# Patient Record
Sex: MALE | Race: OTHER | HISPANIC OR LATINO
[De-identification: names, ages, dates, MRNs, and addresses within clinical notes are randomized per-mention and may not be internally consistent; named-entity substitution may affect disease eponyms.]

---

## 2021-08-27 DIAGNOSIS — Z98.890 OTHER SPECIFIED POSTPROCEDURAL STATES: ICD-10-CM

## 2021-08-27 DIAGNOSIS — Z78.9 OTHER SPECIFIED HEALTH STATUS: ICD-10-CM

## 2021-08-27 DIAGNOSIS — Z80.9 FAMILY HISTORY OF MALIGNANT NEOPLASM, UNSPECIFIED: ICD-10-CM

## 2021-08-27 DIAGNOSIS — Z87.891 PERSONAL HISTORY OF NICOTINE DEPENDENCE: ICD-10-CM

## 2021-08-27 RX ORDER — ENTECAVIR 0.5 MG/1
0.5 TABLET, FILM COATED ORAL DAILY
Refills: 0 | Status: ACTIVE | COMMUNITY

## 2021-08-27 RX ORDER — PENICILLIN V POTASSIUM 250 MG/1
250 TABLET, FILM COATED ORAL TWICE DAILY
Refills: 0 | Status: ACTIVE | COMMUNITY

## 2021-08-27 RX ORDER — ACETAMINOPHEN 500 MG/1
500 TABLET ORAL EVERY 6 HOURS
Refills: 0 | Status: ACTIVE | COMMUNITY

## 2021-08-30 ENCOUNTER — RESULT REVIEW (OUTPATIENT)
Age: 66
End: 2021-08-30

## 2021-08-30 ENCOUNTER — APPOINTMENT (OUTPATIENT)
Dept: CARDIOLOGY | Facility: CLINIC | Age: 66
End: 2021-08-30
Payer: MEDICARE

## 2021-08-30 ENCOUNTER — OUTPATIENT (OUTPATIENT)
Dept: OUTPATIENT SERVICES | Facility: HOSPITAL | Age: 66
LOS: 1 days | Discharge: HOME | End: 2021-08-30

## 2021-08-30 VITALS
OXYGEN SATURATION: 97 % | HEIGHT: 73 IN | RESPIRATION RATE: 16 BRPM | TEMPERATURE: 97.3 F | BODY MASS INDEX: 25.74 KG/M2 | SYSTOLIC BLOOD PRESSURE: 128 MMHG | WEIGHT: 194.22 LBS | HEART RATE: 65 BPM | DIASTOLIC BLOOD PRESSURE: 74 MMHG

## 2021-08-30 DIAGNOSIS — E78.5 HYPERLIPIDEMIA, UNSPECIFIED: ICD-10-CM

## 2021-08-30 DIAGNOSIS — D64.9 ANEMIA, UNSPECIFIED: ICD-10-CM

## 2021-08-30 DIAGNOSIS — I71.2 THORACIC AORTIC ANEURYSM, W/OUT RUPTURE: ICD-10-CM

## 2021-08-30 LAB
ACANTHOCYTES BLD QL SMEAR: SLIGHT — SIGNIFICANT CHANGE UP
ALBUMIN SERPL ELPH-MCNC: 4.7 G/DL
ALP BLD-CCNC: 74 U/L
ALT SERPL-CCNC: 18 U/L
ANION GAP SERPL CALC-SCNC: 13 MMOL/L
ANISOCYTOSIS BLD QL: SLIGHT — SIGNIFICANT CHANGE UP
AST SERPL-CCNC: 24 U/L
BASOPHILS # BLD AUTO: 0.11 K/UL — SIGNIFICANT CHANGE UP (ref 0–0.2)
BASOPHILS NFR BLD AUTO: 2 % — SIGNIFICANT CHANGE UP (ref 0–2)
BILIRUB SERPL-MCNC: 0.9 MG/DL
BUN SERPL-MCNC: 17 MG/DL
CALCIUM SERPL-MCNC: 9.5 MG/DL
CHLORIDE SERPL-SCNC: 105 MMOL/L
CHOLEST SERPL-MCNC: 104 MG/DL
CO2 SERPL-SCNC: 21 MMOL/L
CREAT SERPL-MCNC: 1.06 MG/DL
DACRYOCYTES BLD QL SMEAR: SLIGHT — SIGNIFICANT CHANGE UP
EOSINOPHIL # BLD AUTO: 0.39 K/UL — SIGNIFICANT CHANGE UP (ref 0–0.5)
EOSINOPHIL NFR BLD AUTO: 7 % — HIGH (ref 0–6)
ESTIMATED AVERAGE GLUCOSE: 123 MG/DL
GLUCOSE SERPL-MCNC: 104 MG/DL
HBA1C MFR BLD HPLC: 5.9 %
HCT VFR BLD CALC: 33.5 % — LOW (ref 39–50)
HDLC SERPL-MCNC: 42 MG/DL
HGB BLD-MCNC: 11.5 G/DL — LOW (ref 13–17)
LDLC SERPL CALC-MCNC: 38 MG/DL
LYMPHOCYTES # BLD AUTO: 2.45 K/UL — SIGNIFICANT CHANGE UP (ref 1–3.3)
LYMPHOCYTES # BLD AUTO: 44 % — SIGNIFICANT CHANGE UP (ref 13–44)
MCHC RBC-ENTMCNC: 34.3 G/DL — SIGNIFICANT CHANGE UP (ref 32–36)
MCHC RBC-ENTMCNC: 35.5 PG — HIGH (ref 27–34)
MCV RBC AUTO: 103.4 FL — HIGH (ref 80–100)
MONOCYTES # BLD AUTO: 0.78 K/UL — SIGNIFICANT CHANGE UP (ref 0–0.9)
MONOCYTES NFR BLD AUTO: 14 % — SIGNIFICANT CHANGE UP (ref 2–14)
MYELOCYTES NFR BLD: 1 % — HIGH (ref 0–0)
NEUTROPHILS # BLD AUTO: 1.78 K/UL — LOW (ref 1.8–7.4)
NEUTROPHILS NFR BLD AUTO: 32 % — LOW (ref 43–77)
NONHDLC SERPL-MCNC: 63 MG/DL
NRBC # BLD: 0 /100 — SIGNIFICANT CHANGE UP (ref 0–0)
NRBC # BLD: SIGNIFICANT CHANGE UP /100 WBCS (ref 0–0)
NT-PROBNP SERPL-MCNC: 211 PG/ML
PLAT MORPH BLD: NORMAL — SIGNIFICANT CHANGE UP
PLATELET # BLD AUTO: 165 K/UL — SIGNIFICANT CHANGE UP (ref 150–400)
POIKILOCYTOSIS BLD QL AUTO: SLIGHT — SIGNIFICANT CHANGE UP
POTASSIUM SERPL-SCNC: 4.2 MMOL/L
PROT SERPL-MCNC: 7.2 G/DL
RBC # BLD: 3.24 M/UL — LOW (ref 4.2–5.8)
RBC # FLD: 17 % — HIGH (ref 10.3–14.5)
RBC BLD AUTO: ABNORMAL
SODIUM SERPL-SCNC: 139 MMOL/L
TARGETS BLD QL SMEAR: SLIGHT — SIGNIFICANT CHANGE UP
TRIGL SERPL-MCNC: 123 MG/DL
WBC # BLD: 5.56 K/UL — SIGNIFICANT CHANGE UP (ref 3.8–10.5)
WBC # FLD AUTO: 5.56 K/UL — SIGNIFICANT CHANGE UP (ref 3.8–10.5)

## 2021-08-30 PROCEDURE — 93000 ELECTROCARDIOGRAM COMPLETE: CPT

## 2021-08-30 PROCEDURE — 99204 OFFICE O/P NEW MOD 45 MIN: CPT

## 2021-08-30 NOTE — REASON FOR VISIT
[Symptom and Test Evaluation] : symptom and test evaluation [Structural Heart and Valve Disease] : structural heart and valve disease [Coronary Artery Disease] : coronary artery disease [Spouse] : spouse [FreeTextEntry3] : [facility] [place]

## 2021-08-30 NOTE — PHYSICAL EXAM
[Well Developed] : well developed [Well Nourished] : well nourished [No Acute Distress] : no acute distress [Normal Conjunctiva] : normal conjunctiva [No Xanthelasma] : no xanthelasma [Normal Venous Pressure] : normal venous pressure [No Carotid Bruit] : no carotid bruit [Normal S1, S2] : normal S1, S2 [No Murmur] : no murmur [No Rub] : no rub [No Gallop] : no gallop [Clear Lung Fields] : clear lung fields [Good Air Entry] : good air entry [No Respiratory Distress] : no respiratory distress  [Soft] : abdomen soft [Non Tender] : non-tender [Normal Gait] : normal gait [Gait - Sufficient for Exercise Testing] : gait - sufficient for exercise testing [No Edema] : no edema [No Cyanosis] : no cyanosis [No Clubbing] : no clubbing [No Varicosities] : no varicosities [Normal Radial B/L] : normal radial B/L [No Rash] : no rash [Moves all extremities] : moves all extremities [No Focal Deficits] : no focal deficits [Normal Speech] : normal speech [Alert and Oriented] : alert and oriented [Normal memory] : normal memory

## 2021-08-30 NOTE — HISTORY OF PRESENT ILLNESS
[FreeTextEntry1] : PETER DE LA PAZ is a 65 year man with a history of multiple myeloma s/p CyBorD x2, followed by 4 cycles of daratumumab, carfilzomib, lenalidomide, with dexamethasone followed by autologous SCT (melphalan) in 10/2018, HTN, HLD, and an ascending aortic aneurysm which was repaired with bioprosthetic AV prosthesis 7/3/2019 at [facility]. He is referred for chest pain and dyspnea on exertion. Last saw Cardiologist Sept 2019, Dr. Dutton at [facility] [place].\par \par He reports retrosternal chest pain which comes and goes, especially after exerting himself (gardening or household repairs). He also notes dyspnea on exertion, when walking, which has been present for a few years but worsened in the past few months. No syncope. No palpitations. No recent stress test. \par \par Myeloma was discovered after he presented with chest pain. 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 The most recent PET scan done a few months ago showed no evidence of new disease or skeletal involvement. He remains on lenalidomide maintenance with good control.\par  \par \par Other Medical History:\par splenectomy, partial gastrectomy, partial pancreatectomy\par Aortic valve replacement (bovine, 27mm) Dr. Payne [facility], 2019.\par Titanium kevin in right femur, plasmacytoma\par \par \par

## 2021-08-30 NOTE — REVIEW OF SYSTEMS
[Dyspnea on exertion] : dyspnea during exertion [Chest Discomfort] : chest discomfort [Lower Ext Edema] : lower extremity edema [Leg Claudication] : no intermittent leg claudication [Palpitations] : no palpitations [Orthopnea] : no orthopnea [PND] : no PND [Syncope] : no syncope [Abdominal Pain] : abdominal pain [Numbness (Hypoesthesia)] : numbness [Negative] : Heme/Lymph

## 2021-08-30 NOTE — ASSESSMENT
[FreeTextEntry1] : 65 year old man with coronary artery calcifications on chest CT, thoracic aortic aneursym s/p repair and bioprosthetic AVR in 2019, multiple myeloma in remission after above treatments, SCT, currently on maintenance lenalidomide who is seen for exertional chest pain and dyspnea on exertion.\par \par Lipid panel, A1c\par Exercise stress test\par Echo\par Follow up by phone after the above\par \par Plan of care and recommendations discussed with the patient and all questions answered to the best of my ability and to their apparent satisfaction.

## 2021-09-15 ENCOUNTER — APPOINTMENT (OUTPATIENT)
Dept: CV DIAGNOSITCS | Facility: HOSPITAL | Age: 66
End: 2021-09-15
Payer: MEDICARE

## 2021-09-15 ENCOUNTER — OUTPATIENT (OUTPATIENT)
Dept: OUTPATIENT SERVICES | Facility: HOSPITAL | Age: 66
LOS: 1 days | End: 2021-09-15

## 2021-09-15 ENCOUNTER — APPOINTMENT (OUTPATIENT)
Dept: CV DIAGNOSTICS | Facility: HOSPITAL | Age: 66
End: 2021-09-15
Payer: MEDICARE

## 2021-09-15 DIAGNOSIS — R07.2 PRECORDIAL PAIN: ICD-10-CM

## 2021-09-15 PROCEDURE — 93306 TTE W/DOPPLER COMPLETE: CPT | Mod: 26

## 2021-09-15 PROCEDURE — 93018 CV STRESS TEST I&R ONLY: CPT | Mod: GC

## 2021-09-15 PROCEDURE — 93016 CV STRESS TEST SUPVJ ONLY: CPT | Mod: GC

## 2021-09-20 RX ORDER — METOPROLOL TARTRATE 25 MG/1
25 TABLET, FILM COATED ORAL TWICE DAILY
Refills: 0 | Status: DISCONTINUED | COMMUNITY
End: 2021-09-20

## 2021-10-19 ENCOUNTER — NON-APPOINTMENT (OUTPATIENT)
Age: 66
End: 2021-10-19

## 2021-11-27 PROBLEM — Z92.21 HISTORY OF CHEMOTHERAPY: Status: ACTIVE | Noted: 2021-08-27

## 2021-11-27 PROBLEM — R06.00 DYSPNEA ON EXERTION: Status: ACTIVE | Noted: 2021-08-30

## 2021-11-27 NOTE — CARDIOLOGY SUMMARY
[de-identified] : 11/29/2021:\par 8/30/2021: NSR at normal. HR 62 bpm. QTc 414  [de-identified] : 9/15/2021: Exercise treadmill stress test, 4 METS. ST depressions II, III, AvF, V4-V6 during stress, persisting into recovery.  [de-identified] : 9/15/2021: Normal LV function, LVEF 66 %, Bioprosthetic aortic valve replacement. Peak transaortic\par valve gradient equals 3 mm Hg, mean transaortic valve gradient equals 2 mm Hg, which is probably normal in the\par presence of a prosthetic valve. No obvious aortic valve regurgitation seen.

## 2021-11-27 NOTE — HISTORY OF PRESENT ILLNESS
[FreeTextEntry1] : PEETR DE LA PAZ is a 66 year man with a history of multiple myeloma s/p CyBorD x2, 4 cycles of daratumumab, carfilzomib, lenalidomide, with dexamethasone followed by autologous SCT (melphalan) in 10/2018, HTN, HLD, and an ascending aortic aneurysm which was repaired with bioprosthetic AV replacement 7/3/2019 at [facility] who comes for follow up today.\par \par Interval History:\par Increased metoprolol to 50 mg BID.\par \par History:\par He is referred for chest pain and dyspnea on exertion. Last saw Cardiologist Sept 2019, Dr. Dutton at [facility] [place].\par \par He reports retrosternal chest pain which comes and goes, especially after exerting himself (gardening or household repairs). He also notes dyspnea on exertion, when walking, which has been present for a few years but worsened in the past few months. No syncope. No palpitations. No recent stress test. \par \par Myeloma was discovered after he presented with chest pain. 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 The most recent PET scan done a few months ago showed no evidence of new disease or skeletal involvement. He remains on lenalidomide maintenance with good control.\par  \par \par Other Medical History:\par splenectomy, partial gastrectomy, partial pancreatectomy\par Aortic valve replacement (bovine, 27mm) Dr. 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

## 2021-11-27 NOTE — REASON FOR VISIT
[Structural Heart and Valve Disease] : structural heart and valve disease [Coronary Artery Disease] : coronary artery disease [[place]Entry3[place] [facility] [place]

## 2021-11-29 ENCOUNTER — APPOINTMENT (OUTPATIENT)
Dept: CARDIOLOGY | Facility: CLINIC | Age: 66
End: 2021-11-29

## 2021-11-29 DIAGNOSIS — Z92.21 PERSONAL HISTORY OF ANTINEOPLASTIC CHEMOTHERAPY: ICD-10-CM

## 2021-11-29 DIAGNOSIS — R06.00 DYSPNEA, UNSPECIFIED: ICD-10-CM

## 2022-02-19 ENCOUNTER — TRANSCRIPTION ENCOUNTER (OUTPATIENT)
Age: 67
End: 2022-02-19

## 2022-08-01 ENCOUNTER — APPOINTMENT (OUTPATIENT)
Dept: CARDIOLOGY | Facility: CLINIC | Age: 67
End: 2022-08-01

## 2022-08-01 ENCOUNTER — RESULT REVIEW (OUTPATIENT)
Age: 67
End: 2022-08-01

## 2022-08-01 ENCOUNTER — OUTPATIENT (OUTPATIENT)
Dept: OUTPATIENT SERVICES | Facility: HOSPITAL | Age: 67
LOS: 1 days | Discharge: HOME | End: 2022-08-01

## 2022-08-01 VITALS
BODY MASS INDEX: 26.18 KG/M2 | WEIGHT: 198.42 LBS | RESPIRATION RATE: 16 BRPM | SYSTOLIC BLOOD PRESSURE: 118 MMHG | TEMPERATURE: 97.3 F | OXYGEN SATURATION: 99 % | HEART RATE: 58 BPM | DIASTOLIC BLOOD PRESSURE: 67 MMHG

## 2022-08-01 DIAGNOSIS — C90.01 MULTIPLE MYELOMA IN REMISSION: ICD-10-CM

## 2022-08-01 DIAGNOSIS — R07.2 PRECORDIAL PAIN: ICD-10-CM

## 2022-08-01 DIAGNOSIS — D64.9 ANEMIA, UNSPECIFIED: ICD-10-CM

## 2022-08-01 LAB
BASOPHILS # BLD AUTO: 0.07 K/UL — SIGNIFICANT CHANGE UP (ref 0–0.2)
BASOPHILS NFR BLD AUTO: 1.2 % — SIGNIFICANT CHANGE UP (ref 0–2)
EOSINOPHIL # BLD AUTO: 0.36 K/UL — SIGNIFICANT CHANGE UP (ref 0–0.5)
EOSINOPHIL NFR BLD AUTO: 6.2 % — HIGH (ref 0–6)
HCT VFR BLD CALC: 30.5 % — LOW (ref 39–50)
HGB BLD-MCNC: 10.6 G/DL — LOW (ref 13–17)
IMM GRANULOCYTES NFR BLD AUTO: 0.2 % — SIGNIFICANT CHANGE UP (ref 0–1.5)
LYMPHOCYTES # BLD AUTO: 2.74 K/UL — SIGNIFICANT CHANGE UP (ref 1–3.3)
LYMPHOCYTES # BLD AUTO: 47.2 % — HIGH (ref 13–44)
MCHC RBC-ENTMCNC: 34.8 G/DL — SIGNIFICANT CHANGE UP (ref 32–36)
MCHC RBC-ENTMCNC: 36.8 PG — HIGH (ref 27–34)
MCV RBC AUTO: 105.9 FL — HIGH (ref 80–100)
MONOCYTES # BLD AUTO: 1.16 K/UL — HIGH (ref 0–0.9)
MONOCYTES NFR BLD AUTO: 20 % — HIGH (ref 2–14)
NEUTROPHILS # BLD AUTO: 1.47 K/UL — LOW (ref 1.8–7.4)
NEUTROPHILS NFR BLD AUTO: 25.2 % — LOW (ref 43–77)
NRBC # BLD: 0 /100 WBCS — SIGNIFICANT CHANGE UP (ref 0–0)
PLATELET # BLD AUTO: 146 K/UL — LOW (ref 150–400)
RBC # BLD: 2.88 M/UL — LOW (ref 4.2–5.8)
RBC # FLD: 17 % — HIGH (ref 10.3–14.5)
WBC # BLD: 5.81 K/UL — SIGNIFICANT CHANGE UP (ref 3.8–10.5)
WBC # FLD AUTO: 5.81 K/UL — SIGNIFICANT CHANGE UP (ref 3.8–10.5)

## 2022-08-01 PROCEDURE — 99215 OFFICE O/P EST HI 40 MIN: CPT | Mod: 25

## 2022-08-01 PROCEDURE — 93000 ELECTROCARDIOGRAM COMPLETE: CPT

## 2022-08-01 PROCEDURE — 93010 ELECTROCARDIOGRAM REPORT: CPT

## 2022-08-01 RX ORDER — ASPIRIN ENTERIC COATED TABLETS 81 MG 81 MG/1
81 TABLET, DELAYED RELEASE ORAL
Refills: 0 | Status: ACTIVE | COMMUNITY

## 2022-08-01 RX ORDER — AZITHROMYCIN 250 MG/1
250 TABLET, FILM COATED ORAL
Qty: 6 | Refills: 0 | Status: COMPLETED | COMMUNITY
Start: 2022-07-25

## 2022-08-02 LAB
ALBUMIN SERPL ELPH-MCNC: 4.5 G/DL
ALP BLD-CCNC: 69 U/L
ALT SERPL-CCNC: 14 U/L
ANION GAP SERPL CALC-SCNC: 9 MMOL/L
AST SERPL-CCNC: 19 U/L
BILIRUB SERPL-MCNC: 0.6 MG/DL
BUN SERPL-MCNC: 17 MG/DL
CALCIUM SERPL-MCNC: 9.6 MG/DL
CHLORIDE SERPL-SCNC: 106 MMOL/L
CHOLEST SERPL-MCNC: 105 MG/DL
CO2 SERPL-SCNC: 24 MMOL/L
CREAT SERPL-MCNC: 1.1 MG/DL
EGFR: 74 ML/MIN/1.73M2
ESTIMATED AVERAGE GLUCOSE: 120 MG/DL
GLUCOSE SERPL-MCNC: 90 MG/DL
HBA1C MFR BLD HPLC: 5.8 %
HDLC SERPL-MCNC: 46 MG/DL
LDLC SERPL CALC-MCNC: 34 MG/DL
NONHDLC SERPL-MCNC: 59 MG/DL
POTASSIUM SERPL-SCNC: 4.4 MMOL/L
PROT SERPL-MCNC: 7.2 G/DL
SODIUM SERPL-SCNC: 139 MMOL/L
TRIGL SERPL-MCNC: 123 MG/DL

## 2022-08-02 NOTE — HISTORY OF PRESENT ILLNESS
[FreeTextEntry1] : Interval History:\par Increased metoprolol to 50 mg BID for abnormal exercise stress test. He reports he felt better after this with no further chest pain. No exertional symptoms. No change in exercise capacity. Missed last follow up appointment due to pandemic, but denies any new complaints today and reports feeling well. Remains on maintenance lenalidomide.\par \par History:\par He is referred for chest pain and dyspnea on exertion. Last saw Cardiologist Sept 2019, Dr. Dutton at [facility] [place].\par \par He reports retrosternal chest pain which comes and goes, especially after exerting himself (gardening or household repairs). He also notes dyspnea on exertion, when walking, which has been present for a few years but worsened in the past few months. No syncope. No palpitations. No recent stress test. \par \par Myeloma was discovered after he presented with chest pain. 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 The most recent PET scan done a few months ago showed no evidence of new disease or skeletal involvement. He remains on lenalidomide maintenance with good control.\par  \par Other Medical History:\par splenectomy, partial gastrectomy, partial pancreatectomy\par Aortic valve replacement (bovine, 27mm) Dr. 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

## 2022-08-02 NOTE — PHYSICAL EXAM
[Normal] : normal venous pressure, no carotid bruit [Normal S1, S2] : normal S1, S2 [No Murmur] : no murmur [No Rub] : no rub [No Gallop] : no gallop [Clear Lung Fields] : clear lung fields [Good Air Entry] : good air entry [No Respiratory Distress] : no respiratory distress  [Soft] : abdomen soft [Normal Gait] : normal gait [Gait - Sufficient for Exercise Testing] : gait - sufficient for exercise testing [No Edema] : no edema [No Cyanosis] : no cyanosis [No Clubbing] : no clubbing [No Varicosities] : no varicosities [No Rash] : no rash [Moves all extremities] : moves all extremities [No Focal Deficits] : no focal deficits [Normal Speech] : normal speech [Alert and Oriented] : alert and oriented [de-identified] : surgical and radiation scars

## 2022-08-02 NOTE — DISCUSSION/SUMMARY
[FreeTextEntry1] : Doing well from the cardiovascular standpoint, symptoms controlled with Toprol XL 50 mg daily\par To continue aspirin, atorvastatin 40, and Toprol 50 XL for CAD\par Lipid panel and a1c today\par \par Because of abnormal exercise ECG, risk factors, will risk stratify with MPI or CT-A. Patient in agreement with this approach.\par \par Follow up in 6 months with me. Discussed with the patient and all questions answered to the best of my ability and to his apparent satisfaction.\par \par \par  [EKG obtained to assist in diagnosis and management of assessed problem(s)] : EKG obtained to assist in diagnosis and management of assessed problem(s)

## 2022-08-02 NOTE — REASON FOR VISIT
[FreeTextEntry3] : Dr. Stevie MUÑOZK [FreeTextEntry1] : PETER DE LA PAZ is a 66 year man with a history of multiple myeloma s/p CyBorD x2, 4 cycles of daratumumab, carfilzomib, lenalidomide, with dexamethasone followed by autologous SCT (melphalan) in 10/2018, HTN, HLD, and an ascending aortic aneurysm which was repaired with bioprosthetic AV replacement 7/3/2019 at [facility] who comes for follow up today.\par \par Prior Cancer Treatments:\par ------------------------------------------------------------------------\par Chemo/targeted therapy:\par CyBorD x2\par KRD + daratumumab x4\par autologous SCT with melphalan prep 10/2018\par lenalidomide maintenance currently\par ------------------------------------------------------------------------\par Surgery:\par 1/31/2018: 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

## 2022-08-05 ENCOUNTER — APPOINTMENT (OUTPATIENT)
Dept: CV DIAGNOSITCS | Facility: HOSPITAL | Age: 67
End: 2022-08-05

## 2022-08-05 ENCOUNTER — OUTPATIENT (OUTPATIENT)
Dept: OUTPATIENT SERVICES | Facility: HOSPITAL | Age: 67
LOS: 1 days | End: 2022-08-05

## 2022-08-05 ENCOUNTER — APPOINTMENT (OUTPATIENT)
Dept: CV DIAGNOSTICS | Facility: HOSPITAL | Age: 67
End: 2022-08-05

## 2022-08-05 DIAGNOSIS — C90.01 MULTIPLE MYELOMA IN REMISSION: ICD-10-CM

## 2022-08-05 DIAGNOSIS — I25.10 ATHEROSCLEROTIC HEART DISEASE OF NATIVE CORONARY ARTERY WITHOUT ANGINA PECTORIS: ICD-10-CM

## 2022-08-05 PROCEDURE — 93306 TTE W/DOPPLER COMPLETE: CPT | Mod: 26

## 2022-08-05 PROCEDURE — 93018 CV STRESS TEST I&R ONLY: CPT | Mod: GC

## 2022-08-05 PROCEDURE — 78452 HT MUSCLE IMAGE SPECT MULT: CPT | Mod: 26,MH

## 2022-08-05 PROCEDURE — 93016 CV STRESS TEST SUPVJ ONLY: CPT | Mod: GC

## 2022-10-14 RX ORDER — ATORVASTATIN CALCIUM 40 MG/1
40 TABLET, FILM COATED ORAL
Qty: 90 | Refills: 3 | Status: ACTIVE | COMMUNITY
Start: 1900-01-01 | End: 1900-01-01

## 2023-01-27 ENCOUNTER — TRANSCRIPTION ENCOUNTER (OUTPATIENT)
Age: 68
End: 2023-01-27

## 2023-01-27 RX ORDER — METOPROLOL TARTRATE 50 MG/1
50 TABLET, FILM COATED ORAL
Qty: 180 | Refills: 1 | Status: ACTIVE | COMMUNITY
Start: 2021-09-20 | End: 1900-01-01

## 2023-02-04 PROBLEM — R73.03 PRE-DIABETES: Status: ACTIVE | Noted: 2023-02-04

## 2023-02-06 ENCOUNTER — RESULT REVIEW (OUTPATIENT)
Age: 68
End: 2023-02-06

## 2023-02-06 ENCOUNTER — APPOINTMENT (OUTPATIENT)
Dept: CARDIOLOGY | Facility: CLINIC | Age: 68
End: 2023-02-06
Payer: MEDICARE

## 2023-02-06 ENCOUNTER — OUTPATIENT (OUTPATIENT)
Dept: OUTPATIENT SERVICES | Facility: HOSPITAL | Age: 68
LOS: 1 days | Discharge: HOME | End: 2023-02-06
Payer: MEDICARE

## 2023-02-06 ENCOUNTER — LABORATORY RESULT (OUTPATIENT)
Age: 68
End: 2023-02-06

## 2023-02-06 VITALS
RESPIRATION RATE: 20 BRPM | WEIGHT: 179.67 LBS | HEIGHT: 73 IN | SYSTOLIC BLOOD PRESSURE: 96 MMHG | DIASTOLIC BLOOD PRESSURE: 61 MMHG | BODY MASS INDEX: 23.81 KG/M2 | TEMPERATURE: 97.7 F | OXYGEN SATURATION: 96 % | HEART RATE: 64 BPM

## 2023-02-06 DIAGNOSIS — I10 ESSENTIAL (PRIMARY) HYPERTENSION: ICD-10-CM

## 2023-02-06 DIAGNOSIS — I25.10 ATHEROSCLEROTIC HEART DISEASE OF NATIVE CORONARY ARTERY W/OUT ANGINA PECTORIS: ICD-10-CM

## 2023-02-06 DIAGNOSIS — R73.03 PREDIABETES.: ICD-10-CM

## 2023-02-06 DIAGNOSIS — Z86.79 OTHER SPECIFIED POSTPROCEDURAL STATES: ICD-10-CM

## 2023-02-06 DIAGNOSIS — R61 GENERALIZED HYPERHIDROSIS: ICD-10-CM

## 2023-02-06 DIAGNOSIS — D64.9 ANEMIA, UNSPECIFIED: ICD-10-CM

## 2023-02-06 DIAGNOSIS — C90.00 MULTIPLE MYELOMA NOT HAVING ACHIEVED REMISSION: ICD-10-CM

## 2023-02-06 DIAGNOSIS — Z98.890 OTHER SPECIFIED POSTPROCEDURAL STATES: ICD-10-CM

## 2023-02-06 LAB
ACANTHOCYTES BLD QL SMEAR: SLIGHT — SIGNIFICANT CHANGE UP
ALBUMIN SERPL ELPH-MCNC: 4.1 G/DL
ALP BLD-CCNC: 117 U/L
ALT SERPL-CCNC: 61 U/L
ANION GAP SERPL CALC-SCNC: 19 MMOL/L
AST SERPL-CCNC: 64 U/L
BASOPHILS # BLD AUTO: 0 K/UL — SIGNIFICANT CHANGE UP (ref 0–0.2)
BASOPHILS NFR BLD AUTO: 0 % — SIGNIFICANT CHANGE UP (ref 0–2)
BILIRUB SERPL-MCNC: 0.5 MG/DL
BUN SERPL-MCNC: 45 MG/DL
CALCIUM SERPL-MCNC: 9.6 MG/DL
CHLORIDE SERPL-SCNC: 97 MMOL/L
CO2 SERPL-SCNC: 15 MMOL/L
CREAT SERPL-MCNC: 2.52 MG/DL
EGFR: 27 ML/MIN/1.73M2
ELLIPTOCYTES BLD QL SMEAR: SLIGHT — SIGNIFICANT CHANGE UP
EOSINOPHIL # BLD AUTO: 0.05 K/UL — SIGNIFICANT CHANGE UP (ref 0–0.5)
EOSINOPHIL NFR BLD AUTO: 2 % — SIGNIFICANT CHANGE UP (ref 0–6)
GLUCOSE SERPL-MCNC: 108 MG/DL
HCT VFR BLD CALC: 30 % — LOW (ref 39–50)
HGB BLD-MCNC: 10.6 G/DL — LOW (ref 13–17)
LG PLATELETS BLD QL AUTO: SLIGHT — SIGNIFICANT CHANGE UP
LYMPHOCYTES # BLD AUTO: 0.86 K/UL — LOW (ref 1–3.3)
LYMPHOCYTES # BLD AUTO: 37 % — SIGNIFICANT CHANGE UP (ref 13–44)
MCHC RBC-ENTMCNC: 33.9 PG — SIGNIFICANT CHANGE UP (ref 27–34)
MCHC RBC-ENTMCNC: 35.3 G/DL — SIGNIFICANT CHANGE UP (ref 32–36)
MCV RBC AUTO: 95.8 FL — SIGNIFICANT CHANGE UP (ref 80–100)
MONOCYTES # BLD AUTO: 0.3 K/UL — SIGNIFICANT CHANGE UP (ref 0–0.9)
MONOCYTES NFR BLD AUTO: 13 % — SIGNIFICANT CHANGE UP (ref 2–14)
NEUTROPHILS # BLD AUTO: 1.11 K/UL — LOW (ref 1.8–7.4)
NEUTROPHILS NFR BLD AUTO: 48 % — SIGNIFICANT CHANGE UP (ref 43–77)
NRBC # BLD: 1 /100 — HIGH (ref 0–0)
NRBC # BLD: SIGNIFICANT CHANGE UP /100 WBCS (ref 0–0)
NT-PROBNP SERPL-MCNC: 1273 PG/ML
PLAT MORPH BLD: ABNORMAL
PLATELET # BLD AUTO: SIGNIFICANT CHANGE UP K/UL (ref 150–400)
POIKILOCYTOSIS BLD QL AUTO: SLIGHT — SIGNIFICANT CHANGE UP
POTASSIUM SERPL-SCNC: 4 MMOL/L
PROT SERPL-MCNC: 7.6 G/DL
RBC # BLD: 3.13 M/UL — LOW (ref 4.2–5.8)
RBC # FLD: 18.6 % — HIGH (ref 10.3–14.5)
RBC BLD AUTO: ABNORMAL
SCHISTOCYTES BLD QL AUTO: SLIGHT — SIGNIFICANT CHANGE UP
SODIUM SERPL-SCNC: 131 MMOL/L
TSH SERPL-ACNC: 10.2 UIU/ML
WBC # BLD: 2.32 K/UL — LOW (ref 3.8–10.5)
WBC # FLD AUTO: 2.32 K/UL — LOW (ref 3.8–10.5)

## 2023-02-06 PROCEDURE — 93000 ELECTROCARDIOGRAM COMPLETE: CPT

## 2023-02-06 PROCEDURE — 99214 OFFICE O/P EST MOD 30 MIN: CPT | Mod: 25

## 2023-02-06 PROCEDURE — 93010 ELECTROCARDIOGRAM REPORT: CPT

## 2023-02-06 RX ORDER — LENALIDOMIDE 10 MG/1
10 CAPSULE ORAL DAILY
Refills: 0 | Status: ACTIVE | COMMUNITY

## 2023-02-06 NOTE — PHYSICAL EXAM
[Normal] : normal venous pressure, no carotid bruit [Normal S1, S2] : normal S1, S2 [No Murmur] : no murmur [No Gallop] : no gallop [Clear Lung Fields] : clear lung fields [Good Air Entry] : good air entry [No Respiratory Distress] : no respiratory distress  [Soft] : abdomen soft [Normal Gait] : normal gait [Gait - Sufficient for Exercise Testing] : gait - sufficient for exercise testing [No Edema] : no edema [No Cyanosis] : no cyanosis [No Clubbing] : no clubbing [No Varicosities] : no varicosities [Moves all extremities] : moves all extremities [No Rash] : no rash [No Focal Deficits] : no focal deficits [Normal Speech] : normal speech [Alert and Oriented] : alert and oriented [Ill-Appearing] : ill-appearing [Murmur] : murmur [de-identified] : 2/6 systolic and diastolic murmur [de-identified] : surgical and radiation scars

## 2023-02-06 NOTE — REASON FOR VISIT
[Spouse] : spouse [FreeTextEntry3] : Dr. Stevie MUÑOZK [FreeTextEntry1] : PETER DE LA PAZ is a 67 year man with a history of multiple myeloma s/p autologous SCT in 2018, HTN, HLD, and aortic aneurysm and bioprosthetic AV replacement 7/3/2019 at [facility] seen for follow up.\par \par Prior Cancer Treatments:\par ------------------------------------------------------------------------\par Chemo/targeted therapy:\par CyBorD x2\par KRD + daratumumab x4\par autologous SCT with melphalan prep 10/2018\par lenalidomide maintenance currently\par ------------------------------------------------------------------------\par Surgery:\par 1/31/2018: 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

## 2023-02-06 NOTE — REVIEW OF SYSTEMS
[Negative] : Heme/Lymph [Fever] : fever [Feeling Fatigued] : feeling fatigued [Weight Loss (___ Lbs)] : [unfilled] ~Ulb weight loss [Chest Discomfort] : chest discomfort [Joint Pain] : joint pain [Muscle Cramps] : muscle cramps

## 2023-02-06 NOTE — HISTORY OF PRESENT ILLNESS
[FreeTextEntry1] : Interval History:\par Feels terrible, pain all over. Lost about 20 lbs since August. Also with night sweats, diaphoresis. Occasional chest discomfort, but no exertional angina. Had PET/CT and laboratory testing at [facility] yesterday. To see Dr. Hubbard this week.\par \par \par \par History:\par He is referred for chest pain and dyspnea on exertion. Last saw Cardiologist Sept 2019, Dr. Dutton at [facility] [place].\par \par He reports retrosternal chest pain which comes and goes, especially after exerting himself (gardening or household repairs). He also notes dyspnea on exertion, when walking, which has been present for a few years but worsened in the past few months. No syncope. No palpitations. No recent stress test. \par \par Myeloma was discovered after he presented with chest pain. 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 The most recent PET scan done a few months ago showed no evidence of new disease or skeletal involvement. He remains on lenalidomide maintenance with good control.\par  \par Other Medical History:\par splenectomy, partial gastrectomy, partial pancreatectomy\par Aortic valve replacement (bovine, 27mm) Dr. 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 He reports he felt better after this with no further chest pain. No exertional symptoms. No change in exercise capacity. Missed last follow up appointment due to pandemic, but denies any new complaints today and reports feeling well. Remains on maintenance lenalidomide.\par \par \par Cardiovascular Testing:\par ---------------------------------------\par ECG:\par 2/6/2023: NSR 94 bpm, possible LAE\par 8/1/2022: sinus bradycardia 55 bpm\par 8/30/2021: NSR 60, normal ECG\par ---------------------------------------\par Echo:\par 9/15/2021: EF 66%, bioprosthetic AVR, Peak gradient 3 mm Hg, mean gradient 2 mm Hg (normal), no AI\par ---------------------------------------\par Stress:\par 8/2022: MPI, mild-moderate basal inferolateral scar, mild inferoapical ischemia. EF 65%\par 9/15/2021: Exercise Joseluis 4 mets (fatigue), ST depressions at stress persisted into recovery\par ---------------------------------------

## 2023-02-06 NOTE — ASSESSMENT
[FreeTextEntry1] : Systemic symptoms, weight loss, night sweats suggest possible myeloma progression or infection. Has appointment with Dr. Hubbard tomorrow. He is afebrile today, but with increased HR and decreased BP.\par \par 1. Multiple myeloma, possible relapse.\par - Will check pro-BNP and CMP in anticipation of likely chemotherapy\par - Stressed importance of PO hydration\par - Will call me after myeloma eval once treatment plan is determined for telehealth follow up\par \par 2. Stable ischemic heart disease: mild ischemia with preserved EF on MPI stress test in August. Chest pain resolved with metoprolol. Current symptoms more likely related to systemic process. \par - he should continue maximal medical therapy with aspirin, atorvastatin, and metoprolol\par \par 3. Hstory of bioprosthetic AVR and aortic aneurysm repair. ECG today without worrisome findings and auscultation of the chest revealed soft murmurs which may be physiologic. \par - he should continue aspirin, statin, and BB for BP control.\par - Will repeat echo pending lab results and myeloma plan. \par \par Discussed with Dr. Hubbard and with the patient.

## 2023-02-24 ENCOUNTER — NON-APPOINTMENT (OUTPATIENT)
Age: 68
End: 2023-02-24

## 2023-03-03 ENCOUNTER — INPATIENT (INPATIENT)
Facility: HOSPITAL | Age: 68
LOS: 5 days | Discharge: HOME | DRG: 682 | End: 2023-03-09
Attending: INTERNAL MEDICINE | Admitting: INTERNAL MEDICINE
Payer: MEDICARE

## 2023-03-03 VITALS
HEART RATE: 80 BPM | HEIGHT: 74 IN | DIASTOLIC BLOOD PRESSURE: 55 MMHG | WEIGHT: 169.98 LBS | TEMPERATURE: 98 F | OXYGEN SATURATION: 99 % | RESPIRATION RATE: 18 BRPM | SYSTOLIC BLOOD PRESSURE: 93 MMHG

## 2023-03-03 DIAGNOSIS — D61.818 OTHER PANCYTOPENIA: ICD-10-CM

## 2023-03-03 DIAGNOSIS — R19.7 DIARRHEA, UNSPECIFIED: ICD-10-CM

## 2023-03-03 DIAGNOSIS — I95.9 HYPOTENSION, UNSPECIFIED: ICD-10-CM

## 2023-03-03 DIAGNOSIS — N17.9 ACUTE KIDNEY FAILURE, UNSPECIFIED: ICD-10-CM

## 2023-03-03 DIAGNOSIS — R77.8 OTHER SPECIFIED ABNORMALITIES OF PLASMA PROTEINS: ICD-10-CM

## 2023-03-03 DIAGNOSIS — Z90.81 ACQUIRED ABSENCE OF SPLEEN: Chronic | ICD-10-CM

## 2023-03-03 DIAGNOSIS — Z90.3 ACQUIRED ABSENCE OF STOMACH [PART OF]: Chronic | ICD-10-CM

## 2023-03-03 DIAGNOSIS — Z98.890 OTHER SPECIFIED POSTPROCEDURAL STATES: Chronic | ICD-10-CM

## 2023-03-03 DIAGNOSIS — D72.819 DECREASED WHITE BLOOD CELL COUNT, UNSPECIFIED: ICD-10-CM

## 2023-03-03 DIAGNOSIS — E78.5 HYPERLIPIDEMIA, UNSPECIFIED: ICD-10-CM

## 2023-03-03 DIAGNOSIS — Z29.9 ENCOUNTER FOR PROPHYLACTIC MEASURES, UNSPECIFIED: ICD-10-CM

## 2023-03-03 DIAGNOSIS — C90.00 MULTIPLE MYELOMA NOT HAVING ACHIEVED REMISSION: ICD-10-CM

## 2023-03-03 LAB
ACANTHOCYTES BLD QL SMEAR: SIGNIFICANT CHANGE UP
ANISOCYTOSIS BLD QL: SIGNIFICANT CHANGE UP
BASOPHILS # BLD AUTO: 0 K/UL — SIGNIFICANT CHANGE UP (ref 0–0.2)
BASOPHILS NFR BLD AUTO: 0 % — SIGNIFICANT CHANGE UP (ref 0–2)
EOSINOPHIL # BLD AUTO: 0 K/UL — SIGNIFICANT CHANGE UP (ref 0–0.5)
EOSINOPHIL NFR BLD AUTO: 0 % — SIGNIFICANT CHANGE UP (ref 0–6)
FLUAV AG NPH QL: SIGNIFICANT CHANGE UP
FLUBV AG NPH QL: SIGNIFICANT CHANGE UP
GIANT PLATELETS BLD QL SMEAR: PRESENT — SIGNIFICANT CHANGE UP
HCT VFR BLD CALC: 23 % — LOW (ref 39–50)
HGB BLD-MCNC: 7.6 G/DL — LOW (ref 13–17)
HOWELL-JOLLY BOD BLD QL SMEAR: PRESENT — SIGNIFICANT CHANGE UP
LACTATE BLDV-MCNC: 1.7 MMOL/L — SIGNIFICANT CHANGE UP (ref 0.5–2)
LDH SERPL L TO P-CCNC: 212 U/L — SIGNIFICANT CHANGE UP (ref 50–242)
LYMPHOCYTES # BLD AUTO: 0.19 K/UL — LOW (ref 1–3.3)
LYMPHOCYTES # BLD AUTO: 18 % — SIGNIFICANT CHANGE UP (ref 13–44)
MACROCYTES BLD QL: SIGNIFICANT CHANGE UP
MAGNESIUM SERPL-MCNC: 1.7 MG/DL — SIGNIFICANT CHANGE UP (ref 1.6–2.6)
MANUAL SMEAR VERIFICATION: SIGNIFICANT CHANGE UP
MCHC RBC-ENTMCNC: 33 GM/DL — SIGNIFICANT CHANGE UP (ref 32–36)
MCHC RBC-ENTMCNC: 33.3 PG — SIGNIFICANT CHANGE UP (ref 27–34)
MCV RBC AUTO: 100.9 FL — HIGH (ref 80–100)
METAMYELOCYTES # FLD: 10 % — HIGH (ref 0–0)
MICROCYTES BLD QL: SLIGHT — SIGNIFICANT CHANGE UP
MONOCYTES # BLD AUTO: 0 K/UL — SIGNIFICANT CHANGE UP (ref 0–0.9)
MONOCYTES NFR BLD AUTO: 0 % — LOW (ref 2–14)
NEUTROPHILS # BLD AUTO: 0.76 K/UL — LOW (ref 1.8–7.4)
NEUTROPHILS NFR BLD AUTO: 62 % — SIGNIFICANT CHANGE UP (ref 43–77)
NEUTS BAND # BLD: 10 % — HIGH (ref 0–8)
NRBC # BLD: 2 /100 — HIGH (ref 0–0)
NT-PROBNP SERPL-SCNC: 1120 PG/ML — HIGH (ref 0–300)
OVALOCYTES BLD QL SMEAR: SLIGHT — SIGNIFICANT CHANGE UP
PHOSPHATE SERPL-MCNC: 4.3 MG/DL — SIGNIFICANT CHANGE UP (ref 2.5–4.5)
PLAT MORPH BLD: ABNORMAL
PLATELET # BLD AUTO: 59 K/UL — LOW (ref 150–400)
POIKILOCYTOSIS BLD QL AUTO: SIGNIFICANT CHANGE UP
RBC # BLD: 2.28 M/UL — LOW (ref 4.2–5.8)
RBC # FLD: 20.9 % — HIGH (ref 10.3–14.5)
RBC BLD AUTO: ABNORMAL
RSV RNA NPH QL NAA+NON-PROBE: SIGNIFICANT CHANGE UP
SARS-COV-2 RNA SPEC QL NAA+PROBE: SIGNIFICANT CHANGE UP
SCHISTOCYTES BLD QL AUTO: SLIGHT — SIGNIFICANT CHANGE UP
TARGETS BLD QL SMEAR: SLIGHT — SIGNIFICANT CHANGE UP
TROPONIN T, HIGH SENSITIVITY RESULT: 68 NG/L — HIGH (ref 0–51)
TROPONIN T, HIGH SENSITIVITY RESULT: 74 NG/L — HIGH (ref 0–51)
WBC # BLD: 1.05 K/UL — LOW (ref 3.8–10.5)
WBC # FLD AUTO: 1.05 K/UL — LOW (ref 3.8–10.5)

## 2023-03-03 PROCEDURE — 74176 CT ABD & PELVIS W/O CONTRAST: CPT | Mod: 26,MA

## 2023-03-03 PROCEDURE — 99285 EMERGENCY DEPT VISIT HI MDM: CPT

## 2023-03-03 PROCEDURE — 99223 1ST HOSP IP/OBS HIGH 75: CPT

## 2023-03-03 PROCEDURE — 71046 X-RAY EXAM CHEST 2 VIEWS: CPT | Mod: 26

## 2023-03-03 RX ORDER — ATORVASTATIN CALCIUM 80 MG/1
40 TABLET, FILM COATED ORAL AT BEDTIME
Refills: 0 | Status: DISCONTINUED | OUTPATIENT
Start: 2023-03-03 | End: 2023-03-09

## 2023-03-03 RX ORDER — MONTELUKAST 4 MG/1
10 TABLET, CHEWABLE ORAL DAILY
Refills: 0 | Status: DISCONTINUED | OUTPATIENT
Start: 2023-03-03 | End: 2023-03-09

## 2023-03-03 RX ORDER — PENICILLIN V POTASSIUM 250 MG
250 TABLET ORAL EVERY 12 HOURS
Refills: 0 | Status: DISCONTINUED | OUTPATIENT
Start: 2023-03-03 | End: 2023-03-03

## 2023-03-03 RX ORDER — METRONIDAZOLE 500 MG
500 TABLET ORAL ONCE
Refills: 0 | Status: DISCONTINUED | OUTPATIENT
Start: 2023-03-03 | End: 2023-03-06

## 2023-03-03 RX ORDER — ASPIRIN/CALCIUM CARB/MAGNESIUM 324 MG
81 TABLET ORAL DAILY
Refills: 0 | Status: DISCONTINUED | OUTPATIENT
Start: 2023-03-03 | End: 2023-03-09

## 2023-03-03 RX ORDER — ONDANSETRON 8 MG/1
8 TABLET, FILM COATED ORAL THREE TIMES A DAY
Refills: 0 | Status: DISCONTINUED | OUTPATIENT
Start: 2023-03-03 | End: 2023-03-09

## 2023-03-03 RX ORDER — METRONIDAZOLE 500 MG
500 TABLET ORAL EVERY 8 HOURS
Refills: 0 | Status: DISCONTINUED | OUTPATIENT
Start: 2023-03-03 | End: 2023-03-06

## 2023-03-03 RX ORDER — METRONIDAZOLE 500 MG
TABLET ORAL
Refills: 0 | Status: DISCONTINUED | OUTPATIENT
Start: 2023-03-03 | End: 2023-03-06

## 2023-03-03 RX ORDER — CEFTRIAXONE 500 MG/1
1000 INJECTION, POWDER, FOR SOLUTION INTRAMUSCULAR; INTRAVENOUS EVERY 24 HOURS
Refills: 0 | Status: DISCONTINUED | OUTPATIENT
Start: 2023-03-03 | End: 2023-03-06

## 2023-03-03 RX ORDER — SODIUM CHLORIDE 9 MG/ML
1000 INJECTION INTRAMUSCULAR; INTRAVENOUS; SUBCUTANEOUS
Refills: 0 | Status: DISCONTINUED | OUTPATIENT
Start: 2023-03-03 | End: 2023-03-05

## 2023-03-03 RX ORDER — SODIUM CHLORIDE 9 MG/ML
1000 INJECTION INTRAMUSCULAR; INTRAVENOUS; SUBCUTANEOUS ONCE
Refills: 0 | Status: COMPLETED | OUTPATIENT
Start: 2023-03-03 | End: 2023-03-03

## 2023-03-03 RX ORDER — DEXAMETHASONE 0.5 MG/5ML
4 ELIXIR ORAL DAILY
Refills: 0 | Status: DISCONTINUED | OUTPATIENT
Start: 2023-03-03 | End: 2023-03-09

## 2023-03-03 RX ADMIN — Medication 100 MILLIGRAM(S): at 21:45

## 2023-03-03 RX ADMIN — SODIUM CHLORIDE 75 MILLILITER(S): 9 INJECTION INTRAMUSCULAR; INTRAVENOUS; SUBCUTANEOUS at 19:23

## 2023-03-03 RX ADMIN — ATORVASTATIN CALCIUM 40 MILLIGRAM(S): 80 TABLET, FILM COATED ORAL at 21:42

## 2023-03-03 RX ADMIN — SODIUM CHLORIDE 1000 MILLILITER(S): 9 INJECTION INTRAMUSCULAR; INTRAVENOUS; SUBCUTANEOUS at 14:07

## 2023-03-03 RX ADMIN — CEFTRIAXONE 100 MILLIGRAM(S): 500 INJECTION, POWDER, FOR SOLUTION INTRAMUSCULAR; INTRAVENOUS at 19:23

## 2023-03-03 NOTE — ED PROVIDER NOTE - CHILD ABUSE FACILITY
[facility] reactions to medicines

## 2023-03-03 NOTE — H&P ADULT - HISTORY OF PRESENT ILLNESS
67 year old male PMH aortic aneurysm and bioprosthetic AV valve replacement 2019, HL, splenectomy, partial gastrectomy, partial pancreatectomy, oligosecretory multiple myeloma s/p auto SCT in 2018 on revlimid started david-cybor D 2/10 under care at [facility] by Dr. Jean Claude Murryd was due for chemo today but was found to be more hypotensive than baseline (usually sbp ~90) and was sent to the ED.  He reports having diarrhea for the past 7 days, with abdominal cramping and liquid diarrhea after every meal.  Denies nausea/vomiting.  Denies fevers.  Pain is relieved with diarrhea episodes.  Has no appetite and has had minimal po intake except fluids.  Notes less urine production.  Denies dysuria.    Prior to arrival, he received 2L IVF.  He also received Neupogen today.  In the received another 1L IVF. 67 year old male PMH aortic aneurysm and bioprosthetic AV valve replacement 2019, HL, splenectomy, partial gastrectomy, partial pancreatectomy, oligosecretory multiple myeloma s/p auto SCT in 2018 on revlimid started david-cybor D 2/10 under care at [facility] [place] [place] Dr. Jean Claude Murryd was due for chemo today but was found to be more hypotensive than baseline (usually sbp ~90) and was sent to the ED.  He reports having diarrhea for the past 7 days, with abdominal cramping and liquid diarrhea after every meal.  Denies nausea/vomiting.  Denies fevers.  Pain is relieved with diarrhea episodes.  Has no appetite and has had minimal po intake except fluids.  Notes less urine production.  Denies dysuria.    Prior to arrival, he received 2L IVF.  He also received Neupogen today.  In the received another 1L IVF. 67 year old male PMH aortic aneurysm and bioprosthetic AV valve replacement 2019, HL, splenectomy, partial gastrectomy, partial pancreatectomy, oligosecretory multiple myeloma s/p auto SCT in 2018 on revlimid started david-cybor D 2/10 [place] [facility] [place] Jean Claude Murryd was due for chemo today but was found to be more hypotensive than baseline (usually sbp ~90) and was sent to the ED.  He reports having diarrhea for the past 7 days, with abdominal cramping and liquid diarrhea after every meal.  Denies nausea/vomiting.  Denies fevers.  Pain is relieved with diarrhea episodes.  Has no appetite and has had minimal po intake except fluids.  Notes less urine production.  Denies dysuria.    Prior to arrival, he received 2L IVF.  He also received Neupogen today.  In the received another 1L IVF.

## 2023-03-03 NOTE — H&P ADULT - RESPIRATORY
normal/clear to auscultation bilaterally/no wheezes/no rales/no rhonchi/no respiratory distress/airway patent/breath sounds equal/good air movement/respirations non-labored/no intercostal retractions

## 2023-03-03 NOTE — H&P ADULT - COMMENTS
Gen: no fevers.  +weight loss  HEENT: reports some dysphagia but no odynophagia  Neuro: +HA, no syncope  CV: no cp/palpitations  Resp: +SOB.  No cough  Abd: per HPI  : no dysuria  MSK: +muscle aches  Heme: no epistaxis/gum bleeding  Endo: +cold intolerance  Psych: denies anxiety/depression

## 2023-03-03 NOTE — H&P ADULT - PROBLEM SELECTOR PLAN 2
suspect chemo related  however has h/o splenectomy and is on daily penicillin (assuming for prophylaxis?)  CT abd/pelvis with liquid stool  check cdiff  will give cipro/flagyl given active chemo, bandemia suspect chemo related  however has h/o splenectomy and is on daily penicillin (assuming for prophylaxis?)  CT abd/pelvis with liquid stool  check cdiff  will give ceftriaxone/flagyl given active chemo, bandemia  hold penicillin while on ceftriaxone

## 2023-03-03 NOTE — ED PROVIDER NOTE - ATTENDING CONTRIBUTION TO CARE
67-year-old male with a history of multiple  myeloma on chemo,  valve repair, who presents to the ED from [facility] for hypotension In the 70s patient states his blood pressure normally runs low like 90s to 100 has been having nonbloody diarrhea intermittently for the past week since his last chemo patient was post have chemo today but did not occur due to his blood pressure.  Patient says he feels a little lightheaded no fevers.  Patient received 2 L of fluid outs at prior to arrival.  No access on arrival was peripheral line was not working properly ultrasound IV was placed fluid bolus was ordered do not believe is infectious etiology or sepsis check labs IV fluids and new onset renal failure with cret of 4 normal potassium no EKG changes awaiting labs CT was ordered of the abdomen pelvis of the abdomen is benign on exam diarrhea is likely secondary to chemo

## 2023-03-03 NOTE — ED PROVIDER NOTE - IV ALTEPLASE EXCL REL HIDDEN
Apixaban/Eliquis increases your risk for bleeding. Notify your doctor if you experience any of the following side effects: bleeding, coughing or vomiting blood, red or black stool, unexpected pain or swelling, itching or hives, chest pain, chest tightness, trouble breathing, changes in how much or how often you urinate, red or pink urine, numbness or tingling in your feet, or unusual muscle weakness. When Apixaban/Eliquis is taken with other medicines, they can affect how it works. Taking other medications such as aspirin, blood thinners, nonsteroidal anti-inflammatories, and medications that treat depression can increase your risk of bleeding. It is very important to tell your health care provider about all of the other medicines, including over-the-counter medications, herbs, and vitamins you are taking. DO NOT start, stop, or change the dosage of any medicine, including over-the-counter medicines, vitamins, and herbal products without your doctor’s approval. Any products containing aspirin or are nonsteroidal anti-inflammatories lessen the blood’s ability to form clots and add to the effect of Apixaban/Eliquis. Never take aspirin or medicines that contain aspirin without speaking to your doctor.
show

## 2023-03-03 NOTE — ED PROCEDURE NOTE - PROCEDURE ADDITIONAL DETAILS
Peripheral IV access in the Emergency Department obtained under dynamic ultrasound guidance with dark nonpulsatile blood return.  Catheter was flushed afterwards without any resistance or resulting extravasation.  IV catheter confirmed in compressible vein after insertion.  right upper extremity 18 gauge

## 2023-03-03 NOTE — ED ADULT TRIAGE NOTE - NS ED NURSE AMBULANCES
[facility]

## 2023-03-03 NOTE — H&P ADULT - ASSESSMENT
67 year old male PMH aortic aneurysm and bioprosthetic AV valve replacement 2019, HL, splenectomy, partial gastrectomy, partial pancreatectomy, oligosecretory multiple myeloma s/p auto SCT in 2018 on revlimid started david-cybor D 2/10 under care at [facility] by Dr. Jean Claude Hubbard was due for chemo today but was found to be more hypotensive than baseline (usually sbp ~90) and was sent to the ED, admitted with RK.  67 year old male PMH aortic aneurysm and bioprosthetic AV valve replacement 2019, HL, splenectomy, partial gastrectomy, partial pancreatectomy, oligosecretory multiple myeloma s/p auto SCT in 2018 on revlimid started david-cybor D 2/10 under care at [facility] by Dr. Jean Claude Hubbard was due for chemo today but was found to be more hypotensive than baseline (usually sbp ~90) and was sent to the ED, admitted with RK.  67 year old male PMH aortic aneurysm and bioprosthetic AV valve replacement 2019, HL, splenectomy, partial gastrectomy, partial pancreatectomy, oligosecretory multiple myeloma s/p auto SCT in 2018 on revlimid started david-cybor D 2/10 under care at [facility] by Dr. Jean Claude Hubbard was due for chemo today but was found to be more hypotensive than baseline (usually sbp ~90) and was sent to the ED, admitted with RK.

## 2023-03-03 NOTE — ED PROVIDER NOTE - PHYSICAL EXAMINATION
Const:   Thin elder male  Eyes: no conjunctival injection, and symmetrical lids.  HEENT: Head NCAT, no lesions. Atraumatic external nose and ears. Moist MM.  Neck: Symmetric, trachea midline.   CVS: +S1/S2,    RESP: Unlabored respiratory effort. Clear to auscultation bilaterally.  GI: Nontender/Nondistended   MSK: Normocephalic/Atraumatic, Lower Extremities w/o calf tenderness or edema b/l.   Skin: Warm, dry and intact.   Neuro: CNs II-XII grossly intact. Motor & Sensation grossly intact.  Psych: Awake, Alert, & Oriented (AAO) x3. Appropriate mood and affect.

## 2023-03-03 NOTE — H&P ADULT - NSICDXFAMILYHX_GEN_ALL_CORE_FT
FAMILY HISTORY:  Sibling  Still living? Unknown  FHx: lung cancer, Age at diagnosis: Age Unknown  FHx: ovarian cancer, Age at diagnosis: Age Unknown

## 2023-03-03 NOTE — H&P ADULT - NSHPLABSRESULTS_GEN_ALL_CORE
personally reviewed by me:   ct abd/pelvis: liquid stool, lipometosis structure, cholelithiasis  CXR: clear lungs  EKG: nsr@75, normal intervals, no q/st changes

## 2023-03-03 NOTE — ED CLERICAL - DIVISION
[facility]... [facility]... [facility]...

## 2023-03-03 NOTE — ED ADULT TRIAGE NOTE - NS ED NURSE BANDS TYPE
Left detailed  message on answering machine for patient to inform patient that writer called him yesterday morning to review meds for the refills that needed to be completed and that he did review the meds with writer and refills have already been sent to [facility]. Left our office phone # if he had further questions or concerns. Name band;

## 2023-03-03 NOTE — PATIENT PROFILE ADULT - FALL HARM RISK - HARM RISK INTERVENTIONS
Assistance with ambulation/Assistance OOB with selected safe patient handling equipment/Communicate Risk of Fall with Harm to all staff/Discuss with provider need for PT consult/Monitor gait and stability/Provide patient with walking aids - walker, cane, crutches/Reinforce activity limits and safety measures with patient and family/Tailored Fall Risk Interventions/Visual Cue: Yellow wristband and red socks/Bed in lowest position, wheels locked, appropriate side rails in place/Call bell, personal items and telephone in reach/Instruct patient to call for assistance before getting out of bed or chair/Non-slip footwear when patient is out of bed/[place] to call system/Physically safe environment - no spills, clutter or unnecessary equipment/Purposeful Proactive Rounding/Room/bathroom lighting operational, light cord in reach Assistance with ambulation/Assistance OOB with selected safe patient handling equipment/Communicate Risk of Fall with Harm to all staff/Discuss with provider need for PT consult/Monitor gait and stability/Provide patient with walking aids - walker, cane, crutches/Reinforce activity limits and safety measures with patient and family/Tailored Fall Risk Interventions/Visual Cue: Yellow wristband and red socks/Bed in lowest position, wheels locked, appropriate side rails in place/Call bell, personal items and telephone in reach/Instruct patient to call for assistance before getting out of bed or chair/Non-slip footwear when patient is out of bed/[place] to call system/Physically safe environment - no spills, clutter or unnecessary equipment/Purposeful Proactive Rounding/Room/bathroom lighting operational, light cord in reach Assistance with ambulation/Assistance OOB with selected safe patient handling equipment/Communicate Risk of Fall with Harm to all staff/Discuss with provider need for PT consult/Monitor gait and stability/Provide patient with walking aids - walker, cane, crutches/Reinforce activity limits and safety measures with patient and family/Tailored Fall Risk Interventions/Visual Cue: Yellow wristband and red socks/Bed in lowest position, wheels locked, appropriate side rails in place/Call bell, personal items and telephone in reach/Instruct patient to call for assistance before getting out of bed or chair/Non-slip footwear when patient is out of bed/[place] to call system/Physically safe environment - no spills, clutter or unnecessary equipment/Purposeful Proactive Rounding/Room/bathroom lighting operational, light cord in reach

## 2023-03-03 NOTE — ED ADULT NURSE NOTE - OBJECTIVE STATEMENT
Pt presented to the ED in [facility], sent from cancer center for dehydration and hypotension Pt presented to the ED in [facility], sent from cancer center for dehydration and hypotension Pt presented to the ED in [facility], sent from cancer center for dehydration and hypotension

## 2023-03-03 NOTE — ED ADULT NURSE NOTE - NSIMPLEMENTINTERV_GEN_ALL_ED
Implemented All Fall Risk Interventions:  [place] to call system. Call bell, personal items and telephone within reach. Instruct patient to call for assistance. Room bathroom lighting operational. Non-slip footwear when patient is off stretcher. Physically safe environment: no spills, clutter or unnecessary equipment. Stretcher in lowest position, wheels locked, appropriate side rails in place. Provide visual cue, wrist band, yellow gown, etc. Monitor gait and stability. Monitor for mental status changes and reorient to person, place, and time. Review medications for side effects contributing to fall risk. Reinforce activity limits and safety measures with patient and family. Implemented All Fall Risk Interventions:  [place] to call system. Call bell, personal items and telephone within reach. Instruct patient to call for assistance. Room bathroom lighting operational. Non-slip footwear when patient is off stretcher. Physically safe environment: no spills, clutter or unnecessary equipment. Stretcher in lowest position, wheels locked, appropriate side rails in place. Provide visual cue, wrist band, yellow gown, etc. Monitor gait and stability. Monitor for mental status changes and reorient to person, place, and time. Review medications for side effects contributing to fall risk. Reinforce activity limits and safety measures with patient and family. Implemented All Fall Risk Interventions:  [place] to call system. Call bell, personal items and telephone within reach. Instruct patient to call for assistance. Room bathroom lighting operational. Non-slip footwear when patient is off stretcher. Physically safe environment: no spills, clutter or unnecessary equipment. Stretcher in lowest position, wheels locked, appropriate side rails in place. Provide visual cue, wrist band, yellow gown, etc. Monitor gait and stability. Monitor for mental status changes and reorient to person, place, and time. Review medications for side effects contributing to fall risk. Reinforce activity limits and safety measures with patient and family.

## 2023-03-03 NOTE — H&P ADULT - PROBLEM SELECTOR PLAN 4
not meeting sirs criteria as HR<90, afebrile.  HOwever wbc is low, and has bandemia.  Lactate 1.7.  Will dose cipro/flagyl  f/u blood cultures sent from ED  RVP negative and cxr clear.  No clear infectious source on CT abd.  continue ivf, hold beta blocker not meeting sirs criteria as HR<90, afebrile.  HOwever wbc is low, and has bandemia.  Lactate 1.7.  Will dose ceftriaxone/flagyl  f/u blood cultures sent from ED  RVP negative and cxr clear.  No clear infectious source on CT abd.  continue ivf, hold beta blocker

## 2023-03-03 NOTE — CHART NOTE - NSCHARTNOTEFT_GEN_A_CORE
67 year old male with a history of oligosecretory multiple myeloma s/p auto SCT, under care at [facility] by Dr. Jean Claude Hubbard, presenting with hypotension. Patient has been receiving treatment with Shilpa-CyBorD since 2/10 with his last dose of daratumumab today, 3/3. Last dose of bortezomib and cyclophosphamide on 2/24. Patient is on Revlimid 10mg PO capsules. He also received Neupogen today, 3/3. Labs from earlier today showed creatinine 4.3 and ANC 0.8. His baseline creatinine is between 1.2-1.6. Full consult to follow if admitted.     If admitted to medicine, please admit to Dr. Addison, who is aware of the patient.     Thank you,   Quirino Holloway PA-C  Hematology/Oncology  [facility]  [phone] (office) 67 year old male with a history of oligosecretory multiple myeloma s/p auto SCT, under care at [facility] by Dr. Jean Claude Hubbard, presenting with hypotension. Patient has been receiving treatment with Shilpa-CyBorD since 2/10 with his last dose of daratumumab today, 3/3. Last dose of bortezomib and cyclophosphamide on 2/24. Patient is on Revlimid 10mg PO capsules. He also received Neupogen today, 3/3. Labs from earlier today showed creatinine 4.3 and ANC 0.8. His baseline creatinine is between 1.2-1.6. Full consult to follow if admitted.     If admitted to medicine, please admit to Dr. Addison, who is aware of the patient.     Thank you,   Quirino Holloway PA-C  Hematology/Oncology  [facility]  [phone] (office) 67 year old male with a history of oligosecretory multiple myeloma s/p auto SCT, under care at [facility] by [place]. Jean Claude Hubbard, presenting with hypotension. Patient has been receiving treatment with Shilpa-CyBorD since 2/10 with his last dose of daratumumab today, 3/3. Last dose of bortezomib and cyclophosphamide on 2/24. Patient is on Revlimid 10mg PO capsules. He also received Neupogen today, 3/3. Labs from earlier today showed creatinine 4.3 and ANC 0.8. His baseline creatinine is between 1.2-1.6. Full consult to follow if admitted.     If admitted to medicine, please admit to Dr. Addison, who is aware of the patient.     Thank you,   Quirino Holloway PA-C  Hematology/Oncology  [facility]  [phone] (office)

## 2023-03-03 NOTE — ED PROVIDER NOTE - CLINICAL SUMMARY MEDICAL DECISION MAKING FREE TEXT BOX
patient is a 67-year-old male with a history of multiple  myeloma on chemo,  valve repair, who presents to the ED from [facility] for hypotension.   patient outpatient labs show neutropenia.  We will get septic lab work-up.  Likely admission

## 2023-03-03 NOTE — ED PROVIDER NOTE - OBJECTIVE STATEMENT
patient is a 67-year-old male with a history of multiple  myeloma on chemo,  valve repair, who presents to the ED from [facility] for hypotension.  Patient was supposed to get chemotherapy was sent here due to low blood pressure.  Patient noting that he has been having 1 week of diarrhea, nonbloody.  Denying any chest pain, shortness of breath, nausea, vomiting, fevers, chills.    Outpatient labs show low WBC and creatinine of 4.

## 2023-03-03 NOTE — H&P ADULT - PROBLEM SELECTOR PLAN 5
left message for hematology team to call back  seen by Quirino Holloway PA-C in ED: Hematology/Oncology, [facility]  [phone] (office)  hold entecavir as it will need to be renally dosed  f/u with heme on revlimid dosing left message for hematology team to call back  seen by Quirino Holloway PA-C in ED: Hematology/Oncology, [facility]  [phone] (office)  hold entecavir as it will need to be renally dosed  f/u with heme on revlimid dosing left message for hematology team to call back  seen by Quirino Holloway PA-C in ED: Hematology/Oncology, [facility]  [phone] (office)  hold entecavir as it will need to be renally dosed  f/u with heme on revlimid dosing

## 2023-03-03 NOTE — H&P ADULT - PROBLEM SELECTOR PLAN 3
lower from baseline noted 2/6 with hb 10.6 to now 7.6  suspect chemo related  received neupogen outpatient today  no active bleeding reported  trend cbc daily  check iron studies/folate/b12

## 2023-03-03 NOTE — PATIENT PROFILE ADULT - HOSPITALS/PSYCHIATRIC FACILITIES
[facility]

## 2023-03-03 NOTE — PATIENT PROFILE ADULT - FUNCTIONAL ASSESSMENT - BASIC MOBILITY 6.
3-calculated by average/Not able to assess (calculate score using [facility] averaging method)  3-calculated by average/Not able to assess (calculate score using [facility] averaging method)  3-calculated by average/Not able to assess (calculate score using [facility] averaging method)

## 2023-03-03 NOTE — H&P ADULT - NSICDXPASTSURGICALHX_GEN_ALL_CORE_FT
PAST SURGICAL HISTORY:  H/O splenectomy     History of pancreatic surgery     S/P partial gastrectomy

## 2023-03-03 NOTE — PATIENT PROFILE ADULT - CAREGIVER ADDRESS
[address] [place], [place] [id number] [address] [place], [place] [id number] [address] [place], [place] [id number]

## 2023-03-04 LAB
A1C WITH ESTIMATED AVERAGE GLUCOSE RESULT: 6.5 % — HIGH (ref 4–5.6)
ALBUMIN SERPL ELPH-MCNC: 3.2 G/DL — LOW (ref 3.3–5)
ALP SERPL-CCNC: 83 U/L — SIGNIFICANT CHANGE UP (ref 40–120)
ALT FLD-CCNC: 61 U/L — HIGH (ref 10–45)
ANION GAP SERPL CALC-SCNC: 19 MMOL/L — HIGH (ref 5–17)
APPEARANCE UR: ABNORMAL
AST SERPL-CCNC: 48 U/L — HIGH (ref 10–40)
BACTERIA # UR AUTO: NEGATIVE — SIGNIFICANT CHANGE UP
BILIRUB DIRECT SERPL-MCNC: 0.2 MG/DL — SIGNIFICANT CHANGE UP (ref 0–0.3)
BILIRUB INDIRECT FLD-MCNC: 0.3 MG/DL — SIGNIFICANT CHANGE UP (ref 0.2–1)
BILIRUB SERPL-MCNC: 0.5 MG/DL — SIGNIFICANT CHANGE UP (ref 0.2–1.2)
BILIRUB UR-MCNC: NEGATIVE — SIGNIFICANT CHANGE UP
BUN SERPL-MCNC: 62 MG/DL — HIGH (ref 7–23)
C DIFF GDH STL QL: NEGATIVE — SIGNIFICANT CHANGE UP
C DIFF GDH STL QL: SIGNIFICANT CHANGE UP
CALCIUM SERPL-MCNC: 8.4 MG/DL — SIGNIFICANT CHANGE UP (ref 8.4–10.5)
CHLORIDE SERPL-SCNC: 102 MMOL/L — SIGNIFICANT CHANGE UP (ref 96–108)
CO2 SERPL-SCNC: 13 MMOL/L — LOW (ref 22–31)
COD CRY URNS QL: ABNORMAL
COLOR SPEC: YELLOW — SIGNIFICANT CHANGE UP
CREAT SERPL-MCNC: 4.4 MG/DL — HIGH (ref 0.5–1.3)
DIFF PNL FLD: NEGATIVE — SIGNIFICANT CHANGE UP
EGFR: 14 ML/MIN/1.73M2 — LOW
EPI CELLS # UR: 4 /HPF — SIGNIFICANT CHANGE UP
ESTIMATED AVERAGE GLUCOSE: 140 MG/DL — HIGH (ref 68–114)
FERRITIN SERPL-MCNC: 3651 NG/ML — HIGH (ref 30–400)
FOLATE SERPL-MCNC: >20 NG/ML — SIGNIFICANT CHANGE UP
GLUCOSE SERPL-MCNC: 95 MG/DL — SIGNIFICANT CHANGE UP (ref 70–99)
GLUCOSE UR QL: NEGATIVE — SIGNIFICANT CHANGE UP
GRAN CASTS # UR COMP ASSIST: 2 /LPF — SIGNIFICANT CHANGE UP
HCT VFR BLD CALC: 21.2 % — LOW (ref 39–50)
HCV AB S/CO SERPL IA: 0.03 S/CO — SIGNIFICANT CHANGE UP (ref 0–0.99)
HCV AB SERPL-IMP: SIGNIFICANT CHANGE UP
HGB BLD-MCNC: 7.2 G/DL — LOW (ref 13–17)
HYALINE CASTS # UR AUTO: 1 /LPF — SIGNIFICANT CHANGE UP (ref 0–7)
IRON SATN MFR SERPL: 14 % — LOW (ref 16–55)
IRON SATN MFR SERPL: 25 UG/DL — LOW (ref 45–165)
KETONES UR-MCNC: NEGATIVE — SIGNIFICANT CHANGE UP
LEUKOCYTE ESTERASE UR-ACNC: NEGATIVE — SIGNIFICANT CHANGE UP
MAGNESIUM SERPL-MCNC: 1.7 MG/DL — SIGNIFICANT CHANGE UP (ref 1.6–2.6)
MCHC RBC-ENTMCNC: 33.8 PG — SIGNIFICANT CHANGE UP (ref 27–34)
MCHC RBC-ENTMCNC: 34 GM/DL — SIGNIFICANT CHANGE UP (ref 32–36)
MCV RBC AUTO: 99.5 FL — SIGNIFICANT CHANGE UP (ref 80–100)
NITRITE UR-MCNC: NEGATIVE — SIGNIFICANT CHANGE UP
NRBC # BLD: 1 /100 WBCS — HIGH (ref 0–0)
PH UR: 5.5 — SIGNIFICANT CHANGE UP (ref 5–8)
PHOSPHATE SERPL-MCNC: 4.6 MG/DL — HIGH (ref 2.5–4.5)
PLATELET # BLD AUTO: 53 K/UL — LOW (ref 150–400)
POTASSIUM SERPL-MCNC: 3.4 MMOL/L — LOW (ref 3.5–5.3)
POTASSIUM SERPL-SCNC: 3.4 MMOL/L — LOW (ref 3.5–5.3)
PROT SERPL-MCNC: 5.7 G/DL — LOW (ref 6–8.3)
PROT UR-MCNC: ABNORMAL
RBC # BLD: 2.13 M/UL — LOW (ref 4.2–5.8)
RBC # FLD: 20.8 % — HIGH (ref 10.3–14.5)
RBC CASTS # UR COMP ASSIST: 3 /HPF — SIGNIFICANT CHANGE UP (ref 0–4)
RETICS #: 33 K/UL — SIGNIFICANT CHANGE UP (ref 25–125)
RETICS/RBC NFR: 1.6 % — SIGNIFICANT CHANGE UP (ref 0.5–2.5)
SODIUM SERPL-SCNC: 134 MMOL/L — LOW (ref 135–145)
SP GR SPEC: 1.01 — SIGNIFICANT CHANGE UP (ref 1.01–1.02)
TIBC SERPL-MCNC: 175 UG/DL — LOW (ref 220–430)
TSH SERPL-MCNC: 5.32 UIU/ML — HIGH (ref 0.27–4.2)
UIBC SERPL-MCNC: 150 UG/DL — SIGNIFICANT CHANGE UP (ref 110–370)
UROBILINOGEN FLD QL: NEGATIVE — SIGNIFICANT CHANGE UP
VIT B12 SERPL-MCNC: 536 PG/ML — SIGNIFICANT CHANGE UP (ref 232–1245)
WBC # BLD: 1.52 K/UL — LOW (ref 3.8–10.5)
WBC # FLD AUTO: 1.52 K/UL — LOW (ref 3.8–10.5)
WBC UR QL: 6 /HPF — HIGH (ref 0–5)

## 2023-03-04 RX ORDER — CHLORHEXIDINE GLUCONATE 213 G/1000ML
1 SOLUTION TOPICAL DAILY
Refills: 0 | Status: DISCONTINUED | OUTPATIENT
Start: 2023-03-04 | End: 2023-03-09

## 2023-03-04 RX ORDER — LOPERAMIDE HCL 2 MG
2 TABLET ORAL
Refills: 0 | Status: DISCONTINUED | OUTPATIENT
Start: 2023-03-04 | End: 2023-03-07

## 2023-03-04 RX ADMIN — Medication 100 MILLIGRAM(S): at 21:27

## 2023-03-04 RX ADMIN — CHLORHEXIDINE GLUCONATE 1 APPLICATION(S): 213 SOLUTION TOPICAL at 12:56

## 2023-03-04 RX ADMIN — ATORVASTATIN CALCIUM 40 MILLIGRAM(S): 80 TABLET, FILM COATED ORAL at 21:26

## 2023-03-04 RX ADMIN — Medication 100 MILLIGRAM(S): at 13:50

## 2023-03-04 RX ADMIN — Medication 2 MILLIGRAM(S): at 12:57

## 2023-03-04 RX ADMIN — Medication 4 MILLIGRAM(S): at 05:57

## 2023-03-04 RX ADMIN — Medication 2 MILLIGRAM(S): at 18:00

## 2023-03-04 RX ADMIN — MONTELUKAST 10 MILLIGRAM(S): 4 TABLET, CHEWABLE ORAL at 12:56

## 2023-03-04 RX ADMIN — Medication 100 MILLIGRAM(S): at 05:57

## 2023-03-04 RX ADMIN — SODIUM CHLORIDE 75 MILLILITER(S): 9 INJECTION INTRAMUSCULAR; INTRAVENOUS; SUBCUTANEOUS at 05:57

## 2023-03-04 RX ADMIN — Medication 81 MILLIGRAM(S): at 12:55

## 2023-03-04 RX ADMIN — CEFTRIAXONE 100 MILLIGRAM(S): 500 INJECTION, POWDER, FOR SOLUTION INTRAMUSCULAR; INTRAVENOUS at 18:00

## 2023-03-04 NOTE — DIETITIAN INITIAL EVALUATION ADULT - ADD RECOMMEND
3) Encourage PO intake, obtain food preferences, provide feeding assistance as needed.   4) Add multivitamin to optimize micronutrient intake if no contraindications.   5) Monitor nutritional intake/tolerance, weights, labs, hydration status, skin integrity, BM, GI symptoms.   6) New malnutrition notification sent.

## 2023-03-04 NOTE — PROGRESS NOTE ADULT - SUBJECTIVE AND OBJECTIVE BOX
Name of Patient : PETER DE LA PAZ  MRN: [id number]        Subjective: Patient seen and examined. No new events except as noted.     REVIEW OF SYSTEMS:    CONSTITUTIONAL: No weakness, fevers or chills  EYES/ENT: No visual changes;  No vertigo or throat pain   NECK: No pain or stiffness  RESPIRATORY: No cough, wheezing, hemoptysis; No shortness of breath  CARDIOVASCULAR: No chest pain or palpitations  GASTROINTESTINAL: No abdominal or epigastric pain. No nausea, vomiting, or hematemesis; No diarrhea or constipation. No melena or hematochezia.  GENITOURINARY: No dysuria, frequency or hematuria  NEUROLOGICAL: No numbness or weakness  SKIN: No itching, burning, rashes, or lesions   All other review of systems is negative unless indicated above.    MEDICATIONS:  MEDICATIONS  (STANDING):  aspirin enteric coated 81 milliGRAM(s) Oral daily  atorvastatin 40 milliGRAM(s) Oral at bedtime  cefTRIAXone   IVPB 1000 milliGRAM(s) IV Intermittent every 24 hours  chlorhexidine 2% Cloths 1 Application(s) Topical daily  dexAMETHasone     Tablet 4 milliGRAM(s) Oral daily  loperamide 2 milliGRAM(s) Oral two times a day  metroNIDAZOLE  IVPB 500 milliGRAM(s) IV Intermittent once  metroNIDAZOLE  IVPB 500 milliGRAM(s) IV Intermittent every 8 hours  metroNIDAZOLE  IVPB      montelukast 10 milliGRAM(s) Oral daily  multivitamin 1 Tablet(s) Oral daily  sodium chloride 0.9%. 1000 milliLiter(s) (75 mL/Hr) IV Continuous <Continuous>      PHYSICAL EXAM:  T(C): 36.8 (23 @ 00:12), Max: 37.6 (23 @ 08:47)  HR: 78 (23 @ 00:12) (78 - 90)  BP: 94/53 (23 @ 00:12) (94/53 - 104/62)  RR: 20 (23 @ 00:12) (20 - 20)  SpO2: 97% (23 @ 00:12) (95% - 97%)  Wt(kg): --  I&O's Summary    03 Mar 2023 07:01  -  04 Mar 2023 07:00  --------------------------------------------------------  IN: 880 mL / OUT: 2 mL / NET: 878 mL    04 Mar 2023 07:01  -  05 Mar 2023 01:05  --------------------------------------------------------  IN: 1050 mL / OUT: 0 mL / NET: 1050 mL          Appearance: Normal	  HEENT:  PERRLA   Lymphatic: No lymphadenopathy   Cardiovascular: Normal S1 S2, no JVD  Respiratory: normal effort , clear  Gastrointestinal:  Soft, Non-tender  Skin: No rashes,  warm to touch  Psychiatry:  Mood & affect appropriate  Musculuskeletal: No edema    recent labs, Imaging and EKGs personally reviewed     23 @ 07:01  -  23 @ 07:00  --------------------------------------------------------  IN: 880 mL / OUT: 2 mL / NET: 878 mL    23 @ 07:01  -  23 @ 01:05  --------------------------------------------------------  IN: 1050 mL / OUT: 0 mL / NET: 1050 mL                          7.2    1.52  )-----------( 53       ( 04 Mar 2023 07:01 )             21.2               03-04    134<L>  |  102  |  62<H>  ----------------------------<  95  3.4<L>   |  13<L>  |  4.40<H>    Ca    8.4      04 Mar 2023 07:01  Phos  4.6     03-04  Mg     1.7     03-04    TPro  5.7<L>  /  Alb  3.2<L>  /  TBili  0.5  /  DBili  0.2  /  AST  48<H>  /  ALT  61<H>  /  AlkPhos  83  -                       Urinalysis Basic - ( 04 Mar 2023 07:30 )    Color: Yellow / Appearance: Slightly Turbid / S.014 / pH: x  Gluc: x / Ketone: Negative  / Bili: Negative / Urobili: Negative   Blood: x / Protein: 100 mg/dl / Nitrite: Negative   Leuk Esterase: Negative / RBC: 3 /hpf / WBC 6 /HPF   Sq Epi: x / Non Sq Epi: 4 /hpf / Bacteria: Negative               Name of Patient : PETER DE LA PAZ  MRN: [id number]        Subjective: Patient seen and examined. No new events except as noted.     REVIEW OF SYSTEMS:    CONSTITUTIONAL: No weakness, fevers or chills  EYES/ENT: No visual changes;  No vertigo or throat pain   NECK: No pain or stiffness  RESPIRATORY: No cough, wheezing, hemoptysis; No shortness of breath  CARDIOVASCULAR: No chest pain or palpitations  GASTROINTESTINAL: No abdominal or epigastric pain. No nausea, vomiting, or hematemesis; No diarrhea or constipation. No melena or hematochezia.  GENITOURINARY: No dysuria, frequency or hematuria  NEUROLOGICAL: No numbness or weakness  SKIN: No itching, burning, rashes, or lesions   All other review of systems is negative unless indicated above.    MEDICATIONS:  MEDICATIONS  (STANDING):  aspirin enteric coated 81 milliGRAM(s) Oral daily  atorvastatin 40 milliGRAM(s) Oral at bedtime  cefTRIAXone   IVPB 1000 milliGRAM(s) IV Intermittent every 24 hours  chlorhexidine 2% Cloths 1 Application(s) Topical daily  dexAMETHasone     Tablet 4 milliGRAM(s) Oral daily  loperamide 2 milliGRAM(s) Oral two times a day  metroNIDAZOLE  IVPB 500 milliGRAM(s) IV Intermittent once  metroNIDAZOLE  IVPB 500 milliGRAM(s) IV Intermittent every 8 hours  metroNIDAZOLE  IVPB      montelukast 10 milliGRAM(s) Oral daily  multivitamin 1 Tablet(s) Oral daily  sodium chloride 0.9%. 1000 milliLiter(s) (75 mL/Hr) IV Continuous <Continuous>      PHYSICAL EXAM:  T(C): 36.8 (23 @ 00:12), Max: 37.6 (23 @ 08:47)  HR: 78 (23 @ 00:12) (78 - 90)  BP: 94/53 (23 @ 00:12) (94/53 - 104/62)  RR: 20 (23 @ 00:12) (20 - 20)  SpO2: 97% (23 @ 00:12) (95% - 97%)  Wt(kg): --  I&O's Summary    03 Mar 2023 07:01  -  04 Mar 2023 07:00  --------------------------------------------------------  IN: 880 mL / OUT: 2 mL / NET: 878 mL    04 Mar 2023 07:01  -  05 Mar 2023 01:05  --------------------------------------------------------  IN: 1050 mL / OUT: 0 mL / NET: 1050 mL          Appearance: Normal	  HEENT:  PERRLA   Lymphatic: No lymphadenopathy   Cardiovascular: Normal S1 S2, no JVD  Respiratory: normal effort , clear  Gastrointestinal:  Soft, Non-tender  Skin: No rashes,  warm to touch  Psychiatry:  Mood & affect appropriate  Musculuskeletal: No edema    recent labs, Imaging and EKGs personally reviewed     23 @ 07:01  -  23 @ 07:00  --------------------------------------------------------  IN: 880 mL / OUT: 2 mL / NET: 878 mL    23 @ 07:01  -  23 @ 01:05  --------------------------------------------------------  IN: 1050 mL / OUT: 0 mL / NET: 1050 mL                          7.2    1.52  )-----------( 53       ( 04 Mar 2023 07:01 )             21.2               03-04    134<L>  |  102  |  62<H>  ----------------------------<  95  3.4<L>   |  13<L>  |  4.40<H>    Ca    8.4      04 Mar 2023 07:01  Phos  4.6     03-04  Mg     1.7     03-04    TPro  5.7<L>  /  Alb  3.2<L>  /  TBili  0.5  /  DBili  0.2  /  AST  48<H>  /  ALT  61<H>  /  AlkPhos  83  -                       Urinalysis Basic - ( 04 Mar 2023 07:30 )    Color: Yellow / Appearance: Slightly Turbid / S.014 / pH: x  Gluc: x / Ketone: Negative  / Bili: Negative / Urobili: Negative   Blood: x / Protein: 100 mg/dl / Nitrite: Negative   Leuk Esterase: Negative / RBC: 3 /hpf / WBC 6 /HPF   Sq Epi: x / Non Sq Epi: 4 /hpf / Bacteria: Negative               Name of Patient : PETER DE LA PAZ  MRN: [id number]        Subjective: Patient seen and examined. No new events except as noted.     REVIEW OF SYSTEMS:    CONSTITUTIONAL: No weakness, fevers or chills  EYES/ENT: No visual changes;  No vertigo or throat pain   NECK: No pain or stiffness  RESPIRATORY: No cough, wheezing, hemoptysis; No shortness of breath  CARDIOVASCULAR: No chest pain or palpitations  GASTROINTESTINAL: No abdominal or epigastric pain. No nausea, vomiting, or hematemesis; No diarrhea or constipation. No melena or hematochezia.  GENITOURINARY: No dysuria, frequency or hematuria  NEUROLOGICAL: No numbness or weakness  SKIN: No itching, burning, rashes, or lesions   All other review of systems is negative unless indicated above.    MEDICATIONS:  MEDICATIONS  (STANDING):  aspirin enteric coated 81 milliGRAM(s) Oral daily  atorvastatin 40 milliGRAM(s) Oral at bedtime  cefTRIAXone   IVPB 1000 milliGRAM(s) IV Intermittent every 24 hours  chlorhexidine 2% Cloths 1 Application(s) Topical daily  dexAMETHasone     Tablet 4 milliGRAM(s) Oral daily  loperamide 2 milliGRAM(s) Oral two times a day  metroNIDAZOLE  IVPB 500 milliGRAM(s) IV Intermittent once  metroNIDAZOLE  IVPB 500 milliGRAM(s) IV Intermittent every 8 hours  metroNIDAZOLE  IVPB      montelukast 10 milliGRAM(s) Oral daily  multivitamin 1 Tablet(s) Oral daily  sodium chloride 0.9%. 1000 milliLiter(s) (75 mL/Hr) IV Continuous <Continuous>      PHYSICAL EXAM:  T(C): 36.8 (23 @ 00:12), Max: 37.6 (23 @ 08:47)  HR: 78 (23 @ 00:12) (78 - 90)  BP: 94/53 (23 @ 00:12) (94/53 - 104/62)  RR: 20 (23 @ 00:12) (20 - 20)  SpO2: 97% (23 @ 00:12) (95% - 97%)  Wt(kg): --  I&O's Summary    03 Mar 2023 07:01  -  04 Mar 2023 07:00  --------------------------------------------------------  IN: 880 mL / OUT: 2 mL / NET: 878 mL    04 Mar 2023 07:01  -  05 Mar 2023 01:05  --------------------------------------------------------  IN: 1050 mL / OUT: 0 mL / NET: 1050 mL          Appearance: Normal	  HEENT:  PERRLA   Lymphatic: No lymphadenopathy   Cardiovascular: Normal S1 S2, no JVD  Respiratory: normal effort , clear  Gastrointestinal:  Soft, Non-tender  Skin: No rashes,  warm to touch  Psychiatry:  Mood & affect appropriate  Musculuskeletal: No edema    recent labs, Imaging and EKGs personally reviewed     23 @ 07:01  -  23 @ 07:00  --------------------------------------------------------  IN: 880 mL / OUT: 2 mL / NET: 878 mL    23 @ 07:01  -  23 @ 01:05  --------------------------------------------------------  IN: 1050 mL / OUT: 0 mL / NET: 1050 mL                          7.2    1.52  )-----------( 53       ( 04 Mar 2023 07:01 )             21.2               03-04    134<L>  |  102  |  62<H>  ----------------------------<  95  3.4<L>   |  13<L>  |  4.40<H>    Ca    8.4      04 Mar 2023 07:01  Phos  4.6     03-04  Mg     1.7     03-04    TPro  5.7<L>  /  Alb  3.2<L>  /  TBili  0.5  /  DBili  0.2  /  AST  48<H>  /  ALT  61<H>  /  AlkPhos  83  -                       Urinalysis Basic - ( 04 Mar 2023 07:30 )    Color: Yellow / Appearance: Slightly Turbid / S.014 / pH: x  Gluc: x / Ketone: Negative  / Bili: Negative / Urobili: Negative   Blood: x / Protein: 100 mg/dl / Nitrite: Negative   Leuk Esterase: Negative / RBC: 3 /hpf / WBC 6 /HPF   Sq Epi: x / Non Sq Epi: 4 /hpf / Bacteria: Negative

## 2023-03-04 NOTE — PROGRESS NOTE ADULT - PROBLEM SELECTOR PLAN 1
suspect due to diarrhea chemo related  s/p 3 Liters  trend bmp  continue ivf maintenance  avoid nephrotoxic medications  renally dose meds  check bladder scan for PVR  check UA/urine lytes for Fena

## 2023-03-04 NOTE — DIETITIAN INITIAL EVALUATION ADULT - PERTINENT MEDS FT
MEDICATIONS  (STANDING):  aspirin enteric coated 81 milliGRAM(s) Oral daily  atorvastatin 40 milliGRAM(s) Oral at bedtime  cefTRIAXone   IVPB 1000 milliGRAM(s) IV Intermittent every 24 hours  chlorhexidine 2% Cloths 1 Application(s) Topical daily  dexAMETHasone     Tablet 4 milliGRAM(s) Oral daily  loperamide 2 milliGRAM(s) Oral two times a day  metroNIDAZOLE  IVPB 500 milliGRAM(s) IV Intermittent once  metroNIDAZOLE  IVPB 500 milliGRAM(s) IV Intermittent every 8 hours  metroNIDAZOLE  IVPB      montelukast 10 milliGRAM(s) Oral daily  sodium chloride 0.9%. 1000 milliLiter(s) (75 mL/Hr) IV Continuous <Continuous>    MEDICATIONS  (PRN):  ondansetron   Disintegrating Tablet 8 milliGRAM(s) Oral three times a day PRN Nausea and/or Vomiting

## 2023-03-04 NOTE — CONSULT NOTE ADULT - SUBJECTIVE AND OBJECTIVE BOX
Chief Complaint:  Patient is a 67y old  Male who presents with a chief complaint of 'I took a turn for the worst' (03 Mar 2023 16:06)      Date of service: 23 @ 09:33    HPI:    The patient is a 67 year old male history of aortic aneurysm and bioprosthetic AV valve replacement 2019, HL, splenectomy, partial gastrectomy, partial pancreatectomy, oligosecretory multiple myeloma s/p auto SCT in  on revlimid started david-cybor D 2/10 under care at [facility] [place] by Dr. Jean Claude Hubbard was due for chemo today but was found to be more hypotensive than baseline (usually sbp ~90) and was sent to the ED.  He reports having diarrhea for the past 7 days, with abdominal cramping and liquid diarrhea after every meal.  Denies nausea/vomiting.  Denies fevers.  Pain is relieved with diarrhea episodes.  Has no appetite and has had minimal po intake except fluids.  Notes less urine production.  Denies dysuria.        Allergies:  No Known Allergies      Home Medications:    Hospital Medications:  aspirin enteric coated 81 milliGRAM(s) Oral daily  atorvastatin 40 milliGRAM(s) Oral at bedtime  cefTRIAXone   IVPB 1000 milliGRAM(s) IV Intermittent every 24 hours  chlorhexidine 2% Cloths 1 Application(s) Topical daily  dexAMETHasone     Tablet 4 milliGRAM(s) Oral daily  metroNIDAZOLE  IVPB      metroNIDAZOLE  IVPB 500 milliGRAM(s) IV Intermittent once  metroNIDAZOLE  IVPB 500 milliGRAM(s) IV Intermittent every 8 hours  montelukast 10 milliGRAM(s) Oral daily  ondansetron   Disintegrating Tablet 8 milliGRAM(s) Oral three times a day PRN  sodium chloride 0.9%. 1000 milliLiter(s) IV Continuous <Continuous>      PMHX/PSHX:  Multiple myeloma    Hyperlipidemia    H/O aortic aneurysm    H/O splenectomy    S/P partial gastrectomy    History of pancreatic surgery        Family history:  FHx: lung cancer (Sibling)    FHx: ovarian cancer (Sibling)        Social History:   Denies ethanol use.  Denies illicit drug use.    ROS:     General:  No wt loss, fevers, chills, night sweats, fatigue,   Eyes:  Good vision, no reported pain  ENT:  No sore throat, pain, runny nose, dysphagia  CV:  No pain, palpitations, hypo/hypertension  Resp:  No dyspnea, cough, tachypnea, wheezing  GI:  See HPI  :  No pain, bleeding, incontinence, nocturia  Muscle:  No pain, weakness  Neuro:  No weakness, tingling, memory problems  Psych:  No fatigue, insomnia, mood problems, depression  Endocrine:  No polyuria, polydipsia, cold/heat intolerance  Heme:  No petechiae, ecchymosis, easy bruisability  Integumentary:  No rash, edema      PHYSICAL EXAM:     GENERAL:  Appears stated age, well-groomed, well-nourished, no distress  HEENT:  NC/AT,  conjunctivae anicteric, clear and pink,   NECK: supple, trachea midline  CHEST:  Full & symmetric excursion, no increased effort, breath sounds clear  HEART:  Regular rhythm, no JVD  ABDOMEN:  Soft, non-tender, non-distended, normoactive bowel sounds,  no masses , no hepatosplenomegaly  EXTREMITIES:  no cyanosis,clubbing or edema  SKIN:  No rash, erythema, or, ecchymoses, no jaundice  NEURO:  Alert, non-focal, no asterixis  PSYCH: Appropriate affect, oriented to place and time  RECTAL: Deferred      Vital Signs:  Vital Signs Last 24 Hrs  T(C): 37.6 (04 Mar 2023 08:47), Max: 37.9 (03 Mar 2023 23:54)  T(F): 99.7 (04 Mar 2023 08:47), Max: 100.3 (03 Mar 2023 23:54)  HR: 87 (04 Mar 2023 08:47) (80 - 96)  BP: 104/62 (04 Mar 2023 08:47) (92/51 - 104/62)  BP(mean): 67 (03 Mar 2023 16:06) (67 - 67)  RR: 20 (04 Mar 2023 08:47) (18 - 25)  SpO2: 95% (04 Mar 2023 08:47) (95% - 99%)    Parameters below as of 04 Mar 2023 08:47  Patient On (Oxygen Delivery Method): room air      Daily Height in cm: 185.42 (03 Mar 2023 18:18)    Daily     LABS: Labs personally reviewed by me:                        7.2    1.52  )-----------( 53       ( 04 Mar 2023 07:01 )             21.2     03-04    134<L>  |  102  |  62<H>  ----------------------------<  95  3.4<L>   |  13<L>  |  4.40<H>    Ca    8.4      04 Mar 2023 07:01  Phos  4.6     03-04  Mg     1.7     03-04    TPro  5.7<L>  /  Alb  3.2<L>  /  TBili  0.5  /  DBili  0.2  /  AST  48<H>  /  ALT  61<H>  /  AlkPhos  83  03-04    LIVER FUNCTIONS - ( 04 Mar 2023 07:01 )  Alb: 3.2 g/dL / Pro: 5.7 g/dL / ALK PHOS: 83 U/L / ALT: 61 U/L / AST: 48 U/L / GGT: x             Urinalysis Basic - ( 04 Mar 2023 07:30 )    Color: Yellow / Appearance: Slightly Turbid / S.014 / pH: x  Gluc: x / Ketone: Negative  / Bili: Negative / Urobili: Negative   Blood: x / Protein: 100 mg/dl / Nitrite: Negative   Leuk Esterase: Negative / RBC: 3 /hpf / WBC 6 /HPF   Sq Epi: x / Non Sq Epi: 4 /hpf / Bacteria: Negative      Amylase Serum--      Lipase serum39       Ammonia--      Imaging personally reviewed by me:           Chief Complaint:  Patient is a 67y old  Male who presents with a chief complaint of 'I took a turn for the worst' (03 Mar 2023 16:06)      Date of service: 23 @ 09:33    HPI:    The patient is a 67 year old male history of aortic aneurysm and bioprosthetic AV valve replacement 2019, HL, splenectomy, partial gastrectomy, partial pancreatectomy, oligosecretory multiple myeloma s/p auto SCT in  on revlimid started david-cybor D 2/10 under care at [facility] by Dr. Jean Claude Hubbard was due for chemo today but was found to be more hypotensive than baseline (usually sbp ~90) and was sent to the ED.  He reports having diarrhea for the past 7 days, with abdominal cramping and liquid diarrhea after every meal.  Denies nausea/vomiting.  Denies fevers.  Pain is relieved with diarrhea episodes.  Has no appetite and has had minimal po intake except fluids.  Notes less urine production.  Denies dysuria.        Allergies:  No Known Allergies      Home Medications:    Hospital Medications:  aspirin enteric coated 81 milliGRAM(s) Oral daily  atorvastatin 40 milliGRAM(s) Oral at bedtime  cefTRIAXone   IVPB 1000 milliGRAM(s) IV Intermittent every 24 hours  chlorhexidine 2% Cloths 1 Application(s) Topical daily  dexAMETHasone     Tablet 4 milliGRAM(s) Oral daily  metroNIDAZOLE  IVPB      metroNIDAZOLE  IVPB 500 milliGRAM(s) IV Intermittent once  metroNIDAZOLE  IVPB 500 milliGRAM(s) IV Intermittent every 8 hours  montelukast 10 milliGRAM(s) Oral daily  ondansetron   Disintegrating Tablet 8 milliGRAM(s) Oral three times a day PRN  sodium chloride 0.9%. 1000 milliLiter(s) IV Continuous <Continuous>      PMHX/PSHX:  Multiple myeloma    Hyperlipidemia    H/O aortic aneurysm    H/O splenectomy    S/P partial gastrectomy    History of pancreatic surgery        Family history:  FHx: lung cancer (Sibling)    FHx: ovarian cancer (Sibling)        Social History:   Denies ethanol use.  Denies illicit drug use.    ROS:     General:  No wt loss, fevers, chills, night sweats, fatigue,   Eyes:  Good vision, no reported pain  ENT:  No sore throat, pain, runny nose, dysphagia  CV:  No pain, palpitations, hypo/hypertension  Resp:  No dyspnea, cough, tachypnea, wheezing  GI:  See HPI  :  No pain, bleeding, incontinence, nocturia  Muscle:  No pain, weakness  Neuro:  No weakness, tingling, memory problems  Psych:  No fatigue, insomnia, mood problems, depression  Endocrine:  No polyuria, polydipsia, cold/heat intolerance  Heme:  No petechiae, ecchymosis, easy bruisability  Integumentary:  No rash, edema      PHYSICAL EXAM:     GENERAL:  Appears stated age, well-groomed, well-nourished, no distress  HEENT:  NC/AT,  conjunctivae anicteric, clear and pink,   NECK: supple, trachea midline  CHEST:  Full & symmetric excursion, no increased effort, breath sounds clear  HEART:  Regular rhythm, no JVD  ABDOMEN:  Soft, non-tender, non-distended, normoactive bowel sounds,  no masses , no hepatosplenomegaly  EXTREMITIES:  no cyanosis,clubbing or edema  SKIN:  No rash, erythema, or, ecchymoses, no jaundice  NEURO:  Alert, non-focal, no asterixis  PSYCH: Appropriate affect, oriented to place and time  RECTAL: Deferred      Vital Signs:  Vital Signs Last 24 Hrs  T(C): 37.6 (04 Mar 2023 08:47), Max: 37.9 (03 Mar 2023 23:54)  T(F): 99.7 (04 Mar 2023 08:47), Max: 100.3 (03 Mar 2023 23:54)  HR: 87 (04 Mar 2023 08:47) (80 - 96)  BP: 104/62 (04 Mar 2023 08:47) (92/51 - 104/62)  BP(mean): 67 (03 Mar 2023 16:06) (67 - 67)  RR: 20 (04 Mar 2023 08:47) (18 - 25)  SpO2: 95% (04 Mar 2023 08:47) (95% - 99%)    Parameters below as of 04 Mar 2023 08:47  Patient On (Oxygen Delivery Method): room air      Daily Height in cm: 185.42 (03 Mar 2023 18:18)    Daily     LABS: Labs personally reviewed by me:                        7.2    1.52  )-----------( 53       ( 04 Mar 2023 07:01 )             21.2     03-04    134<L>  |  102  |  62<H>  ----------------------------<  95  3.4<L>   |  13<L>  |  4.40<H>    Ca    8.4      04 Mar 2023 07:01  Phos  4.6     03-04  Mg     1.7     03-04    TPro  5.7<L>  /  Alb  3.2<L>  /  TBili  0.5  /  DBili  0.2  /  AST  48<H>  /  ALT  61<H>  /  AlkPhos  83  03-04    LIVER FUNCTIONS - ( 04 Mar 2023 07:01 )  Alb: 3.2 g/dL / Pro: 5.7 g/dL / ALK PHOS: 83 U/L / ALT: 61 U/L / AST: 48 U/L / GGT: x             Urinalysis Basic - ( 04 Mar 2023 07:30 )    Color: Yellow / Appearance: Slightly Turbid / S.014 / pH: x  Gluc: x / Ketone: Negative  / Bili: Negative / Urobili: Negative   Blood: x / Protein: 100 mg/dl / Nitrite: Negative   Leuk Esterase: Negative / RBC: 3 /hpf / WBC 6 /HPF   Sq Epi: x / Non Sq Epi: 4 /hpf / Bacteria: Negative      Amylase Serum--      Lipase serum39       Ammonia--      Imaging personally reviewed by me:           Chief Complaint:  Patient is a 67y old  Male who presents with a chief complaint of 'I took a turn for the worst' (03 Mar 2023 16:06)      Date of service: 23 @ 09:33    HPI:    The patient is a 67 year old male history of aortic aneurysm and bioprosthetic AV valve replacement 2019, HL, splenectomy, partial gastrectomy, partial pancreatectomy, oligosecretory multiple myeloma s/p auto SCT in  on revlimid started david-cybor D 2/10 under care at [facility] [place] by Dr. Jean Claude Hubbard was due for chemo today but was found to be more hypotensive than baseline (usually sbp ~90) and was sent to the ED.  He reports having diarrhea for the past 7 days, with abdominal cramping and liquid diarrhea after every meal.  Denies nausea/vomiting.  Denies fevers.  Pain is relieved with diarrhea episodes.  Has no appetite and has had minimal po intake except fluids.  Notes less urine production.  Denies dysuria.        Allergies:  No Known Allergies      Home Medications:    Hospital Medications:  aspirin enteric coated 81 milliGRAM(s) Oral daily  atorvastatin 40 milliGRAM(s) Oral at bedtime  cefTRIAXone   IVPB 1000 milliGRAM(s) IV Intermittent every 24 hours  chlorhexidine 2% Cloths 1 Application(s) Topical daily  dexAMETHasone     Tablet 4 milliGRAM(s) Oral daily  metroNIDAZOLE  IVPB      metroNIDAZOLE  IVPB 500 milliGRAM(s) IV Intermittent once  metroNIDAZOLE  IVPB 500 milliGRAM(s) IV Intermittent every 8 hours  montelukast 10 milliGRAM(s) Oral daily  ondansetron   Disintegrating Tablet 8 milliGRAM(s) Oral three times a day PRN  sodium chloride 0.9%. 1000 milliLiter(s) IV Continuous <Continuous>      PMHX/PSHX:  Multiple myeloma    Hyperlipidemia    H/O aortic aneurysm    H/O splenectomy    S/P partial gastrectomy    History of pancreatic surgery        Family history:  FHx: lung cancer (Sibling)    FHx: ovarian cancer (Sibling)        Social History:   Denies ethanol use.  Denies illicit drug use.    ROS:     General:  No wt loss, fevers, chills, night sweats, fatigue,   Eyes:  Good vision, no reported pain  ENT:  No sore throat, pain, runny nose, dysphagia  CV:  No pain, palpitations, hypo/hypertension  Resp:  No dyspnea, cough, tachypnea, wheezing  GI:  See HPI  :  No pain, bleeding, incontinence, nocturia  Muscle:  No pain, weakness  Neuro:  No weakness, tingling, memory problems  Psych:  No fatigue, insomnia, mood problems, depression  Endocrine:  No polyuria, polydipsia, cold/heat intolerance  Heme:  No petechiae, ecchymosis, easy bruisability  Integumentary:  No rash, edema      PHYSICAL EXAM:     GENERAL:  Appears stated age, well-groomed, well-nourished, no distress  HEENT:  NC/AT,  conjunctivae anicteric, clear and pink,   NECK: supple, trachea midline  CHEST:  Full & symmetric excursion, no increased effort, breath sounds clear  HEART:  Regular rhythm, no JVD  ABDOMEN:  Soft, non-tender, non-distended, normoactive bowel sounds,  no masses , no hepatosplenomegaly  EXTREMITIES:  no cyanosis,clubbing or edema  SKIN:  No rash, erythema, or, ecchymoses, no jaundice  NEURO:  Alert, non-focal, no asterixis  PSYCH: Appropriate affect, oriented to place and time  RECTAL: Deferred      Vital Signs:  Vital Signs Last 24 Hrs  T(C): 37.6 (04 Mar 2023 08:47), Max: 37.9 (03 Mar 2023 23:54)  T(F): 99.7 (04 Mar 2023 08:47), Max: 100.3 (03 Mar 2023 23:54)  HR: 87 (04 Mar 2023 08:47) (80 - 96)  BP: 104/62 (04 Mar 2023 08:47) (92/51 - 104/62)  BP(mean): 67 (03 Mar 2023 16:06) (67 - 67)  RR: 20 (04 Mar 2023 08:47) (18 - 25)  SpO2: 95% (04 Mar 2023 08:47) (95% - 99%)    Parameters below as of 04 Mar 2023 08:47  Patient On (Oxygen Delivery Method): room air      Daily Height in cm: 185.42 (03 Mar 2023 18:18)    Daily     LABS: Labs personally reviewed by me:                        7.2    1.52  )-----------( 53       ( 04 Mar 2023 07:01 )             21.2     03-04    134<L>  |  102  |  62<H>  ----------------------------<  95  3.4<L>   |  13<L>  |  4.40<H>    Ca    8.4      04 Mar 2023 07:01  Phos  4.6     03-04  Mg     1.7     03-04    TPro  5.7<L>  /  Alb  3.2<L>  /  TBili  0.5  /  DBili  0.2  /  AST  48<H>  /  ALT  61<H>  /  AlkPhos  83  03-04    LIVER FUNCTIONS - ( 04 Mar 2023 07:01 )  Alb: 3.2 g/dL / Pro: 5.7 g/dL / ALK PHOS: 83 U/L / ALT: 61 U/L / AST: 48 U/L / GGT: x             Urinalysis Basic - ( 04 Mar 2023 07:30 )    Color: Yellow / Appearance: Slightly Turbid / S.014 / pH: x  Gluc: x / Ketone: Negative  / Bili: Negative / Urobili: Negative   Blood: x / Protein: 100 mg/dl / Nitrite: Negative   Leuk Esterase: Negative / RBC: 3 /hpf / WBC 6 /HPF   Sq Epi: x / Non Sq Epi: 4 /hpf / Bacteria: Negative      Amylase Serum--      Lipase serum39       Ammonia--      Imaging personally reviewed by me:           Chief Complaint:  Patient is a 67y old  Male who presents with a chief complaint of 'I took a turn for the worst' (03 Mar 2023 16:06)      Date of service: 23 @ 09:33    HPI:    The patient is a 67 year old male history of aortic aneurysm and bioprosthetic AV valve replacement , HL, splenectomy, partial gastrectomy, partial pancreatectomy, oligosecretory multiple myeloma s/p auto SCT in  on revlimid started david-cybor D 2/10 under care at [facility] by Dr. Jean Claude Hubbard who presented for hypotension. Was scheduled for chemo however was hypotensive and sent to ED for further evaluation. Patient endorses having alternating diarrhea and constipation on chemo agents. However, over the past week has had 10+ episodes of liquid diarrhea daily. Stools brown, not black or bloody. Reports abdominal cramping when about to have diarrhea; cramping resolves with BM. Has diarrhea after eating; now with little appetite. States he checks his weight at home and has lost 10 pounds over the past week. Denies nausea, vomiting, dysphagia, fevers, chills. Has never had a colonoscopy or endoscopy.       Allergies:  No Known Allergies      Home Medications:    Hospital Medications:  aspirin enteric coated 81 milliGRAM(s) Oral daily  atorvastatin 40 milliGRAM(s) Oral at bedtime  cefTRIAXone   IVPB 1000 milliGRAM(s) IV Intermittent every 24 hours  chlorhexidine 2% Cloths 1 Application(s) Topical daily  dexAMETHasone     Tablet 4 milliGRAM(s) Oral daily  metroNIDAZOLE  IVPB      metroNIDAZOLE  IVPB 500 milliGRAM(s) IV Intermittent once  metroNIDAZOLE  IVPB 500 milliGRAM(s) IV Intermittent every 8 hours  montelukast 10 milliGRAM(s) Oral daily  ondansetron   Disintegrating Tablet 8 milliGRAM(s) Oral three times a day PRN  sodium chloride 0.9%. 1000 milliLiter(s) IV Continuous <Continuous>      PMHX/PSHX:  Multiple myeloma    Hyperlipidemia    H/O aortic aneurysm    H/O splenectomy    S/P partial gastrectomy    History of pancreatic surgery        Family history:  FHx: lung cancer (Sibling)    FHx: ovarian cancer (Sibling)        Social History:   Denies ethanol use.  Denies illicit drug use.    ROS:     General:  No wt loss, fevers, chills, night sweats, fatigue,   Eyes:  Good vision, no reported pain  ENT:  No sore throat, pain, runny nose, dysphagia  CV:  No pain, palpitations, hypo/hypertension  Resp:  No dyspnea, cough, tachypnea, wheezing  GI:  See HPI  :  No pain, bleeding, incontinence, nocturia  Muscle:  No pain, weakness  Neuro:  No weakness, tingling, memory problems  Psych:  No fatigue, insomnia, mood problems, depression  Endocrine:  No polyuria, polydipsia, cold/heat intolerance  Heme:  No petechiae, ecchymosis, easy bruisability  Integumentary:  No rash, edema      PHYSICAL EXAM:     GENERAL:  Appears stated age, well-groomed, well-nourished, no distress  HEENT:  NC/AT,  conjunctivae anicteric, clear and pink,   NECK: supple, trachea midline  CHEST:  Full & symmetric excursion, no increased effort, breath sounds clear  HEART:  Regular rhythm, no JVD  ABDOMEN:  Soft, non-tender, non-distended, normoactive bowel sounds,  no masses , no hepatosplenomegaly  EXTREMITIES:  no cyanosis,clubbing or edema  SKIN:  No rash, erythema, or, ecchymoses, no jaundice  NEURO:  Alert, non-focal, no asterixis  PSYCH: Appropriate affect, oriented to place and time  RECTAL: Deferred      Vital Signs:  Vital Signs Last 24 Hrs  T(C): 37.6 (04 Mar 2023 08:47), Max: 37.9 (03 Mar 2023 23:54)  T(F): 99.7 (04 Mar 2023 08:47), Max: 100.3 (03 Mar 2023 23:54)  HR: 87 (04 Mar 2023 08:47) (80 - 96)  BP: 104/62 (04 Mar 2023 08:47) (92/51 - 104/62)  BP(mean): 67 (03 Mar 2023 16:06) (67 - 67)  RR: 20 (04 Mar 2023 08:47) (18 - 25)  SpO2: 95% (04 Mar 2023 08:47) (95% - 99%)    Parameters below as of 04 Mar 2023 08:47  Patient On (Oxygen Delivery Method): room air      Daily Height in cm: 185.42 (03 Mar 2023 18:18)    Daily     LABS: Labs personally reviewed by me:                        7.2    1.52  )-----------( 53       ( 04 Mar 2023 07:01 )             21.2     03-04    134<L>  |  102  |  62<H>  ----------------------------<  95  3.4<L>   |  13<L>  |  4.40<H>    Ca    8.4      04 Mar 2023 07:01  Phos  4.6     03-04  Mg     1.7     03-04    TPro  5.7<L>  /  Alb  3.2<L>  /  TBili  0.5  /  DBili  0.2  /  AST  48<H>  /  ALT  61<H>  /  AlkPhos  83  03-04    LIVER FUNCTIONS - ( 04 Mar 2023 07:01 )  Alb: 3.2 g/dL / Pro: 5.7 g/dL / ALK PHOS: 83 U/L / ALT: 61 U/L / AST: 48 U/L / GGT: x             Urinalysis Basic - ( 04 Mar 2023 07:30 )    Color: Yellow / Appearance: Slightly Turbid / S.014 / pH: x  Gluc: x / Ketone: Negative  / Bili: Negative / Urobili: Negative   Blood: x / Protein: 100 mg/dl / Nitrite: Negative   Leuk Esterase: Negative / RBC: 3 /hpf / WBC 6 /HPF   Sq Epi: x / Non Sq Epi: 4 /hpf / Bacteria: Negative      Amylase Serum--      Lipase serum39       Ammonia--      Imaging personally reviewed by me:           Chief Complaint:  Patient is a 67y old  Male who presents with a chief complaint of 'I took a turn for the worst' (03 Mar 2023 16:06)      Date of service: 23 @ 09:33    HPI:    The patient is a 67 year old male history of aortic aneurysm and bioprosthetic AV valve replacement , HL, splenectomy, partial gastrectomy, partial pancreatectomy, oligosecretory multiple myeloma s/p auto SCT in  on revlimid started david-cybor D 2/10 under care at [facility] [place] by Dr. Jean Claude Hubbard who presented for hypotension. Was scheduled for chemo however was hypotensive and sent to ED for further evaluation. Patient endorses having alternating diarrhea and constipation on chemo agents. However, over the past week has had 10+ episodes of liquid diarrhea daily. Stools brown, not black or bloody. Reports abdominal cramping when about to have diarrhea; cramping resolves with BM. Has diarrhea after eating; now with little appetite. States he checks his weight at home and has lost 10 pounds over the past week. Denies nausea, vomiting, dysphagia, fevers, chills. Has never had a colonoscopy or endoscopy.       Allergies:  No Known Allergies      Home Medications:    Hospital Medications:  aspirin enteric coated 81 milliGRAM(s) Oral daily  atorvastatin 40 milliGRAM(s) Oral at bedtime  cefTRIAXone   IVPB 1000 milliGRAM(s) IV Intermittent every 24 hours  chlorhexidine 2% Cloths 1 Application(s) Topical daily  dexAMETHasone     Tablet 4 milliGRAM(s) Oral daily  metroNIDAZOLE  IVPB      metroNIDAZOLE  IVPB 500 milliGRAM(s) IV Intermittent once  metroNIDAZOLE  IVPB 500 milliGRAM(s) IV Intermittent every 8 hours  montelukast 10 milliGRAM(s) Oral daily  ondansetron   Disintegrating Tablet 8 milliGRAM(s) Oral three times a day PRN  sodium chloride 0.9%. 1000 milliLiter(s) IV Continuous <Continuous>      PMHX/PSHX:  Multiple myeloma    Hyperlipidemia    H/O aortic aneurysm    H/O splenectomy    S/P partial gastrectomy    History of pancreatic surgery        Family history:  FHx: lung cancer (Sibling)    FHx: ovarian cancer (Sibling)        Social History:   Denies ethanol use.  Denies illicit drug use.    ROS:     General:  No wt loss, fevers, chills, night sweats, fatigue,   Eyes:  Good vision, no reported pain  ENT:  No sore throat, pain, runny nose, dysphagia  CV:  No pain, palpitations, hypo/hypertension  Resp:  No dyspnea, cough, tachypnea, wheezing  GI:  See HPI  :  No pain, bleeding, incontinence, nocturia  Muscle:  No pain, weakness  Neuro:  No weakness, tingling, memory problems  Psych:  No fatigue, insomnia, mood problems, depression  Endocrine:  No polyuria, polydipsia, cold/heat intolerance  Heme:  No petechiae, ecchymosis, easy bruisability  Integumentary:  No rash, edema      PHYSICAL EXAM:     GENERAL:  Appears stated age, well-groomed, well-nourished, no distress  HEENT:  NC/AT,  conjunctivae anicteric, clear and pink,   NECK: supple, trachea midline  CHEST:  Full & symmetric excursion, no increased effort, breath sounds clear  HEART:  Regular rhythm, no JVD  ABDOMEN:  Soft, non-tender, non-distended, normoactive bowel sounds,  no masses , no hepatosplenomegaly  EXTREMITIES:  no cyanosis,clubbing or edema  SKIN:  No rash, erythema, or, ecchymoses, no jaundice  NEURO:  Alert, non-focal, no asterixis  PSYCH: Appropriate affect, oriented to place and time  RECTAL: Deferred      Vital Signs:  Vital Signs Last 24 Hrs  T(C): 37.6 (04 Mar 2023 08:47), Max: 37.9 (03 Mar 2023 23:54)  T(F): 99.7 (04 Mar 2023 08:47), Max: 100.3 (03 Mar 2023 23:54)  HR: 87 (04 Mar 2023 08:47) (80 - 96)  BP: 104/62 (04 Mar 2023 08:47) (92/51 - 104/62)  BP(mean): 67 (03 Mar 2023 16:06) (67 - 67)  RR: 20 (04 Mar 2023 08:47) (18 - 25)  SpO2: 95% (04 Mar 2023 08:47) (95% - 99%)    Parameters below as of 04 Mar 2023 08:47  Patient On (Oxygen Delivery Method): room air      Daily Height in cm: 185.42 (03 Mar 2023 18:18)    Daily     LABS: Labs personally reviewed by me:                        7.2    1.52  )-----------( 53       ( 04 Mar 2023 07:01 )             21.2     03-04    134<L>  |  102  |  62<H>  ----------------------------<  95  3.4<L>   |  13<L>  |  4.40<H>    Ca    8.4      04 Mar 2023 07:01  Phos  4.6     03-04  Mg     1.7     03-04    TPro  5.7<L>  /  Alb  3.2<L>  /  TBili  0.5  /  DBili  0.2  /  AST  48<H>  /  ALT  61<H>  /  AlkPhos  83  03-04    LIVER FUNCTIONS - ( 04 Mar 2023 07:01 )  Alb: 3.2 g/dL / Pro: 5.7 g/dL / ALK PHOS: 83 U/L / ALT: 61 U/L / AST: 48 U/L / GGT: x             Urinalysis Basic - ( 04 Mar 2023 07:30 )    Color: Yellow / Appearance: Slightly Turbid / S.014 / pH: x  Gluc: x / Ketone: Negative  / Bili: Negative / Urobili: Negative   Blood: x / Protein: 100 mg/dl / Nitrite: Negative   Leuk Esterase: Negative / RBC: 3 /hpf / WBC 6 /HPF   Sq Epi: x / Non Sq Epi: 4 /hpf / Bacteria: Negative      Amylase Serum--      Lipase serum39       Ammonia--      Imaging personally reviewed by me:           Chief Complaint:  Patient is a 67y old  Male who presents with a chief complaint of 'I took a turn for the worst' (03 Mar 2023 16:06)      Date of service: 23 @ 09:33    HPI:    The patient is a 67 year old male history of aortic aneurysm and bioprosthetic AV valve replacement , HL, splenectomy, partial gastrectomy, partial pancreatectomy, oligosecretory multiple myeloma s/p auto SCT in  on revlimid started david-cybor D 2/10 under care at [facility] [place]. Jean Claude Hubbard who presented for hypotension. Was scheduled for chemo however was hypotensive and sent to ED for further evaluation. Patient endorses having alternating diarrhea and constipation on chemo agents. However, over the past week has had 10+ episodes of liquid diarrhea daily. Stools brown, not black or bloody. Reports abdominal cramping when about to have diarrhea; cramping resolves with BM. Has diarrhea after eating; now with little appetite. States he checks his weight at home and has lost 10 pounds over the past week. Denies nausea, vomiting, dysphagia, fevers, chills. Has never had a colonoscopy or endoscopy.       Allergies:  No Known Allergies      Home Medications:    Hospital Medications:  aspirin enteric coated 81 milliGRAM(s) Oral daily  atorvastatin 40 milliGRAM(s) Oral at bedtime  cefTRIAXone   IVPB 1000 milliGRAM(s) IV Intermittent every 24 hours  chlorhexidine 2% Cloths 1 Application(s) Topical daily  dexAMETHasone     Tablet 4 milliGRAM(s) Oral daily  metroNIDAZOLE  IVPB      metroNIDAZOLE  IVPB 500 milliGRAM(s) IV Intermittent once  metroNIDAZOLE  IVPB 500 milliGRAM(s) IV Intermittent every 8 hours  montelukast 10 milliGRAM(s) Oral daily  ondansetron   Disintegrating Tablet 8 milliGRAM(s) Oral three times a day PRN  sodium chloride 0.9%. 1000 milliLiter(s) IV Continuous <Continuous>      PMHX/PSHX:  Multiple myeloma    Hyperlipidemia    H/O aortic aneurysm    H/O splenectomy    S/P partial gastrectomy    History of pancreatic surgery        Family history:  FHx: lung cancer (Sibling)    FHx: ovarian cancer (Sibling)        Social History:   Denies ethanol use.  Denies illicit drug use.    ROS:     General:  No wt loss, fevers, chills, night sweats, fatigue,   Eyes:  Good vision, no reported pain  ENT:  No sore throat, pain, runny nose, dysphagia  CV:  No pain, palpitations, hypo/hypertension  Resp:  No dyspnea, cough, tachypnea, wheezing  GI:  See HPI  :  No pain, bleeding, incontinence, nocturia  Muscle:  No pain, weakness  Neuro:  No weakness, tingling, memory problems  Psych:  No fatigue, insomnia, mood problems, depression  Endocrine:  No polyuria, polydipsia, cold/heat intolerance  Heme:  No petechiae, ecchymosis, easy bruisability  Integumentary:  No rash, edema      PHYSICAL EXAM:     GENERAL:  Appears stated age, well-groomed, well-nourished, no distress  HEENT:  NC/AT,  conjunctivae anicteric, clear and pink,   NECK: supple, trachea midline  CHEST:  Full & symmetric excursion, no increased effort, breath sounds clear  HEART:  Regular rhythm, no JVD  ABDOMEN:  Soft, non-tender, non-distended, normoactive bowel sounds,  no masses , no hepatosplenomegaly  EXTREMITIES:  no cyanosis,clubbing or edema  SKIN:  No rash, erythema, or, ecchymoses, no jaundice  NEURO:  Alert, non-focal, no asterixis  PSYCH: Appropriate affect, oriented to place and time  RECTAL: Deferred      Vital Signs:  Vital Signs Last 24 Hrs  T(C): 37.6 (04 Mar 2023 08:47), Max: 37.9 (03 Mar 2023 23:54)  T(F): 99.7 (04 Mar 2023 08:47), Max: 100.3 (03 Mar 2023 23:54)  HR: 87 (04 Mar 2023 08:47) (80 - 96)  BP: 104/62 (04 Mar 2023 08:47) (92/51 - 104/62)  BP(mean): 67 (03 Mar 2023 16:06) (67 - 67)  RR: 20 (04 Mar 2023 08:47) (18 - 25)  SpO2: 95% (04 Mar 2023 08:47) (95% - 99%)    Parameters below as of 04 Mar 2023 08:47  Patient On (Oxygen Delivery Method): room air      Daily Height in cm: 185.42 (03 Mar 2023 18:18)    Daily     LABS: Labs personally reviewed by me:                        7.2    1.52  )-----------( 53       ( 04 Mar 2023 07:01 )             21.2     03-04    134<L>  |  102  |  62<H>  ----------------------------<  95  3.4<L>   |  13<L>  |  4.40<H>    Ca    8.4      04 Mar 2023 07:01  Phos  4.6     03-04  Mg     1.7     03-04    TPro  5.7<L>  /  Alb  3.2<L>  /  TBili  0.5  /  DBili  0.2  /  AST  48<H>  /  ALT  61<H>  /  AlkPhos  83  03-04    LIVER FUNCTIONS - ( 04 Mar 2023 07:01 )  Alb: 3.2 g/dL / Pro: 5.7 g/dL / ALK PHOS: 83 U/L / ALT: 61 U/L / AST: 48 U/L / GGT: x             Urinalysis Basic - ( 04 Mar 2023 07:30 )    Color: Yellow / Appearance: Slightly Turbid / S.014 / pH: x  Gluc: x / Ketone: Negative  / Bili: Negative / Urobili: Negative   Blood: x / Protein: 100 mg/dl / Nitrite: Negative   Leuk Esterase: Negative / RBC: 3 /hpf / WBC 6 /HPF   Sq Epi: x / Non Sq Epi: 4 /hpf / Bacteria: Negative      Amylase Serum--      Lipase serum39       Ammonia--      Imaging personally reviewed by me:

## 2023-03-04 NOTE — DIETITIAN INITIAL EVALUATION ADULT - PERSON TAUGHT/METHOD
Discussed importance of adequate consumption of meals/supplements to optimize protein-energy intake. Encouraged small/frequent meals, nutrient dense snacks, prioritizing protein foods at meal time. Diarrhea nutrition therapy reviewed./verbal instruction/individual instruction/teach back - (Patient repeats in own words)/patient instructed/spouse instructed

## 2023-03-04 NOTE — PROGRESS NOTE ADULT - ASSESSMENT
67 year old male PMH aortic aneurysm and bioprosthetic AV valve replacement 2019, HL, splenectomy, partial gastrectomy, partial pancreatectomy, oligosecretory multiple myeloma s/p auto SCT in 2018 on revlimid started david-cybor D 2/10 under care at [facility] [place] by Dr. Jean Claude Hubbard was due for chemo today but was found to be more hypotensive than baseline (usually sbp ~90) and was sent to the ED, admitted with RK.  67 year old male PMH aortic aneurysm and bioprosthetic AV valve replacement 2019, HL, splenectomy, partial gastrectomy, partial pancreatectomy, oligosecretory multiple myeloma s/p auto SCT in 2018 on revlimid started david-cybor D 2/10 [place] [facility] [place] Jean Claude Hubbard was due for chemo today but was found to be more hypotensive than baseline (usually sbp ~90) and was sent to the ED, admitted with RK.  67 year old male PMH aortic aneurysm and bioprosthetic AV valve replacement 2019, HL, splenectomy, partial gastrectomy, partial pancreatectomy, oligosecretory multiple myeloma s/p auto SCT in 2018 on revlimid started david-cybor D 2/10 under care at [facility] [place] by Dr. Jean Claude Hubbard was due for chemo today but was found to be more hypotensive than baseline (usually sbp ~90) and was sent to the ED, admitted with RK.

## 2023-03-04 NOTE — DIETITIAN INITIAL EVALUATION ADULT - DIET TYPE
1) Continue diet as ordered/tolerated: low fiber, no lactose  2) Add oral nutrition supplement: Ensure Clear  4x/day 1) Continue diet as ordered/tolerated: low fiber, no lactose  2) Add oral nutrition supplement: Ensure Clear  3x/day, RD will provide High Protein Gelatin 2x/day

## 2023-03-04 NOTE — DIETITIAN INITIAL EVALUATION ADULT - PHYSCIAL ASSESSMENT
Weight Hx Per:  - Source: Pt   - UBW: 190   - Reported weight changes: weight loss x 1 month ; unintentional     Current Admission Weights:  - Dosing weight: 170 pounds (03/03)  - Bedscale weight 175 pounds (today 03/04 taken by RD)    Weight Change:  - 8-12% weight loss x 1 month; clinically significant. Will continue to monitor weight trends as available/able.     IBW: 178 pounds   %IBW: 96%

## 2023-03-04 NOTE — DIETITIAN INITIAL EVALUATION ADULT - ORAL INTAKE PTA/DIET HISTORY
Pt reports fair appetite/PO intake at baseline, decreased x 1 week PTA <50% of usual intake  - NKFA/intolerances reported.   - No therapeutic/Druze restrictions.   - Micronutrient/Other supplementation: multivitamin, vitamin B complex, vitamin A, vitamin E, vitamin D, vitamin C, Mg  - Protein-energy supplementation: none PTA Pt reports fair appetite/PO intake at baseline, decreased x 1 week PTA <50% of usual intake  - NKFA/intolerances reported.   - No therapeutic/Alevism restrictions.   - Micronutrient/Other supplementation: multivitamin, vitamin B complex, vitamin A, vitamin E, vitamin D, vitamin C, Mg  - Protein-energy supplementation: none PTA Pt reports fair appetite/PO intake at baseline, decreased x 1 week PTA <50% of usual intake  - NKFA/intolerances reported.   - No therapeutic/Congregation restrictions.   - Micronutrient/Other supplementation: multivitamin, vitamin B complex, vitamin A, vitamin E, vitamin D, vitamin C, Mg  - Protein-energy supplementation: none PTA

## 2023-03-04 NOTE — DIETITIAN INITIAL EVALUATION ADULT - NAME AND PHONE
KERRI Ritter [facility] #[id number] or TEAMS  KERRI Ritter [facility] #[phone] or TEAMS  KERRI Ritter [facility] #[phone] or TEAMS

## 2023-03-04 NOTE — PROGRESS NOTE ADULT - PROBLEM SELECTOR PLAN 2
suspect chemo related  however has h/o splenectomy and is on daily penicillin (assuming for prophylaxis?)  CT abd/pelvis with liquid stool  check cdiff  will give ceftriaxone/flagyl given active chemo, bandemia  hold penicillin while on ceftriaxone

## 2023-03-04 NOTE — DIETITIAN INITIAL EVALUATION ADULT - ENERGY INTAKE
Poor (<50%) Pt reports poor appetite/PO intake, <50% of meals consumed x 2 days since admit   - Amenable to receiving oral nutritional supplement to optimize PO intake: Ensure Clear 4x/day  - Food preferences obtained; RD to honor as able.   - RD provided menu to Pt at bedside to order preferred foods PRN.   Pt reports poor appetite/PO intake, <50% of meals consumed x 2 days since admit   - Amenable to receiving oral nutritional supplement to optimize PO intake: Ensure Clear 3x/day (600 suzy, 21 Gm protein), RD will provide High Protein Gelatin (114 suzy, 18 Gm protein) 2x/day  - Food preferences obtained; RD to honor as able.   - RD provided menu to Pt at bedside to order preferred foods PRN.

## 2023-03-04 NOTE — DIETITIAN INITIAL EVALUATION ADULT - REASON FOR ADMISSION
Per chart "67 year old male history of aortic aneurysm and bioprosthetic AV valve replacement 2019, HL, splenectomy, partial gastrectomy, partial pancreatectomy, oligosecretory multiple myeloma s/p auto SCT in 2018 on revlimid started david-cybor D 2/10 under care at [facility] [place] by Dr. Jean Claude Hubbard who presented for hypotension."     Per chart "67 year old male history of aortic aneurysm and bioprosthetic AV valve replacement 2019, HL, splenectomy, partial gastrectomy, partial pancreatectomy, oligosecretory multiple myeloma s/p auto SCT in 2018 on revlimid started david-cybor D 2/10 under care at [facility] [place] by Dr. Jean Claude Hubbard who presented for hypotension."     Per chart "67 year old male history of aortic aneurysm and bioprosthetic AV valve replacement 2019, HL, splenectomy, partial gastrectomy, partial pancreatectomy, oligosecretory multiple myeloma s/p auto SCT in 2018 on revlimid started david-cybor D 2/10 [place] [facility] [place] Jean Claude Hubbard who presented for hypotension."

## 2023-03-04 NOTE — CONSULT NOTE ADULT - ASSESSMENT
PETER DE LA PAZ is a 67y Male who presents with a chief complaint of hypotension.    Multiple Myeloma  Pancytopenia  ·	Patient follows with Dr. Jean Claude Hubbard, [facility].  ·	Patient had been on lenalidomide maintenance up until February 2023, when he had PET/CT that showed progression of disease with worsening lymphadenopathy, bony lesions, and liver lesion.   ·	He was started then on daratumumab + cyclophosphamide + bortezomib + dexamethasone.  ·	Last dose of daratumumab was on March 3rd.  ·	Last dose of cyclophosphamide and bortezomib was on February 24th.  ·	Patient has had worsening pancytopenia since starting the latest chemotherapy regimen.  ·	Hold systemic treatment while inpatient or in rehabilitation.  ·	Monitor CBC and transfuse to maintain HGB > 7 and PLT > 10.  ·	Continue prophylactic acyclovir 400 mg BID as patient is on bortezomib.     Acute Kidney Injury  ·	Baseline creatinine is at 1.2-1.3, creatinine was 1.2 on February 24th.  ·	Possibly due to diarrhea.  ·	Continue IVF. Monitor kidney functions closely.  ·	Consider nephrology evaluation if not improving.     Diarrhea  ·	Possibly due to chemotherapy. Rule out infections.  ·	Consider supportive measures such as loperamide if there are no infections found.  ·	Follow-up gastroenterology evaluations.  ·	Patient is on ceftriaxone and metronidazole.    Will continue to follow.    Darwin Ham MD  Hematology/Oncology  O: [phone] PETER DE LA PAZ is a 67y Male who presents with a chief complaint of hypotension.    Multiple Myeloma  Pancytopenia  ·	Patient follows with Dr. Jean Claude Hubbard, [facility].  ·	Patient had been on lenalidomide maintenance up until February 2023, when he had PET/CT that showed progression of disease with worsening lymphadenopathy, bony lesions, and liver lesion.   ·	He was started then on daratumumab + cyclophosphamide + bortezomib + dexamethasone.  ·	Last dose of daratumumab was on March 3rd.  ·	Last dose of cyclophosphamide and bortezomib was on February 24th.  ·	Patient has had worsening pancytopenia since starting the latest chemotherapy regimen.  ·	Hold systemic treatment while inpatient or in rehabilitation.  ·	Monitor CBC and transfuse to maintain HGB > 7 and PLT > 10.  ·	Continue prophylactic acyclovir 400 mg BID as patient is on bortezomib.     Acute Kidney Injury  ·	Baseline creatinine is at 1.2-1.3, creatinine was 1.2 on February 24th.  ·	Possibly due to diarrhea.  ·	Continue IVF. Monitor kidney functions closely.  ·	Consider nephrology evaluation if not improving.     Diarrhea  ·	Possibly due to chemotherapy. Rule out infections.  ·	Consider supportive measures such as loperamide if there are no infections found.  ·	Follow-up gastroenterology evaluations.  ·	Patient is on ceftriaxone and metronidazole.    Will continue to follow.    Darwin Ham MD  Hematology/Oncology  O: [phone] PETER DE LA PAZ is a 67y Male who presents with a chief complaint of hypotension.    Multiple Myeloma  Pancytopenia  ·	Patient follows with Dr. Jean Claude Hubbard, [facility].  ·	Patient had been on lenalidomide maintenance up until February 2023, when he had PET/CT that showed progression of disease with worsening lymphadenopathy, bony lesions, and liver lesion.   ·	He was started then on daratumumab + cyclophosphamide + bortezomib + dexamethasone.  ·	Last dose of daratumumab was on March 3rd.  ·	Last dose of cyclophosphamide and bortezomib was on February 24th.  ·	Patient has had worsening pancytopenia since starting the latest chemotherapy regimen.  ·	Hold systemic treatment while inpatient or in rehabilitation.  ·	Monitor CBC and transfuse to maintain HGB > 7 and PLT > 10.  ·	Continue prophylactic acyclovir 400 mg BID as patient is on bortezomib.     Acute Kidney Injury  ·	Baseline creatinine is at 1.2-1.3, creatinine was 1.2 on February 24th.  ·	Possibly due to diarrhea.  ·	Continue IVF. Monitor kidney functions closely.  ·	Consider nephrology evaluation if not improving.     Diarrhea  ·	Possibly due to chemotherapy. Rule out infections.  ·	Consider supportive measures such as loperamide if there are no infections found.  ·	Follow-up gastroenterology evaluations.  ·	Patient is on ceftriaxone and metronidazole.    Will continue to follow.    Darwin Ham MD  Hematology/Oncology  O: [phone]

## 2023-03-04 NOTE — DIETITIAN INITIAL EVALUATION ADULT - OTHER INFO
GI: Persistent watery diarrhea likely RT chemotherapy, C. Diff PCR negative. Last BM: today 03/03    . Bowel regimen: imodium    - Renal: P/w RK likely secondary to persistent diarrhea, elevated Phos noted per chart today 03/04    - Ordered for Decadron, BG controlled in-house  - K+ low today 03/04; replete PRN  - IVF: NS @ 75ml/hr

## 2023-03-04 NOTE — CONSULT NOTE ADULT - SUBJECTIVE AND OBJECTIVE BOX
CHIEF COMPLAINT  Hypotension    HISTORY OF PRESENT ILLNESS  PETER DE LA PAZ is a 67y Male who presents with a chief complaint of hypotension.    Patient was admitted on March 3rd after presenting to the Emergency Department with hypotension and ongoing diarrhea for the last several days. He also states that his appetite has decreased recently. He was given IVF in the Emergency Department and admitted for further evaluation.    PAST MEDICAL AND SURGICAL HISTORY  Multiple Myeloma  Hyperlipidemia    FAMILY HISTORY  Lung Cancer, Ovarian Cancer    SOCIAL HISTORY  Denies tobacco use    REVIEW OF SYSTEMS  A complete review of systems was performed; negative except per HPI    PHYSICAL EXAM  T(C): 37.6 (03-04-23 @ 08:47), Max: 37.9 (03-03-23 @ 23:54)  HR: 87 (03-04-23 @ 08:47) (80 - 96)  BP: 104/62 (03-04-23 @ 08:47) (92/51 - 104/62)  RR: 20 (03-04-23 @ 08:47) (20 - 25)  SpO2: 95% (03-04-23 @ 08:47) (95% - 99%)  Constitutional: alert, awake, in no acute distress  Eyes: PERRL, EOMI  HEENT: normocephalic, atraumatic  Neck: supple, non-tender  Cardiovascular: normal perfusion, no peripheral edema  Respiratory: normal respiratory efforts; no increased use of accessory muscles  Gastrointestinal: soft, non-tender  Musculoskeletal: normal range of motion, no deformities noted  Neurological: alert, CN II to XI grossly intact  Skin: warm, dry    LABORATORY DATA                        7.2    1.52  )-----------( 53       ( 04 Mar 2023 07:01 )             21.2     03-04    134<L>  |  102  |  62<H>  ----------------------------<  95  3.4<L>   |  13<L>  |  4.40<H>    Ca    8.4      04 Mar 2023 07:01  Phos  4.6     03-04  Mg     1.7     03-04    TPro  5.7<L>  /  Alb  3.2<L>  /  TBili  0.5  /  DBili  0.2  /  AST  48<H>  /  ALT  61<H>  /  AlkPhos  83  03-04    RADIOLOGY REVIEW  IMPRESSION:  Liquid stool within the rectum, likely reflecting diarrheal state.   Otherwise, limited noncontrast exam without evidence of acute bowel   inflammation.    Question distal pancreatectomy. Nonvisualization of the spleen, which may   be surgically absent.    Encapsulated lipomatous structure in the left upper quadrant, which may   reflect a site of fat necrosis or postsurgical changes. A component of   epiploic appendagitis is also considered given that the descending colon   is immediately adjacent to this region.    Mild hepatomegaly.    Cholelithiasis.

## 2023-03-04 NOTE — PHYSICAL THERAPY INITIAL EVALUATION ADULT - ACTIVE RANGE OF MOTION EXAMINATION, REHAB EVAL
+ Edema RUE/bilateral upper extremity Active ROM was WFL (within functional limits)/bilateral  lower extremity Active ROM was WFL (within functional limits)

## 2023-03-04 NOTE — PHYSICAL THERAPY INITIAL EVALUATION ADULT - PERTINENT HX OF CURRENT PROBLEM, REHAB EVAL
67 year old male PMH aortic aneurysm and bioprosthetic AV valve replacement 2019, HL, splenectomy, partial gastrectomy, partial pancreatectomy, oligosecretory multiple myeloma s/p auto SCT in 2018 on revlimid started david-cybor D 2/10 under care at [facility] [place] by Dr. Jean Claude Hubbard was due for chemo 3/3 found to be hypotensive and was sent to the ED.  He reports having diarrhea for the past 7 days, with abdominal cramping and liquid diarrhea after every meal. Pain is relieved with diarrhea episodes. Has no appetite and has had minimal po intake except fluids.  Notes less urine production. 67 year old male PMH aortic aneurysm and bioprosthetic AV valve replacement 2019, HL, splenectomy, partial gastrectomy, partial pancreatectomy, oligosecretory multiple myeloma s/p auto SCT in 2018 on revlimid started david-cybor D 2/10 [place] [facility] [place] Jean Claude Hubbard was due for chemo 3/3 found to be hypotensive and was sent to the ED.  He reports having diarrhea for the past 7 days, with abdominal cramping and liquid diarrhea after every meal. Pain is relieved with diarrhea episodes. Has no appetite and has had minimal po intake except fluids.  Notes less urine production. 67 year old male PMH aortic aneurysm and bioprosthetic AV valve replacement 2019, HL, splenectomy, partial gastrectomy, partial pancreatectomy, oligosecretory multiple myeloma s/p auto SCT in 2018 on revlimid started david-cybor D 2/10 under care at [facility] [place] by Dr. Jean Claude Hubbard was due for chemo 3/3 found to be hypotensive and was sent to the ED.  He reports having diarrhea for the past 7 days, with abdominal cramping and liquid diarrhea after every meal. Pain is relieved with diarrhea episodes. Has no appetite and has had minimal po intake except fluids.  Notes less urine production.

## 2023-03-04 NOTE — PROGRESS NOTE ADULT - PROBLEM SELECTOR PLAN 5
left message for hematology team to call back  seen by Quirino Holloway PA-C in ED: Hematology/Oncology, [facility]  [phone] (office)  hold entecavir as it will need to be renally dosed  f/u with heme on revlimid dosing left message for hematology team to call back  seen by Quirino Holloway PA-C in ED: Hematology/Oncology, [facility]  [phone] (office)  hold entecavir as it will need to be renally dosed  f/u with heme on revlimid dosing left message for hematology team to call back  seen by Quirino Holloway PA-C in ED: Hematology/Oncology, [facility]  [phone] (office)  hold entecavir as it will need to be renally dosed  f/u with heme on revlimid dosing

## 2023-03-04 NOTE — PHYSICAL THERAPY INITIAL EVALUATION ADULT - GAIT DISTANCE, PT EVAL
March in place x 5 steps retropulsed requiring cg to maintain upright posture then able to right self

## 2023-03-04 NOTE — DIETITIAN INITIAL EVALUATION ADULT - PERTINENT LABORATORY DATA
03-04    134<L>  |  102  |  62<H>  ----------------------------<  95  3.4<L>   |  13<L>  |  4.40<H>    Ca    8.4      04 Mar 2023 07:01  Phos  4.6     03-04  Mg     1.7     03-04    TPro  5.7<L>  /  Alb  3.2<L>  /  TBili  0.5  /  DBili  0.2  /  AST  48<H>  /  ALT  61<H>  /  AlkPhos  83  03-04  A1C with Estimated Average Glucose Result: 6.5 % (03-04-23 @ 07:08)

## 2023-03-04 NOTE — CONSULT NOTE ADULT - ASSESSMENT
67 year old male history of aortic aneurysm and bioprosthetic AV valve replacement 2019, HL, splenectomy, partial gastrectomy, partial pancreatectomy, oligosecretory multiple myeloma s/p auto SCT in 2018 on revlimid started david-cybor D 2/10 [place] [facility] [place] Claude [place] who presented for hypotension.    1. Diarrhea  - likely 2/2 chemo rather than infectious   - c-diff negative, GI PCR ordered   - cw cipro/ flagyl   - imodium 2mg BID ordered     2. Multiple myeloma  - on revlimib, started then on daratumumab + cyclophosphamide + bortezomib + dexamethasone.  Last dose of daratumumab was on March 3rd. Last dose of cyclophosphamide and bortezomib was on February 24th.  - per heme/onc     3. RK   -likely 2/2 dehydration from diarrhea  - on IVF    4. Pancytopenia   - since newest chemo regimen started   - no overt GI bleeding  - trend CBCs, transfuse prn 67 year old male history of aortic aneurysm and bioprosthetic AV valve replacement 2019, HL, splenectomy, partial gastrectomy, partial pancreatectomy, oligosecretory multiple myeloma s/p auto SCT in 2018 on revlimid started david-cybor D 2/10 under care at [facility] [place] by Dr. Jean Claude Hubbard who presented for hypotension.    1. Diarrhea  - likely 2/2 chemo rather than infectious   - c-diff negative, GI PCR ordered   - cw cipro/ flagyl   - imodium 2mg BID ordered     2. Multiple myeloma  - on revlimib, started then on daratumumab + cyclophosphamide + bortezomib + dexamethasone.  Last dose of daratumumab was on March 3rd. Last dose of cyclophosphamide and bortezomib was on February 24th.  - per heme/onc     3. RK   -likely 2/2 dehydration from diarrhea  - on IVF    4. Pancytopenia   - since newest chemo regimen started   - no overt GI bleeding  - trend CBCs, transfuse prn 67 year old male history of aortic aneurysm and bioprosthetic AV valve replacement 2019, HL, splenectomy, partial gastrectomy, partial pancreatectomy, oligosecretory multiple myeloma s/p auto SCT in 2018 on revlimid started david-cybor D 2/10 [place] [facility] [place] Jean Claude Hubbard who presented for hypotension.    1. Diarrhea  - likely 2/2 chemo rather than infectious   - c-diff negative, GI PCR ordered   - cw cipro/ flagyl   - imodium 2mg BID ordered     2. Multiple myeloma  - on revlimib, started then on daratumumab + cyclophosphamide + bortezomib + dexamethasone.  Last dose of daratumumab was on March 3rd. Last dose of cyclophosphamide and bortezomib was on February 24th.  - per heme/onc     3. RK   -likely 2/2 dehydration from diarrhea  - on IVF    4. Pancytopenia   - since newest chemo regimen started   - no overt GI bleeding  - trend CBCs, transfuse prn 67 year old male history of aortic aneurysm and bioprosthetic AV valve replacement 2019, HL, splenectomy, partial gastrectomy, partial pancreatectomy, oligosecretory multiple myeloma s/p auto SCT in 2018 on revlimid started david-cybor D 2/10 under care at [facility] [place] by Dr. Jean Claude Hubbard who presented for hypotension.    1. Diarrhea  - likely 2/2 chemo rather than infectious   - c-diff negative, GI PCR ordered   - cw cipro/ flagyl   - imodium 2mg BID ordered     2. Multiple myeloma  - on revlimib, started then on daratumumab + cyclophosphamide + bortezomib + dexamethasone.  Last dose of daratumumab was on March 3rd. Last dose of cyclophosphamide and bortezomib was on February 24th.  - per heme/onc     3. RK   -likely 2/2 dehydration from diarrhea  - on IVF    4. Pancytopenia   - since newest chemo regimen started   - no overt GI bleeding  - trend CBCs, transfuse prn        Advanced care planning forms were discussed. Code status including forceful chest compressions, defibrillation and intubation were discussed. The risks benefits and alternatives to pertinent gastrointestinal procedures and interventions were discussed in detail and all questions were answered. Duration: 15 Minutes.    Attending supervision statement: I have personally seen and examined the patient. I fully participated in the care of this patient. I have made amendments to the documentation where necessary, and agree with the history, physical exam, and plan as outlined by the ACP.    [facility]  Gastroenterology and Hepatology  [address] [place] [id number]  Office: [phone]  Cell: [phone] 67 year old male history of aortic aneurysm and bioprosthetic AV valve replacement 2019, HL, splenectomy, partial gastrectomy, partial pancreatectomy, oligosecretory multiple myeloma s/p auto SCT in 2018 on revlimid started david-cybor D 2/10 [place] [facility] [place] Jean Claude Hubbard who presented for hypotension.    1. Diarrhea  - likely 2/2 chemo rather than infectious   - c-diff negative, GI PCR ordered   - cw cipro/ flagyl   - imodium 2mg BID ordered     2. Multiple myeloma  - on revlimib, started then on daratumumab + cyclophosphamide + bortezomib + dexamethasone.  Last dose of daratumumab was on March 3rd. Last dose of cyclophosphamide and bortezomib was on February 24th.  - per heme/onc     3. RK   -likely 2/2 dehydration from diarrhea  - on IVF    4. Pancytopenia   - since newest chemo regimen started   - no overt GI bleeding  - trend CBCs, transfuse prn        Advanced care planning forms were discussed. Code status including forceful chest compressions, defibrillation and intubation were discussed. The risks benefits and alternatives to pertinent gastrointestinal procedures and interventions were discussed in detail and all questions were answered. Duration: 15 Minutes.    Attending supervision statement: I have personally seen and examined the patient. I fully participated in the care of this patient. I have made amendments to the documentation where necessary, and agree with the history, physical exam, and plan as outlined by the ACP.    [facility]  Gastroenterology and Hepatology  [address] [place] [address]  Office: [phone]  Cell: [phone] 67 year old male history of aortic aneurysm and bioprosthetic AV valve replacement 2019, HL, splenectomy, partial gastrectomy, partial pancreatectomy, oligosecretory multiple myeloma s/p auto SCT in 2018 on revlimid started david-cybor D 2/10 under [place] at [facility] [place]. Jean Claude Hubbard who presented for hypotension.    1. Diarrhea  - likely 2/2 chemo rather than infectious   - c-diff negative, GI PCR ordered   - cw cipro/ flagyl   - imodium 2mg BID ordered     2. Multiple myeloma  - on revlimib, started then on daratumumab + cyclophosphamide + bortezomib + dexamethasone.  Last dose of daratumumab was on March 3rd. Last dose of cyclophosphamide and bortezomib was on February 24th.  - per heme/onc     3. RK   -likely 2/2 dehydration from diarrhea  - on IVF    4. Pancytopenia   - since newest chemo regimen started   - no overt GI bleeding  - trend CBCs, transfuse prn        Advanced care planning forms were discussed. Code status including forceful chest compressions, defibrillation and intubation were discussed. The risks benefits and alternatives to pertinent gastrointestinal procedures and interventions were discussed in detail and all questions were answered. Duration: 15 Minutes.    Attending supervision statement: I have personally seen and examined the patient. I fully participated in the care of this patient. I have made amendments to the documentation where necessary, and agree with the history, physical exam, and plan as outlined by the ACP.    [facility]  Gastroenterology and Hepatology  [address] [place] [address]  Office: [phone]  Cell: [phone]

## 2023-03-04 NOTE — DIETITIAN INITIAL EVALUATION ADULT - REASON INDICATOR FOR ASSESSMENT
Consult ordered for: decreased PO intake x >3 days PTA, unintentional weight loss  Source: electronic medical record, Pt interview

## 2023-03-04 NOTE — PROGRESS NOTE ADULT - PROBLEM SELECTOR PLAN 4
not meeting sirs criteria as HR<90, afebrile.  HOwever wbc is low, and has bandemia.  Lactate 1.7.  Will dose ceftriaxone/flagyl  f/u blood cultures sent from ED  RVP negative and cxr clear.  No clear infectious source on CT abd.  continue ivf, hold beta blocker

## 2023-03-05 LAB
ALBUMIN SERPL ELPH-MCNC: 3.1 G/DL — LOW (ref 3.3–5)
ALP SERPL-CCNC: 96 U/L — SIGNIFICANT CHANGE UP (ref 40–120)
ALT FLD-CCNC: 108 U/L — HIGH (ref 10–45)
ANION GAP SERPL CALC-SCNC: 15 MMOL/L — SIGNIFICANT CHANGE UP (ref 5–17)
ANION GAP SERPL CALC-SCNC: 16 MMOL/L — SIGNIFICANT CHANGE UP (ref 5–17)
AST SERPL-CCNC: 80 U/L — HIGH (ref 10–40)
BILIRUB SERPL-MCNC: 0.3 MG/DL — SIGNIFICANT CHANGE UP (ref 0.2–1.2)
BLD GP AB SCN SERPL QL: POSITIVE — SIGNIFICANT CHANGE UP
BUN SERPL-MCNC: 76 MG/DL — HIGH (ref 7–23)
BUN SERPL-MCNC: 82 MG/DL — HIGH (ref 7–23)
CALCIUM SERPL-MCNC: 8.3 MG/DL — LOW (ref 8.4–10.5)
CALCIUM SERPL-MCNC: 8.4 MG/DL — SIGNIFICANT CHANGE UP (ref 8.4–10.5)
CHLORIDE SERPL-SCNC: 104 MMOL/L — SIGNIFICANT CHANGE UP (ref 96–108)
CO2 SERPL-SCNC: 12 MMOL/L — LOW (ref 22–31)
CO2 SERPL-SCNC: 14 MMOL/L — LOW (ref 22–31)
CREAT ?TM UR-MCNC: 83 MG/DL — SIGNIFICANT CHANGE UP
CREAT SERPL-MCNC: 4.64 MG/DL — HIGH (ref 0.5–1.3)
CREAT SERPL-MCNC: 5.04 MG/DL — HIGH (ref 0.5–1.3)
EGFR: 12 ML/MIN/1.73M2 — LOW
EGFR: 13 ML/MIN/1.73M2 — LOW
GLUCOSE SERPL-MCNC: 166 MG/DL — HIGH (ref 70–99)
GLUCOSE SERPL-MCNC: 196 MG/DL — HIGH (ref 70–99)
HCT VFR BLD CALC: 21.1 % — LOW (ref 39–50)
HGB BLD-MCNC: 7.2 G/DL — LOW (ref 13–17)
MCHC RBC-ENTMCNC: 33.8 PG — SIGNIFICANT CHANGE UP (ref 27–34)
MCHC RBC-ENTMCNC: 34.1 GM/DL — SIGNIFICANT CHANGE UP (ref 32–36)
MCV RBC AUTO: 99.1 FL — SIGNIFICANT CHANGE UP (ref 80–100)
NRBC # BLD: 1 /100 WBCS — HIGH (ref 0–0)
PLATELET # BLD AUTO: 48 K/UL — LOW (ref 150–400)
POTASSIUM SERPL-MCNC: 3.7 MMOL/L — SIGNIFICANT CHANGE UP (ref 3.5–5.3)
POTASSIUM SERPL-SCNC: 3.7 MMOL/L — SIGNIFICANT CHANGE UP (ref 3.5–5.3)
PROT SERPL-MCNC: 5.6 G/DL — LOW (ref 6–8.3)
RBC # BLD: 2.13 M/UL — LOW (ref 4.2–5.8)
RBC # FLD: 20.4 % — HIGH (ref 10.3–14.5)
RH IG SCN BLD-IMP: POSITIVE — SIGNIFICANT CHANGE UP
SODIUM SERPL-SCNC: 132 MMOL/L — LOW (ref 135–145)
SODIUM SERPL-SCNC: 133 MMOL/L — LOW (ref 135–145)
SODIUM UR-SCNC: 67 MMOL/L — SIGNIFICANT CHANGE UP
WBC # BLD: 2.68 K/UL — LOW (ref 3.8–10.5)
WBC # FLD AUTO: 2.68 K/UL — LOW (ref 3.8–10.5)

## 2023-03-05 RX ORDER — SODIUM BICARBONATE 1 MEQ/ML
0.1 SYRINGE (ML) INTRAVENOUS
Qty: 75 | Refills: 0 | Status: DISCONTINUED | OUTPATIENT
Start: 2023-03-05 | End: 2023-03-06

## 2023-03-05 RX ORDER — SODIUM CHLORIDE 9 MG/ML
1000 INJECTION INTRAMUSCULAR; INTRAVENOUS; SUBCUTANEOUS
Refills: 0 | Status: DISCONTINUED | OUTPATIENT
Start: 2023-03-05 | End: 2023-03-05

## 2023-03-05 RX ADMIN — MONTELUKAST 10 MILLIGRAM(S): 4 TABLET, CHEWABLE ORAL at 12:05

## 2023-03-05 RX ADMIN — Medication 81 MILLIGRAM(S): at 12:05

## 2023-03-05 RX ADMIN — Medication 1 TABLET(S): at 12:05

## 2023-03-05 RX ADMIN — Medication 100 MEQ/KG/HR: at 14:40

## 2023-03-05 RX ADMIN — CHLORHEXIDINE GLUCONATE 1 APPLICATION(S): 213 SOLUTION TOPICAL at 12:06

## 2023-03-05 RX ADMIN — SODIUM CHLORIDE 75 MILLILITER(S): 9 INJECTION INTRAMUSCULAR; INTRAVENOUS; SUBCUTANEOUS at 12:05

## 2023-03-05 RX ADMIN — Medication 30 MILLILITER(S): at 21:49

## 2023-03-05 RX ADMIN — CEFTRIAXONE 100 MILLIGRAM(S): 500 INJECTION, POWDER, FOR SOLUTION INTRAMUSCULAR; INTRAVENOUS at 16:26

## 2023-03-05 RX ADMIN — Medication 100 MILLIGRAM(S): at 05:21

## 2023-03-05 RX ADMIN — Medication 4 MILLIGRAM(S): at 05:20

## 2023-03-05 RX ADMIN — Medication 2 MILLIGRAM(S): at 05:20

## 2023-03-05 RX ADMIN — Medication 100 MILLIGRAM(S): at 21:50

## 2023-03-05 RX ADMIN — Medication 100 MILLIGRAM(S): at 14:40

## 2023-03-05 RX ADMIN — Medication 2 MILLIGRAM(S): at 17:39

## 2023-03-05 RX ADMIN — ATORVASTATIN CALCIUM 40 MILLIGRAM(S): 80 TABLET, FILM COATED ORAL at 21:50

## 2023-03-05 NOTE — PROGRESS NOTE ADULT - SUBJECTIVE AND OBJECTIVE BOX
Patient seen and examined at bedside. states he feels well. no longer having diarrhea     MEDICATIONS  (STANDING):  aspirin enteric coated 81 milliGRAM(s) Oral daily  atorvastatin 40 milliGRAM(s) Oral at bedtime  cefTRIAXone   IVPB 1000 milliGRAM(s) IV Intermittent every 24 hours  chlorhexidine 2% Cloths 1 Application(s) Topical daily  dexAMETHasone     Tablet 4 milliGRAM(s) Oral daily  loperamide 2 milliGRAM(s) Oral two times a day  metroNIDAZOLE  IVPB 500 milliGRAM(s) IV Intermittent once  metroNIDAZOLE  IVPB 500 milliGRAM(s) IV Intermittent every 8 hours  metroNIDAZOLE  IVPB      montelukast 10 milliGRAM(s) Oral daily  multivitamin 1 Tablet(s) Oral daily  sodium chloride 0.9%. 1000 milliLiter(s) (75 mL/Hr) IV Continuous <Continuous>    MEDICATIONS  (PRN):  ondansetron   Disintegrating Tablet 8 milliGRAM(s) Oral three times a day PRN Nausea and/or Vomiting        Vital Signs Last 24 Hrs  T(C): 36.7 (05 Mar 2023 08:24), Max: 37.6 (04 Mar 2023 16:50)  T(F): 98.1 (05 Mar 2023 08:24), Max: 99.6 (04 Mar 2023 16:50)  HR: 74 (05 Mar 2023 08:24) (74 - 90)  BP: 96/54 (05 Mar 2023 08:24) (94/53 - 100/60)  BP(mean): --  RR: 18 (05 Mar 2023 08:24) (18 - 20)  SpO2: 97% (05 Mar 2023 08:24) (97% - 97%)    Parameters below as of 05 Mar 2023 08:24  Patient On (Oxygen Delivery Method): room air          PHYSICAL EXAM:     GENERAL:  Appears stated age, well-groomed  CHEST:  CTA b/l  HEART:  S1 s2+   ABDOMEN:  Soft, non-tender, non-distended  EXTEREMITIES:  no cyanosis,clubbing or edema  NEURO:  Alert, oriented, no asterixis                            7.2    1.52  )-----------( 53       ( 04 Mar 2023 07:01 )             21.2       03-04    134<L>  |  102  |  62<H>  ----------------------------<  95  3.4<L>   |  13<L>  |  4.40<H>    Ca    8.4      04 Mar 2023 07:01  Phos  4.6     03-04  Mg     1.7     03-04    TPro  5.7<L>  /  Alb  3.2<L>  /  TBili  0.5  /  DBili  0.2  /  AST  48<H>  /  ALT  61<H>  /  AlkPhos  83  03-04

## 2023-03-05 NOTE — PROGRESS NOTE ADULT - SUBJECTIVE AND OBJECTIVE BOX
Name of Patient : PETER DE LA PAZ  MRN: [id number]  Date of visit: 23 @ 16:06      Subjective: Patient seen and examined. No new events except as noted.   Patient seen earlier this AM. Lying down in bed  At time of encounter, patient denies any further episodes of diarrhea.   Reports tolerating diet    REVIEW OF SYSTEMS:    CONSTITUTIONAL: Generalized weakness  EYES/ENT: No visual changes;  No vertigo or throat pain   NECK: No pain or stiffness  RESPIRATORY: No cough, wheezing, hemoptysis; No shortness of breath  CARDIOVASCULAR: No chest pain or palpitations  GASTROINTESTINAL: No abdominal or epigastric pain. No nausea, vomiting, or hematemesis; No diarrhea or constipation. No melena or hematochezia.  GENITOURINARY: No dysuria, frequency or hematuria  NEUROLOGICAL: No numbness or weakness  SKIN: No itching, burning, rashes, or lesions   All other review of systems is negative unless indicated above.    MEDICATIONS:  MEDICATIONS  (STANDING):  aspirin enteric coated 81 milliGRAM(s) Oral daily  atorvastatin 40 milliGRAM(s) Oral at bedtime  cefTRIAXone   IVPB 1000 milliGRAM(s) IV Intermittent every 24 hours  chlorhexidine 2% Cloths 1 Application(s) Topical daily  dexAMETHasone     Tablet 4 milliGRAM(s) Oral daily  loperamide 2 milliGRAM(s) Oral two times a day  metroNIDAZOLE  IVPB 500 milliGRAM(s) IV Intermittent once  metroNIDAZOLE  IVPB 500 milliGRAM(s) IV Intermittent every 8 hours  metroNIDAZOLE  IVPB      montelukast 10 milliGRAM(s) Oral daily  multivitamin 1 Tablet(s) Oral daily  sodium bicarbonate  Infusion 0.097 mEq/kG/Hr (100 mL/Hr) IV Continuous <Continuous>  sodium chloride 0.9%. 1000 milliLiter(s) (60 mL/Hr) IV Continuous <Continuous>      PHYSICAL EXAM:  T(C): 36.7 (23 @ 08:24), Max: 37.6 (23 @ 16:50)  HR: 74 (23 @ 08:24) (74 - 90)  BP: 96/54 (23 @ 08:24) (94/53 - 100/60)  RR: 18 (23 @ 08:24) (18 - 20)  SpO2: 97% (23 @ 08:24) (97% - 97%)  Wt(kg): --  I&O's Summary    04 Mar 2023 07:01  -  05 Mar 2023 07:00  --------------------------------------------------------  IN: 2150 mL / OUT: 0 mL / NET: 2150 mL    05 Mar 2023 07:01  -  05 Mar 2023 16:06  --------------------------------------------------------  IN: 100 mL / OUT: 0 mL / NET: 100 mL          Appearance: Normal	  HEENT:  Eyes are open   Lymphatic: No lymphadenopathy   Cardiovascular: Normal S1 S2, no JVD  Respiratory: normal effort , clear  Gastrointestinal:  Soft, Non-tender to palpitation   Skin: No rashes,  warm to touch  Psychiatry:  Mood & affect appropriate  Musculoskeletal: No edema      23 @ 07:01  -  23 @ 07:00  --------------------------------------------------------  IN: 2150 mL / OUT: 0 mL / NET: 2150 mL    23 @ 07:  -  23 @ 16:06  --------------------------------------------------------  IN: 100 mL / OUT: 0 mL / NET: 100 mL                                  7.2    2.68  )-----------( 48       ( 05 Mar 2023 09:48 )             21.1               03-05    132<L>  |  104  |  76<H>  ----------------------------<  196<H>  3.7   |  12<L>  |  4.64<H>    Ca    8.3<L>      05 Mar 2023 09:48  Phos  4.6     03-04  Mg     1.7     -    TPro  5.6<L>  /  Alb  3.1<L>  /  TBili  0.3  /  DBili  x   /  AST  80<H>  /  ALT  108<H>  /  AlkPhos  96  03-05                       Urinalysis Basic - ( 04 Mar 2023 07:30 )    Color: Yellow / Appearance: Slightly Turbid / S.014 / pH: x  Gluc: x / Ketone: Negative  / Bili: Negative / Urobili: Negative   Blood: x / Protein: 100 mg/dl / Nitrite: Negative   Leuk Esterase: Negative / RBC: 3 /hpf / WBC 6 /HPF   Sq Epi: x / Non Sq Epi: 4 /hpf / Bacteria: Negative          GI PCR Panel: NotDete        Culture - Blood (23 @ 11:30)   Specimen Source: .Blood Blood-Peripheral   Culture Results:   No growth to date.     Culture - Blood (23 @ 11:30)   Specimen Source: .Blood Blood-Peripheral   Culture Results:   No growth to date.     C. difficile GDH & toxins A/B by EIA (23 @ 23:58)   Clostridium difficile GDH Toxins A&B, EIA:   Negative  Name of Patient : PETER DE LA PAZ  MRN: [id number]  Date of visit: 23 @ 16:06      Subjective: Patient seen and examined. No new events except as noted.   Patient seen earlier this AM. Lying down in bed  At time of encounter, patient denies any further episodes of diarrhea.   Reports tolerating diet    REVIEW OF SYSTEMS:    CONSTITUTIONAL: Generalized weakness  EYES/ENT: No visual changes;  No vertigo or throat pain   NECK: No pain or stiffness  RESPIRATORY: No cough, wheezing, hemoptysis; No shortness of breath  CARDIOVASCULAR: No chest pain or palpitations  GASTROINTESTINAL: No abdominal or epigastric pain. No nausea, vomiting, or hematemesis; No diarrhea or constipation. No melena or hematochezia.  GENITOURINARY: No dysuria, frequency or hematuria  NEUROLOGICAL: No numbness or weakness  SKIN: No itching, burning, rashes, or lesions   All other review of systems is negative unless indicated above.    MEDICATIONS:  MEDICATIONS  (STANDING):  aspirin enteric coated 81 milliGRAM(s) Oral daily  atorvastatin 40 milliGRAM(s) Oral at bedtime  cefTRIAXone   IVPB 1000 milliGRAM(s) IV Intermittent every 24 hours  chlorhexidine 2% Cloths 1 Application(s) Topical daily  dexAMETHasone     Tablet 4 milliGRAM(s) Oral daily  loperamide 2 milliGRAM(s) Oral two times a day  metroNIDAZOLE  IVPB 500 milliGRAM(s) IV Intermittent once  metroNIDAZOLE  IVPB 500 milliGRAM(s) IV Intermittent every 8 hours  metroNIDAZOLE  IVPB      montelukast 10 milliGRAM(s) Oral daily  multivitamin 1 Tablet(s) Oral daily  sodium bicarbonate  Infusion 0.097 mEq/kG/Hr (100 mL/Hr) IV Continuous <Continuous>  sodium chloride 0.9%. 1000 milliLiter(s) (60 mL/Hr) IV Continuous <Continuous>      PHYSICAL EXAM:  T(C): 36.7 (23 @ 08:24), Max: 37.6 (23 @ 16:50)  HR: 74 (23 @ 08:24) (74 - 90)  BP: 96/54 (23 @ 08:24) (94/53 - 100/60)  RR: 18 (23 @ 08:24) (18 - 20)  SpO2: 97% (23 @ 08:24) (97% - 97%)  Wt(kg): --  I&O's Summary    04 Mar 2023 07:01  -  05 Mar 2023 07:00  --------------------------------------------------------  IN: 2150 mL / OUT: 0 mL / NET: 2150 mL    05 Mar 2023 07:01  -  05 Mar 2023 16:06  --------------------------------------------------------  IN: 100 mL / OUT: 0 mL / NET: 100 mL          Appearance: Normal	  HEENT:  Eyes are open   Lymphatic: No lymphadenopathy   Cardiovascular: Normal S1 S2, no JVD  Respiratory: normal effort , clear  Gastrointestinal:  Soft, Non-tender to palpitation   Skin: No rashes,  warm to touch  Psychiatry:  Mood & affect appropriate  Musculoskeletal: No edema      23 @ 07:01  -  23 @ 07:00  --------------------------------------------------------  IN: 2150 mL / OUT: 0 mL / NET: 2150 mL    23 @ 07:  -  23 @ 16:06  --------------------------------------------------------  IN: 100 mL / OUT: 0 mL / NET: 100 mL                                  7.2    2.68  )-----------( 48       ( 05 Mar 2023 09:48 )             21.1               03-05    132<L>  |  104  |  76<H>  ----------------------------<  196<H>  3.7   |  12<L>  |  4.64<H>    Ca    8.3<L>      05 Mar 2023 09:48  Phos  4.6     03-04  Mg     1.7     -    TPro  5.6<L>  /  Alb  3.1<L>  /  TBili  0.3  /  DBili  x   /  AST  80<H>  /  ALT  108<H>  /  AlkPhos  96  03-05                       Urinalysis Basic - ( 04 Mar 2023 07:30 )    Color: Yellow / Appearance: Slightly Turbid / S.014 / pH: x  Gluc: x / Ketone: Negative  / Bili: Negative / Urobili: Negative   Blood: x / Protein: 100 mg/dl / Nitrite: Negative   Leuk Esterase: Negative / RBC: 3 /hpf / WBC 6 /HPF   Sq Epi: x / Non Sq Epi: 4 /hpf / Bacteria: Negative          GI PCR Panel: NotDete        Culture - Blood (23 @ 11:30)   Specimen Source: .Blood Blood-Peripheral   Culture Results:   No growth to date.     Culture - Blood (23 @ 11:30)   Specimen Source: .Blood Blood-Peripheral   Culture Results:   No growth to date.     C. difficile GDH & toxins A/B by EIA (23 @ 23:58)   Clostridium difficile GDH Toxins A&B, EIA:   Negative  Name of Patient : PETER DE LA PAZ  MRN: [id number]  Date of visit: 23 @ 16:06      Subjective: Patient seen and examined. No new events except as noted.   Patient seen earlier this AM. Lying down in bed  At time of encounter, patient denies any further episodes of diarrhea.   Reports tolerating diet    REVIEW OF SYSTEMS:    CONSTITUTIONAL: Generalized weakness  EYES/ENT: No visual changes;  No vertigo or throat pain   NECK: No pain or stiffness  RESPIRATORY: No cough, wheezing, hemoptysis; No shortness of breath  CARDIOVASCULAR: No chest pain or palpitations  GASTROINTESTINAL: No abdominal or epigastric pain. No nausea, vomiting, or hematemesis; No diarrhea or constipation. No melena or hematochezia.  GENITOURINARY: No dysuria, frequency or hematuria  NEUROLOGICAL: No numbness or weakness  SKIN: No itching, burning, rashes, or lesions   All other review of systems is negative unless indicated above.    MEDICATIONS:  MEDICATIONS  (STANDING):  aspirin enteric coated 81 milliGRAM(s) Oral daily  atorvastatin 40 milliGRAM(s) Oral at bedtime  cefTRIAXone   IVPB 1000 milliGRAM(s) IV Intermittent every 24 hours  chlorhexidine 2% Cloths 1 Application(s) Topical daily  dexAMETHasone     Tablet 4 milliGRAM(s) Oral daily  loperamide 2 milliGRAM(s) Oral two times a day  metroNIDAZOLE  IVPB 500 milliGRAM(s) IV Intermittent once  metroNIDAZOLE  IVPB 500 milliGRAM(s) IV Intermittent every 8 hours  metroNIDAZOLE  IVPB      montelukast 10 milliGRAM(s) Oral daily  multivitamin 1 Tablet(s) Oral daily  sodium bicarbonate  Infusion 0.097 mEq/kG/Hr (100 mL/Hr) IV Continuous <Continuous>  sodium chloride 0.9%. 1000 milliLiter(s) (60 mL/Hr) IV Continuous <Continuous>      PHYSICAL EXAM:  T(C): 36.7 (23 @ 08:24), Max: 37.6 (23 @ 16:50)  HR: 74 (23 @ 08:24) (74 - 90)  BP: 96/54 (23 @ 08:24) (94/53 - 100/60)  RR: 18 (23 @ 08:24) (18 - 20)  SpO2: 97% (23 @ 08:24) (97% - 97%)  Wt(kg): --  I&O's Summary    04 Mar 2023 07:01  -  05 Mar 2023 07:00  --------------------------------------------------------  IN: 2150 mL / OUT: 0 mL / NET: 2150 mL    05 Mar 2023 07:01  -  05 Mar 2023 16:06  --------------------------------------------------------  IN: 100 mL / OUT: 0 mL / NET: 100 mL          Appearance: Normal	  HEENT:  Eyes are open   Lymphatic: No lymphadenopathy   Cardiovascular: Normal S1 S2, no JVD  Respiratory: normal effort , clear  Gastrointestinal:  Soft, Non-tender to palpitation   Skin: No rashes,  warm to touch  Psychiatry:  Mood & affect appropriate  Musculoskeletal: No edema      23 @ 07:01  -  23 @ 07:00  --------------------------------------------------------  IN: 2150 mL / OUT: 0 mL / NET: 2150 mL    23 @ 07:  -  23 @ 16:06  --------------------------------------------------------  IN: 100 mL / OUT: 0 mL / NET: 100 mL                                  7.2    2.68  )-----------( 48       ( 05 Mar 2023 09:48 )             21.1               03-05    132<L>  |  104  |  76<H>  ----------------------------<  196<H>  3.7   |  12<L>  |  4.64<H>    Ca    8.3<L>      05 Mar 2023 09:48  Phos  4.6     03-04  Mg     1.7     -    TPro  5.6<L>  /  Alb  3.1<L>  /  TBili  0.3  /  DBili  x   /  AST  80<H>  /  ALT  108<H>  /  AlkPhos  96  03-05                       Urinalysis Basic - ( 04 Mar 2023 07:30 )    Color: Yellow / Appearance: Slightly Turbid / S.014 / pH: x  Gluc: x / Ketone: Negative  / Bili: Negative / Urobili: Negative   Blood: x / Protein: 100 mg/dl / Nitrite: Negative   Leuk Esterase: Negative / RBC: 3 /hpf / WBC 6 /HPF   Sq Epi: x / Non Sq Epi: 4 /hpf / Bacteria: Negative          GI PCR Panel: NotDete        Culture - Blood (23 @ 11:30)   Specimen Source: .Blood Blood-Peripheral   Culture Results:   No growth to date.     Culture - Blood (23 @ 11:30)   Specimen Source: .Blood Blood-Peripheral   Culture Results:   No growth to date.     C. difficile GDH & toxins A/B by EIA (23 @ 23:58)   Clostridium difficile GDH Toxins A&B, EIA:   Negative

## 2023-03-05 NOTE — PROGRESS NOTE ADULT - NS ATTEND AMEND GEN_ALL_CORE FT
Pt care and plan discussed and reviewed with PA. Plan as outlined above edited by me to reflect our discussion. Advanced care planning/advanced directives discussed with patient/family. DNR status including forceful chest compressions to attempt to restart the heart, ventilator support/artificial breathing, electric shock, artificial nutrition, health care proxy, Molst form all discussed with pt.

## 2023-03-05 NOTE — PROGRESS NOTE ADULT - ASSESSMENT
PETER DE LA PAZ is a 67y Male who presents with a chief complaint of hypotension.    Multiple Myeloma  Pancytopenia  ·	Patient follows with Dr. Jean Claude Hubbard, [facility].  ·	Patient had been on lenalidomide maintenance up until February 2023, when he had PET/CT that showed progression of disease with worsening lymphadenopathy, bony lesions, and liver lesion.   ·	He was started then on daratumumab + cyclophosphamide + bortezomib + dexamethasone.  ·	Last dose of daratumumab was on March 3rd.  ·	Last dose of cyclophosphamide and bortezomib was on February 24th.  ·	Patient has had worsening pancytopenia since starting the latest chemotherapy regimen.  ·	Hold systemic treatment while inpatient or in rehabilitation.  ·	Monitor CBC and transfuse to maintain HGB > 7 and PLT > 10.  ·	Continue prophylactic acyclovir 400 mg BID as patient is on bortezomib.     Acute Kidney Injury  ·	Baseline creatinine is at 1.2-1.3, creatinine was 1.2 on February 24th.  ·	Possibly due to diarrhea.  ·	Continue IVF. Monitor kidney functions closely.  ·	Consider nephrology evaluation    Diarrhea  ·	Possibly due to chemotherapy. Rule out infections.  ·	Consider supportive measures such as loperamide if there are no infections found.  ·	appears to have resolved     Nile Pedro MD  HematologyOncology   O:   PETER DE LA PAZ is a 67y Male who presents with a chief complaint of hypotension.    Multiple Myeloma  Pancytopenia  ·	Patient follows with Dr. Jean Claude Hbubard, [facility].  ·	Patient had been on lenalidomide maintenance up until February 2023, when he had PET/CT that showed progression of disease with worsening lymphadenopathy, bony lesions, and liver lesion.   ·	He was started then on daratumumab + cyclophosphamide + bortezomib + dexamethasone.  ·	Last dose of daratumumab was on March 3rd.  ·	Last dose of cyclophosphamide and bortezomib was on February 24th.  ·	Patient has had worsening pancytopenia since starting the latest chemotherapy regimen.  ·	Hold systemic treatment while inpatient or in rehabilitation.  ·	Monitor CBC and transfuse to maintain HGB > 7 and PLT > 10.  ·	Continue prophylactic acyclovir 400 mg BID as patient is on bortezomib.     Acute Kidney Injury  ·	Baseline creatinine is at 1.2-1.3, creatinine was 1.2 on February 24th.  ·	Possibly due to diarrhea.  ·	Continue IVF. Monitor kidney functions closely.  ·	Consider nephrology evaluation    Diarrhea  ·	Possibly due to chemotherapy. Rule out infections.  ·	Consider supportive measures such as loperamide if there are no infections found.  ·	appears to have resolved     Nile Pedro MD  HematologyOncology   O:   PETER DE LA PAZ is a 67y Male who presents with a chief complaint of hypotension.    Multiple Myeloma  Pancytopenia  ·	Patient follows with Dr. Jean Claude Hubbard, [facility].  ·	Patient had been on lenalidomide maintenance up until February 2023, when he had PET/CT that showed progression of disease with worsening lymphadenopathy, bony lesions, and liver lesion.   ·	He was started then on daratumumab + cyclophosphamide + bortezomib + dexamethasone.  ·	Last dose of daratumumab was on March 3rd.  ·	Last dose of cyclophosphamide and bortezomib was on February 24th.  ·	Patient has had worsening pancytopenia since starting the latest chemotherapy regimen.  ·	Hold systemic treatment while inpatient or in rehabilitation.  ·	Monitor CBC and transfuse to maintain HGB > 7 and PLT > 10.  ·	Continue prophylactic acyclovir 400 mg BID as patient is on bortezomib.     Acute Kidney Injury  ·	Baseline creatinine is at 1.2-1.3, creatinine was 1.2 on February 24th.  ·	Possibly due to diarrhea.  ·	Continue IVF. Monitor kidney functions closely.  ·	Consider nephrology evaluation    Diarrhea  ·	Possibly due to chemotherapy. Rule out infections.  ·	Consider supportive measures such as loperamide if there are no infections found.  ·	appears to have resolved     Nile Pedro MD  HematologyOncology   O:

## 2023-03-05 NOTE — PROGRESS NOTE ADULT - ASSESSMENT
67 year old male history of aortic aneurysm and bioprosthetic AV valve replacement 2019, HL, splenectomy, partial gastrectomy, partial pancreatectomy, oligosecretory multiple myeloma s/p auto SCT in 2018 on revlimid started david-cybor D 2/10 [place] [facility] [place] Jean Claude Hubbard who presented for hypotension.    1. Diarrhea  - likely 2/2 chemo rather than infectious (stools negative)  - low dose loperamide  - diet as tolerated    2. Multiple myeloma  - on revlimid, started then on daratumumab + cyclophosphamide + bortezomib + dexamethasone.  Last dose of daratumumab was on March 3rd. Last dose of cyclophosphamide and bortezomib was on February 24th.  - per heme/onc     3. KR   -likely 2/2 dehydration from diarrhea  - on IVF    4. Pancytopenia   - since newest chemo regimen started   - no overt GI bleeding  - trend CBCs, transfuse prn     67 year old male history of aortic aneurysm and bioprosthetic AV valve replacement 2019, HL, splenectomy, partial gastrectomy, partial pancreatectomy, oligosecretory multiple myeloma s/p auto SCT in 2018 on revlimid started david-cybor D 2/10 under care at [facility] [place] by Dr. Jean Claude Hubbard who presented for hypotension.    1. Diarrhea  - likely 2/2 chemo rather than infectious (stools negative)  - low dose loperamide  - diet as tolerated    2. Multiple myeloma  - on revlimid, started then on daratumumab + cyclophosphamide + bortezomib + dexamethasone.  Last dose of daratumumab was on March 3rd. Last dose of cyclophosphamide and bortezomib was on February 24th.  - per heme/onc     3. RK   -likely 2/2 dehydration from diarrhea  - on IVF    4. Pancytopenia   - since newest chemo regimen started   - no overt GI bleeding  - trend CBCs, transfuse prn     67 year old male history of aortic aneurysm and bioprosthetic AV valve replacement 2019, HL, splenectomy, partial gastrectomy, partial pancreatectomy, oligosecretory multiple myeloma s/p auto SCT in 2018 on revlimid started david-cybor D 2/10 under care at [facility] [place] by Dr. Jean Claude Hubbard who presented for hypotension.    1. Diarrhea  - likely 2/2 chemo rather than infectious (stools negative)  - low dose loperamide  - diet as tolerated    2. Multiple myeloma  - on revlimid, started then on daratumumab + cyclophosphamide + bortezomib + dexamethasone.  Last dose of daratumumab was on March 3rd. Last dose of cyclophosphamide and bortezomib was on February 24th.  - per heme/onc     3. RK   -likely 2/2 dehydration from diarrhea  - on IVF    4. Pancytopenia   - since newest chemo regimen started   - no overt GI bleeding  - trend CBCs, transfuse prn

## 2023-03-05 NOTE — CONSULT NOTE ADULT - SUBJECTIVE AND OBJECTIVE BOX
[facility] NEPHROLOGY- CONSULTATION NOTE    Patient is a 67y Male with MM on  daratumumab + cyclophosphamide + bortezomib + dexamethasone (last dose 3/3) with Baseline creatinine is at 1.2-1.3, creatinine was 1.2 on  who presented to the hospital with severe diarrhea, though to be due to chemotherapy.  Of note, chemotherapy regimen was changed at the end of February due to CT/PET that showed progression of disease. Pt reports having up to 10 episodes of diarrhea/day for ~ 1 week.  He has been eating and drinking, but feels he was unable to keep up with his output.  No ACEi/ARBs/SGLT-2 inhibitors/NSAIDs/Diuretics/.  Pt had a PET/CT in late February.    Pt reports that his diarrhea has significantly improved.    PAST MEDICAL & SURGICAL HISTORY:  Multiple myeloma      Hyperlipidemia      H/O aortic aneurysm      H/O splenectomy      S/P partial gastrectomy      History of pancreatic surgery        No Known Allergies    Home Medications Reviewed  Hospital Medications:   MEDICATIONS  (STANDING):  aspirin enteric coated 81 milliGRAM(s) Oral daily  atorvastatin 40 milliGRAM(s) Oral at bedtime  cefTRIAXone   IVPB 1000 milliGRAM(s) IV Intermittent every 24 hours  chlorhexidine 2% Cloths 1 Application(s) Topical daily  dexAMETHasone     Tablet 4 milliGRAM(s) Oral daily  loperamide 2 milliGRAM(s) Oral two times a day  metroNIDAZOLE  IVPB 500 milliGRAM(s) IV Intermittent once  metroNIDAZOLE  IVPB 500 milliGRAM(s) IV Intermittent every 8 hours  metroNIDAZOLE  IVPB      montelukast 10 milliGRAM(s) Oral daily  multivitamin 1 Tablet(s) Oral daily  sodium bicarbonate  Infusion 0.097 mEq/kG/Hr (100 mL/Hr) IV Continuous <Continuous>    SOCIAL HISTORY:  Denies ETOh,Smoking,   FAMILY HISTORY:  FHx: lung cancer (Sibling)    FHx: ovarian cancer (Sibling)      REVIEW OF SYSTEMS:  CONSTITUTIONAL: No weakness, fevers or chills  EYES/ENT: No visual changes;  No vertigo or throat pain   NECK: No pain or stiffness  RESPIRATORY: No cough, wheezing, hemoptysis; No shortness of breath  CARDIOVASCULAR: No chest pain or palpitations.  GASTROINTESTINAL: No abdominal or epigastric pain. No nausea, vomiting, or hematemesis; + diarrhea improving. No melena or hematochezia.  GENITOURINARY: No dysuria, frequency, foamy urine, urinary urgency, incontinence or hematuria  NEUROLOGICAL: No numbness or weakness  SKIN: No itching, burning, rashes, or lesions   VASCULAR: No bilateral lower extremity edema.   All other review of systems is negative unless indicated above.    VITALS:  T(F): 97.7 (23 @ 16:31), Max: 98.2 (23 @ 00:12)  HR: 78 (23 @ 16:31)  BP: 98/60 (23 @ 16:31)  RR: 18 (23 @ 16:31)  SpO2: 97% (23 @ 16:31)  Wt(kg): --     @ 07:01  -   @ 07:00  --------------------------------------------------------  IN: 2150 mL / OUT: 0 mL / NET: 2150 mL     @ 07:01  -   @ 17:35  --------------------------------------------------------  IN: 150 mL / OUT: 0 mL / NET: 150 mL          PHYSICAL EXAM:  Constitutional: NAD  HEENT: anicteric sclera, oropharynx clear, MMM  Neck: No JVD  Respiratory: CTAB, no wheezes, rales or rhonchi  Cardiovascular: S1, S2, RRR  Gastrointestinal: BS+, soft, NT/ND  Extremities: No cyanosis or clubbing. No peripheral edema  Neurological: A/O x 3, no focal deficits  Psychiatric: Normal mood, normal affect  : No CVA tenderness. No lloyd.   Skin: No rashes    LABS:    132 <--, 134 <--, 132 <--  132<L>  |  104  |  76<H>  ----------------------------<  196<H>  3.7   |  12<L>  |  4.64<H>    Ca    8.3<L>      05 Mar 2023 09:48  Phos  4.6     03-04  Mg     1.7     -04    TPro  5.6<L>  /  Alb  3.1<L>  /  TBili  0.3  /  DBili      /  AST  80<H>  /  ALT  108<H>  /  AlkPhos  96      Creatinine Trend: 4.64 <--, 4.40 <--, 4.01 <--                        7.2    2.68  )-----------( 48       ( 05 Mar 2023 09:48 )             21.1     Urine Studies:  Urinalysis Basic - ( 04 Mar 2023 07:30 )    Color: Yellow / Appearance: Slightly Turbid / S.014 / pH:   Gluc:  / Ketone: Negative  / Bili: Negative / Urobili: Negative   Blood:  / Protein: 100 mg/dl / Nitrite: Negative   Leuk Esterase: Negative / RBC: 3 /hpf / WBC 6 /HPF   Sq Epi:  / Non Sq Epi: 4 /hpf / Bacteria: Negative      Creatinine, Random Urine: 83 mg/dL ( @ 08:48)  Sodium, Random Urine: 67 mmol/L ( @ 08:48)    RADIOLOGY & ADDITIONAL STUDIES:      < from: CT Abdomen and Pelvis No Cont (23 @ 12:38) >    ACC: [id number] EXAM:  CT ABDOMEN AND PELVIS   ORDERED BY: BONITA SOSA     PROCEDURE DATE:  2023          INTERPRETATION:  CLINICAL INFORMATION: Abdominal pain and diarrhea.   Multiple myeloma, on chemotherapy. Sent to the emergency department for   hypotension.    COMPARISON: None.    CONTRAST/COMPLICATIONS:  IV Contrast: NONE  Oral Contrast: NONE  Complications: None reported at time of study completion    PROCEDURE:  CT of the Abdomen and Pelvis was performed.  Sagittal and coronal reformats were performed.    FINDINGS:  Limited evaluation of the vasculature and viscera in the absence of   intravenous contrast.    LOWER CHEST: Aortic valve repair. Coronary artery calcifications.   Hypoattenuation of the blood pool relative to myocardium, compatible with   anemia.    LIVER: Mild hepatomegaly, measures 19.0 cm craniocaudally.  BILE DUCTS: Normal caliber.  GALLBLADDER: Cholelithiasis.  SPLEEN: Not visualized and may be surgically absent.  PANCREAS: The pancreas appears truncated with possible distal   pancreatectomy.  ADRENALS: Within normal limits.  KIDNEYS/URETERS: Within normal limits.    BLADDER: Underdistended. Small diverticulum projecting from the right   posterior bladder wall.  REPRODUCTIVE ORGANS: The prostate is mildly enlarged.    BOWEL: Suture material along the distal esophagus, gastroesophageal   junction, and gastric fundus. No bowel obstruction. Appendix is normal.   Liquid stool within the rectum. No appreciable bowel inflammation.  PERITONEUM: No ascites. Bilobed fat density in the left upper quadrant   demonstrating a thin rim of encapsulation, with one lobe measuring 3.1 cm   and the other lobe measuring 2.5 cm (series 3 images 39 and 41), which   may reflect a site of fat necrosis or post surgicalchanges. The proximal   descending colon courses immediately adjacent to this site, and a   component of epiploic appendagitis is also considered.  VESSELS: Atherosclerotic changes.  RETROPERITONEUM/LYMPH NODES: No lymphadenopathy.  ABDOMINAL WALL: Small bilateral fat-containing inguinal hernias.  BONES: Median sternotomy wires. Partially imaged right femoral   intramedullary kevin and nail fixation. Degenerative changes. Osseous   demineralization without a discrete focal aggressive osseous lesion  identified.    IMPRESSION:  Liquid stool within the rectum, likely reflecting diarrheal state.   Otherwise, limited noncontrast exam without evidence of acute bowel   inflammation.    Question distal pancreatectomy. Nonvisualization of the spleen, which may   be surgically absent.    Encapsulated lipomatous structure in the left upper quadrant, which may   reflect a site of fat necrosis or postsurgical changes. A component of   epiploic appendagitis is also considered given that the descending colon   is immediately adjacent to this region.    Mild hepatomegaly.    Cholelithiasis.          --- End of Report ---            SINAN FABIAN MD; Attending Radiologist  This document has been electronically signed. Mar  3 2023  2:28PM    < end of copied text >             [facility] NEPHROLOGY- CONSULTATION NOTE    Patient is a 67y Male with MM on  daratumumab + cyclophosphamide + bortezomib + dexamethasone (last dose 3/3) with Baseline creatinine is at 1.2-1.3, creatinine was 1.2 on  who presented to the hospital with severe diarrhea, though to be due to chemotherapy.  Of note, chemotherapy regimen was changed at the end of February due to CT/PET that showed progression of disease. Pt reports having up to 10 episodes of diarrhea/day for ~ 1 week.  He has been eating and drinking, but feels he was unable to keep up with his output.  No ACEi/ARBs/SGLT-2 inhibitors/NSAIDs/Diuretics/.  Pt had a PET/CT in late February.    Pt reports that his diarrhea has significantly improved.    PAST MEDICAL & SURGICAL HISTORY:  Multiple myeloma      Hyperlipidemia      H/O aortic aneurysm      H/O splenectomy      S/P partial gastrectomy      History of pancreatic surgery        No Known Allergies    Home Medications Reviewed  Hospital Medications:   MEDICATIONS  (STANDING):  aspirin enteric coated 81 milliGRAM(s) Oral daily  atorvastatin 40 milliGRAM(s) Oral at bedtime  cefTRIAXone   IVPB 1000 milliGRAM(s) IV Intermittent every 24 hours  chlorhexidine 2% Cloths 1 Application(s) Topical daily  dexAMETHasone     Tablet 4 milliGRAM(s) Oral daily  loperamide 2 milliGRAM(s) Oral two times a day  metroNIDAZOLE  IVPB 500 milliGRAM(s) IV Intermittent once  metroNIDAZOLE  IVPB 500 milliGRAM(s) IV Intermittent every 8 hours  metroNIDAZOLE  IVPB      montelukast 10 milliGRAM(s) Oral daily  multivitamin 1 Tablet(s) Oral daily  sodium bicarbonate  Infusion 0.097 mEq/kG/Hr (100 mL/Hr) IV Continuous <Continuous>    SOCIAL HISTORY:  Denies ETOh,Smoking,   FAMILY HISTORY:  FHx: lung cancer (Sibling)    FHx: ovarian cancer (Sibling)      REVIEW OF SYSTEMS:  CONSTITUTIONAL: No weakness, fevers or chills  EYES/ENT: No visual changes;  No vertigo or throat pain   NECK: No pain or stiffness  RESPIRATORY: No cough, wheezing, hemoptysis; No shortness of breath  CARDIOVASCULAR: No chest pain or palpitations.  GASTROINTESTINAL: No abdominal or epigastric pain. No nausea, vomiting, or hematemesis; + diarrhea improving. No melena or hematochezia.  GENITOURINARY: No dysuria, frequency, foamy urine, urinary urgency, incontinence or hematuria  NEUROLOGICAL: No numbness or weakness  SKIN: No itching, burning, rashes, or lesions   VASCULAR: No bilateral lower extremity edema.   All other review of systems is negative unless indicated above.    VITALS:  T(F): 97.7 (23 @ 16:31), Max: 98.2 (23 @ 00:12)  HR: 78 (23 @ 16:31)  BP: 98/60 (23 @ 16:31)  RR: 18 (23 @ 16:31)  SpO2: 97% (23 @ 16:31)  Wt(kg): --     @ 07:01  -   @ 07:00  --------------------------------------------------------  IN: 2150 mL / OUT: 0 mL / NET: 2150 mL     @ 07:01  -   @ 17:35  --------------------------------------------------------  IN: 150 mL / OUT: 0 mL / NET: 150 mL          PHYSICAL EXAM:  Constitutional: NAD  HEENT: anicteric sclera, oropharynx clear, MMM  Neck: No JVD  Respiratory: CTAB, no wheezes, rales or rhonchi  Cardiovascular: S1, S2, RRR  Gastrointestinal: BS+, soft, NT/ND  Extremities: No cyanosis or clubbing. No peripheral edema  Neurological: A/O x 3, no focal deficits  Psychiatric: Normal mood, normal affect  : No CVA tenderness. No lloyd.   Skin: No rashes    LABS:    132 <--, 134 <--, 132 <--  132<L>  |  104  |  76<H>  ----------------------------<  196<H>  3.7   |  12<L>  |  4.64<H>    Ca    8.3<L>      05 Mar 2023 09:48  Phos  4.6     03-04  Mg     1.7     -04    TPro  5.6<L>  /  Alb  3.1<L>  /  TBili  0.3  /  DBili      /  AST  80<H>  /  ALT  108<H>  /  AlkPhos  96      Creatinine Trend: 4.64 <--, 4.40 <--, 4.01 <--                        7.2    2.68  )-----------( 48       ( 05 Mar 2023 09:48 )             21.1     Urine Studies:  Urinalysis Basic - ( 04 Mar 2023 07:30 )    Color: Yellow / Appearance: Slightly Turbid / S.014 / pH:   Gluc:  / Ketone: Negative  / Bili: Negative / Urobili: Negative   Blood:  / Protein: 100 mg/dl / Nitrite: Negative   Leuk Esterase: Negative / RBC: 3 /hpf / WBC 6 /HPF   Sq Epi:  / Non Sq Epi: 4 /hpf / Bacteria: Negative      Creatinine, Random Urine: 83 mg/dL ( @ 08:48)  Sodium, Random Urine: 67 mmol/L ( @ 08:48)    RADIOLOGY & ADDITIONAL STUDIES:      < from: CT Abdomen and Pelvis No Cont (23 @ 12:38) >    ACC: [id number] EXAM:  CT ABDOMEN AND PELVIS   ORDERED BY: BONITA SOSA     PROCEDURE DATE:  2023          INTERPRETATION:  CLINICAL INFORMATION: Abdominal pain and diarrhea.   Multiple myeloma, on chemotherapy. Sent to the emergency department for   hypotension.    COMPARISON: None.    CONTRAST/COMPLICATIONS:  IV Contrast: NONE  Oral Contrast: NONE  Complications: None reported at time of study completion    PROCEDURE:  CT of the Abdomen and Pelvis was performed.  Sagittal and coronal reformats were performed.    FINDINGS:  Limited evaluation of the vasculature and viscera in the absence of   intravenous contrast.    LOWER CHEST: Aortic valve repair. Coronary artery calcifications.   Hypoattenuation of the blood pool relative to myocardium, compatible with   anemia.    LIVER: Mild hepatomegaly, measures 19.0 cm craniocaudally.  BILE DUCTS: Normal caliber.  GALLBLADDER: Cholelithiasis.  SPLEEN: Not visualized and may be surgically absent.  PANCREAS: The pancreas appears truncated with possible distal   pancreatectomy.  ADRENALS: Within normal limits.  KIDNEYS/URETERS: Within normal limits.    BLADDER: Underdistended. Small diverticulum projecting from the right   posterior bladder wall.  REPRODUCTIVE ORGANS: The prostate is mildly enlarged.    BOWEL: Suture material along the distal esophagus, gastroesophageal   junction, and gastric fundus. No bowel obstruction. Appendix is normal.   Liquid stool within the rectum. No appreciable bowel inflammation.  PERITONEUM: No ascites. Bilobed fat density in the left upper quadrant   demonstrating a thin rim of encapsulation, with one lobe measuring 3.1 cm   and the other lobe measuring 2.5 cm (series 3 images 39 and 41), which   may reflect a site of fat necrosis or post surgicalchanges. The proximal   descending colon courses immediately adjacent to this site, and a   component of epiploic appendagitis is also considered.  VESSELS: Atherosclerotic changes.  RETROPERITONEUM/LYMPH NODES: No lymphadenopathy.  ABDOMINAL WALL: Small bilateral fat-containing inguinal hernias.  BONES: Median sternotomy wires. Partially imaged right femoral   intramedullary kevin and nail fixation. Degenerative changes. Osseous   demineralization without a discrete focal aggressive osseous lesion  identified.    IMPRESSION:  Liquid stool within the rectum, likely reflecting diarrheal state.   Otherwise, limited noncontrast exam without evidence of acute bowel   inflammation.    Question distal pancreatectomy. Nonvisualization of the spleen, which may   be surgically absent.    Encapsulated lipomatous structure in the left upper quadrant, which may   reflect a site of fat necrosis or postsurgical changes. A component of   epiploic appendagitis is also considered given that the descending colon   is immediately adjacent to this region.    Mild hepatomegaly.    Cholelithiasis.          --- End of Report ---            SINAN FABIAN MD; Attending Radiologist  This document has been electronically signed. Mar  3 2023  2:28PM    < end of copied text >             [facility] NEPHROLOGY- CONSULTATION NOTE    Patient is a 67y Male with MM on  daratumumab + cyclophosphamide + bortezomib + dexamethasone (last dose 3/3) with Baseline creatinine is at 1.2-1.3, creatinine was 1.2 on  who presented to the hospital with severe diarrhea, though to be due to chemotherapy.  Of note, chemotherapy regimen was changed at the end of February due to CT/PET that showed progression of disease. Pt reports having up to 10 episodes of diarrhea/day for ~ 1 week.  He has been eating and drinking, but feels he was unable to keep up with his output.  No ACEi/ARBs/SGLT-2 inhibitors/NSAIDs/Diuretics/.  Pt had a PET/CT in late February.    Pt reports that his diarrhea has significantly improved.    PAST MEDICAL & SURGICAL HISTORY:  Multiple myeloma      Hyperlipidemia      H/O aortic aneurysm      H/O splenectomy      S/P partial gastrectomy      History of pancreatic surgery        No Known Allergies    Home Medications Reviewed  Hospital Medications:   MEDICATIONS  (STANDING):  aspirin enteric coated 81 milliGRAM(s) Oral daily  atorvastatin 40 milliGRAM(s) Oral at bedtime  cefTRIAXone   IVPB 1000 milliGRAM(s) IV Intermittent every 24 hours  chlorhexidine 2% Cloths 1 Application(s) Topical daily  dexAMETHasone     Tablet 4 milliGRAM(s) Oral daily  loperamide 2 milliGRAM(s) Oral two times a day  metroNIDAZOLE  IVPB 500 milliGRAM(s) IV Intermittent once  metroNIDAZOLE  IVPB 500 milliGRAM(s) IV Intermittent every 8 hours  metroNIDAZOLE  IVPB      montelukast 10 milliGRAM(s) Oral daily  multivitamin 1 Tablet(s) Oral daily  sodium bicarbonate  Infusion 0.097 mEq/kG/Hr (100 mL/Hr) IV Continuous <Continuous>    SOCIAL HISTORY:  Denies ETOh,Smoking,   FAMILY HISTORY:  FHx: lung cancer (Sibling)    FHx: ovarian cancer (Sibling)      REVIEW OF SYSTEMS:  CONSTITUTIONAL: No weakness, fevers or chills  EYES/ENT: No visual changes;  No vertigo or throat pain   NECK: No pain or stiffness  RESPIRATORY: No cough, wheezing, hemoptysis; No shortness of breath  CARDIOVASCULAR: No chest pain or palpitations.  GASTROINTESTINAL: No abdominal or epigastric pain. No nausea, vomiting, or hematemesis; + diarrhea improving. No melena or hematochezia.  GENITOURINARY: No dysuria, frequency, foamy urine, urinary urgency, incontinence or hematuria  NEUROLOGICAL: No numbness or weakness  SKIN: No itching, burning, rashes, or lesions   VASCULAR: No bilateral lower extremity edema.   All other review of systems is negative unless indicated above.    VITALS:  T(F): 97.7 (23 @ 16:31), Max: 98.2 (23 @ 00:12)  HR: 78 (23 @ 16:31)  BP: 98/60 (23 @ 16:31)  RR: 18 (23 @ 16:31)  SpO2: 97% (23 @ 16:31)  Wt(kg): --     @ 07:01  -   @ 07:00  --------------------------------------------------------  IN: 2150 mL / OUT: 0 mL / NET: 2150 mL     @ 07:01  -   @ 17:35  --------------------------------------------------------  IN: 150 mL / OUT: 0 mL / NET: 150 mL          PHYSICAL EXAM:  Constitutional: NAD  HEENT: anicteric sclera, oropharynx clear, MMM  Neck: No JVD  Respiratory: CTAB, no wheezes, rales or rhonchi  Cardiovascular: S1, S2, RRR  Gastrointestinal: BS+, soft, NT/ND  Extremities: No cyanosis or clubbing. No peripheral edema  Neurological: A/O x 3, no focal deficits  Psychiatric: Normal mood, normal affect  : No CVA tenderness. No lloyd.   Skin: No rashes    LABS:    132 <--, 134 <--, 132 <--  132<L>  |  104  |  76<H>  ----------------------------<  196<H>  3.7   |  12<L>  |  4.64<H>    Ca    8.3<L>      05 Mar 2023 09:48  Phos  4.6     03-04  Mg     1.7     -04    TPro  5.6<L>  /  Alb  3.1<L>  /  TBili  0.3  /  DBili      /  AST  80<H>  /  ALT  108<H>  /  AlkPhos  96      Creatinine Trend: 4.64 <--, 4.40 <--, 4.01 <--                        7.2    2.68  )-----------( 48       ( 05 Mar 2023 09:48 )             21.1     Urine Studies:  Urinalysis Basic - ( 04 Mar 2023 07:30 )    Color: Yellow / Appearance: Slightly Turbid / S.014 / pH:   Gluc:  / Ketone: Negative  / Bili: Negative / Urobili: Negative   Blood:  / Protein: 100 mg/dl / Nitrite: Negative   Leuk Esterase: Negative / RBC: 3 /hpf / WBC 6 /HPF   Sq Epi:  / Non Sq Epi: 4 /hpf / Bacteria: Negative      Creatinine, Random Urine: 83 mg/dL ( @ 08:48)  Sodium, Random Urine: 67 mmol/L ( @ 08:48)    RADIOLOGY & ADDITIONAL STUDIES:      < from: CT Abdomen and Pelvis No Cont (23 @ 12:38) >    ACC: [id number] EXAM:  CT ABDOMEN AND PELVIS   ORDERED BY: BONITA SOSA     PROCEDURE DATE:  2023          INTERPRETATION:  CLINICAL INFORMATION: Abdominal pain and diarrhea.   Multiple myeloma, on chemotherapy. Sent to the emergency department for   hypotension.    COMPARISON: None.    CONTRAST/COMPLICATIONS:  IV Contrast: NONE  Oral Contrast: NONE  Complications: None reported at time of study completion    PROCEDURE:  CT of the Abdomen and Pelvis was performed.  Sagittal and coronal reformats were performed.    FINDINGS:  Limited evaluation of the vasculature and viscera in the absence of   intravenous contrast.    LOWER CHEST: Aortic valve repair. Coronary artery calcifications.   Hypoattenuation of the blood pool relative to myocardium, compatible with   anemia.    LIVER: Mild hepatomegaly, measures 19.0 cm craniocaudally.  BILE DUCTS: Normal caliber.  GALLBLADDER: Cholelithiasis.  SPLEEN: Not visualized and may be surgically absent.  PANCREAS: The pancreas appears truncated with possible distal   pancreatectomy.  ADRENALS: Within normal limits.  KIDNEYS/URETERS: Within normal limits.    BLADDER: Underdistended. Small diverticulum projecting from the right   posterior bladder wall.  REPRODUCTIVE ORGANS: The prostate is mildly enlarged.    BOWEL: Suture material along the distal esophagus, gastroesophageal   junction, and gastric fundus. No bowel obstruction. Appendix is normal.   Liquid stool within the rectum. No appreciable bowel inflammation.  PERITONEUM: No ascites. Bilobed fat density in the left upper quadrant   demonstrating a thin rim of encapsulation, with one lobe measuring 3.1 cm   and the other lobe measuring 2.5 cm (series 3 images 39 and 41), which   may reflect a site of fat necrosis or post surgicalchanges. The proximal   descending colon courses immediately adjacent to this site, and a   component of epiploic appendagitis is also considered.  VESSELS: Atherosclerotic changes.  RETROPERITONEUM/LYMPH NODES: No lymphadenopathy.  ABDOMINAL WALL: Small bilateral fat-containing inguinal hernias.  BONES: Median sternotomy wires. Partially imaged right femoral   intramedullary kevin and nail fixation. Degenerative changes. Osseous   demineralization without a discrete focal aggressive osseous lesion  identified.    IMPRESSION:  Liquid stool within the rectum, likely reflecting diarrheal state.   Otherwise, limited noncontrast exam without evidence of acute bowel   inflammation.    Question distal pancreatectomy. Nonvisualization of the spleen, which may   be surgically absent.    Encapsulated lipomatous structure in the left upper quadrant, which may   reflect a site of fat necrosis or postsurgical changes. A component of   epiploic appendagitis is also considered given that the descending colon   is immediately adjacent to this region.    Mild hepatomegaly.    Cholelithiasis.          --- End of Report ---            SINAN FABIAN MD; Attending Radiologist  This document has been electronically signed. Mar  3 2023  2:28PM    < end of copied text >             [facility] NEPHROLOGY- CONSULTATION NOTE    Patient is a 67y Male with MM on  daratumumab + cyclophosphamide + bortezomib + dexamethasone (last dose 3/3) with Baseline creatinine is at 1.2-1.3, creatinine was 1.2 on  who presented to the hospital with severe diarrhea, though to be due to chemotherapy.  Of note, chemotherapy regimen was changed at the end of February due to CT/PET that showed progression of disease. Pt reports having up to 10 episodes of diarrhea/day for ~ 1 week.  He has been eating and drinking, but feels he was unable to keep up with his output.  No ACEi/ARBs/SGLT-2 inhibitors/NSAIDs/Diuretics/.  Pt had a PET/CT in late February.    Pt reports that his diarrhea has significantly improved.    PAST MEDICAL & SURGICAL HISTORY:  Multiple myeloma      Hyperlipidemia      H/O aortic aneurysm      H/O splenectomy      S/P partial gastrectomy      History of pancreatic surgery        No Known Allergies    Home Medications Reviewed  Hospital Medications:   MEDICATIONS  (STANDING):  aspirin enteric coated 81 milliGRAM(s) Oral daily  atorvastatin 40 milliGRAM(s) Oral at bedtime  cefTRIAXone   IVPB 1000 milliGRAM(s) IV Intermittent every 24 hours  chlorhexidine 2% Cloths 1 Application(s) Topical daily  dexAMETHasone     Tablet 4 milliGRAM(s) Oral daily  loperamide 2 milliGRAM(s) Oral two times a day  metroNIDAZOLE  IVPB 500 milliGRAM(s) IV Intermittent once  metroNIDAZOLE  IVPB 500 milliGRAM(s) IV Intermittent every 8 hours  metroNIDAZOLE  IVPB      montelukast 10 milliGRAM(s) Oral daily  multivitamin 1 Tablet(s) Oral daily  sodium bicarbonate  Infusion 0.097 mEq/kG/Hr (100 mL/Hr) IV Continuous <Continuous>    SOCIAL HISTORY:  Denies ETOh,Smoking,   FAMILY HISTORY:  FHx: lung cancer (Sibling)    FHx: ovarian cancer (Sibling)      REVIEW OF SYSTEMS:  CONSTITUTIONAL: No weakness, fevers or chills  EYES/ENT: No visual changes;  No vertigo or throat pain   NECK: No pain or stiffness  RESPIRATORY: No cough, wheezing, hemoptysis; No shortness of breath  CARDIOVASCULAR: No chest pain or palpitations.  GASTROINTESTINAL: No abdominal or epigastric pain. No nausea, vomiting, or hematemesis; + diarrhea improving. No melena or hematochezia.  GENITOURINARY: No dysuria, frequency, foamy urine, urinary urgency, incontinence or hematuria  NEUROLOGICAL: No numbness or weakness  SKIN: No itching, burning, rashes, or lesions   VASCULAR: No bilateral lower extremity edema.   All other review of systems is negative unless indicated above.    VITALS:  T(F): 97.7 (23 @ 16:31), Max: 98.2 (23 @ 00:12)  HR: 78 (23 @ 16:31)  BP: 98/60 (23 @ 16:31)  RR: 18 (23 @ 16:31)  SpO2: 97% (23 @ 16:31)  Wt(kg): --     @ 07:01  -   @ 07:00  --------------------------------------------------------  IN: 2150 mL / OUT: 0 mL / NET: 2150 mL     @ 07:01  -   @ 17:35  --------------------------------------------------------  IN: 150 mL / OUT: 0 mL / NET: 150 mL          PHYSICAL EXAM:  Constitutional: NAD  HEENT: anicteric sclera, oropharynx clear, MMM  Neck: No JVD  Respiratory: CTAB, no wheezes, rales or rhonchi  Cardiovascular: S1, S2, RRR  Gastrointestinal: BS+, soft, NT/ND  Extremities: No cyanosis or clubbing. No peripheral edema  Neurological: A/O x 3, no focal deficits  Psychiatric: Normal mood, normal affect  : No CVA tenderness. No llody.   Skin: No rashes    LABS:    132 <--, 134 <--, 132 <--  132<L>  |  104  |  76<H>  ----------------------------<  196<H>  3.7   |  12<L>  |  4.64<H>    Ca    8.3<L>      05 Mar 2023 09:48  Phos  4.6     03-04  Mg     1.7     -04    TPro  5.6<L>  /  Alb  3.1<L>  /  TBili  0.3  /  DBili      /  AST  80<H>  /  ALT  108<H>  /  AlkPhos  96      Creatinine Trend: 4.64 <--, 4.40 <--, 4.01 <--                        7.2    2.68  )-----------( 48       ( 05 Mar 2023 09:48 )             21.1     Urine Studies:  Urinalysis Basic - ( 04 Mar 2023 07:30 )    Color: Yellow / Appearance: Slightly Turbid / S.014 / pH:   Gluc:  / Ketone: Negative  / Bili: Negative / Urobili: Negative   Blood:  / Protein: 100 mg/dl / Nitrite: Negative   Leuk Esterase: Negative / RBC: 3 /hpf / WBC 6 /HPF   Sq Epi:  / Non Sq Epi: 4 /hpf / Bacteria: Negative      Creatinine, Random Urine: 83 mg/dL ( @ 08:48)  Sodium, Random Urine: 67 mmol/L ( @ 08:48)    RADIOLOGY & ADDITIONAL STUDIES:      < from: CT Abdomen and Pelvis No Cont (23 @ 12:38) >    ACC: [id number] EXAM:  CT ABDOMEN AND PELVIS   ORDERED BY: BONITA SOSA     PROCEDURE DATE:  2023          INTERPRETATION:  CLINICAL INFORMATION: Abdominal pain and diarrhea.   Multiple myeloma, on chemotherapy. Sent to the emergency department for   hypotension.    COMPARISON: None.    CONTRAST/COMPLICATIONS:  IV Contrast: NONE  Oral Contrast: NONE  Complications: None reported at time of study completion    PROCEDURE:  CT of the Abdomen and Pelvis was performed.  Sagittal and coronal reformats were performed.    FINDINGS:  Limited evaluation of the vasculature and viscera in the absence of   intravenous contrast.    LOWER CHEST: Aortic valve repair. Coronary artery calcifications.   Hypoattenuation of the blood pool relative to myocardium, compatible with   anemia.    LIVER: Mild hepatomegaly, measures 19.0 cm craniocaudally.  BILE DUCTS: Normal caliber.  GALLBLADDER: Cholelithiasis.  SPLEEN: Not visualized and may be surgically absent.  PANCREAS: The pancreas appears truncated with possible distal   pancreatectomy.  ADRENALS: Within normal limits.  KIDNEYS/URETERS: Within normal limits.    BLADDER: Underdistended. Small diverticulum projecting from the right   posterior bladder wall.  REPRODUCTIVE ORGANS: The prostate is mildly enlarged.    BOWEL: Suture material along the distal esophagus, gastroesophageal   junction, and gastric fundus. No bowel obstruction. Appendix is normal.   Liquid stool within the rectum. No appreciable bowel inflammation.  PERITONEUM: No ascites. Bilobed fat density in the left upper quadrant   demonstrating a thin rim of encapsulation, with one lobe measuring 3.1 cm   and the other lobe measuring 2.5 cm (series 3 images 39 and 41), which   may reflect a site of fat necrosis or post surgicalchanges. The proximal   descending colon courses immediately adjacent to this site, and a   component of epiploic appendagitis is also considered.  VESSELS: Atherosclerotic changes.  RETROPERITONEUM/LYMPH NODES: No lymphadenopathy.  ABDOMINAL WALL: Small bilateral fat-containing inguinal hernias.  BONES: Median sternotomy wires. Partially imaged right femoral   intramedullary kevin and nail fixation. Degenerative changes. Osseous   demineralization without a discrete focal aggressive osseous lesion  identified.    IMPRESSION:  Liquid stool within the rectum, likely reflecting diarrheal state.   Otherwise, limited noncontrast exam without evidence of acute bowel   inflammation.    Question distal pancreatectomy. Nonvisualization of the spleen, which may   be surgically absent.    Encapsulated lipomatous structure in the left upper quadrant, which may   reflect a site of fat necrosis or postsurgical changes. A component of   epiploic appendagitis is also considered given that the descending colon   is immediately adjacent to this region.    Mild hepatomegaly.    Cholelithiasis.          --- End of Report ---            SINAN FABIAN MD; Attending Radiologist  This document has been electronically signed. Mar  3 2023  2:28PM    < end of copied text >             [facility] NEPHROLOGY- CONSULTATION NOTE    Patient is a 67y Male with MM on  daratumumab + cyclophosphamide + bortezomib + dexamethasone (last dose 3/3) with Baseline creatinine is at 1.2-1.3, creatinine was 1.2 on  who presented to the hospital with severe diarrhea, though to be due to chemotherapy.  Of note, chemotherapy regimen was changed at the end of February due to CT/PET that showed progression of disease. Pt reports having up to 10 episodes of diarrhea/day for ~ 1 week.  He has been eating and drinking, but feels he was unable to keep up with his output.  No ACEi/ARBs/SGLT-2 inhibitors/NSAIDs/Diuretics/.  Pt had a PET/CT in late February.    Pt reports that his diarrhea has significantly improved.    PAST MEDICAL & SURGICAL HISTORY:  Multiple myeloma      Hyperlipidemia      H/O aortic aneurysm      H/O splenectomy      S/P partial gastrectomy      History of pancreatic surgery        No Known Allergies    Home Medications Reviewed  Hospital Medications:   MEDICATIONS  (STANDING):  aspirin enteric coated 81 milliGRAM(s) Oral daily  atorvastatin 40 milliGRAM(s) Oral at bedtime  cefTRIAXone   IVPB 1000 milliGRAM(s) IV Intermittent every 24 hours  chlorhexidine 2% Cloths 1 Application(s) Topical daily  dexAMETHasone     Tablet 4 milliGRAM(s) Oral daily  loperamide 2 milliGRAM(s) Oral two times a day  metroNIDAZOLE  IVPB 500 milliGRAM(s) IV Intermittent once  metroNIDAZOLE  IVPB 500 milliGRAM(s) IV Intermittent every 8 hours  metroNIDAZOLE  IVPB      montelukast 10 milliGRAM(s) Oral daily  multivitamin 1 Tablet(s) Oral daily  sodium bicarbonate  Infusion 0.097 mEq/kG/Hr (100 mL/Hr) IV Continuous <Continuous>    SOCIAL HISTORY:  Denies ETOh,Smoking,   FAMILY HISTORY:  FHx: lung cancer (Sibling)    FHx: ovarian cancer (Sibling)      REVIEW OF SYSTEMS:  CONSTITUTIONAL: No weakness, fevers or chills  EYES/ENT: No visual changes;  No vertigo or throat pain   NECK: No pain or stiffness  RESPIRATORY: No cough, wheezing, hemoptysis; No shortness of breath  CARDIOVASCULAR: No chest pain or palpitations.  GASTROINTESTINAL: No abdominal or epigastric pain. No nausea, vomiting, or hematemesis; + diarrhea improving. No melena or hematochezia.  GENITOURINARY: No dysuria, frequency, foamy urine, urinary urgency, incontinence or hematuria  NEUROLOGICAL: No numbness or weakness  SKIN: No itching, burning, rashes, or lesions   VASCULAR: No bilateral lower extremity edema.   All other review of systems is negative unless indicated above.    VITALS:  T(F): 97.7 (23 @ 16:31), Max: 98.2 (23 @ 00:12)  HR: 78 (23 @ 16:31)  BP: 98/60 (23 @ 16:31)  RR: 18 (23 @ 16:31)  SpO2: 97% (23 @ 16:31)  Wt(kg): --     @ 07:01  -   @ 07:00  --------------------------------------------------------  IN: 2150 mL / OUT: 0 mL / NET: 2150 mL     @ 07:01  -   @ 17:35  --------------------------------------------------------  IN: 150 mL / OUT: 0 mL / NET: 150 mL          PHYSICAL EXAM:  Constitutional: NAD  HEENT: anicteric sclera, oropharynx clear, MMM  Neck: No JVD  Respiratory: CTAB, no wheezes, rales or rhonchi  Cardiovascular: S1, S2, RRR  Gastrointestinal: BS+, soft, NT/ND  Extremities: No cyanosis or clubbing. No peripheral edema  Neurological: A/O x 3, no focal deficits  Psychiatric: Normal mood, normal affect  : No CVA tenderness. No lloyd.   Skin: No rashes    LABS:    132 <--, 134 <--, 132 <--  132<L>  |  104  |  76<H>  ----------------------------<  196<H>  3.7   |  12<L>  |  4.64<H>    Ca    8.3<L>      05 Mar 2023 09:48  Phos  4.6     03-04  Mg     1.7     -04    TPro  5.6<L>  /  Alb  3.1<L>  /  TBili  0.3  /  DBili      /  AST  80<H>  /  ALT  108<H>  /  AlkPhos  96      Creatinine Trend: 4.64 <--, 4.40 <--, 4.01 <--                        7.2    2.68  )-----------( 48       ( 05 Mar 2023 09:48 )             21.1     Urine Studies:  Urinalysis Basic - ( 04 Mar 2023 07:30 )    Color: Yellow / Appearance: Slightly Turbid / S.014 / pH:   Gluc:  / Ketone: Negative  / Bili: Negative / Urobili: Negative   Blood:  / Protein: 100 mg/dl / Nitrite: Negative   Leuk Esterase: Negative / RBC: 3 /hpf / WBC 6 /HPF   Sq Epi:  / Non Sq Epi: 4 /hpf / Bacteria: Negative      Creatinine, Random Urine: 83 mg/dL ( @ 08:48)  Sodium, Random Urine: 67 mmol/L ( @ 08:48)    RADIOLOGY & ADDITIONAL STUDIES:      < from: CT Abdomen and Pelvis No Cont (23 @ 12:38) >    ACC: [id number] EXAM:  CT ABDOMEN AND PELVIS   ORDERED BY: BONITA SOSA     PROCEDURE DATE:  2023          INTERPRETATION:  CLINICAL INFORMATION: Abdominal pain and diarrhea.   Multiple myeloma, on chemotherapy. Sent to the emergency department for   hypotension.    COMPARISON: None.    CONTRAST/COMPLICATIONS:  IV Contrast: NONE  Oral Contrast: NONE  Complications: None reported at time of study completion    PROCEDURE:  CT of the Abdomen and Pelvis was performed.  Sagittal and coronal reformats were performed.    FINDINGS:  Limited evaluation of the vasculature and viscera in the absence of   intravenous contrast.    LOWER CHEST: Aortic valve repair. Coronary artery calcifications.   Hypoattenuation of the blood pool relative to myocardium, compatible with   anemia.    LIVER: Mild hepatomegaly, measures 19.0 cm craniocaudally.  BILE DUCTS: Normal caliber.  GALLBLADDER: Cholelithiasis.  SPLEEN: Not visualized and may be surgically absent.  PANCREAS: The pancreas appears truncated with possible distal   pancreatectomy.  ADRENALS: Within normal limits.  KIDNEYS/URETERS: Within normal limits.    BLADDER: Underdistended. Small diverticulum projecting from the right   posterior bladder wall.  REPRODUCTIVE ORGANS: The prostate is mildly enlarged.    BOWEL: Suture material along the distal esophagus, gastroesophageal   junction, and gastric fundus. No bowel obstruction. Appendix is normal.   Liquid stool within the rectum. No appreciable bowel inflammation.  PERITONEUM: No ascites. Bilobed fat density in the left upper quadrant   demonstrating a thin rim of encapsulation, with one lobe measuring 3.1 cm   and the other lobe measuring 2.5 cm (series 3 images 39 and 41), which   may reflect a site of fat necrosis or post surgicalchanges. The proximal   descending colon courses immediately adjacent to this site, and a   component of epiploic appendagitis is also considered.  VESSELS: Atherosclerotic changes.  RETROPERITONEUM/LYMPH NODES: No lymphadenopathy.  ABDOMINAL WALL: Small bilateral fat-containing inguinal hernias.  BONES: Median sternotomy wires. Partially imaged right femoral   intramedullary kevin and nail fixation. Degenerative changes. Osseous   demineralization without a discrete focal aggressive osseous lesion  identified.    IMPRESSION:  Liquid stool within the rectum, likely reflecting diarrheal state.   Otherwise, limited noncontrast exam without evidence of acute bowel   inflammation.    Question distal pancreatectomy. Nonvisualization of the spleen, which may   be surgically absent.    Encapsulated lipomatous structure in the left upper quadrant, which may   reflect a site of fat necrosis or postsurgical changes. A component of   epiploic appendagitis is also considered given that the descending colon   is immediately adjacent to this region.    Mild hepatomegaly.    Cholelithiasis.          --- End of Report ---            SINAN FABIAN MD; Attending Radiologist  This document has been electronically signed. Mar  3 2023  2:28PM    < end of copied text >             [facility] NEPHROLOGY- CONSULTATION NOTE    Patient is a 67y Male with MM on  daratumumab + cyclophosphamide + bortezomib + dexamethasone (last dose 3/3) with Baseline creatinine is at 1.2-1.3, creatinine was 1.2 on  who presented to the hospital with severe diarrhea, though to be due to chemotherapy.  Of note, chemotherapy regimen was changed at the end of February due to CT/PET that showed progression of disease. Pt reports having up to 10 episodes of diarrhea/day for ~ 1 week.  He has been eating and drinking, but feels he was unable to keep up with his output.  No ACEi/ARBs/SGLT-2 inhibitors/NSAIDs/Diuretics/.  Pt had a PET/CT in late February.    Pt reports that his diarrhea has significantly improved.    PAST MEDICAL & SURGICAL HISTORY:  Multiple myeloma      Hyperlipidemia      H/O aortic aneurysm      H/O splenectomy      S/P partial gastrectomy      History of pancreatic surgery        No Known Allergies    Home Medications Reviewed  Hospital Medications:   MEDICATIONS  (STANDING):  aspirin enteric coated 81 milliGRAM(s) Oral daily  atorvastatin 40 milliGRAM(s) Oral at bedtime  cefTRIAXone   IVPB 1000 milliGRAM(s) IV Intermittent every 24 hours  chlorhexidine 2% Cloths 1 Application(s) Topical daily  dexAMETHasone     Tablet 4 milliGRAM(s) Oral daily  loperamide 2 milliGRAM(s) Oral two times a day  metroNIDAZOLE  IVPB 500 milliGRAM(s) IV Intermittent once  metroNIDAZOLE  IVPB 500 milliGRAM(s) IV Intermittent every 8 hours  metroNIDAZOLE  IVPB      montelukast 10 milliGRAM(s) Oral daily  multivitamin 1 Tablet(s) Oral daily  sodium bicarbonate  Infusion 0.097 mEq/kG/Hr (100 mL/Hr) IV Continuous <Continuous>    SOCIAL HISTORY:  Denies ETOh,Smoking,   FAMILY HISTORY:  FHx: lung cancer (Sibling)    FHx: ovarian cancer (Sibling)      REVIEW OF SYSTEMS:  CONSTITUTIONAL: No weakness, fevers or chills  EYES/ENT: No visual changes;  No vertigo or throat pain   NECK: No pain or stiffness  RESPIRATORY: No cough, wheezing, hemoptysis; No shortness of breath  CARDIOVASCULAR: No chest pain or palpitations.  GASTROINTESTINAL: No abdominal or epigastric pain. No nausea, vomiting, or hematemesis; + diarrhea improving. No melena or hematochezia.  GENITOURINARY: No dysuria, frequency, foamy urine, urinary urgency, incontinence or hematuria  NEUROLOGICAL: No numbness or weakness  SKIN: No itching, burning, rashes, or lesions   VASCULAR: No bilateral lower extremity edema.   All other review of systems is negative unless indicated above.    VITALS:  T(F): 97.7 (23 @ 16:31), Max: 98.2 (23 @ 00:12)  HR: 78 (23 @ 16:31)  BP: 98/60 (23 @ 16:31)  RR: 18 (23 @ 16:31)  SpO2: 97% (23 @ 16:31)  Wt(kg): --     @ 07:01  -   @ 07:00  --------------------------------------------------------  IN: 2150 mL / OUT: 0 mL / NET: 2150 mL     @ 07:01  -   @ 17:35  --------------------------------------------------------  IN: 150 mL / OUT: 0 mL / NET: 150 mL          PHYSICAL EXAM:  Constitutional: NAD  HEENT: anicteric sclera, oropharynx clear, MMM  Neck: No JVD  Respiratory: CTAB, no wheezes, rales or rhonchi  Cardiovascular: S1, S2, RRR  Gastrointestinal: BS+, soft, NT/ND  Extremities: No cyanosis or clubbing. No peripheral edema  Neurological: A/O x 3, no focal deficits  Psychiatric: Normal mood, normal affect  : No CVA tenderness. No lloyd.   Skin: No rashes    LABS:    132 <--, 134 <--, 132 <--  132<L>  |  104  |  76<H>  ----------------------------<  196<H>  3.7   |  12<L>  |  4.64<H>    Ca    8.3<L>      05 Mar 2023 09:48  Phos  4.6     03-04  Mg     1.7     -04    TPro  5.6<L>  /  Alb  3.1<L>  /  TBili  0.3  /  DBili      /  AST  80<H>  /  ALT  108<H>  /  AlkPhos  96      Creatinine Trend: 4.64 <--, 4.40 <--, 4.01 <--                        7.2    2.68  )-----------( 48       ( 05 Mar 2023 09:48 )             21.1     Urine Studies:  Urinalysis Basic - ( 04 Mar 2023 07:30 )    Color: Yellow / Appearance: Slightly Turbid / S.014 / pH:   Gluc:  / Ketone: Negative  / Bili: Negative / Urobili: Negative   Blood:  / Protein: 100 mg/dl / Nitrite: Negative   Leuk Esterase: Negative / RBC: 3 /hpf / WBC 6 /HPF   Sq Epi:  / Non Sq Epi: 4 /hpf / Bacteria: Negative      Creatinine, Random Urine: 83 mg/dL ( @ 08:48)  Sodium, Random Urine: 67 mmol/L ( @ 08:48)    RADIOLOGY & ADDITIONAL STUDIES:      < from: CT Abdomen and Pelvis No Cont (23 @ 12:38) >    ACC: [id number] EXAM:  CT ABDOMEN AND PELVIS   ORDERED BY: BONITA SOSA     PROCEDURE DATE:  2023          INTERPRETATION:  CLINICAL INFORMATION: Abdominal pain and diarrhea.   Multiple myeloma, on chemotherapy. Sent to the emergency department for   hypotension.    COMPARISON: None.    CONTRAST/COMPLICATIONS:  IV Contrast: NONE  Oral Contrast: NONE  Complications: None reported at time of study completion    PROCEDURE:  CT of the Abdomen and Pelvis was performed.  Sagittal and coronal reformats were performed.    FINDINGS:  Limited evaluation of the vasculature and viscera in the absence of   intravenous contrast.    LOWER CHEST: Aortic valve repair. Coronary artery calcifications.   Hypoattenuation of the blood pool relative to myocardium, compatible with   anemia.    LIVER: Mild hepatomegaly, measures 19.0 cm craniocaudally.  BILE DUCTS: Normal caliber.  GALLBLADDER: Cholelithiasis.  SPLEEN: Not visualized and may be surgically absent.  PANCREAS: The pancreas appears truncated with possible distal   pancreatectomy.  ADRENALS: Within normal limits.  KIDNEYS/URETERS: Within normal limits.    BLADDER: Underdistended. Small diverticulum projecting from the right   posterior bladder wall.  REPRODUCTIVE ORGANS: The prostate is mildly enlarged.    BOWEL: Suture material along the distal esophagus, gastroesophageal   junction, and gastric fundus. No bowel obstruction. Appendix is normal.   Liquid stool within the rectum. No appreciable bowel inflammation.  PERITONEUM: No ascites. Bilobed fat density in the left upper quadrant   demonstrating a thin rim of encapsulation, with one lobe measuring 3.1 cm   and the other lobe measuring 2.5 cm (series 3 images 39 and 41), which   may reflect a site of fat necrosis or post surgicalchanges. The proximal   descending colon courses immediately adjacent to this site, and a   component of epiploic appendagitis is also considered.  VESSELS: Atherosclerotic changes.  RETROPERITONEUM/LYMPH NODES: No lymphadenopathy.  ABDOMINAL WALL: Small bilateral fat-containing inguinal hernias.  BONES: Median sternotomy wires. Partially imaged right femoral   intramedullary kevin and nail fixation. Degenerative changes. Osseous   demineralization without a discrete focal aggressive osseous lesion  identified.    IMPRESSION:  Liquid stool within the rectum, likely reflecting diarrheal state.   Otherwise, limited noncontrast exam without evidence of acute bowel   inflammation.    Question distal pancreatectomy. Nonvisualization of the spleen, which may   be surgically absent.    Encapsulated lipomatous structure in the left upper quadrant, which may   reflect a site of fat necrosis or postsurgical changes. A component of   epiploic appendagitis is also considered given that the descending colon   is immediately adjacent to this region.    Mild hepatomegaly.    Cholelithiasis.          --- End of Report ---            SINAN FABIAN MD; Attending Radiologist  This document has been electronically signed. Mar  3 2023  2:28PM    < end of copied text >

## 2023-03-05 NOTE — CONSULT NOTE ADULT - ASSESSMENT
67 year-old man with RK hemodynamically-mediated in the setting of severe dehydration.    1. RK-  hemodynamically-mediated in the setting of severe dehydration.  It is possible that RK has progressed to ATN given the duration of diarrhea and lack of initial response to IVF.  Doubt cast nephropathy given timeframe of RK and doubt chemotherapy related RK, though it is possible given the recent change in chemotherapy agents.  Will give further IVF as below.  Repeat BMP this evening and again tomorrow morning.    2. Acidosis- mostly non-gap acidosis, though there is a small component of an AG acidosis.  Giving 1/2NS + 75mEq NaHCO3 X 1 L, then will change to NS or LR, but maintain isotonic fluids. Repeat BMP this evening and again tomorrow morning.  3. Hyponatremia- in the setting of volume depletion as above as well as small component of hyperglycemia.  4. MM- treatment per oncology. Doubt renal involement.    [facility] NEPHROLOGY  Leoncio Leonard M.D.  Tay Brooke D.O.  Precious Chen M.D.  Nidia Stallings, NURIS, ANP-C    Telephone: [phone]  Facsimile: [phone] [place] [address], [address]  [place] [address]   67 year-old man with RK hemodynamically-mediated in the setting of severe dehydration.    1. RK-  hemodynamically-mediated in the setting of severe dehydration.  It is possible that RK has progressed to ATN given the duration of diarrhea and lack of initial response to IVF.  Doubt cast nephropathy given timeframe of RK and doubt chemotherapy related RK, though it is possible given the recent change in chemotherapy agents.  Will give further IVF as below.  Repeat BMP this evening and again tomorrow morning.    2. Acidosis- mostly non-gap acidosis, though there is a small component of an AG acidosis.  Giving 1/2NS + 75mEq NaHCO3 X 1 L, then will change to NS or LR, but maintain isotonic fluids. Repeat BMP this evening and again tomorrow morning.  3. Hyponatremia- in the setting of volume depletion as above as well as small component of hyperglycemia.  4. MM- treatment per oncology. Doubt renal involement.    [facility] NEPHROLOGY  Leoncio Leonard M.D.  Tay Brooke D.O.  Precious Chen M.D.  Nidia Stallings, NURIS, ANP-C    Telephone: [phone]  Facsimile: [phone] [place] [address], [address]  [place] [address]   67 year-old man with RK hemodynamically-mediated in the setting of severe dehydration.    1. RK-  hemodynamically-mediated in the setting of severe dehydration.  It is possible that RK has progressed to ATN given the duration of diarrhea and lack of initial response to IVF.  Doubt cast nephropathy given timeframe of RK and doubt chemotherapy related RK, though it is possible given the recent change in chemotherapy agents.  Will give further IVF as below.  Repeat BMP this evening and again tomorrow morning.    2. Acidosis- mostly non-gap acidosis, though there is a small component of an AG acidosis.  Giving 1/2NS + 75mEq NaHCO3 X 1 L, then will change to NS or LR, but maintain isotonic fluids. Repeat BMP this evening and again tomorrow morning.  3. Hyponatremia- in the setting of volume depletion as above as well as small component of hyperglycemia.  4. MM- treatment per oncology. Doubt renal involement.    [facility] NEPHROLOGY  Leoncio Leonard M.D.  Tay Brooke D.O.  Precious Chen M.D.  Nidia Stallings, NURIS, ANP-C    Telephone: [phone]  Facsimile: [phone] [place] [address], [address]  [place] [address]   67 year-old man with RK hemodynamically-mediated in the setting of severe dehydration with hypotension.    1. RK-  hemodynamically-mediated in the setting of severe dehydration with hypotension.  It is possible that RK has progressed to ATN given the duration of diarrhea and lack of initial response to IVF.  Doubt cast nephropathy given timeframe of RK and doubt chemotherapy related RK, though it is possible given the recent change in chemotherapy agents.  Will give further IVF as below.  Repeat BMP this evening and again tomorrow morning.    2. Acidosis- mostly non-gap acidosis, though there is a small component of an AG acidosis.  Giving 1/2NS + 75mEq NaHCO3 X 1 L, then will change to NS or LR, but maintain isotonic fluids. Repeat BMP this evening and again tomorrow morning.  3. Hyponatremia- in the setting of volume depletion as above as well as small component of hyperglycemia.  4. MM- treatment per oncology. Doubt renal involement.    [facility] NEPHROLOGY  Leoncio Leonard M.D.  Tay Brooke D.O.  Precious Chen M.D.  Nidia Stallings, NURIS, ANP-C    Telephone: [phone]  Facsimile: [phone]    [address] [place] [address], [address]  [place] [address]   67 year-old man with RK hemodynamically-mediated in the setting of severe dehydration with hypotension.    1. RK-  hemodynamically-mediated in the setting of severe dehydration with hypotension.  It is possible that RK has progressed to ATN given the duration of diarrhea and lack of initial response to IVF.  Doubt cast nephropathy given timeframe of RK and doubt chemotherapy related RK, though it is possible given the recent change in chemotherapy agents.  Will give further IVF as below.  Repeat BMP this evening and again tomorrow morning.    2. Acidosis- mostly non-gap acidosis, though there is a small component of an AG acidosis.  Giving 1/2NS + 75mEq NaHCO3 X 1 L, then will change to NS or LR, but maintain isotonic fluids. Repeat BMP this evening and again tomorrow morning.  3. Hyponatremia- in the setting of volume depletion as above as well as small component of hyperglycemia.  4. MM- treatment per oncology. Doubt renal involement.    [facility] NEPHROLOGY  Leoncio Leonard M.D.  Tay Brooke D.O.  Precious Chen M.D.  Nidia Stallings, NURIS, ANP-C    Telephone: [phone]  Facsimile: [phone]    [address] [place] [address], [address]  [place] [address]   67 year-old man with RK hemodynamically-mediated in the setting of severe dehydration with hypotension.    1. RK-  hemodynamically-mediated in the setting of severe dehydration with hypotension.  It is possible that RK has progressed to ATN given the duration of diarrhea and lack of initial response to IVF.  Doubt cast nephropathy given timeframe of RK and doubt chemotherapy related RK, though it is possible given the recent change in chemotherapy agents.  Will give further IVF as below.  Repeat BMP this evening and again tomorrow morning.    2. Acidosis- mostly non-gap acidosis, though there is a small component of an AG acidosis.  Giving 1/2NS + 75mEq NaHCO3 X 1 L, then will change to NS or LR, but maintain isotonic fluids. Repeat BMP this evening and again tomorrow morning.  3. Hyponatremia- in the setting of volume depletion as above as well as small component of hyperglycemia.  4. MM- treatment per oncology. Doubt renal involement.    [facility] NEPHROLOGY  Leoncio Leonard M.D.  Tay Brooke D.O.  Precious Chen M.D.  Nidia Stallings, NURIS, ANP-C    Telephone: [phone]  Facsimile: [phone]    [address] [place] [address], [address]  [place] [address]

## 2023-03-05 NOTE — PROGRESS NOTE ADULT - SUBJECTIVE AND OBJECTIVE BOX
Chief Complaint:  Patient is a 67y old  Male who presents with a chief complaint of 'I took a turn for the worst' (05 Mar 2023 09:35)      Date of service 23 @ 10:15      Interval Events:   no diarrhea overnight    Hospital Medications:  aspirin enteric coated 81 milliGRAM(s) Oral daily  atorvastatin 40 milliGRAM(s) Oral at bedtime  cefTRIAXone   IVPB 1000 milliGRAM(s) IV Intermittent every 24 hours  chlorhexidine 2% Cloths 1 Application(s) Topical daily  dexAMETHasone     Tablet 4 milliGRAM(s) Oral daily  loperamide 2 milliGRAM(s) Oral two times a day  metroNIDAZOLE  IVPB 500 milliGRAM(s) IV Intermittent once  metroNIDAZOLE  IVPB 500 milliGRAM(s) IV Intermittent every 8 hours  metroNIDAZOLE  IVPB      montelukast 10 milliGRAM(s) Oral daily  multivitamin 1 Tablet(s) Oral daily  ondansetron   Disintegrating Tablet 8 milliGRAM(s) Oral three times a day PRN  sodium chloride 0.9%. 1000 milliLiter(s) IV Continuous <Continuous>        Review of Systems:  General:  No wt loss, fevers, chills, night sweats, fatigue,   Eyes:  Good vision, no reported pain  ENT:  No sore throat, pain, runny nose, dysphagia  CV:  No pain, palpitations, hypo/hypertension  Resp:  No dyspnea, cough, tachypnea, wheezing  GI:  See HPI  :  No pain, bleeding, incontinence, nocturia  Muscle:  No pain, weakness  Neuro:  No weakness, tingling, memory problems  Psych:  No fatigue, insomnia, mood problems, depression  Endocrine:  No polyuria, polydipsia, cold/heat intolerance  Heme:  No petechiae, ecchymosis, easy bruisability  Integumentary:  No rash, edema    PHYSICAL EXAM:   Vital Signs:  Vital Signs Last 24 Hrs  T(C): 36.7 (05 Mar 2023 08:24), Max: 37.6 (04 Mar 2023 16:50)  T(F): 98.1 (05 Mar 2023 08:24), Max: 99.6 (04 Mar 2023 16:50)  HR: 74 (05 Mar 2023 08:24) (74 - 90)  BP: 96/54 (05 Mar 2023 08:24) (94/53 - 100/60)  BP(mean): --  RR: 18 (05 Mar 2023 08:24) (18 - 20)  SpO2: 97% (05 Mar 2023 08:24) (97% - 97%)    Parameters below as of 05 Mar 2023 08:24  Patient On (Oxygen Delivery Method): room air      Daily     Daily       PHYSICAL EXAM:     GENERAL:  Appears stated age, well-groomed, well-nourished, no distress  HEENT:  NC/AT,  conjunctivae anicteric, clear and pink,   NECK: supple, trachea midline  CHEST:  Full & symmetric excursion, no increased effort, breath sounds clear  HEART:  Regular rhythm, no JVD  ABDOMEN:  Soft, non-tender, non-distended, normoactive bowel sounds,  no masses , no hepatosplenomegaly  EXTREMITIES:  no cyanosis,clubbing or edema  SKIN:  No rash, erythema, or, ecchymoses, no jaundice  NEURO:  Alert, non-focal, no asterixis  PSYCH: Appropriate affect, oriented to place and time  RECTAL: Deferred      LABS Personally reviewed by me:                        7.2    2.68  )-----------( x        ( 05 Mar 2023 09:48 )             21.1     Mean Cell Volume: 99.1 fl (23 @ 09:48)    03-05    132<L>  |  104  |  76<H>  ----------------------------<  196<H>  3.7   |  12<L>  |  4.64<H>    Ca    8.3<L>      05 Mar 2023 09:48  Phos  4.6     03-04  Mg     1.7     03-04    TPro  5.6<L>  /  Alb  3.1<L>  /  TBili  0.3  /  DBili  x   /  AST  80<H>  /  ALT  108<H>  /  AlkPhos  96  03-05    LIVER FUNCTIONS - ( 05 Mar 2023 09:48 )  Alb: 3.1 g/dL / Pro: 5.6 g/dL / ALK PHOS: 96 U/L / ALT: 108 U/L / AST: 80 U/L / GGT: x             Urinalysis Basic - ( 04 Mar 2023 07:30 )    Color: Yellow / Appearance: Slightly Turbid / S.014 / pH: x  Gluc: x / Ketone: Negative  / Bili: Negative / Urobili: Negative   Blood: x / Protein: 100 mg/dl / Nitrite: Negative   Leuk Esterase: Negative / RBC: 3 /hpf / WBC 6 /HPF   Sq Epi: x / Non Sq Epi: 4 /hpf / Bacteria: Negative                              7.2    2.68  )-----------( x        ( 05 Mar 2023 09:48 )             21.1                         7.2    1.52  )-----------( 53       ( 04 Mar 2023 07:01 )             21.2                         7.6    1.05  )-----------( 59       ( 03 Mar 2023 12:15 )             23.0       Imaging personally reviewed by me:

## 2023-03-05 NOTE — PROGRESS NOTE ADULT - ASSESSMENT
67 year old male PMH aortic aneurysm and bioprosthetic AV valve replacement 2019, HL, splenectomy, partial gastrectomy, partial pancreatectomy, oligosecretory multiple myeloma s/p auto SCT in 2018 on revlimid started david-cybor D 2/10 under care at [facility] by Dr. Jean Claude Hubbard was due for chemo today but was found to be more hypotensive than baseline (usually sbp ~90) and was sent to the ED, admitted with RK.     RK (acute kidney injury).  - Suspect due to diarrhea / chemo related  - S/P IVF, will continue gentle IVF 60 CC x 24 hours    - Trend BMP  - Avoid nephrotoxic medications  - Renally dose meds  - Check bladder scan for PVR  - Check UA/urine lytes for Fena  - Started on Sodium bicarb infusion   - Renal eval consulted; F/u recs     Diarrhea  - Suspect chemo related  - However has h/o splenectomy and is on daily penicillin (assuming for prophylaxis?)  - CT abd/pelvis with liquid stool  - C diff - Neg   - C/w  ceftriaxone/flagyl given active chemo, bandemia  - Hold penicillin while on ceftriaxone.  - GI PCR neg   - GI eval consulted; F/u recs     Pancytopenia.   - Lower from baseline noted 2/6 with hb 10.6 to now 7.6  - Suspect chemo related  - Received neupogen outpatient prior to arrival   - No active bleeding reported  - Trend cbc daily  - Check iron studies/folate/b12. -- Noted  - Maintain Active T+S    Elevated LFTs  - Cont to monitor and trend  - Avoid hepatotoxic agents  - GI eval appreciated; F/u recs     Hypotension.  - Not meeting sirs criteria as HR<90, afebrile.  HOwever wbc is low, and has bandemia.  Lactate 1.7.  - C/w ceftriaxone/flagyl  - BCx2 w/ NGTD; F/u final   - RVP negative and cxr clear.  No clear infectious source on CT abd.  - Continue ivf, hold beta blocker.    Multiple myeloma.   - heme/Onc eval appreciated; F/u recs   - Hold entecavir as it will need to be renally dosed  - F/u with heme on revlimid dosing.    Hyperlipidemia.  -continue statin.     Elevated troponin  - suspect due to hypotension and RK, not true cardiac event  - EKG no acute findings.     Preventive measure.    no pharm ppx due to thrombocytopenia  ICDs  incentive spirometry  fall risk  PT consult.   67 year old male PMH aortic aneurysm and bioprosthetic AV valve replacement 2019, HL, splenectomy, partial gastrectomy, partial pancreatectomy, oligosecretory multiple myeloma s/p auto SCT in 2018 on revlimid started david-cybor D 2/10 [place] [facility] [place] Jean Claude Hubbard was due for chemo today but was found to be more hypotensive than baseline (usually sbp ~90) and was sent to the ED, admitted with RK.     RK (acute kidney injury).  - Suspect due to diarrhea / chemo related  - S/P IVF, will continue gentle IVF 60 CC x 24 hours    - Trend BMP  - Avoid nephrotoxic medications  - Renally dose meds  - Check bladder scan for PVR  - Check UA/urine lytes for Fena  - Started on Sodium bicarb infusion   - Renal eval consulted; F/u recs     Diarrhea  - Suspect chemo related  - However has h/o splenectomy and is on daily penicillin (assuming for prophylaxis?)  - CT abd/pelvis with liquid stool  - C diff - Neg   - C/w  ceftriaxone/flagyl given active chemo, bandemia  - Hold penicillin while on ceftriaxone.  - GI PCR neg   - GI eval consulted; F/u recs     Pancytopenia.   - Lower from baseline noted 2/6 with hb 10.6 to now 7.6  - Suspect chemo related  - Received neupogen outpatient prior to arrival   - No active bleeding reported  - Trend cbc daily  - Check iron studies/folate/b12. -- Noted  - Maintain Active T+S    Elevated LFTs  - Cont to monitor and trend  - Avoid hepatotoxic agents  - GI eval appreciated; F/u recs     Hypotension.  - Not meeting sirs criteria as HR<90, afebrile.  HOwever wbc is low, and has bandemia.  Lactate 1.7.  - C/w ceftriaxone/flagyl  - BCx2 w/ NGTD; F/u final   - RVP negative and cxr clear.  No clear infectious source on CT abd.  - Continue ivf, hold beta blocker.    Multiple myeloma.   - heme/Onc eval appreciated; F/u recs   - Hold entecavir as it will need to be renally dosed  - F/u with heme on revlimid dosing.    Hyperlipidemia.  -continue statin.     Elevated troponin  - suspect due to hypotension and RK, not true cardiac event  - EKG no acute findings.     Preventive measure.    no pharm ppx due to thrombocytopenia  ICDs  incentive spirometry  fall risk  PT consult.   67 year old male PMH aortic aneurysm and bioprosthetic AV valve replacement 2019, HL, splenectomy, partial gastrectomy, partial pancreatectomy, oligosecretory multiple myeloma s/p auto SCT in 2018 on revlimid started david-cybor D 2/10 [place] [facility] [place] Jean Claude Hubbard was due for chemo today but was found to be more hypotensive than baseline (usually sbp ~90) and was sent to the ED, admitted with RK.     RK (acute kidney injury).  - Suspect due to diarrhea / chemo related  - S/P IVF, will continue gentle IVF 60 CC x 24 hours    - Trend BMP  - Avoid nephrotoxic medications  - Renally dose meds  - Check bladder scan for PVR  - Check UA/urine lytes for Fena  - Started on Sodium bicarb infusion   - Renal eval consulted; F/u recs     Diarrhea  - Suspect chemo related  - However has h/o splenectomy and is on daily penicillin (assuming for prophylaxis?)  - CT abd/pelvis with liquid stool  - C diff - Neg   - C/w  ceftriaxone/flagyl given active chemo, bandemia  - Hold penicillin while on ceftriaxone.  - GI PCR neg   - GI eval consulted; F/u recs     Pancytopenia.   - Lower from baseline noted 2/6 with hb 10.6 to now 7.6  - Suspect chemo related  - Received neupogen outpatient prior to arrival   - No active bleeding reported  - Trend cbc daily  - Check iron studies/folate/b12. -- Noted  - Maintain Active T+S    Elevated LFTs  - Cont to monitor and trend  - Avoid hepatotoxic agents  - GI eval appreciated; F/u recs     Hypotension.  - Not meeting sirs criteria as HR<90, afebrile.  HOwever wbc is low, and has bandemia.  Lactate 1.7.  - C/w ceftriaxone/flagyl  - BCx2 w/ NGTD; F/u final   - RVP negative and cxr clear.  No clear infectious source on CT abd.  - Continue ivf, hold beta blocker.    Multiple myeloma.   - heme/Onc eval appreciated; F/u recs   - Hold entecavir as it will need to be renally dosed  - F/u with heme on revlimid dosing.    Hyperlipidemia.  -continue statin.     Elevated troponin  - suspect due to hypotension and RK, not true cardiac event  - EKG no acute findings.     Preventive measure.    no pharm ppx due to thrombocytopenia  ICDs  incentive spirometry  fall risk  PT consult.

## 2023-03-06 LAB
ALBUMIN SERPL ELPH-MCNC: 2.8 G/DL — LOW (ref 3.3–5)
ALP SERPL-CCNC: 88 U/L — SIGNIFICANT CHANGE UP (ref 40–120)
ALT FLD-CCNC: 85 U/L — HIGH (ref 10–45)
ANION GAP SERPL CALC-SCNC: 16 MMOL/L — SIGNIFICANT CHANGE UP (ref 5–17)
AST SERPL-CCNC: 38 U/L — SIGNIFICANT CHANGE UP (ref 10–40)
BILIRUB SERPL-MCNC: 0.3 MG/DL — SIGNIFICANT CHANGE UP (ref 0.2–1.2)
BUN SERPL-MCNC: 82 MG/DL — HIGH (ref 7–23)
CALCIUM SERPL-MCNC: 8.6 MG/DL — SIGNIFICANT CHANGE UP (ref 8.4–10.5)
CHLORIDE SERPL-SCNC: 105 MMOL/L — SIGNIFICANT CHANGE UP (ref 96–108)
CO2 SERPL-SCNC: 15 MMOL/L — LOW (ref 22–31)
CREAT SERPL-MCNC: 4.86 MG/DL — HIGH (ref 0.5–1.3)
EGFR: 12 ML/MIN/1.73M2 — LOW
GLUCOSE SERPL-MCNC: 140 MG/DL — HIGH (ref 70–99)
HCT VFR BLD CALC: 21.3 % — LOW (ref 39–50)
HGB BLD-MCNC: 7.3 G/DL — LOW (ref 13–17)
MCHC RBC-ENTMCNC: 33.8 PG — SIGNIFICANT CHANGE UP (ref 27–34)
MCHC RBC-ENTMCNC: 34.3 GM/DL — SIGNIFICANT CHANGE UP (ref 32–36)
MCV RBC AUTO: 98.6 FL — SIGNIFICANT CHANGE UP (ref 80–100)
NRBC # BLD: 1 /100 WBCS — HIGH (ref 0–0)
PLATELET # BLD AUTO: 52 K/UL — LOW (ref 150–400)
POTASSIUM SERPL-MCNC: 3.9 MMOL/L — SIGNIFICANT CHANGE UP (ref 3.5–5.3)
POTASSIUM SERPL-SCNC: 3.9 MMOL/L — SIGNIFICANT CHANGE UP (ref 3.5–5.3)
PROT SERPL-MCNC: 5.1 G/DL — LOW (ref 6–8.3)
RBC # BLD: 2.16 M/UL — LOW (ref 4.2–5.8)
RBC # FLD: 20.6 % — HIGH (ref 10.3–14.5)
SODIUM SERPL-SCNC: 136 MMOL/L — SIGNIFICANT CHANGE UP (ref 135–145)
WBC # BLD: 2.33 K/UL — LOW (ref 3.8–10.5)
WBC # FLD AUTO: 2.33 K/UL — LOW (ref 3.8–10.5)

## 2023-03-06 PROCEDURE — 86077 PHYS BLOOD BANK SERV XMATCH: CPT

## 2023-03-06 RX ORDER — ACYCLOVIR SODIUM 500 MG
400 VIAL (EA) INTRAVENOUS DAILY
Refills: 0 | Status: DISCONTINUED | OUTPATIENT
Start: 2023-03-06 | End: 2023-03-09

## 2023-03-06 RX ORDER — SODIUM BICARBONATE 1 MEQ/ML
0.1 SYRINGE (ML) INTRAVENOUS
Qty: 75 | Refills: 0 | Status: DISCONTINUED | OUTPATIENT
Start: 2023-03-06 | End: 2023-03-08

## 2023-03-06 RX ORDER — PENICILLIN V POTASSIUM 250 MG
250 TABLET ORAL EVERY 12 HOURS
Refills: 0 | Status: DISCONTINUED | OUTPATIENT
Start: 2023-03-06 | End: 2023-03-09

## 2023-03-06 RX ADMIN — Medication 250 MILLIGRAM(S): at 17:02

## 2023-03-06 RX ADMIN — Medication 4 MILLIGRAM(S): at 05:04

## 2023-03-06 RX ADMIN — MONTELUKAST 10 MILLIGRAM(S): 4 TABLET, CHEWABLE ORAL at 11:39

## 2023-03-06 RX ADMIN — CHLORHEXIDINE GLUCONATE 1 APPLICATION(S): 213 SOLUTION TOPICAL at 11:38

## 2023-03-06 RX ADMIN — Medication 100 MEQ/KG/HR: at 17:02

## 2023-03-06 RX ADMIN — Medication 2 MILLIGRAM(S): at 05:05

## 2023-03-06 RX ADMIN — Medication 81 MILLIGRAM(S): at 11:39

## 2023-03-06 RX ADMIN — ATORVASTATIN CALCIUM 40 MILLIGRAM(S): 80 TABLET, FILM COATED ORAL at 22:09

## 2023-03-06 RX ADMIN — Medication 100 MILLIGRAM(S): at 05:02

## 2023-03-06 RX ADMIN — Medication 2 MILLIGRAM(S): at 17:02

## 2023-03-06 RX ADMIN — Medication 1 TABLET(S): at 11:39

## 2023-03-06 RX ADMIN — Medication 100 MILLIGRAM(S): at 14:58

## 2023-03-06 NOTE — PROGRESS NOTE ADULT - NS ATTEND AMEND GEN_ALL_CORE FT
66 y/o male, recently switched to daratumumab + CyBorD, admitted with RK, diarrhea. Diarrhea has since resolved. Continue to monitor cytopenia. Continue to monitor kidney functions. Follow-up nephrology recommendations.    Continue to hold all myeloma therapy at this time. 68 y/o male, recently switched to daratumumab + CyBorD, admitted with RK, diarrhea. Diarrhea has since resolved. Continue to monitor cytopenia. Continue to monitor kidney functions. Follow-up nephrology recommendations.    Continue to hold all myeloma therapy at this time.

## 2023-03-06 NOTE — PROGRESS NOTE ADULT - SUBJECTIVE AND OBJECTIVE BOX
[facility] NEPHROLOGY- PROGRESS NOTE    67y Male with history of MM on chemotherapy presents with hypotension. Nephrology consulted for elevated Scr.    REVIEW OF SYSTEMS:  Gen: no fevers  Cards: no chest pain  Resp: no dyspnea  GI: no nausea or vomiting or diarrhea  Vascular: no LE edema    No Known Allergies      Hospital Medications: Medications reviewed    VITALS:  T(F): 97.5 (23 @ 08:57), Max: 97.8 (23 @ 00:10)  HR: 76 (23 @ 08:57)  BP: 110/57 (23 @ 08:57)  RR: 18 (23 @ 08:57)  SpO2: 98% (23 @ 08:57)  Wt(kg): --  Height (cm): 185.4 ( @ 18:18)  Weight (kg): 77.111 ( @ 18:18)  BMI (kg/m2): 22.4 ( 18:18)  BSA (m2): 2.01 ( @ 18:18)    0305 @ 07:01  -   @ 07:00  --------------------------------------------------------  IN: 1300 mL / OUT: 0 mL / NET: 1300 mL     @ 07:01  -   @ 15:45  --------------------------------------------------------  IN: 100 mL / OUT: 0 mL / NET: 100 mL        PHYSICAL EXAM:    Gen: NAD, calm  Cards: RRR, +S1/S2, no M/G/R  Resp: CTA B/L  GI: soft, NT/ND, NABS  Vascular: no LE edema B/L    LABS:      136  |  105  |  82<H>  ----------------------------<  140<H>  3.9   |  15<L>  |  4.86<H>    Ca    8.6      06 Mar 2023 06:55    TPro  5.1<L>  /  Alb  2.8<L>  /  TBili  0.3  /  DBili      /  AST  38  /  ALT  85<H>  /  AlkPhos  88  -    Creatinine Trend: 4.86 <--, 5.04 <--, 4.64 <--, 4.40 <--, 4.01 <--                        7.3    2.33  )-----------( 52       ( 06 Mar 2023 06:55 )             21.3     Urine Studies:  Urinalysis Basic - ( 04 Mar 2023 07:30 )    Color: Yellow / Appearance: Slightly Turbid / S.014 / pH:   Gluc:  / Ketone: Negative  / Bili: Negative / Urobili: Negative   Blood:  / Protein: 100 mg/dl / Nitrite: Negative   Leuk Esterase: Negative / RBC: 3 /hpf / WBC 6 /HPF   Sq Epi:  / Non Sq Epi: 4 /hpf / Bacteria: Negative      Creatinine, Random Urine: 83 mg/dL ( @ 08:48)  Sodium, Random Urine: 67 mmol/L ( @ 08:48)      RADIOLOGY & ADDITIONAL STUDIES: [facility] NEPHROLOGY- PROGRESS NOTE    67y Male with history of MM on chemotherapy presents with hypotension. Nephrology consulted for elevated Scr.    REVIEW OF SYSTEMS:  Gen: no fevers  Cards: no chest pain  Resp: no dyspnea  GI: no nausea or vomiting or diarrhea  Vascular: no LE edema    No Known Allergies      Hospital Medications: Medications reviewed    VITALS:  T(F): 97.5 (23 @ 08:57), Max: 97.8 (23 @ 00:10)  HR: 76 (23 @ 08:57)  BP: 110/57 (23 @ 08:57)  RR: 18 (23 @ 08:57)  SpO2: 98% (23 @ 08:57)  Wt(kg): --  Height (cm): 185.4 ( @ 18:18)  Weight (kg): 77.111 ( @ 18:18)  BMI (kg/m2): 22.4 ( 18:18)  BSA (m2): 2.01 ( @ 18:18)    0305 @ 07:01  -   @ 07:00  --------------------------------------------------------  IN: 1300 mL / OUT: 0 mL / NET: 1300 mL     @ 07:01  -   @ 15:45  --------------------------------------------------------  IN: 100 mL / OUT: 0 mL / NET: 100 mL        PHYSICAL EXAM:    Gen: NAD, calm  Cards: RRR, +S1/S2, no M/G/R  Resp: CTA B/L  GI: soft, NT/ND, NABS  Vascular: no LE edema B/L    LABS:      136  |  105  |  82<H>  ----------------------------<  140<H>  3.9   |  15<L>  |  4.86<H>    Ca    8.6      06 Mar 2023 06:55    TPro  5.1<L>  /  Alb  2.8<L>  /  TBili  0.3  /  DBili      /  AST  38  /  ALT  85<H>  /  AlkPhos  88  -    Creatinine Trend: 4.86 <--, 5.04 <--, 4.64 <--, 4.40 <--, 4.01 <--                        7.3    2.33  )-----------( 52       ( 06 Mar 2023 06:55 )             21.3     Urine Studies:  Urinalysis Basic - ( 04 Mar 2023 07:30 )    Color: Yellow / Appearance: Slightly Turbid / S.014 / pH:   Gluc:  / Ketone: Negative  / Bili: Negative / Urobili: Negative   Blood:  / Protein: 100 mg/dl / Nitrite: Negative   Leuk Esterase: Negative / RBC: 3 /hpf / WBC 6 /HPF   Sq Epi:  / Non Sq Epi: 4 /hpf / Bacteria: Negative      Creatinine, Random Urine: 83 mg/dL ( @ 08:48)  Sodium, Random Urine: 67 mmol/L ( @ 08:48)      RADIOLOGY & ADDITIONAL STUDIES: [facility] NEPHROLOGY- PROGRESS NOTE    67y Male with history of MM on chemotherapy presents with hypotension. Nephrology consulted for elevated Scr.    REVIEW OF SYSTEMS:  Gen: no fevers  Cards: no chest pain  Resp: no dyspnea  GI: no nausea or vomiting or diarrhea  Vascular: no LE edema    No Known Allergies      Hospital Medications: Medications reviewed    VITALS:  T(F): 97.5 (23 @ 08:57), Max: 97.8 (23 @ 00:10)  HR: 76 (23 @ 08:57)  BP: 110/57 (23 @ 08:57)  RR: 18 (23 @ 08:57)  SpO2: 98% (23 @ 08:57)  Wt(kg): --  Height (cm): 185.4 ( @ 18:18)  Weight (kg): 77.111 ( @ 18:18)  BMI (kg/m2): 22.4 ( 18:18)  BSA (m2): 2.01 ( @ 18:18)    0305 @ 07:01  -   @ 07:00  --------------------------------------------------------  IN: 1300 mL / OUT: 0 mL / NET: 1300 mL     @ 07:01  -   @ 15:45  --------------------------------------------------------  IN: 100 mL / OUT: 0 mL / NET: 100 mL        PHYSICAL EXAM:    Gen: NAD, calm  Cards: RRR, +S1/S2, no M/G/R  Resp: CTA B/L  GI: soft, NT/ND, NABS  Vascular: no LE edema B/L    LABS:      136  |  105  |  82<H>  ----------------------------<  140<H>  3.9   |  15<L>  |  4.86<H>    Ca    8.6      06 Mar 2023 06:55    TPro  5.1<L>  /  Alb  2.8<L>  /  TBili  0.3  /  DBili      /  AST  38  /  ALT  85<H>  /  AlkPhos  88  -    Creatinine Trend: 4.86 <--, 5.04 <--, 4.64 <--, 4.40 <--, 4.01 <--                        7.3    2.33  )-----------( 52       ( 06 Mar 2023 06:55 )             21.3     Urine Studies:  Urinalysis Basic - ( 04 Mar 2023 07:30 )    Color: Yellow / Appearance: Slightly Turbid / S.014 / pH:   Gluc:  / Ketone: Negative  / Bili: Negative / Urobili: Negative   Blood:  / Protein: 100 mg/dl / Nitrite: Negative   Leuk Esterase: Negative / RBC: 3 /hpf / WBC 6 /HPF   Sq Epi:  / Non Sq Epi: 4 /hpf / Bacteria: Negative      Creatinine, Random Urine: 83 mg/dL ( @ 08:48)  Sodium, Random Urine: 67 mmol/L ( @ 08:48)      RADIOLOGY & ADDITIONAL STUDIES:

## 2023-03-06 NOTE — PROGRESS NOTE ADULT - SUBJECTIVE AND OBJECTIVE BOX
Patient is a 67y old  Male who presents with a chief complaint of 'I took a turn for the worst' (06 Mar 2023 11:51)    Patient seen and examined at bedside. Feeling well.     MEDICATIONS  (STANDING):  aspirin enteric coated 81 milliGRAM(s) Oral daily  atorvastatin 40 milliGRAM(s) Oral at bedtime  cefTRIAXone   IVPB 1000 milliGRAM(s) IV Intermittent every 24 hours  chlorhexidine 2% Cloths 1 Application(s) Topical daily  dexAMETHasone     Tablet 4 milliGRAM(s) Oral daily  loperamide 2 milliGRAM(s) Oral two times a day  metroNIDAZOLE  IVPB 500 milliGRAM(s) IV Intermittent once  metroNIDAZOLE  IVPB 500 milliGRAM(s) IV Intermittent every 8 hours  metroNIDAZOLE  IVPB      montelukast 10 milliGRAM(s) Oral daily  multivitamin 1 Tablet(s) Oral daily  sodium bicarbonate  Infusion 0.097 mEq/kG/Hr (100 mL/Hr) IV Continuous <Continuous>    MEDICATIONS  (PRN):  ondansetron   Disintegrating Tablet 8 milliGRAM(s) Oral three times a day PRN Nausea and/or Vomiting      Vital Signs Last 24 Hrs  T(C): 36.4 (06 Mar 2023 08:57), Max: 36.6 (06 Mar 2023 00:10)  T(F): 97.5 (06 Mar 2023 08:57), Max: 97.8 (06 Mar 2023 00:10)  HR: 76 (06 Mar 2023 08:57) (74 - 78)  BP: 110/57 (06 Mar 2023 08:57) (96/51 - 110/57)  BP(mean): --  RR: 18 (06 Mar 2023 08:57) (18 - 18)  SpO2: 98% (06 Mar 2023 08:57) (96% - 98%)    Parameters below as of 06 Mar 2023 08:57  Patient On (Oxygen Delivery Method): room air    PE  NAD  Awake, alert  Anicteric, MMM  No c/c/e  No rash grossly  FROM                          7.3    2.33  )-----------( 52       ( 06 Mar 2023 06:55 )             21.3       03-06    136  |  105  |  82<H>  ----------------------------<  140<H>  3.9   |  15<L>  |  4.86<H>    Ca    8.6      06 Mar 2023 06:55    TPro  5.1<L>  /  Alb  2.8<L>  /  TBili  0.3  /  DBili  x   /  AST  38  /  ALT  85<H>  /  AlkPhos  88  03-06

## 2023-03-06 NOTE — PROGRESS NOTE ADULT - ASSESSMENT
67 year old male history of aortic aneurysm and bioprosthetic AV valve replacement 2019, HL, splenectomy, partial gastrectomy, partial pancreatectomy, oligosecretory multiple myeloma s/p auto SCT in 2018 on revlimid started david-cybor D 2/10 under [place] at [facility] [place] by Dr. Jean Claude Hubbard who presented for hypotension.    1. Diarrhea  - likely 2/2 chemo rather than infectious (stools negative)  - low dose loperamide--diarrhea resolving  - tolerating low fiber diet  - no indication for abx     2. Multiple myeloma  - on revlimid, started then on daratumumab + cyclophosphamide + bortezomib + dexamethasone.  Last dose of daratumumab was on March 3rd. Last dose of cyclophosphamide and bortezomib was on February 24th.  - per heme/onc     3. RK   -likely 2/2 dehydration from diarrhea  - on IVF    4. Pancytopenia   - since newest chemo regimen started   - no overt GI bleeding  - trend CBCs, transfuse prn      Attending supervision statement: I have personally seen and examined the patient. I fully participated in the care of this patient. I have made amendments to the documentation where necessary, and agree with the history, physical exam, and plan as outlined by the ACP.     67 year old male history of aortic aneurysm and bioprosthetic AV valve replacement 2019, HL, splenectomy, partial gastrectomy, partial pancreatectomy, oligosecretory multiple myeloma s/p auto SCT in 2018 on revlimid started david-cybor D 2/10 under care at [facility] [place] by Dr. Jean Claude Hubbard who presented for hypotension.    1. Diarrhea  - likely 2/2 chemo rather than infectious (stools negative)  - low dose loperamide--diarrhea resolving  - tolerating low fiber diet  - no indication for abx     2. Multiple myeloma  - on revlimid, started then on daratumumab + cyclophosphamide + bortezomib + dexamethasone.  Last dose of daratumumab was on March 3rd. Last dose of cyclophosphamide and bortezomib was on February 24th.  - per heme/onc     3. RK   -likely 2/2 dehydration from diarrhea  - on IVF    4. Pancytopenia   - since newest chemo regimen started   - no overt GI bleeding  - trend CBCs, transfuse prn      Attending supervision statement: I have personally seen and examined the patient. I fully participated in the care of this patient. I have made amendments to the documentation where necessary, and agree with the history, physical exam, and plan as outlined by the ACP.     67 year old male history of aortic aneurysm and bioprosthetic AV valve replacement 2019, HL, splenectomy, partial gastrectomy, partial pancreatectomy, oligosecretory multiple myeloma s/p auto SCT in 2018 on revlimid started david-cybor D 2/10 under care at [facility] [place] by Dr. Jean Claude Hubbard who presented for hypotension.    1. Diarrhea  - likely 2/2 chemo rather than infectious (stools negative)  - low dose loperamide--diarrhea resolving  - tolerating low fiber diet  - no indication for abx     2. Multiple myeloma  - on revlimid, started then on daratumumab + cyclophosphamide + bortezomib + dexamethasone.  Last dose of daratumumab was on March 3rd. Last dose of cyclophosphamide and bortezomib was on February 24th.  - per heme/onc     3. RK   -likely 2/2 dehydration from diarrhea  - on IVF    4. Pancytopenia   - since newest chemo regimen started   - no overt GI bleeding  - trend CBCs, transfuse prn      Attending supervision statement: I have personally seen and examined the patient. I fully participated in the care of this patient. I have made amendments to the documentation where necessary, and agree with the history, physical exam, and plan as outlined by the ACP.

## 2023-03-06 NOTE — PROGRESS NOTE ADULT - SUBJECTIVE AND OBJECTIVE BOX
Name of Patient : PETER DE LA PAZ  MRN: [id number]  Date of visit: 03-06-23 @ 15:48      Subjective: Patient seen and examined. No new events except as noted.   Patient seen earlier this AM. Patient reports diarrhea has resolved.   Discussed with GI - Will KAYE OCONNELL.   Home PCN resumed.    REVIEW OF SYSTEMS:    CONSTITUTIONAL: No weakness, fevers or chills  EYES/ENT: No visual changes;  No vertigo or throat pain   NECK: No pain or stiffness  RESPIRATORY: No cough, wheezing, hemoptysis; No shortness of breath  CARDIOVASCULAR: No chest pain or palpitations  GASTROINTESTINAL: + Diarrhea resolved, Soft BM; No abdominal or epigastric pain. No nausea, vomiting, or hematemesis; No diarrhea or constipation. No melena or hematochezia.  GENITOURINARY: No dysuria, frequency or hematuria  NEUROLOGICAL: No numbness or weakness  SKIN: No itching, burning, rashes, or lesions   All other review of systems is negative unless indicated above.    MEDICATIONS:  MEDICATIONS  (STANDING):  acyclovir   Oral Tab/Cap 400 milliGRAM(s) Oral daily  aspirin enteric coated 81 milliGRAM(s) Oral daily  atorvastatin 40 milliGRAM(s) Oral at bedtime  chlorhexidine 2% Cloths 1 Application(s) Topical daily  dexAMETHasone     Tablet 4 milliGRAM(s) Oral daily  loperamide 2 milliGRAM(s) Oral two times a day  montelukast 10 milliGRAM(s) Oral daily  multivitamin 1 Tablet(s) Oral daily  penicillin   milliGRAM(s) Oral every 12 hours      PHYSICAL EXAM:  T(C): 36.4 (03-06-23 @ 08:57), Max: 36.6 (03-06-23 @ 00:10)  HR: 76 (03-06-23 @ 08:57) (74 - 78)  BP: 110/57 (03-06-23 @ 08:57) (96/51 - 110/57)  RR: 18 (03-06-23 @ 08:57) (18 - 18)  SpO2: 98% (03-06-23 @ 08:57) (96% - 98%)  Wt(kg): --  I&O's Summary    05 Mar 2023 07:01  -  06 Mar 2023 07:00  --------------------------------------------------------  IN: 1300 mL / OUT: 0 mL / NET: 1300 mL    06 Mar 2023 07:01  -  06 Mar 2023 15:48  --------------------------------------------------------  IN: 100 mL / OUT: 0 mL / NET: 100 mL          Appearance: Normal	  HEENT:  Eyes are open; Glasses   Lymphatic: No lymphadenopathy   Cardiovascular: Normal    Respiratory: normal effort , clear  Gastrointestinal:  Soft, Non-tender  Skin: No rashes,  warm to touch  Psychiatry:  Mood & affect appropriate  Musculoskeletal: No edema          03-05-23 @ 07:01  -  03-06-23 @ 07:00  --------------------------------------------------------  IN: 1300 mL / OUT: 0 mL / NET: 1300 mL    03-06-23 @ 07:01  -  03-06-23 @ 15:48  --------------------------------------------------------  IN: 100 mL / OUT: 0 mL / NET: 100 mL                                  7.3    2.33  )-----------( 52       ( 06 Mar 2023 06:55 )             21.3               03-06    136  |  105  |  82<H>  ----------------------------<  140<H>  3.9   |  15<L>  |  4.86<H>    Ca    8.6      06 Mar 2023 06:55    TPro  5.1<L>  /  Alb  2.8<L>  /  TBili  0.3  /  DBili  x   /  AST  38  /  ALT  85<H>  /  AlkPhos  88  03-06                         Culture - Blood (03.03.23 @ 11:30)   Specimen Source: .Blood Blood-Peripheral   Culture Results:   No growth to date.     Culture - Blood (03.03.23 @ 11:30)   Specimen Source: .Blood Blood-Peripheral   Culture Results:   No growth to date.  Name of Patient : PETER DE LA PAZ  MRN: [id number]  Date of visit: 03-06-23 @ 15:48      Subjective: Patient seen and examined. No new events except as noted.   Patient seen earlier this AM. Patient reports diarrhea has resolved.   Discussed with GI - Will KAYE OCONNELL.   Home PCN resumed.    REVIEW OF SYSTEMS:    CONSTITUTIONAL: No weakness, fevers or chills  EYES/ENT: No visual changes;  No vertigo or throat pain   NECK: No pain or stiffness  RESPIRATORY: No cough, wheezing, hemoptysis; No shortness of breath  CARDIOVASCULAR: No chest pain or palpitations  GASTROINTESTINAL: + Diarrhea resolved, Soft BM; No abdominal or epigastric pain. No nausea, vomiting, or hematemesis; No diarrhea or constipation. No melena or hematochezia.  GENITOURINARY: No dysuria, frequency or hematuria  NEUROLOGICAL: No numbness or weakness  SKIN: No itching, burning, rashes, or lesions   All other review of systems is negative unless indicated above.    MEDICATIONS:  MEDICATIONS  (STANDING):  acyclovir   Oral Tab/Cap 400 milliGRAM(s) Oral daily  aspirin enteric coated 81 milliGRAM(s) Oral daily  atorvastatin 40 milliGRAM(s) Oral at bedtime  chlorhexidine 2% Cloths 1 Application(s) Topical daily  dexAMETHasone     Tablet 4 milliGRAM(s) Oral daily  loperamide 2 milliGRAM(s) Oral two times a day  montelukast 10 milliGRAM(s) Oral daily  multivitamin 1 Tablet(s) Oral daily  penicillin   milliGRAM(s) Oral every 12 hours      PHYSICAL EXAM:  T(C): 36.4 (03-06-23 @ 08:57), Max: 36.6 (03-06-23 @ 00:10)  HR: 76 (03-06-23 @ 08:57) (74 - 78)  BP: 110/57 (03-06-23 @ 08:57) (96/51 - 110/57)  RR: 18 (03-06-23 @ 08:57) (18 - 18)  SpO2: 98% (03-06-23 @ 08:57) (96% - 98%)  Wt(kg): --  I&O's Summary    05 Mar 2023 07:01  -  06 Mar 2023 07:00  --------------------------------------------------------  IN: 1300 mL / OUT: 0 mL / NET: 1300 mL    06 Mar 2023 07:01  -  06 Mar 2023 15:48  --------------------------------------------------------  IN: 100 mL / OUT: 0 mL / NET: 100 mL          Appearance: Normal	  HEENT:  Eyes are open; Glasses   Lymphatic: No lymphadenopathy   Cardiovascular: Normal    Respiratory: normal effort , clear  Gastrointestinal:  Soft, Non-tender  Skin: No rashes,  warm to touch  Psychiatry:  Mood & affect appropriate  Musculoskeletal: No edema          03-05-23 @ 07:01  -  03-06-23 @ 07:00  --------------------------------------------------------  IN: 1300 mL / OUT: 0 mL / NET: 1300 mL    03-06-23 @ 07:01  -  03-06-23 @ 15:48  --------------------------------------------------------  IN: 100 mL / OUT: 0 mL / NET: 100 mL                                  7.3    2.33  )-----------( 52       ( 06 Mar 2023 06:55 )             21.3               03-06    136  |  105  |  82<H>  ----------------------------<  140<H>  3.9   |  15<L>  |  4.86<H>    Ca    8.6      06 Mar 2023 06:55    TPro  5.1<L>  /  Alb  2.8<L>  /  TBili  0.3  /  DBili  x   /  AST  38  /  ALT  85<H>  /  AlkPhos  88  03-06                         Culture - Blood (03.03.23 @ 11:30)   Specimen Source: .Blood Blood-Peripheral   Culture Results:   No growth to date.     Culture - Blood (03.03.23 @ 11:30)   Specimen Source: .Blood Blood-Peripheral   Culture Results:   No growth to date.  Name of Patient : PETER DE LA PAZ  MRN: [id number]  Date of visit: 03-06-23 @ 15:48      Subjective: Patient seen and examined. No new events except as noted.   Patient seen earlier this AM. Patient reports diarrhea has resolved.   Discussed with GI - Will KAYE OCONNELL.   Home PCN resumed.    REVIEW OF SYSTEMS:    CONSTITUTIONAL: No weakness, fevers or chills  EYES/ENT: No visual changes;  No vertigo or throat pain   NECK: No pain or stiffness  RESPIRATORY: No cough, wheezing, hemoptysis; No shortness of breath  CARDIOVASCULAR: No chest pain or palpitations  GASTROINTESTINAL: + Diarrhea resolved, Soft BM; No abdominal or epigastric pain. No nausea, vomiting, or hematemesis; No diarrhea or constipation. No melena or hematochezia.  GENITOURINARY: No dysuria, frequency or hematuria  NEUROLOGICAL: No numbness or weakness  SKIN: No itching, burning, rashes, or lesions   All other review of systems is negative unless indicated above.    MEDICATIONS:  MEDICATIONS  (STANDING):  acyclovir   Oral Tab/Cap 400 milliGRAM(s) Oral daily  aspirin enteric coated 81 milliGRAM(s) Oral daily  atorvastatin 40 milliGRAM(s) Oral at bedtime  chlorhexidine 2% Cloths 1 Application(s) Topical daily  dexAMETHasone     Tablet 4 milliGRAM(s) Oral daily  loperamide 2 milliGRAM(s) Oral two times a day  montelukast 10 milliGRAM(s) Oral daily  multivitamin 1 Tablet(s) Oral daily  penicillin   milliGRAM(s) Oral every 12 hours      PHYSICAL EXAM:  T(C): 36.4 (03-06-23 @ 08:57), Max: 36.6 (03-06-23 @ 00:10)  HR: 76 (03-06-23 @ 08:57) (74 - 78)  BP: 110/57 (03-06-23 @ 08:57) (96/51 - 110/57)  RR: 18 (03-06-23 @ 08:57) (18 - 18)  SpO2: 98% (03-06-23 @ 08:57) (96% - 98%)  Wt(kg): --  I&O's Summary    05 Mar 2023 07:01  -  06 Mar 2023 07:00  --------------------------------------------------------  IN: 1300 mL / OUT: 0 mL / NET: 1300 mL    06 Mar 2023 07:01  -  06 Mar 2023 15:48  --------------------------------------------------------  IN: 100 mL / OUT: 0 mL / NET: 100 mL          Appearance: Normal	  HEENT:  Eyes are open; Glasses   Lymphatic: No lymphadenopathy   Cardiovascular: Normal    Respiratory: normal effort , clear  Gastrointestinal:  Soft, Non-tender  Skin: No rashes,  warm to touch  Psychiatry:  Mood & affect appropriate  Musculoskeletal: No edema          03-05-23 @ 07:01  -  03-06-23 @ 07:00  --------------------------------------------------------  IN: 1300 mL / OUT: 0 mL / NET: 1300 mL    03-06-23 @ 07:01  -  03-06-23 @ 15:48  --------------------------------------------------------  IN: 100 mL / OUT: 0 mL / NET: 100 mL                                  7.3    2.33  )-----------( 52       ( 06 Mar 2023 06:55 )             21.3               03-06    136  |  105  |  82<H>  ----------------------------<  140<H>  3.9   |  15<L>  |  4.86<H>    Ca    8.6      06 Mar 2023 06:55    TPro  5.1<L>  /  Alb  2.8<L>  /  TBili  0.3  /  DBili  x   /  AST  38  /  ALT  85<H>  /  AlkPhos  88  03-06                         Culture - Blood (03.03.23 @ 11:30)   Specimen Source: .Blood Blood-Peripheral   Culture Results:   No growth to date.     Culture - Blood (03.03.23 @ 11:30)   Specimen Source: .Blood Blood-Peripheral   Culture Results:   No growth to date.

## 2023-03-06 NOTE — PROGRESS NOTE ADULT - ASSESSMENT
PETER DE LA PAZ is a 67y Male who presents with a chief complaint of hypotension.    Multiple Myeloma  Pancytopenia  ·	Patient follows with Dr. Jean Claude Hubbard, [facility].  ·	Patient had been on lenalidomide maintenance up until February 2023, when he had PET/CT that showed progression of disease with worsening lymphadenopathy, bony lesions, and liver lesion.   ·	He was started then on daratumumab + cyclophosphamide + bortezomib + dexamethasone.  ·	Last dose of daratumumab was on March 3rd.  ·	Last dose of cyclophosphamide and bortezomib was on February 24th.  ·	Patient has had worsening pancytopenia since starting the latest chemotherapy regimen.  ·	Hold systemic treatment while inpatient or in rehabilitation.  ·	Monitor CBC and transfuse to maintain HGB > 7 and PLT > 10.  ·	Continue prophylactic acyclovir 400 mg BID as patient is on bortezomib.     Acute Kidney Injury  ·	Baseline creatinine is at 1.2-1.3, creatinine was 1.2 on February 24th.  ·	Possibly due to diarrhea, which is improved  ·	Nephrology consulted, continue IVF.   ·	Monitor kidney functions closely. Cr 4.86 today.    Diarrhea  ·	Possibly due to chemotherapy. GI PCR and C diff negative  ·	GI following, on low dose loperamide  ·	Resolved     Will continue to follow.     Quirino Holloway PA-C  Hematology/Oncology  [facility]  [phone] (office)  PETER DE LA PAZ is a 67y Male who presents with a chief complaint of hypotension.    Multiple Myeloma  Pancytopenia  ·	Patient follows with Dr. Jean Claude Hubbard, [facility].  ·	Patient had been on lenalidomide maintenance up until February 2023, when he had PET/CT that showed progression of disease with worsening lymphadenopathy, bony lesions, and liver lesion.   ·	He was started then on daratumumab + cyclophosphamide + bortezomib + dexamethasone.  ·	Last dose of daratumumab was on March 3rd.  ·	Last dose of cyclophosphamide and bortezomib was on February 24th.  ·	Patient has had worsening pancytopenia since starting the latest chemotherapy regimen.  ·	Hold systemic treatment while inpatient or in rehabilitation.  ·	Monitor CBC and transfuse to maintain HGB > 7 and PLT > 10.  ·	Continue prophylactic acyclovir 400 mg BID as patient is on bortezomib.     Acute Kidney Injury  ·	Baseline creatinine is at 1.2-1.3, creatinine was 1.2 on February 24th.  ·	Possibly due to diarrhea, which is improved  ·	Nephrology consulted, continue IVF.   ·	Monitor kidney functions closely. Cr 4.86 today.    Diarrhea  ·	Possibly due to chemotherapy. GI PCR and C diff negative  ·	GI following, on low dose loperamide  ·	Resolved     Will continue to follow.     Quirino Holloway PA-C  Hematology/Oncology  [facility]  [phone] (office)  PETER DE LA PAZ is a 67y Male who presents with a chief complaint of hypotension.    Multiple Myeloma  Pancytopenia  ·	Patient follows with Dr. Jean Claude Hubbard, [facility].  ·	Patient had been on lenalidomide maintenance up until February 2023, when he had PET/CT that showed progression of disease with worsening lymphadenopathy, bony lesions, and liver lesion.   ·	He was started then on daratumumab + cyclophosphamide + bortezomib + dexamethasone.  ·	Last dose of daratumumab was on March 3rd.  ·	Last dose of cyclophosphamide and bortezomib was on February 24th.  ·	Patient has had worsening pancytopenia since starting the latest chemotherapy regimen.  ·	Hold systemic treatment while inpatient or in rehabilitation.  ·	Monitor CBC and transfuse to maintain HGB > 7 and PLT > 10.  ·	Continue prophylactic acyclovir 400 mg BID as patient is on bortezomib.     Acute Kidney Injury  ·	Baseline creatinine is at 1.2-1.3, creatinine was 1.2 on February 24th.  ·	Possibly due to diarrhea, which is improved  ·	Nephrology consulted, continue IVF.   ·	Monitor kidney functions closely. Cr 4.86 today.    Diarrhea  ·	Possibly due to chemotherapy. GI PCR and C diff negative  ·	GI following, on low dose loperamide  ·	Resolved     Will continue to follow.     Quirino Holloway PA-C  Hematology/Oncology  [facility]  [phone] (office)

## 2023-03-06 NOTE — PROGRESS NOTE ADULT - ASSESSMENT
67 year old male PMH aortic aneurysm and bioprosthetic AV valve replacement 2019, HL, splenectomy, partial gastrectomy, partial pancreatectomy, oligosecretory multiple myeloma s/p auto SCT in 2018 on revlimid started david-cybor D 2/10 [place] [facility] [place]. Jean Claude Hubbard was due for chemo today but was found to be more hypotensive than baseline (usually sbp ~90) and was sent to the ED, admitted with RK.     RK (acute kidney injury).  - Suspect due to diarrhea / chemo related  - S/P IVF, will continue gentle IVF 60 CC x 24 hours    - Trend BMP  - Avoid nephrotoxic medications  - Renally dose meds  - Check bladder scan for PVR  - Check UA/urine lytes for Fena  - On Sodium bicarb infusion as per renal   - Renal eval appreciated; F/u recs     Diarrhea  - Suspect chemo related  - However has h/o splenectomy and is on daily penicillin (assuming for prophylaxis?)  - CT abd/pelvis with liquid stool  - C diff - Neg   - Ceftriaxone/flagyl -- DC'd. Discussed with GI. Infectious work up negative   - PCN resumed   - GI PCR neg   - GI eval appreciated; F/u recs     Pancytopenia.   - Suspect chemo related  - Received neupogen outpatient prior to arrival   - No active bleeding reported  - Trend cbc daily  - Check iron studies/folate/b12. -- Noted  - Maintain Active T+S  - Heme/Onc eval appreciated; F/u recs    Elevated LFTs  - Cont to monitor and trend  - Avoid hepatotoxic agents  - GI eval appreciated; F/u recs     Hypotension.  - Not meeting sirs criteria as HR<90, afebrile.  However wbc is low, and has bandemia.  Lactate 1.7.  - ABX DC'd   - BCx2 w/ NGTD; F/u final   - RVP negative and cxr clear.  No clear infectious source on CT abd.  - Continue ivf, hold beta blocker.    Multiple myeloma.   - heme/Onc eval appreciated; F/u recs   - Hold entecavir as it will need to be renally dosed  - F/u with heme on revlimid dosing.    Hyperlipidemia.  -continue statin.     Elevated troponin  - suspect due to hypotension and RK, not true cardiac event  - EKG no acute findings.     Preventive measure.    no pharm ppx due to thrombocytopenia  ICDs  incentive spirometry  fall risk  PT consult.   67 year old male PMH aortic aneurysm and bioprosthetic AV valve replacement 2019, HL, splenectomy, partial gastrectomy, partial pancreatectomy, oligosecretory multiple myeloma s/p auto SCT in 2018 on revlimid started david-cybor D 2/10 [place] [facility] [place] Jean Claude Hubbard was due for chemo today but was found to be more hypotensive than baseline (usually sbp ~90) and was sent to the ED, admitted with RK.     RK (acute kidney injury).  - Suspect due to diarrhea / chemo related  - S/P IVF, will continue gentle IVF 60 CC x 24 hours    - Trend BMP  - Avoid nephrotoxic medications  - Renally dose meds  - Check bladder scan for PVR  - Check UA/urine lytes for Fena  - On Sodium bicarb infusion as per renal   - Renal eval appreciated; F/u recs     Diarrhea  - Suspect chemo related  - However has h/o splenectomy and is on daily penicillin (assuming for prophylaxis?)  - CT abd/pelvis with liquid stool  - C diff - Neg   - Ceftriaxone/flagyl -- DC'd. Discussed with GI. Infectious work up negative   - PCN resumed   - GI PCR neg   - GI eval appreciated; F/u recs     Pancytopenia.   - Suspect chemo related  - Received neupogen outpatient prior to arrival   - No active bleeding reported  - Trend cbc daily  - Check iron studies/folate/b12. -- Noted  - Maintain Active T+S  - Heme/Onc eval appreciated; F/u recs    Elevated LFTs  - Cont to monitor and trend  - Avoid hepatotoxic agents  - GI eval appreciated; F/u recs     Hypotension.  - Not meeting sirs criteria as HR<90, afebrile.  However wbc is low, and has bandemia.  Lactate 1.7.  - ABX DC'd   - BCx2 w/ NGTD; F/u final   - RVP negative and cxr clear.  No clear infectious source on CT abd.  - Continue ivf, hold beta blocker.    Multiple myeloma.   - heme/Onc eval appreciated; F/u recs   - Hold entecavir as it will need to be renally dosed  - F/u with heme on revlimid dosing.    Hyperlipidemia.  -continue statin.     Elevated troponin  - suspect due to hypotension and RK, not true cardiac event  - EKG no acute findings.     Preventive measure.    no pharm ppx due to thrombocytopenia  ICDs  incentive spirometry  fall risk  PT consult.   67 year old male PMH aortic aneurysm and bioprosthetic AV valve replacement 2019, HL, splenectomy, partial gastrectomy, partial pancreatectomy, oligosecretory multiple myeloma s/p auto SCT in 2018 on revlimid started david-cybor D 2/10 [place] [facility] [place] Jean Claude Hubbard was due for chemo today but was found to be more hypotensive than baseline (usually sbp ~90) and was sent to the ED, admitted with RK.     RK (acute kidney injury).  - Suspect due to diarrhea / chemo related  - S/P IVF, will continue gentle IVF 60 CC x 24 hours    - Trend BMP  - Avoid nephrotoxic medications  - Renally dose meds  - Check bladder scan for PVR  - Check UA/urine lytes for Fena  - On Sodium bicarb infusion as per renal   - Renal eval appreciated; F/u recs     Diarrhea  - Suspect chemo related  - However has h/o splenectomy and is on daily penicillin (assuming for prophylaxis?)  - CT abd/pelvis with liquid stool  - C diff - Neg   - Ceftriaxone/flagyl -- DC'd. Discussed with GI. Infectious work up negative   - PCN resumed   - GI PCR neg   - GI eval appreciated; F/u recs     Pancytopenia.   - Suspect chemo related  - Received neupogen outpatient prior to arrival   - No active bleeding reported  - Trend cbc daily  - Check iron studies/folate/b12. -- Noted  - Maintain Active T+S  - Heme/Onc eval appreciated; F/u recs    Elevated LFTs  - Cont to monitor and trend  - Avoid hepatotoxic agents  - GI eval appreciated; F/u recs     Hypotension.  - Not meeting sirs criteria as HR<90, afebrile.  However wbc is low, and has bandemia.  Lactate 1.7.  - ABX DC'd   - BCx2 w/ NGTD; F/u final   - RVP negative and cxr clear.  No clear infectious source on CT abd.  - Continue ivf, hold beta blocker.    Multiple myeloma.   - heme/Onc eval appreciated; F/u recs   - Hold entecavir as it will need to be renally dosed  - F/u with heme on revlimid dosing.    Hyperlipidemia.  -continue statin.     Elevated troponin  - suspect due to hypotension and RK, not true cardiac event  - EKG no acute findings.     Preventive measure.    no pharm ppx due to thrombocytopenia  ICDs  incentive spirometry  fall risk  PT consult.

## 2023-03-06 NOTE — PROGRESS NOTE ADULT - ASSESSMENT
67y Male with history of MM on chemotherapy presents with hypotension. Nephrology consulted for elevated Scr.    1. RK: likely due to ATN in setting of hypotension and diarrhea. Scr has plateaued with mild improvement this morning. UA bland with hyaline casts. FeNa elevated. CT without obstruction. Continue with IVF. Check LDH/haptoglobin r/o TMA. Check strict I/O's. Avoid nephrotoxins. Monitor electrolytes.    2. Hypotension: BP improving. IVF as above. Can start midodrine if SBP < 100. Monitor BP.    3. Metabolic acidosis: With gap and non-gap acidosis likely due to RK/diarrhea. Will continue with 1/2 NS with 75 meQ of sodium bicarbonate but please check AM VBG to ensure patient with acidemia. Monitor pH.    4. Hyponatremia: likely due to volume depletion. Hyponatremia resolved with IVF. Monitor serum Na.    5. MM: As per Heme/Onc.    [facility] NEPHROLOGY  Leoncio Leonard M.D.  Tay Brooke D.O.  Precious Chen M.D.  Nidia Stallings, MSN, ANP-C    Telephone: [phone]  Facsimile: [phone] [place] [address], [address]  [place] [address]   67y Male with history of MM on chemotherapy presents with hypotension. Nephrology consulted for elevated Scr.    1. RK: likely due to ATN in setting of hypotension and diarrhea. Scr has plateaued with mild improvement this morning. UA bland with hyaline casts. FeNa elevated. CT without obstruction. Continue with IVF. Check LDH/haptoglobin r/o TMA. Check strict I/O's. Avoid nephrotoxins. Monitor electrolytes.    2. Hypotension: BP improving. IVF as above. Can start midodrine if SBP < 100. Monitor BP.    3. Metabolic acidosis: With gap and non-gap acidosis likely due to RK/diarrhea. Will continue with 1/2 NS with 75 meQ of sodium bicarbonate but please check AM VBG to ensure patient with acidemia. Monitor pH.    4. Hyponatremia: likely due to volume depletion. Hyponatremia resolved with IVF. Monitor serum Na.    5. MM: As per Heme/Onc.    [facility] NEPHROLOGY  Leoncio Leonard M.D.  Tay Brooke D.O.  Precious Chen M.D.  Nidia Stallings, MSN, ANP-C    Telephone: [phone]  Facsimile: [phone] [place] [address], [address]  [place] [address]   67y Male with history of MM on chemotherapy presents with hypotension. Nephrology consulted for elevated Scr.    1. RK: likely due to ATN in setting of hypotension and diarrhea. Scr has plateaued with mild improvement this morning. UA bland with hyaline casts. FeNa elevated. CT without obstruction. Continue with IVF. Check LDH/haptoglobin r/o TMA. Check strict I/O's. Avoid nephrotoxins. Monitor electrolytes.    2. Hypotension: BP improving. IVF as above. Can start midodrine if SBP < 100. Monitor BP.    3. Metabolic acidosis: With gap and non-gap acidosis likely due to RK/diarrhea. Will continue with 1/2 NS with 75 meQ of sodium bicarbonate but please check AM VBG to ensure patient with acidemia. Monitor pH.    4. Hyponatremia: likely due to volume depletion. Hyponatremia resolved with IVF. Monitor serum Na.    5. MM: As per Heme/Onc.    [facility] NEPHROLOGY  Leoncio Leonard M.D.  Tay Brooke D.O.  Precious Chen M.D.  Nidia Stallings, MSN, ANP-C    Telephone: [phone]  Facsimile: [phone] [place] [address], [address]  [place] [address]

## 2023-03-06 NOTE — PROGRESS NOTE ADULT - SUBJECTIVE AND OBJECTIVE BOX
Chief Complaint:  Patient is a 67y old  Male who presents with a chief complaint of 'I took a turn for the worst' (05 Mar 2023 17:34)      Date of service 03-06-23 @ 11:52      Interval Events:   Patient seen and examined.   Diarrhea resolved. No abdominal cramping.   States appetite is better and ate all of breakfast today.    Hospital Medications:  aspirin enteric coated 81 milliGRAM(s) Oral daily  atorvastatin 40 milliGRAM(s) Oral at bedtime  cefTRIAXone   IVPB 1000 milliGRAM(s) IV Intermittent every 24 hours  chlorhexidine 2% Cloths 1 Application(s) Topical daily  dexAMETHasone     Tablet 4 milliGRAM(s) Oral daily  loperamide 2 milliGRAM(s) Oral two times a day  metroNIDAZOLE  IVPB 500 milliGRAM(s) IV Intermittent once  metroNIDAZOLE  IVPB 500 milliGRAM(s) IV Intermittent every 8 hours  metroNIDAZOLE  IVPB      montelukast 10 milliGRAM(s) Oral daily  multivitamin 1 Tablet(s) Oral daily  ondansetron   Disintegrating Tablet 8 milliGRAM(s) Oral three times a day PRN  sodium bicarbonate  Infusion 0.097 mEq/kG/Hr IV Continuous <Continuous>        Review of Systems:  General:  No wt loss, fevers, chills, night sweats, fatigue,   Eyes:  Good vision, no reported pain  ENT:  No sore throat, pain, runny nose, dysphagia  CV:  No pain, palpitations, hypo/hypertension  Resp:  No dyspnea, cough, tachypnea, wheezing  GI:  See HPI  :  No pain, bleeding, incontinence, nocturia  Muscle:  No pain, weakness  Neuro:  No weakness, tingling, memory problems  Psych:  No fatigue, insomnia, mood problems, depression  Endocrine:  No polyuria, polydipsia, cold/heat intolerance  Heme:  No petechiae, ecchymosis, easy bruisability  Integumentary:  No rash, edema    PHYSICAL EXAM:   Vital Signs:  Vital Signs Last 24 Hrs  T(C): 36.4 (06 Mar 2023 08:57), Max: 36.6 (06 Mar 2023 00:10)  T(F): 97.5 (06 Mar 2023 08:57), Max: 97.8 (06 Mar 2023 00:10)  HR: 76 (06 Mar 2023 08:57) (74 - 78)  BP: 110/57 (06 Mar 2023 08:57) (96/51 - 110/57)  BP(mean): --  RR: 18 (06 Mar 2023 08:57) (18 - 18)  SpO2: 98% (06 Mar 2023 08:57) (96% - 98%)    Parameters below as of 06 Mar 2023 08:57  Patient On (Oxygen Delivery Method): room air      Daily     Daily       PHYSICAL EXAM:     GENERAL:  Appears stated age, well-groomed, well-nourished, no distress  HEENT:  NC/AT,  conjunctivae anicteric, clear and pink,   NECK: supple, trachea midline  CHEST:  Full & symmetric excursion, no increased effort, breath sounds clear  HEART:  Regular rhythm, no JVD  ABDOMEN:  Soft, non-tender, non-distended, normoactive bowel sounds,  no masses , no hepatosplenomegaly  EXTREMITIES:  no cyanosis,clubbing or edema  SKIN:  No rash, erythema, or, ecchymoses, no jaundice  NEURO:  Alert, non-focal, no asterixis  PSYCH: Appropriate affect, oriented to place and time  RECTAL: Deferred      LABS Personally reviewed by me:                        7.3    2.33  )-----------( 52       ( 06 Mar 2023 06:55 )             21.3     Mean Cell Volume: 98.6 fl (03-06-23 @ 06:55)    03-06    136  |  105  |  82<H>  ----------------------------<  140<H>  3.9   |  15<L>  |  4.86<H>    Ca    8.6      06 Mar 2023 06:55    TPro  5.1<L>  /  Alb  2.8<L>  /  TBili  0.3  /  DBili  x   /  AST  38  /  ALT  85<H>  /  AlkPhos  88  03-06    LIVER FUNCTIONS - ( 06 Mar 2023 06:55 )  Alb: 2.8 g/dL / Pro: 5.1 g/dL / ALK PHOS: 88 U/L / ALT: 85 U/L / AST: 38 U/L / GGT: x                                       7.3    2.33  )-----------( 52       ( 06 Mar 2023 06:55 )             21.3                         7.2    2.68  )-----------( 48       ( 05 Mar 2023 09:48 )             21.1                         7.2    1.52  )-----------( 53       ( 04 Mar 2023 07:01 )             21.2                         7.6    1.05  )-----------( 59       ( 03 Mar 2023 12:15 )             23.0       Imaging personally reviewed by me:

## 2023-03-07 LAB
ALBUMIN SERPL ELPH-MCNC: 2.9 G/DL — LOW (ref 3.3–5)
ALP SERPL-CCNC: 88 U/L — SIGNIFICANT CHANGE UP (ref 40–120)
ALT FLD-CCNC: 62 U/L — HIGH (ref 10–45)
ANION GAP SERPL CALC-SCNC: 17 MMOL/L — SIGNIFICANT CHANGE UP (ref 5–17)
AST SERPL-CCNC: 16 U/L — SIGNIFICANT CHANGE UP (ref 10–40)
BASE EXCESS BLDV CALC-SCNC: -7.9 MMOL/L — LOW (ref -2–3)
BILIRUB SERPL-MCNC: 0.4 MG/DL — SIGNIFICANT CHANGE UP (ref 0.2–1.2)
BLD GP AB SCN SERPL QL: POSITIVE — SIGNIFICANT CHANGE UP
BUN SERPL-MCNC: 82 MG/DL — HIGH (ref 7–23)
CALCIUM SERPL-MCNC: 8.1 MG/DL — LOW (ref 8.4–10.5)
CHLORIDE SERPL-SCNC: 107 MMOL/L — SIGNIFICANT CHANGE UP (ref 96–108)
CO2 BLDV-SCNC: 20 MMOL/L — LOW (ref 22–26)
CO2 SERPL-SCNC: 17 MMOL/L — LOW (ref 22–31)
CREAT SERPL-MCNC: 4.33 MG/DL — HIGH (ref 0.5–1.3)
EGFR: 14 ML/MIN/1.73M2 — LOW
GAS PNL BLDV: SIGNIFICANT CHANGE UP
GLUCOSE SERPL-MCNC: 123 MG/DL — HIGH (ref 70–99)
HAPTOGLOB SERPL-MCNC: 249 MG/DL — HIGH (ref 34–200)
HCO3 BLDV-SCNC: 18 MMOL/L — LOW (ref 22–29)
HCT VFR BLD CALC: 22 % — LOW (ref 39–50)
HGB BLD-MCNC: 7.6 G/DL — LOW (ref 13–17)
LDH SERPL L TO P-CCNC: 226 U/L — SIGNIFICANT CHANGE UP (ref 50–242)
MCHC RBC-ENTMCNC: 33.3 PG — SIGNIFICANT CHANGE UP (ref 27–34)
MCHC RBC-ENTMCNC: 34.5 GM/DL — SIGNIFICANT CHANGE UP (ref 32–36)
MCV RBC AUTO: 96.5 FL — SIGNIFICANT CHANGE UP (ref 80–100)
NRBC # BLD: 0 /100 WBCS — SIGNIFICANT CHANGE UP (ref 0–0)
PCO2 BLDV: 39 MMHG — LOW (ref 42–55)
PH BLDV: 7.28 — LOW (ref 7.32–7.43)
PLATELET # BLD AUTO: 51 K/UL — LOW (ref 150–400)
PO2 BLDV: 32 MMHG — SIGNIFICANT CHANGE UP (ref 25–45)
POTASSIUM SERPL-MCNC: 3.6 MMOL/L — SIGNIFICANT CHANGE UP (ref 3.5–5.3)
POTASSIUM SERPL-SCNC: 3.6 MMOL/L — SIGNIFICANT CHANGE UP (ref 3.5–5.3)
PROT SERPL-MCNC: 5 G/DL — LOW (ref 6–8.3)
RBC # BLD: 2.28 M/UL — LOW (ref 4.2–5.8)
RBC # FLD: 21.1 % — HIGH (ref 10.3–14.5)
RH IG SCN BLD-IMP: POSITIVE — SIGNIFICANT CHANGE UP
SAO2 % BLDV: 51.9 % — LOW (ref 67–88)
SODIUM SERPL-SCNC: 141 MMOL/L — SIGNIFICANT CHANGE UP (ref 135–145)
WBC # BLD: 2.21 K/UL — LOW (ref 3.8–10.5)
WBC # FLD AUTO: 2.21 K/UL — LOW (ref 3.8–10.5)

## 2023-03-07 RX ORDER — POTASSIUM CHLORIDE 20 MEQ
20 PACKET (EA) ORAL
Refills: 0 | Status: COMPLETED | OUTPATIENT
Start: 2023-03-07 | End: 2023-03-07

## 2023-03-07 RX ORDER — DIPHENOXYLATE HCL/ATROPINE 2.5-.025MG
1 TABLET ORAL
Refills: 0 | Status: DISCONTINUED | OUTPATIENT
Start: 2023-03-07 | End: 2023-03-09

## 2023-03-07 RX ADMIN — Medication 1 TABLET(S): at 18:06

## 2023-03-07 RX ADMIN — Medication 100 MEQ/KG/HR: at 15:56

## 2023-03-07 RX ADMIN — ATORVASTATIN CALCIUM 40 MILLIGRAM(S): 80 TABLET, FILM COATED ORAL at 21:04

## 2023-03-07 RX ADMIN — Medication 250 MILLIGRAM(S): at 18:06

## 2023-03-07 RX ADMIN — MONTELUKAST 10 MILLIGRAM(S): 4 TABLET, CHEWABLE ORAL at 12:01

## 2023-03-07 RX ADMIN — Medication 100 MEQ/KG/HR: at 05:30

## 2023-03-07 RX ADMIN — Medication 400 MILLIGRAM(S): at 12:03

## 2023-03-07 RX ADMIN — Medication 250 MILLIGRAM(S): at 05:30

## 2023-03-07 RX ADMIN — Medication 2 MILLIGRAM(S): at 05:29

## 2023-03-07 RX ADMIN — CHLORHEXIDINE GLUCONATE 1 APPLICATION(S): 213 SOLUTION TOPICAL at 12:02

## 2023-03-07 RX ADMIN — Medication 81 MILLIGRAM(S): at 12:01

## 2023-03-07 RX ADMIN — Medication 4 MILLIGRAM(S): at 05:29

## 2023-03-07 RX ADMIN — Medication 20 MILLIEQUIVALENT(S): at 18:06

## 2023-03-07 RX ADMIN — Medication 1 TABLET(S): at 12:01

## 2023-03-07 RX ADMIN — Medication 20 MILLIEQUIVALENT(S): at 16:35

## 2023-03-07 NOTE — PROGRESS NOTE ADULT - SUBJECTIVE AND OBJECTIVE BOX
covering for dr. cherry    1) pt with diarrhea, not felt to be infectious more likely chemo induced  pt states it was better but now again worse today    2) pt on imodium would change to lomotil bid    3) pt with aocd, mm no sign of gib    4) no complaint of abdominal pain, titrate up lomotil

## 2023-03-07 NOTE — PROGRESS NOTE ADULT - ASSESSMENT
67y Male with history of MM on chemotherapy presents with hypotension. Nephrology consulted for elevated Scr.    1. RK: likely due to ATN in setting of hypotension and diarrhea. Scr now improving. UA bland with hyaline casts. FeNa elevated. CT without obstruction. TMA work up negative. Continue with IVF. Avoid nephrotoxins. Monitor electrolytes.    2. Hypotension: BP improving. IVF as above. Can start midodrine if SBP < 100. Monitor BP.    3. Metabolic acidosis: With gap and non-gap acidosis likely due to RK/diarrhea and concurrent respiratory acidosis as PCO2 higher than expected for compensation. Will continue with 1/2 NS with 75 meQ of sodium bicarbonate. Monitor pH.    4. Hyponatremia: Resolved with IVF. Monitor serum Na.    5. MM: As per Heme/Onc.    [facility] NEPHROLOGY  Leoncio Leonard M.D.  Tay Brooke D.O.  Precious Chen M.D.  Nidia Stallings, NURIS, ANP-C    Telephone: [phone]  Facsimile: [phone] [place] [address], [address]  [place] [address]   67y Male with history of MM on chemotherapy presents with hypotension. Nephrology consulted for elevated Scr.    1. RK: likely due to ATN in setting of hypotension and diarrhea. Scr now improving. UA bland with hyaline casts. FeNa elevated. CT without obstruction. TMA work up negative. Continue with IVF. Avoid nephrotoxins. Monitor electrolytes.    2. Hypotension: BP improving. IVF as above. Can start midodrine if SBP < 100. Monitor BP.    3. Metabolic acidosis: With gap and non-gap acidosis likely due to RK/diarrhea and concurrent respiratory acidosis as PCO2 higher than expected for compensation. Will continue with 1/2 NS with 75 meQ of sodium bicarbonate. Monitor pH.    4. Hyponatremia: Resolved with IVF. Monitor serum Na.    5. MM: As per Heme/Onc.    [facility] NEPHROLOGY  Leoncio Leonard M.D.  Tay Brooke D.O.  Precious Chen M.D.  Nidia Stallings, NURIS, ANP-C    Telephone: [phone]  Facsimile: [phone] [place] [address], [address]  [place] [address]   67y Male with history of MM on chemotherapy presents with hypotension. Nephrology consulted for elevated Scr.    1. RK: likely due to ATN in setting of hypotension and diarrhea. Scr now improving. UA bland with hyaline casts. FeNa elevated. CT without obstruction. TMA work up negative. Continue with IVF. Avoid nephrotoxins. Monitor electrolytes.    2. Hypotension: BP improving. IVF as above. Can start midodrine if SBP < 100. Monitor BP.    3. Metabolic acidosis: With gap and non-gap acidosis likely due to RK/diarrhea and concurrent respiratory acidosis as PCO2 higher than expected for compensation. Will continue with 1/2 NS with 75 meQ of sodium bicarbonate. Monitor pH.    4. Hyponatremia: Resolved with IVF. Monitor serum Na.    5. MM: As per Heme/Onc.    [facility] NEPHROLOGY  Leoncio Leonard M.D.  Tay Brooke D.O.  Precious Chen M.D.  Nidia Stallings, NURIS, ANP-C    Telephone: [phone]  Facsimile: [phone] [place] [address], [address]  [place] [address]

## 2023-03-07 NOTE — PROGRESS NOTE ADULT - ASSESSMENT
PETER DE LA PAZ is a 67y Male who presents with a chief complaint of hypotension.    Multiple Myeloma  Pancytopenia  ·	Patient follows with Dr. Jean Claude Hubbard, [facility].  ·	Patient had been on lenalidomide maintenance up until February 2023, when he had PET/CT that showed progression of disease with worsening lymphadenopathy, bony lesions, and liver lesion.   ·	He was started then on daratumumab + cyclophosphamide + bortezomib + dexamethasone.  ·	Last dose of daratumumab was on March 3rd.  ·	Last dose of cyclophosphamide and bortezomib was on February 24th.  ·	Patient has had worsening pancytopenia since starting the latest chemotherapy regimen.  ·	Hold systemic treatment while inpatient or in rehabilitation.  ·	Monitor CBC and transfuse to maintain HGB > 7 and PLT > 10.  ·	Continue prophylactic acyclovir 400 mg BID as patient is on bortezomib, and prophylactic penicillin 250mg PO bid.     Acute Kidney Injury  ·	Baseline creatinine is at 1.2-1.3, creatinine was 1.2 on February 24th.  ·	Possibly due to diarrhea and hypotension  ·	Nephrology consulted, continue IVF.   ·	Monitor kidney functions closely. Cr 4.33 today.    Diarrhea  ·	Possibly due to chemotherapy. GI PCR and C diff negative  ·	Now reporting diarrhea again. GI adjusting antidiarrheals.     Will continue to follow.     Quirino Holloway PA-C  Hematology/Oncology  [facility]  [phone] (office)  PETER DE LA PAZ is a 67y Male who presents with a chief complaint of hypotension.    Multiple Myeloma  Pancytopenia  ·	Patient follows with Dr. Jean Claude Hubbard, [facility].  ·	Patient had been on lenalidomide maintenance up until February 2023, when he had PET/CT that showed progression of disease with worsening lymphadenopathy, bony lesions, and liver lesion.   ·	He was started then on daratumumab + cyclophosphamide + bortezomib + dexamethasone.  ·	Last dose of daratumumab was on March 3rd.  ·	Last dose of cyclophosphamide and bortezomib was on February 24th.  ·	Patient has had worsening pancytopenia since starting the latest chemotherapy regimen.  ·	Hold systemic treatment while inpatient or in rehabilitation.  ·	Monitor CBC and transfuse to maintain HGB > 7 and PLT > 10.  ·	Continue prophylactic acyclovir 400 mg BID as patient is on bortezomib, and prophylactic penicillin 250mg PO bid.     Acute Kidney Injury  ·	Baseline creatinine is at 1.2-1.3, creatinine was 1.2 on February 24th.  ·	Possibly due to diarrhea and hypotension  ·	Nephrology consulted, continue IVF.   ·	Monitor kidney functions closely. Cr 4.33 today.    Diarrhea  ·	Possibly due to chemotherapy. GI PCR and C diff negative  ·	Now reporting diarrhea again. GI adjusting antidiarrheals.     Will continue to follow.     Quirino Holloway PA-C  Hematology/Oncology  [facility]  [phone] (office)  PETER DE LA PAZ is a 67y Male who presents with a chief complaint of hypotension.    Multiple Myeloma  Pancytopenia  ·	Patient follows with Dr. Jean Claude Hubbard, [facility].  ·	Patient had been on lenalidomide maintenance up until February 2023, when he had PET/CT that showed progression of disease with worsening lymphadenopathy, bony lesions, and liver lesion.   ·	He was started then on daratumumab + cyclophosphamide + bortezomib + dexamethasone.  ·	Last dose of daratumumab was on March 3rd.  ·	Last dose of cyclophosphamide and bortezomib was on February 24th.  ·	Patient has had worsening pancytopenia since starting the latest chemotherapy regimen.  ·	Hold systemic treatment while inpatient or in rehabilitation.  ·	Monitor CBC and transfuse to maintain HGB > 7 and PLT > 10.  ·	Continue prophylactic acyclovir 400 mg BID as patient is on bortezomib, and prophylactic penicillin 250mg PO bid.     Acute Kidney Injury  ·	Baseline creatinine is at 1.2-1.3, creatinine was 1.2 on February 24th.  ·	Possibly due to diarrhea and hypotension  ·	Nephrology consulted, continue IVF.   ·	Monitor kidney functions closely. Cr 4.33 today.    Diarrhea  ·	Possibly due to chemotherapy. GI PCR and C diff negative  ·	Now reporting diarrhea again. GI adjusting antidiarrheals.     Will continue to follow.     Quirino Holloway PA-C  Hematology/Oncology  [facility]  [phone] (office)  PETER DE LA PAZ is a 67y Male who presents with a chief complaint of hypotension.    Multiple Myeloma  Pancytopenia  ·	[facility] Jean Claude Hubbard[facility]  ·	Patient had been on lenalidomide maintenance up until February 2023, when he had PET/CT that showed progression of disease with worsening lymphadenopathy, bony lesions, and liver lesion.   ·	He was started then on daratumumab + cyclophosphamide + bortezomib + dexamethasone.  ·	Last dose of daratumumab was on March 3rd.  ·	Last dose of cyclophosphamide and bortezomib was on February 24th.  ·	Patient has had worsening pancytopenia since starting the latest chemotherapy regimen.  ·	Hold systemic treatment while inpatient or in rehabilitation.  ·	Monitor CBC and transfuse to maintain HGB > 7 and PLT > 10.  ·	Continue prophylactic acyclovir 400 mg BID as patient is on bortezomib    Acute Kidney Injury  ·	Baseline creatinine is at 1.2-1.3, creatinine was 1.2 on February 24th.  ·	Possibly due to diarrhea and hypotension  ·	Nephrology consulted, continue IVF.   ·	Monitor kidney functions closely. Cr 4.33 today.    Diarrhea  ·	Possibly due to chemotherapy. GI PCR and C diff negative  ·	Now reporting diarrhea again. GI adjusting antidiarrheals.     Will continue to follow.     Quirino Holloway PA-C  Hematology/Oncology  [facility]  [phone] (office)  PETER DE LA PAZ is a 67y Male who presents with a chief complaint of hypotension.    Multiple Myeloma  Pancytopenia  ·	Patient follows with Dr. Jean Claude Hubbard, [facility].  ·	Patient had been on lenalidomide maintenance up until February 2023, when he had PET/CT that showed progression of disease with worsening lymphadenopathy, bony lesions, and liver lesion.   ·	He was started then on daratumumab + cyclophosphamide + bortezomib + dexamethasone.  ·	Last dose of daratumumab was on March 3rd.  ·	Last dose of cyclophosphamide and bortezomib was on February 24th.  ·	Patient has had worsening pancytopenia since starting the latest chemotherapy regimen.  ·	Hold systemic treatment while inpatient or in rehabilitation.  ·	Monitor CBC and transfuse to maintain HGB > 7 and PLT > 10.  ·	Continue prophylactic acyclovir 400 mg BID as patient is on bortezomib    Acute Kidney Injury  ·	Baseline creatinine is at 1.2-1.3, creatinine was 1.2 on February 24th.  ·	Possibly due to diarrhea and hypotension  ·	Nephrology consulted, continue IVF.   ·	Monitor kidney functions closely. Cr 4.33 today.    Diarrhea  ·	Possibly due to chemotherapy. GI PCR and C diff negative  ·	Now reporting diarrhea again. GI adjusting antidiarrheals.     Will continue to follow.     Quirino Holloway PA-C  Hematology/Oncology  [facility]  [phone] (office)  PETER DE LA PAZ is a 67y Male who presents with a chief complaint of hypotension.    Multiple Myeloma  Pancytopenia  ·	Patient follows with Dr. Jean Claude Hubbard, [facility].  ·	Patient had been on lenalidomide maintenance up until February 2023, when he had PET/CT that showed progression of disease with worsening lymphadenopathy, bony lesions, and liver lesion.   ·	He was started then on daratumumab + cyclophosphamide + bortezomib + dexamethasone.  ·	Last dose of daratumumab was on March 3rd.  ·	Last dose of cyclophosphamide and bortezomib was on February 24th.  ·	Patient has had worsening pancytopenia since starting the latest chemotherapy regimen.  ·	Hold systemic treatment while inpatient or in rehabilitation.  ·	Monitor CBC and transfuse to maintain HGB > 7 and PLT > 10.  ·	Continue prophylactic acyclovir 400 mg BID as patient is on bortezomib    Acute Kidney Injury  ·	Baseline creatinine is at 1.2-1.3, creatinine was 1.2 on February 24th.  ·	Possibly due to diarrhea and hypotension  ·	Nephrology consulted, continue IVF.   ·	Monitor kidney functions closely. Cr 4.33 today.    Diarrhea  ·	Possibly due to chemotherapy. GI PCR and C diff negative  ·	Now reporting diarrhea again. GI adjusting antidiarrheals.     Will continue to follow.     Quirino Holloway PA-C  Hematology/Oncology  [facility]  [phone] (office)

## 2023-03-07 NOTE — PROGRESS NOTE ADULT - NS ATTEND AMEND GEN_ALL_CORE FT
66 y/o male with multiple myeloma, admitted with RK, diarrhea. Diarrhea restarted today. Now on lomotil per gastroenterology. Monitoring kidney functions closely; nephrology following. 68 y/o male with multiple myeloma, admitted with RK, diarrhea. Diarrhea restarted today. Now on lomotil per gastroenterology. Monitoring kidney functions closely; nephrology following.

## 2023-03-07 NOTE — PROGRESS NOTE ADULT - SUBJECTIVE AND OBJECTIVE BOX
Patient is a 67y old  Male who presents with a chief complaint of 'I took a turn for the worst' (07 Mar 2023 06:55)    Patient seen and examined at bedside. Reports having diarrhea this morning, no obvious bleeding.    MEDICATIONS  (STANDING):  acyclovir   Oral Tab/Cap 400 milliGRAM(s) Oral daily  aspirin enteric coated 81 milliGRAM(s) Oral daily  atorvastatin 40 milliGRAM(s) Oral at bedtime  chlorhexidine 2% Cloths 1 Application(s) Topical daily  dexAMETHasone     Tablet 4 milliGRAM(s) Oral daily  diphenoxylate/atropine 1 Tablet(s) Oral two times a day  montelukast 10 milliGRAM(s) Oral daily  multivitamin 1 Tablet(s) Oral daily  penicillin   milliGRAM(s) Oral every 12 hours  sodium bicarbonate  Infusion 0.097 mEq/kG/Hr (100 mL/Hr) IV Continuous <Continuous>    MEDICATIONS  (PRN):  ondansetron   Disintegrating Tablet 8 milliGRAM(s) Oral three times a day PRN Nausea and/or Vomiting      Vital Signs Last 24 Hrs  T(C): 36.8 (07 Mar 2023 00:29), Max: 36.8 (07 Mar 2023 00:29)  T(F): 98.2 (07 Mar 2023 00:29), Max: 98.2 (07 Mar 2023 00:29)  HR: 77 (07 Mar 2023 00:29) (76 - 77)  BP: 97/58 (07 Mar 2023 00:29) (97/58 - 99/60)  BP(mean): --  RR: 18 (07 Mar 2023 00:29) (18 - 18)  SpO2: 98% (07 Mar 2023 00:29) (97% - 98%)    Parameters below as of 07 Mar 2023 00:29  Patient On (Oxygen Delivery Method): room air        PE  NAD  Awake, alert  Anicteric, MMM  No c/c/e  No rash grossly  FROM                          7.6    2.21  )-----------( 51       ( 07 Mar 2023 06:46 )             22.0       03-07    141  |  107  |  82<H>  ----------------------------<  123<H>  3.6   |  17<L>  |  4.33<H>    Ca    8.1<L>      07 Mar 2023 06:48    TPro  5.0<L>  /  Alb  2.9<L>  /  TBili  0.4  /  DBili  x   /  AST  16  /  ALT  62<H>  /  AlkPhos  88  03-07

## 2023-03-07 NOTE — PROGRESS NOTE ADULT - SUBJECTIVE AND OBJECTIVE BOX
Name of Patient : PETER DE LA PAZ  MRN: [id number]  Date of visit: 03-07-23 @ 12:27      Subjective: Patient seen and examined. No new events except as noted.   Patient seen earlier this AM. Sitting up in bed. Denies gross bleeding.   BP soft. Denies lightheadedness or dizziness.  Reports 2 episodes of diarrhea this AM. Imodium switched to lomotil per GI     REVIEW OF SYSTEMS:    CONSTITUTIONAL: No weakness, fevers or chills  EYES/ENT: No visual changes;  No vertigo or throat pain   NECK: No pain or stiffness  RESPIRATORY: No cough, wheezing, hemoptysis; No shortness of breath  CARDIOVASCULAR: No chest pain or palpitations  GASTROINTESTINAL: + 2 episodes of diarrhea; Denies bleeding; No abdominal or epigastric pain. No nausea, vomiting, or hematemesis   GENITOURINARY: No dysuria, frequency or hematuria  NEUROLOGICAL: No numbness or weakness  SKIN: No itching, burning, rashes, or lesions   All other review of systems is negative unless indicated above.    MEDICATIONS:  MEDICATIONS  (STANDING):  acyclovir   Oral Tab/Cap 400 milliGRAM(s) Oral daily  aspirin enteric coated 81 milliGRAM(s) Oral daily  atorvastatin 40 milliGRAM(s) Oral at bedtime  chlorhexidine 2% Cloths 1 Application(s) Topical daily  dexAMETHasone     Tablet 4 milliGRAM(s) Oral daily  diphenoxylate/atropine 1 Tablet(s) Oral two times a day  montelukast 10 milliGRAM(s) Oral daily  multivitamin 1 Tablet(s) Oral daily  penicillin   milliGRAM(s) Oral every 12 hours  sodium bicarbonate  Infusion 0.097 mEq/kG/Hr (100 mL/Hr) IV Continuous <Continuous>      PHYSICAL EXAM:  T(C): 36.8 (03-07-23 @ 00:29), Max: 36.8 (03-07-23 @ 00:29)  HR: 77 (03-07-23 @ 00:29) (76 - 77)  BP: 97/58 (03-07-23 @ 00:29) (97/58 - 99/60)  RR: 18 (03-07-23 @ 00:29) (18 - 18)  SpO2: 98% (03-07-23 @ 00:29) (97% - 98%)  Wt(kg): --  I&O's Summary    06 Mar 2023 07:01  -  07 Mar 2023 07:00  --------------------------------------------------------  IN: 1561 mL / OUT: 900 mL / NET: 661 mL          Appearance: Normal	  HEENT:  Eyes are open   Lymphatic: No lymphadenopathy   Cardiovascular: Normal S1 S2, no JVD  Respiratory: normal effort , clear  Gastrointestinal:  Soft, Non-tender  Skin: No rashes,  warm to touch  Psychiatry:  Mood & affect appropriate  Musculoskeletal: No edema          03-06-23 @ 07:01  -  03-07-23 @ 07:00  --------------------------------------------------------  IN: 1561 mL / OUT: 900 mL / NET: 661 mL                            7.6    2.21  )-----------( 51       ( 07 Mar 2023 06:46 )             22.0               03-07    141  |  107  |  82<H>  ----------------------------<  123<H>  3.6   |  17<L>  |  4.33<H>    Ca    8.1<L>      07 Mar 2023 06:48    TPro  5.0<L>  /  Alb  2.9<L>  /  TBili  0.4  /  DBili  x   /  AST  16  /  ALT  62<H>  /  AlkPhos  88  03-07                        Culture - Blood (03.03.23 @ 11:30)   Specimen Source: .Blood Blood-Peripheral   Culture Results: No growth to date.     Culture - Blood (03.03.23 @ 11:30)   Specimen Source: .Blood Blood-Peripheral   Culture Results: No growth to date.        Name of Patient : PETER DE LA PAZ  MRN: [id number]  Date of visit: 03-07-23 @ 12:27      Subjective: Patient seen and examined. No new events except as noted.   Patient seen earlier this AM. Sitting up in bed. Denies gross bleeding.   BP soft. Denies lightheadedness or dizziness.  Reports 2 episodes of diarrhea this AM. Imodium switched to lomotil per GI     REVIEW OF SYSTEMS:    CONSTITUTIONAL: No weakness, fevers or chills  EYES/ENT: No visual changes;  No vertigo or throat pain   NECK: No pain or stiffness  RESPIRATORY: No cough, wheezing, hemoptysis; No shortness of breath  CARDIOVASCULAR: No chest pain or palpitations  GASTROINTESTINAL: + 2 episodes of diarrhea; Denies bleeding; No abdominal or epigastric pain. No nausea, vomiting, or hematemesis   GENITOURINARY: No dysuria, frequency or hematuria  NEUROLOGICAL: No numbness or weakness  SKIN: No itching, burning, rashes, or lesions   All other review of systems is negative unless indicated above.    MEDICATIONS:  MEDICATIONS  (STANDING):  acyclovir   Oral Tab/Cap 400 milliGRAM(s) Oral daily  aspirin enteric coated 81 milliGRAM(s) Oral daily  atorvastatin 40 milliGRAM(s) Oral at bedtime  chlorhexidine 2% Cloths 1 Application(s) Topical daily  dexAMETHasone     Tablet 4 milliGRAM(s) Oral daily  diphenoxylate/atropine 1 Tablet(s) Oral two times a day  montelukast 10 milliGRAM(s) Oral daily  multivitamin 1 Tablet(s) Oral daily  penicillin   milliGRAM(s) Oral every 12 hours  sodium bicarbonate  Infusion 0.097 mEq/kG/Hr (100 mL/Hr) IV Continuous <Continuous>      PHYSICAL EXAM:  T(C): 36.8 (03-07-23 @ 00:29), Max: 36.8 (03-07-23 @ 00:29)  HR: 77 (03-07-23 @ 00:29) (76 - 77)  BP: 97/58 (03-07-23 @ 00:29) (97/58 - 99/60)  RR: 18 (03-07-23 @ 00:29) (18 - 18)  SpO2: 98% (03-07-23 @ 00:29) (97% - 98%)  Wt(kg): --  I&O's Summary    06 Mar 2023 07:01  -  07 Mar 2023 07:00  --------------------------------------------------------  IN: 1561 mL / OUT: 900 mL / NET: 661 mL          Appearance: Normal	  HEENT:  Eyes are open   Lymphatic: No lymphadenopathy   Cardiovascular: Normal S1 S2, no JVD  Respiratory: normal effort , clear  Gastrointestinal:  Soft, Non-tender  Skin: No rashes,  warm to touch  Psychiatry:  Mood & affect appropriate  Musculoskeletal: No edema          03-06-23 @ 07:01  -  03-07-23 @ 07:00  --------------------------------------------------------  IN: 1561 mL / OUT: 900 mL / NET: 661 mL                            7.6    2.21  )-----------( 51       ( 07 Mar 2023 06:46 )             22.0               03-07    141  |  107  |  82<H>  ----------------------------<  123<H>  3.6   |  17<L>  |  4.33<H>    Ca    8.1<L>      07 Mar 2023 06:48    TPro  5.0<L>  /  Alb  2.9<L>  /  TBili  0.4  /  DBili  x   /  AST  16  /  ALT  62<H>  /  AlkPhos  88  03-07                        Culture - Blood (03.03.23 @ 11:30)   Specimen Source: .Blood Blood-Peripheral   Culture Results: No growth to date.     Culture - Blood (03.03.23 @ 11:30)   Specimen Source: .Blood Blood-Peripheral   Culture Results: No growth to date.        Name of Patient : PETER DE LA PAZ  MRN: [id number]  Date of visit: 03-07-23 @ 12:27      Subjective: Patient seen and examined. No new events except as noted.   Patient seen earlier this AM. Sitting up in bed. Denies gross bleeding.   BP soft. Denies lightheadedness or dizziness.  Reports 2 episodes of diarrhea this AM. Imodium switched to lomotil per GI     REVIEW OF SYSTEMS:    CONSTITUTIONAL: No weakness, fevers or chills  EYES/ENT: No visual changes;  No vertigo or throat pain   NECK: No pain or stiffness  RESPIRATORY: No cough, wheezing, hemoptysis; No shortness of breath  CARDIOVASCULAR: No chest pain or palpitations  GASTROINTESTINAL: + 2 episodes of diarrhea; Denies bleeding; No abdominal or epigastric pain. No nausea, vomiting, or hematemesis   GENITOURINARY: No dysuria, frequency or hematuria  NEUROLOGICAL: No numbness or weakness  SKIN: No itching, burning, rashes, or lesions   All other review of systems is negative unless indicated above.    MEDICATIONS:  MEDICATIONS  (STANDING):  acyclovir   Oral Tab/Cap 400 milliGRAM(s) Oral daily  aspirin enteric coated 81 milliGRAM(s) Oral daily  atorvastatin 40 milliGRAM(s) Oral at bedtime  chlorhexidine 2% Cloths 1 Application(s) Topical daily  dexAMETHasone     Tablet 4 milliGRAM(s) Oral daily  diphenoxylate/atropine 1 Tablet(s) Oral two times a day  montelukast 10 milliGRAM(s) Oral daily  multivitamin 1 Tablet(s) Oral daily  penicillin   milliGRAM(s) Oral every 12 hours  sodium bicarbonate  Infusion 0.097 mEq/kG/Hr (100 mL/Hr) IV Continuous <Continuous>      PHYSICAL EXAM:  T(C): 36.8 (03-07-23 @ 00:29), Max: 36.8 (03-07-23 @ 00:29)  HR: 77 (03-07-23 @ 00:29) (76 - 77)  BP: 97/58 (03-07-23 @ 00:29) (97/58 - 99/60)  RR: 18 (03-07-23 @ 00:29) (18 - 18)  SpO2: 98% (03-07-23 @ 00:29) (97% - 98%)  Wt(kg): --  I&O's Summary    06 Mar 2023 07:01  -  07 Mar 2023 07:00  --------------------------------------------------------  IN: 1561 mL / OUT: 900 mL / NET: 661 mL          Appearance: Normal	  HEENT:  Eyes are open   Lymphatic: No lymphadenopathy   Cardiovascular: Normal S1 S2, no JVD  Respiratory: normal effort , clear  Gastrointestinal:  Soft, Non-tender  Skin: No rashes,  warm to touch  Psychiatry:  Mood & affect appropriate  Musculoskeletal: No edema          03-06-23 @ 07:01  -  03-07-23 @ 07:00  --------------------------------------------------------  IN: 1561 mL / OUT: 900 mL / NET: 661 mL                            7.6    2.21  )-----------( 51       ( 07 Mar 2023 06:46 )             22.0               03-07    141  |  107  |  82<H>  ----------------------------<  123<H>  3.6   |  17<L>  |  4.33<H>    Ca    8.1<L>      07 Mar 2023 06:48    TPro  5.0<L>  /  Alb  2.9<L>  /  TBili  0.4  /  DBili  x   /  AST  16  /  ALT  62<H>  /  AlkPhos  88  03-07                        Culture - Blood (03.03.23 @ 11:30)   Specimen Source: .Blood Blood-Peripheral   Culture Results: No growth to date.     Culture - Blood (03.03.23 @ 11:30)   Specimen Source: .Blood Blood-Peripheral   Culture Results: No growth to date.

## 2023-03-07 NOTE — PROGRESS NOTE ADULT - SUBJECTIVE AND OBJECTIVE BOX
[facility] NEPHROLOGY- PROGRESS NOTE    67y Male with history of MM on chemotherapy presents with hypotension. Nephrology consulted for elevated Scr.    REVIEW OF SYSTEMS:  Gen: no fevers  Cards: no chest pain  Resp: no dyspnea  GI: no nausea or vomiting, + diarrhea with ab pain  Vascular: no LE edema    No Known Allergies      Hospital Medications: Medications reviewed      VITALS:  T(F): 97.3 (23 @ 13:38), Max: 98.2 (23 @ 00:29)  HR: 76 (23 @ 13:38)  BP: 108/67 (23 @ 13:38)  RR: 16 (23 @ 13:38)  SpO2: 95% (23 @ 13:38)  Wt(kg): --     @ 07:01  -   @ 07:00  --------------------------------------------------------  IN: 1561 mL / OUT: 900 mL / NET: 661 mL        PHYSICAL EXAM:    Gen: NAD, calm  Cards: RRR, +S1/S2, no M/G/R  Resp: CTA B/L  GI: soft, NT/ND, NABS  Vascular: no LE edema B/L      LABS:      141  |  107  |  82<H>  ----------------------------<  123<H>  3.6   |  17<L>  |  4.33<H>    Ca    8.1<L>      07 Mar 2023 06:48    TPro  5.0<L>  /  Alb  2.9<L>  /  TBili  0.4  /  DBili      /  AST  16  /  ALT  62<H>  /  AlkPhos  88      Creatinine Trend: 4.33 <--, 4.86 <--, 5.04 <--, 4.64 <--, 4.40 <--, 4.01 <--                        7.6    2.21  )-----------( 51       ( 07 Mar 2023 06:46 )             22.0     Urine Studies:  Urinalysis Basic - ( 04 Mar 2023 07:30 )    Color: Yellow / Appearance: Slightly Turbid / S.014 / pH:   Gluc:  / Ketone: Negative  / Bili: Negative / Urobili: Negative   Blood:  / Protein: 100 mg/dl / Nitrite: Negative   Leuk Esterase: Negative / RBC: 3 /hpf / WBC 6 /HPF   Sq Epi:  / Non Sq Epi: 4 /hpf / Bacteria: Negative      Creatinine, Random Urine: 83 mg/dL ( @ 08:48)  Sodium, Random Urine: 67 mmol/L ( @ 08:48)     [facility] NEPHROLOGY- PROGRESS NOTE    67y Male with history of MM on chemotherapy presents with hypotension. Nephrology consulted for elevated Scr.    REVIEW OF SYSTEMS:  Gen: no fevers  Cards: no chest pain  Resp: no dyspnea  GI: no nausea or vomiting, + diarrhea with ab pain  Vascular: no LE edema    No Known Allergies      Hospital Medications: Medications reviewed      VITALS:  T(F): 97.3 (23 @ 13:38), Max: 98.2 (23 @ 00:29)  HR: 76 (23 @ 13:38)  BP: 108/67 (23 @ 13:38)  RR: 16 (23 @ 13:38)  SpO2: 95% (23 @ 13:38)  Wt(kg): --     @ 07:01  -   @ 07:00  --------------------------------------------------------  IN: 1561 mL / OUT: 900 mL / NET: 661 mL        PHYSICAL EXAM:    Gen: NAD, calm  Cards: RRR, +S1/S2, no M/G/R  Resp: CTA B/L  GI: soft, NT/ND, NABS  Vascular: no LE edema B/L      LABS:      141  |  107  |  82<H>  ----------------------------<  123<H>  3.6   |  17<L>  |  4.33<H>    Ca    8.1<L>      07 Mar 2023 06:48    TPro  5.0<L>  /  Alb  2.9<L>  /  TBili  0.4  /  DBili      /  AST  16  /  ALT  62<H>  /  AlkPhos  88      Creatinine Trend: 4.33 <--, 4.86 <--, 5.04 <--, 4.64 <--, 4.40 <--, 4.01 <--                        7.6    2.21  )-----------( 51       ( 07 Mar 2023 06:46 )             22.0     Urine Studies:  Urinalysis Basic - ( 04 Mar 2023 07:30 )    Color: Yellow / Appearance: Slightly Turbid / S.014 / pH:   Gluc:  / Ketone: Negative  / Bili: Negative / Urobili: Negative   Blood:  / Protein: 100 mg/dl / Nitrite: Negative   Leuk Esterase: Negative / RBC: 3 /hpf / WBC 6 /HPF   Sq Epi:  / Non Sq Epi: 4 /hpf / Bacteria: Negative      Creatinine, Random Urine: 83 mg/dL ( @ 08:48)  Sodium, Random Urine: 67 mmol/L ( @ 08:48)     [facility] NEPHROLOGY- PROGRESS NOTE    67y Male with history of MM on chemotherapy presents with hypotension. Nephrology consulted for elevated Scr.    REVIEW OF SYSTEMS:  Gen: no fevers  Cards: no chest pain  Resp: no dyspnea  GI: no nausea or vomiting, + diarrhea with ab pain  Vascular: no LE edema    No Known Allergies      Hospital Medications: Medications reviewed      VITALS:  T(F): 97.3 (23 @ 13:38), Max: 98.2 (23 @ 00:29)  HR: 76 (23 @ 13:38)  BP: 108/67 (23 @ 13:38)  RR: 16 (23 @ 13:38)  SpO2: 95% (23 @ 13:38)  Wt(kg): --     @ 07:01  -   @ 07:00  --------------------------------------------------------  IN: 1561 mL / OUT: 900 mL / NET: 661 mL        PHYSICAL EXAM:    Gen: NAD, calm  Cards: RRR, +S1/S2, no M/G/R  Resp: CTA B/L  GI: soft, NT/ND, NABS  Vascular: no LE edema B/L      LABS:      141  |  107  |  82<H>  ----------------------------<  123<H>  3.6   |  17<L>  |  4.33<H>    Ca    8.1<L>      07 Mar 2023 06:48    TPro  5.0<L>  /  Alb  2.9<L>  /  TBili  0.4  /  DBili      /  AST  16  /  ALT  62<H>  /  AlkPhos  88      Creatinine Trend: 4.33 <--, 4.86 <--, 5.04 <--, 4.64 <--, 4.40 <--, 4.01 <--                        7.6    2.21  )-----------( 51       ( 07 Mar 2023 06:46 )             22.0     Urine Studies:  Urinalysis Basic - ( 04 Mar 2023 07:30 )    Color: Yellow / Appearance: Slightly Turbid / S.014 / pH:   Gluc:  / Ketone: Negative  / Bili: Negative / Urobili: Negative   Blood:  / Protein: 100 mg/dl / Nitrite: Negative   Leuk Esterase: Negative / RBC: 3 /hpf / WBC 6 /HPF   Sq Epi:  / Non Sq Epi: 4 /hpf / Bacteria: Negative      Creatinine, Random Urine: 83 mg/dL ( @ 08:48)  Sodium, Random Urine: 67 mmol/L ( @ 08:48)

## 2023-03-07 NOTE — PROGRESS NOTE ADULT - ASSESSMENT
67 year old male PMH aortic aneurysm and bioprosthetic AV valve replacement 2019, HL, splenectomy, partial gastrectomy, partial pancreatectomy, oligosecretory multiple myeloma s/p auto SCT in 2018 on revlimid started david-cybor D 2/10 under care at [facility] [place] by Dr. Jean Claude Hubbard was due for chemo today but was found to be more hypotensive than baseline (usually sbp ~90) and was sent to the ED, admitted with RK.     RK (acute kidney injury).  - Suspect due to diarrhea / chemo related  - S/P IVF, on bicarb drip as per renal   - Trend BMP  - Avoid nephrotoxic medications  - Renally dose meds  - Check bladder scan for PVR  - Check UA/urine lytes for Fena  - On Sodium bicarb infusion as per renal   - Renal eval appreciated; F/u recs     Diarrhea  - Suspect chemo related  - However has h/o splenectomy and is on daily penicillin (assuming for prophylaxis?)  - CT abd/pelvis with liquid stool  - C diff - Neg   - Ceftriaxone/flagyl -- DC'd. Discussed with GI. Infectious work up negative   - PCN resumed   - GI PCR neg   - GI eval appreciated; F/u recs   - Imodium switched to Lomotil as per GI in view of recurrent diarrhea. Monitor closely    Pancytopenia.   - Suspect chemo related  - Received neupogen outpatient prior to arrival   - No active bleeding reported  - Trend cbc daily  - Check iron studies/folate/b12. -- Noted  - Maintain Active T+S  - Heme/Onc eval appreciated; F/u recs    Elevated LFTs  - Cont to monitor and trend  - Avoid hepatotoxic agents  - GI eval appreciated; F/u recs     Hypotension.  - Not meeting sirs criteria as HR<90, afebrile.  However wbc is low, and has bandemia.  Lactate 1.7.  - ABX DC'd   - BCx2 w/ NGTD; F/u final   - RVP negative and cxr clear.  No clear infectious source on CT abd.  - Continue ivf, hold beta blocker.    Multiple myeloma.   - heme/Onc eval appreciated; F/u recs   - Hold entecavir as it will need to be renally dosed  - F/u with heme on revlimid dosing.    Hyperlipidemia.  -continue statin.     Elevated troponin  - suspect due to hypotension and RK, not true cardiac event  - EKG no acute findings.     Preventive measure.    no pharm ppx due to thrombocytopenia  ICDs  incentive spirometry  fall risk  PT consult.   67 year old male PMH aortic aneurysm and bioprosthetic AV valve replacement 2019, HL, splenectomy, partial gastrectomy, partial pancreatectomy, oligosecretory multiple myeloma s/p auto SCT in 2018 on revlimid started david-cybor D 2/10 under care at [facility] [place] by Dr. Jean Claude Hubbard was due for chemo today but was found to be more hypotensive than baseline (usually sbp ~90) and was sent to the ED, admitted with RK.     RK (acute kidney injury).  - Suspect due to diarrhea / chemo related  - S/P IVF, on bicarb drip as per renal   - Trend BMP  - Avoid nephrotoxic medications  - Renally dose meds  - Check bladder scan for PVR  - Check UA/urine lytes for Fena  - On Sodium bicarb infusion as per renal   - Renal eval appreciated; F/u recs     Diarrhea  - Suspect chemo related  - However has h/o splenectomy and is on daily penicillin (assuming for prophylaxis?)  - CT abd/pelvis with liquid stool  - C diff - Neg   - Ceftriaxone/flagyl -- DC'd. Discussed with GI. Infectious work up negative   - PCN resumed   - GI PCR neg   - GI eval appreciated; F/u recs   - Imodium switched to Lomotil as per GI in view of recurrent diarrhea. Monitor closely    Pancytopenia.   - Suspect chemo related  - Received neupogen outpatient prior to arrival   - No active bleeding reported  - Trend cbc daily  - Check iron studies/folate/b12. -- Noted  - Maintain Active T+S  - Heme/Onc eval appreciated; F/u recs    Elevated LFTs  - Cont to monitor and trend  - Avoid hepatotoxic agents  - GI eval appreciated; F/u recs     Hypotension.  - Not meeting sirs criteria as HR<90, afebrile.  However wbc is low, and has bandemia.  Lactate 1.7.  - ABX DC'd   - BCx2 w/ NGTD; F/u final   - RVP negative and cxr clear.  No clear infectious source on CT abd.  - Continue ivf, hold beta blocker.    Multiple myeloma.   - heme/Onc eval appreciated; F/u recs   - Hold entecavir as it will need to be renally dosed  - F/u with heme on revlimid dosing.    Hyperlipidemia.  -continue statin.     Elevated troponin  - suspect due to hypotension and RK, not true cardiac event  - EKG no acute findings.     Preventive measure.    no pharm ppx due to thrombocytopenia  ICDs  incentive spirometry  fall risk  PT consult.   67 year old male PMH aortic aneurysm and bioprosthetic AV valve replacement 2019, HL, splenectomy, partial gastrectomy, partial pancreatectomy, oligosecretory multiple myeloma s/p auto SCT in 2018 on revlimid started david-cybor D 2/10 [place] [facility] [place]. Jean Claude Hubbard was due for chemo today but was found to be more hypotensive than baseline (usually sbp ~90) and was sent to the ED, admitted with RK.     RK (acute kidney injury).  - Suspect due to diarrhea / chemo related  - S/P IVF, on bicarb drip as per renal   - Trend BMP  - Avoid nephrotoxic medications  - Renally dose meds  - Check bladder scan for PVR  - Check UA/urine lytes for Fena  - On Sodium bicarb infusion as per renal   - Renal eval appreciated; F/u recs     Diarrhea  - Suspect chemo related  - However has h/o splenectomy and is on daily penicillin (assuming for prophylaxis?)  - CT abd/pelvis with liquid stool  - C diff - Neg   - Ceftriaxone/flagyl -- DC'd. Discussed with GI. Infectious work up negative   - PCN resumed   - GI PCR neg   - GI eval appreciated; F/u recs   - Imodium switched to Lomotil as per GI in view of recurrent diarrhea. Monitor closely    Pancytopenia.   - Suspect chemo related  - Received neupogen outpatient prior to arrival   - No active bleeding reported  - Trend cbc daily  - Check iron studies/folate/b12. -- Noted  - Maintain Active T+S  - Heme/Onc eval appreciated; F/u recs    Elevated LFTs  - Cont to monitor and trend  - Avoid hepatotoxic agents  - GI eval appreciated; F/u recs     Hypotension.  - Not meeting sirs criteria as HR<90, afebrile.  However wbc is low, and has bandemia.  Lactate 1.7.  - ABX DC'd   - BCx2 w/ NGTD; F/u final   - RVP negative and cxr clear.  No clear infectious source on CT abd.  - Continue ivf, hold beta blocker.    Multiple myeloma.   - heme/Onc eval appreciated; F/u recs   - Hold entecavir as it will need to be renally dosed  - F/u with heme on revlimid dosing.    Hyperlipidemia.  -continue statin.     Elevated troponin  - suspect due to hypotension and RK, not true cardiac event  - EKG no acute findings.     Preventive measure.    no pharm ppx due to thrombocytopenia  ICDs  incentive spirometry  fall risk  PT consult.

## 2023-03-08 LAB
ANION GAP SERPL CALC-SCNC: 15 MMOL/L — SIGNIFICANT CHANGE UP (ref 5–17)
BUN SERPL-MCNC: 76 MG/DL — HIGH (ref 7–23)
CALCIUM SERPL-MCNC: 7.5 MG/DL — LOW (ref 8.4–10.5)
CHLORIDE SERPL-SCNC: 105 MMOL/L — SIGNIFICANT CHANGE UP (ref 96–108)
CO2 SERPL-SCNC: 21 MMOL/L — LOW (ref 22–31)
CREAT SERPL-MCNC: 3.82 MG/DL — HIGH (ref 0.5–1.3)
CULTURE RESULTS: SIGNIFICANT CHANGE UP
EGFR: 16 ML/MIN/1.73M2 — LOW
GLUCOSE SERPL-MCNC: 122 MG/DL — HIGH (ref 70–99)
HCT VFR BLD CALC: 21.2 % — LOW (ref 39–50)
HGB BLD-MCNC: 7.3 G/DL — LOW (ref 13–17)
MCHC RBC-ENTMCNC: 33.8 PG — SIGNIFICANT CHANGE UP (ref 27–34)
MCHC RBC-ENTMCNC: 34.4 GM/DL — SIGNIFICANT CHANGE UP (ref 32–36)
MCV RBC AUTO: 98.1 FL — SIGNIFICANT CHANGE UP (ref 80–100)
NRBC # BLD: 1 /100 WBCS — HIGH (ref 0–0)
PLATELET # BLD AUTO: 50 K/UL — LOW (ref 150–400)
POTASSIUM SERPL-MCNC: 3.8 MMOL/L — SIGNIFICANT CHANGE UP (ref 3.5–5.3)
POTASSIUM SERPL-SCNC: 3.8 MMOL/L — SIGNIFICANT CHANGE UP (ref 3.5–5.3)
RBC # BLD: 2.16 M/UL — LOW (ref 4.2–5.8)
RBC # FLD: 20.6 % — HIGH (ref 10.3–14.5)
SODIUM SERPL-SCNC: 141 MMOL/L — SIGNIFICANT CHANGE UP (ref 135–145)
SPECIMEN SOURCE: SIGNIFICANT CHANGE UP
WBC # BLD: 2.03 K/UL — LOW (ref 3.8–10.5)
WBC # FLD AUTO: 2.03 K/UL — LOW (ref 3.8–10.5)

## 2023-03-08 RX ORDER — SODIUM CHLORIDE 9 MG/ML
1000 INJECTION, SOLUTION INTRAVENOUS
Refills: 0 | Status: DISCONTINUED | OUTPATIENT
Start: 2023-03-08 | End: 2023-03-09

## 2023-03-08 RX ADMIN — Medication 400 MILLIGRAM(S): at 12:50

## 2023-03-08 RX ADMIN — Medication 81 MILLIGRAM(S): at 12:49

## 2023-03-08 RX ADMIN — Medication 4 MILLIGRAM(S): at 05:11

## 2023-03-08 RX ADMIN — Medication 1 TABLET(S): at 18:21

## 2023-03-08 RX ADMIN — Medication 250 MILLIGRAM(S): at 05:11

## 2023-03-08 RX ADMIN — ATORVASTATIN CALCIUM 40 MILLIGRAM(S): 80 TABLET, FILM COATED ORAL at 22:59

## 2023-03-08 RX ADMIN — Medication 100 MEQ/KG/HR: at 05:11

## 2023-03-08 RX ADMIN — Medication 250 MILLIGRAM(S): at 18:18

## 2023-03-08 RX ADMIN — CHLORHEXIDINE GLUCONATE 1 APPLICATION(S): 213 SOLUTION TOPICAL at 12:50

## 2023-03-08 RX ADMIN — SODIUM CHLORIDE 100 MILLILITER(S): 9 INJECTION, SOLUTION INTRAVENOUS at 11:30

## 2023-03-08 RX ADMIN — Medication 1 TABLET(S): at 05:11

## 2023-03-08 RX ADMIN — MONTELUKAST 10 MILLIGRAM(S): 4 TABLET, CHEWABLE ORAL at 12:49

## 2023-03-08 RX ADMIN — Medication 1 TABLET(S): at 12:50

## 2023-03-08 RX ADMIN — SODIUM CHLORIDE 100 MILLILITER(S): 9 INJECTION, SOLUTION INTRAVENOUS at 22:57

## 2023-03-08 NOTE — PROGRESS NOTE ADULT - ASSESSMENT
67 year old male history of aortic aneurysm and bioprosthetic AV valve replacement 2019, HL, splenectomy, partial gastrectomy, partial pancreatectomy, oligosecretory multiple myeloma s/p auto SCT in 2018 on revlimid started david-cybor D 2/10 [place] [facility] [place] Jean Claude Hubbard who presented for hypotension.    1. Diarrhea  - likely 2/2 chemo rather than infectious (stools negative)  - switched to lomotil 1 tab bid-- diarrhea resolved   - tolerating low fiber diet  - no indication for abx     2. Multiple myeloma  - on revlimid, started then on daratumumab + cyclophosphamide + bortezomib + dexamethasone.  Last dose of daratumumab was on March 3rd. Last dose of cyclophosphamide and bortezomib was on February 24th.  - per heme/onc     3. RK   -likely 2/2 dehydration from diarrhea  - creatinine downtrending   - on IVF    4. Pancytopenia   - since newest chemo regimen started   - no overt GI bleeding  - trend CBCs, transfuse prn      Attending supervision statement: I have personally seen and examined the patient. I fully participated in the care of this patient. I have made amendments to the documentation where necessary, and agree with the history, physical exam, and plan as outlined by the ACP.     67 year old male history of aortic aneurysm and bioprosthetic AV valve replacement 2019, HL, splenectomy, partial gastrectomy, partial pancreatectomy, oligosecretory multiple myeloma s/p auto SCT in 2018 on revlimid started david-cybor D 2/10 under care at [facility] [place] by Dr. Jean Claude Hubbard who presented for hypotension.    1. Diarrhea  - likely 2/2 chemo rather than infectious (stools negative)  - switched to lomotil 1 tab bid-- diarrhea resolved   - tolerating low fiber diet  - no indication for abx     2. Multiple myeloma  - on revlimid, started then on daratumumab + cyclophosphamide + bortezomib + dexamethasone.  Last dose of daratumumab was on March 3rd. Last dose of cyclophosphamide and bortezomib was on February 24th.  - per heme/onc     3. RK   -likely 2/2 dehydration from diarrhea  - creatinine downtrending   - on IVF    4. Pancytopenia   - since newest chemo regimen started   - no overt GI bleeding  - trend CBCs, transfuse prn      Attending supervision statement: I have personally seen and examined the patient. I fully participated in the care of this patient. I have made amendments to the documentation where necessary, and agree with the history, physical exam, and plan as outlined by the ACP.     67 year old male history of aortic aneurysm and bioprosthetic AV valve replacement 2019, HL, splenectomy, partial gastrectomy, partial pancreatectomy, oligosecretory multiple myeloma s/p auto SCT in 2018 on revlimid started david-cybor D 2/10 [place] [facility] [place] Jean Claude Hubbard who presented for hypotension.    1. Diarrhea  - likely 2/2 chemo rather than infectious (stools negative)  - switched to lomotil 1 tab bid-- diarrhea resolved   - tolerating low fiber diet  - no indication for abx     2. Multiple myeloma  - on revlimid, started then on daratumumab + cyclophosphamide + bortezomib + dexamethasone.  Last dose of daratumumab was on March 3rd. Last dose of cyclophosphamide and bortezomib was on February 24th.  - per heme/onc     3. RK   -likely 2/2 dehydration from diarrhea  - creatinine downtrending   - on IVF    4. Pancytopenia   - since newest chemo regimen started   - no overt GI bleeding  - trend CBCs, transfuse prn      Attending supervision statement: I have personally seen and examined the patient. I fully participated in the care of this patient. I have made amendments to the documentation where necessary, and agree with the history, physical exam, and plan as outlined by the ACP.

## 2023-03-08 NOTE — PROGRESS NOTE ADULT - SUBJECTIVE AND OBJECTIVE BOX
Name of Patient : PETER DE LA PAZ  MRN: [id number]  Date of visit: 03-08-23 @ 13:39      Subjective: Patient seen and examined. No new events except as noted.   Patient seen earlier this AM. Sitting up in bed. Reports diarrhea has improved with Lomotil.  Patient reports his BP is currently at his baseline. Patient denies lightheadedness or dizziness. Denies gait unsteadiness.  Denies abdominal pain or discomfort. Denies nausea or vomiting.     REVIEW OF SYSTEMS:    CONSTITUTIONAL: No weakness, fevers or chills  EYES/ENT: No visual changes;  No vertigo or throat pain   NECK: No pain or stiffness  RESPIRATORY: No cough, wheezing, hemoptysis; No shortness of breath  CARDIOVASCULAR: No chest pain or palpitations  GASTROINTESTINAL: + Diarrhea improved; No abdominal or epigastric pain. No nausea, vomiting, or hematemesis; No diarrhea or constipation. No melena or hematochezia.  GENITOURINARY: No dysuria, frequency or hematuria  NEUROLOGICAL: No numbness or weakness  SKIN: No itching, burning, rashes, or lesions   All other review of systems is negative unless indicated above.    MEDICATIONS:  MEDICATIONS  (STANDING):  acyclovir   Oral Tab/Cap 400 milliGRAM(s) Oral daily  aspirin enteric coated 81 milliGRAM(s) Oral daily  atorvastatin 40 milliGRAM(s) Oral at bedtime  chlorhexidine 2% Cloths 1 Application(s) Topical daily  dexAMETHasone     Tablet 4 milliGRAM(s) Oral daily  diphenoxylate/atropine 1 Tablet(s) Oral two times a day  lactated ringers. 1000 milliLiter(s) (100 mL/Hr) IV Continuous <Continuous>  montelukast 10 milliGRAM(s) Oral daily  multivitamin 1 Tablet(s) Oral daily  penicillin   milliGRAM(s) Oral every 12 hours      PHYSICAL EXAM:  T(C): 36.8 (03-08-23 @ 08:30), Max: 36.8 (03-07-23 @ 15:09)  HR: 73 (03-08-23 @ 08:30) (72 - 77)  BP: 90/60 (03-08-23 @ 08:30) (90/60 - 110/61)  RR: 18 (03-08-23 @ 08:30) (16 - 18)  SpO2: 96% (03-08-23 @ 08:30) (95% - 97%)  Wt(kg): --  I&O's Summary    07 Mar 2023 07:01  -  08 Mar 2023 07:00  --------------------------------------------------------  IN: 1001 mL / OUT: 1650 mL / NET: -649 mL          Appearance: Normal	  HEENT:  Eyes are open; Glasses   Lymphatic: No lymphadenopathy   Cardiovascular: Normal S1 S2, no JVD  Respiratory: normal effort , clear  Gastrointestinal:  Soft, Non-tender to palpitation   Skin: No rashes,  warm to touch  Psychiatry:  Mood & affect appropriate  Musculoskeletal: No edema      03-07-23 @ 07:01  -  03-08-23 @ 07:00  --------------------------------------------------------  IN: 1001 mL / OUT: 1650 mL / NET: -649 mL                                7.3    2.03  )-----------( 50       ( 08 Mar 2023 06:34 )             21.2               03-08    141  |  105  |  76<H>  ----------------------------<  122<H>  3.8   |  21<L>  |  3.82<H>    Ca    7.5<L>      08 Mar 2023 06:34    TPro  5.0<L>  /  Alb  2.9<L>  /  TBili  0.4  /  DBili  x   /  AST  16  /  ALT  62<H>  /  AlkPhos  88  03-07                         Culture - Blood (03.03.23 @ 11:30)   Specimen Source: .Blood Blood-Peripheral   Culture Results: No growth to date.     Culture - Blood (03.03.23 @ 11:30)   Specimen Source: .Blood Blood-Peripheral   Culture Results: No growth to date.        Name of Patient : PETER DE LA PAZ  MRN: [id number]  Date of visit: 03-08-23 @ 13:39      Subjective: Patient seen and examined. No new events except as noted.   Patient seen earlier this AM. Sitting up in bed. Reports diarrhea has improved with Lomotil.  Patient reports his BP is currently at his baseline. Patient denies lightheadedness or dizziness. Denies gait unsteadiness.  Denies abdominal pain or discomfort. Denies nausea or vomiting.     REVIEW OF SYSTEMS:    CONSTITUTIONAL: No weakness, fevers or chills  EYES/ENT: No visual changes;  No vertigo or throat pain   NECK: No pain or stiffness  RESPIRATORY: No cough, wheezing, hemoptysis; No shortness of breath  CARDIOVASCULAR: No chest pain or palpitations  GASTROINTESTINAL: + Diarrhea improved; No abdominal or epigastric pain. No nausea, vomiting, or hematemesis; No diarrhea or constipation. No melena or hematochezia.  GENITOURINARY: No dysuria, frequency or hematuria  NEUROLOGICAL: No numbness or weakness  SKIN: No itching, burning, rashes, or lesions   All other review of systems is negative unless indicated above.    MEDICATIONS:  MEDICATIONS  (STANDING):  acyclovir   Oral Tab/Cap 400 milliGRAM(s) Oral daily  aspirin enteric coated 81 milliGRAM(s) Oral daily  atorvastatin 40 milliGRAM(s) Oral at bedtime  chlorhexidine 2% Cloths 1 Application(s) Topical daily  dexAMETHasone     Tablet 4 milliGRAM(s) Oral daily  diphenoxylate/atropine 1 Tablet(s) Oral two times a day  lactated ringers. 1000 milliLiter(s) (100 mL/Hr) IV Continuous <Continuous>  montelukast 10 milliGRAM(s) Oral daily  multivitamin 1 Tablet(s) Oral daily  penicillin   milliGRAM(s) Oral every 12 hours      PHYSICAL EXAM:  T(C): 36.8 (03-08-23 @ 08:30), Max: 36.8 (03-07-23 @ 15:09)  HR: 73 (03-08-23 @ 08:30) (72 - 77)  BP: 90/60 (03-08-23 @ 08:30) (90/60 - 110/61)  RR: 18 (03-08-23 @ 08:30) (16 - 18)  SpO2: 96% (03-08-23 @ 08:30) (95% - 97%)  Wt(kg): --  I&O's Summary    07 Mar 2023 07:01  -  08 Mar 2023 07:00  --------------------------------------------------------  IN: 1001 mL / OUT: 1650 mL / NET: -649 mL          Appearance: Normal	  HEENT:  Eyes are open; Glasses   Lymphatic: No lymphadenopathy   Cardiovascular: Normal S1 S2, no JVD  Respiratory: normal effort , clear  Gastrointestinal:  Soft, Non-tender to palpitation   Skin: No rashes,  warm to touch  Psychiatry:  Mood & affect appropriate  Musculoskeletal: No edema      03-07-23 @ 07:01  -  03-08-23 @ 07:00  --------------------------------------------------------  IN: 1001 mL / OUT: 1650 mL / NET: -649 mL                                7.3    2.03  )-----------( 50       ( 08 Mar 2023 06:34 )             21.2               03-08    141  |  105  |  76<H>  ----------------------------<  122<H>  3.8   |  21<L>  |  3.82<H>    Ca    7.5<L>      08 Mar 2023 06:34    TPro  5.0<L>  /  Alb  2.9<L>  /  TBili  0.4  /  DBili  x   /  AST  16  /  ALT  62<H>  /  AlkPhos  88  03-07                         Culture - Blood (03.03.23 @ 11:30)   Specimen Source: .Blood Blood-Peripheral   Culture Results: No growth to date.     Culture - Blood (03.03.23 @ 11:30)   Specimen Source: .Blood Blood-Peripheral   Culture Results: No growth to date.        Name of Patient : PETER DE LA PAZ  MRN: [id number]  Date of visit: 03-08-23 @ 13:39      Subjective: Patient seen and examined. No new events except as noted.   Patient seen earlier this AM. Sitting up in bed. Reports diarrhea has improved with Lomotil.  Patient reports his BP is currently at his baseline. Patient denies lightheadedness or dizziness. Denies gait unsteadiness.  Denies abdominal pain or discomfort. Denies nausea or vomiting.     REVIEW OF SYSTEMS:    CONSTITUTIONAL: No weakness, fevers or chills  EYES/ENT: No visual changes;  No vertigo or throat pain   NECK: No pain or stiffness  RESPIRATORY: No cough, wheezing, hemoptysis; No shortness of breath  CARDIOVASCULAR: No chest pain or palpitations  GASTROINTESTINAL: + Diarrhea improved; No abdominal or epigastric pain. No nausea, vomiting, or hematemesis; No diarrhea or constipation. No melena or hematochezia.  GENITOURINARY: No dysuria, frequency or hematuria  NEUROLOGICAL: No numbness or weakness  SKIN: No itching, burning, rashes, or lesions   All other review of systems is negative unless indicated above.    MEDICATIONS:  MEDICATIONS  (STANDING):  acyclovir   Oral Tab/Cap 400 milliGRAM(s) Oral daily  aspirin enteric coated 81 milliGRAM(s) Oral daily  atorvastatin 40 milliGRAM(s) Oral at bedtime  chlorhexidine 2% Cloths 1 Application(s) Topical daily  dexAMETHasone     Tablet 4 milliGRAM(s) Oral daily  diphenoxylate/atropine 1 Tablet(s) Oral two times a day  lactated ringers. 1000 milliLiter(s) (100 mL/Hr) IV Continuous <Continuous>  montelukast 10 milliGRAM(s) Oral daily  multivitamin 1 Tablet(s) Oral daily  penicillin   milliGRAM(s) Oral every 12 hours      PHYSICAL EXAM:  T(C): 36.8 (03-08-23 @ 08:30), Max: 36.8 (03-07-23 @ 15:09)  HR: 73 (03-08-23 @ 08:30) (72 - 77)  BP: 90/60 (03-08-23 @ 08:30) (90/60 - 110/61)  RR: 18 (03-08-23 @ 08:30) (16 - 18)  SpO2: 96% (03-08-23 @ 08:30) (95% - 97%)  Wt(kg): --  I&O's Summary    07 Mar 2023 07:01  -  08 Mar 2023 07:00  --------------------------------------------------------  IN: 1001 mL / OUT: 1650 mL / NET: -649 mL          Appearance: Normal	  HEENT:  Eyes are open; Glasses   Lymphatic: No lymphadenopathy   Cardiovascular: Normal S1 S2, no JVD  Respiratory: normal effort , clear  Gastrointestinal:  Soft, Non-tender to palpitation   Skin: No rashes,  warm to touch  Psychiatry:  Mood & affect appropriate  Musculoskeletal: No edema      03-07-23 @ 07:01  -  03-08-23 @ 07:00  --------------------------------------------------------  IN: 1001 mL / OUT: 1650 mL / NET: -649 mL                                7.3    2.03  )-----------( 50       ( 08 Mar 2023 06:34 )             21.2               03-08    141  |  105  |  76<H>  ----------------------------<  122<H>  3.8   |  21<L>  |  3.82<H>    Ca    7.5<L>      08 Mar 2023 06:34    TPro  5.0<L>  /  Alb  2.9<L>  /  TBili  0.4  /  DBili  x   /  AST  16  /  ALT  62<H>  /  AlkPhos  88  03-07                         Culture - Blood (03.03.23 @ 11:30)   Specimen Source: .Blood Blood-Peripheral   Culture Results: No growth to date.     Culture - Blood (03.03.23 @ 11:30)   Specimen Source: .Blood Blood-Peripheral   Culture Results: No growth to date.

## 2023-03-08 NOTE — PROGRESS NOTE ADULT - SUBJECTIVE AND OBJECTIVE BOX
[facility] NEPHROLOGY- PROGRESS NOTE    67y Male with history of MM on chemotherapy presents with hypotension. Nephrology consulted for elevated Scr.    REVIEW OF SYSTEMS:  Gen: no fevers  Cards: no chest pain  Resp: no dyspnea  GI: no nausea or vomiting, + diarrhea resolved  Vascular: no LE edema    No Known Allergies      Hospital Medications: Medications reviewed      VITALS:  T(F): 98.3 (23 @ 08:30), Max: 98.3 (23 @ 08:30)  HR: 73 (23 @ 08:30)  BP: 90/60 (23 @ 08:30)  RR: 18 (23 @ 08:30)  SpO2: 96% (23 @ 08:30)  Wt(kg): --     @ 07:01  -   @ 07:00  --------------------------------------------------------  IN: 1001 mL / OUT: 1650 mL / NET: -649 mL        PHYSICAL EXAM:    Gen: NAD, calm  Cards: RRR, +S1/S2, no M/G/R  Resp: CTA B/L  GI: soft, NT/ND, NABS  Vascular: no LE edema B/L      LABS:      141  |  105  |  76<H>  ----------------------------<  122<H>  3.8   |  21<L>  |  3.82<H>    Ca    7.5<L>      08 Mar 2023 06:34    TPro  5.0<L>  /  Alb  2.9<L>  /  TBili  0.4  /  DBili      /  AST  16  /  ALT  62<H>  /  AlkPhos  88      Creatinine Trend: 3.82 <--, 4.33 <--, 4.86 <--, 5.04 <--, 4.64 <--, 4.40 <--, 4.01 <--                        7.3    2.03  )-----------( 50       ( 08 Mar 2023 06:34 )             21.2     Urine Studies:  Urinalysis Basic - ( 04 Mar 2023 07:30 )    Color: Yellow / Appearance: Slightly Turbid / S.014 / pH:   Gluc:  / Ketone: Negative  / Bili: Negative / Urobili: Negative   Blood:  / Protein: 100 mg/dl / Nitrite: Negative   Leuk Esterase: Negative / RBC: 3 /hpf / WBC 6 /HPF   Sq Epi:  / Non Sq Epi: 4 /hpf / Bacteria: Negative      Creatinine, Random Urine: 83 mg/dL ( @ 08:48)  Sodium, Random Urine: 67 mmol/L ( @ 08:48)     [facility] NEPHROLOGY- PROGRESS NOTE    67y Male with history of MM on chemotherapy presents with hypotension. Nephrology consulted for elevated Scr.    REVIEW OF SYSTEMS:  Gen: no fevers  Cards: no chest pain  Resp: no dyspnea  GI: no nausea or vomiting, + diarrhea resolved  Vascular: no LE edema    No Known Allergies      Hospital Medications: Medications reviewed      VITALS:  T(F): 98.3 (23 @ 08:30), Max: 98.3 (23 @ 08:30)  HR: 73 (23 @ 08:30)  BP: 90/60 (23 @ 08:30)  RR: 18 (23 @ 08:30)  SpO2: 96% (23 @ 08:30)  Wt(kg): --     @ 07:01  -   @ 07:00  --------------------------------------------------------  IN: 1001 mL / OUT: 1650 mL / NET: -649 mL        PHYSICAL EXAM:    Gen: NAD, calm  Cards: RRR, +S1/S2, no M/G/R  Resp: CTA B/L  GI: soft, NT/ND, NABS  Vascular: no LE edema B/L      LABS:      141  |  105  |  76<H>  ----------------------------<  122<H>  3.8   |  21<L>  |  3.82<H>    Ca    7.5<L>      08 Mar 2023 06:34    TPro  5.0<L>  /  Alb  2.9<L>  /  TBili  0.4  /  DBili      /  AST  16  /  ALT  62<H>  /  AlkPhos  88      Creatinine Trend: 3.82 <--, 4.33 <--, 4.86 <--, 5.04 <--, 4.64 <--, 4.40 <--, 4.01 <--                        7.3    2.03  )-----------( 50       ( 08 Mar 2023 06:34 )             21.2     Urine Studies:  Urinalysis Basic - ( 04 Mar 2023 07:30 )    Color: Yellow / Appearance: Slightly Turbid / S.014 / pH:   Gluc:  / Ketone: Negative  / Bili: Negative / Urobili: Negative   Blood:  / Protein: 100 mg/dl / Nitrite: Negative   Leuk Esterase: Negative / RBC: 3 /hpf / WBC 6 /HPF   Sq Epi:  / Non Sq Epi: 4 /hpf / Bacteria: Negative      Creatinine, Random Urine: 83 mg/dL ( @ 08:48)  Sodium, Random Urine: 67 mmol/L ( @ 08:48)     [facility] NEPHROLOGY- PROGRESS NOTE    67y Male with history of MM on chemotherapy presents with hypotension. Nephrology consulted for elevated Scr.    REVIEW OF SYSTEMS:  Gen: no fevers  Cards: no chest pain  Resp: no dyspnea  GI: no nausea or vomiting, + diarrhea resolved  Vascular: no LE edema    No Known Allergies      Hospital Medications: Medications reviewed      VITALS:  T(F): 98.3 (23 @ 08:30), Max: 98.3 (23 @ 08:30)  HR: 73 (23 @ 08:30)  BP: 90/60 (23 @ 08:30)  RR: 18 (23 @ 08:30)  SpO2: 96% (23 @ 08:30)  Wt(kg): --     @ 07:01  -   @ 07:00  --------------------------------------------------------  IN: 1001 mL / OUT: 1650 mL / NET: -649 mL        PHYSICAL EXAM:    Gen: NAD, calm  Cards: RRR, +S1/S2, no M/G/R  Resp: CTA B/L  GI: soft, NT/ND, NABS  Vascular: no LE edema B/L      LABS:      141  |  105  |  76<H>  ----------------------------<  122<H>  3.8   |  21<L>  |  3.82<H>    Ca    7.5<L>      08 Mar 2023 06:34    TPro  5.0<L>  /  Alb  2.9<L>  /  TBili  0.4  /  DBili      /  AST  16  /  ALT  62<H>  /  AlkPhos  88      Creatinine Trend: 3.82 <--, 4.33 <--, 4.86 <--, 5.04 <--, 4.64 <--, 4.40 <--, 4.01 <--                        7.3    2.03  )-----------( 50       ( 08 Mar 2023 06:34 )             21.2     Urine Studies:  Urinalysis Basic - ( 04 Mar 2023 07:30 )    Color: Yellow / Appearance: Slightly Turbid / S.014 / pH:   Gluc:  / Ketone: Negative  / Bili: Negative / Urobili: Negative   Blood:  / Protein: 100 mg/dl / Nitrite: Negative   Leuk Esterase: Negative / RBC: 3 /hpf / WBC 6 /HPF   Sq Epi:  / Non Sq Epi: 4 /hpf / Bacteria: Negative      Creatinine, Random Urine: 83 mg/dL ( @ 08:48)  Sodium, Random Urine: 67 mmol/L ( @ 08:48)

## 2023-03-08 NOTE — PROGRESS NOTE ADULT - ASSESSMENT
67 year old male PMH aortic aneurysm and bioprosthetic AV valve replacement 2019, HL, splenectomy, partial gastrectomy, partial pancreatectomy, oligosecretory multiple myeloma s/p auto SCT in 2018 on revlimid started david-cybor D 2/10 under care at [facility] [place] by Dr. Jean Claude Hubbard was due for chemo today but was found to be more hypotensive than baseline (usually sbp ~90) and was sent to the ED, admitted with RK.     RK (acute kidney injury).  - Suspect due to diarrhea / chemo related  - S/P IVF, on bicarb drip as per renal --> Switched to LR    - Trend BMP  - Avoid nephrotoxic medications  - Renally dose meds  - Check bladder scan for PVR  - Check UA/urine lytes for Fena  - On Sodium bicarb infusion as per renal   - Renal eval appreciated; F/u recs   - Cr has plateaued, now downtrending. On IVF as per renal     Diarrhea  - Suspect chemo related  - However has h/o splenectomy and is on daily penicillin (assuming for prophylaxis?)  - CT abd/pelvis with liquid stool  - C diff - Neg   - Ceftriaxone/flagyl -- DC'd. Discussed with GI. Infectious work up negative   - PCN resumed   - GI PCR neg   - GI eval appreciated; F/u recs   - Imodium switched to Lomotil as per GI in view of recurrent diarrhea. Monitor closely --> Pt reports improvement in diarrhea. Cont to monitor     Pancytopenia.   - Suspect chemo related  - Received neupogen outpatient prior to arrival   - No active bleeding reported  - Trend cbc daily  - Check iron studies/folate/b12. -- Noted  - Maintain Active T+S  - Heme/Onc eval appreciated; F/u recs    Elevated LFTs  - Cont to monitor and trend  - Avoid hepatotoxic agents  - GI eval appreciated; F/u recs     Hypotension.  - Not meeting sirs criteria as HR<90, afebrile.  However wbc is low, and has bandemia.  Lactate 1.7.  - ABX DC'd   - BCx2 w/ NGTD; F/u final   - RVP negative and cxr clear.  No clear infectious source on CT abd.  - Continue ivf, hold beta blocker.  - Pt reports BP is currently at baseline. Asymptomatic at time of encounter     Multiple myeloma.   - heme/Onc eval appreciated; F/u recs   - Hold entecavir as it will need to be renally dosed  - F/u with heme on revlimid dosing.    Hyperlipidemia.  -continue statin.    Elevated troponin  - suspect due to hypotension and RK, not true cardiac event  - EKG no acute findings.    Preventive measure.   - No pharm ppx due to thrombocytopenia; C/w ICDs  - Incentive spirometry  - Fall risk  - PT consult.   67 year old male PMH aortic aneurysm and bioprosthetic AV valve replacement 2019, HL, splenectomy, partial gastrectomy, partial pancreatectomy, oligosecretory multiple myeloma s/p auto SCT in 2018 on revlimid started david-cybor D 2/10 under [place] at [facility] [place]. Jean Claude Hubbard was due for chemo today but was found to be more hypotensive than baseline (usually sbp ~90) and was sent to the ED, admitted with RK.     RK (acute kidney injury).  - Suspect due to diarrhea / chemo related  - S/P IVF, on bicarb drip as per renal --> Switched to LR    - Trend BMP  - Avoid nephrotoxic medications  - Renally dose meds  - Check bladder scan for PVR  - Check UA/urine lytes for Fena  - On Sodium bicarb infusion as per renal   - Renal eval appreciated; F/u recs   - Cr has plateaued, now downtrending. On IVF as per renal     Diarrhea  - Suspect chemo related  - However has h/o splenectomy and is on daily penicillin (assuming for prophylaxis?)  - CT abd/pelvis with liquid stool  - C diff - Neg   - Ceftriaxone/flagyl -- DC'd. Discussed with GI. Infectious work up negative   - PCN resumed   - GI PCR neg   - GI eval appreciated; F/u recs   - Imodium switched to Lomotil as per GI in view of recurrent diarrhea. Monitor closely --> Pt reports improvement in diarrhea. Cont to monitor     Pancytopenia.   - Suspect chemo related  - Received neupogen outpatient prior to arrival   - No active bleeding reported  - Trend cbc daily  - Check iron studies/folate/b12. -- Noted  - Maintain Active T+S  - Heme/Onc eval appreciated; F/u recs    Elevated LFTs  - Cont to monitor and trend  - Avoid hepatotoxic agents  - GI eval appreciated; F/u recs     Hypotension.  - Not meeting sirs criteria as HR<90, afebrile.  However wbc is low, and has bandemia.  Lactate 1.7.  - ABX DC'd   - BCx2 w/ NGTD; F/u final   - RVP negative and cxr clear.  No clear infectious source on CT abd.  - Continue ivf, hold beta blocker.  - Pt reports BP is currently at baseline. Asymptomatic at time of encounter     Multiple myeloma.   - heme/Onc eval appreciated; F/u recs   - Hold entecavir as it will need to be renally dosed  - F/u with heme on revlimid dosing.    Hyperlipidemia.  -continue statin.    Elevated troponin  - suspect due to hypotension and RK, not true cardiac event  - EKG no acute findings.    Preventive measure.   - No pharm ppx due to thrombocytopenia; C/w ICDs  - Incentive spirometry  - Fall risk  - PT consult.   67 year old male PMH aortic aneurysm and bioprosthetic AV valve replacement 2019, HL, splenectomy, partial gastrectomy, partial pancreatectomy, oligosecretory multiple myeloma s/p auto SCT in 2018 on revlimid started david-cybor D 2/10 [place] [facility] [place] Jean Claude Hubbard was due for chemo today but was found to be more hypotensive than baseline (usually sbp ~90) and was sent to the ED, admitted with RK.     RK (acute kidney injury).  - Suspect due to diarrhea / chemo related  - S/P IVF, on bicarb drip as per renal --> Switched to LR    - Trend BMP  - Avoid nephrotoxic medications  - Renally dose meds  - Check bladder scan for PVR  - Check UA/urine lytes for Fena  - On Sodium bicarb infusion as per renal   - Renal eval appreciated; F/u recs   - Cr has plateaued, now downtrending. On IVF as per renal     Diarrhea  - Suspect chemo related  - However has h/o splenectomy and is on daily penicillin (assuming for prophylaxis?)  - CT abd/pelvis with liquid stool  - C diff - Neg   - Ceftriaxone/flagyl -- DC'd. Discussed with GI. Infectious work up negative   - PCN resumed   - GI PCR neg   - GI eval appreciated; F/u recs   - Imodium switched to Lomotil as per GI in view of recurrent diarrhea. Monitor closely --> Pt reports improvement in diarrhea. Cont to monitor     Pancytopenia.   - Suspect chemo related  - Received neupogen outpatient prior to arrival   - No active bleeding reported  - Trend cbc daily  - Check iron studies/folate/b12. -- Noted  - Maintain Active T+S  - Heme/Onc eval appreciated; F/u recs    Elevated LFTs  - Cont to monitor and trend  - Avoid hepatotoxic agents  - GI eval appreciated; F/u recs     Hypotension.  - Not meeting sirs criteria as HR<90, afebrile.  However wbc is low, and has bandemia.  Lactate 1.7.  - ABX DC'd   - BCx2 w/ NGTD; F/u final   - RVP negative and cxr clear.  No clear infectious source on CT abd.  - Continue ivf, hold beta blocker.  - Pt reports BP is currently at baseline. Asymptomatic at time of encounter     Multiple myeloma.   - heme/Onc eval appreciated; F/u recs   - Hold entecavir as it will need to be renally dosed  - F/u with heme on revlimid dosing.    Hyperlipidemia.  -continue statin.    Elevated troponin  - suspect due to hypotension and RK, not true cardiac event  - EKG no acute findings.    Preventive measure.   - No pharm ppx due to thrombocytopenia; C/w ICDs  - Incentive spirometry  - Fall risk  - PT consult.

## 2023-03-08 NOTE — PROGRESS NOTE ADULT - ASSESSMENT
PETER DE LA PAZ is a 67y Male who presents with a chief complaint of hypotension.    Multiple Myeloma  Pancytopenia  ·	Patient follows with Dr. Jean Claude Hubbard, [facility].  ·	Patient had been on lenalidomide maintenance up until February 2023, when he had PET/CT that showed progression of disease with worsening lymphadenopathy, bony lesions, and liver lesion.   ·	He was started then on daratumumab + cyclophosphamide + bortezomib + dexamethasone.  ·	Last dose of daratumumab was on March 3rd.  ·	Last dose of cyclophosphamide and bortezomib was on February 24th.  ·	Patient has had worsening pancytopenia since starting the latest chemotherapy regimen.  ·	Hold systemic treatment while inpatient or in rehabilitation.  ·	Monitor CBC and transfuse to maintain HGB > 7 and PLT > 10.  ·	Continue prophylactic acyclovir 400 mg BID as patient is on bortezomib    Acute Kidney Injury  ·	Baseline creatinine is at 1.2-1.3, creatinine was 1.2 on February 24th.  ·	Possibly due to diarrhea and hypotension  ·	Nephrology consulted, continue IVF.   ·	Monitor kidney functions closely. Cr 3.82 today.    Diarrhea  ·	Possibly due to chemotherapy. GI PCR and C diff negative  ·	Diarrhea currently resolved    Will continue to follow.     Quirino Holloway PA-C  Hematology/Oncology  [facility]  [phone] (office)  PETER DE LA PAZ is a 67y Male who presents with a chief complaint of hypotension.    Multiple Myeloma  Pancytopenia  ·	Patient follows with Dr. Jean Claude Hubbard, [facility].  ·	Patient had been on lenalidomide maintenance up until February 2023, when he had PET/CT that showed progression of disease with worsening lymphadenopathy, bony lesions, and liver lesion.   ·	He was started then on daratumumab + cyclophosphamide + bortezomib + dexamethasone.  ·	Last dose of daratumumab was on March 3rd.  ·	Last dose of cyclophosphamide and bortezomib was on February 24th.  ·	Patient has had worsening pancytopenia since starting the latest chemotherapy regimen.  ·	Hold systemic treatment while inpatient or in rehabilitation.  ·	Monitor CBC and transfuse to maintain HGB > 7 and PLT > 10.  ·	Continue prophylactic acyclovir 400 mg BID as patient is on bortezomib    Acute Kidney Injury  ·	Baseline creatinine is at 1.2-1.3, creatinine was 1.2 on February 24th.  ·	Possibly due to diarrhea and hypotension  ·	Nephrology consulted, continue IVF.   ·	Monitor kidney functions closely. Cr 3.82 today.    Diarrhea  ·	Possibly due to chemotherapy. GI PCR and C diff negative  ·	Diarrhea currently resolved    Will continue to follow.     Quirino Holloway PA-C  Hematology/Oncology  [facility]  [phone] (office)  PETER DE LA PAZ is a 67y Male who presents with a chief complaint of hypotension.    Multiple Myeloma  Pancytopenia  ·	Patient follows with Dr. Jean Claude Hubbard, [facility].  ·	Patient had been on lenalidomide maintenance up until February 2023, when he had PET/CT that showed progression of disease with worsening lymphadenopathy, bony lesions, and liver lesion.   ·	He was started then on daratumumab + cyclophosphamide + bortezomib + dexamethasone.  ·	Last dose of daratumumab was on March 3rd.  ·	Last dose of cyclophosphamide and bortezomib was on February 24th.  ·	Patient has had worsening pancytopenia since starting the latest chemotherapy regimen.  ·	Hold systemic treatment while inpatient or in rehabilitation.  ·	Monitor CBC and transfuse to maintain HGB > 7 and PLT > 10.  ·	Continue prophylactic acyclovir 400 mg BID as patient is on bortezomib    Acute Kidney Injury  ·	Baseline creatinine is at 1.2-1.3, creatinine was 1.2 on February 24th.  ·	Possibly due to diarrhea and hypotension  ·	Nephrology consulted, continue IVF.   ·	Monitor kidney functions closely. Cr 3.82 today.    Diarrhea  ·	Possibly due to chemotherapy. GI PCR and C diff negative  ·	Diarrhea currently resolved    Will continue to follow.     Quirino Holloway PA-C  Hematology/Oncology  [facility]  [phone] (office)

## 2023-03-08 NOTE — PROGRESS NOTE ADULT - ASSESSMENT
67y Male with history of MM on chemotherapy presents with hypotension. Nephrology consulted for elevated Scr.    1. RK: likely due to ATN in setting of hypotension and diarrhea. Scr now improving. UA bland with hyaline casts. FeNa elevated. CT without obstruction. TMA work up negative. Continue with IVF. Avoid nephrotoxins. Monitor electrolytes.    2. Hypotension: BP improving. Can start midodrine if SBP remains < 100. Monitor BP.    3. Metabolic acidosis: Resolving. Change bicarb gtt to LR. Monitor pH.    4. Hyponatremia: Resolved with IVF. Monitor serum Na.    5. MM: As per Heme/Onc.    [facility] NEPHROLOGY  Leoncio Leonard M.D.  Tay Brooke D.O.  Precious Chen M.D.  Nidia Stallings, MSN, ANP-C    Telephone: [phone]  Facsimile: [phone]    [facility] [place] [address], [address]  [place] [id number]   67y Male with history of MM on chemotherapy presents with hypotension. Nephrology consulted for elevated Scr.    1. RK: likely due to ATN in setting of hypotension and diarrhea. Scr now improving. UA bland with hyaline casts. FeNa elevated. CT without obstruction. TMA work up negative. Continue with IVF. Avoid nephrotoxins. Monitor electrolytes.    2. Hypotension: BP improving. Can start midodrine if SBP remains < 100. Monitor BP.    3. Metabolic acidosis: Resolving. Change bicarb gtt to LR. Monitor pH.    4. Hyponatremia: Resolved with IVF. Monitor serum Na.    5. MM: As per Heme/Onc.    [facility] NEPHROLOGY  Leoncio Leonard M.D.  Tay Brooke D.O.  Precious Chen M.D.  Nidia Stallings, MSN, ANP-C    Telephone: [phone]  Facsimile: [phone]    [facility] [place] [address], [address]  [place] [phone]   67y Male with history of MM on chemotherapy presents with hypotension. Nephrology consulted for elevated Scr.    1. RK: likely due to ATN in setting of hypotension and diarrhea. Scr now improving. UA bland with hyaline casts. FeNa elevated. CT without obstruction. TMA work up negative. Continue with IVF. Avoid nephrotoxins. Monitor electrolytes.    2. Hypotension: BP improving. Can start midodrine if SBP remains < 100. Monitor BP.    3. Metabolic acidosis: Resolving. Change bicarb gtt to LR. Monitor pH.    4. Hyponatremia: Resolved with IVF. Monitor serum Na.    5. MM: As per Heme/Onc.    [facility] NEPHROLOGY  Leoncio Leonard M.D.  Tay Brooke D.O.  Precious Chen M.D.  Nidia Stallings, MSN, ANP-C    Telephone: [phone]  Facsimile: [phone]    [facility] [place] [address], [address]  [place] [phone]

## 2023-03-08 NOTE — PROGRESS NOTE ADULT - SUBJECTIVE AND OBJECTIVE BOX
Chief Complaint:  Patient is a 67y old  Male who presents with a chief complaint of 'I took a turn for the worst' (08 Mar 2023 12:17)      Date of service 23 @ 12:34      Interval Events:   Patient seen and examined.   Diarrhea resolved. Tolerating diet.    Hospital Medications:  acyclovir   Oral Tab/Cap 400 milliGRAM(s) Oral daily  aspirin enteric coated 81 milliGRAM(s) Oral daily  atorvastatin 40 milliGRAM(s) Oral at bedtime  chlorhexidine 2% Cloths 1 Application(s) Topical daily  dexAMETHasone     Tablet 4 milliGRAM(s) Oral daily  diphenoxylate/atropine 1 Tablet(s) Oral two times a day  lactated ringers. 1000 milliLiter(s) IV Continuous <Continuous>  montelukast 10 milliGRAM(s) Oral daily  multivitamin 1 Tablet(s) Oral daily  ondansetron   Disintegrating Tablet 8 milliGRAM(s) Oral three times a day PRN  penicillin   milliGRAM(s) Oral every 12 hours        Review of Systems:  General:  No wt loss, fevers, chills, night sweats, fatigue,   Eyes:  Good vision, no reported pain  ENT:  No sore throat, pain, runny nose, dysphagia  CV:  No pain, palpitations, hypo/hypertension  Resp:  No dyspnea, cough, tachypnea, wheezing  GI:  See HPI  :  No pain, bleeding, incontinence, nocturia  Muscle:  No pain, weakness  Neuro:  No weakness, tingling, memory problems  Psych:  No fatigue, insomnia, mood problems, depression  Endocrine:  No polyuria, polydipsia, cold/heat intolerance  Heme:  No petechiae, ecchymosis, easy bruisability  Integumentary:  No rash, edema    PHYSICAL EXAM:   Vital Signs:  Vital Signs Last 24 Hrs  T(C): 36.8 (08 Mar 2023 08:30), Max: 36.8 (07 Mar 2023 15:09)  T(F): 98.3 (08 Mar 2023 08:30), Max: 98.3 (08 Mar 2023 08:30)  HR: 73 (08 Mar 2023 08:30) (72 - 77)  BP: 90/60 (08 Mar 2023 08:30) (90/60 - 110/61)  BP(mean): --  RR: 18 (08 Mar 2023 08:30) (16 - 18)  SpO2: 96% (08 Mar 2023 08:30) (95% - 97%)    Parameters below as of 08 Mar 2023 08:30  Patient On (Oxygen Delivery Method): room air      Daily     Daily Weight in k.8 (08 Mar 2023 08:30)      PHYSICAL EXAM:     GENERAL:  Appears stated age, well-groomed, well-nourished, no distress  HEENT:  NC/AT,  conjunctivae anicteric, clear and pink,   NECK: supple, trachea midline  CHEST:  Full & symmetric excursion, no increased effort, breath sounds clear  HEART:  Regular rhythm, no JVD  ABDOMEN:  Soft, non-tender, non-distended, normoactive bowel sounds,  no masses , no hepatosplenomegaly  EXTREMITIES:  no cyanosis,clubbing or edema  SKIN:  No rash, erythema, or, ecchymoses, no jaundice  NEURO:  Alert, non-focal, no asterixis  PSYCH: Appropriate affect, oriented to place and time  RECTAL: Deferred      LABS Personally reviewed by me:                        7.3    2.03  )-----------( 50       ( 08 Mar 2023 06:34 )             21.2     Mean Cell Volume: 98.1 fl (23 @ 06:34)    03-08    141  |  105  |  76<H>  ----------------------------<  122<H>  3.8   |  21<L>  |  3.82<H>    Ca    7.5<L>      08 Mar 2023 06:34    TPro  5.0<L>  /  Alb  2.9<L>  /  TBili  0.4  /  DBili  x   /  AST  16  /  ALT  62<H>  /  AlkPhos  88  03-07    LIVER FUNCTIONS - ( 07 Mar 2023 06:48 )  Alb: 2.9 g/dL / Pro: 5.0 g/dL / ALK PHOS: 88 U/L / ALT: 62 U/L / AST: 16 U/L / GGT: x                                       7.3    2.03  )-----------( 50       ( 08 Mar 2023 06:34 )             21.2                         7.6    2.21  )-----------( 51       ( 07 Mar 2023 06:46 )             22.0                         7.3    2.33  )-----------( 52       ( 06 Mar 2023 06:55 )             21.3       Imaging personally reviewed by me:

## 2023-03-08 NOTE — PROGRESS NOTE ADULT - NS ATTEND OPT1 GEN_ALL_CORE
I attest my time as attending is greater than 50% of the total combined time spent on qualifying patient care activities by the PA/NP and attending.
I independently performed the documented:
I independently performed the documented:

## 2023-03-08 NOTE — PROGRESS NOTE ADULT - SUBJECTIVE AND OBJECTIVE BOX
Patient is a 67y old  Male who presents with a chief complaint of 'I took a turn for the worst' (08 Mar 2023 11:01)    Patient seen and examined at bedside. Feeling well, reports diarrhea has resolved.    MEDICATIONS  (STANDING):  acyclovir   Oral Tab/Cap 400 milliGRAM(s) Oral daily  aspirin enteric coated 81 milliGRAM(s) Oral daily  atorvastatin 40 milliGRAM(s) Oral at bedtime  chlorhexidine 2% Cloths 1 Application(s) Topical daily  dexAMETHasone     Tablet 4 milliGRAM(s) Oral daily  diphenoxylate/atropine 1 Tablet(s) Oral two times a day  lactated ringers. 1000 milliLiter(s) (100 mL/Hr) IV Continuous <Continuous>  montelukast 10 milliGRAM(s) Oral daily  multivitamin 1 Tablet(s) Oral daily  penicillin   milliGRAM(s) Oral every 12 hours    MEDICATIONS  (PRN):  ondansetron   Disintegrating Tablet 8 milliGRAM(s) Oral three times a day PRN Nausea and/or Vomiting    Vital Signs Last 24 Hrs  T(C): 36.8 (08 Mar 2023 08:30), Max: 36.8 (07 Mar 2023 15:09)  T(F): 98.3 (08 Mar 2023 08:30), Max: 98.3 (08 Mar 2023 08:30)  HR: 73 (08 Mar 2023 08:30) (72 - 77)  BP: 90/60 (08 Mar 2023 08:30) (90/60 - 110/61)  BP(mean): --  RR: 18 (08 Mar 2023 08:30) (16 - 18)  SpO2: 96% (08 Mar 2023 08:30) (95% - 97%)    Parameters below as of 08 Mar 2023 08:30  Patient On (Oxygen Delivery Method): room air        PE  NAD  Awake, alert  Anicteric, MMM  RRR  CTAB  Abd soft, NT, ND  No c/c/e  No rash grossly  FROM                          7.3    2.03  )-----------( 50       ( 08 Mar 2023 06:34 )             21.2       03-08    141  |  105  |  76<H>  ----------------------------<  122<H>  3.8   |  21<L>  |  3.82<H>    Ca    7.5<L>      08 Mar 2023 06:34    TPro  5.0<L>  /  Alb  2.9<L>  /  TBili  0.4  /  DBili  x   /  AST  16  /  ALT  62<H>  /  AlkPhos  88  03-07

## 2023-03-09 ENCOUNTER — TRANSCRIPTION ENCOUNTER (OUTPATIENT)
Age: 68
End: 2023-03-09

## 2023-03-09 VITALS
OXYGEN SATURATION: 96 % | RESPIRATION RATE: 18 BRPM | DIASTOLIC BLOOD PRESSURE: 65 MMHG | TEMPERATURE: 98 F | SYSTOLIC BLOOD PRESSURE: 105 MMHG | HEART RATE: 89 BPM

## 2023-03-09 LAB
ACANTHOCYTES BLD QL SMEAR: SIGNIFICANT CHANGE UP
ANION GAP SERPL CALC-SCNC: 15 MMOL/L — SIGNIFICANT CHANGE UP (ref 5–17)
ANISOCYTOSIS BLD QL: SIGNIFICANT CHANGE UP
BASOPHILS # BLD AUTO: 0 K/UL — SIGNIFICANT CHANGE UP (ref 0–0.2)
BASOPHILS NFR BLD AUTO: 0 % — SIGNIFICANT CHANGE UP (ref 0–2)
BUN SERPL-MCNC: 64 MG/DL — HIGH (ref 7–23)
CALCIUM SERPL-MCNC: 7.5 MG/DL — LOW (ref 8.4–10.5)
CHLORIDE SERPL-SCNC: 105 MMOL/L — SIGNIFICANT CHANGE UP (ref 96–108)
CO2 SERPL-SCNC: 23 MMOL/L — SIGNIFICANT CHANGE UP (ref 22–31)
CREAT SERPL-MCNC: 2.92 MG/DL — HIGH (ref 0.5–1.3)
EGFR: 23 ML/MIN/1.73M2 — LOW
ELLIPTOCYTES BLD QL SMEAR: SLIGHT — SIGNIFICANT CHANGE UP
EOSINOPHIL # BLD AUTO: 0.07 K/UL — SIGNIFICANT CHANGE UP (ref 0–0.5)
EOSINOPHIL NFR BLD AUTO: 3.5 % — SIGNIFICANT CHANGE UP (ref 0–6)
GIANT PLATELETS BLD QL SMEAR: PRESENT — SIGNIFICANT CHANGE UP
GLUCOSE SERPL-MCNC: 128 MG/DL — HIGH (ref 70–99)
HCT VFR BLD CALC: 22.5 % — LOW (ref 39–50)
HGB BLD-MCNC: 7.8 G/DL — LOW (ref 13–17)
LYMPHOCYTES # BLD AUTO: 0.43 K/UL — LOW (ref 1–3.3)
LYMPHOCYTES # BLD AUTO: 20.9 % — SIGNIFICANT CHANGE UP (ref 13–44)
MACROCYTES BLD QL: SIGNIFICANT CHANGE UP
MANUAL SMEAR VERIFICATION: SIGNIFICANT CHANGE UP
MCHC RBC-ENTMCNC: 33.8 PG — SIGNIFICANT CHANGE UP (ref 27–34)
MCHC RBC-ENTMCNC: 34.7 GM/DL — SIGNIFICANT CHANGE UP (ref 32–36)
MCV RBC AUTO: 97.4 FL — SIGNIFICANT CHANGE UP (ref 80–100)
MONOCYTES # BLD AUTO: 0.4 K/UL — SIGNIFICANT CHANGE UP (ref 0–0.9)
MONOCYTES NFR BLD AUTO: 19.1 % — HIGH (ref 2–14)
NEUTROPHILS # BLD AUTO: 1.17 K/UL — LOW (ref 1.8–7.4)
NEUTROPHILS NFR BLD AUTO: 56.5 % — SIGNIFICANT CHANGE UP (ref 43–77)
NRBC # BLD: 3 /100 — HIGH (ref 0–0)
PLAT MORPH BLD: ABNORMAL
PLATELET # BLD AUTO: 50 K/UL — LOW (ref 150–400)
POIKILOCYTOSIS BLD QL AUTO: SIGNIFICANT CHANGE UP
POTASSIUM SERPL-MCNC: 3.5 MMOL/L — SIGNIFICANT CHANGE UP (ref 3.5–5.3)
POTASSIUM SERPL-SCNC: 3.5 MMOL/L — SIGNIFICANT CHANGE UP (ref 3.5–5.3)
RBC # BLD: 2.31 M/UL — LOW (ref 4.2–5.8)
RBC # FLD: 21 % — HIGH (ref 10.3–14.5)
RBC BLD AUTO: ABNORMAL
SCHISTOCYTES BLD QL AUTO: SIGNIFICANT CHANGE UP
SODIUM SERPL-SCNC: 143 MMOL/L — SIGNIFICANT CHANGE UP (ref 135–145)
TARGETS BLD QL SMEAR: SLIGHT — SIGNIFICANT CHANGE UP
WBC # BLD: 2.07 K/UL — LOW (ref 3.8–10.5)
WBC # FLD AUTO: 2.07 K/UL — LOW (ref 3.8–10.5)

## 2023-03-09 PROCEDURE — 83010 ASSAY OF HAPTOGLOBIN QUANT: CPT

## 2023-03-09 PROCEDURE — 82728 ASSAY OF FERRITIN: CPT

## 2023-03-09 PROCEDURE — 87040 BLOOD CULTURE FOR BACTERIA: CPT

## 2023-03-09 PROCEDURE — 83540 ASSAY OF IRON: CPT

## 2023-03-09 PROCEDURE — 85027 COMPLETE CBC AUTOMATED: CPT

## 2023-03-09 PROCEDURE — 84300 ASSAY OF URINE SODIUM: CPT

## 2023-03-09 PROCEDURE — 84100 ASSAY OF PHOSPHORUS: CPT

## 2023-03-09 PROCEDURE — 82607 VITAMIN B-12: CPT

## 2023-03-09 PROCEDURE — 87507 IADNA-DNA/RNA PROBE TQ 12-25: CPT

## 2023-03-09 PROCEDURE — 86901 BLOOD TYPING SEROLOGIC RH(D): CPT

## 2023-03-09 PROCEDURE — 83690 ASSAY OF LIPASE: CPT

## 2023-03-09 PROCEDURE — 36415 COLL VENOUS BLD VENIPUNCTURE: CPT

## 2023-03-09 PROCEDURE — 97161 PT EVAL LOW COMPLEX 20 MIN: CPT

## 2023-03-09 PROCEDURE — 87324 CLOSTRIDIUM AG IA: CPT

## 2023-03-09 PROCEDURE — 83615 LACTATE (LD) (LDH) ENZYME: CPT

## 2023-03-09 PROCEDURE — 85025 COMPLETE CBC W/AUTO DIFF WBC: CPT

## 2023-03-09 PROCEDURE — 86880 COOMBS TEST DIRECT: CPT

## 2023-03-09 PROCEDURE — 97116 GAIT TRAINING THERAPY: CPT

## 2023-03-09 PROCEDURE — 81001 URINALYSIS AUTO W/SCOPE: CPT

## 2023-03-09 PROCEDURE — 80053 COMPREHEN METABOLIC PANEL: CPT

## 2023-03-09 PROCEDURE — 83735 ASSAY OF MAGNESIUM: CPT

## 2023-03-09 PROCEDURE — 83036 HEMOGLOBIN GLYCOSYLATED A1C: CPT

## 2023-03-09 PROCEDURE — 71046 X-RAY EXAM CHEST 2 VIEWS: CPT

## 2023-03-09 PROCEDURE — 87637 SARSCOV2&INF A&B&RSV AMP PRB: CPT

## 2023-03-09 PROCEDURE — 86803 HEPATITIS C AB TEST: CPT

## 2023-03-09 PROCEDURE — 86922 COMPATIBILITY TEST ANTIGLOB: CPT

## 2023-03-09 PROCEDURE — 82570 ASSAY OF URINE CREATININE: CPT

## 2023-03-09 PROCEDURE — 82803 BLOOD GASES ANY COMBINATION: CPT

## 2023-03-09 PROCEDURE — 83605 ASSAY OF LACTIC ACID: CPT

## 2023-03-09 PROCEDURE — 99285 EMERGENCY DEPT VISIT HI MDM: CPT

## 2023-03-09 PROCEDURE — 83550 IRON BINDING TEST: CPT

## 2023-03-09 PROCEDURE — 84443 ASSAY THYROID STIM HORMONE: CPT

## 2023-03-09 PROCEDURE — 86900 BLOOD TYPING SEROLOGIC ABO: CPT

## 2023-03-09 PROCEDURE — 74176 CT ABD & PELVIS W/O CONTRAST: CPT | Mod: MA

## 2023-03-09 PROCEDURE — 86870 RBC ANTIBODY IDENTIFICATION: CPT

## 2023-03-09 PROCEDURE — 80076 HEPATIC FUNCTION PANEL: CPT

## 2023-03-09 PROCEDURE — 85045 AUTOMATED RETICULOCYTE COUNT: CPT

## 2023-03-09 PROCEDURE — 86850 RBC ANTIBODY SCREEN: CPT

## 2023-03-09 PROCEDURE — 87449 NOS EACH ORGANISM AG IA: CPT

## 2023-03-09 PROCEDURE — 86905 BLOOD TYPING RBC ANTIGENS: CPT

## 2023-03-09 PROCEDURE — 80048 BASIC METABOLIC PNL TOTAL CA: CPT

## 2023-03-09 PROCEDURE — 84484 ASSAY OF TROPONIN QUANT: CPT

## 2023-03-09 PROCEDURE — 82746 ASSAY OF FOLIC ACID SERUM: CPT

## 2023-03-09 PROCEDURE — 97110 THERAPEUTIC EXERCISES: CPT

## 2023-03-09 PROCEDURE — 83880 ASSAY OF NATRIURETIC PEPTIDE: CPT

## 2023-03-09 RX ORDER — ACYCLOVIR SODIUM 500 MG
1 VIAL (EA) INTRAVENOUS
Qty: 30 | Refills: 0
Start: 2023-03-09 | End: 2023-04-07

## 2023-03-09 RX ADMIN — Medication 81 MILLIGRAM(S): at 12:03

## 2023-03-09 RX ADMIN — CHLORHEXIDINE GLUCONATE 1 APPLICATION(S): 213 SOLUTION TOPICAL at 12:04

## 2023-03-09 RX ADMIN — Medication 400 MILLIGRAM(S): at 12:03

## 2023-03-09 RX ADMIN — Medication 4 MILLIGRAM(S): at 05:45

## 2023-03-09 RX ADMIN — Medication 1 TABLET(S): at 05:45

## 2023-03-09 RX ADMIN — Medication 1 TABLET(S): at 12:08

## 2023-03-09 RX ADMIN — MONTELUKAST 10 MILLIGRAM(S): 4 TABLET, CHEWABLE ORAL at 12:03

## 2023-03-09 RX ADMIN — Medication 250 MILLIGRAM(S): at 05:45

## 2023-03-09 NOTE — PROGRESS NOTE ADULT - ASSESSMENT
67y Male with history of MM on chemotherapy presents with hypotension. Nephrology consulted for elevated Scr.    1. RK: likely due to ATN in setting of hypotension and diarrhea. Scr improving. UA bland with hyaline casts. FeNa elevated. CT without obstruction. TMA work up negative. Continue with IVF. Avoid nephrotoxins. Monitor electrolytes.    2. Hypotension: BP improving. Can start midodrine if SBP remains < 100. Monitor BP.    3. Metabolic acidosis: Resolved s/p bicarb gtt. Monitor pH.    4. Hyponatremia: Resolved with IVF. Monitor serum Na.    5. MM: As per Heme/Onc.    No renal objection to discharge.    [facility] NEPHROLOGY  Leoncio Leonard M.D.  Tay Brooke D.O.  Precious Chen M.D.  Nidia Stallings, MSN, ANP-C    Telephone: [phone]  Facsimile: [phone]    [address] [place] [address], [address]  [place] [address]   67y Male with history of MM on chemotherapy presents with hypotension. Nephrology consulted for elevated Scr.    1. RK: likely due to ATN in setting of hypotension and diarrhea. Scr improving. UA bland with hyaline casts. FeNa elevated. CT without obstruction. TMA work up negative. Continue with IVF. Avoid nephrotoxins. Monitor electrolytes.    2. Hypotension: BP improving. Can start midodrine if SBP remains < 100. Monitor BP.    3. Metabolic acidosis: Resolved s/p bicarb gtt. Monitor pH.    4. Hyponatremia: Resolved with IVF. Monitor serum Na.    5. MM: As per Heme/Onc.    No renal objection to discharge.    [facility] NEPHROLOGY  Leoncio Leonard M.D.  Tay Brooke D.O.  Precious Chen M.D.  Nidia Stallings, MSN, ANP-C    Telephone: [phone]  Facsimile: [phone]    [address] [place] [address], [address]  [place] [address]   67y Male with history of MM on chemotherapy presents with hypotension. Nephrology consulted for elevated Scr.    1. RK: likely due to ATN in setting of hypotension and diarrhea. Scr improving. UA bland with hyaline casts. FeNa elevated. CT without obstruction. TMA work up negative. Continue with IVF. Avoid nephrotoxins. Monitor electrolytes.    2. Hypotension: BP improving. Can start midodrine if SBP remains < 100. Monitor BP.    3. Metabolic acidosis: Resolved s/p bicarb gtt. Monitor pH.    4. Hyponatremia: Resolved with IVF. Monitor serum Na.    5. MM: As per Heme/Onc.    No renal objection to discharge.    [facility] NEPHROLOGY  Leoncio Leonard M.D.  Tay Brooke D.O.  Precious Chen M.D.  Nidia Stallings, MSN, ANP-C    Telephone: [phone]  Facsimile: [phone]    [address] [place] [address], [address]  [place] [address]

## 2023-03-09 NOTE — DISCHARGE NOTE PROVIDER - HOSPITAL COURSE
67 year old male PMH aortic aneurysm and bioprosthetic AV valve replacement 2019, HL, splenectomy, partial gastrectomy, partial pancreatectomy, oligosecretory multiple myeloma s/p auto SCT in 2018 on revlimid started david-cybor D 2/10 under care at [facility] by Dr. Jean Claude Hubbard was due for chemo today but was found to be more hypotensive than baseline (usually sbp ~90) and was sent to the ED, admitted with RK.     RK (acute kidney injury).  - Suspect due to diarrhea / chemo related  - S/P IVF, on bicarb drip as per renal --> Switched to LR    - On Sodium bicarb infusion as per renal   - Renal eval appreciated; likely due to ATN in setting of hypotension and diarrhea. Scr now improving. UA bland with hyaline casts. FeNa elevated. CT without obstruction. TMA work   	up negative.  - Cr has plateaued, now downtrending. S/p IVF     Diarrhea  - Suspect chemo related  - However has h/o splenectomy and is on daily penicillin (likely for prophylaxis)  - CT abd/pelvis with liquid stool  - C diff - Neg   - Ceftriaxone/flagyl -- DC'd. Discussed with GI.   Infectious work up has been negative   - PCN resumed   - GI PCR neg   - GI eval appreciated; F/u recs   - Imodium switched to Lomotil as per GI in view of recurrent diarrhea. Monitor closely --> Pt reports improvement in diarrhea. Cont to monitor     Pancytopenia.   - Suspect chemo related  - Received neupogen outpatient prior to arrival   - No active bleeding reported  - Trend cbc daily  - Check iron studies/folate/b12. -- Noted  - Heme/Onc eval appreciated; F/u recs    Elevated LFTs  - Cont to monitor and trend  - Avoid hepatotoxic agents  - GI eval appreciated; F/u recs     Hypotension.  - Not meeting sirs criteria as HR<90, afebrile.  However wbc is low, and has bandemia.  Lactate 1.7.  - ABX DC'd   - BCx2 w/ NGTD; F/u final   - RVP negative and cxr clear.  No clear infectious source on CT abd.  - s/p IVF  hold beta blocker.  - Pt reports BP is currently at baseline. Asymptomatic     Multiple myeloma.   - heme/Onc eval appreciated; Hold systemic treatment while inpatient or in rehabilitation.  Monitor CBC and transfuse to maintain HGB > 7 and PLT > 10.  Continue prophylactic acyclovir 400 mg BID   - Hold entecavir as it will need to be renally dosed  - F/u with heme on revlimid dosing.  Pt to follow up with Dr. Jean Claude Hubbard at  [facility].    Elevated Troponin  - suspect due to hypotension and RK, not true cardiac event  - EKG no acute findings.      Pt is medically stable for discharge home and to follow up with his Heme/ONC and Renal    67 year old male PMH aortic aneurysm and bioprosthetic AV valve replacement 2019, HL, splenectomy, partial gastrectomy, partial pancreatectomy, oligosecretory multiple myeloma s/p auto SCT in 2018 on revlimid started david-cybor D 2/10 under care at [facility] by Dr. Jean Claude Hubbard was due for chemo today but was found to be more hypotensive than baseline (usually sbp ~90) and was sent to the ED, admitted with RK.     RK (acute kidney injury).  - Suspect due to diarrhea / chemo related  - S/P IVF, on bicarb drip as per renal --> Switched to LR    - On Sodium bicarb infusion as per renal   - Renal eval appreciated; likely due to ATN in setting of hypotension and diarrhea. Scr now improving. UA bland with hyaline casts. FeNa elevated. CT without obstruction. TMA work   	up negative.  - Cr has plateaued, now downtrending. S/p IVF     Diarrhea  - Suspect chemo related  - However has h/o splenectomy and is on daily penicillin (likely for prophylaxis)  - CT abd/pelvis with liquid stool  - C diff - Neg   - Ceftriaxone/flagyl -- DC'd. Discussed with GI.   Infectious work up has been negative   - PCN resumed   - GI PCR neg   - GI eval appreciated; F/u recs   - Imodium switched to Lomotil as per GI in view of recurrent diarrhea. Monitor closely --> Pt reports improvement in diarrhea. Cont to monitor     Pancytopenia.   - Suspect chemo related  - Received neupogen outpatient prior to arrival   - No active bleeding reported  - Trend cbc daily  - Check iron studies/folate/b12. -- Noted  - Heme/Onc eval appreciated; F/u recs    Elevated LFTs  - Cont to monitor and trend  - Avoid hepatotoxic agents  - GI eval appreciated; F/u recs     Hypotension.  - Not meeting sirs criteria as HR<90, afebrile.  However wbc is low, and has bandemia.  Lactate 1.7.  - ABX DC'd   - BCx2 w/ NGTD; F/u final   - RVP negative and cxr clear.  No clear infectious source on CT abd.  - s/p IVF  hold beta blocker.  - Pt reports BP is currently at baseline. Asymptomatic     Multiple myeloma.   - heme/Onc eval appreciated; Hold systemic treatment while inpatient or in rehabilitation.  Monitor CBC and transfuse to maintain HGB > 7 and PLT > 10.  Continue prophylactic acyclovir 400 mg BID   - Hold entecavir as it will need to be renally dosed  - F/u with heme on revlimid dosing.  Pt to follow up with Dr. Jean Claude Hubbard at  [facility].    Elevated Troponin  - suspect due to hypotension and RK, not true cardiac event  - EKG no acute findings.      Pt is medically stable for discharge home and to follow up with his Heme/ONC and Renal    67 year old male PMH aortic aneurysm and bioprosthetic AV valve replacement 2019, HL, splenectomy, partial gastrectomy, partial pancreatectomy, oligosecretory multiple myeloma s/p auto SCT in 2018 on revlimid started david-cybor D 2/10 under care at [facility] by Dr. Jean Claude Hubbard was due for chemo today but was found to be more hypotensive than baseline (usually sbp ~90) and was sent to the ED, admitted with RK.     RK (acute kidney injury).  - Suspect due to diarrhea / chemo related  - S/P IVF, on bicarb drip as per renal --> Switched to LR    - On Sodium bicarb infusion as per renal   - Renal eval appreciated; likely due to ATN in setting of hypotension and diarrhea. Scr now improving. UA bland with hyaline casts. FeNa elevated. CT without obstruction. TMA work   	up negative.  - Cr has plateaued, now downtrending. S/p IVF     Diarrhea  - Suspect chemo related  - However has h/o splenectomy and is on daily penicillin (likely for prophylaxis)  - CT abd/pelvis with liquid stool  - C diff - Neg   - Ceftriaxone/flagyl -- DC'd. Discussed with GI.   Infectious work up has been negative   - PCN resumed   - GI PCR neg   - GI eval appreciated; F/u recs   - Imodium switched to Lomotil as per GI in view of recurrent diarrhea. Monitor closely --> Pt reports improvement in diarrhea. Cont to monitor     Pancytopenia.   - Suspect chemo related  - Received neupogen outpatient prior to arrival   - No active bleeding reported  - Trend cbc daily  - Check iron studies/folate/b12. -- Noted  - Heme/Onc eval appreciated; F/u recs    Elevated LFTs  - Cont to monitor and trend  - Avoid hepatotoxic agents  - GI eval appreciated; F/u recs     Hypotension.  - Not meeting sirs criteria as HR<90, afebrile.  However wbc is low, and has bandemia.  Lactate 1.7.  - ABX DC'd   - BCx2 w/ NGTD; F/u final   - RVP negative and cxr clear.  No clear infectious source on CT abd.  - s/p IVF  hold beta blocker.  - Pt reports BP is currently at baseline. Asymptomatic     Multiple myeloma.   - heme/Onc eval appreciated; Hold systemic treatment while inpatient or in rehabilitation.  Monitor CBC and transfuse to maintain HGB > 7 and PLT > 10.  Continue prophylactic acyclovir 400 mg BID   - Hold entecavir as it will need to be renally dosed  - F/u with heme on revlimid dosing.  Pt to follow up with Dr. Jean Claude Hubbard at  [facility].    Elevated Troponin  - suspect due to hypotension and RK, not true cardiac event  - EKG no acute findings.      Pt is medically stable for discharge home and to follow up with his Heme/ONC and Renal    67 year old male PMH aortic aneurysm and bioprosthetic AV valve replacement 2019, HL, splenectomy, partial gastrectomy, partial pancreatectomy, oligosecretory multiple myeloma s/p auto SCT in 2018 on revlimid started david-cybor D 2/10 under care at [facility] by Dr. Jean Claude Hubbard was due for chemo today but was found to be more hypotensive than baseline (usually sbp ~90) and was sent to the ED, admitted with RK.     RK (acute kidney injury).  - Suspect due to diarrhea / chemo related  - S/P IVF, on bicarb drip as per renal --> Switched to LR    - On Sodium bicarb infusion as per renal   - Renal eval appreciated; likely due to ATN in setting of hypotension and diarrhea. Scr now improving. UA bland with hyaline casts. FeNa elevated. CT without obstruction. TMA work   	up negative.  - Cr has plateaued, now downtrending. S/p IVF     Diarrhea  - Suspect chemo related  - However has h/o splenectomy and is on daily penicillin (likely for prophylaxis)  - CT abd/pelvis with liquid stool  - C diff - Neg   - Ceftriaxone/flagyl -- DC'd. Discussed with GI.   Infectious work up has been negative   - PCN resumed   - GI PCR neg   - GI eval appreciated; F/u recs   - Imodium switched to Lomotil as per GI in view of recurrent diarrhea. Monitor closely --> Pt reports improvement in diarrhea. Cont to monitor     Pancytopenia.   - Suspect chemo related  - Received neupogen outpatient prior to arrival   - No active bleeding reported  - Trend cbc daily  - Check iron studies/folate/b12. -- Noted  - Heme/Onc eval appreciated; F/u recs    Elevated LFTs  - Cont to monitor and trend  - Avoid hepatotoxic agents  - GI eval appreciated; F/u recs     Hypotension.  - Not meeting sirs criteria as HR<90, afebrile.  However wbc is low, and has bandemia.  Lactate 1.7.  - ABX DC'd   - BCx2 w/ NGTD; F/u final   - RVP negative and cxr clear.  No clear infectious source on CT abd.  - s/p IVF  hold beta blocker.  - Pt reports BP is currently at baseline. Asymptomatic     Multiple myeloma.   - heme/Onc eval appreciated; Hold systemic treatment while inpatient or in rehabilitation.  Monitor CBC and transfuse to maintain HGB > 7 and PLT > 10.  Continue prophylactic acyclovir 400 mg BID   - Hold entecavir as it will need to be renally dosed  - F/u with heme on revlimid dosing.  Pt to follow up with Dr. Jean Claude Hubbard at  [facility].    Elevated Troponin  suspect due to hypotension and RK, not true cardiac event  EKG no acute findings.  Pt is medically stable for discharge home and to follow up with his Heme/ONC and Renal    67 year old male PMH aortic aneurysm and bioprosthetic AV valve replacement 2019, HL, splenectomy, partial gastrectomy, partial pancreatectomy, oligosecretory multiple myeloma s/p auto SCT in 2018 on revlimid started david-cybor D 2/10 under care at [facility] by Dr. Jean Claude Hubbard was due for chemo today but was found to be more hypotensive than baseline (usually sbp ~90) and was sent to the ED, admitted with RK.     RK (acute kidney injury).  - Suspect due to diarrhea / chemo related  - S/P IVF, on bicarb drip as per renal --> Switched to LR    - On Sodium bicarb infusion as per renal   - Renal eval appreciated; likely due to ATN in setting of hypotension and diarrhea. Scr now improving. UA bland with hyaline casts. FeNa elevated. CT without obstruction. TMA work   	up negative.  - Cr has plateaued, now downtrending. S/p IVF     Diarrhea  - Suspect chemo related  - However has h/o splenectomy and is on daily penicillin (likely for prophylaxis)  - CT abd/pelvis with liquid stool  - C diff - Neg   - Ceftriaxone/flagyl -- DC'd. Discussed with GI.   Infectious work up has been negative   - PCN resumed   - GI PCR neg   - GI eval appreciated; F/u recs   - Imodium switched to Lomotil as per GI in view of recurrent diarrhea. Monitor closely --> Pt reports improvement in diarrhea. Cont to monitor     Pancytopenia.   - Suspect chemo related  - Received neupogen outpatient prior to arrival   - No active bleeding reported  - Trend cbc daily  - Check iron studies/folate/b12. -- Noted  - Heme/Onc eval appreciated; F/u recs    Elevated LFTs  - Cont to monitor and trend  - Avoid hepatotoxic agents  - GI eval appreciated; F/u recs     Hypotension.  - Not meeting sirs criteria as HR<90, afebrile.  However wbc is low, and has bandemia.  Lactate 1.7.  - ABX DC'd   - BCx2 w/ NGTD; F/u final   - RVP negative and cxr clear.  No clear infectious source on CT abd.  - s/p IVF  hold beta blocker.  - Pt reports BP is currently at baseline. Asymptomatic     Multiple myeloma.   - heme/Onc eval appreciated; Hold systemic treatment while inpatient or in rehabilitation.  Monitor CBC and transfuse to maintain HGB > 7 and PLT > 10.  Continue prophylactic acyclovir 400 mg BID   - Hold entecavir as it will need to be renally dosed  - F/u with heme on revlimid dosing.  Pt to follow up with Dr. Jean Claude Hubbard at  [facility].    Elevated Troponin  suspect due to hypotension and RK, not true cardiac event  EKG no acute findings.  Pt is medically stable for discharge home and to follow up with his Heme/ONC and Renal    67 year old male PMH aortic aneurysm and bioprosthetic AV valve replacement 2019, HL, splenectomy, partial gastrectomy, partial pancreatectomy, oligosecretory multiple myeloma s/p auto SCT in 2018 on revlimid started david-cybor D 2/10 under care at [facility] by Dr. Jean Claude Hubbard was due for chemo today but was found to be more hypotensive than baseline (usually sbp ~90) and was sent to the ED, admitted with RK.     RK (acute kidney injury).  - Suspect due to diarrhea / chemo related  - S/P IVF, on bicarb drip as per renal --> Switched to LR    - On Sodium bicarb infusion as per renal   - Renal eval appreciated; likely due to ATN in setting of hypotension and diarrhea. Scr now improving. UA bland with hyaline casts. FeNa elevated. CT without obstruction. TMA work   	up negative.  - Cr has plateaued, now downtrending. S/p IVF     Diarrhea  - Suspect chemo related  - However has h/o splenectomy and is on daily penicillin (likely for prophylaxis)  - CT abd/pelvis with liquid stool  - C diff - Neg   - Ceftriaxone/flagyl -- DC'd. Discussed with GI.   Infectious work up has been negative   - PCN resumed   - GI PCR neg   - GI eval appreciated; F/u recs   - Imodium switched to Lomotil as per GI in view of recurrent diarrhea. Monitor closely --> Pt reports improvement in diarrhea. Cont to monitor     Pancytopenia.   - Suspect chemo related  - Received neupogen outpatient prior to arrival   - No active bleeding reported  - Trend cbc daily  - Check iron studies/folate/b12. -- Noted  - Heme/Onc eval appreciated; F/u recs    Elevated LFTs  - Cont to monitor and trend  - Avoid hepatotoxic agents  - GI eval appreciated; F/u recs     Hypotension.  - Not meeting sirs criteria as HR<90, afebrile.  However wbc is low, and has bandemia.  Lactate 1.7.  - ABX DC'd   - BCx2 w/ NGTD; F/u final   - RVP negative and cxr clear.  No clear infectious source on CT abd.  - s/p IVF  hold beta blocker.  - Pt reports BP is currently at baseline. Asymptomatic     Multiple myeloma.   - heme/Onc eval appreciated; Hold systemic treatment while inpatient or in rehabilitation.  Monitor CBC and transfuse to maintain HGB > 7 and PLT > 10.  Continue prophylactic acyclovir 400 mg BID   - Hold entecavir as it will need to be renally dosed  - F/u with heme on revlimid dosing.  Pt to follow up with Dr. Jean Claude Hubbard at  [facility].    Elevated Troponin  suspect due to hypotension and RK, not true cardiac event  EKG no acute findings.  Pt is medically stable for discharge home and to follow up with his Heme/ONC and Renal

## 2023-03-09 NOTE — DISCHARGE NOTE NURSING/CASE MANAGEMENT/SOCIAL WORK - NSDCFUADDAPPT_GEN_ALL_CORE_FT
Pt to follow up with Dr. Jean Claude Hubbard at  [facility]. Pt to follow up with Dr. Jean Claude Hubbard at  [facility]. Pt to follow up with Dr. Jean Claude Hubbard at  [facility].

## 2023-03-09 NOTE — DISCHARGE NOTE NURSING/CASE MANAGEMENT/SOCIAL WORK - PATIENT PORTAL LINK FT
You can access the FollowMyHealth Patient Portal offered by [facility] by registering at the following website: [email or web address]. By joining [employer]’s FollowMyHealth portal, you will also be able to view your health information using other applications (apps) compatible with our system. You can access the FollowMyHealth Patient Portal offered by [facility] by registering at the following website: [email or web address]. By joining [employer]’s FollowMyHealth portal, you will also be able to view your health information using other applications (apps) compatible with our system. You can access the FollowMyHealth Patient Portal offered by [facility] by registering at the following website: [email or web address]. By joining [employer]’s FollowMyHealth portal, you will also be able to view your health information using other applications (apps) compatible with our system.

## 2023-03-09 NOTE — PROGRESS NOTE ADULT - SUBJECTIVE AND OBJECTIVE BOX
Patient is a 67y old  Male who presents with a chief complaint of 'I took a turn for the worst' (09 Mar 2023 13:15)    He feels well, no diarrhea. Planned for discharge.    MEDICATIONS  (STANDING):  acyclovir   Oral Tab/Cap 400 milliGRAM(s) Oral daily  aspirin enteric coated 81 milliGRAM(s) Oral daily  atorvastatin 40 milliGRAM(s) Oral at bedtime  chlorhexidine 2% Cloths 1 Application(s) Topical daily  dexAMETHasone     Tablet 4 milliGRAM(s) Oral daily  diphenoxylate/atropine 1 Tablet(s) Oral two times a day  lactated ringers. 1000 milliLiter(s) (100 mL/Hr) IV Continuous <Continuous>  montelukast 10 milliGRAM(s) Oral daily  multivitamin 1 Tablet(s) Oral daily  penicillin   milliGRAM(s) Oral every 12 hours    MEDICATIONS  (PRN):  ondansetron   Disintegrating Tablet 8 milliGRAM(s) Oral three times a day PRN Nausea and/or Vomiting    Vital Signs Last 24 Hrs  T(C): 36.7 (09 Mar 2023 08:40), Max: 36.7 (09 Mar 2023 01:26)  T(F): 98.1 (09 Mar 2023 08:40), Max: 98.1 (09 Mar 2023 08:40)  HR: 89 (09 Mar 2023 08:40) (71 - 89)  BP: 105/65 (09 Mar 2023 08:40) (104/62 - 105/65)  BP(mean): --  RR: 18 (09 Mar 2023 08:40) (18 - 185)  SpO2: 96% (09 Mar 2023 08:40) (95% - 96%)    Parameters below as of 09 Mar 2023 08:40  Patient On (Oxygen Delivery Method): room air        PE  NAD  Awake, alert  Anicteric, MMM  RRR  CTAB  Abd soft, NT, ND  No edema                          7.8    2.07  )-----------( 50       ( 09 Mar 2023 06:54 )             22.5       03-09    143  |  105  |  64<H>  ----------------------------<  128<H>  3.5   |  23  |  2.92<H>    Ca    7.5<L>      09 Mar 2023 06:54

## 2023-03-09 NOTE — DISCHARGE NOTE PROVIDER - PROVIDER TOKENS
PROVIDER:[TOKEN:[[id number]]] PROVIDER:[TOKEN:[[id number]]] PROVIDER:[TOKEN:[[id number]]] PROVIDER:[TOKEN:[[id number]]],PROVIDER:[TOKEN:[[id number]]] PROVIDER:[TOKEN:[[id number]]],PROVIDER:[TOKEN:[[id number]]] PROVIDER:[TOKEN:[[id number]]],PROVIDER:[TOKEN:[[id number]]]

## 2023-03-09 NOTE — PROGRESS NOTE ADULT - SUBJECTIVE AND OBJECTIVE BOX
[facility] NEPHROLOGY- PROGRESS NOTE    67y Male with history of MM on chemotherapy presents with hypotension. Nephrology consulted for elevated Scr.    REVIEW OF SYSTEMS:  Gen: no fevers  Cards: no chest pain  Resp: no dyspnea  GI: no nausea or vomiting, + diarrhea resolved  Vascular: no LE edema    No Known Allergies      Hospital Medications: Medications reviewed      VITALS:  T(F): 98.1 (23 @ 08:40), Max: 98.1 (23 @ 08:40)  HR: 89 (23 @ 08:40)  BP: 105/65 (23 @ 08:40)  RR: 18 (23 @ 08:40)  SpO2: 96% (23 @ 08:40)  Wt(kg): --     @ 07:01  -   @ 07:00  --------------------------------------------------------  IN: 1200 mL / OUT: 0 mL / NET: 1200 mL        PHYSICAL EXAM:    Gen: NAD, calm  Cards: RRR, +S1/S2, no M/G/R  Resp: CTA B/L  GI: soft, NT/ND, NABS  Vascular: no LE edema B/L      LABS:      143  |  105  |  64<H>  ----------------------------<  128<H>  3.5   |  23  |  2.92<H>    Ca    7.5<L>      09 Mar 2023 06:54      Creatinine Trend: 2.92 <--, 3.82 <--, 4.33 <--, 4.86 <--, 5.04 <--, 4.64 <--, 4.40 <--, 4.01 <--                        7.8    2.07  )-----------( 50       ( 09 Mar 2023 06:54 )             22.5     Urine Studies:  Urinalysis Basic - ( 04 Mar 2023 07:30 )    Color: Yellow / Appearance: Slightly Turbid / S.014 / pH:   Gluc:  / Ketone: Negative  / Bili: Negative / Urobili: Negative   Blood:  / Protein: 100 mg/dl / Nitrite: Negative   Leuk Esterase: Negative / RBC: 3 /hpf / WBC 6 /HPF   Sq Epi:  / Non Sq Epi: 4 /hpf / Bacteria: Negative      Creatinine, Random Urine: 83 mg/dL ( @ 08:48)  Sodium, Random Urine: 67 mmol/L ( @ 08:48)         [facility] NEPHROLOGY- PROGRESS NOTE    67y Male with history of MM on chemotherapy presents with hypotension. Nephrology consulted for elevated Scr.    REVIEW OF SYSTEMS:  Gen: no fevers  Cards: no chest pain  Resp: no dyspnea  GI: no nausea or vomiting, + diarrhea resolved  Vascular: no LE edema    No Known Allergies      Hospital Medications: Medications reviewed      VITALS:  T(F): 98.1 (23 @ 08:40), Max: 98.1 (23 @ 08:40)  HR: 89 (23 @ 08:40)  BP: 105/65 (23 @ 08:40)  RR: 18 (23 @ 08:40)  SpO2: 96% (23 @ 08:40)  Wt(kg): --     @ 07:01  -   @ 07:00  --------------------------------------------------------  IN: 1200 mL / OUT: 0 mL / NET: 1200 mL        PHYSICAL EXAM:    Gen: NAD, calm  Cards: RRR, +S1/S2, no M/G/R  Resp: CTA B/L  GI: soft, NT/ND, NABS  Vascular: no LE edema B/L      LABS:      143  |  105  |  64<H>  ----------------------------<  128<H>  3.5   |  23  |  2.92<H>    Ca    7.5<L>      09 Mar 2023 06:54      Creatinine Trend: 2.92 <--, 3.82 <--, 4.33 <--, 4.86 <--, 5.04 <--, 4.64 <--, 4.40 <--, 4.01 <--                        7.8    2.07  )-----------( 50       ( 09 Mar 2023 06:54 )             22.5     Urine Studies:  Urinalysis Basic - ( 04 Mar 2023 07:30 )    Color: Yellow / Appearance: Slightly Turbid / S.014 / pH:   Gluc:  / Ketone: Negative  / Bili: Negative / Urobili: Negative   Blood:  / Protein: 100 mg/dl / Nitrite: Negative   Leuk Esterase: Negative / RBC: 3 /hpf / WBC 6 /HPF   Sq Epi:  / Non Sq Epi: 4 /hpf / Bacteria: Negative      Creatinine, Random Urine: 83 mg/dL ( @ 08:48)  Sodium, Random Urine: 67 mmol/L ( @ 08:48)         [facility] NEPHROLOGY- PROGRESS NOTE    67y Male with history of MM on chemotherapy presents with hypotension. Nephrology consulted for elevated Scr.    REVIEW OF SYSTEMS:  Gen: no fevers  Cards: no chest pain  Resp: no dyspnea  GI: no nausea or vomiting, + diarrhea resolved  Vascular: no LE edema    No Known Allergies      Hospital Medications: Medications reviewed      VITALS:  T(F): 98.1 (23 @ 08:40), Max: 98.1 (23 @ 08:40)  HR: 89 (23 @ 08:40)  BP: 105/65 (23 @ 08:40)  RR: 18 (23 @ 08:40)  SpO2: 96% (23 @ 08:40)  Wt(kg): --     @ 07:01  -   @ 07:00  --------------------------------------------------------  IN: 1200 mL / OUT: 0 mL / NET: 1200 mL        PHYSICAL EXAM:    Gen: NAD, calm  Cards: RRR, +S1/S2, no M/G/R  Resp: CTA B/L  GI: soft, NT/ND, NABS  Vascular: no LE edema B/L      LABS:      143  |  105  |  64<H>  ----------------------------<  128<H>  3.5   |  23  |  2.92<H>    Ca    7.5<L>      09 Mar 2023 06:54      Creatinine Trend: 2.92 <--, 3.82 <--, 4.33 <--, 4.86 <--, 5.04 <--, 4.64 <--, 4.40 <--, 4.01 <--                        7.8    2.07  )-----------( 50       ( 09 Mar 2023 06:54 )             22.5     Urine Studies:  Urinalysis Basic - ( 04 Mar 2023 07:30 )    Color: Yellow / Appearance: Slightly Turbid / S.014 / pH:   Gluc:  / Ketone: Negative  / Bili: Negative / Urobili: Negative   Blood:  / Protein: 100 mg/dl / Nitrite: Negative   Leuk Esterase: Negative / RBC: 3 /hpf / WBC 6 /HPF   Sq Epi:  / Non Sq Epi: 4 /hpf / Bacteria: Negative      Creatinine, Random Urine: 83 mg/dL ( @ 08:48)  Sodium, Random Urine: 67 mmol/L ( @ 08:48)

## 2023-03-09 NOTE — DISCHARGE NOTE NURSING/CASE MANAGEMENT/SOCIAL WORK - CAREGIVER ADDRESS
[address] [place], [place] [id number] 1160 [address] [place], [place] [id number] 5568 [address] [place], [place] [id number]

## 2023-03-09 NOTE — PROGRESS NOTE ADULT - ASSESSMENT
67 year old male history of aortic aneurysm and bioprosthetic AV valve replacement 2019, HL, splenectomy, partial gastrectomy, partial pancreatectomy, oligosecretory multiple myeloma s/p auto SCT in 2018 on revlimid started david-cybor D 2/10 [place] [facility] [place] Jean Claude Hubbard who presented for hypotension.    1. Diarrhea  - likely 2/2 chemo rather than infectious (stools negative)  - switched to lomotil 1 tab bid-- diarrhea resolved   - tolerating low fiber diet  - no indication for abx     2. Multiple myeloma  - on revlimid, started then on daratumumab + cyclophosphamide + bortezomib + dexamethasone.  Last dose of daratumumab was on March 3rd. Last dose of cyclophosphamide and bortezomib was on February 24th.  - per heme/onc     3. RK   -likely 2/2 dehydration from diarrhea  - creatinine downtrending   - on IVF    4. Pancytopenia   - since newest chemo regimen started   - no overt GI bleeding  - trend CBCs, transfuse prn    DC planning underay      Attending supervision statement: I have personally seen and examined the patient. I fully participated in the care of this patient. I have made amendments to the documentation where necessary, and agree with the history, physical exam, and plan as outlined by the ACP.     67 year old male history of aortic aneurysm and bioprosthetic AV valve replacement 2019, HL, splenectomy, partial gastrectomy, partial pancreatectomy, oligosecretory multiple myeloma s/p auto SCT in 2018 on revlimid started david-cybor D 2/10 [place] [facility] [place]. Jean Claude Hubbard who presented for hypotension.    1. Diarrhea  - likely 2/2 chemo rather than infectious (stools negative)  - switched to lomotil 1 tab bid-- diarrhea resolved   - tolerating low fiber diet  - no indication for abx     2. Multiple myeloma  - on revlimid, started then on daratumumab + cyclophosphamide + bortezomib + dexamethasone.  Last dose of daratumumab was on March 3rd. Last dose of cyclophosphamide and bortezomib was on February 24th.  - per heme/onc     3. RK   -likely 2/2 dehydration from diarrhea  - creatinine downtrending   - on IVF    4. Pancytopenia   - since newest chemo regimen started   - no overt GI bleeding  - trend CBCs, transfuse prn    DC planning underay      Attending supervision statement: I have personally seen and examined the patient. I fully participated in the care of this patient. I have made amendments to the documentation where necessary, and agree with the history, physical exam, and plan as outlined by the ACP.     67 year old male history of aortic aneurysm and bioprosthetic AV valve replacement 2019, HL, splenectomy, partial gastrectomy, partial pancreatectomy, oligosecretory multiple myeloma s/p auto SCT in 2018 on revlimid started david-cybor D 2/10 [place] [facility]. Jean Claude Hubbard who presented for hypotension.    1. Diarrhea  - likely 2/2 chemo rather than infectious (stools negative)  - switched to lomotil 1 tab bid-- diarrhea resolved   - tolerating low fiber diet  - no indication for abx     2. Multiple myeloma  - on revlimid, started then on daratumumab + cyclophosphamide + bortezomib + dexamethasone.  Last dose of daratumumab was on March 3rd. Last dose of cyclophosphamide and bortezomib was on February 24th.  - per heme/onc     3. RK   -likely 2/2 dehydration from diarrhea  - creatinine downtrending   - on IVF    4. Pancytopenia   - since newest chemo regimen started   - no overt GI bleeding  - trend CBCs, transfuse prn    DC planning underay      Attending supervision statement: I have personally seen and examined the patient. I fully participated in the care of this patient. I have made amendments to the documentation where necessary, and agree with the history, physical exam, and plan as outlined by the ACP.

## 2023-03-09 NOTE — DISCHARGE NOTE PROVIDER - NPI NUMBER (FOR SYSADMIN USE ONLY) :
[[id number]] [[id number]] [[id number]] [[id number]],[[id number]] [[id number]],[[id number]] [[id number]],[[id number]]

## 2023-03-09 NOTE — DISCHARGE NOTE PROVIDER - NSDCMRMEDTOKEN_GEN_ALL_CORE_FT
aspirin 81 mg oral tablet: 1 tab(s) orally once a day  atorvastatin 40 mg oral tablet: 1 tab(s) orally once a day  dexamethasone 4 mg oral tablet: 1 tab(s) orally once a day  entecavir 0.5 mg oral tablet: 1 tab(s) orally once a day  Metoprolol Tartrate 50 mg oral tablet: 1 tab(s) orally 2 times a day  ondansetron 8 mg oral tablet, disintegratin tab(s) orally 3 times a day  penicillin V potassium 250 mg oral tablet: 1 tab(s) orally 2 times a day  Revlimid 10 mg oral capsule: 1 cap(s) orally once a day  Singulair 10 mg oral tablet: 1 tab(s) orally once a day   acyclovir 400 mg oral tablet: 1 tab(s) orally once a day  aspirin 81 mg oral tablet: 1 tab(s) orally once a day  atorvastatin 40 mg oral tablet: 1 tab(s) orally once a day  dexamethasone 4 mg oral tablet: 1 tab(s) orally once a day  ondansetron 8 mg oral tablet, disintegratin tab(s) orally 3 times a day  penicillin V potassium 250 mg oral tablet: 1 tab(s) orally 2 times a day  Revlimid 10 mg oral capsule: 1 cap(s) orally once a day  Singulair 10 mg oral tablet: 1 tab(s) orally once a day

## 2023-03-09 NOTE — DISCHARGE NOTE PROVIDER - NSDCFUADDAPPT_GEN_ALL_CORE_FT
Pt to follow up with Dr. Jean Claude Hubbard at  [facility]. Pt to follow up with Dr. Jean Claude Hubbard at  [facility]. Pt to follow up with Dr. Jean Claude Hubbard at  [facility].

## 2023-03-09 NOTE — DISCHARGE NOTE NURSING/CASE MANAGEMENT/SOCIAL WORK - NSDCPEFALRISK_GEN_ALL_CORE
For information on Fall & Injury Prevention, visit: https://www.[facility]/news/fall-prevention-protects-and-maintains-health-and-mobility OR  https://www.[facility]/news/fall-prevention-tips-to-avoid-injury OR  https://www.cdc.gov/steadi/patient.html For information on Fall & Injury Prevention, visit: https://www.[facility]/news/fall-prevention-protects-and-maintains-health-and-mobility OR  https://www.[facility]/news/fall-prevention-tips-to-avoid-injury OR  https://www.cdc.gov/steadi/patient.html For information on Fall & Injury Prevention, visit: https://www.[facility]/news/fall-prevention-protects-and-maintains-health-and-mobility OR  https://www.[facility]/news/fall-prevention-tips-to-avoid-injury OR  https://www.cdc.gov/steadi/patient.html

## 2023-03-09 NOTE — PROGRESS NOTE ADULT - REASON FOR ADMISSION
'I took a turn for the worst'

## 2023-03-09 NOTE — DISCHARGE NOTE PROVIDER - CARE PROVIDERS DIRECT ADDRESSES
,DirectAddress_Unknown ,DirectAddress_Unknown,[email or web address] ,DirectAddress_Unknown,[email or web address] ,DirectAddress_Unknown,[email or web address]

## 2023-03-09 NOTE — PROGRESS NOTE ADULT - ASSESSMENT
PETER DE LA PAZ is a 67y Male who presents with a chief complaint of hypotension.    Multiple Myeloma  Pancytopenia  ·	Patient follows with Dr. Jean Claude Hubbard, [facility].  ·	Patient had been on lenalidomide maintenance up until February 2023, when he had PET/CT that showed progression of disease with worsening lymphadenopathy, bony lesions, and liver lesion.   ·	He was started then on daratumumab + cyclophosphamide + bortezomib + dexamethasone.  ·	Last dose of daratumumab was on March 3rd.  ·	Last dose of cyclophosphamide and bortezomib was on February 24th.  ·	Patient has had worsening pancytopenia since starting the latest chemotherapy regimen.  ·	Hold systemic treatment while inpatient.  ·	Monitor CBC and transfuse to maintain HGB > 7 and PLT > 10.  ·	Continue prophylactic acyclovir 400 mg BID as patient is on bortezomib    Acute Kidney Injury  ·	Baseline creatinine is at 1.2-1.3, creatinine was 1.2 on February 24th.    ·	Possibly due to diarrhea and hypotension  ·	Nephrology consulted, continue IVF.   ·	Monitor kidney functions closely. Now down to 2.92 from 5.04.    Diarrhea  ·	Possibly due to chemotherapy. GI PCR and C diff negative  ·	Diarrhea currently resolved    Will continue to follow.   D/c home today.    Teena Riley D.O.   Hematology/Oncology  [facility]  [phone] (office)  PETER DE LA PAZ is a 67y Male who presents with a chief complaint of hypotension.    Multiple Myeloma  Pancytopenia  ·	Patient follows with Dr. Jean Claude Hubbard, [facility].  ·	Patient had been on lenalidomide maintenance up until February 2023, when he had PET/CT that showed progression of disease with worsening lymphadenopathy, bony lesions, and liver lesion.   ·	He was started then on daratumumab + cyclophosphamide + bortezomib + dexamethasone.  ·	Last dose of daratumumab was on March 3rd.  ·	Last dose of cyclophosphamide and bortezomib was on February 24th.  ·	Patient has had worsening pancytopenia since starting the latest chemotherapy regimen.  ·	Hold systemic treatment while inpatient.  ·	Monitor CBC and transfuse to maintain HGB > 7 and PLT > 10.  ·	Continue prophylactic acyclovir 400 mg BID as patient is on bortezomib    Acute Kidney Injury  ·	Baseline creatinine is at 1.2-1.3, creatinine was 1.2 on February 24th.    ·	Possibly due to diarrhea and hypotension  ·	Nephrology consulted, continue IVF.   ·	Monitor kidney functions closely. Now down to 2.92 from 5.04.    Diarrhea  ·	Possibly due to chemotherapy. GI PCR and C diff negative  ·	Diarrhea currently resolved    Will continue to follow.   D/c home today.    Teena Riley D.O.   Hematology/Oncology  [facility]  [phone] (office)  PETER DE LA PAZ is a 67y Male who presents with a chief complaint of hypotension.    Multiple Myeloma  Pancytopenia  ·	Patient follows with Dr. Jean Claude Hubbard, [facility].  ·	Patient had been on lenalidomide maintenance up until February 2023, when he had PET/CT that showed progression of disease with worsening lymphadenopathy, bony lesions, and liver lesion.   ·	He was started then on daratumumab + cyclophosphamide + bortezomib + dexamethasone.  ·	Last dose of daratumumab was on March 3rd.  ·	Last dose of cyclophosphamide and bortezomib was on February 24th.  ·	Patient has had worsening pancytopenia since starting the latest chemotherapy regimen.  ·	Hold systemic treatment while inpatient.  ·	Monitor CBC and transfuse to maintain HGB > 7 and PLT > 10.  ·	Continue prophylactic acyclovir 400 mg BID as patient is on bortezomib    Acute Kidney Injury  ·	Baseline creatinine is at 1.2-1.3, creatinine was 1.2 on February 24th.    ·	Possibly due to diarrhea and hypotension  ·	Nephrology consulted, continue IVF.   ·	Monitor kidney functions closely. Now down to 2.92 from 5.04.    Diarrhea  ·	Possibly due to chemotherapy. GI PCR and C diff negative  ·	Diarrhea currently resolved    Will continue to follow.   D/c home today.    Teena Riley D.O.   Hematology/Oncology  [facility]  [phone] (office)

## 2023-03-09 NOTE — PROGRESS NOTE ADULT - SUBJECTIVE AND OBJECTIVE BOX
Name of Patient : PETER DE LA PAZ  MRN: [id number]  Date of visit: 03-09-23        Subjective: Patient seen and examined. No new events except as noted.   Patient seen earlier this AM. Sitting up in bed. Patient reports feeling well. States his diarrhea has resolved.   Patient reports feeling well. Cr down-trending     REVIEW OF SYSTEMS:    CONSTITUTIONAL: No weakness, fevers or chills  EYES/ENT: No visual changes;  No vertigo or throat pain   NECK: No pain or stiffness  RESPIRATORY: No cough, wheezing, hemoptysis; No shortness of breath  CARDIOVASCULAR: No chest pain or palpitations  GASTROINTESTINAL: No abdominal or epigastric pain. No nausea, vomiting, or hematemesis; No diarrhea or constipation. No melena or hematochezia.  GENITOURINARY: No dysuria, frequency or hematuria  NEUROLOGICAL: No numbness or weakness  SKIN: No itching, burning, rashes, or lesions   All other review of systems is negative unless indicated above.    MEDICATIONS:  MEDICATIONS  (STANDING):  acyclovir   Oral Tab/Cap 400 milliGRAM(s) Oral daily  aspirin enteric coated 81 milliGRAM(s) Oral daily  atorvastatin 40 milliGRAM(s) Oral at bedtime  chlorhexidine 2% Cloths 1 Application(s) Topical daily  dexAMETHasone     Tablet 4 milliGRAM(s) Oral daily  diphenoxylate/atropine 1 Tablet(s) Oral two times a day  lactated ringers. 1000 milliLiter(s) (100 mL/Hr) IV Continuous <Continuous>  montelukast 10 milliGRAM(s) Oral daily  multivitamin 1 Tablet(s) Oral daily  penicillin   milliGRAM(s) Oral every 12 hours      PHYSICAL EXAM:  T(C): 36.7 (03-09-23 @ 08:40), Max: 36.7 (03-09-23 @ 01:26)  HR: 89 (03-09-23 @ 08:40) (71 - 89)  BP: 105/65 (03-09-23 @ 08:40) (104/62 - 105/65)  RR: 18 (03-09-23 @ 08:40) (18 - 185)  SpO2: 96% (03-09-23 @ 08:40) (95% - 96%)  Wt(kg): --  I&O's Summary    08 Mar 2023 07:01  -  09 Mar 2023 07:00  --------------------------------------------------------  IN: 1200 mL / OUT: 0 mL / NET: 1200 mL          Appearance: Normal	  HEENT:  Eyes are open; Glasses   Lymphatic: No lymphadenopathy   Cardiovascular: Normal S1 S2, no JVD  Respiratory: normal effort , clear  Gastrointestinal:  Soft, Non-tender  Skin: No rashes,  warm to touch  Psychiatry:  Mood & affect appropriate  Musculoskeletal: No edema      03-08-23 @ 07:01  -  03-09-23 @ 07:00  --------------------------------------------------------  IN: 1200 mL / OUT: 0 mL / NET: 1200 mL                              7.8    2.07  )-----------( 50       ( 09 Mar 2023 06:54 )             22.5               03-09    143  |  105  |  64<H>  ----------------------------<  128<H>  3.5   |  23  |  2.92<H>    Ca    7.5<L>      09 Mar 2023 06:54             Culture - Blood (03.03.23 @ 11:30)   Specimen Source: .Blood Blood-Peripheral   Culture Results: No Growth Final     Culture - Blood (03.03.23 @ 11:30)   Specimen Source: .Blood Blood-Peripheral   Culture Results: No Growth Final          Name of Patient : PETER DE LA PAZ  MRN: [id number]  Date of visit: 03-09-23        Subjective: Patient seen and examined. No new events except as noted.   Patient seen earlier this AM. Sitting up in bed. Patient reports feeling well. States his diarrhea has resolved.   Patient reports feeling well. Cr down-trending     REVIEW OF SYSTEMS:    CONSTITUTIONAL: No weakness, fevers or chills  EYES/ENT: No visual changes;  No vertigo or throat pain   NECK: No pain or stiffness  RESPIRATORY: No cough, wheezing, hemoptysis; No shortness of breath  CARDIOVASCULAR: No chest pain or palpitations  GASTROINTESTINAL: No abdominal or epigastric pain. No nausea, vomiting, or hematemesis; No diarrhea or constipation. No melena or hematochezia.  GENITOURINARY: No dysuria, frequency or hematuria  NEUROLOGICAL: No numbness or weakness  SKIN: No itching, burning, rashes, or lesions   All other review of systems is negative unless indicated above.    MEDICATIONS:  MEDICATIONS  (STANDING):  acyclovir   Oral Tab/Cap 400 milliGRAM(s) Oral daily  aspirin enteric coated 81 milliGRAM(s) Oral daily  atorvastatin 40 milliGRAM(s) Oral at bedtime  chlorhexidine 2% Cloths 1 Application(s) Topical daily  dexAMETHasone     Tablet 4 milliGRAM(s) Oral daily  diphenoxylate/atropine 1 Tablet(s) Oral two times a day  lactated ringers. 1000 milliLiter(s) (100 mL/Hr) IV Continuous <Continuous>  montelukast 10 milliGRAM(s) Oral daily  multivitamin 1 Tablet(s) Oral daily  penicillin   milliGRAM(s) Oral every 12 hours      PHYSICAL EXAM:  T(C): 36.7 (03-09-23 @ 08:40), Max: 36.7 (03-09-23 @ 01:26)  HR: 89 (03-09-23 @ 08:40) (71 - 89)  BP: 105/65 (03-09-23 @ 08:40) (104/62 - 105/65)  RR: 18 (03-09-23 @ 08:40) (18 - 185)  SpO2: 96% (03-09-23 @ 08:40) (95% - 96%)  Wt(kg): --  I&O's Summary    08 Mar 2023 07:01  -  09 Mar 2023 07:00  --------------------------------------------------------  IN: 1200 mL / OUT: 0 mL / NET: 1200 mL          Appearance: Normal	  HEENT:  Eyes are open; Glasses   Lymphatic: No lymphadenopathy   Cardiovascular: Normal S1 S2, no JVD  Respiratory: normal effort , clear  Gastrointestinal:  Soft, Non-tender  Skin: No rashes,  warm to touch  Psychiatry:  Mood & affect appropriate  Musculoskeletal: No edema      03-08-23 @ 07:01  -  03-09-23 @ 07:00  --------------------------------------------------------  IN: 1200 mL / OUT: 0 mL / NET: 1200 mL                              7.8    2.07  )-----------( 50       ( 09 Mar 2023 06:54 )             22.5               03-09    143  |  105  |  64<H>  ----------------------------<  128<H>  3.5   |  23  |  2.92<H>    Ca    7.5<L>      09 Mar 2023 06:54             Culture - Blood (03.03.23 @ 11:30)   Specimen Source: .Blood Blood-Peripheral   Culture Results: No Growth Final     Culture - Blood (03.03.23 @ 11:30)   Specimen Source: .Blood Blood-Peripheral   Culture Results: No Growth Final          Name of Patient : PETER DE LA PAZ  MRN: [id number]  Date of visit: 03-09-23        Subjective: Patient seen and examined. No new events except as noted.   Patient seen earlier this AM. Sitting up in bed. Patient reports feeling well. States his diarrhea has resolved.   Patient reports feeling well. Cr down-trending     REVIEW OF SYSTEMS:    CONSTITUTIONAL: No weakness, fevers or chills  EYES/ENT: No visual changes;  No vertigo or throat pain   NECK: No pain or stiffness  RESPIRATORY: No cough, wheezing, hemoptysis; No shortness of breath  CARDIOVASCULAR: No chest pain or palpitations  GASTROINTESTINAL: No abdominal or epigastric pain. No nausea, vomiting, or hematemesis; No diarrhea or constipation. No melena or hematochezia.  GENITOURINARY: No dysuria, frequency or hematuria  NEUROLOGICAL: No numbness or weakness  SKIN: No itching, burning, rashes, or lesions   All other review of systems is negative unless indicated above.    MEDICATIONS:  MEDICATIONS  (STANDING):  acyclovir   Oral Tab/Cap 400 milliGRAM(s) Oral daily  aspirin enteric coated 81 milliGRAM(s) Oral daily  atorvastatin 40 milliGRAM(s) Oral at bedtime  chlorhexidine 2% Cloths 1 Application(s) Topical daily  dexAMETHasone     Tablet 4 milliGRAM(s) Oral daily  diphenoxylate/atropine 1 Tablet(s) Oral two times a day  lactated ringers. 1000 milliLiter(s) (100 mL/Hr) IV Continuous <Continuous>  montelukast 10 milliGRAM(s) Oral daily  multivitamin 1 Tablet(s) Oral daily  penicillin   milliGRAM(s) Oral every 12 hours      PHYSICAL EXAM:  T(C): 36.7 (03-09-23 @ 08:40), Max: 36.7 (03-09-23 @ 01:26)  HR: 89 (03-09-23 @ 08:40) (71 - 89)  BP: 105/65 (03-09-23 @ 08:40) (104/62 - 105/65)  RR: 18 (03-09-23 @ 08:40) (18 - 185)  SpO2: 96% (03-09-23 @ 08:40) (95% - 96%)  Wt(kg): --  I&O's Summary    08 Mar 2023 07:01  -  09 Mar 2023 07:00  --------------------------------------------------------  IN: 1200 mL / OUT: 0 mL / NET: 1200 mL          Appearance: Normal	  HEENT:  Eyes are open; Glasses   Lymphatic: No lymphadenopathy   Cardiovascular: Normal S1 S2, no JVD  Respiratory: normal effort , clear  Gastrointestinal:  Soft, Non-tender  Skin: No rashes,  warm to touch  Psychiatry:  Mood & affect appropriate  Musculoskeletal: No edema      03-08-23 @ 07:01  -  03-09-23 @ 07:00  --------------------------------------------------------  IN: 1200 mL / OUT: 0 mL / NET: 1200 mL                              7.8    2.07  )-----------( 50       ( 09 Mar 2023 06:54 )             22.5               03-09    143  |  105  |  64<H>  ----------------------------<  128<H>  3.5   |  23  |  2.92<H>    Ca    7.5<L>      09 Mar 2023 06:54             Culture - Blood (03.03.23 @ 11:30)   Specimen Source: .Blood Blood-Peripheral   Culture Results: No Growth Final     Culture - Blood (03.03.23 @ 11:30)   Specimen Source: .Blood Blood-Peripheral   Culture Results: No Growth Final

## 2023-03-09 NOTE — PROGRESS NOTE ADULT - SUBJECTIVE AND OBJECTIVE BOX
Chief Complaint:  Patient is a 67y old  Male who presents with a chief complaint of 'I took a turn for the worst' (09 Mar 2023 10:43)      Date of service 03-09-23 @ 13:16      Interval Events:   Patient seen and examined.   Feels well. Diarrhea resolved.   Planned for discharge today.     Hospital Medications:  acyclovir   Oral Tab/Cap 400 milliGRAM(s) Oral daily  aspirin enteric coated 81 milliGRAM(s) Oral daily  atorvastatin 40 milliGRAM(s) Oral at bedtime  chlorhexidine 2% Cloths 1 Application(s) Topical daily  dexAMETHasone     Tablet 4 milliGRAM(s) Oral daily  diphenoxylate/atropine 1 Tablet(s) Oral two times a day  lactated ringers. 1000 milliLiter(s) IV Continuous <Continuous>  montelukast 10 milliGRAM(s) Oral daily  multivitamin 1 Tablet(s) Oral daily  ondansetron   Disintegrating Tablet 8 milliGRAM(s) Oral three times a day PRN  penicillin   milliGRAM(s) Oral every 12 hours        Review of Systems:  General:  No wt loss, fevers, chills, night sweats, fatigue,   Eyes:  Good vision, no reported pain  ENT:  No sore throat, pain, runny nose, dysphagia  CV:  No pain, palpitations, hypo/hypertension  Resp:  No dyspnea, cough, tachypnea, wheezing  GI:  See HPI  :  No pain, bleeding, incontinence, nocturia  Muscle:  No pain, weakness  Neuro:  No weakness, tingling, memory problems  Psych:  No fatigue, insomnia, mood problems, depression  Endocrine:  No polyuria, polydipsia, cold/heat intolerance  Heme:  No petechiae, ecchymosis, easy bruisability  Integumentary:  No rash, edema    PHYSICAL EXAM:   Vital Signs:  Vital Signs Last 24 Hrs  T(C): 36.7 (09 Mar 2023 08:40), Max: 36.7 (09 Mar 2023 01:26)  T(F): 98.1 (09 Mar 2023 08:40), Max: 98.1 (09 Mar 2023 08:40)  HR: 89 (09 Mar 2023 08:40) (71 - 89)  BP: 105/65 (09 Mar 2023 08:40) (97/60 - 105/65)  BP(mean): --  RR: 18 (09 Mar 2023 08:40) (18 - 185)  SpO2: 96% (09 Mar 2023 08:40) (94% - 96%)    Parameters below as of 09 Mar 2023 08:40  Patient On (Oxygen Delivery Method): room air      Daily     Daily       PHYSICAL EXAM:     GENERAL:  Appears stated age, well-groomed, well-nourished, no distress  HEENT:  NC/AT,  conjunctivae anicteric, clear and pink,   NECK: supple, trachea midline  CHEST:  Full & symmetric excursion, no increased effort, breath sounds clear  HEART:  Regular rhythm, no JVD  ABDOMEN:  Soft, non-tender, non-distended, normoactive bowel sounds,  no masses , no hepatosplenomegaly  EXTREMITIES:  no cyanosis,clubbing or edema  SKIN:  No rash, erythema, or, ecchymoses, no jaundice  NEURO:  Alert, non-focal, no asterixis  PSYCH: Appropriate affect, oriented to place and time  RECTAL: Deferred      LABS Personally reviewed by me:                        7.8    2.07  )-----------( 50       ( 09 Mar 2023 06:54 )             22.5     Mean Cell Volume: 97.4 fl (03-09-23 @ 06:54)    03-09    143  |  105  |  64<H>  ----------------------------<  128<H>  3.5   |  23  |  2.92<H>    Ca    7.5<L>      09 Mar 2023 06:54                                    7.8    2.07  )-----------( 50       ( 09 Mar 2023 06:54 )             22.5                         7.3    2.03  )-----------( 50       ( 08 Mar 2023 06:34 )             21.2                         7.6    2.21  )-----------( 51       ( 07 Mar 2023 06:46 )             22.0       Imaging personally reviewed by me:

## 2023-03-09 NOTE — DISCHARGE NOTE PROVIDER - NSDCCPCAREPLAN_GEN_ALL_CORE_FT
PRINCIPAL DISCHARGE DIAGNOSIS  Diagnosis: RK (acute kidney injury)  Assessment and Plan of Treatment: Suspect due to diarrhea / chemo related  - You received  IVF and required a bicarb drip as per renal --> eventually swich to regular IVF     -  Scr now improving.  CT without obstruction. TMA work   up negative.  - Cr has plateaued, now downtrending.   Please follow up with your PCP / nephrologist         SECONDARY DISCHARGE DIAGNOSES  Diagnosis: Diarrhea  Assessment and Plan of Treatment: Suspect chemo related  - CT abd/pelvis with liquid stool  - C diff - Neg   - Ceftriaxone/flagyl -- DC'd. Discussed with GI.   Infectious work up has been negative    Diagnosis: Multiple myeloma  Assessment and Plan of Treatment: Please follow up at [facility] with Dr. Dr. Jean Claude Hubbard    Diagnosis: Pancytopenia  Assessment and Plan of Treatment: Suspect chemo related  - Received neupogen outpatient prior to arrival   - No active bleeding reported  Follow up with your Oncologist    Diagnosis: Hyperlipidemia  Assessment and Plan of Treatment: Continue with your cholesterol medications. Eat a heart healthy diet that is low in saturated fats and salt, and includes whole grains, fruits, vegetables and lean protein; exercise regularly (consult with your physician or cardiologist first); maintain a heart healthy weight; if you smoke - quit (A resource to help you stop smoking is the [facility] Tobacco [facility] – phone number [phone].). Continue to follow with your primary physician or cardiologist.  Seek medical help for dizziness, Lightheadedness, Blurry vision, Headache, Chest pain, Shortness of breath      Diagnosis: Hypotension  Assessment and Plan of Treatment: resolved.  Likely in the setting of diarrhea snd dehydration   You received IV fluids    Diagnosis: Elevated troponin  Assessment and Plan of Treatment: suspect due to hypotension and RK, not true cardiac event  - EKG no acute findings.       PRINCIPAL DISCHARGE DIAGNOSIS  Diagnosis: RK (acute kidney injury)  Assessment and Plan of Treatment: Suspect due to diarrhea / chemo related  - You received  IVF and required a bicarb drip as per renal --> eventually swich to regular IVF     -  Scr now improving.  CT without obstruction. TMA work   up negative.  - Cr has plateaued, now downtrending.   Please follow up with your PCP / nephrologist         SECONDARY DISCHARGE DIAGNOSES  Diagnosis: Diarrhea  Assessment and Plan of Treatment: Suspect chemo related  - CT abd/pelvis with liquid stool  - C diff - Neg   - Ceftriaxone/flagyl -- DC'd. Discussed with GI.   Infectious work up has been negative    Diagnosis: Multiple myeloma  Assessment and Plan of Treatment: Please follow up at [facility] with Dr. Dr. Jean Claude Hubbard    Diagnosis: Pancytopenia  Assessment and Plan of Treatment: Suspect chemo related  - Received neupogen outpatient prior to arrival   - No active bleeding reported  Follow up with your Oncologist    Diagnosis: Hyperlipidemia  Assessment and Plan of Treatment: Continue with your cholesterol medications. Eat a heart healthy diet that is low in saturated fats and salt, and includes whole grains, fruits, vegetables and lean protein; exercise regularly (consult with your physician or cardiologist first); maintain a heart healthy weight; if you smoke - quit (A resource to help you stop smoking is the [facility] – phone number 968-728-4062.). Continue to follow with your primary physician or cardiologist.  Seek medical help for dizziness, Lightheadedness, Blurry vision, Headache, Chest pain, Shortness of breath      Diagnosis: Hypotension  Assessment and Plan of Treatment: resolved.  Likely in the setting of diarrhea snd dehydration   You received IV fluids    Diagnosis: Elevated troponin  Assessment and Plan of Treatment: suspect due to hypotension and RK, not true cardiac event  - EKG no acute findings.       PRINCIPAL DISCHARGE DIAGNOSIS  Diagnosis: RK (acute kidney injury)  Assessment and Plan of Treatment: Suspect due to diarrhea / chemo related  - You received  IVF and required a bicarb drip as per renal --> eventually swich to regular IVF     -  Scr now improving.  CT without obstruction. TMA work   up negative.  - Cr has plateaued, now downtrending.   Please follow up with your PCP / nephrologist         SECONDARY DISCHARGE DIAGNOSES  Diagnosis: Diarrhea  Assessment and Plan of Treatment: Suspect chemo related  - CT abd/pelvis with liquid stool  - C diff - Neg   - Ceftriaxone/flagyl -- DC'd. Discussed with GI.   Infectious work up has been negative    Diagnosis: Multiple myeloma  Assessment and Plan of Treatment: Please follow up at [facility] with Dr. Dr. Jean Claude Hubbard    Diagnosis: Pancytopenia  Assessment and Plan of Treatment: Suspect chemo related  - Received neupogen outpatient prior to arrival   - No active bleeding reported  Follow up with your Oncologist    Diagnosis: Hyperlipidemia  Assessment and Plan of Treatment: Continue with your cholesterol medications. Eat a heart healthy diet that is low in saturated fats and salt, and includes whole grains, fruits, vegetables and lean protein; exercise regularly (consult with your physician or cardiologist first); maintain a heart healthy weight; if you smoke - quit (A resource to help you stop smoking is the [facility] – phone number [phone].). Continue to follow with your primary physician or cardiologist.  Seek medical help for dizziness, Lightheadedness, Blurry vision, Headache, Chest pain, Shortness of breath      Diagnosis: Hypotension  Assessment and Plan of Treatment: resolved.  Likely in the setting of diarrhea snd dehydration   You received IV fluids    Diagnosis: Elevated troponin  Assessment and Plan of Treatment: suspect due to hypotension and RK, not true cardiac event  - EKG no acute findings.

## 2023-03-09 NOTE — PROGRESS NOTE ADULT - ASSESSMENT
67 year old male PMH aortic aneurysm and bioprosthetic AV valve replacement 2019, HL, splenectomy, partial gastrectomy, partial pancreatectomy, oligosecretory multiple myeloma s/p auto SCT in 2018 on revlimid started david-cybor D 2/10 under care at [facility] by Dr. Jean Claude Hubbard was due for chemo today but was found to be more hypotensive than baseline (usually sbp ~90) and was sent to the ED, admitted with RK.     RK (acute kidney injury).  - Suspect due to diarrhea / chemo related  - S/P IVF, on bicarb drip as per renal --> Switched to LR    - Trend BMP  - Avoid nephrotoxic medications  - Renally dose meds  - Check bladder scan for PVR  - Check UA/urine lytes for Fena  - On Sodium bicarb infusion as per renal   - Renal eval appreciated; F/u recs   - Cr has plateaued, now downtrending. On IVF as per renal   - Outpatient renal follow up     Diarrhea  - Suspect chemo related  - However has h/o splenectomy and is on daily penicillin (assuming for prophylaxis?)  - CT abd/pelvis with liquid stool  - C diff - Neg   - Ceftriaxone/flagyl -- DC'd. Discussed with GI. Infectious work up negative   - PCN resumed   - GI PCR neg   - GI eval appreciated; F/u recs   - Imodium switched to Lomotil as per GI in view of recurrent diarrhea. Monitor closely --> Pt reports resolution in diarrhea     Pancytopenia.   - Suspect chemo related  - Received neupogen outpatient prior to arrival   - No active bleeding reported  - Trend cbc daily  - Check iron studies/folate/b12. -- Noted  - Maintain Active T+S  - Heme/Onc eval appreciated; F/u recs    Elevated LFTs  - Cont to monitor and trend  - Avoid hepatotoxic agents  - GI eval appreciated; F/u recs     Hypotension.  - Not meeting sirs criteria as HR<90, afebrile.  However wbc is low, and has bandemia.  Lactate 1.7.  - ABX DC'd   - BCx2 w/ Neg final   - RVP negative and cxr clear.  No clear infectious source on CT abd.  - Continue ivf, hold beta blocker --> F/u with Cardio/ PCP within 1 week of DC   - Pt reports BP is currently at baseline. Asymptomatic at time of encounter     Multiple myeloma.   - heme/Onc eval appreciated; F/u recs   - Hold entecavir as it will need to be renally dosed  - F/u with heme on revlimid dosing.  - Outpatient [facility] follow up     Hyperlipidemia.  -continue statin.    Elevated troponin  - suspect due to hypotension and RK, not true cardiac event  - EKG no acute findings.    Preventive measure.   - No pharm ppx due to thrombocytopenia; C/w ICDs  - Incentive spirometry  - Fall risk  - PT consult.      DC planning  67 year old male PMH aortic aneurysm and bioprosthetic AV valve replacement 2019, HL, splenectomy, partial gastrectomy, partial pancreatectomy, oligosecretory multiple myeloma s/p auto SCT in 2018 on revlimid started david-cybor D 2/10 under care at [facility] by Dr. Jean Claude Hubbard was due for chemo today but was found to be more hypotensive than baseline (usually sbp ~90) and was sent to the ED, admitted with RK.     RK (acute kidney injury).  - Suspect due to diarrhea / chemo related  - S/P IVF, on bicarb drip as per renal --> Switched to LR    - Trend BMP  - Avoid nephrotoxic medications  - Renally dose meds  - Check bladder scan for PVR  - Check UA/urine lytes for Fena  - On Sodium bicarb infusion as per renal   - Renal eval appreciated; F/u recs   - Cr has plateaued, now downtrending. On IVF as per renal   - Outpatient renal follow up     Diarrhea  - Suspect chemo related  - However has h/o splenectomy and is on daily penicillin (assuming for prophylaxis?)  - CT abd/pelvis with liquid stool  - C diff - Neg   - Ceftriaxone/flagyl -- DC'd. Discussed with GI. Infectious work up negative   - PCN resumed   - GI PCR neg   - GI eval appreciated; F/u recs   - Imodium switched to Lomotil as per GI in view of recurrent diarrhea. Monitor closely --> Pt reports resolution in diarrhea     Pancytopenia.   - Suspect chemo related  - Received neupogen outpatient prior to arrival   - No active bleeding reported  - Trend cbc daily  - Check iron studies/folate/b12. -- Noted  - Maintain Active T+S  - Heme/Onc eval appreciated; F/u recs    Elevated LFTs  - Cont to monitor and trend  - Avoid hepatotoxic agents  - GI eval appreciated; F/u recs     Hypotension.  - Not meeting sirs criteria as HR<90, afebrile.  However wbc is low, and has bandemia.  Lactate 1.7.  - ABX DC'd   - BCx2 w/ Neg final   - RVP negative and cxr clear.  No clear infectious source on CT abd.  - Continue ivf, hold beta blocker --> F/u with Cardio/ PCP within 1 week of DC   - Pt reports BP is currently at baseline. Asymptomatic at time of encounter     Multiple myeloma.   - heme/Onc eval appreciated; F/u recs   - Hold entecavir as it will need to be renally dosed  - F/u with heme on revlimid dosing.  - Outpatient [facility] follow up     Hyperlipidemia.  -continue statin.    Elevated troponin  - suspect due to hypotension and RK, not true cardiac event  - EKG no acute findings.    Preventive measure.   - No pharm ppx due to thrombocytopenia; C/w ICDs  - Incentive spirometry  - Fall risk  - PT consult.      DC planning  67 year old male PMH aortic aneurysm and bioprosthetic AV valve replacement 2019, HL, splenectomy, partial gastrectomy, partial pancreatectomy, oligosecretory multiple myeloma s/p auto SCT in 2018 on revlimid started david-cybor D 2/10 under [place] at [facility] [place]. Jean Claude Hubbard was due for chemo today but was found to be more hypotensive than baseline (usually sbp ~90) and was sent to the ED, admitted with RK.     RK (acute kidney injury).  - Suspect due to diarrhea / chemo related  - S/P IVF, on bicarb drip as per renal --> Switched to LR    - Trend BMP  - Avoid nephrotoxic medications  - Renally dose meds  - Check bladder scan for PVR  - Check UA/urine lytes for Fena  - On Sodium bicarb infusion as per renal   - Renal eval appreciated; F/u recs   - Cr has plateaued, now downtrending. On IVF as per renal   - Outpatient renal follow up     Diarrhea  - Suspect chemo related  - However has h/o splenectomy and is on daily penicillin (assuming for prophylaxis?)  - CT abd/pelvis with liquid stool  - C diff - Neg   - Ceftriaxone/flagyl -- DC'd. Discussed with GI. Infectious work up negative   - PCN resumed   - GI PCR neg   - GI eval appreciated; F/u recs   - Imodium switched to Lomotil as per GI in view of recurrent diarrhea. Monitor closely --> Pt reports resolution in diarrhea     Pancytopenia.   - Suspect chemo related  - Received neupogen outpatient prior to arrival   - No active bleeding reported  - Trend cbc daily  - Check iron studies/folate/b12. -- Noted  - Maintain Active T+S  - Heme/Onc eval appreciated; F/u recs    Elevated LFTs  - Cont to monitor and trend  - Avoid hepatotoxic agents  - GI eval appreciated; F/u recs     Hypotension.  - Not meeting sirs criteria as HR<90, afebrile.  However wbc is low, and has bandemia.  Lactate 1.7.  - ABX DC'd   - BCx2 w/ Neg final   - RVP negative and cxr clear.  No clear infectious source on CT abd.  - Continue ivf, hold beta blocker --> F/u with Cardio/ PCP within 1 week of DC   - Pt reports BP is currently at baseline. Asymptomatic at time of encounter     Multiple myeloma.   - heme/Onc eval appreciated; F/u recs   - Hold entecavir as it will need to be renally dosed  - F/u with heme on revlimid dosing.  - Outpatient [facility] follow up     Hyperlipidemia.  -continue statin.    Elevated troponin  - suspect due to hypotension and RK, not true cardiac event  - EKG no acute findings.    Preventive measure.   - No pharm ppx due to thrombocytopenia; C/w ICDs  - Incentive spirometry  - Fall risk  - PT consult.      DC planning

## 2023-03-09 NOTE — DISCHARGE NOTE PROVIDER - CARE PROVIDER_API CALL
Tay Brooke (DO)  Internal Medicine  [address], [place] [address]  Phone: [phone]  Fax: [phone]  Follow Up Time:    Tay Brooke (DO)  Internal Medicine  [address], [place] [address]  Phone: [phone]  Fax: [phone]  Follow Up Time:    Tay Brooke (DO)  Internal Medicine  [address], [place] [address]  Phone: [phone]  Fax: [phone]  Follow Up Time:    Tay Brooke (DO)  Internal Medicine  [address], [place] [address]  Phone: [phone]  Fax: [phone]  Follow Up Time:     Jean Claude Hubbard  Internal Medicine  [facility] [place] [address]  Phone: ([phone]  Fax: ()-  Follow Up Time:    Tay Brooke (DO)  Internal Medicine  [address], [place] [address]  Phone: [phone]  Fax: [phone]  Follow Up Time:     Jean Claude Hubbard  Internal Medicine  [facility] [place] [address]  Phone: ([phone]  Fax: ()-  Follow Up Time:    Tay Brooke (DO)  Internal Medicine  [address], [place] [address]  Phone: [phone]  Fax: [phone]  Follow Up Time:     Jean Claude Hubbard  Internal Medicine  [facility] [place] [address]  Phone: ([phone]  Fax: ()-  Follow Up Time:

## 2023-03-09 NOTE — PROGRESS NOTE ADULT - PROVIDER SPECIALTY LIST ADULT
Gastroenterology
Heme/Onc
Internal Medicine
Nephrology
Gastroenterology
Heme/Onc
Internal Medicine
Nephrology
Nephrology
Gastroenterology
Heme/Onc
Internal Medicine
Internal Medicine
Heme/Onc
Heme/Onc
Internal Medicine
Nephrology
Internal Medicine

## 2023-03-23 ENCOUNTER — INPATIENT (INPATIENT)
Facility: HOSPITAL | Age: 68
LOS: 25 days | Discharge: ANOTHER HOSPITAL | DRG: 865 | End: 2023-04-18
Attending: INTERNAL MEDICINE | Admitting: INTERNAL MEDICINE
Payer: MEDICARE

## 2023-03-23 VITALS
OXYGEN SATURATION: 96 % | SYSTOLIC BLOOD PRESSURE: 109 MMHG | TEMPERATURE: 101 F | WEIGHT: 169.98 LBS | HEART RATE: 93 BPM | DIASTOLIC BLOOD PRESSURE: 53 MMHG | HEIGHT: 73 IN | RESPIRATION RATE: 18 BRPM

## 2023-03-23 DIAGNOSIS — R50.9 FEVER, UNSPECIFIED: ICD-10-CM

## 2023-03-23 DIAGNOSIS — Z98.890 OTHER SPECIFIED POSTPROCEDURAL STATES: Chronic | ICD-10-CM

## 2023-03-23 DIAGNOSIS — Z90.81 ACQUIRED ABSENCE OF SPLEEN: Chronic | ICD-10-CM

## 2023-03-23 DIAGNOSIS — Z90.3 ACQUIRED ABSENCE OF STOMACH [PART OF]: Chronic | ICD-10-CM

## 2023-03-23 LAB
ACANTHOCYTES BLD QL SMEAR: SLIGHT — SIGNIFICANT CHANGE UP
ALBUMIN SERPL ELPH-MCNC: 3 G/DL — LOW (ref 3.3–5)
ALP SERPL-CCNC: 69 U/L — SIGNIFICANT CHANGE UP (ref 40–120)
ALT FLD-CCNC: 18 U/L — SIGNIFICANT CHANGE UP (ref 10–45)
ANION GAP SERPL CALC-SCNC: 11 MMOL/L — SIGNIFICANT CHANGE UP (ref 5–17)
ANISOCYTOSIS BLD QL: SIGNIFICANT CHANGE UP
APPEARANCE UR: CLEAR — SIGNIFICANT CHANGE UP
APTT BLD: 29.6 SEC — SIGNIFICANT CHANGE UP (ref 27.5–35.5)
AST SERPL-CCNC: 21 U/L — SIGNIFICANT CHANGE UP (ref 10–40)
BACTERIA # UR AUTO: NEGATIVE — SIGNIFICANT CHANGE UP
BASE EXCESS BLDV CALC-SCNC: 1.7 MMOL/L — SIGNIFICANT CHANGE UP (ref -2–3)
BASOPHILS # BLD AUTO: 0 K/UL — SIGNIFICANT CHANGE UP (ref 0–0.2)
BASOPHILS NFR BLD AUTO: 0 % — SIGNIFICANT CHANGE UP (ref 0–2)
BILIRUB SERPL-MCNC: 0.7 MG/DL — SIGNIFICANT CHANGE UP (ref 0.2–1.2)
BILIRUB UR-MCNC: NEGATIVE — SIGNIFICANT CHANGE UP
BLD GP AB SCN SERPL QL: POSITIVE — SIGNIFICANT CHANGE UP
BUN SERPL-MCNC: 20 MG/DL — SIGNIFICANT CHANGE UP (ref 7–23)
BURR CELLS BLD QL SMEAR: PRESENT — SIGNIFICANT CHANGE UP
CA-I SERPL-SCNC: 1.18 MMOL/L — SIGNIFICANT CHANGE UP (ref 1.15–1.33)
CALCIUM SERPL-MCNC: 8.3 MG/DL — LOW (ref 8.4–10.5)
CHLORIDE BLDV-SCNC: 101 MMOL/L — SIGNIFICANT CHANGE UP (ref 96–108)
CHLORIDE SERPL-SCNC: 99 MMOL/L — SIGNIFICANT CHANGE UP (ref 96–108)
CO2 BLDV-SCNC: 27 MMOL/L — HIGH (ref 22–26)
CO2 SERPL-SCNC: 23 MMOL/L — SIGNIFICANT CHANGE UP (ref 22–31)
COLOR SPEC: SIGNIFICANT CHANGE UP
CREAT SERPL-MCNC: 1.37 MG/DL — HIGH (ref 0.5–1.3)
DIFF PNL FLD: ABNORMAL
EGFR: 56 ML/MIN/1.73M2 — LOW
ELLIPTOCYTES BLD QL SMEAR: SLIGHT — SIGNIFICANT CHANGE UP
EOSINOPHIL # BLD AUTO: 0 K/UL — SIGNIFICANT CHANGE UP (ref 0–0.5)
EOSINOPHIL NFR BLD AUTO: 0 % — SIGNIFICANT CHANGE UP (ref 0–6)
EPI CELLS # UR: 0 /HPF — SIGNIFICANT CHANGE UP
GAS PNL BLDV: 132 MMOL/L — LOW (ref 136–145)
GAS PNL BLDV: SIGNIFICANT CHANGE UP
GIANT PLATELETS BLD QL SMEAR: PRESENT — SIGNIFICANT CHANGE UP
GLUCOSE BLDV-MCNC: 94 MG/DL — SIGNIFICANT CHANGE UP (ref 70–99)
GLUCOSE SERPL-MCNC: 102 MG/DL — HIGH (ref 70–99)
GLUCOSE UR QL: NEGATIVE — SIGNIFICANT CHANGE UP
HCO3 BLDV-SCNC: 26 MMOL/L — SIGNIFICANT CHANGE UP (ref 22–29)
HCT VFR BLD CALC: 19.5 % — CRITICAL LOW (ref 39–50)
HCT VFR BLDA CALC: 20 % — CRITICAL LOW (ref 39–51)
HGB BLD CALC-MCNC: 6.8 G/DL — CRITICAL LOW (ref 12.6–17.4)
HGB BLD-MCNC: 6.7 G/DL — CRITICAL LOW (ref 13–17)
HOWELL-JOLLY BOD BLD QL SMEAR: PRESENT — SIGNIFICANT CHANGE UP
HYALINE CASTS # UR AUTO: 1 /LPF — SIGNIFICANT CHANGE UP (ref 0–2)
INR BLD: 1.42 RATIO — HIGH (ref 0.88–1.16)
KETONES UR-MCNC: NEGATIVE — SIGNIFICANT CHANGE UP
LACTATE BLDV-MCNC: 1.2 MMOL/L — SIGNIFICANT CHANGE UP (ref 0.5–2)
LEUKOCYTE ESTERASE UR-ACNC: NEGATIVE — SIGNIFICANT CHANGE UP
LG PLATELETS BLD QL AUTO: SLIGHT — SIGNIFICANT CHANGE UP
LYMPHOCYTES # BLD AUTO: 0.06 K/UL — LOW (ref 1–3.3)
LYMPHOCYTES # BLD AUTO: 2 % — LOW (ref 13–44)
MACROCYTES BLD QL: SLIGHT — SIGNIFICANT CHANGE UP
MANUAL SMEAR VERIFICATION: SIGNIFICANT CHANGE UP
MCHC RBC-ENTMCNC: 34.4 GM/DL — SIGNIFICANT CHANGE UP (ref 32–36)
MCHC RBC-ENTMCNC: 34.4 PG — HIGH (ref 27–34)
MCV RBC AUTO: 100 FL — SIGNIFICANT CHANGE UP (ref 80–100)
MICROCYTES BLD QL: SLIGHT — SIGNIFICANT CHANGE UP
MONOCYTES # BLD AUTO: 0.06 K/UL — SIGNIFICANT CHANGE UP (ref 0–0.9)
MONOCYTES NFR BLD AUTO: 2 % — SIGNIFICANT CHANGE UP (ref 2–14)
NEUTROPHILS # BLD AUTO: 3.11 K/UL — SIGNIFICANT CHANGE UP (ref 1.8–7.4)
NEUTROPHILS NFR BLD AUTO: 94 % — HIGH (ref 43–77)
NEUTS BAND # BLD: 2 % — SIGNIFICANT CHANGE UP (ref 0–8)
NITRITE UR-MCNC: NEGATIVE — SIGNIFICANT CHANGE UP
NRBC # BLD: 0 /100 — SIGNIFICANT CHANGE UP (ref 0–0)
OVALOCYTES BLD QL SMEAR: SLIGHT — SIGNIFICANT CHANGE UP
PCO2 BLDV: 35 MMHG — LOW (ref 42–55)
PH BLDV: 7.47 — HIGH (ref 7.32–7.43)
PH UR: 6 — SIGNIFICANT CHANGE UP (ref 5–8)
PLAT MORPH BLD: ABNORMAL
PLATELET # BLD AUTO: 63 K/UL — LOW (ref 150–400)
PO2 BLDV: 64 MMHG — HIGH (ref 25–45)
POLYCHROMASIA BLD QL SMEAR: SLIGHT — SIGNIFICANT CHANGE UP
POTASSIUM BLDV-SCNC: 4 MMOL/L — SIGNIFICANT CHANGE UP (ref 3.5–5.1)
POTASSIUM SERPL-MCNC: 4.4 MMOL/L — SIGNIFICANT CHANGE UP (ref 3.5–5.3)
POTASSIUM SERPL-SCNC: 4.4 MMOL/L — SIGNIFICANT CHANGE UP (ref 3.5–5.3)
PROT SERPL-MCNC: 5.1 G/DL — LOW (ref 6–8.3)
PROT UR-MCNC: ABNORMAL
PROTHROM AB SERPL-ACNC: 16.5 SEC — HIGH (ref 10.5–13.4)
RAPID RVP RESULT: SIGNIFICANT CHANGE UP
RBC # BLD: 1.95 M/UL — LOW (ref 4.2–5.8)
RBC # FLD: 23.3 % — HIGH (ref 10.3–14.5)
RBC BLD AUTO: ABNORMAL
RBC CASTS # UR COMP ASSIST: 7 /HPF — HIGH (ref 0–4)
RH IG SCN BLD-IMP: POSITIVE — SIGNIFICANT CHANGE UP
SAO2 % BLDV: 93.9 % — HIGH (ref 67–88)
SARS-COV-2 RNA SPEC QL NAA+PROBE: SIGNIFICANT CHANGE UP
SCHISTOCYTES BLD QL AUTO: SLIGHT — SIGNIFICANT CHANGE UP
SMUDGE CELLS # BLD: PRESENT — SIGNIFICANT CHANGE UP
SODIUM SERPL-SCNC: 133 MMOL/L — LOW (ref 135–145)
SP GR SPEC: 1.01 — SIGNIFICANT CHANGE UP (ref 1.01–1.02)
TARGETS BLD QL SMEAR: SLIGHT — SIGNIFICANT CHANGE UP
UROBILINOGEN FLD QL: NEGATIVE — SIGNIFICANT CHANGE UP
WBC # BLD: 3.24 K/UL — LOW (ref 3.8–10.5)
WBC # FLD AUTO: 3.24 K/UL — LOW (ref 3.8–10.5)
WBC UR QL: 1 /HPF — SIGNIFICANT CHANGE UP (ref 0–5)

## 2023-03-23 PROCEDURE — 71045 X-RAY EXAM CHEST 1 VIEW: CPT | Mod: 26

## 2023-03-23 PROCEDURE — 74177 CT ABD & PELVIS W/CONTRAST: CPT | Mod: 26,MA

## 2023-03-23 PROCEDURE — 99285 EMERGENCY DEPT VISIT HI MDM: CPT

## 2023-03-23 RX ORDER — ACETAMINOPHEN 500 MG
1000 TABLET ORAL ONCE
Refills: 0 | Status: COMPLETED | OUTPATIENT
Start: 2023-03-23 | End: 2023-03-23

## 2023-03-23 RX ORDER — SODIUM CHLORIDE 9 MG/ML
1000 INJECTION, SOLUTION INTRAVENOUS ONCE
Refills: 0 | Status: COMPLETED | OUTPATIENT
Start: 2023-03-23 | End: 2023-03-23

## 2023-03-23 RX ORDER — CEFEPIME 1 G/1
2000 INJECTION, POWDER, FOR SOLUTION INTRAMUSCULAR; INTRAVENOUS ONCE
Refills: 0 | Status: COMPLETED | OUTPATIENT
Start: 2023-03-23 | End: 2023-03-23

## 2023-03-23 RX ADMIN — CEFEPIME 100 MILLIGRAM(S): 1 INJECTION, POWDER, FOR SOLUTION INTRAMUSCULAR; INTRAVENOUS at 22:47

## 2023-03-23 RX ADMIN — Medication 400 MILLIGRAM(S): at 22:02

## 2023-03-23 RX ADMIN — SODIUM CHLORIDE 1000 MILLILITER(S): 9 INJECTION, SOLUTION INTRAVENOUS at 23:38

## 2023-03-23 NOTE — ED ADULT NURSE NOTE - CAS TRG GENERAL NORM CIRC DET
Enoxaparin/Lovenox - Compliance/Enoxaparin/Lovenox - Dietary Advice/Enoxaparin/Lovenox - Follow up monitoring/Enoxaparin/Lovenox - Potential for adverse drug reactions and interactions
Strong peripheral pulses

## 2023-03-23 NOTE — ED ADULT NURSE NOTE - OBJECTIVE STATEMENT
pt is a 66yo male pt is a 68yo male pt is a 66yo male PMH aortic aneurism, multiple myeloma, HLD BIBEMS for fever. pt states he was at [facility] for cancer and allied diseases to receive a blood transfusion for low hgb, pt found to be febrile to 102.5F, EMS called to bring pt to ED. pt receives chemotherapy infusions on Tuesdays, outpatient MD was concerned for fever secondary to cancer, wanted pt to be evaluated in ED. pt currently endorsing some stomach discomfort but otherwise denies complaints. pt A&Ox3 gross neuro intact, no difficulty speaking in complete sentences, s1s2 heart sounds heard, pulses x 4, daniels x4, abdomen soft nontender nondistended, skin intact. pt denies chest pain, sob, ha, n/v/d, urinary symptoms, hematuria. pt is a 68yo male PMH aortic aneurism, multiple myeloma, HLD BIBEMS for fever. pt states he was at [facility] for cancer and allied diseases to receive a blood transfusion for low hgb, pt found to be febrile to 102.5F, EMS called to bring pt to ED. pt receives chemotherapy infusions on Tuesdays, outpatient MD was concerned for fever secondary to cancer, wanted pt to be evaluated in ED. pt currently endorsing some stomach discomfort but otherwise denies complaints. pt A&Ox3 gross neuro intact, no difficulty speaking in complete sentences, s1s2 heart sounds heard, pulses x 4, daniels x4, abdomen soft nontender nondistended, skin intact. pt denies chest pain, sob, ha, n/v/d, urinary symptoms, hematuria. pt is a 68yo male PMH aortic aneurism, multiple myeloma, HLD BIBEMS for fever. pt states he was at [facility] for cancer and allied diseases to receive a blood transfusion for low hgb, pt found to be febrile to 102.5F, EMS called to bring pt to ED. pt receives chemotherapy infusions on Tuesdays, outpatient MD was concerned for fever secondary to cancer, wanted pt to be evaluated in ED. pt currently endorsing some stomach discomfort but otherwise denies complaints. pt A&Ox3 gross neuro intact, no difficulty speaking in complete sentences, s1s2 heart sounds heard, pulses x 4, daniels x4, abdomen soft nontender nondistended, skin intact. pt denies chest pain, sob, ha, n/v/d, urinary symptoms, hematuria. pt is a 66yo male PMH aortic aneurism, multiple myeloma, HLD BIBEMS for fever. pt states he was at [facility] for cancer and allied diseases to receive a blood transfusion for low hgb, pt found to be febrile to 102.5F, EMS called to bring pt to ED. pt receives chemotherapy infusions on Tuesdays, outpatient MD was concerned for fever secondary to cancer, wanted pt to be evaluated in ED. pt currently endorsing some stomach discomfort but otherwise denies complaints. pt was given 1000mg PO tylenol @ sending facility, pt A&Ox3 gross neuro intact, no difficulty speaking in complete sentences, s1s2 heart sounds heard, pulses x 4, daniels x4, abdomen soft nontender nondistended, skin intact. pt denies chest pain, sob, ha, n/v/d, urinary symptoms, hematuria. pt is a 66yo male PMH aortic aneurism, multiple myeloma, HLD BIBEMS for fever. pt states he was at [facility] for cancer and allied diseases to receive a blood transfusion for low hgb, pt found to be febrile to 102.5F, EMS called to bring pt to ED. pt receives chemotherapy infusions on Tuesdays, outpatient MD was concerned for fever secondary to cancer, wanted pt to be evaluated in ED. pt currently endorsing some stomach discomfort but otherwise denies complaints. pt was given 1000mg PO tylenol @ sending facility, pt A&Ox3 gross neuro intact, no difficulty speaking in complete sentences, s1s2 heart sounds heard, pulses x 4, daniels x4, abdomen soft nontender nondistended, skin intact. pt denies chest pain, sob, ha, n/v/d, urinary symptoms, hematuria. pt is a 68yo male PMH aortic aneurism, multiple myeloma, HLD BIBEMS for fever. pt states he was at [facility] for cancer and allied diseases to receive a blood transfusion for low hgb, pt found to be febrile to 102.5F, EMS called to bring pt to ED. pt receives chemotherapy infusions on Tuesdays, outpatient MD was concerned for fever secondary to cancer, wanted pt to be evaluated in ED. pt currently endorsing some stomach discomfort but otherwise denies complaints. pt was given 1000mg PO tylenol @ sending facility, pt A&Ox3 gross neuro intact, no difficulty speaking in complete sentences, s1s2 heart sounds heard, pulses x 4, daniels x4, abdomen soft nontender nondistended, skin intact. pt denies chest pain, sob, ha, n/v/d, urinary symptoms, hematuria.

## 2023-03-23 NOTE — ED ADULT TRIAGE NOTE - ACCOMPANIED BY
Low Dose CT (LDCT) Lung Screening criteria met   Age 50-69   Pack year smoking >30   Still smoking or less than 15 year since quit   No sign or symptoms of lung cancer   > 11 months since last LDCT     Risks and benefits of lung cancer screening with LDCT scans discussed:    Significance of positive screen - False-positive LDCT results often occur. 95% of all positive results do not lead to a diagnosis of cancer. Usually further imaging can resolve most false-positive results; however, some patients may require invasive procedures. Over diagnosis risk - 10% to 12% of screen-detected lung cancer cases are over diagnosed--that is, the cancer would not have been detected in the patient's lifetime without the screening. Need for follow up screens annually to continue lung cancer screening effectiveness     Risks associated with radiation from annual LDCT- Radiation exposure is about the same as for a mammogram, which is about 1/3 of the annual background radiation exposure from everyday life. Starting screening at age 54 is not likely to increase cancer risk from radiation exposure. Patients with comorbidities resulting in life expectancy of < 10 years, or that would preclude treatment of an abnormality identified on CT, should not be screened due to lack of benefit.     To obtain maximal benefit from this screening, smoking cessation and long-term abstinence from smoking is critical EMT/paramedic

## 2023-03-23 NOTE — ED ADULT NURSE NOTE - NSIMPLEMENTINTERV_GEN_ALL_ED
Implemented All Universal Safety Interventions:  [place] to call system. Call bell, personal items and telephone within reach. Instruct patient to call for assistance. Room bathroom lighting operational. Non-slip footwear when patient is off stretcher. Physically safe environment: no spills, clutter or unnecessary equipment. Stretcher in lowest position, wheels locked, appropriate side rails in place. Implemented All Universal Safety Interventions:  [place] to call system. Call bell, personal items and telephone within reach. Instruct patient to call for assistance. Room bathroom lighting operational. Non-slip footwear when patient is off stretcher. Physically safe environment: no spills, clutter or unnecessary equipment. Stretcher in lowest position, wheels locked, appropriate side rails in place. Implemented All Universal Safety Interventions:  [place] to call system. Call bell, personal items and telephone within reach. Instruct patient to call for assistance. Room bathroom lighting operational. Non-slip footwear when patient is off stretcher. Physically safe environment: no spills, clutter or unnecessary equipment. Stretcher in lowest position, wheels locked, appropriate side rails in place.

## 2023-03-23 NOTE — ED PROVIDER NOTE - OBJECTIVE STATEMENT
66 yo male with PMHx of aortic aneurysm and bioprosthetic AV valve replacement 2019, HL, splenectomy, partial gastrectomy, partial pancreatectomy, 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 p/w fever.  Patient reports that he went to bed last night feeling well.  This morning upon waking patient had chills and body aches.  Took his temperature and it was noted to be 102.  Patient endorsing some mild periumbilical abdominal discomfort, but otherwise feels well.  Denies chest pain, shortness of breath, cough, nasal congestion, vomiting, diarrhea, dysuria.  No sick contacts or recent travel.  Patient states that he went to his hematologist to get a blood transfusion today and was instructed to come to the ER.

## 2023-03-23 NOTE — ED PROVIDER NOTE - CLINICAL SUMMARY MEDICAL DECISION MAKING FREE TEXT BOX
Deloris: 67 year old male with MM, sent from [facility] center for fever. Patient had fever this am and then went there for blood andplt transfusion. unclear ifreceived full unit of blood. c/o epigastric pain. n/v x 1.  PE: Alert, nad, nonlabored respirations, + s1s2, abdomen ttp epigastrium, no /c/ce, no focal deficits, alert and oriented x 3. plan: patient with fever, on chemo. will get labs, blood cultures, ct a/p, reassess Deloris: 67 year old male with MM, sent from [facility] center for fever. Patient had fever this am and then went there for blood andplt transfusion. unclear ifreceived full unit of blood. c/o epigastric pain. n/v x 1.  PE: Alert, nad, nonlabored respirations, + s1s2, abdomen ttp epigastrium, no /c/ce, no focal deficits, alert and oriented x 3. plan: patient with fever, on chemo. will get labs, blood cultures, ct a/p, reassess Deloris: 67 year old male with MM, [place] [facility] [place]. Patient had fever this am and then went there for blood andplt transfusion. unclear ifreceived full unit of blood. c/o epigastric pain. n/v x 1.  PE: Alert, nad, nonlabored respirations, + s1s2, abdomen ttp epigastrium, no /c/ce, no focal deficits, alert and oriented x 3. plan: patient with fever, on chemo. will get labs, blood cultures, ct a/p, reassess

## 2023-03-24 DIAGNOSIS — E78.5 HYPERLIPIDEMIA, UNSPECIFIED: ICD-10-CM

## 2023-03-24 DIAGNOSIS — C90.00 MULTIPLE MYELOMA NOT HAVING ACHIEVED REMISSION: ICD-10-CM

## 2023-03-24 DIAGNOSIS — Z29.9 ENCOUNTER FOR PROPHYLACTIC MEASURES, UNSPECIFIED: ICD-10-CM

## 2023-03-24 DIAGNOSIS — I10 ESSENTIAL (PRIMARY) HYPERTENSION: ICD-10-CM

## 2023-03-24 DIAGNOSIS — R50.9 FEVER, UNSPECIFIED: ICD-10-CM

## 2023-03-24 LAB
ALBUMIN SERPL ELPH-MCNC: 3.1 G/DL — LOW (ref 3.3–5)
ALP SERPL-CCNC: 74 U/L — SIGNIFICANT CHANGE UP (ref 40–120)
ALT FLD-CCNC: 17 U/L — SIGNIFICANT CHANGE UP (ref 10–45)
ANION GAP SERPL CALC-SCNC: 6 MMOL/L — SIGNIFICANT CHANGE UP (ref 5–17)
APTT BLD: 22.9 SEC — LOW (ref 27.5–35.5)
AST SERPL-CCNC: 9 U/L — LOW (ref 10–40)
BILIRUB SERPL-MCNC: 0.8 MG/DL — SIGNIFICANT CHANGE UP (ref 0.2–1.2)
BLD GP AB SCN SERPL QL: POSITIVE — SIGNIFICANT CHANGE UP
BUN SERPL-MCNC: 19 MG/DL — SIGNIFICANT CHANGE UP (ref 7–23)
CALCIUM SERPL-MCNC: 8.4 MG/DL — SIGNIFICANT CHANGE UP (ref 8.4–10.5)
CHLORIDE SERPL-SCNC: 99 MMOL/L — SIGNIFICANT CHANGE UP (ref 96–108)
CO2 SERPL-SCNC: 28 MMOL/L — SIGNIFICANT CHANGE UP (ref 22–31)
CREAT SERPL-MCNC: 1.46 MG/DL — HIGH (ref 0.5–1.3)
CULTURE RESULTS: SIGNIFICANT CHANGE UP
DAT C3-SP REAG RBC QL: NEGATIVE — SIGNIFICANT CHANGE UP
EGFR: 52 ML/MIN/1.73M2 — LOW
GLUCOSE SERPL-MCNC: 104 MG/DL — HIGH (ref 70–99)
HAPTOGLOB SERPL-MCNC: 212 MG/DL — HIGH (ref 34–200)
HCT VFR BLD CALC: 23.2 % — LOW (ref 39–50)
HGB BLD-MCNC: 7.8 G/DL — LOW (ref 13–17)
INR BLD: 1.54 RATIO — HIGH (ref 0.88–1.16)
LDH SERPL L TO P-CCNC: 160 U/L — SIGNIFICANT CHANGE UP (ref 50–242)
MCHC RBC-ENTMCNC: 32.4 PG — SIGNIFICANT CHANGE UP (ref 27–34)
MCHC RBC-ENTMCNC: 33.6 GM/DL — SIGNIFICANT CHANGE UP (ref 32–36)
MCV RBC AUTO: 96.3 FL — SIGNIFICANT CHANGE UP (ref 80–100)
NRBC # BLD: 5 /100 WBCS — HIGH (ref 0–0)
NT-PROBNP SERPL-SCNC: 3068 PG/ML — HIGH (ref 0–300)
PLATELET # BLD AUTO: 51 K/UL — LOW (ref 150–400)
POTASSIUM SERPL-MCNC: 4 MMOL/L — SIGNIFICANT CHANGE UP (ref 3.5–5.3)
POTASSIUM SERPL-SCNC: 4 MMOL/L — SIGNIFICANT CHANGE UP (ref 3.5–5.3)
PROT SERPL-MCNC: 5.3 G/DL — LOW (ref 6–8.3)
PROTHROM AB SERPL-ACNC: 17.8 SEC — HIGH (ref 10.5–13.4)
RBC # BLD: 2.41 M/UL — LOW (ref 4.2–5.8)
RBC # FLD: 25 % — HIGH (ref 10.3–14.5)
RH IG SCN BLD-IMP: POSITIVE — SIGNIFICANT CHANGE UP
SODIUM SERPL-SCNC: 133 MMOL/L — LOW (ref 135–145)
SPECIMEN SOURCE: SIGNIFICANT CHANGE UP
TRANSFUSION REACTION INTERP 1: SIGNIFICANT CHANGE UP
WBC # BLD: 3.48 K/UL — LOW (ref 3.8–10.5)
WBC # FLD AUTO: 3.48 K/UL — LOW (ref 3.8–10.5)

## 2023-03-24 PROCEDURE — 71045 X-RAY EXAM CHEST 1 VIEW: CPT | Mod: 26

## 2023-03-24 PROCEDURE — 93970 EXTREMITY STUDY: CPT | Mod: 26

## 2023-03-24 PROCEDURE — 99222 1ST HOSP IP/OBS MODERATE 55: CPT

## 2023-03-24 PROCEDURE — 99232 SBSQ HOSP IP/OBS MODERATE 35: CPT

## 2023-03-24 PROCEDURE — 99223 1ST HOSP IP/OBS HIGH 75: CPT

## 2023-03-24 PROCEDURE — 86078 PHYS BLOOD BANK SERV REACTJ: CPT

## 2023-03-24 RX ORDER — METOPROLOL TARTRATE 50 MG
50 TABLET ORAL
Refills: 0 | Status: DISCONTINUED | OUTPATIENT
Start: 2023-03-24 | End: 2023-03-31

## 2023-03-24 RX ORDER — DEXAMETHASONE 0.5 MG/5ML
4 ELIXIR ORAL DAILY
Refills: 0 | Status: DISCONTINUED | OUTPATIENT
Start: 2023-03-24 | End: 2023-03-29

## 2023-03-24 RX ORDER — PIPERACILLIN AND TAZOBACTAM 4; .5 G/20ML; G/20ML
3.38 INJECTION, POWDER, LYOPHILIZED, FOR SOLUTION INTRAVENOUS ONCE
Refills: 0 | Status: COMPLETED | OUTPATIENT
Start: 2023-03-24 | End: 2023-03-24

## 2023-03-24 RX ORDER — HEPARIN SODIUM 5000 [USP'U]/ML
INJECTION INTRAVENOUS; SUBCUTANEOUS
Qty: 25000 | Refills: 0 | Status: DISCONTINUED | OUTPATIENT
Start: 2023-03-24 | End: 2023-03-25

## 2023-03-24 RX ORDER — MAGNESIUM SULFATE 500 MG/ML
1 VIAL (ML) INJECTION ONCE
Refills: 0 | Status: COMPLETED | OUTPATIENT
Start: 2023-03-24 | End: 2023-03-24

## 2023-03-24 RX ORDER — VANCOMYCIN HCL 1 G
1250 VIAL (EA) INTRAVENOUS EVERY 12 HOURS
Refills: 0 | Status: DISCONTINUED | OUTPATIENT
Start: 2023-03-24 | End: 2023-03-24

## 2023-03-24 RX ORDER — ATORVASTATIN CALCIUM 80 MG/1
40 TABLET, FILM COATED ORAL AT BEDTIME
Refills: 0 | Status: DISCONTINUED | OUTPATIENT
Start: 2023-03-24 | End: 2023-04-12

## 2023-03-24 RX ORDER — ENTECAVIR 0.5 MG/1
0.5 TABLET ORAL DAILY
Refills: 0 | Status: DISCONTINUED | OUTPATIENT
Start: 2023-03-24 | End: 2023-03-29

## 2023-03-24 RX ORDER — VANCOMYCIN HCL 1 G
750 VIAL (EA) INTRAVENOUS EVERY 12 HOURS
Refills: 0 | Status: DISCONTINUED | OUTPATIENT
Start: 2023-03-24 | End: 2023-03-24

## 2023-03-24 RX ORDER — PIPERACILLIN AND TAZOBACTAM 4; .5 G/20ML; G/20ML
3.38 INJECTION, POWDER, LYOPHILIZED, FOR SOLUTION INTRAVENOUS EVERY 8 HOURS
Refills: 0 | Status: DISCONTINUED | OUTPATIENT
Start: 2023-03-25 | End: 2023-03-28

## 2023-03-24 RX ORDER — ACETAMINOPHEN 500 MG
1000 TABLET ORAL ONCE
Refills: 0 | Status: COMPLETED | OUTPATIENT
Start: 2023-03-24 | End: 2023-03-24

## 2023-03-24 RX ORDER — ACETAMINOPHEN 500 MG
650 TABLET ORAL EVERY 6 HOURS
Refills: 0 | Status: DISCONTINUED | OUTPATIENT
Start: 2023-03-24 | End: 2023-04-18

## 2023-03-24 RX ORDER — ACYCLOVIR SODIUM 500 MG
400 VIAL (EA) INTRAVENOUS DAILY
Refills: 0 | Status: DISCONTINUED | OUTPATIENT
Start: 2023-03-24 | End: 2023-03-29

## 2023-03-24 RX ORDER — ACETAMINOPHEN 500 MG
325 TABLET ORAL ONCE
Refills: 0 | Status: COMPLETED | OUTPATIENT
Start: 2023-03-24 | End: 2023-03-24

## 2023-03-24 RX ORDER — HEPARIN SODIUM 5000 [USP'U]/ML
3000 INJECTION INTRAVENOUS; SUBCUTANEOUS EVERY 6 HOURS
Refills: 0 | Status: DISCONTINUED | OUTPATIENT
Start: 2023-03-24 | End: 2023-03-28

## 2023-03-24 RX ORDER — HEPARIN SODIUM 5000 [USP'U]/ML
6500 INJECTION INTRAVENOUS; SUBCUTANEOUS EVERY 6 HOURS
Refills: 0 | Status: DISCONTINUED | OUTPATIENT
Start: 2023-03-24 | End: 2023-03-28

## 2023-03-24 RX ORDER — CEFEPIME 1 G/1
2000 INJECTION, POWDER, FOR SOLUTION INTRAMUSCULAR; INTRAVENOUS EVERY 12 HOURS
Refills: 0 | Status: DISCONTINUED | OUTPATIENT
Start: 2023-03-24 | End: 2023-03-24

## 2023-03-24 RX ADMIN — ATORVASTATIN CALCIUM 40 MILLIGRAM(S): 80 TABLET, FILM COATED ORAL at 21:19

## 2023-03-24 RX ADMIN — Medication 400 MILLIGRAM(S): at 11:49

## 2023-03-24 RX ADMIN — PIPERACILLIN AND TAZOBACTAM 200 GRAM(S): 4; .5 INJECTION, POWDER, LYOPHILIZED, FOR SOLUTION INTRAVENOUS at 17:53

## 2023-03-24 RX ADMIN — Medication 650 MILLIGRAM(S): at 10:59

## 2023-03-24 RX ADMIN — CEFEPIME 100 MILLIGRAM(S): 1 INJECTION, POWDER, FOR SOLUTION INTRAMUSCULAR; INTRAVENOUS at 17:04

## 2023-03-24 RX ADMIN — Medication 650 MILLIGRAM(S): at 20:09

## 2023-03-24 RX ADMIN — Medication 4 MILLIGRAM(S): at 11:49

## 2023-03-24 RX ADMIN — Medication 650 MILLIGRAM(S): at 11:20

## 2023-03-24 RX ADMIN — HEPARIN SODIUM 6500 UNIT(S): 5000 INJECTION INTRAVENOUS; SUBCUTANEOUS at 17:18

## 2023-03-24 RX ADMIN — HEPARIN SODIUM 1400 UNIT(S)/HR: 5000 INJECTION INTRAVENOUS; SUBCUTANEOUS at 15:46

## 2023-03-24 RX ADMIN — Medication 325 MILLIGRAM(S): at 20:48

## 2023-03-24 RX ADMIN — Medication 400 MILLIGRAM(S): at 03:56

## 2023-03-24 RX ADMIN — HEPARIN SODIUM 1700 UNIT(S)/HR: 5000 INJECTION INTRAVENOUS; SUBCUTANEOUS at 17:14

## 2023-03-24 RX ADMIN — Medication 1000 MILLIGRAM(S): at 04:35

## 2023-03-24 RX ADMIN — Medication 250 MILLIGRAM(S): at 08:35

## 2023-03-24 RX ADMIN — ENTECAVIR 0.5 MILLIGRAM(S): 0.5 TABLET ORAL at 11:49

## 2023-03-24 RX ADMIN — Medication 100 GRAM(S): at 17:48

## 2023-03-24 RX ADMIN — PIPERACILLIN AND TAZOBACTAM 25 GRAM(S): 4; .5 INJECTION, POWDER, LYOPHILIZED, FOR SOLUTION INTRAVENOUS at 21:19

## 2023-03-24 RX ADMIN — Medication 50 MILLIGRAM(S): at 17:01

## 2023-03-24 NOTE — CONSULT NOTE ADULT - ASSESSMENT
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 Found to be febrile on admission, ID consulted for further management.    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 Found to be febrile on admission, ID consulted for further management.    Tmax 102.4 (3/23)  WBC 3.5K today  Urinalysis unremarkable  RVP neg  CXR unremarkable    Impression:  #Fever  #Immunocompromised Status, H/O Splenectomy    Fever of unclear etiology, only localizing finding is watery BM x 1 day duration and abdominal discomfort. Non-infectious etiologies on DDx as well, DVT noted on LE Duplex.     Recs:  - d/c vancomycin and cefepime  - start Zosyn 3.375g IV q8H  - check CT A/P, with IV contrast if renal function permits  - would consider pursuing CTA Chest to r/o PE, as could explain fevers  - follow up blood cultures  - check GI PCR  - monitor fever curve    Plan discussed with primary team    Morro Jimenez MD  Infectious Disease Fellow  Available on [employer] Teams  Before 9AM or after 5PM: [phone]  68 y/o M PMHx MM (s/p autoSCT, s/p relapse now on chemo last dose 3/3), bioprosthetic AVR (2019), splenectomy, and partial gastrectomy/pancreatectomy, presents with fever/chills. Found to be febrile on admission, ID consulted for further management.    Tmax 102.4 (3/23)  WBC 3.5K today  Urinalysis unremarkable  RVP neg  CXR unremarkable    Impression:  #Fever  #Immunocompromised Status, H/O Splenectomy    Fever of unclear etiology, only localizing finding is watery BM x 1 day duration and abdominal discomfort. Non-infectious etiologies on DDx as well, DVT noted on LE Duplex.     Recs:  - d/c vancomycin and cefepime  - start Zosyn 3.375g IV q8H  - check CT A/P, with IV contrast if renal function permits  - would consider pursuing CTA Chest to r/o PE, as could explain fevers  - follow up blood cultures  - check GI PCR  - monitor fever curve    Plan discussed with primary team    Morro Jimenez MD  Infectious Disease Fellow  Available on [employer] Teams  Before 9AM or after 5PM: [phone]  68 y/o M PMHx MM (s/p autoSCT, s/p relapse now on chemo last dose 3/3), bioprosthetic AVR (2019), splenectomy, and partial gastrectomy/pancreatectomy, presents with fever/chills. Found to be febrile on admission, ID consulted for further management.    Tmax 102.4 (3/23)  WBC 3.5K today  Urinalysis unremarkable  RVP neg  CXR unremarkable    Impression:  #Fever  #Immunocompromised Status, H/O Splenectomy    Fever of unclear etiology, only localizing finding is watery BM x 1 day duration and abdominal discomfort. Non-infectious etiologies on DDx as well, DVT noted on LE Duplex.     Recs:  - d/c vancomycin and cefepime  - start Zosyn 3.375g IV q8H  - check CT A/P, with IV contrast if renal function permits  - would consider pursuing CTA Chest to r/o PE, as could explain fevers  - follow up blood cultures  - check GI PCR  - monitor fever curve    Plan discussed with primary team    Morro Jimenez MD  Infectious Disease Fellow  Available on [employer] Teams  Before 9AM or after 5PM: [phone]

## 2023-03-24 NOTE — PROGRESS NOTE ADULT - SUBJECTIVE AND OBJECTIVE BOX
Name of Patient : PETER DE LA PAZ  MRN: [id number]  Date of visit: 23 @ 17:38      Subjective: Patient seen and examined. No new events except as noted.   Patient seen earlier this AM. Sitting up in bed.   In Emergency department.   C/o fatigue; S/p 1 unit of PRBCs yesterday   Febrile this AM  ID eval consulted    REVIEW OF SYSTEMS:    CONSTITUTIONAL: Generalized weakness   EYES/ENT: No visual changes;  No vertigo or throat pain   NECK: No pain or stiffness  RESPIRATORY: No cough, wheezing, hemoptysis; No shortness of breath  CARDIOVASCULAR: No chest pain or palpitations  GASTROINTESTINAL: No abdominal or epigastric pain. No nausea, vomiting, or hematemesis; No diarrhea or constipation. No melena or hematochezia.  GENITOURINARY: No dysuria, frequency or hematuria  NEUROLOGICAL: No numbness or weakness  SKIN: No itching, burning, rashes, or lesions   All other review of systems is negative unless indicated above.    MEDICATIONS:  MEDICATIONS  (STANDING):  acyclovir   Oral Tab/Cap 400 milliGRAM(s) Oral daily  atorvastatin 40 milliGRAM(s) Oral at bedtime  cefepime   IVPB 2000 milliGRAM(s) IV Intermittent every 12 hours  dexAMETHasone     Tablet 4 milliGRAM(s) Oral daily  entecavir 0.5 milliGRAM(s) Oral daily  heparin  Infusion.  Unit(s)/Hr (14 mL/Hr) IV Continuous <Continuous>  magnesium sulfate  IVPB 1 Gram(s) IV Intermittent once  metoprolol tartrate 50 milliGRAM(s) Oral two times a day  vancomycin  IVPB 750 milliGRAM(s) IV Intermittent every 12 hours      PHYSICAL EXAM:  T(C): 37.7 (23 @ 16:33), Max: 39.1 (23 @ 03:30)  HR: 91 (23 @ 16:33) (85 - 111)  BP: 124/70 (23 @ 16:33) (100/55 - 137/67)  RR: 18 (23 @ 16:33) (18 - 20)  SpO2: 95% (23 @ 16:33) (95% - 98%)  Wt(kg): --  I&O's Summary    23 Mar 2023 07:01  -  24 Mar 2023 07:00  --------------------------------------------------------  IN: 100 mL / OUT: 0 mL / NET: 100 mL    24 Mar 2023 07:01  -  24 Mar 2023 17:38  --------------------------------------------------------  IN: 0 mL / OUT: 300 mL / NET: -300 mL          Appearance: Normal; Glasses 	  HEENT:  Eyes are open   Lymphatic: No lymphadenopathy   Cardiovascular: Normal S1 S2   Respiratory: normal effort , clear  Gastrointestinal:  Soft, Non-tender  Skin: No rashes,  warm to touch  Psychiatry:  Mood & affect appropriate  Musculoskeletal: RLE Edema           23 @ 07:  -  23 @ 07:00  --------------------------------------------------------  IN: 100 mL / OUT: 0 mL / NET: 100 mL    23 @ 07:01  -  23 @ 17:38  --------------------------------------------------------  IN: 0 mL / OUT: 300 mL / NET: -300 mL                                7.8    3.48  )-----------( 51       ( 24 Mar 2023 04:23 )             23.2                   133<L>  |  99  |  19  ----------------------------<  104<H>  4.0   |  28  |  1.46<H>    Ca    8.4      24 Mar 2023 04:23  Mg     1.6         TPro  5.3<L>  /  Alb  3.1<L>  /  TBili  0.8  /  DBili  x   /  AST  9<L>  /  ALT  17  /  AlkPhos  74  -    PT/INR - ( 24 Mar 2023 15:47 )   PT: 17.8 sec;   INR: 1.54 ratio         PTT - ( 24 Mar 2023 15:47 )  PTT:22.9 sec                   Urinalysis Basic - ( 23 Mar 2023 18:13 )    Color: Light Yellow / Appearance: Clear / S.015 / pH: x  Gluc: x / Ketone: Negative  / Bili: Negative / Urobili: Negative   Blood: x / Protein: Trace / Nitrite: Negative   Leuk Esterase: Negative / RBC: 7 /hpf / WBC 1 /HPF   Sq Epi: x / Non Sq Epi: 0 /hpf / Bacteria: Negative        < from: [facility] Duplex Lower Ext Vein Scan, Bilat (23 @ 14:58) >  IMPRESSION:  Acute below the knee DVT confined to the soleal vein of the right calf.    Examination findings were conveyed toDr. Paredes by vascular technologist   Axel at 1430 hours on 3/24/2023 with read back.      < end of copied text >        < from: CT Abdomen and Pelvis w/ IV Cont (23 @ 20:25) >    IMPRESSION:  No acute pathology.    Prominent peripancreatic and portacaval nodes, etiology unclear.   Correlation with prior imaging would prove useful to demonstrate   stability.    < end of copied text >     Name of Patient : PETER DE LA PAZ  MRN: [id number]  Date of visit: 23 @ 17:38      Subjective: Patient seen and examined. No new events except as noted.   Patient seen earlier this AM. Sitting up in bed.   In Emergency department.   C/o fatigue; S/p 1 unit of PRBCs yesterday   Febrile this AM  ID eval consulted    REVIEW OF SYSTEMS:    CONSTITUTIONAL: Generalized weakness   EYES/ENT: No visual changes;  No vertigo or throat pain   NECK: No pain or stiffness  RESPIRATORY: No cough, wheezing, hemoptysis; No shortness of breath  CARDIOVASCULAR: No chest pain or palpitations  GASTROINTESTINAL: No abdominal or epigastric pain. No nausea, vomiting, or hematemesis; No diarrhea or constipation. No melena or hematochezia.  GENITOURINARY: No dysuria, frequency or hematuria  NEUROLOGICAL: No numbness or weakness  SKIN: No itching, burning, rashes, or lesions   All other review of systems is negative unless indicated above.    MEDICATIONS:  MEDICATIONS  (STANDING):  acyclovir   Oral Tab/Cap 400 milliGRAM(s) Oral daily  atorvastatin 40 milliGRAM(s) Oral at bedtime  cefepime   IVPB 2000 milliGRAM(s) IV Intermittent every 12 hours  dexAMETHasone     Tablet 4 milliGRAM(s) Oral daily  entecavir 0.5 milliGRAM(s) Oral daily  heparin  Infusion.  Unit(s)/Hr (14 mL/Hr) IV Continuous <Continuous>  magnesium sulfate  IVPB 1 Gram(s) IV Intermittent once  metoprolol tartrate 50 milliGRAM(s) Oral two times a day  vancomycin  IVPB 750 milliGRAM(s) IV Intermittent every 12 hours      PHYSICAL EXAM:  T(C): 37.7 (23 @ 16:33), Max: 39.1 (23 @ 03:30)  HR: 91 (23 @ 16:33) (85 - 111)  BP: 124/70 (23 @ 16:33) (100/55 - 137/67)  RR: 18 (23 @ 16:33) (18 - 20)  SpO2: 95% (23 @ 16:33) (95% - 98%)  Wt(kg): --  I&O's Summary    23 Mar 2023 07:01  -  24 Mar 2023 07:00  --------------------------------------------------------  IN: 100 mL / OUT: 0 mL / NET: 100 mL    24 Mar 2023 07:01  -  24 Mar 2023 17:38  --------------------------------------------------------  IN: 0 mL / OUT: 300 mL / NET: -300 mL          Appearance: Normal; Glasses 	  HEENT:  Eyes are open   Lymphatic: No lymphadenopathy   Cardiovascular: Normal S1 S2   Respiratory: normal effort , clear  Gastrointestinal:  Soft, Non-tender  Skin: No rashes,  warm to touch  Psychiatry:  Mood & affect appropriate  Musculoskeletal: RLE Edema           23 @ 07:  -  23 @ 07:00  --------------------------------------------------------  IN: 100 mL / OUT: 0 mL / NET: 100 mL    23 @ 07:01  -  23 @ 17:38  --------------------------------------------------------  IN: 0 mL / OUT: 300 mL / NET: -300 mL                                7.8    3.48  )-----------( 51       ( 24 Mar 2023 04:23 )             23.2                   133<L>  |  99  |  19  ----------------------------<  104<H>  4.0   |  28  |  1.46<H>    Ca    8.4      24 Mar 2023 04:23  Mg     1.6         TPro  5.3<L>  /  Alb  3.1<L>  /  TBili  0.8  /  DBili  x   /  AST  9<L>  /  ALT  17  /  AlkPhos  74  -    PT/INR - ( 24 Mar 2023 15:47 )   PT: 17.8 sec;   INR: 1.54 ratio         PTT - ( 24 Mar 2023 15:47 )  PTT:22.9 sec                   Urinalysis Basic - ( 23 Mar 2023 18:13 )    Color: Light Yellow / Appearance: Clear / S.015 / pH: x  Gluc: x / Ketone: Negative  / Bili: Negative / Urobili: Negative   Blood: x / Protein: Trace / Nitrite: Negative   Leuk Esterase: Negative / RBC: 7 /hpf / WBC 1 /HPF   Sq Epi: x / Non Sq Epi: 0 /hpf / Bacteria: Negative        < from: [facility] Duplex Lower Ext Vein Scan, Bilat (23 @ 14:58) >  IMPRESSION:  Acute below the knee DVT confined to the soleal vein of the right calf.    Examination findings were conveyed toDr. Paredes by vascular technologist   Axel at 1430 hours on 3/24/2023 with read back.      < end of copied text >        < from: CT Abdomen and Pelvis w/ IV Cont (23 @ 20:25) >    IMPRESSION:  No acute pathology.    Prominent peripancreatic and portacaval nodes, etiology unclear.   Correlation with prior imaging would prove useful to demonstrate   stability.    < end of copied text >     Name of Patient : PETER DE LA PAZ  MRN: [id number]  Date of visit: 23 @ 17:38      Subjective: Patient seen and examined. No new events except as noted.   Patient seen earlier this AM. Sitting up in bed.   In Emergency department.   C/o fatigue; S/p 1 unit of PRBCs yesterday   Febrile this AM  ID eval consulted    REVIEW OF SYSTEMS:    CONSTITUTIONAL: Generalized weakness   EYES/ENT: No visual changes;  No vertigo or throat pain   NECK: No pain or stiffness  RESPIRATORY: No cough, wheezing, hemoptysis; No shortness of breath  CARDIOVASCULAR: No chest pain or palpitations  GASTROINTESTINAL: No abdominal or epigastric pain. No nausea, vomiting, or hematemesis; No diarrhea or constipation. No melena or hematochezia.  GENITOURINARY: No dysuria, frequency or hematuria  NEUROLOGICAL: No numbness or weakness  SKIN: No itching, burning, rashes, or lesions   All other review of systems is negative unless indicated above.    MEDICATIONS:  MEDICATIONS  (STANDING):  acyclovir   Oral Tab/Cap 400 milliGRAM(s) Oral daily  atorvastatin 40 milliGRAM(s) Oral at bedtime  cefepime   IVPB 2000 milliGRAM(s) IV Intermittent every 12 hours  dexAMETHasone     Tablet 4 milliGRAM(s) Oral daily  entecavir 0.5 milliGRAM(s) Oral daily  heparin  Infusion.  Unit(s)/Hr (14 mL/Hr) IV Continuous <Continuous>  magnesium sulfate  IVPB 1 Gram(s) IV Intermittent once  metoprolol tartrate 50 milliGRAM(s) Oral two times a day  vancomycin  IVPB 750 milliGRAM(s) IV Intermittent every 12 hours      PHYSICAL EXAM:  T(C): 37.7 (23 @ 16:33), Max: 39.1 (23 @ 03:30)  HR: 91 (23 @ 16:33) (85 - 111)  BP: 124/70 (23 @ 16:33) (100/55 - 137/67)  RR: 18 (23 @ 16:33) (18 - 20)  SpO2: 95% (23 @ 16:33) (95% - 98%)  Wt(kg): --  I&O's Summary    23 Mar 2023 07:01  -  24 Mar 2023 07:00  --------------------------------------------------------  IN: 100 mL / OUT: 0 mL / NET: 100 mL    24 Mar 2023 07:01  -  24 Mar 2023 17:38  --------------------------------------------------------  IN: 0 mL / OUT: 300 mL / NET: -300 mL          Appearance: Normal; Glasses 	  HEENT:  Eyes are open   Lymphatic: No lymphadenopathy   Cardiovascular: Normal S1 S2   Respiratory: normal effort , clear  Gastrointestinal:  Soft, Non-tender  Skin: No rashes,  warm to touch  Psychiatry:  Mood & affect appropriate  Musculoskeletal: RLE Edema           23 @ 07:  -  23 @ 07:00  --------------------------------------------------------  IN: 100 mL / OUT: 0 mL / NET: 100 mL    23 @ 07:01  -  23 @ 17:38  --------------------------------------------------------  IN: 0 mL / OUT: 300 mL / NET: -300 mL                                7.8    3.48  )-----------( 51       ( 24 Mar 2023 04:23 )             23.2                   133<L>  |  99  |  19  ----------------------------<  104<H>  4.0   |  28  |  1.46<H>    Ca    8.4      24 Mar 2023 04:23  Mg     1.6         TPro  5.3<L>  /  Alb  3.1<L>  /  TBili  0.8  /  DBili  x   /  AST  9<L>  /  ALT  17  /  AlkPhos  74  -    PT/INR - ( 24 Mar 2023 15:47 )   PT: 17.8 sec;   INR: 1.54 ratio         PTT - ( 24 Mar 2023 15:47 )  PTT:22.9 sec                   Urinalysis Basic - ( 23 Mar 2023 18:13 )    Color: Light Yellow / Appearance: Clear / S.015 / pH: x  Gluc: x / Ketone: Negative  / Bili: Negative / Urobili: Negative   Blood: x / Protein: Trace / Nitrite: Negative   Leuk Esterase: Negative / RBC: 7 /hpf / WBC 1 /HPF   Sq Epi: x / Non Sq Epi: 0 /hpf / Bacteria: Negative        < from: [facility] Duplex Lower Ext Vein Scan, Bilat (23 @ 14:58) >  IMPRESSION:  Acute below the knee DVT confined to the soleal vein of the right calf.    Examination findings were conveyed toDr. Paredes by vascular technologist   Axel at 1430 hours on 3/24/2023 with read back.      < end of copied text >        < from: CT Abdomen and Pelvis w/ IV Cont (23 @ 20:25) >    IMPRESSION:  No acute pathology.    Prominent peripancreatic and portacaval nodes, etiology unclear.   Correlation with prior imaging would prove useful to demonstrate   stability.    < end of copied text >

## 2023-03-24 NOTE — CHART NOTE - NSCHARTNOTEFT_GEN_A_CORE
CC: temperature 102.4F      HPI:  Notified by RN patient with oral temperature of 102.4F approximately 15 minutes after pRBC transfusion. Patient seen and examined by me at bedside in [place] section of ED; patient is alert, awake, NAD. He denied any history of transfusion reactions. He denied headache, dizziness, chest pain, shortness of breath, pruritus, rash, cough, rhinorrhea, N/V/D, urinary symptoms, back pain, or abdominal pain. Chart review noted with fevers since admission.         ROS:  CONSTITUTIONAL:  (+) fever  CARDIOVASCULAR:  No chest pain or palpitations  RESPIRATORY:   No SOB, cough, wheezing  GASTROINTESTINAL:  No abdominal pain, N/V/D  EXTREMITIES:  No swelling or joint pain  GENITOURINARY:  No burning on urination, increased frequency or urgency.  No flank pain.  SKIN: No rashes        PAST MEDICAL & SURGICAL HISTORY:  Multiple myeloma  Hyperlipidemia  H/O aortic aneurysm  H/O splenectomy  S/P partial gastrectomy  History of pancreatic surgery          VITAL SIGNS:  T(C): 39.1 (23 @ 03:30), Max: 39.1 (23 @ 03:30)  HR: 97 (23 @ 03:30) (85 - 111)  BP: 118/61 (23 @ 03:30) (96/53 - 127/71)  RR: 18 (23 @ 03:30) (18 - 20)  SpO2: 96% (23 @ 03:30) (96% - 98%)        Physical Exam:  General: WN/WD male sitting in bed, NAD, AOx3, nontoxic appearing  Head:  NC/AT  CV: RRR, S1S2   Respiratory: CTA B/L, nonlabored on room air. No rales/rhonchi/wheezes.  Abdominal: (+) bowel sounds x4. Soft, NT, no palpable mass, no guarding, or rebound tenderness  MSK: No BLLE edema, + peripheral pulses, FROM all 4 extremity  Skin: (+) warm  Psych: Appropriate affect       LABORATORY:                        6.7    3.24  )-----------( 63       ( 23 Mar 2023 17:42 )             19.5       -    133<L>  |  99  |  20  ----------------------------<  102<H>  4.4   |  23  |  1.37<H>    Ca    8.3<L>      23 Mar 2023 17:42    TPro  5.1<L>  /  Alb  3.0<L>  /  TBili  0.7  /  DBili  x   /  AST  21  /  ALT  18  /  AlkPhos  69        Urinalysis Basic - ( 23 Mar 2023 18:13 )  Color: Light Yellow / Appearance: Clear / S.015 / pH: x  Gluc: x / Ketone: Negative  / Bili: Negative / Urobili: Negative   Blood: x / Protein: Trace / Nitrite: Negative   Leuk Esterase: Negative / RBC: 7 /hpf / WBC 1 /HPF   Sq Epi: x / Non Sq Epi: 0 /hpf / Bacteria: Negative                RADIOLOGY:  < from: Xray Chest 1 View- PORTABLE-Urgent (23 @ 17:42) >  IMPRESSION:  Clear lungs.  < end of copied text >          ASSESSMENT/PLAN:   67yoM PMHx of aortic aneurysm and bioprosthetic AV valve replacement 2019, HL, splenectomy, partial gastrectomy, partial pancreatectomy, 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 p/w fever.  Patient reports that he went to bed last night feeling well.  This morning upon waking patient had chills and body aches.  Took his temperature and it was noted to be 102.  Patient endorsing some mild periumbilical abdominal discomfort, but otherwise feels well. Patient states that he went to his hematologist to get a blood transfusion today and was instructed to come to the ER.  Patient now presenting acutely with temperature of 102.4F orally.     #Fever in setting of transfusion- r/o transfusion reaction vs infectious etiology  -Vital signs hemodynamically stable, notable for temperature 102.4F  -Labs notable for leukopenia  -Lactate 1.2 on admission  -Acetaminophen 1000mg IV x1  -Cooling measures PRN pyrexia  -BCx x2 collected 3/23, in lab  -UA (3/23) unremarkable  -UCx collected 3/23, in lab  -CXR (3/23) with "clear lungs"  -COVID/RVP (3/23) negative  -Given development of fever in context of blood transfusion, will work up for transfusion reaction, although less likely given patient has been febrile since admission.  -STAT CBC, CMP, LDH, haptoglobin, T&S, pBNP ordered   -Consider ID evaluation in AM  -The above was discussed in detail with hospitalist Dr. Stratton, who agrees with the above management. Plan to initiate empiric antibiotics at this time.   -Will continue to closely monitor patient/vitals   -Will endorse to day team    Maral Galloway PA-C  Dept of Medicine  [id number] CC: temperature 102.4F      HPI:  Notified by RN patient with oral temperature of 102.4F approximately 15 minutes after pRBC transfusion. Patient seen and examined by me at bedside in [place] section of ED; patient is alert, awake, NAD. He denied any history of transfusion reactions. He denied headache, dizziness, chest pain, shortness of breath, pruritus, rash, cough, rhinorrhea, N/V/D, urinary symptoms, back pain, or abdominal pain. Chart review noted with fevers since admission.         ROS:  CONSTITUTIONAL:  (+) fever  CARDIOVASCULAR:  No chest pain or palpitations  RESPIRATORY:   No SOB, cough, wheezing  GASTROINTESTINAL:  No abdominal pain, N/V/D  EXTREMITIES:  No swelling or joint pain  GENITOURINARY:  No burning on urination, increased frequency or urgency.  No flank pain.  SKIN: No rashes        PAST MEDICAL & SURGICAL HISTORY:  Multiple myeloma  Hyperlipidemia  H/O aortic aneurysm  H/O splenectomy  S/P partial gastrectomy  History of pancreatic surgery          VITAL SIGNS:  T(C): 39.1 (23 @ 03:30), Max: 39.1 (23 @ 03:30)  HR: 97 (23 @ 03:30) (85 - 111)  BP: 118/61 (23 @ 03:30) (96/53 - 127/71)  RR: 18 (23 @ 03:30) (18 - 20)  SpO2: 96% (23 @ 03:30) (96% - 98%)        Physical Exam:  General: WN/WD male sitting in bed, NAD, AOx3, nontoxic appearing  Head:  NC/AT  CV: RRR, S1S2   Respiratory: CTA B/L, nonlabored on room air. No rales/rhonchi/wheezes.  Abdominal: (+) bowel sounds x4. Soft, NT, no palpable mass, no guarding, or rebound tenderness  MSK: No BLLE edema, + peripheral pulses, FROM all 4 extremity  Skin: (+) warm  Psych: Appropriate affect       LABORATORY:                        6.7    3.24  )-----------( 63       ( 23 Mar 2023 17:42 )             19.5       -    133<L>  |  99  |  20  ----------------------------<  102<H>  4.4   |  23  |  1.37<H>    Ca    8.3<L>      23 Mar 2023 17:42    TPro  5.1<L>  /  Alb  3.0<L>  /  TBili  0.7  /  DBili  x   /  AST  21  /  ALT  18  /  AlkPhos  69        Urinalysis Basic - ( 23 Mar 2023 18:13 )  Color: Light Yellow / Appearance: Clear / S.015 / pH: x  Gluc: x / Ketone: Negative  / Bili: Negative / Urobili: Negative   Blood: x / Protein: Trace / Nitrite: Negative   Leuk Esterase: Negative / RBC: 7 /hpf / WBC 1 /HPF   Sq Epi: x / Non Sq Epi: 0 /hpf / Bacteria: Negative                RADIOLOGY:  < from: Xray Chest 1 View- PORTABLE-Urgent (23 @ 17:42) >  IMPRESSION:  Clear lungs.  < end of copied text >          ASSESSMENT/PLAN:   67yoM PMHx of aortic aneurysm and bioprosthetic AV valve replacement 2019, HL, splenectomy, partial gastrectomy, partial pancreatectomy, 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 p/w fever.  Patient reports that he went to bed last night feeling well.  This morning upon waking patient had chills and body aches.  Took his temperature and it was noted to be 102.  Patient endorsing some mild periumbilical abdominal discomfort, but otherwise feels well. Patient states that he went to his hematologist to get a blood transfusion today and was instructed to come to the ER.  Patient now presenting acutely with temperature of 102.4F orally.     #Fever in setting of transfusion- r/o transfusion reaction vs infectious etiology  -Vital signs hemodynamically stable, notable for temperature 102.4F  -Labs notable for leukopenia  -Lactate 1.2 on admission  -Acetaminophen 1000mg IV x1  -Cooling measures PRN pyrexia  -BCx x2 collected 3/23, in lab  -UA (3/23) unremarkable  -UCx collected 3/23, in lab  -CXR (3/23) with "clear lungs"  -COVID/RVP (3/23) negative  -Given development of fever in context of blood transfusion, will work up for transfusion reaction, although less likely given patient has been febrile since admission.  -STAT CBC, CMP, LDH, haptoglobin, T&S, pBNP ordered   -Consider ID evaluation in AM  -The above was discussed in detail with hospitalist Dr. Stratton, who agrees with the above management. Plan to initiate empiric antibiotics at this time.   -Will continue to closely monitor patient/vitals   -Will endorse to day team    Maral Galloway PA-C  Dept of Medicine  [id number] CC: temperature 102.4F      HPI:  Notified by RN patient with oral temperature of 102.4F approximately 15 minutes after pRBC transfusion. Patient seen and examined by me at bedside in [place] section of ED; patient is alert, awake, NAD. He denied any history of transfusion reactions. He denied headache, dizziness, chest pain, shortness of breath, pruritus, rash, cough, rhinorrhea, N/V/D, urinary symptoms, back pain, or abdominal pain. Chart review noted with fevers since admission.         ROS:  CONSTITUTIONAL:  (+) fever  CARDIOVASCULAR:  No chest pain or palpitations  RESPIRATORY:   No SOB, cough, wheezing  GASTROINTESTINAL:  No abdominal pain, N/V/D  EXTREMITIES:  No swelling or joint pain  GENITOURINARY:  No burning on urination, increased frequency or urgency.  No flank pain.  SKIN: No rashes        PAST MEDICAL & SURGICAL HISTORY:  Multiple myeloma  Hyperlipidemia  H/O aortic aneurysm  H/O splenectomy  S/P partial gastrectomy  History of pancreatic surgery          VITAL SIGNS:  T(C): 39.1 (23 @ 03:30), Max: 39.1 (23 @ 03:30)  HR: 97 (23 @ 03:30) (85 - 111)  BP: 118/61 (23 @ 03:30) (96/53 - 127/71)  RR: 18 (23 @ 03:30) (18 - 20)  SpO2: 96% (23 @ 03:30) (96% - 98%)        Physical Exam:  General: WN/WD male sitting in bed, NAD, AOx3, nontoxic appearing  Head:  NC/AT  CV: RRR, S1S2   Respiratory: CTA B/L, nonlabored on room air. No rales/rhonchi/wheezes.  Abdominal: (+) bowel sounds x4. Soft, NT, no palpable mass, no guarding, or rebound tenderness  MSK: No BLLE edema, + peripheral pulses, FROM all 4 extremity  Skin: (+) warm  Psych: Appropriate affect       LABORATORY:                        6.7    3.24  )-----------( 63       ( 23 Mar 2023 17:42 )             19.5       -    133<L>  |  99  |  20  ----------------------------<  102<H>  4.4   |  23  |  1.37<H>    Ca    8.3<L>      23 Mar 2023 17:42    TPro  5.1<L>  /  Alb  3.0<L>  /  TBili  0.7  /  DBili  x   /  AST  21  /  ALT  18  /  AlkPhos  69        Urinalysis Basic - ( 23 Mar 2023 18:13 )  Color: Light Yellow / Appearance: Clear / S.015 / pH: x  Gluc: x / Ketone: Negative  / Bili: Negative / Urobili: Negative   Blood: x / Protein: Trace / Nitrite: Negative   Leuk Esterase: Negative / RBC: 7 /hpf / WBC 1 /HPF   Sq Epi: x / Non Sq Epi: 0 /hpf / Bacteria: Negative                RADIOLOGY:  < from: Xray Chest 1 View- PORTABLE-Urgent (23 @ 17:42) >  IMPRESSION:  Clear lungs.  < end of copied text >          ASSESSMENT/PLAN:   67yoM PMHx of aortic aneurysm and bioprosthetic AV valve replacement 2019, HL, splenectomy, partial gastrectomy, partial pancreatectomy, 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 p/w fever.  Patient reports that he went to bed last night feeling well.  This morning upon waking patient had chills and body aches.  Took his temperature and it was noted to be 102.  Patient endorsing some mild periumbilical abdominal discomfort, but otherwise feels well. Patient states that he went to his hematologist to get a blood transfusion today and was instructed to come to the ER.  Patient now presenting acutely with temperature of 102.4F orally.     #Fever in setting of transfusion- r/o transfusion reaction vs infectious etiology  -Vital signs hemodynamically stable, notable for temperature 102.4F  -Labs notable for leukopenia  -Lactate 1.2 on admission  -Acetaminophen 1000mg IV x1  -Cooling measures PRN pyrexia  -BCx x2 collected 3/23, in lab  -UA (3/23) unremarkable  -UCx collected 3/23, in lab  -CXR (3/23) with "clear lungs"  -COVID/RVP (3/23) negative  -Given development of fever in context of blood transfusion, will work up for transfusion reaction, although less likely given patient has been febrile since admission.  -STAT CBC, CMP, LDH, haptoglobin, T&S, pBNP ordered   -Consider ID evaluation in AM  -The above was discussed in detail with hospitalist Dr. Stratton, who agrees with the above management. Plan to initiate empiric antibiotics at this time.   -Will continue to closely monitor patient/vitals   -Will endorse to day team    Maral Galloway PA-C  Dept of Medicine  [id number] CC: temperature 102.4F      HPI:  Notified by RN patient with oral temperature of 102.4F approximately 15 minutes after pRBC transfusion. Patient seen and examined by me at bedside in [place] section of ED; patient is alert, awake, NAD. He denied any history of transfusion reactions. He denied headache, dizziness, chest pain, shortness of breath, pruritus, rash, cough, rhinorrhea, N/V/D, urinary symptoms, back pain, or abdominal pain. Chart review noted with fevers since admission.         ROS:  CONSTITUTIONAL:  (+) fever  CARDIOVASCULAR:  No chest pain or palpitations  RESPIRATORY:   No SOB, cough, wheezing  GASTROINTESTINAL:  No abdominal pain, N/V/D  EXTREMITIES:  No swelling or joint pain  GENITOURINARY:  No burning on urination, increased frequency or urgency.  No flank pain.  SKIN: No rashes        PAST MEDICAL & SURGICAL HISTORY:  Multiple myeloma  Hyperlipidemia  H/O aortic aneurysm  H/O splenectomy  S/P partial gastrectomy  History of pancreatic surgery          VITAL SIGNS:  T(C): 39.1 (23 @ 03:30), Max: 39.1 (23 @ 03:30)  HR: 97 (23 @ 03:30) (85 - 111)  BP: 118/61 (23 @ 03:30) (96/53 - 127/71)  RR: 18 (23 @ 03:30) (18 - 20)  SpO2: 96% (23 @ 03:30) (96% - 98%)        Physical Exam:  General: WN/WD male sitting in bed, NAD, AOx3, nontoxic appearing  Head:  NC/AT  CV: RRR, S1S2   Respiratory: CTA B/L, nonlabored on room air. No rales/rhonchi/wheezes.  Abdominal: (+) bowel sounds x4. Soft, NT, no palpable mass, no guarding, or rebound tenderness  MSK: No BLLE edema, + peripheral pulses, FROM all 4 extremity  Skin: (+) warm  Psych: Appropriate affect       LABORATORY:                        6.7    3.24  )-----------( 63       ( 23 Mar 2023 17:42 )             19.5       -    133<L>  |  99  |  20  ----------------------------<  102<H>  4.4   |  23  |  1.37<H>    Ca    8.3<L>      23 Mar 2023 17:42    TPro  5.1<L>  /  Alb  3.0<L>  /  TBili  0.7  /  DBili  x   /  AST  21  /  ALT  18  /  AlkPhos  69        Urinalysis Basic - ( 23 Mar 2023 18:13 )  Color: Light Yellow / Appearance: Clear / S.015 / pH: x  Gluc: x / Ketone: Negative  / Bili: Negative / Urobili: Negative   Blood: x / Protein: Trace / Nitrite: Negative   Leuk Esterase: Negative / RBC: 7 /hpf / WBC 1 /HPF   Sq Epi: x / Non Sq Epi: 0 /hpf / Bacteria: Negative                RADIOLOGY:  < from: Xray Chest 1 View- PORTABLE-Urgent (23 @ 17:42) >  IMPRESSION:  Clear lungs.  < end of copied text >          ASSESSMENT/PLAN:   67yoM PMHx of aortic aneurysm and bioprosthetic AV valve replacement 2019, HL, splenectomy, partial gastrectomy, partial pancreatectomy, 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 p/w fever.  Patient reports that he went to bed last night feeling well.  This morning upon waking patient had chills and body aches.  Took his temperature and it was noted to be 102.  Patient endorsing some mild periumbilical abdominal discomfort, but otherwise feels well. Patient states that he went to his hematologist to get a blood transfusion today and was instructed to come to the ER.  Patient now presenting acutely with temperature of 102.4F orally fifteen minutes after completing blood transfusion. He denies history of transfusion reaction.     #Fever in setting of transfusion- r/o transfusion reaction vs infectious etiology  -Vital signs hemodynamically stable, notable for temperature 102.4F  -Labs notable for leukopenia  -Lactate 1.2 on admission  -Acetaminophen 1000mg IV x1  -Cooling measures PRN pyrexia  -BCx x2 collected 3/23, in lab  -UA (3/23) unremarkable  -UCx collected 3/23, in lab  -CXR (3/23) with "clear lungs"  -COVID/RVP (3/23) negative  -Given development of fever in context of blood transfusion, will work up for transfusion reaction, although less likely given patient has been intermittently febrile since admission.  -STAT CBC, CMP, LDH, haptoglobin, T&S, pBNP ordered   -Urgent CXR ordered  -Consider ID evaluation in AM  -Consider heme/onc evaluation in AM  -The above was discussed in detail with hospitalist Dr. Stratton, who agrees with the above management. Plan to initiate empiric antibiotics at this time.   -Will continue to closely monitor patient/vitals   -Will endorse to day team    Maral Galloway PA-C  Dept of Medicine  [id number] CC: temperature 102.4F      HPI:  Notified by RN patient with oral temperature of 102.4F approximately 15 minutes after pRBC transfusion. Patient seen and examined by me at bedside in [place] section of ED; patient is alert, awake, NAD. He denied any history of transfusion reactions. He denied headache, dizziness, chest pain, shortness of breath, pruritus, rash, cough, rhinorrhea, N/V/D, urinary symptoms, back pain, or abdominal pain. Chart review noted with fevers since admission.         ROS:  CONSTITUTIONAL:  (+) fever  CARDIOVASCULAR:  No chest pain or palpitations  RESPIRATORY:   No SOB, cough, wheezing  GASTROINTESTINAL:  No abdominal pain, N/V/D  EXTREMITIES:  No swelling or joint pain  GENITOURINARY:  No burning on urination, increased frequency or urgency.  No flank pain.  SKIN: No rashes        PAST MEDICAL & SURGICAL HISTORY:  Multiple myeloma  Hyperlipidemia  H/O aortic aneurysm  H/O splenectomy  S/P partial gastrectomy  History of pancreatic surgery          VITAL SIGNS:  T(C): 39.1 (23 @ 03:30), Max: 39.1 (23 @ 03:30)  HR: 97 (23 @ 03:30) (85 - 111)  BP: 118/61 (23 @ 03:30) (96/53 - 127/71)  RR: 18 (23 @ 03:30) (18 - 20)  SpO2: 96% (23 @ 03:30) (96% - 98%)        Physical Exam:  General: WN/WD male sitting in bed, NAD, AOx3, nontoxic appearing  Head:  NC/AT  CV: RRR, S1S2   Respiratory: CTA B/L, nonlabored on room air. No rales/rhonchi/wheezes.  Abdominal: (+) bowel sounds x4. Soft, NT, no palpable mass, no guarding, or rebound tenderness  MSK: No BLLE edema, + peripheral pulses, FROM all 4 extremity  Skin: (+) warm  Psych: Appropriate affect       LABORATORY:                        6.7    3.24  )-----------( 63       ( 23 Mar 2023 17:42 )             19.5       -    133<L>  |  99  |  20  ----------------------------<  102<H>  4.4   |  23  |  1.37<H>    Ca    8.3<L>      23 Mar 2023 17:42    TPro  5.1<L>  /  Alb  3.0<L>  /  TBili  0.7  /  DBili  x   /  AST  21  /  ALT  18  /  AlkPhos  69        Urinalysis Basic - ( 23 Mar 2023 18:13 )  Color: Light Yellow / Appearance: Clear / S.015 / pH: x  Gluc: x / Ketone: Negative  / Bili: Negative / Urobili: Negative   Blood: x / Protein: Trace / Nitrite: Negative   Leuk Esterase: Negative / RBC: 7 /hpf / WBC 1 /HPF   Sq Epi: x / Non Sq Epi: 0 /hpf / Bacteria: Negative                RADIOLOGY:  < from: Xray Chest 1 View- PORTABLE-Urgent (23 @ 17:42) >  IMPRESSION:  Clear lungs.  < end of copied text >          ASSESSMENT/PLAN:   67yoM PMHx of aortic aneurysm and bioprosthetic AV valve replacement 2019, HL, splenectomy, partial gastrectomy, partial pancreatectomy, 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 p/w fever.  Patient reports that he went to bed last night feeling well.  This morning upon waking patient had chills and body aches.  Took his temperature and it was noted to be 102.  Patient endorsing some mild periumbilical abdominal discomfort, but otherwise feels well. Patient states that he went to his hematologist to get a blood transfusion today and was instructed to come to the ER.  Patient now presenting acutely with temperature of 102.4F orally fifteen minutes after completing blood transfusion. He denies history of transfusion reaction.     #Fever in setting of transfusion- r/o transfusion reaction vs infectious etiology  -Vital signs hemodynamically stable, notable for temperature 102.4F  -Labs notable for leukopenia  -Lactate 1.2 on admission  -Acetaminophen 1000mg IV x1  -Cooling measures PRN pyrexia  -BCx x2 collected 3/23, in lab  -UA (3/23) unremarkable  -UCx collected 3/23, in lab  -CXR (3/23) with "clear lungs"  -COVID/RVP (3/23) negative  -Given development of fever in context of blood transfusion, will work up for transfusion reaction, although less likely given patient has been intermittently febrile since admission.  -STAT CBC, CMP, LDH, haptoglobin, T&S, pBNP ordered   -Urgent CXR ordered  -Consider ID evaluation in AM  -Consider heme/onc evaluation in AM  -The above was discussed in detail with hospitalist Dr. Stratton, who agrees with the above management. Plan to initiate empiric antibiotics at this time.   -Will continue to closely monitor patient/vitals   -Will endorse to day team    Maral Galloway PA-C  Dept of Medicine  [id number] CC: temperature 102.4F      HPI:  Notified by RN patient with oral temperature of 102.4F approximately 15 minutes after pRBC transfusion. Patient seen and examined by me at bedside in [place] section of ED; patient is alert, awake, NAD. He denied any history of transfusion reactions. He denied headache, dizziness, chest pain, shortness of breath, pruritus, rash, cough, rhinorrhea, N/V/D, urinary symptoms, back pain, or abdominal pain. Chart review noted with fevers since admission.         ROS:  CONSTITUTIONAL:  (+) fever  CARDIOVASCULAR:  No chest pain or palpitations  RESPIRATORY:   No SOB, cough, wheezing  GASTROINTESTINAL:  No abdominal pain, N/V/D  EXTREMITIES:  No swelling or joint pain  GENITOURINARY:  No burning on urination, increased frequency or urgency.  No flank pain.  SKIN: No rashes        PAST MEDICAL & SURGICAL HISTORY:  Multiple myeloma  Hyperlipidemia  H/O aortic aneurysm  H/O splenectomy  S/P partial gastrectomy  History of pancreatic surgery          VITAL SIGNS:  T(C): 39.1 (23 @ 03:30), Max: 39.1 (23 @ 03:30)  HR: 97 (23 @ 03:30) (85 - 111)  BP: 118/61 (23 @ 03:30) (96/53 - 127/71)  RR: 18 (23 @ 03:30) (18 - 20)  SpO2: 96% (23 @ 03:30) (96% - 98%)        Physical Exam:  General: WN/WD male sitting in bed, NAD, AOx3, nontoxic appearing  Head:  NC/AT  CV: RRR, S1S2   Respiratory: CTA B/L, nonlabored on room air. No rales/rhonchi/wheezes.  Abdominal: (+) bowel sounds x4. Soft, NT, no palpable mass, no guarding, or rebound tenderness  MSK: No BLLE edema, + peripheral pulses, FROM all 4 extremity  Skin: (+) warm  Psych: Appropriate affect       LABORATORY:                        6.7    3.24  )-----------( 63       ( 23 Mar 2023 17:42 )             19.5       -    133<L>  |  99  |  20  ----------------------------<  102<H>  4.4   |  23  |  1.37<H>    Ca    8.3<L>      23 Mar 2023 17:42    TPro  5.1<L>  /  Alb  3.0<L>  /  TBili  0.7  /  DBili  x   /  AST  21  /  ALT  18  /  AlkPhos  69        Urinalysis Basic - ( 23 Mar 2023 18:13 )  Color: Light Yellow / Appearance: Clear / S.015 / pH: x  Gluc: x / Ketone: Negative  / Bili: Negative / Urobili: Negative   Blood: x / Protein: Trace / Nitrite: Negative   Leuk Esterase: Negative / RBC: 7 /hpf / WBC 1 /HPF   Sq Epi: x / Non Sq Epi: 0 /hpf / Bacteria: Negative                RADIOLOGY:  < from: Xray Chest 1 View- PORTABLE-Urgent (23 @ 17:42) >  IMPRESSION:  Clear lungs.  < end of copied text >          ASSESSMENT/PLAN:   67yoM PMHx of aortic aneurysm and bioprosthetic AV valve replacement 2019, HL, splenectomy, partial gastrectomy, partial pancreatectomy, 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 p/w fever.  Patient reports that he went to bed last night feeling well.  This morning upon waking patient had chills and body aches.  Took his temperature and it was noted to be 102.  Patient endorsing some mild periumbilical abdominal discomfort, but otherwise feels well. Patient states that he went to his hematologist to get a blood transfusion today and was instructed to come to the ER.  Patient now presenting acutely with temperature of 102.4F orally fifteen minutes after completing blood transfusion. He denies history of transfusion reaction.     #Fever in setting of transfusion- r/o transfusion reaction vs infectious etiology  -Vital signs hemodynamically stable, notable for temperature 102.4F  -Labs notable for leukopenia  -Lactate 1.2 on admission  -Acetaminophen 1000mg IV x1  -Cooling measures PRN pyrexia  -BCx x2 collected 3/23, in lab  -UA (3/23) unremarkable  -UCx collected 3/23, in lab  -CXR (3/23) with "clear lungs"  -COVID/RVP (3/23) negative  -Given development of fever in context of blood transfusion, will work up for transfusion reaction, although less likely given patient has been intermittently febrile since admission.  -STAT CBC, CMP, LDH, haptoglobin, T&S, pBNP ordered   -Urgent CXR ordered  -Consider ID evaluation in AM  -Consider heme/onc evaluation in AM  -The above was discussed in detail with hospitalist Dr. Stratton, who agrees with the above management. Plan to initiate empiric antibiotics at this time.   -Will continue to closely monitor patient/vitals   -Will endorse to day team    Maral Galloway PA-C  Dept of Medicine  [id number]

## 2023-03-24 NOTE — CONSULT NOTE ADULT - SUBJECTIVE AND OBJECTIVE BOX
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, presents with fever, patient reports that upon waking this morning patient had chills and body aches, took his temperature which was 102, patient endorsing some mild periumbilical abdominal discomfort, but otherwise feels well, denies chest pain, shortness of breath, cough, nasal congestion, vomiting, diarrhea, dysuria, no sick contacts or recent travel, patient states that he went to his hematologist to get a blood transfusion today and was instructed to come to the ER, in the ED, pt was febrile, tachycardic, otherwise VSS, labs notable for pancytopenia (Hg 6.7), elevated Cr (improved from prior baseline), BNP 3K, pt was given 1U PRBC, Ofirmev, 1L LR, admitted to general medicine for further management, of note pt had additional fever approx 15 minutes after finishing transfusion (24 Mar 2023 05:32)     ID consulted for fever    REVIEW OF SYSTEMS  [  ] ROS unobtainable because:    [ x ] All other systems negative except as noted below    Constitutional:  [ ] fever [ ] chills  [ ] weight loss  [ ]night sweat  [ ]poor appetite/PO intake [ ]fatigue   Skin:  [ ] rash [ ] phlebitis	  Eyes: [ ] icterus [ ] pain  [ ] discharge	  ENMT: [ ] sore throat  [ ] thrush [ ] ulcers [ ] exudates [ ]anosmia  Respiratory: [ ] dyspnea [ ] hemoptysis [ ] cough [ ] sputum	  Cardiovascular:  [ ] chest pain [ ] palpitations [ ] edema	  Gastrointestinal:  [ ] nausea [ ] vomiting [ ] diarrhea [ ] constipation [ ] pain	  Genitourinary:  [ ] dysuria [ ] frequency [ ] hematuria [ ] discharge [ ] flank pain  [ ] incontinence  Musculoskeletal:  [ ] myalgias [ ] arthralgias [ ] arthritis  [ ] back pain  Neurological:  [ ] headache [ ] weakness [ ] seizures  [ ] confusion/altered mental status    prior hospital charts reviewed [V]  primary team notes reviewed [V]  other consultant notes reviewed [V]    PAST MEDICAL & SURGICAL HISTORY:  Multiple myeloma    Hyperlipidemia    H/O aortic aneurysm    H/O splenectomy    S/P partial gastrectomy    History of pancreatic surgery      FAMILY HISTORY:  FHx: lung cancer (Sibling)  FHx: ovarian cancer (Sibling)      SOCIAL HISTORY:  Denied smoking    Allergies  No Known Allergies    ANTIMICROBIALS:  acyclovir   Oral Tab/Cap 400 daily  cefepime   IVPB 2000 every 12 hours  entecavir 0.5 daily  vancomycin  IVPB 750 every 12 hours    ANTIMICROBIALS (past 90 days):   MEDICATIONS  (STANDING):  acyclovir   Oral Tab/Cap   400 milliGRAM(s) Oral (23 @ 11:49)    cefepime   IVPB   100 mL/Hr IV Intermittent (23 @ 22:47)    entecavir   0.5 milliGRAM(s) Oral (23 @ 11:49)    vancomycin  IVPB   250 mL/Hr IV Intermittent (23 @ 08:35)      MEDICATIONS  (STANDING):  acetaminophen     Tablet .. 650 every 6 hours PRN  atorvastatin 40 at bedtime  dexAMETHasone     Tablet 4 daily  metoprolol tartrate 50 two times a day    VITALS:  Vital Signs Last 24 Hrs  T(F): 99.6 (23 @ 13:21), Max: 102.4 (23 @ 03:30)  Vital Signs Last 24 Hrs  HR: 104 (23 @ 10:34) (85 - 111)  BP: 137/67 (23 @ 10:34) (96/53 - 137/67)  RR: 18 (23 @ 10:34)  SpO2: 96% (23 @ 10:34) (95% - 98%)  Wt(kg): --    PHYSICAL EXAM:  Constitutional: non-toxic, no distress  HEAD/EYES: anicteric, no conjunctival injection  ENT:  supple, no thrush  Cardiovascular:   +S1/S2  Respiratory:  +BS bilaterally  GI:  soft, non-tender, +bowel sounds  :  no lloyd  Musculoskeletal:  no synovitis, normal ROM  Neurologic: awake and alert,  no focal findings  Skin:  no rash, no erythema, no phlebitis  Vascular: warm extremities bilaterally  Psychiatric:  calm, cooperative    Labs:                        7.8    3.48  )-----------( 51       ( 24 Mar 2023 04:23 )             23.2     -    133<L>  |  99  |  19  ----------------------------<  104<H>  4.0   |  28  |  1.46<H>    Ca    8.4      24 Mar 2023 04:23  Mg     1.6         TPro  5.3<L>  /  Alb  3.1<L>  /  TBili  0.8  /  DBili  x   /  AST  9<L>  /  ALT  17  /  AlkPhos  74      WBC Trend:  WBC Count: 3.48 (23 @ 04:23)  WBC Count: 3.24 (23 @ 17:42)    Auto Neutrophil #: 3.11 K/uL (23 @ 17:42)  Band Neutrophils %: 2.0 % (23 @ 17:42)  Auto Neutrophil #: 1.17 K/uL (23 @ 06:54)  Auto Neutrophil #: 0.76 K/uL (23 @ 12:15)  Band Neutrophils %: 10.0 % (23 @ 12:15)    Auto Eosinophil %: 0.0 % (23 @ 17:42)    Urinalysis Basic - ( 23 Mar 2023 18:13 )    Color: Light Yellow / Appearance: Clear / S.015 / pH: x  Gluc: x / Ketone: Negative  / Bili: Negative / Urobili: Negative   Blood: x / Protein: Trace / Nitrite: Negative   Leuk Esterase: Negative / RBC: 7 /hpf / WBC 1 /HPF   Sq Epi: x / Non Sq Epi: 0 /hpf / Bacteria: Negative    MICROBIOLOGY:  Culture - Blood (collected 03 Mar 2023 11:30)  Source: .Blood Blood-Peripheral  Final Report:    No Growth Final    Culture - Blood (collected 03 Mar 2023 11:30)  Source: .Blood Blood-Peripheral  Final Report:    No Growth Final    Rapid RVP Result: NotDetec ( @ 17:42)    RADIOLOGY:  imaging below personally reviewed    < from: Xray Chest 1 View- PORTABLE-Urgent (23 @ 17:42) >  IMPRESSION:  Clear lungs.  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, presents with fever, patient reports that upon waking this morning patient had chills and body aches, took his temperature which was 102, patient endorsing some mild periumbilical abdominal discomfort, but otherwise feels well, denies chest pain, shortness of breath, cough, nasal congestion, vomiting, diarrhea, dysuria, no sick contacts or recent travel, patient states that he went to his hematologist to get a blood transfusion today and was instructed to come to the ER, in the ED, pt was febrile, tachycardic, otherwise VSS, labs notable for pancytopenia (Hg 6.7), elevated Cr (improved from prior baseline), BNP 3K, pt was given 1U PRBC, Ofirmev, 1L LR, admitted to general medicine for further management, of note pt had additional fever approx 15 minutes after finishing transfusion (24 Mar 2023 05:32)     ID consulted for fever    REVIEW OF SYSTEMS  [  ] ROS unobtainable because:    [ x ] All other systems negative except as noted below    Constitutional:  [ ] fever [ ] chills  [ ] weight loss  [ ]night sweat  [ ]poor appetite/PO intake [ ]fatigue   Skin:  [ ] rash [ ] phlebitis	  Eyes: [ ] icterus [ ] pain  [ ] discharge	  ENMT: [ ] sore throat  [ ] thrush [ ] ulcers [ ] exudates [ ]anosmia  Respiratory: [ ] dyspnea [ ] hemoptysis [ ] cough [ ] sputum	  Cardiovascular:  [ ] chest pain [ ] palpitations [ ] edema	  Gastrointestinal:  [ ] nausea [ ] vomiting [ ] diarrhea [ ] constipation [ ] pain	  Genitourinary:  [ ] dysuria [ ] frequency [ ] hematuria [ ] discharge [ ] flank pain  [ ] incontinence  Musculoskeletal:  [ ] myalgias [ ] arthralgias [ ] arthritis  [ ] back pain  Neurological:  [ ] headache [ ] weakness [ ] seizures  [ ] confusion/altered mental status    prior hospital charts reviewed [V]  primary team notes reviewed [V]  other consultant notes reviewed [V]    PAST MEDICAL & SURGICAL HISTORY:  Multiple myeloma    Hyperlipidemia    H/O aortic aneurysm    H/O splenectomy    S/P partial gastrectomy    History of pancreatic surgery      FAMILY HISTORY:  FHx: lung cancer (Sibling)  FHx: ovarian cancer (Sibling)      SOCIAL HISTORY:  Denied smoking    Allergies  No Known Allergies    ANTIMICROBIALS:  acyclovir   Oral Tab/Cap 400 daily  cefepime   IVPB 2000 every 12 hours  entecavir 0.5 daily  vancomycin  IVPB 750 every 12 hours    ANTIMICROBIALS (past 90 days):   MEDICATIONS  (STANDING):  acyclovir   Oral Tab/Cap   400 milliGRAM(s) Oral (23 @ 11:49)    cefepime   IVPB   100 mL/Hr IV Intermittent (23 @ 22:47)    entecavir   0.5 milliGRAM(s) Oral (23 @ 11:49)    vancomycin  IVPB   250 mL/Hr IV Intermittent (23 @ 08:35)      MEDICATIONS  (STANDING):  acetaminophen     Tablet .. 650 every 6 hours PRN  atorvastatin 40 at bedtime  dexAMETHasone     Tablet 4 daily  metoprolol tartrate 50 two times a day    VITALS:  Vital Signs Last 24 Hrs  T(F): 99.6 (23 @ 13:21), Max: 102.4 (23 @ 03:30)  Vital Signs Last 24 Hrs  HR: 104 (23 @ 10:34) (85 - 111)  BP: 137/67 (23 @ 10:34) (96/53 - 137/67)  RR: 18 (23 @ 10:34)  SpO2: 96% (23 @ 10:34) (95% - 98%)  Wt(kg): --    PHYSICAL EXAM:  Constitutional: non-toxic, no distress  HEAD/EYES: anicteric, no conjunctival injection  ENT:  supple, no thrush  Cardiovascular:   +S1/S2  Respiratory:  +BS bilaterally  GI:  soft, non-tender, +bowel sounds  :  no lloyd  Musculoskeletal:  no synovitis, normal ROM  Neurologic: awake and alert,  no focal findings  Skin:  no rash, no erythema, no phlebitis  Vascular: warm extremities bilaterally  Psychiatric:  calm, cooperative    Labs:                        7.8    3.48  )-----------( 51       ( 24 Mar 2023 04:23 )             23.2     -    133<L>  |  99  |  19  ----------------------------<  104<H>  4.0   |  28  |  1.46<H>    Ca    8.4      24 Mar 2023 04:23  Mg     1.6         TPro  5.3<L>  /  Alb  3.1<L>  /  TBili  0.8  /  DBili  x   /  AST  9<L>  /  ALT  17  /  AlkPhos  74      WBC Trend:  WBC Count: 3.48 (23 @ 04:23)  WBC Count: 3.24 (23 @ 17:42)    Auto Neutrophil #: 3.11 K/uL (23 @ 17:42)  Band Neutrophils %: 2.0 % (23 @ 17:42)  Auto Neutrophil #: 1.17 K/uL (23 @ 06:54)  Auto Neutrophil #: 0.76 K/uL (23 @ 12:15)  Band Neutrophils %: 10.0 % (23 @ 12:15)    Auto Eosinophil %: 0.0 % (23 @ 17:42)    Urinalysis Basic - ( 23 Mar 2023 18:13 )    Color: Light Yellow / Appearance: Clear / S.015 / pH: x  Gluc: x / Ketone: Negative  / Bili: Negative / Urobili: Negative   Blood: x / Protein: Trace / Nitrite: Negative   Leuk Esterase: Negative / RBC: 7 /hpf / WBC 1 /HPF   Sq Epi: x / Non Sq Epi: 0 /hpf / Bacteria: Negative    MICROBIOLOGY:  Culture - Blood (collected 03 Mar 2023 11:30)  Source: .Blood Blood-Peripheral  Final Report:    No Growth Final    Culture - Blood (collected 03 Mar 2023 11:30)  Source: .Blood Blood-Peripheral  Final Report:    No Growth Final    Rapid RVP Result: NotDetec ( @ 17:42)    RADIOLOGY:  imaging below personally reviewed    < from: Xray Chest 1 View- PORTABLE-Urgent (23 @ 17:42) >  IMPRESSION:  Clear lungs.  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, presents with fever, patient reports that upon waking this morning patient had chills and body aches, took his temperature which was 102, patient endorsing some mild periumbilical abdominal discomfort, but otherwise feels well, denies chest pain, shortness of breath, cough, nasal congestion, vomiting, diarrhea, dysuria, no sick contacts or recent travel, patient states that he went to his hematologist to get a blood transfusion today and was instructed to come to the ER, in the ED, pt was febrile, tachycardic, otherwise VSS, labs notable for pancytopenia (Hg 6.7), elevated Cr (improved from prior baseline), BNP 3K, pt was given 1U PRBC, Ofirmev, 1L LR, admitted to general medicine for further management, of note pt had additional fever approx 15 minutes after finishing transfusion (24 Mar 2023 05:32)     ID consulted for fever    REVIEW OF SYSTEMS  [  ] ROS unobtainable because:    [ x ] All other systems negative except as noted below    Constitutional:  [ ] fever [ ] chills  [ ] weight loss  [ ]night sweat  [ ]poor appetite/PO intake [ ]fatigue   Skin:  [ ] rash [ ] phlebitis	  Eyes: [ ] icterus [ ] pain  [ ] discharge	  ENMT: [ ] sore throat  [ ] thrush [ ] ulcers [ ] exudates [ ]anosmia  Respiratory: [ ] dyspnea [ ] hemoptysis [ ] cough [ ] sputum	  Cardiovascular:  [ ] chest pain [ ] palpitations [ ] edema	  Gastrointestinal:  [ ] nausea [ ] vomiting [ ] diarrhea [ ] constipation [ ] pain	  Genitourinary:  [ ] dysuria [ ] frequency [ ] hematuria [ ] discharge [ ] flank pain  [ ] incontinence  Musculoskeletal:  [ ] myalgias [ ] arthralgias [ ] arthritis  [ ] back pain  Neurological:  [ ] headache [ ] weakness [ ] seizures  [ ] confusion/altered mental status    prior hospital charts reviewed [V]  primary team notes reviewed [V]  other consultant notes reviewed [V]    PAST MEDICAL & SURGICAL HISTORY:  Multiple myeloma    Hyperlipidemia    H/O aortic aneurysm    H/O splenectomy    S/P partial gastrectomy    History of pancreatic surgery      FAMILY HISTORY:  FHx: lung cancer (Sibling)  FHx: ovarian cancer (Sibling)      SOCIAL HISTORY:  Denied smoking    Allergies  No Known Allergies    ANTIMICROBIALS:  acyclovir   Oral Tab/Cap 400 daily  cefepime   IVPB 2000 every 12 hours  entecavir 0.5 daily  vancomycin  IVPB 750 every 12 hours    ANTIMICROBIALS (past 90 days):   MEDICATIONS  (STANDING):  acyclovir   Oral Tab/Cap   400 milliGRAM(s) Oral (23 @ 11:49)    cefepime   IVPB   100 mL/Hr IV Intermittent (23 @ 22:47)    entecavir   0.5 milliGRAM(s) Oral (23 @ 11:49)    vancomycin  IVPB   250 mL/Hr IV Intermittent (23 @ 08:35)      MEDICATIONS  (STANDING):  acetaminophen     Tablet .. 650 every 6 hours PRN  atorvastatin 40 at bedtime  dexAMETHasone     Tablet 4 daily  metoprolol tartrate 50 two times a day    VITALS:  Vital Signs Last 24 Hrs  T(F): 99.6 (23 @ 13:21), Max: 102.4 (23 @ 03:30)  Vital Signs Last 24 Hrs  HR: 104 (23 @ 10:34) (85 - 111)  BP: 137/67 (23 @ 10:34) (96/53 - 137/67)  RR: 18 (23 @ 10:34)  SpO2: 96% (23 @ 10:34) (95% - 98%)  Wt(kg): --    PHYSICAL EXAM:  Constitutional: non-toxic, no distress  HEAD/EYES: anicteric, no conjunctival injection  ENT:  supple, no thrush  Cardiovascular:   +S1/S2  Respiratory:  +BS bilaterally  GI:  soft, mildly tender lower abdomen, +bowel sounds  :  no lloyd  Musculoskeletal:  no synovitis, normal ROM  Neurologic: awake and alert,  no focal findings  Skin:  no rash, no erythema, no phlebitis  Vascular: warm extremities bilaterally  Psychiatric:  calm, cooperative    Labs:                        7.8    3.48  )-----------( 51       ( 24 Mar 2023 04:23 )             23.2     -    133<L>  |  99  |  19  ----------------------------<  104<H>  4.0   |  28  |  1.46<H>    Ca    8.4      24 Mar 2023 04:23  Mg     1.6         TPro  5.3<L>  /  Alb  3.1<L>  /  TBili  0.8  /  DBili  x   /  AST  9<L>  /  ALT  17  /  AlkPhos  74      WBC Trend:  WBC Count: 3.48 (23 @ 04:23)  WBC Count: 3.24 (23 @ 17:42)    Auto Neutrophil #: 3.11 K/uL (23 @ 17:42)  Band Neutrophils %: 2.0 % (23 @ 17:42)  Auto Neutrophil #: 1.17 K/uL (23 @ 06:54)  Auto Neutrophil #: 0.76 K/uL (23 @ 12:15)  Band Neutrophils %: 10.0 % (23 @ 12:15)    Auto Eosinophil %: 0.0 % (23 @ 17:42)    Urinalysis Basic - ( 23 Mar 2023 18:13 )    Color: Light Yellow / Appearance: Clear / S.015 / pH: x  Gluc: x / Ketone: Negative  / Bili: Negative / Urobili: Negative   Blood: x / Protein: Trace / Nitrite: Negative   Leuk Esterase: Negative / RBC: 7 /hpf / WBC 1 /HPF   Sq Epi: x / Non Sq Epi: 0 /hpf / Bacteria: Negative    MICROBIOLOGY:  Culture - Blood (collected 03 Mar 2023 11:30)  Source: .Blood Blood-Peripheral  Final Report:    No Growth Final    Culture - Blood (collected 03 Mar 2023 11:30)  Source: .Blood Blood-Peripheral  Final Report:    No Growth Final    Rapid RVP Result: NotDetec ( @ 17:42)    RADIOLOGY:  imaging below personally reviewed    < from: Xray Chest 1 View- PORTABLE-Urgent (23 @ 17:42) >  IMPRESSION:  Clear lungs.  < end of copied text >   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, presents with fever, patient reports that upon waking this morning patient had chills and body aches, took his temperature which was 102, patient endorsing some mild periumbilical abdominal discomfort, but otherwise feels well, denies chest pain, shortness of breath, cough, nasal congestion, vomiting, diarrhea, dysuria, no sick contacts or recent travel, patient states that he went to his hematologist to get a blood transfusion today and was instructed to come to the ER, in the ED, pt was febrile, tachycardic, otherwise VSS, labs notable for pancytopenia (Hg 6.7), elevated Cr (improved from prior baseline), BNP 3K, pt was given 1U PRBC, Ofirmev, 1L LR, admitted to general medicine for further management, of note pt had additional fever approx 15 minutes after finishing transfusion (24 Mar 2023 05:32)     ID consulted for fever    REVIEW OF SYSTEMS  [  ] ROS unobtainable because:    [ x ] All other systems negative except as noted below    Constitutional:  [ ] fever [ ] chills  [ ] weight loss  [ ]night sweat  [ ]poor appetite/PO intake [ ]fatigue   Skin:  [ ] rash [ ] phlebitis	  Eyes: [ ] icterus [ ] pain  [ ] discharge	  ENMT: [ ] sore throat  [ ] thrush [ ] ulcers [ ] exudates [ ]anosmia  Respiratory: [ ] dyspnea [ ] hemoptysis [ ] cough [ ] sputum	  Cardiovascular:  [ ] chest pain [ ] palpitations [ ] edema	  Gastrointestinal:  [ ] nausea [ ] vomiting [ ] diarrhea [ ] constipation [ ] pain	  Genitourinary:  [ ] dysuria [ ] frequency [ ] hematuria [ ] discharge [ ] flank pain  [ ] incontinence  Musculoskeletal:  [ ] myalgias [ ] arthralgias [ ] arthritis  [ ] back pain  Neurological:  [ ] headache [ ] weakness [ ] seizures  [ ] confusion/altered mental status    prior hospital charts reviewed [V]  primary team notes reviewed [V]  other consultant notes reviewed [V]    PAST MEDICAL & SURGICAL HISTORY:  Multiple myeloma    Hyperlipidemia    H/O aortic aneurysm    H/O splenectomy    S/P partial gastrectomy    History of pancreatic surgery      FAMILY HISTORY:  FHx: lung cancer (Sibling)  FHx: ovarian cancer (Sibling)      SOCIAL HISTORY:  Denied smoking    Allergies  No Known Allergies    ANTIMICROBIALS:  acyclovir   Oral Tab/Cap 400 daily  cefepime   IVPB 2000 every 12 hours  entecavir 0.5 daily  vancomycin  IVPB 750 every 12 hours    ANTIMICROBIALS (past 90 days):   MEDICATIONS  (STANDING):  acyclovir   Oral Tab/Cap   400 milliGRAM(s) Oral (23 @ 11:49)    cefepime   IVPB   100 mL/Hr IV Intermittent (23 @ 22:47)    entecavir   0.5 milliGRAM(s) Oral (23 @ 11:49)    vancomycin  IVPB   250 mL/Hr IV Intermittent (23 @ 08:35)      MEDICATIONS  (STANDING):  acetaminophen     Tablet .. 650 every 6 hours PRN  atorvastatin 40 at bedtime  dexAMETHasone     Tablet 4 daily  metoprolol tartrate 50 two times a day    VITALS:  Vital Signs Last 24 Hrs  T(F): 99.6 (23 @ 13:21), Max: 102.4 (23 @ 03:30)  Vital Signs Last 24 Hrs  HR: 104 (23 @ 10:34) (85 - 111)  BP: 137/67 (23 @ 10:34) (96/53 - 137/67)  RR: 18 (23 @ 10:34)  SpO2: 96% (23 @ 10:34) (95% - 98%)  Wt(kg): --    PHYSICAL EXAM:  Constitutional: non-toxic, no distress  HEAD/EYES: anicteric, no conjunctival injection  ENT:  supple, no thrush  Cardiovascular:   +S1/S2  Respiratory:  +BS bilaterally  GI:  soft, mildly tender lower abdomen, +bowel sounds  :  no lloyd  Musculoskeletal:  no synovitis, normal ROM  Neurologic: awake and alert,  no focal findings  Skin:  no rash, no erythema, no phlebitis  Vascular: warm extremities bilaterally  Psychiatric:  calm, cooperative    Labs:                        7.8    3.48  )-----------( 51       ( 24 Mar 2023 04:23 )             23.2     -    133<L>  |  99  |  19  ----------------------------<  104<H>  4.0   |  28  |  1.46<H>    Ca    8.4      24 Mar 2023 04:23  Mg     1.6         TPro  5.3<L>  /  Alb  3.1<L>  /  TBili  0.8  /  DBili  x   /  AST  9<L>  /  ALT  17  /  AlkPhos  74      WBC Trend:  WBC Count: 3.48 (23 @ 04:23)  WBC Count: 3.24 (23 @ 17:42)    Auto Neutrophil #: 3.11 K/uL (23 @ 17:42)  Band Neutrophils %: 2.0 % (23 @ 17:42)  Auto Neutrophil #: 1.17 K/uL (23 @ 06:54)  Auto Neutrophil #: 0.76 K/uL (23 @ 12:15)  Band Neutrophils %: 10.0 % (23 @ 12:15)    Auto Eosinophil %: 0.0 % (23 @ 17:42)    Urinalysis Basic - ( 23 Mar 2023 18:13 )    Color: Light Yellow / Appearance: Clear / S.015 / pH: x  Gluc: x / Ketone: Negative  / Bili: Negative / Urobili: Negative   Blood: x / Protein: Trace / Nitrite: Negative   Leuk Esterase: Negative / RBC: 7 /hpf / WBC 1 /HPF   Sq Epi: x / Non Sq Epi: 0 /hpf / Bacteria: Negative    MICROBIOLOGY:  Culture - Blood (collected 03 Mar 2023 11:30)  Source: .Blood Blood-Peripheral  Final Report:    No Growth Final    Culture - Blood (collected 03 Mar 2023 11:30)  Source: .Blood Blood-Peripheral  Final Report:    No Growth Final    Rapid RVP Result: NotDetec ( @ 17:42)    RADIOLOGY:  imaging below personally reviewed    < from: Xray Chest 1 View- PORTABLE-Urgent (23 @ 17:42) >  IMPRESSION:  Clear lungs.  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, presents with fever, patient reports that upon waking this morning patient had chills and body aches, took his temperature which was 102, patient endorsing some mild periumbilical abdominal discomfort, but otherwise feels well, denies chest pain, shortness of breath, cough, nasal congestion, vomiting, diarrhea, dysuria, no sick contacts or recent travel, patient states that he went to his hematologist to get a blood transfusion today and was instructed to come to the ER, in the ED, pt was febrile, tachycardic, otherwise VSS, labs notable for pancytopenia (Hg 6.7), elevated Cr (improved from prior baseline), BNP 3K, pt was given 1U PRBC, Ofirmev, 1L LR, admitted to general medicine for further management, of note pt had additional fever approx 15 minutes after finishing transfusion (24 Mar 2023 05:32)     ID consulted for fever    REVIEW OF SYSTEMS  [  ] ROS unobtainable because:    [ x ] All other systems negative except as noted below    Constitutional:  [ ] fever [ ] chills  Skin:  [ ] rash [ ] phlebitis	  Eyes: [ ] icterus [ ] pain  [ ] discharge	  ENMT: [ ] sore throat  [ ] thrush  Respiratory: [ ] dyspnea  [ ] cough [ ] sputum	  Cardiovascular:  [ ] chest pain [ ] palpitations [ ] edema	  Gastrointestinal:  [ ] nausea [ ] vomiting [ ] diarrhea [ ] constipation [ ] pain	  Genitourinary:  [ ] dysuria [ ] frequency   Musculoskeletal:  [ ] myalgias [ ] arthralgias [ ] arthritis  [ ] back pain  Neurological:  [ ] headache [ ] weakness [ ] seizures  [ ] confusion/altered mental status  Extremities: some swelling       prior hospital charts reviewed [V]  primary team notes reviewed [V]  other consultant notes reviewed [V]      PAST MEDICAL & SURGICAL HISTORY:  Multiple myeloma    Hyperlipidemia    H/O aortic aneurysm    H/O splenectomy    S/P partial gastrectomy    History of pancreatic surgery        FAMILY HISTORY:  FHx: lung cancer (Sibling)  FHx: ovarian cancer (Sibling)        SOCIAL HISTORY:  Denied smoking, lives with family.         Allergies  No Known Allergies    ANTIMICROBIALS:  acyclovir   Oral Tab/Cap 400 daily  cefepime   IVPB 2000 every 12 hours  entecavir 0.5 daily  vancomycin  IVPB 750 every 12 hours    ANTIMICROBIALS (past 90 days):   MEDICATIONS  (STANDING):  acyclovir   Oral Tab/Cap   400 milliGRAM(s) Oral (23 @ 11:49)    cefepime   IVPB   100 mL/Hr IV Intermittent (23 @ 22:47)    entecavir   0.5 milliGRAM(s) Oral (23 @ 11:49)    vancomycin  IVPB   250 mL/Hr IV Intermittent (23 @ 08:35)      MEDICATIONS  (STANDING):  acetaminophen     Tablet .. 650 every 6 hours PRN  atorvastatin 40 at bedtime  dexAMETHasone     Tablet 4 daily  metoprolol tartrate 50 two times a day    VITALS:  Vital Signs Last 24 Hrs  T(F): 99.6 (23 @ 13:21), Max: 102.4 (23 @ 03:30)  Vital Signs Last 24 Hrs  HR: 104 (23 @ 10:34) (85 - 111)  BP: 137/67 (23 @ 10:34) (96/53 - 137/67)  RR: 18 (23 @ 10:34)  SpO2: 96% (23 @ 10:34) (95% - 98%)  Wt(kg): --        PHYSICAL EXAM:  Constitutional: non-toxic, no distress  HEAD/EYES: anicteric, no conjunctival injection  ENT:  supple, no thrush  Cardiovascular:   +S1/S2  Respiratory:  +BS bilaterally  GI:  soft, mildly tender lower abdomen, +bowel sounds  :  no lloyd  Musculoskeletal:  no synovitis, normal ROM  Neurologic: awake and alert,  no focal findings  Skin:  no rash, no erythema, no phlebitis  Vascular: warm extremities bilaterally  Psychiatric:  calm, cooperative    Labs:                        7.8    3.48  )-----------( 51       ( 24 Mar 2023 04:23 )             23.2     03-    133<L>  |  99  |  19  ----------------------------<  104<H>  4.0   |  28  |  1.46<H>    Ca    8.4      24 Mar 2023 04:23  Mg     1.6         TPro  5.3<L>  /  Alb  3.1<L>  /  TBili  0.8  /  DBili  x   /  AST  9<L>  /  ALT  17  /  AlkPhos  74      WBC Trend:  WBC Count: 3.48 (23 @ 04:23)  WBC Count: 3.24 (23 @ 17:42)    Auto Neutrophil #: 3.11 K/uL (23 @ 17:42)  Band Neutrophils %: 2.0 % (23 @ 17:42)  Auto Neutrophil #: 1.17 K/uL (23 @ 06:54)  Auto Neutrophil #: 0.76 K/uL (23 @ 12:15)  Band Neutrophils %: 10.0 % (23 @ 12:15)    Auto Eosinophil %: 0.0 % (23 @ 17:42)    Urinalysis Basic - ( 23 Mar 2023 18:13 )    Color: Light Yellow / Appearance: Clear / S.015 / pH: x  Gluc: x / Ketone: Negative  / Bili: Negative / Urobili: Negative   Blood: x / Protein: Trace / Nitrite: Negative   Leuk Esterase: Negative / RBC: 7 /hpf / WBC 1 /HPF   Sq Epi: x / Non Sq Epi: 0 /hpf / Bacteria: Negative    MICROBIOLOGY:  Culture - Blood (collected 03 Mar 2023 11:30)  Source: .Blood Blood-Peripheral  Final Report:    No Growth Final    Culture - Blood (collected 03 Mar 2023 11:30)  Source: .Blood Blood-Peripheral  Final Report:    No Growth Final    Rapid RVP Result: NotDetec ( @ 17:42)      RADIOLOGY:  imaging below personally reviewed    < from: Xray Chest 1 View- PORTABLE-Urgent (23 @ 17:42) >  IMPRESSION:  Clear lungs.       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, presents with fever, patient reports that upon waking this morning patient had chills and body aches, took his temperature which was 102, patient endorsing some mild periumbilical abdominal discomfort, but otherwise feels well, denies chest pain, shortness of breath, cough, nasal congestion, vomiting, diarrhea, dysuria, no sick contacts or recent travel, patient states that he went to his hematologist to get a blood transfusion today and was instructed to come to the ER, in the ED, pt was febrile, tachycardic, otherwise VSS, labs notable for pancytopenia (Hg 6.7), elevated Cr (improved from prior baseline), BNP 3K, pt was given 1U PRBC, Ofirmev, 1L LR, admitted to general medicine for further management, of note pt had additional fever approx 15 minutes after finishing transfusion (24 Mar 2023 05:32)     ID consulted for fever    REVIEW OF SYSTEMS  [  ] ROS unobtainable because:    [ x ] All other systems negative except as noted below    Constitutional:  [ ] fever [ ] chills  Skin:  [ ] rash [ ] phlebitis	  Eyes: [ ] icterus [ ] pain  [ ] discharge	  ENMT: [ ] sore throat  [ ] thrush  Respiratory: [ ] dyspnea  [ ] cough [ ] sputum	  Cardiovascular:  [ ] chest pain [ ] palpitations [ ] edema	  Gastrointestinal:  [ ] nausea [ ] vomiting [ ] diarrhea [ ] constipation [ ] pain	  Genitourinary:  [ ] dysuria [ ] frequency   Musculoskeletal:  [ ] myalgias [ ] arthralgias [ ] arthritis  [ ] back pain  Neurological:  [ ] headache [ ] weakness [ ] seizures  [ ] confusion/altered mental status  Extremities: some swelling       prior hospital charts reviewed [V]  primary team notes reviewed [V]  other consultant notes reviewed [V]      PAST MEDICAL & SURGICAL HISTORY:  Multiple myeloma    Hyperlipidemia    H/O aortic aneurysm    H/O splenectomy    S/P partial gastrectomy    History of pancreatic surgery        FAMILY HISTORY:  FHx: lung cancer (Sibling)  FHx: ovarian cancer (Sibling)        SOCIAL HISTORY:  Denied smoking, lives with family.         Allergies  No Known Allergies    ANTIMICROBIALS:  acyclovir   Oral Tab/Cap 400 daily  cefepime   IVPB 2000 every 12 hours  entecavir 0.5 daily  vancomycin  IVPB 750 every 12 hours    ANTIMICROBIALS (past 90 days):   MEDICATIONS  (STANDING):  acyclovir   Oral Tab/Cap   400 milliGRAM(s) Oral (23 @ 11:49)    cefepime   IVPB   100 mL/Hr IV Intermittent (23 @ 22:47)    entecavir   0.5 milliGRAM(s) Oral (23 @ 11:49)    vancomycin  IVPB   250 mL/Hr IV Intermittent (23 @ 08:35)      MEDICATIONS  (STANDING):  acetaminophen     Tablet .. 650 every 6 hours PRN  atorvastatin 40 at bedtime  dexAMETHasone     Tablet 4 daily  metoprolol tartrate 50 two times a day    VITALS:  Vital Signs Last 24 Hrs  T(F): 99.6 (23 @ 13:21), Max: 102.4 (23 @ 03:30)  Vital Signs Last 24 Hrs  HR: 104 (23 @ 10:34) (85 - 111)  BP: 137/67 (23 @ 10:34) (96/53 - 137/67)  RR: 18 (23 @ 10:34)  SpO2: 96% (23 @ 10:34) (95% - 98%)  Wt(kg): --        PHYSICAL EXAM:  Constitutional: non-toxic, no distress  HEAD/EYES: anicteric, no conjunctival injection  ENT:  supple, no thrush  Cardiovascular:   +S1/S2  Respiratory:  +BS bilaterally  GI:  soft, mildly tender lower abdomen, +bowel sounds  :  no lloyd  Musculoskeletal:  no synovitis, normal ROM  Neurologic: awake and alert,  no focal findings  Skin:  no rash, no erythema, no phlebitis  Vascular: warm extremities bilaterally  Psychiatric:  calm, cooperative    Labs:                        7.8    3.48  )-----------( 51       ( 24 Mar 2023 04:23 )             23.2     03-    133<L>  |  99  |  19  ----------------------------<  104<H>  4.0   |  28  |  1.46<H>    Ca    8.4      24 Mar 2023 04:23  Mg     1.6         TPro  5.3<L>  /  Alb  3.1<L>  /  TBili  0.8  /  DBili  x   /  AST  9<L>  /  ALT  17  /  AlkPhos  74      WBC Trend:  WBC Count: 3.48 (23 @ 04:23)  WBC Count: 3.24 (23 @ 17:42)    Auto Neutrophil #: 3.11 K/uL (23 @ 17:42)  Band Neutrophils %: 2.0 % (23 @ 17:42)  Auto Neutrophil #: 1.17 K/uL (23 @ 06:54)  Auto Neutrophil #: 0.76 K/uL (23 @ 12:15)  Band Neutrophils %: 10.0 % (23 @ 12:15)    Auto Eosinophil %: 0.0 % (23 @ 17:42)    Urinalysis Basic - ( 23 Mar 2023 18:13 )    Color: Light Yellow / Appearance: Clear / S.015 / pH: x  Gluc: x / Ketone: Negative  / Bili: Negative / Urobili: Negative   Blood: x / Protein: Trace / Nitrite: Negative   Leuk Esterase: Negative / RBC: 7 /hpf / WBC 1 /HPF   Sq Epi: x / Non Sq Epi: 0 /hpf / Bacteria: Negative    MICROBIOLOGY:  Culture - Blood (collected 03 Mar 2023 11:30)  Source: .Blood Blood-Peripheral  Final Report:    No Growth Final    Culture - Blood (collected 03 Mar 2023 11:30)  Source: .Blood Blood-Peripheral  Final Report:    No Growth Final    Rapid RVP Result: NotDetec ( @ 17:42)      RADIOLOGY:  imaging below personally reviewed    < from: Xray Chest 1 View- PORTABLE-Urgent (23 @ 17:42) >  IMPRESSION:  Clear lungs.

## 2023-03-24 NOTE — CONSULT NOTE ADULT - SUBJECTIVE AND OBJECTIVE BOX
GENERAL SURGERY CONSULT NOTE  Consulting surgical team: Vascular Surgery   Consulting attending: Dr. 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, presents with fever, patient reports that upon waking this morning patient had chills and body aches, took his temperature which was 102, patient endorsing some mild periumbilical abdominal discomfort, but otherwise feels well, denies chest pain, shortness of breath, cough, nasal congestion, vomiting, diarrhea, dysuria, no sick contacts or recent travel, patient states that he went to his hematologist to get a blood transfusion today and was instructed to come to the ER, in the ED, pt was febrile, tachycardic, otherwise VSS, labs notable for pancytopenia (Hg 6.7), elevated Cr (improved from prior baseline), BNP 3K, pt was given 1U PRBC, Ofirmev, 1L LR, admitted to general medicine for further management, of note pt had additional fever approx 15 minutes after finishing transfusion (24 Mar 2023 05:32)    Patient found to have R soleal DVT on ultrasound. No DVT found in LLE. Vascular surgery consulted for evaluations. Patient currently on heparin gtt.     PAST MEDICAL HISTORY:  Multiple myeloma    Hyperlipidemia    H/O aortic aneurysm        PAST SURGICAL HISTORY:  H/O splenectomy    S/P partial gastrectomy    History of pancreatic surgery        SOCIAL HISTORY:  - Denies EtOH abuse, smoking, IVDA    MEDICATIONS:  acetaminophen     Tablet .. 650 milliGRAM(s) Oral every 6 hours PRN  acyclovir   Oral Tab/Cap 400 milliGRAM(s) Oral daily  atorvastatin 40 milliGRAM(s) Oral at bedtime  dexAMETHasone     Tablet 4 milliGRAM(s) Oral daily  entecavir 0.5 milliGRAM(s) Oral daily  heparin   Injectable 6500 Unit(s) IV Push every 6 hours PRN  heparin   Injectable 3000 Unit(s) IV Push every 6 hours PRN  heparin  Infusion.  Unit(s)/Hr IV Continuous <Continuous>  metoprolol tartrate 50 milliGRAM(s) Oral two times a day  piperacillin/tazobactam IVPB.- 3.375 Gram(s) IV Intermittent once      ALLERGIES:  No Known Allergies      VITALS & I/Os:  Vital Signs Last 24 Hrs  T(C): 37.7 (24 Mar 2023 16:33), Max: 39.1 (24 Mar 2023 03:30)  T(F): 99.9 (24 Mar 2023 16:33), Max: 102.4 (24 Mar 2023 03:30)  HR: 91 (24 Mar 2023 16:33) (85 - 111)  BP: 124/70 (24 Mar 2023 16:33) (100/55 - 137/67)  BP(mean): 74 (24 Mar 2023 00:46) (69 - 74)  RR: 18 (24 Mar 2023 16:33) (18 - 20)  SpO2: 95% (24 Mar 2023 16:33) (95% - 98%)    Parameters below as of 24 Mar 2023 16:33  Patient On (Oxygen Delivery Method): room air        I&O's Summary    23 Mar 2023 07:01  -  24 Mar 2023 07:00  --------------------------------------------------------  IN: 100 mL / OUT: 0 mL / NET: 100 mL    24 Mar 2023 07:01  -  24 Mar 2023 18:20  --------------------------------------------------------  IN: 0 mL / OUT: 300 mL / NET: -300 mL        PHYSICAL EXAM:  GEN: resting comfortably in bed, in NAD  RESP: no acute respiratory distress, breathing comfortably   ABD: soft, non-distended, non-tender   EXT:  WWP, VICENTE   NEURO:  no focal neuro deficits     LABS:                        7.8    3.48  )-----------( 51       ( 24 Mar 2023 04:23 )             23.2     03-24    133<L>  |  99  |  19  ----------------------------<  104<H>  4.0   |  28  |  1.46<H>    Ca    8.4      24 Mar 2023 04:23  Mg     1.6     -24    TPro  5.3<L>  /  Alb  3.1<L>  /  TBili  0.8  /  DBili  x   /  AST  9<L>  /  ALT  17  /  AlkPhos  74  -24    Lactate:    PT/INR - ( 24 Mar 2023 15:47 )   PT: 17.8 sec;   INR: 1.54 ratio         PTT - ( 24 Mar 2023 15:47 )  PTT:22.9 sec          Urinalysis Basic - ( 23 Mar 2023 18:13 )    Color: Light Yellow / Appearance: Clear / S.015 / pH: x  Gluc: x / Ketone: Negative  / Bili: Negative / Urobili: Negative   Blood: x / Protein: Trace / Nitrite: Negative   Leuk Esterase: Negative / RBC: 7 /hpf / WBC 1 /HPF   Sq Epi: x / Non Sq Epi: 0 /hpf / Bacteria: Negative        IMAGING:

## 2023-03-24 NOTE — H&P ADULT - HISTORY OF PRESENT ILLNESS
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, presents with fever, patient reports that upon waking this morning patient had chills and body aches, took his temperature which was 102, patient endorsing some mild periumbilical abdominal discomfort, but otherwise feels well, denies chest pain, shortness of breath, cough, nasal congestion, vomiting, diarrhea, dysuria, no sick contacts or recent travel, patient states that he went to his hematologist to get a blood transfusion today and was instructed to come to the ER,  68yo M w/ PMHx of aortic aneurysm and bioprosthetic AV valve replacement 2019, HLD, splenectomy, partial gastrectomy, partial pancreatectomy, 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, presents with fever, patient reports that upon waking this morning patient had chills and body aches, took his temperature which was 102, patient endorsing some mild periumbilical abdominal discomfort, but otherwise feels well, denies chest pain, shortness of breath, cough, nasal congestion, vomiting, diarrhea, dysuria, no sick contacts or recent travel, patient states that he went to his hematologist to get a blood transfusion today and was instructed to come to the ER,  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, presents with fever, patient reports that upon waking this morning patient had chills and body aches, took his temperature which was 102, patient endorsing some mild periumbilical abdominal discomfort, but otherwise feels well, denies chest pain, shortness of breath, cough, nasal congestion, vomiting, diarrhea, dysuria, no sick contacts or recent travel, patient states that he went to his hematologist to get a blood transfusion today and was instructed to come to the ER, in the ED, pt was febrile, tachycardic, otherwise VSS, labs notable for pancytopenia (Hg 6.7), elevated Cr (improved from prior baseline), BNP 3K, pt was given 1U PRBC, Ofirmev, 1L LR, admitted to general medicine for further management, of note pt had additional fever approx 15 minutes after finishing transfusion

## 2023-03-24 NOTE — H&P ADULT - PROBLEM SELECTOR PLAN 2
-reviewed prior hematology notes from 3/9/23, last received chemo 3/3  -pancytopenia largely at baseline although Hg 6.7 on arrival  -s/p 1U PRBC with good response   -c/w home acyclovir  -c/w home entecavir   -c/w home dexamethasone   -pt reports his Revlimid was held  -hematology consult in AM -reviewed prior hematology notes from 3/9/23, last received chemo 3/3  -pancytopenia largely at baseline although Hg 6.7 on arrival  -s/p 1U PRBC with good response   -c/w home acyclovir  -c/w home entecavir   -c/w home dexamethasone   -pt reports his Revlimid was held  -hematology consult in AM  -hold home aspirin

## 2023-03-24 NOTE — CONSULT NOTE ADULT - NS ATTEND AMEND GEN_ALL_CORE FT
Patient with recently progressive multiple myeloma followed by Dr. Hubbard at [facility] on treatment with D-VCD, last treated with CTX on 3/13, DV on 3/23.  He is admitted with fever, RVP/initial blood cultures are negative, on empiric treatment with zosyn.  If he remains febrile, would pan for CT scan.   Found to have a soleal vein DVT, on IV heparin.  Only on aspirin outpatient, no objection to DOAC on discharge. Patient with recently progressive multiple myeloma followed by Dr. Hubbard at [facility] [place] on treatment with D-VCD, last treated with CTX on 3/13, DV on 3/23.  He is admitted with fever, RVP/initial blood cultures are negative, on empiric treatment with zosyn.  If he remains febrile, would pan for CT scan.   Found to have a soleal vein DVT, on IV heparin.  Only on aspirin outpatient, no objection to DOAC on discharge. Patient with recently progressive multiple myeloma followed by Dr. Hubbard at [facility] [place] [place] treatment with D-VCD, last treated with CTX on 3/13, DV on 3/23.  He is admitted with fever, RVP/initial blood cultures are negative, on empiric treatment with zosyn.  If he remains febrile, would pan for CT scan.   Found to have a soleal vein DVT, on IV heparin.  Only on aspirin outpatient, no objection to DOAC on discharge.

## 2023-03-24 NOTE — CONSULT NOTE ADULT - ASSESSMENT
PETER DE LA PAZ is a 67y Male who presents with a chief complaint of fever.    Multiple Myeloma  Pancytopenia  ·	Patient follows with Dr. Jean Claude Hubbard, [facility].  ·	Patient had been on lenalidomide maintenance up until February 2023, when he had PET/CT that showed progression of disease with worsening lymphadenopathy, bony lesions, and liver lesion.   ·	He was started then on daratumumab + cyclophosphamide + bortezomib + dexamethasone.  ·	Last dose of daratumumab was on March 23rd.  ·	Last dose of bortezomib was on March 23rd  ·	Last dose of cyclophosphamide was on March 13th  ·	Patient has had worsening pancytopenia since starting the latest chemotherapy regimen.  ·	Hold systemic treatment while inpatient.  ·	Monitor CBC and transfuse to maintain HGB > 7 and PLT > 10.  ·	Continue prophylactic acyclovir 400 mg BID as patient is on bortezomib    Fever  ·	infection vs disease  ·	Tmax 102 on admission  ·	ID consulted, on IV abx  ·	outpatient respiratory PCR negative  ·	will recommend GI PCR d/t reports of diarrhea  ·	preliminary blood and urine cultures unremarkable  ·	management per primary team    Will continue to follow.     Steph Holly NP  Hematology/ Oncology  [facility]  [phone] (office)  [phone] (alt office)  Evenings and weekends please call MD on call or office   PETER DE LA PAZ is a 67y Male who presents with a chief complaint of fever.    Multiple Myeloma  Pancytopenia  ·	Patient follows with Dr. Jean Claude Hubbard, [facility].  ·	Patient had been on lenalidomide maintenance up until February 2023, when he had PET/CT that showed progression of disease with worsening lymphadenopathy, bony lesions, and liver lesion.   ·	He was started then on daratumumab + cyclophosphamide + bortezomib + dexamethasone.  ·	Last dose of daratumumab was on March 23rd.  ·	Last dose of bortezomib was on March 23rd  ·	Last dose of cyclophosphamide was on March 13th  ·	Patient has had worsening pancytopenia since starting the latest chemotherapy regimen.  ·	Hold systemic treatment while inpatient.  ·	Monitor CBC and transfuse to maintain HGB > 7 and PLT > 10.  ·	Continue prophylactic acyclovir 400 mg BID as patient is on bortezomib    Fever  ·	infection vs disease  ·	Tmax 102 on admission  ·	ID consulted, on IV abx  ·	outpatient respiratory PCR negative  ·	will recommend GI PCR d/t reports of diarrhea  ·	preliminary blood and urine cultures unremarkable  ·	management per primary team    Will continue to follow.     Steph Holly NP  Hematology/ Oncology  [facility]  [phone] (office)  [phone] (alt office)  Evenings and weekends please call MD on call or office   PETER DE LA PAZ is a 67y Male who presents with a chief complaint of fever.    Multiple Myeloma  Pancytopenia  ·	Patient follows with Dr. Jean Claude Hubbard, [facility].  ·	Patient had been on lenalidomide maintenance up until February 2023, when he had PET/CT that showed progression of disease with worsening lymphadenopathy, bony lesions, and liver lesion.   ·	He was started then on daratumumab + cyclophosphamide + bortezomib + dexamethasone.  ·	Last dose of daratumumab was on March 23rd.  ·	Last dose of bortezomib was on March 23rd  ·	Last dose of cyclophosphamide was on March 13th  ·	Patient has had worsening pancytopenia since starting the latest chemotherapy regimen.  ·	Hold systemic treatment while inpatient.  ·	Monitor CBC and transfuse to maintain HGB > 7 and PLT > 10.  ·	Continue prophylactic acyclovir 400 mg BID as patient is on bortezomib    Fever  ·	infection vs disease  ·	Tmax 102 on admission  ·	ID consulted, on IV abx  ·	outpatient respiratory PCR negative  ·	will recommend GI PCR d/t reports of diarrhea  ·	preliminary blood and urine cultures unremarkable  ·	management per primary team    Will continue to follow.     Steph Holly NP  Hematology/ Oncology  [facility]  [phone] (office)  [phone] (alt office)  Evenings and weekends please call MD on call or office   PETER DE LA PAZ is a 67y Male who presents with a chief complaint of fever.    Multiple Myeloma  Pancytopenia  ·	Patient follows with Dr. Jean Claude Hubbard, [facility].  ·	Patient had been on lenalidomide maintenance up until February 2023, when he had PET/CT that showed progression of disease with worsening lymphadenopathy, bony lesions, and liver lesion.   ·	He was started then on daratumumab + cyclophosphamide + bortezomib + dexamethasone.  ·	Last dose of daratumumab was on March 23rd.  ·	Last dose of bortezomib was on March 23rd  ·	Last dose of cyclophosphamide was on March 13th  ·	Patient has had worsening pancytopenia since starting the latest chemotherapy regimen.  ·	Hold systemic treatment while inpatient.  ·	Monitor CBC and transfuse to maintain HGB > 7 and PLT > 10.  ·	Continue prophylactic acyclovir 400 mg BID as patient is on bortezomib    Fever  ·	infection vs disease  ·	Tmax 102 on admission  ·	ID consulted, on IV abx  ·	outpatient respiratory PCR negative  ·	will recommend GI PCR d/t reports of diarrhea  ·	preliminary blood and urine cultures unremarkable  ·	management per primary team    Will continue to follow.     Steph Holly NP  Hematology/ Oncology  [facility]  [phone] (office)  [phone] (alt office)  Evenings and weekends please call MD on call or office   PETER DE LA PAZ is a 67y Male who presents with a chief complaint of fever.    Multiple Myeloma  Pancytopenia  ·	Patient follows with Dr. Jean Claude Hubbard, [facility].  ·	Patient had been on lenalidomide maintenance up until February 2023, when he had PET/CT that showed progression of disease with worsening lymphadenopathy, bony lesions, and liver lesion.   ·	He was started then on daratumumab + cyclophosphamide + bortezomib + dexamethasone.  ·	Last dose of daratumumab was on March 23rd.  ·	Last dose of bortezomib was on March 23rd  ·	Last dose of cyclophosphamide was on March 13th  ·	Patient has had worsening pancytopenia since starting the latest chemotherapy regimen.  ·	Hold systemic treatment while inpatient.  ·	Monitor CBC and transfuse to maintain HGB > 7 and PLT > 10.  ·	Continue prophylactic acyclovir 400 mg BID as patient is on bortezomib    Fever  ·	infection vs disease  ·	Tmax 102 on admission  ·	ID consulted, on IV abx  ·	outpatient respiratory PCR negative  ·	will recommend GI PCR d/t reports of diarrhea  ·	preliminary blood and urine cultures unremarkable  ·	management per primary team    Will continue to follow.     Steph Holly NP  Hematology/ Oncology  [facility]  [phone] (office)  [phone] (alt office)  Evenings and weekends please call MD on call or office   PETER DE LA PAZ is a 67y Male who presents with a chief complaint of fever.    Multiple Myeloma  Pancytopenia  ·	Patient follows with Dr. Jean Claude Hubbard, [facility].  ·	Patient had been on lenalidomide maintenance up until February 2023, when he had PET/CT that showed progression of disease with worsening lymphadenopathy, bony lesions, and liver lesion.   ·	He was started then on daratumumab + cyclophosphamide + bortezomib + dexamethasone.  ·	Last dose of daratumumab was on March 23rd.  ·	Last dose of bortezomib was on March 23rd  ·	Last dose of cyclophosphamide was on March 13th  ·	Patient has had worsening pancytopenia since starting the latest chemotherapy regimen.  ·	Hold systemic treatment while inpatient.  ·	Monitor CBC and transfuse to maintain HGB > 7 and PLT > 10.  ·	Continue prophylactic acyclovir 400 mg BID as patient is on bortezomib    Fever  ·	infection vs disease  ·	Tmax 102 on admission  ·	ID consulted, on IV abx  ·	outpatient respiratory PCR negative  ·	will recommend GI PCR d/t reports of diarrhea  ·	preliminary blood and urine cultures unremarkable  ·	management per primary team    Will continue to follow.     Steph Holly NP  Hematology/ Oncology  [facility]  [phone] (office)  [phone] (alt office)  Evenings and weekends please call MD on call or office

## 2023-03-24 NOTE — H&P ADULT - PROBLEM SELECTOR PLAN 1
-no obvious infectious symptoms  -U/A unremarkable  -f/u blood and urine cultures  -CXR without consolidations or effusions   -less likely transfusion reaction as pt febrile prior to transfusion  -obtain LE VA duplex  -empiric treatment with Vanc/cefepime  -ID consult in AM  -hold home penicillin while on Vanc/Cefepime

## 2023-03-24 NOTE — PROGRESS NOTE ADULT - ASSESSMENT
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   - no obvious infectious symptoms  - U/A unremarkable  - F/u BCx2 and UCx   - CXR without consolidations or effusions   - less likely transfusion reaction as pt febrile prior to transfusion  - LE VA duplex --> + For DVT    - Was on empiric treatment with Vanc/cefepime --> ID consulted; started on Zosyn  - Check GI PCR   - Hold home penicillin while on Vanc/Cefepime.    Multiple myeloma.   - pancytopenia largely at baseline although Hg 6.7 on arrival  - s/p 1U PRBC with good response   - c/w home acyclovir  - c/w home entecavir   - c/w home dexamethasone   - Pt reports his Revlimid was held  - Heme/Onc consulted; F/u recs   - Hold home aspirin.  DVT   - Duplex + For R soleal vein DVT  --> Will consider CT A chest once creatinine permits as patient received contrast; Monitor for LOPEZ   - Started on Hep gtt; Monitor PTT; Monitor H/H closely    Hypertension.   - C/w home metoprolol.    Hyperlipidemia.   - C/w home atorvastatin.    Prophylactic measure.   dvt ppx: Hep GTT   diet: regular  ambulate: with assistance    fall precautions  aspiration precautions. 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   - no obvious infectious symptoms  - U/A unremarkable  - F/u BCx2 and UCx   - CXR without consolidations or effusions   - less likely transfusion reaction as pt febrile prior to transfusion  - LE VA duplex --> + For DVT    - Was on empiric treatment with Vanc/cefepime --> ID consulted; started on Zosyn  - Check GI PCR   - Hold home penicillin while on Vanc/Cefepime.    Multiple myeloma.   - pancytopenia largely at baseline although Hg 6.7 on arrival  - s/p 1U PRBC with good response   - c/w home acyclovir  - c/w home entecavir   - c/w home dexamethasone   - Pt reports his Revlimid was held  - Heme/Onc consulted; F/u recs   - Hold home aspirin.  DVT   - Duplex + For R soleal vein DVT  --> Will consider CT A chest once creatinine permits as patient received contrast; Monitor for LOPEZ   - Started on Hep gtt; Monitor PTT; Monitor H/H closely    Hypertension.   - C/w home metoprolol.    Hyperlipidemia.   - C/w home atorvastatin.    Prophylactic measure.   dvt ppx: Hep GTT   diet: regular  ambulate: with assistance    fall precautions  aspiration precautions. 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   - no obvious infectious symptoms  - U/A unremarkable  - F/u BCx2 and UCx   - CXR without consolidations or effusions   - less likely transfusion reaction as pt febrile prior to transfusion  - LE VA duplex --> + For DVT    - Was on empiric treatment with Vanc/cefepime --> ID consulted; started on Zosyn  - Check GI PCR   - Hold home penicillin while on Vanc/Cefepime.    Multiple myeloma.   - pancytopenia largely at baseline although Hg 6.7 on arrival  - s/p 1U PRBC with good response   - c/w home acyclovir  - c/w home entecavir   - c/w home dexamethasone   - Pt reports his Revlimid was held  - Heme/Onc consulted; F/u recs   - Hold home aspirin.    DVT   - Duplex + For R soleal vein DVT  --> Will consider CT A chest once creatinine permits as patient received contrast; Monitor for LOPEZ   - Started on Hep gtt; Monitor PTT; Monitor H/H closely    Hypertension.   - C/w home metoprolol.    Hyperlipidemia.   - C/w home atorvastatin.    Abnormal CT  - Outpatient follow up for CT findings    Prophylactic measure.   dvt ppx: Hep GTT   diet: regular  ambulate: with assistance    fall precautions  aspiration precautions. 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   - no obvious infectious symptoms  - U/A unremarkable  - F/u BCx2 and UCx   - CXR without consolidations or effusions   - less likely transfusion reaction as pt febrile prior to transfusion  - LE VA duplex --> + For DVT    - Was on empiric treatment with Vanc/cefepime --> ID consulted; started on Zosyn  - Check GI PCR   - Hold home penicillin while on Vanc/Cefepime.    Multiple myeloma.   - pancytopenia largely at baseline although Hg 6.7 on arrival  - s/p 1U PRBC with good response   - c/w home acyclovir  - c/w home entecavir   - c/w home dexamethasone   - Pt reports his Revlimid was held  - Heme/Onc consulted; F/u recs   - Hold home aspirin.    DVT   - Duplex + For R soleal vein DVT  --> Will consider CT A chest once creatinine permits as patient received contrast; Monitor for OLPEZ   - Started on Hep gtt; Monitor PTT; Monitor H/H closely    Hypertension.   - C/w home metoprolol.    Hyperlipidemia.   - C/w home atorvastatin.    Abnormal CT  - Outpatient follow up for CT findings    Prophylactic measure.   dvt ppx: Hep GTT   diet: regular  ambulate: with assistance    fall precautions  aspiration precautions.

## 2023-03-24 NOTE — CONSULT NOTE ADULT - ATTENDING COMMENTS
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 Found to be febrile on admission, ID consulted for further management.    Tmax 102.4 (3/23)  WBC 3.5K today  Urinalysis unremarkable  RVP neg  CXR unremarkable    Impression:  #Fever  #Immunocompromised Status, H/O Splenectomy  #pancytopenia       Fever of unclear etiology, only localizing finding is watery BM x 1 day duration and abdominal discomfort. Non-infectious etiologies on DDx as well, DVT noted on LE Duplex.     Recs:  - d/c vancomycin and cefepime  - start Zosyn 3.375g IV q8H  - check CT A/P, with IV contrast if renal function permits to r/o GI source given abdominal pain and diarrhea   - would consider pursuing CTA Chest to r/o PE, as could explain fevers  - follow up blood cultures  - check GI PCR  - trend cbc for pancytopenia     Dejuan Ralph  Please contact through MS Teams   If no response or past 5 pm/weekend call [phone]. 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 Found to be febrile on admission, ID consulted for further management.    Tmax 102.4 (3/23)  WBC 3.5K today  Urinalysis unremarkable  RVP neg  CXR unremarkable    Impression:  #Fever  #Immunocompromised Status, H/O Splenectomy  #pancytopenia       Fever of unclear etiology, only localizing finding is watery BM x 1 day duration and abdominal discomfort. Non-infectious etiologies on DDx as well, DVT noted on LE Duplex.     Recs:  - d/c vancomycin and cefepime  - start Zosyn 3.375g IV q8H  - check CT A/P, with IV contrast if renal function permits to r/o GI source given abdominal pain and diarrhea   - would consider pursuing CTA Chest to r/o PE, as could explain fevers  - follow up blood cultures  - check GI PCR  - trend cbc for pancytopenia     Dejuan Ralph  Please contact through MS Teams   If no response or past 5 pm/weekend call [phone]. 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 Found to be febrile on admission, ID consulted for further management.    Tmax 102.4 (3/23)  WBC 3.5K today  Urinalysis unremarkable  RVP neg  CXR unremarkable    Impression:  #Fever  #Immunocompromised Status, H/O Splenectomy  #pancytopenia       Fever of unclear etiology, only localizing finding is watery BM x 1 day duration and abdominal discomfort. Non-infectious etiologies on DDx as well, DVT noted on LE Duplex.     Recs:  - d/c vancomycin and cefepime  - start Zosyn 3.375g IV q8H  - check CT A/P, with IV contrast if renal function permits to r/o GI source given abdominal pain and diarrhea   - would consider pursuing CTA Chest to r/o PE, as could explain fevers  - follow up blood cultures  - check GI PCR  - trend cbc for pancytopenia     Dejuan Ralph  Please contact through MS Teams   If no response or past 5 pm/weekend call [phone].

## 2023-03-24 NOTE — ED ADULT NURSE REASSESSMENT NOTE - NS ED NURSE REASSESS COMMENT FT1
PRBC initiated. Consent in chart. Risks and benefits of administering product explained to patient. Patient verbalized understanding of risks and benefits. Patient educated on side effects to look out for. VSS. Second RN at beside for confirmation of product and pt identification. Stretcher locked and in lowest position, appropriate side rails up. Pt instructed to notify RN if assistance is needed. Pt admitted and pending bed assignment.

## 2023-03-24 NOTE — CONSULT NOTE ADULT - ASSESSMENT
68yo M w/ PMHx of aortic aneurysm and bioprosthetic AV valve replacement 2019, HLD, splenectomy, partial gastrectomy, partial pancreatectomy, 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, presents fevers. Vascular consulted for acute R soleal DVT.     Plan: 68yo M w/ PMHx of aortic aneurysm and bioprosthetic AV valve replacement 2019, HLD, splenectomy, partial gastrectomy, partial pancreatectomy, 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, presents fevers. Vascular consulted for acute R soleal DVT.     Plan:  - Continue AC   - Patient will need a outpatient surveillance ultrasound in 6 months to evaluate for resolution of DVT     Plan discussed with fellow Dr. Cabrales on behalf of Dr. Ramón Acevedo PGY-2   Vascular Surgery  [id number]  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, presents fevers. Vascular consulted for acute R soleal DVT.     Plan:  - Continue AC   - Patient will need a outpatient surveillance ultrasound in 6 months to evaluate for resolution of DVT     Plan discussed with fellow Dr. Cabrales on behalf of Dr. Ramón Acevedo PGY-2   Vascular Surgery  [id number]  68yo M w/ PMHx of aortic aneurysm and bioprosthetic AV valve replacement 2019, HLD, splenectomy, partial gastrectomy, partial pancreatectomy, 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, presents fevers. Vascular consulted for acute R soleal DVT.     Plan:  - Continue AC   - Patient will need a outpatient surveillance ultrasound in 6 months to evaluate for resolution of DVT     Plan discussed with fellow Dr. Cabrales on behalf of Dr. Ramón Acevedo PGY-2   Vascular Surgery  [id number]  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, presents fevers. Vascular consulted for acute R soleal DVT. Patient currently on heparin gtt    Plan:  - Continue AC   - Patient will need a outpatient surveillance ultrasound in 6 months to evaluate for resolution of DVT     Plan discussed with fellow Dr. Cabrales on behalf of Dr. Ramón Acevedo PGY-2   Vascular Surgery  [id number]  68yo M w/ PMHx of aortic aneurysm and bioprosthetic AV valve replacement 2019, HLD, splenectomy, partial gastrectomy, partial pancreatectomy, 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, presents fevers. Vascular consulted for acute R soleal DVT. Patient currently on heparin gtt    Plan:  - Continue AC   - Patient will need a outpatient surveillance ultrasound in 6 months to evaluate for resolution of DVT     Plan discussed with fellow Dr. Cabrales on behalf of Dr. Ramón Acevedo PGY-2   Vascular Surgery  [id number]  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, presents fevers. Vascular consulted for acute R soleal DVT. Patient currently on heparin gtt    Plan:  - Continue AC   - Patient will need a outpatient surveillance ultrasound in 6 months to evaluate for resolution of DVT     Plan discussed with fellow Dr. Cabrales on behalf of Dr. Ramón Acevedo PGY-2   Vascular Surgery  [id number]

## 2023-03-24 NOTE — PATIENT PROFILE ADULT - FALL HARM RISK - HARM RISK INTERVENTIONS
Assistance OOB with selected safe patient handling equipment/Communicate Risk of Fall with Harm to all staff/Discuss with provider need for PT consult/Monitor gait and stability/Provide patient with walking aids - walker, cane, crutches/Reinforce activity limits and safety measures with patient and family/Review medications for side effects contributing to fall risk/Sit up slowly, dangle for a short time, stand at bedside before walking/Tailored Fall Risk Interventions/Toileting schedule using arm’s reach rule for commode and bathroom/Visual Cue: Yellow wristband and red socks/Bed in lowest position, wheels locked, appropriate side rails in place/Call bell, personal items and telephone in reach/Instruct patient to call for assistance before getting out of bed or chair/Non-slip footwear when patient is out of bed/[place] to call system/Physically safe environment - no spills, clutter or unnecessary equipment/Purposeful Proactive Rounding/Room/bathroom lighting operational, light cord in reach Assistance OOB with selected safe patient handling equipment/Communicate Risk of Fall with Harm to all staff/Discuss with provider need for PT consult/Monitor gait and stability/Provide patient with walking aids - walker, cane, crutches/Reinforce activity limits and safety measures with patient and family/Review medications for side effects contributing to fall risk/Sit up slowly, dangle for a short time, stand at bedside before walking/Tailored Fall Risk Interventions/Toileting schedule using arm’s reach rule for commode and bathroom/Visual Cue: Yellow wristband and red socks/Bed in lowest position, wheels locked, appropriate side rails in place/Call bell, personal items and telephone in reach/Instruct patient to call for assistance before getting out of bed or chair/Non-slip footwear when patient is out of bed/[place] to call system/Physically safe environment - no spills, clutter or unnecessary equipment/Purposeful Proactive Rounding/Room/bathroom lighting operational, light cord in reach Assistance OOB with selected safe patient handling equipment/Communicate Risk of Fall with Harm to all staff/Discuss with provider need for PT consult/Monitor gait and stability/Provide patient with walking aids - walker, cane, crutches/Reinforce activity limits and safety measures with patient and family/Review medications for side effects contributing to fall risk/Sit up slowly, dangle for a short time, stand at bedside before walking/Tailored Fall Risk Interventions/Toileting schedule using arm’s reach rule for commode and bathroom/Visual Cue: Yellow wristband and red socks/Bed in lowest position, wheels locked, appropriate side rails in place/Call bell, personal items and telephone in reach/Instruct patient to call for assistance before getting out of bed or chair/Non-slip footwear when patient is out of bed/[place] to call system/Physically safe environment - no spills, clutter or unnecessary equipment/Purposeful Proactive Rounding/Room/bathroom lighting operational, light cord in reach

## 2023-03-24 NOTE — H&P ADULT - ASSESSMENT
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

## 2023-03-24 NOTE — CONSULT NOTE ADULT - SUBJECTIVE AND OBJECTIVE BOX
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, presents with fever, patient reports that upon waking this morning patient had chills and body aches, took his temperature which was 102, patient endorsing some mild periumbilical abdominal discomfort, but otherwise feels well, denies chest pain, shortness of breath, cough, nasal congestion, vomiting, diarrhea, dysuria, no sick contacts or recent travel, patient states that he went to his hematologist to get a blood transfusion today and was instructed to come to the ER, in the ED, pt was febrile, tachycardic, otherwise VSS, labs notable for pancytopenia (Hg 6.7), elevated Cr (improved from prior baseline), BNP 3K, pt was given 1U PRBC, Ofirmev, 1L LR, admitted to general medicine for further management, of note pt had additional fever approx 15 minutes after finishing transfusion (24 Mar 2023 05:32)    Hematology/Oncology called to see patient who [place] w/ Jean Claude Carrasco [place] [facility] [place]     PAST MEDICAL & SURGICAL HISTORY:  Multiple myeloma      Hyperlipidemia      H/O aortic aneurysm      H/O splenectomy      S/P partial gastrectomy      History of pancreatic surgery          FAMILY HISTORY:  FHx: lung cancer (Sibling)    FHx: ovarian cancer (Sibling)        Alochol: Denied  Smoking: Nonsmoker  Drug Use: Denied  Marital Status:         Allergies    No Known Allergies    Intolerances        MEDICATIONS  (STANDING):  acyclovir   Oral Tab/Cap 400 milliGRAM(s) Oral daily  atorvastatin 40 milliGRAM(s) Oral at bedtime  cefepime   IVPB 2000 milliGRAM(s) IV Intermittent every 12 hours  dexAMETHasone     Tablet 4 milliGRAM(s) Oral daily  entecavir 0.5 milliGRAM(s) Oral daily  heparin  Infusion.  Unit(s)/Hr (14 mL/Hr) IV Continuous <Continuous>  metoprolol tartrate 50 milliGRAM(s) Oral two times a day  vancomycin  IVPB 750 milliGRAM(s) IV Intermittent every 12 hours    MEDICATIONS  (PRN):  acetaminophen     Tablet .. 650 milliGRAM(s) Oral every 6 hours PRN Temp greater or equal to 38C (100.4F), Mild Pain (1 - 3)  heparin   Injectable 6500 Unit(s) IV Push every 6 hours PRN For aPTT less than 40  heparin   Injectable 3000 Unit(s) IV Push every 6 hours PRN For aPTT between 40 - 57      ROS  +fevers on admission  No epistaxis, HA, sore throat  No CP, SOB, cough, sputum  No n/v abd pain, melena, hematochezia  +diarrhea   No edema  No rash  No anxiety  No back pain, joint pain  No bleeding, bruising  No dysuria, hematuria    T(C): 37.2 (03-24-23 @ 15:12), Max: 39.1 (03-24-23 @ 03:30)  HR: 95 (03-24-23 @ 15:12) (85 - 111)  BP: 100/55 (03-24-23 @ 15:12) (96/53 - 137/67)  RR: 18 (03-24-23 @ 15:12) (18 - 20)  SpO2: 96% (03-24-23 @ 15:12) (95% - 98%)  Wt(kg): --    PE  NAD  Awake, alert  Anicteric, MMM  RRR  CTAB  Abd soft, NT, ND  No c/c/e  No rash grossly  FROM                          7.8    3.48  )-----------( 51       ( 24 Mar 2023 04:23 )             23.2       03-24    133<L>  |  99  |  19  ----------------------------<  104<H>  4.0   |  28  |  1.46<H>    Ca    8.4      24 Mar 2023 04:23  Mg     1.6     03-24    TPro  5.3<L>  /  Alb  3.1<L>  /  TBili  0.8  /  DBili  x   /  AST  9<L>  /  ALT  17  /  AlkPhos  74  03-24   Reason for consult: MM    HPI:  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, presents with fever, patient reports that upon waking this morning patient had chills and body aches, took his temperature which was 102, patient endorsing some mild periumbilical abdominal discomfort, but otherwise feels well, denies chest pain, shortness of breath, cough, nasal congestion, vomiting, diarrhea, dysuria, no sick contacts or recent travel, patient states that he went to his hematologist to get a blood transfusion today and was instructed to come to the ER, in the ED, pt was febrile, tachycardic, otherwise VSS, labs notable for pancytopenia (Hg 6.7), elevated Cr (improved from prior baseline), BNP 3K, pt was given 1U PRBC, Ofirmev, 1L LR, admitted to general medicine for further management, of note pt had additional fever approx 15 minutes after finishing transfusion (24 Mar 2023 05:32)    Hematology/Oncology called to see patient who [place] [facility] [place]     PAST MEDICAL & SURGICAL HISTORY:  Multiple myeloma      Hyperlipidemia      H/O aortic aneurysm      H/O splenectomy      S/P partial gastrectomy      History of pancreatic surgery          FAMILY HISTORY:  FHx: lung cancer (Sibling)    FHx: ovarian cancer (Sibling)        Alochol: Denied  Smoking: Nonsmoker  Drug Use: Denied  Marital Status:         Allergies    No Known Allergies    Intolerances        MEDICATIONS  (STANDING):  acyclovir   Oral Tab/Cap 400 milliGRAM(s) Oral daily  atorvastatin 40 milliGRAM(s) Oral at bedtime  cefepime   IVPB 2000 milliGRAM(s) IV Intermittent every 12 hours  dexAMETHasone     Tablet 4 milliGRAM(s) Oral daily  entecavir 0.5 milliGRAM(s) Oral daily  heparin  Infusion.  Unit(s)/Hr (14 mL/Hr) IV Continuous <Continuous>  metoprolol tartrate 50 milliGRAM(s) Oral two times a day  vancomycin  IVPB 750 milliGRAM(s) IV Intermittent every 12 hours    MEDICATIONS  (PRN):  acetaminophen     Tablet .. 650 milliGRAM(s) Oral every 6 hours PRN Temp greater or equal to 38C (100.4F), Mild Pain (1 - 3)  heparin   Injectable 6500 Unit(s) IV Push every 6 hours PRN For aPTT less than 40  heparin   Injectable 3000 Unit(s) IV Push every 6 hours PRN For aPTT between 40 - 57      ROS  +fevers on admission  No epistaxis, HA, sore throat  No CP, SOB, cough, sputum  No n/v abd pain, melena, hematochezia  +diarrhea   No edema  No rash  No anxiety  No back pain, joint pain  No bleeding, bruising  No dysuria, hematuria    T(C): 37.2 (03-24-23 @ 15:12), Max: 39.1 (03-24-23 @ 03:30)  HR: 95 (03-24-23 @ 15:12) (85 - 111)  BP: 100/55 (03-24-23 @ 15:12) (96/53 - 137/67)  RR: 18 (03-24-23 @ 15:12) (18 - 20)  SpO2: 96% (03-24-23 @ 15:12) (95% - 98%)  Wt(kg): --    PE  NAD  Awake, alert  Anicteric, MMM  RRR  CTAB  Abd soft, NT, ND  No c/c/e  No rash grossly  FROM                          7.8    3.48  )-----------( 51       ( 24 Mar 2023 04:23 )             23.2       03-24    133<L>  |  99  |  19  ----------------------------<  104<H>  4.0   |  28  |  1.46<H>    Ca    8.4      24 Mar 2023 04:23  Mg     1.6     03-24    TPro  5.3<L>  /  Alb  3.1<L>  /  TBili  0.8  /  DBili  x   /  AST  9<L>  /  ALT  17  /  AlkPhos  74  03-24   Reason for consult: MM    HPI:  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, presents with fever, patient reports that upon waking this morning patient had chills and body aches, took his temperature which was 102, patient endorsing some mild periumbilical abdominal discomfort, but otherwise feels well, denies chest pain, shortness of breath, cough, nasal congestion, vomiting, diarrhea, dysuria, no sick contacts or recent travel, patient states that he went to his hematologist to get a blood transfusion today and was instructed to come to the ER, in the ED, pt was febrile, tachycardic, otherwise VSS, labs notable for pancytopenia (Hg 6.7), elevated Cr (improved from prior baseline), BNP 3K, pt was given 1U PRBC, Ofirmev, 1L LR, admitted to general medicine for further management, of note pt had additional fever approx 15 minutes after finishing transfusion (24 Mar 2023 05:32)    Hematology/Oncology called to see patient [place] [facility] [place]     PAST MEDICAL & SURGICAL HISTORY:  Multiple myeloma      Hyperlipidemia      H/O aortic aneurysm      H/O splenectomy      S/P partial gastrectomy      History of pancreatic surgery          FAMILY HISTORY:  FHx: lung cancer (Sibling)    FHx: ovarian cancer (Sibling)        Alochol: Denied  Smoking: Nonsmoker  Drug Use: Denied  Marital Status:         Allergies    No Known Allergies    Intolerances        MEDICATIONS  (STANDING):  acyclovir   Oral Tab/Cap 400 milliGRAM(s) Oral daily  atorvastatin 40 milliGRAM(s) Oral at bedtime  cefepime   IVPB 2000 milliGRAM(s) IV Intermittent every 12 hours  dexAMETHasone     Tablet 4 milliGRAM(s) Oral daily  entecavir 0.5 milliGRAM(s) Oral daily  heparin  Infusion.  Unit(s)/Hr (14 mL/Hr) IV Continuous <Continuous>  metoprolol tartrate 50 milliGRAM(s) Oral two times a day  vancomycin  IVPB 750 milliGRAM(s) IV Intermittent every 12 hours    MEDICATIONS  (PRN):  acetaminophen     Tablet .. 650 milliGRAM(s) Oral every 6 hours PRN Temp greater or equal to 38C (100.4F), Mild Pain (1 - 3)  heparin   Injectable 6500 Unit(s) IV Push every 6 hours PRN For aPTT less than 40  heparin   Injectable 3000 Unit(s) IV Push every 6 hours PRN For aPTT between 40 - 57      ROS  +fevers on admission  No epistaxis, HA, sore throat  No CP, SOB, cough, sputum  No n/v abd pain, melena, hematochezia  +diarrhea   No edema  No rash  No anxiety  No back pain, joint pain  No bleeding, bruising  No dysuria, hematuria    T(C): 37.2 (03-24-23 @ 15:12), Max: 39.1 (03-24-23 @ 03:30)  HR: 95 (03-24-23 @ 15:12) (85 - 111)  BP: 100/55 (03-24-23 @ 15:12) (96/53 - 137/67)  RR: 18 (03-24-23 @ 15:12) (18 - 20)  SpO2: 96% (03-24-23 @ 15:12) (95% - 98%)  Wt(kg): --    PE  NAD  Awake, alert  Anicteric, MMM  RRR  CTAB  Abd soft, NT, ND  No c/c/e  No rash grossly  FROM                          7.8    3.48  )-----------( 51       ( 24 Mar 2023 04:23 )             23.2       03-24    133<L>  |  99  |  19  ----------------------------<  104<H>  4.0   |  28  |  1.46<H>    Ca    8.4      24 Mar 2023 04:23  Mg     1.6     03-24    TPro  5.3<L>  /  Alb  3.1<L>  /  TBili  0.8  /  DBili  x   /  AST  9<L>  /  ALT  17  /  AlkPhos  74  03-24

## 2023-03-24 NOTE — PROVIDER CONTACT NOTE (OTHER) - ASSESSMENT
Pt a&ox4. Temp 102.4 deg F, other VS as charted. Pt s/p 1 unit PRBC  at this time. Pt denies chest pain or shortness of breath. Pt denies back pain or itching

## 2023-03-24 NOTE — H&P ADULT - NSHPPHYSICALEXAM_GEN_ALL_CORE
Vital Signs Last 24 Hrs  T(C): 37.8 (24 Mar 2023 04:35), Max: 39.1 (24 Mar 2023 03:30)  T(F): 100 (24 Mar 2023 04:35), Max: 102.4 (24 Mar 2023 03:30)  HR: 92 (24 Mar 2023 04:35) (85 - 111)  BP: 100/60 (24 Mar 2023 04:35) (96/53 - 127/71)  BP(mean): 74 (24 Mar 2023 00:46) (66 - 74)  RR: 18 (24 Mar 2023 04:35) (18 - 20)  SpO2: 95% (24 Mar 2023 04:35) (95% - 98%)    Parameters below as of 24 Mar 2023 04:35  Patient On (Oxygen Delivery Method): room air

## 2023-03-25 LAB
ALBUMIN SERPL ELPH-MCNC: 3.2 G/DL — LOW (ref 3.3–5)
ALP SERPL-CCNC: 75 U/L — SIGNIFICANT CHANGE UP (ref 40–120)
ALT FLD-CCNC: 17 U/L — SIGNIFICANT CHANGE UP (ref 10–45)
ANION GAP SERPL CALC-SCNC: 11 MMOL/L — SIGNIFICANT CHANGE UP (ref 5–17)
APTT BLD: 122.4 SEC — CRITICAL HIGH (ref 27.5–35.5)
APTT BLD: 72.7 SEC — HIGH (ref 27.5–35.5)
AST SERPL-CCNC: 11 U/L — SIGNIFICANT CHANGE UP (ref 10–40)
BILIRUB SERPL-MCNC: 0.7 MG/DL — SIGNIFICANT CHANGE UP (ref 0.2–1.2)
BUN SERPL-MCNC: 19 MG/DL — SIGNIFICANT CHANGE UP (ref 7–23)
CALCIUM SERPL-MCNC: 8.6 MG/DL — SIGNIFICANT CHANGE UP (ref 8.4–10.5)
CHLORIDE SERPL-SCNC: 99 MMOL/L — SIGNIFICANT CHANGE UP (ref 96–108)
CO2 SERPL-SCNC: 24 MMOL/L — SIGNIFICANT CHANGE UP (ref 22–31)
CREAT SERPL-MCNC: 1.46 MG/DL — HIGH (ref 0.5–1.3)
EGFR: 52 ML/MIN/1.73M2 — LOW
FERRITIN SERPL-MCNC: 1642 NG/ML — HIGH (ref 30–400)
FOLATE SERPL-MCNC: >20 NG/ML — SIGNIFICANT CHANGE UP
GI PCR PANEL: SIGNIFICANT CHANGE UP
GLUCOSE SERPL-MCNC: 131 MG/DL — HIGH (ref 70–99)
HCT VFR BLD CALC: 20.7 % — CRITICAL LOW (ref 39–50)
HCT VFR BLD CALC: 21.8 % — LOW (ref 39–50)
HGB BLD-MCNC: 7.3 G/DL — LOW (ref 13–17)
HGB BLD-MCNC: 7.5 G/DL — LOW (ref 13–17)
INR BLD: 1.6 RATIO — HIGH (ref 0.88–1.16)
INR BLD: 1.71 RATIO — HIGH (ref 0.88–1.16)
IRON SATN MFR SERPL: 11 % — LOW (ref 16–55)
IRON SATN MFR SERPL: 19 UG/DL — LOW (ref 45–165)
MCHC RBC-ENTMCNC: 32.5 PG — SIGNIFICANT CHANGE UP (ref 27–34)
MCHC RBC-ENTMCNC: 33.3 PG — SIGNIFICANT CHANGE UP (ref 27–34)
MCHC RBC-ENTMCNC: 34.4 GM/DL — SIGNIFICANT CHANGE UP (ref 32–36)
MCHC RBC-ENTMCNC: 35.3 GM/DL — SIGNIFICANT CHANGE UP (ref 32–36)
MCV RBC AUTO: 94.4 FL — SIGNIFICANT CHANGE UP (ref 80–100)
MCV RBC AUTO: 94.5 FL — SIGNIFICANT CHANGE UP (ref 80–100)
NRBC # BLD: 3 /100 WBCS — HIGH (ref 0–0)
PLATELET # BLD AUTO: 45 K/UL — LOW (ref 150–400)
PLATELET # BLD AUTO: 49 K/UL — LOW (ref 150–400)
POTASSIUM SERPL-MCNC: 3.5 MMOL/L — SIGNIFICANT CHANGE UP (ref 3.5–5.3)
POTASSIUM SERPL-SCNC: 3.5 MMOL/L — SIGNIFICANT CHANGE UP (ref 3.5–5.3)
PROT SERPL-MCNC: 5.2 G/DL — LOW (ref 6–8.3)
PROTHROM AB SERPL-ACNC: 18.5 SEC — HIGH (ref 10.5–13.4)
PROTHROM AB SERPL-ACNC: 19.9 SEC — HIGH (ref 10.5–13.4)
RBC # BLD: 2.19 M/UL — LOW (ref 4.2–5.8)
RBC # BLD: 2.31 M/UL — LOW (ref 4.2–5.8)
RBC # FLD: 24.8 % — HIGH (ref 10.3–14.5)
RBC # FLD: 25 % — HIGH (ref 10.3–14.5)
SODIUM SERPL-SCNC: 134 MMOL/L — LOW (ref 135–145)
TIBC SERPL-MCNC: 174 UG/DL — LOW (ref 220–430)
UIBC SERPL-MCNC: 155 UG/DL — SIGNIFICANT CHANGE UP (ref 110–370)
VIT B12 SERPL-MCNC: 297 PG/ML — SIGNIFICANT CHANGE UP (ref 232–1245)
WBC # BLD: 3.48 K/UL — LOW (ref 3.8–10.5)
WBC # BLD: 3.76 K/UL — LOW (ref 3.8–10.5)
WBC # FLD AUTO: 3.48 K/UL — LOW (ref 3.8–10.5)
WBC # FLD AUTO: 3.76 K/UL — LOW (ref 3.8–10.5)

## 2023-03-25 RX ORDER — HEPARIN SODIUM 5000 [USP'U]/ML
1900 INJECTION INTRAVENOUS; SUBCUTANEOUS
Qty: 25000 | Refills: 0 | Status: DISCONTINUED | OUTPATIENT
Start: 2023-03-25 | End: 2023-03-28

## 2023-03-25 RX ORDER — ACETAMINOPHEN 500 MG
1000 TABLET ORAL ONCE
Refills: 0 | Status: COMPLETED | OUTPATIENT
Start: 2023-03-25 | End: 2023-03-25

## 2023-03-25 RX ORDER — ACETAMINOPHEN 500 MG
975 TABLET ORAL ONCE
Refills: 0 | Status: COMPLETED | OUTPATIENT
Start: 2023-03-25 | End: 2023-03-25

## 2023-03-25 RX ADMIN — Medication 50 MILLIGRAM(S): at 17:06

## 2023-03-25 RX ADMIN — PIPERACILLIN AND TAZOBACTAM 25 GRAM(S): 4; .5 INJECTION, POWDER, LYOPHILIZED, FOR SOLUTION INTRAVENOUS at 21:58

## 2023-03-25 RX ADMIN — HEPARIN SODIUM 1700 UNIT(S)/HR: 5000 INJECTION INTRAVENOUS; SUBCUTANEOUS at 23:28

## 2023-03-25 RX ADMIN — Medication 975 MILLIGRAM(S): at 13:43

## 2023-03-25 RX ADMIN — Medication 4 MILLIGRAM(S): at 05:31

## 2023-03-25 RX ADMIN — HEPARIN SODIUM 3000 UNIT(S): 5000 INJECTION INTRAVENOUS; SUBCUTANEOUS at 05:58

## 2023-03-25 RX ADMIN — ENTECAVIR 0.5 MILLIGRAM(S): 0.5 TABLET ORAL at 12:22

## 2023-03-25 RX ADMIN — Medication 650 MILLIGRAM(S): at 05:31

## 2023-03-25 RX ADMIN — HEPARIN SODIUM 1900 UNIT(S)/HR: 5000 INJECTION INTRAVENOUS; SUBCUTANEOUS at 05:59

## 2023-03-25 RX ADMIN — HEPARIN SODIUM 1700 UNIT(S)/HR: 5000 INJECTION INTRAVENOUS; SUBCUTANEOUS at 14:53

## 2023-03-25 RX ADMIN — Medication 400 MILLIGRAM(S): at 21:22

## 2023-03-25 RX ADMIN — PIPERACILLIN AND TAZOBACTAM 25 GRAM(S): 4; .5 INJECTION, POWDER, LYOPHILIZED, FOR SOLUTION INTRAVENOUS at 13:43

## 2023-03-25 RX ADMIN — Medication 1000 MILLIGRAM(S): at 22:22

## 2023-03-25 RX ADMIN — Medication 50 MILLIGRAM(S): at 05:31

## 2023-03-25 RX ADMIN — Medication 975 MILLIGRAM(S): at 15:00

## 2023-03-25 RX ADMIN — Medication 400 MILLIGRAM(S): at 12:22

## 2023-03-25 RX ADMIN — PIPERACILLIN AND TAZOBACTAM 25 GRAM(S): 4; .5 INJECTION, POWDER, LYOPHILIZED, FOR SOLUTION INTRAVENOUS at 05:31

## 2023-03-25 RX ADMIN — ATORVASTATIN CALCIUM 40 MILLIGRAM(S): 80 TABLET, FILM COATED ORAL at 21:22

## 2023-03-25 NOTE — PROGRESS NOTE ADULT - ASSESSMENT
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   - no obvious infectious symptoms  - U/A unremarkable  - F/u BCx2 and UCx   - CXR without consolidations or effusions   - less likely transfusion reaction as pt febrile prior to transfusion  - LE VA duplex --> + For DVT    - Was on empiric treatment with Vanc/cefepime --> ID consulted; started on Zosyn  - Check GI PCR   - Hold home penicillin while on Vanc/Cefepime.    Multiple myeloma.   - pancytopenia largely at baseline although Hg 6.7 on arrival  - s/p 1U PRBC with good response   - c/w home acyclovir  - c/w home entecavir   - c/w home dexamethasone   - Pt reports his Revlimid was held  - Heme/Onc consulted; F/u recs   - Hold home aspirin.    DVT   - Duplex + For R soleal vein DVT  --> Will consider CT A chest once creatinine permits as patient received contrast; Monitor for LOPEZ   - Started on Hep gtt; Monitor PTT; Monitor H/H closely    Hypertension.   - C/w home metoprolol.    Hyperlipidemia.   - C/w home atorvastatin.    Abnormal CT  - Outpatient follow up for CT findings    Prophylactic measure.   dvt ppx: Hep GTT   diet: regular  ambulate: with assistance    fall precautions  aspiration precautions.

## 2023-03-25 NOTE — CHART NOTE - NSCHARTNOTEFT_GEN_A_CORE
MEDICINE NP    PETER DE LA PAZ  67y Male    Patient is a 67y old  Male who presents with a chief complaint of fever (25 Mar 2023 12:40)       67yoM PMHx of Aortic Aneurysm and bioprosthetic AV Valve replacement 2019, HL, Splenectomy, partial Gastrectomy, partial Pancreatectomy, Oligosecretory Multiple Myeloma s/p auto SCT on chemo p/w fever, chills and body aches.  Temperature noted to be 102.  Admitted with Fever on Zosyn.  Now with recurrent Fever.       > Event Summary:   Notified by RN, Patient with recurrent fever, T-101.5.  Patient seen at bedside, AOX3, reporting chills.  Also states to two loose-form BMs today.    Denies abdominal pain, Nausea / Vomiting, SOB, cough.    Exam: +Feverish to touch.  Abd soft, NT, ND w/ +BS x4.    -Tylenol / Cooling measure PRN  -BCX (03.23.23 @ 17:35) - NGTD   -CXR (03.24.23 @ 05:43) Clear  -UCX (03.23.23 @ 18:13)  neg   -Rpt BCX x2 ordered  -C/w Zosyn, and f/u ID in AM   -Trend fever and wbc  curve  -Will endorse to Day Provider in AM and Attending to follow           -Vital Signs Last 24 Hrs  T(C): 37.8 (25 Mar 2023 23:58), Max: 39.3 (25 Mar 2023 12:34)  T(F): 100 (25 Mar 2023 23:58), Max: 102.8 (25 Mar 2023 12:34)  HR: 77 (25 Mar 2023 23:58) (76 - 103)  BP: 95/71 (25 Mar 2023 23:58) (95/56 - 133/69)  BP(mean): --  RR: 18 (25 Mar 2023 23:58) (18 - 18)  SpO2: 96% (25 Mar 2023 23:58) (96% - 100%)    Parameters below as of 25 Mar 2023 19:17  Patient On (Oxygen Delivery Method): room air      Yaritza Martinez [facility]  Medicine Department  #[id number] MEDICINE NP    PETER DE LA PAZ  67y Male    Patient is a 67y old  Male who presents with a chief complaint of fever (25 Mar 2023 12:40)       67yoM PMHx of Aortic Aneurysm and bioprosthetic AV Valve replacement 2019, HL, Splenectomy, partial Gastrectomy, partial Pancreatectomy, Oligosecretory Multiple Myeloma s/p auto SCT on chemo p/w fever, chills and body aches.  Temperature noted to be 102.  Admitted with Fever on Zosyn.  Now with recurrent Fever.       > Event Summary:   Notified by RN, Patient with recurrent fever, T-101.5.  Patient seen at bedside, AOX3, reporting chills.  Also states to two loose-form BMs today.    Denies abdominal pain, Nausea / Vomiting, SOB, cough.    Exam: +Feverish to touch.  Abd soft, NT, ND w/ +BS x4.    -Tylenol / Cooling measure PRN  -BCX (03.23.23 @ 17:35) - NGTD   -CXR (03.24.23 @ 05:43) Clear  -UCX (03.23.23 @ 18:13)  neg   -Rpt BCX x2 ordered  -C/w Zosyn, and f/u ID in AM   -Trend fever and wbc  curve  -Will endorse to Day Provider in AM and Attending to follow           -Vital Signs Last 24 Hrs  T(C): 37.8 (25 Mar 2023 23:58), Max: 39.3 (25 Mar 2023 12:34)  T(F): 100 (25 Mar 2023 23:58), Max: 102.8 (25 Mar 2023 12:34)  HR: 77 (25 Mar 2023 23:58) (76 - 103)  BP: 95/71 (25 Mar 2023 23:58) (95/56 - 133/69)  BP(mean): --  RR: 18 (25 Mar 2023 23:58) (18 - 18)  SpO2: 96% (25 Mar 2023 23:58) (96% - 100%)    Parameters below as of 25 Mar 2023 19:17  Patient On (Oxygen Delivery Method): room air      Yaritza Martinez [facility]  Medicine Department  #[id number] MEDICINE NP    PETER DE LA PAZ  67y Male    Patient is a 67y old  Male who presents with a chief complaint of fever (25 Mar 2023 12:40)       67yoM PMHx of Aortic Aneurysm and bioprosthetic AV Valve replacement 2019, HL, Splenectomy, partial Gastrectomy, partial Pancreatectomy, Oligosecretory Multiple Myeloma s/p auto SCT on chemo p/w fever, chills and body aches.  Temperature noted to be 102.  Admitted with Fever on Zosyn.  Now with recurrent Fever.       > Event Summary:   Notified by RN, Patient with recurrent fever, T-101.5.  Patient seen at bedside, AOX3, reporting chills.  Also states to two loose-form BMs today.    Denies abdominal pain, Nausea / Vomiting, SOB, cough.    Exam: +Feverish to touch.  Abd soft, NT, ND w/ +BS x4.    -Tylenol / Cooling measure PRN  -BCX (03.23.23 @ 17:35) - NGTD   -CXR (03.24.23 @ 05:43) Clear  -UCX (03.23.23 @ 18:13)  neg   -Rpt BCX x2 ordered  -C/w Zosyn, and f/u ID in AM   -Trend fever and wbc  curve  -Will endorse to Day Provider in AM and Attending to follow           -Vital Signs Last 24 Hrs  T(C): 37.8 (25 Mar 2023 23:58), Max: 39.3 (25 Mar 2023 12:34)  T(F): 100 (25 Mar 2023 23:58), Max: 102.8 (25 Mar 2023 12:34)  HR: 77 (25 Mar 2023 23:58) (76 - 103)  BP: 95/71 (25 Mar 2023 23:58) (95/56 - 133/69)  BP(mean): --  RR: 18 (25 Mar 2023 23:58) (18 - 18)  SpO2: 96% (25 Mar 2023 23:58) (96% - 100%)    Parameters below as of 25 Mar 2023 19:17  Patient On (Oxygen Delivery Method): room air      Yaritza Martinez [facility]  Medicine Department  #[id number] MEDICINE NP    PETER DE LA PAZ  67y Male    Patient is a 67y old  Male who presents with a chief complaint of fever (25 Mar 2023 12:40)       67yoM PMHx of Aortic Aneurysm and bioprosthetic AV Valve replacement 2019, HL, Splenectomy, partial Gastrectomy, partial Pancreatectomy, Oligosecretory Multiple Myeloma s/p auto SCT on chemo p/w fever, chills and body aches.  Temperature noted to be 102.  Admitted with Fever /FUO on Zosyn.  Now with recurrent Fever.       > Event Summary:   Notified by RN, Patient with recurrent fever, T-101.5.  Patient seen at bedside, AOX3, reporting chills.  Also states to two loose-form BMs today.    Denies abdominal pain, Nausea / Vomiting, SOB, cough.    Exam:  +Feverish to touch.  Lung CTA.   Abd soft, NT, ND w/ +BS x4.    -Tylenol / Cooling measure PRN  -BCX (03.23.23 @ 17:35) - NGTD   -CXR (03.24.23 @ 05:43) Clear  -UCX (03.23.23 @ 18:13)  neg   -GI PCR  (03.24.23 @ 22:30) -neg  -Rpt BCX x2 ordered  -C/w Zosyn, and f/u ID in AM   -Trend fever and wbc  curve  -Will endorse to Day Provider in AM and Attending to follow           -Vital Signs Last 24 Hrs  T(C): 37.8 (25 Mar 2023 23:58), Max: 39.3 (25 Mar 2023 12:34)  T(F): 100 (25 Mar 2023 23:58), Max: 102.8 (25 Mar 2023 12:34)  HR: 77 (25 Mar 2023 23:58) (76 - 103)  BP: 95/71 (25 Mar 2023 23:58) (95/56 - 133/69)  BP(mean): --  RR: 18 (25 Mar 2023 23:58) (18 - 18)  SpO2: 96% (25 Mar 2023 23:58) (96% - 100%)    Parameters below as of 25 Mar 2023 19:17  Patient On (Oxygen Delivery Method): room air      [facility]  Medicine Department  #[id number] MEDICINE NP    PETER DE LA PAZ  67y Male    Patient is a 67y old  Male who presents with a chief complaint of fever (25 Mar 2023 12:40)       67yoM PMHx of Aortic Aneurysm and bioprosthetic AV Valve replacement 2019, HL, Splenectomy, partial Gastrectomy, partial Pancreatectomy, Oligosecretory Multiple Myeloma s/p auto SCT on chemo p/w fever, chills and body aches.  Temperature noted to be 102.  Admitted with Fever /FUO on Zosyn.  Now with recurrent Fever.       > Event Summary:   Notified by RN, Patient with recurrent fever, T-101.5.  Patient seen at bedside, AOX3, reporting chills.  Also states to two loose-form BMs today.    Denies abdominal pain, Nausea / Vomiting, SOB, cough.    Exam:  +Feverish to touch.  Lung CTA.   Abd soft, NT, ND w/ +BS x4.    -Tylenol / Cooling measure PRN  -BCX (03.23.23 @ 17:35) - NGTD   -CXR (03.24.23 @ 05:43) Clear  -UCX (03.23.23 @ 18:13)  neg   -GI PCR  (03.24.23 @ 22:30) -neg  -Rpt BCX x2 ordered  -C/w Zosyn, and f/u ID in AM   -Trend fever and wbc  curve  -Will endorse to Day Provider in AM and Attending to follow           -Vital Signs Last 24 Hrs  T(C): 37.8 (25 Mar 2023 23:58), Max: 39.3 (25 Mar 2023 12:34)  T(F): 100 (25 Mar 2023 23:58), Max: 102.8 (25 Mar 2023 12:34)  HR: 77 (25 Mar 2023 23:58) (76 - 103)  BP: 95/71 (25 Mar 2023 23:58) (95/56 - 133/69)  BP(mean): --  RR: 18 (25 Mar 2023 23:58) (18 - 18)  SpO2: 96% (25 Mar 2023 23:58) (96% - 100%)    Parameters below as of 25 Mar 2023 19:17  Patient On (Oxygen Delivery Method): room air      [facility]  Medicine Department  #[id number] MEDICINE NP    PETER DE LA PAZ  67y Male    Patient is a 67y old  Male who presents with a chief complaint of fever (25 Mar 2023 12:40)       67yoM PMHx of Aortic Aneurysm and bioprosthetic AV Valve replacement 2019, HL, Splenectomy, partial Gastrectomy, partial Pancreatectomy, Oligosecretory Multiple Myeloma s/p auto SCT on chemo p/w fever, chills and body aches.  Temperature noted to be 102.  Admitted with Fever /FUO on Zosyn.  Now with recurrent Fever.       > Event Summary:   Notified by RN, Patient with recurrent fever, T-101.5.  Patient seen at bedside, AOX3, reporting chills.  Also states to two loose-form BMs today.    Denies abdominal pain, Nausea / Vomiting, SOB, cough.    Exam:  +Feverish to touch.  Lung CTA.   Abd soft, NT, ND w/ +BS x4.    -Tylenol / Cooling measure PRN  -BCX (03.23.23 @ 17:35) - NGTD   -CXR (03.24.23 @ 05:43) Clear  -UCX (03.23.23 @ 18:13)  neg   -GI PCR  (03.24.23 @ 22:30) -neg  -Rpt BCX x2 ordered  -C/w Zosyn, and f/u ID in AM   -Trend fever and wbc  curve  -Will endorse to Day Provider in AM and Attending to follow           -Vital Signs Last 24 Hrs  T(C): 37.8 (25 Mar 2023 23:58), Max: 39.3 (25 Mar 2023 12:34)  T(F): 100 (25 Mar 2023 23:58), Max: 102.8 (25 Mar 2023 12:34)  HR: 77 (25 Mar 2023 23:58) (76 - 103)  BP: 95/71 (25 Mar 2023 23:58) (95/56 - 133/69)  BP(mean): --  RR: 18 (25 Mar 2023 23:58) (18 - 18)  SpO2: 96% (25 Mar 2023 23:58) (96% - 100%)    Parameters below as of 25 Mar 2023 19:17  Patient On (Oxygen Delivery Method): room air      [facility]  Medicine Department  #[id number] MEDICINE NP    PETER DE LA PAZ  67y Male    Patient is a 67y old  Male who presents with a chief complaint of fever (25 Mar 2023 12:40)       67yoM PMHx of Aortic Aneurysm and bioprosthetic AV Valve replacement 2019, HL, Splenectomy, partial Gastrectomy, partial Pancreatectomy, Oligosecretory Multiple Myeloma s/p auto SCT on chemo p/w fever, chills and body aches.  Temperature noted to be 102.  Admitted with Fever /FUO on Zosyn.  Now with recurrent Fever.       > Event Summary:   Notified by RN, Patient with recurrent fever, T-101.5.  Patient seen at bedside, AOX3, reporting chills.  Also states to two loose-form BMs today.    Denies abdominal pain, Nausea / Vomiting, SOB, cough.    Exam:  +Feverish to touch.  Lung CTA.   Abd soft, NT, ND w/ +BS x4.    -Tylenol / Cooling measure PRN  -BCX (03.23.23 @ 17:35) - NGTD   -CXR (03.24.23 @ 05:43) Clear  -UCX (03.23.23 @ 18:13)  neg   -GI PCR  (03.24.23 @ 22:30) -neg  -CTAP (03.23.23 @ 20:25) - No acute pathology.  -Rpt BCX x2 ordered  -C/w Zosyn, and f/u ID in AM   -Trend fever and wbc  curve  -Will endorse to Day Provider in AM and Attending to follow       -Vital Signs Last 24 Hrs  T(C): 37.8 (25 Mar 2023 23:58), Max: 39.3 (25 Mar 2023 12:34)  T(F): 100 (25 Mar 2023 23:58), Max: 102.8 (25 Mar 2023 12:34)  HR: 77 (25 Mar 2023 23:58) (76 - 103)  BP: 95/71 (25 Mar 2023 23:58) (95/56 - 133/69)  BP(mean): --  RR: 18 (25 Mar 2023 23:58) (18 - 18)  SpO2: 96% (25 Mar 2023 23:58) (96% - 100%)    Parameters below as of 25 Mar 2023 19:17  Patient On (Oxygen Delivery Method): room air      [facility]  Medicine Department  #[id number] MEDICINE NP    PETER DE LA PAZ  67y Male    Patient is a 67y old  Male who presents with a chief complaint of fever (25 Mar 2023 12:40)       67yoM PMHx of Aortic Aneurysm and bioprosthetic AV Valve replacement 2019, HL, Splenectomy, partial Gastrectomy, partial Pancreatectomy, Oligosecretory Multiple Myeloma s/p auto SCT on chemo p/w fever, chills and body aches.  Temperature noted to be 102.  Admitted with Fever /FUO on Zosyn.  Now with recurrent Fever.       > Event Summary:   Notified by RN, Patient with recurrent fever, T-101.5.  Patient seen at bedside, AOX3, reporting chills.  Also states to two loose-form BMs today.    Denies abdominal pain, Nausea / Vomiting, SOB, cough.    Exam:  +Feverish to touch.  Lung CTA.   Abd soft, NT, ND w/ +BS x4.    -Tylenol / Cooling measure PRN  -BCX (03.23.23 @ 17:35) - NGTD   -CXR (03.24.23 @ 05:43) Clear  -UCX (03.23.23 @ 18:13)  neg   -GI PCR  (03.24.23 @ 22:30) -neg  -CTAP (03.23.23 @ 20:25) - No acute pathology.  -Rpt BCX x2 ordered  -C/w Zosyn, and f/u ID in AM   -Trend fever and wbc  curve  -Will endorse to Day Provider in AM and Attending to follow       -Vital Signs Last 24 Hrs  T(C): 37.8 (25 Mar 2023 23:58), Max: 39.3 (25 Mar 2023 12:34)  T(F): 100 (25 Mar 2023 23:58), Max: 102.8 (25 Mar 2023 12:34)  HR: 77 (25 Mar 2023 23:58) (76 - 103)  BP: 95/71 (25 Mar 2023 23:58) (95/56 - 133/69)  BP(mean): --  RR: 18 (25 Mar 2023 23:58) (18 - 18)  SpO2: 96% (25 Mar 2023 23:58) (96% - 100%)    Parameters below as of 25 Mar 2023 19:17  Patient On (Oxygen Delivery Method): room air      [facility]  Medicine Department  #[id number] MEDICINE NP    PETER DE LA PAZ  67y Male    Patient is a 67y old  Male who presents with a chief complaint of fever (25 Mar 2023 12:40)       67yoM PMHx of Aortic Aneurysm and bioprosthetic AV Valve replacement 2019, HL, Splenectomy, partial Gastrectomy, partial Pancreatectomy, Oligosecretory Multiple Myeloma s/p auto SCT on chemo p/w fever, chills and body aches.  Temperature noted to be 102.  Admitted with Fever /FUO on Zosyn.  Now with recurrent Fever.       > Event Summary:   Notified by RN, Patient with recurrent fever, T-101.5.  Patient seen at bedside, AOX3, reporting chills.  Also states to two loose-form BMs today.    Denies abdominal pain, Nausea / Vomiting, SOB, cough.    Exam:  +Feverish to touch.  Lung CTA.   Abd soft, NT, ND w/ +BS x4.    -Tylenol / Cooling measure PRN  -BCX (03.23.23 @ 17:35) - NGTD   -CXR (03.24.23 @ 05:43) Clear  -UCX (03.23.23 @ 18:13)  neg   -GI PCR  (03.24.23 @ 22:30) -neg  -CTAP (03.23.23 @ 20:25) - No acute pathology.  -Rpt BCX x2 ordered  -C/w Zosyn, and f/u ID in AM   -Trend fever and wbc  curve  -Will endorse to Day Provider in AM and Attending to follow       -Vital Signs Last 24 Hrs  T(C): 37.8 (25 Mar 2023 23:58), Max: 39.3 (25 Mar 2023 12:34)  T(F): 100 (25 Mar 2023 23:58), Max: 102.8 (25 Mar 2023 12:34)  HR: 77 (25 Mar 2023 23:58) (76 - 103)  BP: 95/71 (25 Mar 2023 23:58) (95/56 - 133/69)  BP(mean): --  RR: 18 (25 Mar 2023 23:58) (18 - 18)  SpO2: 96% (25 Mar 2023 23:58) (96% - 100%)    Parameters below as of 25 Mar 2023 19:17  Patient On (Oxygen Delivery Method): room air      [facility]  Medicine Department  #[id number]

## 2023-03-25 NOTE — PROGRESS NOTE ADULT - ASSESSMENT
PETER DE LA PAZ is a 67y Male who presents with a chief complaint of fever.    Multiple Myeloma  Pancytopenia  ·	Patient follows with Dr. Jean Claude Hubbard, [facility].  ·	Patient had been on lenalidomide maintenance up until February 2023, when he had PET/CT that showed progression of disease with worsening lymphadenopathy, bony lesions, and liver lesion.   ·	He was started then on daratumumab + cyclophosphamide + bortezomib + dexamethasone.  ·	Last dose of daratumumab was on March 23rd.  ·	Last dose of bortezomib was on March 23rd  ·	Last dose of cyclophosphamide was on March 13th  ·	Patient has had worsening pancytopenia since starting the latest chemotherapy regimen.  ·	Hold systemic treatment while inpatient.  ·	Monitor CBC and transfuse to maintain HGB > 7 and PLT > 10.  ·	Continue prophylactic acyclovir 400 mg BID as patient is on bortezomib  ·	counts are stable today  ·	folate, Fe, b12 adequate    Fever  ·	infection vs disease  ·	Tmax 102 on admission  ·	ID consulted, on IV zosyn  ·	outpatient respiratory PCR negative    LE distal DVT -- on hep gtt at this time, vascular seen, plts so far adequate for AC  -- pt with high risk for propagation of distal DVT. However, if plts fall, this is distal and can monitor with serial dopplers as well  -- monitor, will need to determine timing of switching to PO AC, would wait until plts stable      Will continue to follow. D/w pt.    PETER DE LA PAZ is a 67y Male who presents with a chief complaint of fever.    Multiple Myeloma  Pancytopenia  ·	Patient follows with Dr. Jean Claude Hubbard, [facility].  ·	Patient had been on lenalidomide maintenance up until February 2023, when he had PET/CT that showed progression of disease with worsening lymphadenopathy, bony lesions, and liver lesion.   ·	He was started then on daratumumab + cyclophosphamide + bortezomib + dexamethasone.  ·	Last dose of daratumumab was on March 23rd.  ·	Last dose of bortezomib was on March 23rd  ·	Last dose of cyclophosphamide was on March 13th  ·	Patient has had worsening pancytopenia since starting the latest chemotherapy regimen.  ·	Hold systemic treatment while inpatient.  ·	Monitor CBC and transfuse to maintain HGB > 7 and PLT > 10.  ·	Continue prophylactic acyclovir 400 mg BID as patient is on bortezomib  ·	counts are stable today  ·	folate, Fe, b12 adequate    Fever  ·	infection vs disease  ·	Tmax 102 on admission  ·	ID consulted, on IV zosyn  ·	outpatient respiratory PCR negative    LE distal DVT -- on hep gtt at this time, vascular seen, plts so far adequate for AC  -- pt with high risk for propagation of distal DVT. However, if plts fall, this is distal and can monitor with serial dopplers as well  -- monitor, will need to determine timing of switching to PO AC, would wait until plts stable      Will continue to follow. D/w pt.    PETER DE LA PAZ is a 67y Male who presents with a chief complaint of fever.    Multiple Myeloma  Pancytopenia  ·	Patient follows with Dr. Jean Claude Hubbard, [facility].  ·	Patient had been on lenalidomide maintenance up until February 2023, when he had PET/CT that showed progression of disease with worsening lymphadenopathy, bony lesions, and liver lesion.   ·	He was started then on daratumumab + cyclophosphamide + bortezomib + dexamethasone.  ·	Last dose of daratumumab was on March 23rd.  ·	Last dose of bortezomib was on March 23rd  ·	Last dose of cyclophosphamide was on March 13th  ·	Patient has had worsening pancytopenia since starting the latest chemotherapy regimen.  ·	Hold systemic treatment while inpatient.  ·	Monitor CBC and transfuse to maintain HGB > 7 and PLT > 10.  ·	Continue prophylactic acyclovir 400 mg BID as patient is on bortezomib  ·	counts are stable today  ·	folate, Fe, b12 adequate    Fever  ·	infection vs disease  ·	Tmax 102 on admission  ·	ID consulted, on IV zosyn  ·	outpatient respiratory PCR negative    LE distal DVT -- on hep gtt at this time, vascular seen, plts so far adequate for AC  -- pt with high risk for propagation of distal DVT. However, if plts fall, this is distal and can monitor with serial dopplers as well  -- monitor, will need to determine timing of switching to PO AC, would wait until plts stable      Will continue to follow. D/w pt.

## 2023-03-25 NOTE — PROGRESS NOTE ADULT - SUBJECTIVE AND OBJECTIVE BOX
Name of Patient : PETER DE LA PAZ  MRN: [id number]  Date of visit: 03-25-23       Subjective: Patient seen and examined. No new events except as noted.       REVIEW OF SYSTEMS:    CONSTITUTIONAL: No weakness, fevers or chills  EYES/ENT: No visual changes;  No vertigo or throat pain   NECK: No pain or stiffness  RESPIRATORY: No cough, wheezing, hemoptysis; No shortness of breath  CARDIOVASCULAR: No chest pain or palpitations  GASTROINTESTINAL: No abdominal or epigastric pain. No nausea, vomiting, or hematemesis; No diarrhea or constipation. No melena or hematochezia.  GENITOURINARY: No dysuria, frequency or hematuria  NEUROLOGICAL: No numbness or weakness  SKIN: No itching, burning, rashes, or lesions   All other review of systems is negative unless indicated above.    MEDICATIONS:  MEDICATIONS  (STANDING):  acyclovir   Oral Tab/Cap 400 milliGRAM(s) Oral daily  atorvastatin 40 milliGRAM(s) Oral at bedtime  dexAMETHasone     Tablet 4 milliGRAM(s) Oral daily  entecavir 0.5 milliGRAM(s) Oral daily  heparin  Infusion. 1900 Unit(s)/Hr (19 mL/Hr) IV Continuous <Continuous>  metoprolol tartrate 50 milliGRAM(s) Oral two times a day  piperacillin/tazobactam IVPB.. 3.375 Gram(s) IV Intermittent every 8 hours      PHYSICAL EXAM:  T(C): 38.6 (03-25-23 @ 19:17), Max: 39.3 (03-25-23 @ 12:34)  HR: 90 (03-25-23 @ 19:17) (76 - 103)  BP: 110/64 (03-25-23 @ 19:17) (95/56 - 133/69)  RR: 18 (03-25-23 @ 19:17) (18 - 18)  SpO2: 97% (03-25-23 @ 19:17) (96% - 100%)  Wt(kg): --  I&O's Summary    24 Mar 2023 07:01  -  25 Mar 2023 07:00  --------------------------------------------------------  IN: 1160 mL / OUT: 450 mL / NET: 710 mL    25 Mar 2023 07:01  -  25 Mar 2023 23:41  --------------------------------------------------------  IN: 524 mL / OUT: 400 mL / NET: 124 mL          Appearance: Normal	  HEENT:  PERRLA   Lymphatic: No lymphadenopathy   Cardiovascular: Normal S1 S2, no JVD  Respiratory: normal effort , clear  Gastrointestinal:  Soft, Non-tender  Skin: No rashes,  warm to touch  Psychiatry:  Mood & affect appropriate  Musculuskeletal: No edema    recent labs, Imaging and EKGs personally reviewed     03-24-23 @ 07:01  -  03-25-23 @ 07:00  --------------------------------------------------------  IN: 1160 mL / OUT: 450 mL / NET: 710 mL    03-25-23 @ 07:01  -  03-25-23 @ 23:41  --------------------------------------------------------  IN: 524 mL / OUT: 400 mL / NET: 124 mL                            7.5    3.76  )-----------( 49       ( 25 Mar 2023 05:46 )             21.8               03-25    134<L>  |  99  |  19  ----------------------------<  131<H>  3.5   |  24  |  1.46<H>    Ca    8.6      25 Mar 2023 05:46  Mg     1.6     03-24    TPro  5.2<L>  /  Alb  3.2<L>  /  TBili  0.7  /  DBili  x   /  AST  11  /  ALT  17  /  AlkPhos  75  03-25    PT/INR - ( 25 Mar 2023 14:02 )   PT: 19.9 sec;   INR: 1.71 ratio         PTT - ( 25 Mar 2023 21:12 )  PTT:84.7 sec                            Name of Patient : PETER DE LA PAZ  MRN: [id number]  Date of visit: 03-25-23       Subjective: Patient seen and examined. No new events except as noted.       REVIEW OF SYSTEMS:    CONSTITUTIONAL: No weakness, fevers or chills  EYES/ENT: No visual changes;  No vertigo or throat pain   NECK: No pain or stiffness  RESPIRATORY: No cough, wheezing, hemoptysis; No shortness of breath  CARDIOVASCULAR: No chest pain or palpitations  GASTROINTESTINAL: No abdominal or epigastric pain. No nausea, vomiting, or hematemesis; No diarrhea or constipation. No melena or hematochezia.  GENITOURINARY: No dysuria, frequency or hematuria  NEUROLOGICAL: No numbness or weakness  SKIN: No itching, burning, rashes, or lesions   All other review of systems is negative unless indicated above.    MEDICATIONS:  MEDICATIONS  (STANDING):  acyclovir   Oral Tab/Cap 400 milliGRAM(s) Oral daily  atorvastatin 40 milliGRAM(s) Oral at bedtime  dexAMETHasone     Tablet 4 milliGRAM(s) Oral daily  entecavir 0.5 milliGRAM(s) Oral daily  heparin  Infusion. 1900 Unit(s)/Hr (19 mL/Hr) IV Continuous <Continuous>  metoprolol tartrate 50 milliGRAM(s) Oral two times a day  piperacillin/tazobactam IVPB.. 3.375 Gram(s) IV Intermittent every 8 hours      PHYSICAL EXAM:  T(C): 38.6 (03-25-23 @ 19:17), Max: 39.3 (03-25-23 @ 12:34)  HR: 90 (03-25-23 @ 19:17) (76 - 103)  BP: 110/64 (03-25-23 @ 19:17) (95/56 - 133/69)  RR: 18 (03-25-23 @ 19:17) (18 - 18)  SpO2: 97% (03-25-23 @ 19:17) (96% - 100%)  Wt(kg): --  I&O's Summary    24 Mar 2023 07:01  -  25 Mar 2023 07:00  --------------------------------------------------------  IN: 1160 mL / OUT: 450 mL / NET: 710 mL    25 Mar 2023 07:01  -  25 Mar 2023 23:41  --------------------------------------------------------  IN: 524 mL / OUT: 400 mL / NET: 124 mL          Appearance: Normal	  HEENT:  PERRLA   Lymphatic: No lymphadenopathy   Cardiovascular: Normal S1 S2, no JVD  Respiratory: normal effort , clear  Gastrointestinal:  Soft, Non-tender  Skin: No rashes,  warm to touch  Psychiatry:  Mood & affect appropriate  Musculuskeletal: No edema    recent labs, Imaging and EKGs personally reviewed     03-24-23 @ 07:01  -  03-25-23 @ 07:00  --------------------------------------------------------  IN: 1160 mL / OUT: 450 mL / NET: 710 mL    03-25-23 @ 07:01  -  03-25-23 @ 23:41  --------------------------------------------------------  IN: 524 mL / OUT: 400 mL / NET: 124 mL                            7.5    3.76  )-----------( 49       ( 25 Mar 2023 05:46 )             21.8               03-25    134<L>  |  99  |  19  ----------------------------<  131<H>  3.5   |  24  |  1.46<H>    Ca    8.6      25 Mar 2023 05:46  Mg     1.6     03-24    TPro  5.2<L>  /  Alb  3.2<L>  /  TBili  0.7  /  DBili  x   /  AST  11  /  ALT  17  /  AlkPhos  75  03-25    PT/INR - ( 25 Mar 2023 14:02 )   PT: 19.9 sec;   INR: 1.71 ratio         PTT - ( 25 Mar 2023 21:12 )  PTT:84.7 sec                            Name of Patient : PETER DE LA PAZ  MRN: [id number]  Date of visit: 03-25-23       Subjective: Patient seen and examined. No new events except as noted.       REVIEW OF SYSTEMS:    CONSTITUTIONAL: No weakness, fevers or chills  EYES/ENT: No visual changes;  No vertigo or throat pain   NECK: No pain or stiffness  RESPIRATORY: No cough, wheezing, hemoptysis; No shortness of breath  CARDIOVASCULAR: No chest pain or palpitations  GASTROINTESTINAL: No abdominal or epigastric pain. No nausea, vomiting, or hematemesis; No diarrhea or constipation. No melena or hematochezia.  GENITOURINARY: No dysuria, frequency or hematuria  NEUROLOGICAL: No numbness or weakness  SKIN: No itching, burning, rashes, or lesions   All other review of systems is negative unless indicated above.    MEDICATIONS:  MEDICATIONS  (STANDING):  acyclovir   Oral Tab/Cap 400 milliGRAM(s) Oral daily  atorvastatin 40 milliGRAM(s) Oral at bedtime  dexAMETHasone     Tablet 4 milliGRAM(s) Oral daily  entecavir 0.5 milliGRAM(s) Oral daily  heparin  Infusion. 1900 Unit(s)/Hr (19 mL/Hr) IV Continuous <Continuous>  metoprolol tartrate 50 milliGRAM(s) Oral two times a day  piperacillin/tazobactam IVPB.. 3.375 Gram(s) IV Intermittent every 8 hours      PHYSICAL EXAM:  T(C): 38.6 (03-25-23 @ 19:17), Max: 39.3 (03-25-23 @ 12:34)  HR: 90 (03-25-23 @ 19:17) (76 - 103)  BP: 110/64 (03-25-23 @ 19:17) (95/56 - 133/69)  RR: 18 (03-25-23 @ 19:17) (18 - 18)  SpO2: 97% (03-25-23 @ 19:17) (96% - 100%)  Wt(kg): --  I&O's Summary    24 Mar 2023 07:01  -  25 Mar 2023 07:00  --------------------------------------------------------  IN: 1160 mL / OUT: 450 mL / NET: 710 mL    25 Mar 2023 07:01  -  25 Mar 2023 23:41  --------------------------------------------------------  IN: 524 mL / OUT: 400 mL / NET: 124 mL          Appearance: Normal	  HEENT:  PERRLA   Lymphatic: No lymphadenopathy   Cardiovascular: Normal S1 S2, no JVD  Respiratory: normal effort , clear  Gastrointestinal:  Soft, Non-tender  Skin: No rashes,  warm to touch  Psychiatry:  Mood & affect appropriate  Musculuskeletal: No edema    recent labs, Imaging and EKGs personally reviewed     03-24-23 @ 07:01  -  03-25-23 @ 07:00  --------------------------------------------------------  IN: 1160 mL / OUT: 450 mL / NET: 710 mL    03-25-23 @ 07:01  -  03-25-23 @ 23:41  --------------------------------------------------------  IN: 524 mL / OUT: 400 mL / NET: 124 mL                            7.5    3.76  )-----------( 49       ( 25 Mar 2023 05:46 )             21.8               03-25    134<L>  |  99  |  19  ----------------------------<  131<H>  3.5   |  24  |  1.46<H>    Ca    8.6      25 Mar 2023 05:46  Mg     1.6     03-24    TPro  5.2<L>  /  Alb  3.2<L>  /  TBili  0.7  /  DBili  x   /  AST  11  /  ALT  17  /  AlkPhos  75  03-25    PT/INR - ( 25 Mar 2023 14:02 )   PT: 19.9 sec;   INR: 1.71 ratio         PTT - ( 25 Mar 2023 21:12 )  PTT:84.7 sec

## 2023-03-25 NOTE — PROVIDER CONTACT NOTE (OTHER) - ASSESSMENT
Patient A&Ox4, vitals stable except temperature, no sign and symptom of distress, pt resting in bed comfortable

## 2023-03-25 NOTE — PROGRESS NOTE ADULT - SUBJECTIVE AND OBJECTIVE BOX
Pt seen, feeling uncomfortable after receiving zosyn, no other new complaints He was noted to have acute distal DVT, currently on hep gtt    MEDICATIONS  (STANDING):  acyclovir   Oral Tab/Cap 400 milliGRAM(s) Oral daily  atorvastatin 40 milliGRAM(s) Oral at bedtime  dexAMETHasone     Tablet 4 milliGRAM(s) Oral daily  entecavir 0.5 milliGRAM(s) Oral daily  heparin  Infusion. 1900 Unit(s)/Hr (19 mL/Hr) IV Continuous <Continuous>  metoprolol tartrate 50 milliGRAM(s) Oral two times a day  piperacillin/tazobactam IVPB.. 3.375 Gram(s) IV Intermittent every 8 hours    MEDICATIONS  (PRN):  acetaminophen     Tablet .. 650 milliGRAM(s) Oral every 6 hours PRN Temp greater or equal to 38C (100.4F), Mild Pain (1 - 3)  heparin   Injectable 6500 Unit(s) IV Push every 6 hours PRN For aPTT less than 40  heparin   Injectable 3000 Unit(s) IV Push every 6 hours PRN For aPTT between 40 - 57      ROS  No fever, sweats, chills  No epistaxis, HA, sore throat  No CP, SOB, cough, sputum  No n/v/d, abd pain, melena, hematochezia  No edema  No rash  No anxiety  No back pain, joint pain  No bleeding, bruising  No dysuria, hematuria    Vital Signs Last 24 Hrs  T(C): 38.5 (25 Mar 2023 14:52), Max: 39.3 (25 Mar 2023 12:34)  T(F): 101.3 (25 Mar 2023 14:52), Max: 102.8 (25 Mar 2023 12:34)  HR: 102 (25 Mar 2023 12:34) (76 - 102)  BP: 133/69 (25 Mar 2023 12:34) (95/56 - 133/69)  BP(mean): --  RR: 18 (25 Mar 2023 12:34) (18 - 18)  SpO2: 96% (25 Mar 2023 12:34) (95% - 96%)    Parameters below as of 25 Mar 2023 12:34  Patient On (Oxygen Delivery Method): room air        PE  NAD  Awake, alert  Anicteric  limited 2/2 participation                        7.5    3.76  )-----------( 49       ( 25 Mar 2023 05:46 )             21.8       03-25    134<L>  |  99  |  19  ----------------------------<  131<H>  3.5   |  24  |  1.46<H>    Ca    8.6      25 Mar 2023 05:46  Mg     1.6     03-24    TPro  5.2<L>  /  Alb  3.2<L>  /  TBili  0.7  /  DBili  x   /  AST  11  /  ALT  17  /  AlkPhos  75  03-25

## 2023-03-26 LAB
ALBUMIN SERPL ELPH-MCNC: 3.1 G/DL — LOW (ref 3.3–5)
ALP SERPL-CCNC: 77 U/L — SIGNIFICANT CHANGE UP (ref 40–120)
ALT FLD-CCNC: 29 U/L — SIGNIFICANT CHANGE UP (ref 10–45)
ANION GAP SERPL CALC-SCNC: 11 MMOL/L — SIGNIFICANT CHANGE UP (ref 5–17)
AST SERPL-CCNC: 18 U/L — SIGNIFICANT CHANGE UP (ref 10–40)
BILIRUB SERPL-MCNC: 0.6 MG/DL — SIGNIFICANT CHANGE UP (ref 0.2–1.2)
BUN SERPL-MCNC: 24 MG/DL — HIGH (ref 7–23)
CALCIUM SERPL-MCNC: 8.7 MG/DL — SIGNIFICANT CHANGE UP (ref 8.4–10.5)
CHLORIDE SERPL-SCNC: 95 MMOL/L — LOW (ref 96–108)
CO2 SERPL-SCNC: 24 MMOL/L — SIGNIFICANT CHANGE UP (ref 22–31)
CREAT SERPL-MCNC: 1.75 MG/DL — HIGH (ref 0.5–1.3)
EGFR: 42 ML/MIN/1.73M2 — LOW
GLUCOSE SERPL-MCNC: 132 MG/DL — HIGH (ref 70–99)
HCT VFR BLD CALC: 20.8 % — CRITICAL LOW (ref 39–50)
HGB BLD-MCNC: 7.5 G/DL — LOW (ref 13–17)
MCHC RBC-ENTMCNC: 33.8 PG — SIGNIFICANT CHANGE UP (ref 27–34)
MCHC RBC-ENTMCNC: 36.1 GM/DL — HIGH (ref 32–36)
MCV RBC AUTO: 93.7 FL — SIGNIFICANT CHANGE UP (ref 80–100)
NRBC # BLD: 4 /100 WBCS — HIGH (ref 0–0)
PLATELET # BLD AUTO: 58 K/UL — LOW (ref 150–400)
POTASSIUM SERPL-MCNC: 3.8 MMOL/L — SIGNIFICANT CHANGE UP (ref 3.5–5.3)
POTASSIUM SERPL-SCNC: 3.8 MMOL/L — SIGNIFICANT CHANGE UP (ref 3.5–5.3)
PROT SERPL-MCNC: 5.2 G/DL — LOW (ref 6–8.3)
RBC # BLD: 2.22 M/UL — LOW (ref 4.2–5.8)
RBC # FLD: 23.9 % — HIGH (ref 10.3–14.5)
SODIUM SERPL-SCNC: 130 MMOL/L — LOW (ref 135–145)
WBC # BLD: 3.17 K/UL — LOW (ref 3.8–10.5)
WBC # FLD AUTO: 3.17 K/UL — LOW (ref 3.8–10.5)

## 2023-03-26 PROCEDURE — 99232 SBSQ HOSP IP/OBS MODERATE 35: CPT

## 2023-03-26 RX ORDER — SODIUM CHLORIDE 9 MG/ML
1000 INJECTION INTRAMUSCULAR; INTRAVENOUS; SUBCUTANEOUS
Refills: 0 | Status: DISCONTINUED | OUTPATIENT
Start: 2023-03-26 | End: 2023-03-28

## 2023-03-26 RX ORDER — ACETAMINOPHEN 500 MG
500 TABLET ORAL ONCE
Refills: 0 | Status: COMPLETED | OUTPATIENT
Start: 2023-03-26 | End: 2023-03-26

## 2023-03-26 RX ORDER — ACETAMINOPHEN 500 MG
1000 TABLET ORAL ONCE
Refills: 0 | Status: COMPLETED | OUTPATIENT
Start: 2023-03-26 | End: 2023-03-26

## 2023-03-26 RX ADMIN — PIPERACILLIN AND TAZOBACTAM 25 GRAM(S): 4; .5 INJECTION, POWDER, LYOPHILIZED, FOR SOLUTION INTRAVENOUS at 15:18

## 2023-03-26 RX ADMIN — Medication 1000 MILLIGRAM(S): at 06:03

## 2023-03-26 RX ADMIN — Medication 650 MILLIGRAM(S): at 22:56

## 2023-03-26 RX ADMIN — Medication 50 MILLIGRAM(S): at 17:51

## 2023-03-26 RX ADMIN — Medication 4 MILLIGRAM(S): at 05:14

## 2023-03-26 RX ADMIN — Medication 200 MILLIGRAM(S): at 23:24

## 2023-03-26 RX ADMIN — PIPERACILLIN AND TAZOBACTAM 25 GRAM(S): 4; .5 INJECTION, POWDER, LYOPHILIZED, FOR SOLUTION INTRAVENOUS at 06:00

## 2023-03-26 RX ADMIN — Medication 650 MILLIGRAM(S): at 22:26

## 2023-03-26 RX ADMIN — Medication 50 MILLIGRAM(S): at 05:14

## 2023-03-26 RX ADMIN — Medication 400 MILLIGRAM(S): at 14:32

## 2023-03-26 RX ADMIN — HEPARIN SODIUM 1700 UNIT(S)/HR: 5000 INJECTION INTRAVENOUS; SUBCUTANEOUS at 08:15

## 2023-03-26 RX ADMIN — ENTECAVIR 0.5 MILLIGRAM(S): 0.5 TABLET ORAL at 14:32

## 2023-03-26 RX ADMIN — Medication 400 MILLIGRAM(S): at 05:30

## 2023-03-26 RX ADMIN — ATORVASTATIN CALCIUM 40 MILLIGRAM(S): 80 TABLET, FILM COATED ORAL at 22:15

## 2023-03-26 RX ADMIN — SODIUM CHLORIDE 75 MILLILITER(S): 9 INJECTION INTRAMUSCULAR; INTRAVENOUS; SUBCUTANEOUS at 14:37

## 2023-03-26 NOTE — PROGRESS NOTE ADULT - SUBJECTIVE AND OBJECTIVE BOX
Pt seen, with fevers, feeling tired, uncomfortable.     MEDICATIONS  (STANDING):  acyclovir   Oral Tab/Cap 400 milliGRAM(s) Oral daily  atorvastatin 40 milliGRAM(s) Oral at bedtime  dexAMETHasone     Tablet 4 milliGRAM(s) Oral daily  entecavir 0.5 milliGRAM(s) Oral daily  heparin  Infusion. 1900 Unit(s)/Hr (19 mL/Hr) IV Continuous <Continuous>  metoprolol tartrate 50 milliGRAM(s) Oral two times a day  piperacillin/tazobactam IVPB.. 3.375 Gram(s) IV Intermittent every 8 hours  sodium chloride 0.9%. 1000 milliLiter(s) (75 mL/Hr) IV Continuous <Continuous>    MEDICATIONS  (PRN):  acetaminophen     Tablet .. 650 milliGRAM(s) Oral every 6 hours PRN Temp greater or equal to 38C (100.4F), Mild Pain (1 - 3)  heparin   Injectable 6500 Unit(s) IV Push every 6 hours PRN For aPTT less than 40  heparin   Injectable 3000 Unit(s) IV Push every 6 hours PRN For aPTT between 40 - 57      ROS  fever, no sore throat, no cp/sob/n/v/abd pain, with diarrhea today    Vital Signs Last 24 Hrs  T(C): 37.7 (26 Mar 2023 16:41), Max: 39.4 (26 Mar 2023 05:08)  T(F): 99.8 (26 Mar 2023 16:41), Max: 102.9 (26 Mar 2023 05:08)  HR: 90 (26 Mar 2023 16:41) (73 - 103)  BP: 102/57 (26 Mar 2023 16:41) (93/56 - 111/66)  BP(mean): --  RR: 18 (26 Mar 2023 16:41) (18 - 18)  SpO2: 94% (26 Mar 2023 16:41) (94% - 100%)    Parameters below as of 26 Mar 2023 16:41  Patient On (Oxygen Delivery Method): room air        PE  feverish, uncomfortable  anicteric  limited 2/2 covid pandemic                          7.5    3.17  )-----------( 58       ( 26 Mar 2023 06:57 )             20.8       03-26    130<L>  |  95<L>  |  24<H>  ----------------------------<  132<H>  3.8   |  24  |  1.75<H>    Ca    8.7      26 Mar 2023 06:57    TPro  5.2<L>  /  Alb  3.1<L>  /  TBili  0.6  /  DBili  x   /  AST  18  /  ALT  29  /  AlkPhos  77  03-26

## 2023-03-26 NOTE — PROGRESS NOTE ADULT - ASSESSMENT
PETER DE LA PAZ is a 67y Male who presents with a chief complaint of fever.    Multiple Myeloma  Pancytopenia  ·	Patient follows with Dr. Jean Claude Hubbard, [facility].  ·	Patient had been on lenalidomide maintenance up until February 2023, when he had PET/CT that showed progression of disease with worsening lymphadenopathy, bony lesions, and liver lesion.   ·	He was started then on daratumumab + cyclophosphamide + bortezomib + dexamethasone.  ·	Last dose of daratumumab was on March 23rd.  ·	Last dose of bortezomib was on March 23rd  ·	Last dose of cyclophosphamide was on March 13th  ·	Patient has had worsening pancytopenia since starting the latest chemotherapy regimen.  ·	Hold systemic treatment while inpatient.  ·	Monitor CBC and transfuse to maintain HGB > 7 and PLT > 10.  ·	Continue prophylactic acyclovir 400 mg BID as patient is on bortezomib  ·	counts are stable today, WBC slightly decreased at 3.2, hgb stable at 7.5, plts improved at 58  ·	folate, Fe, b12 adequate    Fever  ·	infection vs disease  ·	Tmax 102 on admission, now recurrent  ·	ID consulted, on IV abx  ·	repeat fever w/u per ID  ·	outpatient respiratory PCR negative    diarrhea -- neg stool PCR  -- monitor    LE distal DVT -- on hep gtt at this time, vascular seen, plts so far adequate for AC  -- pt with high risk for propagation of distal DVT. However, if plts fall, this is distal and can monitor with serial dopplers as well  -- monitor, will need to determine timing of switching to PO AC, would wait until plts stable      Will continue to follow. D/w pt.    PETER DE LA PAZ is a 67y Male who presents with a chief complaint of fever.    Multiple Myeloma  Pancytopenia  ·	Patient follows with Dr. Jean Claude Hubbard, [facility].  ·	Patient had been on lenalidomide maintenance up until February 2023, when he had PET/CT that showed progression of disease with worsening lymphadenopathy, bony lesions, and liver lesion.   ·	He was started then on daratumumab + cyclophosphamide + bortezomib + dexamethasone.  ·	Last dose of daratumumab was on March 23rd.  ·	Last dose of bortezomib was on March 23rd  ·	Last dose of cyclophosphamide was on March 13th  ·	Patient has had worsening pancytopenia since starting the latest chemotherapy regimen.  ·	Hold systemic treatment while inpatient.  ·	Monitor CBC and transfuse to maintain HGB > 7 and PLT > 10.  ·	Continue prophylactic acyclovir 400 mg BID as patient is on bortezomib  ·	counts are stable today, WBC slightly decreased at 3.2, hgb stable at 7.5, plts improved at 58  ·	folate, Fe, b12 adequate    Fever  ·	infection vs disease  ·	Tmax 102 on admission, now recurrent  ·	ID consulted, on IV abx  ·	repeat fever w/u per ID  ·	outpatient respiratory PCR negative    diarrhea -- neg stool PCR  -- monitor    LE distal DVT -- on hep gtt at this time, vascular seen, plts so far adequate for AC  -- pt with high risk for propagation of distal DVT. However, if plts fall, this is distal and can monitor with serial dopplers as well  -- monitor, will need to determine timing of switching to PO AC, would wait until plts stable      Will continue to follow. D/w pt.    PETER DE LA PAZ is a 67y Male who presents with a chief complaint of fever.    Multiple Myeloma  Pancytopenia  ·	Patient follows with Dr. Jean Claude Hubbard, [facility].  ·	Patient had been on lenalidomide maintenance up until February 2023, when he had PET/CT that showed progression of disease with worsening lymphadenopathy, bony lesions, and liver lesion.   ·	He was started then on daratumumab + cyclophosphamide + bortezomib + dexamethasone.  ·	Last dose of daratumumab was on March 23rd.  ·	Last dose of bortezomib was on March 23rd  ·	Last dose of cyclophosphamide was on March 13th  ·	Patient has had worsening pancytopenia since starting the latest chemotherapy regimen.  ·	Hold systemic treatment while inpatient.  ·	Monitor CBC and transfuse to maintain HGB > 7 and PLT > 10.  ·	Continue prophylactic acyclovir 400 mg BID as patient is on bortezomib  ·	counts are stable today, WBC slightly decreased at 3.2, hgb stable at 7.5, plts improved at 58  ·	folate, Fe, b12 adequate    Fever  ·	infection vs disease  ·	Tmax 102 on admission, now recurrent  ·	ID consulted, on IV abx  ·	repeat fever w/u per ID  ·	outpatient respiratory PCR negative    diarrhea -- neg stool PCR  -- monitor    LE distal DVT -- on hep gtt at this time, vascular seen, plts so far adequate for AC  -- pt with high risk for propagation of distal DVT. However, if plts fall, this is distal and can monitor with serial dopplers as well  -- monitor, will need to determine timing of switching to PO AC, would wait until plts stable      Will continue to follow. D/w pt.

## 2023-03-26 NOTE — PROGRESS NOTE ADULT - SUBJECTIVE AND OBJECTIVE BOX
Name of Patient : PETER DE LA PAZ  MRN: [id number]  Date of visit: 03-26-23 @ 15:51      Subjective: Patient seen and examined. No new events except as noted.   Febrile again       REVIEW OF SYSTEMS:    CONSTITUTIONAL:+ fever   EYES/ENT: No visual changes;  No vertigo or throat pain   NECK: No pain or stiffness  RESPIRATORY: No cough, wheezing, hemoptysis; No shortness of breath  CARDIOVASCULAR: No chest pain or palpitations  GASTROINTESTINAL: No abdominal or epigastric pain. No nausea, vomiting, or hematemesis; No diarrhea or constipation. No melena or hematochezia.  GENITOURINARY: No dysuria, frequency or hematuria  NEUROLOGICAL: No numbness or weakness  SKIN: No itching, burning, rashes, or lesions   All other review of systems is negative unless indicated above.    MEDICATIONS:  MEDICATIONS  (STANDING):  acyclovir   Oral Tab/Cap 400 milliGRAM(s) Oral daily  atorvastatin 40 milliGRAM(s) Oral at bedtime  dexAMETHasone     Tablet 4 milliGRAM(s) Oral daily  entecavir 0.5 milliGRAM(s) Oral daily  heparin  Infusion. 1900 Unit(s)/Hr (19 mL/Hr) IV Continuous <Continuous>  metoprolol tartrate 50 milliGRAM(s) Oral two times a day  piperacillin/tazobactam IVPB.. 3.375 Gram(s) IV Intermittent every 8 hours  sodium chloride 0.9%. 1000 milliLiter(s) (75 mL/Hr) IV Continuous <Continuous>      PHYSICAL EXAM:  T(C): 36.8 (03-26-23 @ 12:27), Max: 39.4 (03-26-23 @ 05:08)  HR: 83 (03-26-23 @ 12:27) (73 - 103)  BP: 93/56 (03-26-23 @ 12:27) (93/56 - 111/66)  RR: 18 (03-26-23 @ 12:27) (18 - 18)  SpO2: 99% (03-26-23 @ 12:27) (95% - 100%)  Wt(kg): --  I&O's Summary    25 Mar 2023 07:01  -  26 Mar 2023 07:00  --------------------------------------------------------  IN: 1268 mL / OUT: 400 mL / NET: 868 mL    26 Mar 2023 07:01  -  26 Mar 2023 15:51  --------------------------------------------------------  IN: 480 mL / OUT: 0 mL / NET: 480 mL          Appearance: Normal	  HEENT:  PERRLA   Lymphatic: No lymphadenopathy   Cardiovascular: Normal S1 S2, no JVD  Respiratory: normal effort , clear  Gastrointestinal:  Soft, Non-tender  Skin: No rashes,  warm to touch  Psychiatry:  Mood & affect appropriate  Musculuskeletal: No edema    recent labs, Imaging and EKGs personally reviewed       03-25-23 @ 07:01  -  03-26-23 @ 07:00  --------------------------------------------------------  IN: 1268 mL / OUT: 400 mL / NET: 868 mL    03-26-23 @ 07:01 - 03-26-23 @ 15:51  --------------------------------------------------------  IN: 480 mL / OUT: 0 mL / NET: 480 mL                          7.5    3.17  )-----------( 58       ( 26 Mar 2023 06:57 )             20.8               03-26    130<L>  |  95<L>  |  24<H>  ----------------------------<  132<H>  3.8   |  24  |  1.75<H>    Ca    8.7      26 Mar 2023 06:57    TPro  5.2<L>  /  Alb  3.1<L>  /  TBili  0.6  /  DBili  x   /  AST  18  /  ALT  29  /  AlkPhos  77  03-26    PT/INR - ( 25 Mar 2023 14:02 )   PT: 19.9 sec;   INR: 1.71 ratio         PTT - ( 26 Mar 2023 06:57 )  PTT:80.7 sec                              Name of Patient : PETER DE LA PAZ  MRN: [id number]  Date of visit: 03-26-23 @ 15:51      Subjective: Patient seen and examined. No new events except as noted.   Febrile again       REVIEW OF SYSTEMS:    CONSTITUTIONAL:+ fever   EYES/ENT: No visual changes;  No vertigo or throat pain   NECK: No pain or stiffness  RESPIRATORY: No cough, wheezing, hemoptysis; No shortness of breath  CARDIOVASCULAR: No chest pain or palpitations  GASTROINTESTINAL: No abdominal or epigastric pain. No nausea, vomiting, or hematemesis; No diarrhea or constipation. No melena or hematochezia.  GENITOURINARY: No dysuria, frequency or hematuria  NEUROLOGICAL: No numbness or weakness  SKIN: No itching, burning, rashes, or lesions   All other review of systems is negative unless indicated above.    MEDICATIONS:  MEDICATIONS  (STANDING):  acyclovir   Oral Tab/Cap 400 milliGRAM(s) Oral daily  atorvastatin 40 milliGRAM(s) Oral at bedtime  dexAMETHasone     Tablet 4 milliGRAM(s) Oral daily  entecavir 0.5 milliGRAM(s) Oral daily  heparin  Infusion. 1900 Unit(s)/Hr (19 mL/Hr) IV Continuous <Continuous>  metoprolol tartrate 50 milliGRAM(s) Oral two times a day  piperacillin/tazobactam IVPB.. 3.375 Gram(s) IV Intermittent every 8 hours  sodium chloride 0.9%. 1000 milliLiter(s) (75 mL/Hr) IV Continuous <Continuous>      PHYSICAL EXAM:  T(C): 36.8 (03-26-23 @ 12:27), Max: 39.4 (03-26-23 @ 05:08)  HR: 83 (03-26-23 @ 12:27) (73 - 103)  BP: 93/56 (03-26-23 @ 12:27) (93/56 - 111/66)  RR: 18 (03-26-23 @ 12:27) (18 - 18)  SpO2: 99% (03-26-23 @ 12:27) (95% - 100%)  Wt(kg): --  I&O's Summary    25 Mar 2023 07:01  -  26 Mar 2023 07:00  --------------------------------------------------------  IN: 1268 mL / OUT: 400 mL / NET: 868 mL    26 Mar 2023 07:01  -  26 Mar 2023 15:51  --------------------------------------------------------  IN: 480 mL / OUT: 0 mL / NET: 480 mL          Appearance: Normal	  HEENT:  PERRLA   Lymphatic: No lymphadenopathy   Cardiovascular: Normal S1 S2, no JVD  Respiratory: normal effort , clear  Gastrointestinal:  Soft, Non-tender  Skin: No rashes,  warm to touch  Psychiatry:  Mood & affect appropriate  Musculuskeletal: No edema    recent labs, Imaging and EKGs personally reviewed       03-25-23 @ 07:01  -  03-26-23 @ 07:00  --------------------------------------------------------  IN: 1268 mL / OUT: 400 mL / NET: 868 mL    03-26-23 @ 07:01 - 03-26-23 @ 15:51  --------------------------------------------------------  IN: 480 mL / OUT: 0 mL / NET: 480 mL                          7.5    3.17  )-----------( 58       ( 26 Mar 2023 06:57 )             20.8               03-26    130<L>  |  95<L>  |  24<H>  ----------------------------<  132<H>  3.8   |  24  |  1.75<H>    Ca    8.7      26 Mar 2023 06:57    TPro  5.2<L>  /  Alb  3.1<L>  /  TBili  0.6  /  DBili  x   /  AST  18  /  ALT  29  /  AlkPhos  77  03-26    PT/INR - ( 25 Mar 2023 14:02 )   PT: 19.9 sec;   INR: 1.71 ratio         PTT - ( 26 Mar 2023 06:57 )  PTT:80.7 sec                              Name of Patient : PETER DE LA PAZ  MRN: [id number]  Date of visit: 03-26-23 @ 15:51      Subjective: Patient seen and examined. No new events except as noted.   Febrile again       REVIEW OF SYSTEMS:    CONSTITUTIONAL:+ fever   EYES/ENT: No visual changes;  No vertigo or throat pain   NECK: No pain or stiffness  RESPIRATORY: No cough, wheezing, hemoptysis; No shortness of breath  CARDIOVASCULAR: No chest pain or palpitations  GASTROINTESTINAL: No abdominal or epigastric pain. No nausea, vomiting, or hematemesis; No diarrhea or constipation. No melena or hematochezia.  GENITOURINARY: No dysuria, frequency or hematuria  NEUROLOGICAL: No numbness or weakness  SKIN: No itching, burning, rashes, or lesions   All other review of systems is negative unless indicated above.    MEDICATIONS:  MEDICATIONS  (STANDING):  acyclovir   Oral Tab/Cap 400 milliGRAM(s) Oral daily  atorvastatin 40 milliGRAM(s) Oral at bedtime  dexAMETHasone     Tablet 4 milliGRAM(s) Oral daily  entecavir 0.5 milliGRAM(s) Oral daily  heparin  Infusion. 1900 Unit(s)/Hr (19 mL/Hr) IV Continuous <Continuous>  metoprolol tartrate 50 milliGRAM(s) Oral two times a day  piperacillin/tazobactam IVPB.. 3.375 Gram(s) IV Intermittent every 8 hours  sodium chloride 0.9%. 1000 milliLiter(s) (75 mL/Hr) IV Continuous <Continuous>      PHYSICAL EXAM:  T(C): 36.8 (03-26-23 @ 12:27), Max: 39.4 (03-26-23 @ 05:08)  HR: 83 (03-26-23 @ 12:27) (73 - 103)  BP: 93/56 (03-26-23 @ 12:27) (93/56 - 111/66)  RR: 18 (03-26-23 @ 12:27) (18 - 18)  SpO2: 99% (03-26-23 @ 12:27) (95% - 100%)  Wt(kg): --  I&O's Summary    25 Mar 2023 07:01  -  26 Mar 2023 07:00  --------------------------------------------------------  IN: 1268 mL / OUT: 400 mL / NET: 868 mL    26 Mar 2023 07:01  -  26 Mar 2023 15:51  --------------------------------------------------------  IN: 480 mL / OUT: 0 mL / NET: 480 mL          Appearance: Normal	  HEENT:  PERRLA   Lymphatic: No lymphadenopathy   Cardiovascular: Normal S1 S2, no JVD  Respiratory: normal effort , clear  Gastrointestinal:  Soft, Non-tender  Skin: No rashes,  warm to touch  Psychiatry:  Mood & affect appropriate  Musculuskeletal: No edema    recent labs, Imaging and EKGs personally reviewed       03-25-23 @ 07:01  -  03-26-23 @ 07:00  --------------------------------------------------------  IN: 1268 mL / OUT: 400 mL / NET: 868 mL    03-26-23 @ 07:01 - 03-26-23 @ 15:51  --------------------------------------------------------  IN: 480 mL / OUT: 0 mL / NET: 480 mL                          7.5    3.17  )-----------( 58       ( 26 Mar 2023 06:57 )             20.8               03-26    130<L>  |  95<L>  |  24<H>  ----------------------------<  132<H>  3.8   |  24  |  1.75<H>    Ca    8.7      26 Mar 2023 06:57    TPro  5.2<L>  /  Alb  3.1<L>  /  TBili  0.6  /  DBili  x   /  AST  18  /  ALT  29  /  AlkPhos  77  03-26    PT/INR - ( 25 Mar 2023 14:02 )   PT: 19.9 sec;   INR: 1.71 ratio         PTT - ( 26 Mar 2023 06:57 )  PTT:80.7 sec

## 2023-03-26 NOTE — PROGRESS NOTE ADULT - SUBJECTIVE AND OBJECTIVE BOX
Patient is a 67y old  Male who presents with a chief complaint of fever (25 Mar 2023 12:40)    Being followed by ID for        Interval history:  No other acute events      ROS:  No cough,SOB,CP  No N/V/D  No abd pain  No urinary complaints  No HA  No joint or limb pain  No other complaints    PAST MEDICAL & SURGICAL HISTORY:  Multiple myeloma      Hyperlipidemia      H/O aortic aneurysm      H/O splenectomy      S/P partial gastrectomy      History of pancreatic surgery        Allergies    No Known Allergies    Intolerances      Antimicrobials:    acyclovir   Oral Tab/Cap 400 milliGRAM(s) Oral daily  entecavir 0.5 milliGRAM(s) Oral daily  piperacillin/tazobactam IVPB.. 3.375 Gram(s) IV Intermittent every 8 hours    MEDICATIONS  (STANDING):  acyclovir   Oral Tab/Cap 400 milliGRAM(s) Oral daily  atorvastatin 40 milliGRAM(s) Oral at bedtime  dexAMETHasone     Tablet 4 milliGRAM(s) Oral daily  entecavir 0.5 milliGRAM(s) Oral daily  heparin  Infusion. 1900 Unit(s)/Hr (19 mL/Hr) IV Continuous <Continuous>  metoprolol tartrate 50 milliGRAM(s) Oral two times a day  piperacillin/tazobactam IVPB.. 3.375 Gram(s) IV Intermittent every 8 hours  sodium chloride 0.9%. 1000 milliLiter(s) (75 mL/Hr) IV Continuous <Continuous>      Vital Signs Last 24 Hrs  T(C): 36.6 (03-26-23 @ 08:27), Max: 39.4 (03-26-23 @ 05:08)  T(F): 97.8 (03-26-23 @ 08:27), Max: 102.9 (03-26-23 @ 05:08)  HR: 84 (03-26-23 @ 11:00) (73 - 103)  BP: 94/50 (03-26-23 @ 11:00) (94/48 - 133/69)  BP(mean): --  RR: 18 (03-26-23 @ 08:27) (18 - 18)  SpO2: 95% (03-26-23 @ 08:27) (95% - 100%)    Physical Exam:    Constitutional well preserved,comfortable,pleasant    HEENT PERRLA EOMI,No pallor or icterus    No oral exudate or erythema    Neck supple no JVD or LN    Chest Good AE,CTA    CVS RRR S1 S2 WNl No murmur or rub or gallop    Abd soft BS normal No tenderness no masses    Ext No cyanosis clubbing or edema    IV site no erythema tenderness or discharge    Joints no swelling or LOM    CNS AAO X 3 no focal    Lab Data:                          7.5    3.17  )-----------( 58       ( 26 Mar 2023 06:57 )             20.8       03-26    130<L>  |  95<L>  |  24<H>  ----------------------------<  132<H>  3.8   |  24  |  1.75<H>    Ca    8.7      26 Mar 2023 06:57    TPro  5.2<L>  /  Alb  3.1<L>  /  TBili  0.6  /  DBili  x   /  AST  18  /  ALT  29  /  AlkPhos  77  03-26          Clean Catch Clean Catch (Midstream)  03-23-23   <10,000 CFU/mL Normal Urogenital Lali  --  --      .Blood Blood-Peripheral  03-23-23   No growth to date.  --  --      .Blood Blood-Peripheral  03-23-23   No growth to date.  --  --      < from: [facility] Duplex Lower Ext Vein Scan, Bilat (03.24.23 @ 14:58) >  IMPRESSION:  Acute below the knee DVT confined to the soleal vein of the right calf.    < end of copied text >      < from: Xray Chest 1 View- PORTABLE-Urgent (Xray Chest 1 View- PORTABLE-Urgent .) (03.24.23 @ 05:43) >    IMPRESSION:  Clear lungs.  Unchanged right hemidiaphragm elevation.    --- End of Report ---      < end of copied text >            WBC Count: 3.17 (03-26-23 @ 06:57)  WBC Count: 3.76 (03-25-23 @ 05:46)  WBC Count: 3.48 (03-24-23 @ 23:10)  WBC Count: 3.48 (03-24-23 @ 04:23)  WBC Count: 3.24 (03-23-23 @ 17:42)             Patient is a 67y old  Male who presents with a chief complaint of fever (25 Mar 2023 12:40)    Being followed by ID for        Interval history:  No other acute events      ROS:  No cough,SOB,CP  No N/V/D  No abd pain  No urinary complaints  No HA  No joint or limb pain  No other complaints    PAST MEDICAL & SURGICAL HISTORY:  Multiple myeloma      Hyperlipidemia      H/O aortic aneurysm      H/O splenectomy      S/P partial gastrectomy      History of pancreatic surgery        Allergies    No Known Allergies    Intolerances      Antimicrobials:    acyclovir   Oral Tab/Cap 400 milliGRAM(s) Oral daily  entecavir 0.5 milliGRAM(s) Oral daily  piperacillin/tazobactam IVPB.. 3.375 Gram(s) IV Intermittent every 8 hours    MEDICATIONS  (STANDING):  acyclovir   Oral Tab/Cap 400 milliGRAM(s) Oral daily  atorvastatin 40 milliGRAM(s) Oral at bedtime  dexAMETHasone     Tablet 4 milliGRAM(s) Oral daily  entecavir 0.5 milliGRAM(s) Oral daily  heparin  Infusion. 1900 Unit(s)/Hr (19 mL/Hr) IV Continuous <Continuous>  metoprolol tartrate 50 milliGRAM(s) Oral two times a day  piperacillin/tazobactam IVPB.. 3.375 Gram(s) IV Intermittent every 8 hours  sodium chloride 0.9%. 1000 milliLiter(s) (75 mL/Hr) IV Continuous <Continuous>      Vital Signs Last 24 Hrs  T(C): 36.6 (03-26-23 @ 08:27), Max: 39.4 (03-26-23 @ 05:08)  T(F): 97.8 (03-26-23 @ 08:27), Max: 102.9 (03-26-23 @ 05:08)  HR: 84 (03-26-23 @ 11:00) (73 - 103)  BP: 94/50 (03-26-23 @ 11:00) (94/48 - 133/69)  BP(mean): --  RR: 18 (03-26-23 @ 08:27) (18 - 18)  SpO2: 95% (03-26-23 @ 08:27) (95% - 100%)    Physical Exam:    Constitutional well preserved,comfortable,pleasant    HEENT PERRLA EOMI,No pallor or icterus    No oral exudate or erythema    Neck supple no JVD or LN    Chest Good AE,CTA    CVS RRR S1 S2 WNl No murmur or rub or gallop    Abd soft BS normal No tenderness no masses    Ext No cyanosis clubbing or edema    IV site no erythema tenderness or discharge    Joints no swelling or LOM    CNS AAO X 3 no focal    Lab Data:                          7.5    3.17  )-----------( 58       ( 26 Mar 2023 06:57 )             20.8       03-26    130<L>  |  95<L>  |  24<H>  ----------------------------<  132<H>  3.8   |  24  |  1.75<H>    Ca    8.7      26 Mar 2023 06:57    TPro  5.2<L>  /  Alb  3.1<L>  /  TBili  0.6  /  DBili  x   /  AST  18  /  ALT  29  /  AlkPhos  77  03-26          Clean Catch Clean Catch (Midstream)  03-23-23   <10,000 CFU/mL Normal Urogenital Lali  --  --      .Blood Blood-Peripheral  03-23-23   No growth to date.  --  --      .Blood Blood-Peripheral  03-23-23   No growth to date.  --  --      < from: [facility] Duplex Lower Ext Vein Scan, Bilat (03.24.23 @ 14:58) >  IMPRESSION:  Acute below the knee DVT confined to the soleal vein of the right calf.    < end of copied text >      < from: Xray Chest 1 View- PORTABLE-Urgent (Xray Chest 1 View- PORTABLE-Urgent .) (03.24.23 @ 05:43) >    IMPRESSION:  Clear lungs.  Unchanged right hemidiaphragm elevation.    --- End of Report ---      < end of copied text >            WBC Count: 3.17 (03-26-23 @ 06:57)  WBC Count: 3.76 (03-25-23 @ 05:46)  WBC Count: 3.48 (03-24-23 @ 23:10)  WBC Count: 3.48 (03-24-23 @ 04:23)  WBC Count: 3.24 (03-23-23 @ 17:42)      < from: CT Abdomen and Pelvis w/ IV Cont (03.23.23 @ 20:25) >  ACC: [id number] EXAM:  CT ABDOMEN AND PELVIS IC   ORDERED BY: JOSE ANGEL LAMBERT     PROCEDURE DATE:  03/23/2023          INTERPRETATION:  CLINICAL INFORMATION: Periumbilical pain and fever.   Multiple myeloma, on chemotherapy.    COMPARISON: CT 3/3/2023    CONTRAST/COMPLICATIONS:  IV Contrast: Omnipaque 350  90 cc administered   10 cc discarded  Oral Contrast: None  Complications: None    PROCEDURE:  CT of the Abdomen and Pelvis was performed.  Sagittal and coronal reformats were performed.    FINDINGS:  LOWER CHEST: Aortic valve replacement.    LIVER: Within normal limits.  BILE DUCTS: Normal caliber.  GALLBLADDER: Cholelithiasis.  SPLEEN: Splenectomy.  PANCREAS: Distal pancreatectomy. Remaining portions of the pancreas are   within normal limits.  ADRENALS: Within normal limits.  KIDNEYS/URETERS: Small right renal cyst.    BLADDER: Mildly distended with mild diffuse wall thickening and a small   diverticulum at the right base.  REPRODUCTIVE ORGANS: Prostate within normal limits.    BOWEL: Partial gastrectomy. No bowel obstruction. Appendix is normal.  PERITONEUM: Trace ascites. Left upper quadrant fat necrosis.  VESSELS: Atherosclerotic changes.  RETROPERITONEUM/LYMPH NODES: Prominent peripancreatic and portacaval   nodes with a reference peripancreatic node (4:35) 3.1 x 1.7 cm.   Comparison with prior imaging would prove useful to demonstrate stability.  ABDOMINAL WALL: Within normal limits.  BONES: Sternotomy. Degenerative changes. Status post right hip ORIF.    IMPRESSION:  No acute pathology.    Prominent peripancreatic and portacaval nodes, etiology unclear.   Correlation with prior imaging would prove useful to demonstrate   stability.    --- End of Report ---      < end of copied text >         Patient is a 67y old  Male who presents with a chief complaint of fever (25 Mar 2023 12:40)    Being followed by ID for        Interval history:  No other acute events      ROS:  No cough,SOB,CP  No N/V/D  No abd pain  No urinary complaints  No HA  No joint or limb pain  No other complaints    PAST MEDICAL & SURGICAL HISTORY:  Multiple myeloma      Hyperlipidemia      H/O aortic aneurysm      H/O splenectomy      S/P partial gastrectomy      History of pancreatic surgery        Allergies    No Known Allergies    Intolerances      Antimicrobials:    acyclovir   Oral Tab/Cap 400 milliGRAM(s) Oral daily  entecavir 0.5 milliGRAM(s) Oral daily  piperacillin/tazobactam IVPB.. 3.375 Gram(s) IV Intermittent every 8 hours    MEDICATIONS  (STANDING):  acyclovir   Oral Tab/Cap 400 milliGRAM(s) Oral daily  atorvastatin 40 milliGRAM(s) Oral at bedtime  dexAMETHasone     Tablet 4 milliGRAM(s) Oral daily  entecavir 0.5 milliGRAM(s) Oral daily  heparin  Infusion. 1900 Unit(s)/Hr (19 mL/Hr) IV Continuous <Continuous>  metoprolol tartrate 50 milliGRAM(s) Oral two times a day  piperacillin/tazobactam IVPB.. 3.375 Gram(s) IV Intermittent every 8 hours  sodium chloride 0.9%. 1000 milliLiter(s) (75 mL/Hr) IV Continuous <Continuous>      Vital Signs Last 24 Hrs  T(C): 36.6 (03-26-23 @ 08:27), Max: 39.4 (03-26-23 @ 05:08)  T(F): 97.8 (03-26-23 @ 08:27), Max: 102.9 (03-26-23 @ 05:08)  HR: 84 (03-26-23 @ 11:00) (73 - 103)  BP: 94/50 (03-26-23 @ 11:00) (94/48 - 133/69)  BP(mean): --  RR: 18 (03-26-23 @ 08:27) (18 - 18)  SpO2: 95% (03-26-23 @ 08:27) (95% - 100%)    Physical Exam:    Constitutional well preserved,comfortable,pleasant    HEENT PERRLA EOMI,No pallor or icterus    No oral exudate or erythema    Neck supple no JVD or LN    Chest Good AE,CTA    CVS RRR S1 S2 WNl No murmur or rub or gallop    Abd soft BS normal No tenderness no masses    Ext No cyanosis clubbing or edema    IV site no erythema tenderness or discharge    Joints no swelling or LOM    CNS AAO X 3 no focal    Lab Data:                          7.5    3.17  )-----------( 58       ( 26 Mar 2023 06:57 )             20.8       03-26    130<L>  |  95<L>  |  24<H>  ----------------------------<  132<H>  3.8   |  24  |  1.75<H>    Ca    8.7      26 Mar 2023 06:57    TPro  5.2<L>  /  Alb  3.1<L>  /  TBili  0.6  /  DBili  x   /  AST  18  /  ALT  29  /  AlkPhos  77  03-26          Clean Catch Clean Catch (Midstream)  03-23-23   <10,000 CFU/mL Normal Urogenital Lali  --  --      .Blood Blood-Peripheral  03-23-23   No growth to date.  --  --      .Blood Blood-Peripheral  03-23-23   No growth to date.  --  --      < from: [facility] Duplex Lower Ext Vein Scan, Bilat (03.24.23 @ 14:58) >  IMPRESSION:  Acute below the knee DVT confined to the soleal vein of the right calf.    < end of copied text >      < from: Xray Chest 1 View- PORTABLE-Urgent (Xray Chest 1 View- PORTABLE-Urgent .) (03.24.23 @ 05:43) >    IMPRESSION:  Clear lungs.  Unchanged right hemidiaphragm elevation.    --- End of Report ---      < end of copied text >            WBC Count: 3.17 (03-26-23 @ 06:57)  WBC Count: 3.76 (03-25-23 @ 05:46)  WBC Count: 3.48 (03-24-23 @ 23:10)  WBC Count: 3.48 (03-24-23 @ 04:23)  WBC Count: 3.24 (03-23-23 @ 17:42)      < from: CT Abdomen and Pelvis w/ IV Cont (03.23.23 @ 20:25) >  ACC: [id number] EXAM:  CT ABDOMEN AND PELVIS IC   ORDERED BY: JOSE ANGEL LAMBERT     PROCEDURE DATE:  03/23/2023          INTERPRETATION:  CLINICAL INFORMATION: Periumbilical pain and fever.   Multiple myeloma, on chemotherapy.    COMPARISON: CT 3/3/2023    CONTRAST/COMPLICATIONS:  IV Contrast: Omnipaque 350  90 cc administered   10 cc discarded  Oral Contrast: None  Complications: None    PROCEDURE:  CT of the Abdomen and Pelvis was performed.  Sagittal and coronal reformats were performed.    FINDINGS:  LOWER CHEST: Aortic valve replacement.    LIVER: Within normal limits.  BILE DUCTS: Normal caliber.  GALLBLADDER: Cholelithiasis.  SPLEEN: Splenectomy.  PANCREAS: Distal pancreatectomy. Remaining portions of the pancreas are   within normal limits.  ADRENALS: Within normal limits.  KIDNEYS/URETERS: Small right renal cyst.    BLADDER: Mildly distended with mild diffuse wall thickening and a small   diverticulum at the right base.  REPRODUCTIVE ORGANS: Prostate within normal limits.    BOWEL: Partial gastrectomy. No bowel obstruction. Appendix is normal.  PERITONEUM: Trace ascites. Left upper quadrant fat necrosis.  VESSELS: Atherosclerotic changes.  RETROPERITONEUM/LYMPH NODES: Prominent peripancreatic and portacaval   nodes with a reference peripancreatic node (4:35) 3.1 x 1.7 cm.   Comparison with prior imaging would prove useful to demonstrate stability.  ABDOMINAL WALL: Within normal limits.  BONES: Sternotomy. Degenerative changes. Status post right hip ORIF.    IMPRESSION:  No acute pathology.    Prominent peripancreatic and portacaval nodes, etiology unclear.   Correlation with prior imaging would prove useful to demonstrate   stability.    --- End of Report ---      < end of copied text >         Patient is a 67y old  Male who presents with a chief complaint of fever (25 Mar 2023 12:40)    Being followed by ID for        Interval history:  No other acute events      ROS:  No cough,SOB,CP  No N/V/D  No abd pain  No urinary complaints  No HA  No joint or limb pain  No other complaints    PAST MEDICAL & SURGICAL HISTORY:  Multiple myeloma      Hyperlipidemia      H/O aortic aneurysm      H/O splenectomy      S/P partial gastrectomy      History of pancreatic surgery        Allergies    No Known Allergies    Intolerances      Antimicrobials:    acyclovir   Oral Tab/Cap 400 milliGRAM(s) Oral daily  entecavir 0.5 milliGRAM(s) Oral daily  piperacillin/tazobactam IVPB.. 3.375 Gram(s) IV Intermittent every 8 hours    MEDICATIONS  (STANDING):  acyclovir   Oral Tab/Cap 400 milliGRAM(s) Oral daily  atorvastatin 40 milliGRAM(s) Oral at bedtime  dexAMETHasone     Tablet 4 milliGRAM(s) Oral daily  entecavir 0.5 milliGRAM(s) Oral daily  heparin  Infusion. 1900 Unit(s)/Hr (19 mL/Hr) IV Continuous <Continuous>  metoprolol tartrate 50 milliGRAM(s) Oral two times a day  piperacillin/tazobactam IVPB.. 3.375 Gram(s) IV Intermittent every 8 hours  sodium chloride 0.9%. 1000 milliLiter(s) (75 mL/Hr) IV Continuous <Continuous>      Vital Signs Last 24 Hrs  T(C): 36.6 (03-26-23 @ 08:27), Max: 39.4 (03-26-23 @ 05:08)  T(F): 97.8 (03-26-23 @ 08:27), Max: 102.9 (03-26-23 @ 05:08)  HR: 84 (03-26-23 @ 11:00) (73 - 103)  BP: 94/50 (03-26-23 @ 11:00) (94/48 - 133/69)  BP(mean): --  RR: 18 (03-26-23 @ 08:27) (18 - 18)  SpO2: 95% (03-26-23 @ 08:27) (95% - 100%)    Physical Exam:    Constitutional well preserved,comfortable,pleasant    HEENT PERRLA EOMI,No pallor or icterus    No oral exudate or erythema    Neck supple no JVD or LN    Chest Good AE,CTA    CVS RRR S1 S2 WNl No murmur or rub or gallop    Abd soft BS normal No tenderness no masses    Ext No cyanosis clubbing or edema    IV site no erythema tenderness or discharge    Joints no swelling or LOM    CNS AAO X 3 no focal    Lab Data:                          7.5    3.17  )-----------( 58       ( 26 Mar 2023 06:57 )             20.8       03-26    130<L>  |  95<L>  |  24<H>  ----------------------------<  132<H>  3.8   |  24  |  1.75<H>    Ca    8.7      26 Mar 2023 06:57    TPro  5.2<L>  /  Alb  3.1<L>  /  TBili  0.6  /  DBili  x   /  AST  18  /  ALT  29  /  AlkPhos  77  03-26          Clean Catch Clean Catch (Midstream)  03-23-23   <10,000 CFU/mL Normal Urogenital Lali  --  --      .Blood Blood-Peripheral  03-23-23   No growth to date.  --  --      .Blood Blood-Peripheral  03-23-23   No growth to date.  --  --      < from: [facility] Duplex Lower Ext Vein Scan, Bilat (03.24.23 @ 14:58) >  IMPRESSION:  Acute below the knee DVT confined to the soleal vein of the right calf.    < end of copied text >      < from: Xray Chest 1 View- PORTABLE-Urgent (Xray Chest 1 View- PORTABLE-Urgent .) (03.24.23 @ 05:43) >    IMPRESSION:  Clear lungs.  Unchanged right hemidiaphragm elevation.    --- End of Report ---      < end of copied text >            WBC Count: 3.17 (03-26-23 @ 06:57)  WBC Count: 3.76 (03-25-23 @ 05:46)  WBC Count: 3.48 (03-24-23 @ 23:10)  WBC Count: 3.48 (03-24-23 @ 04:23)  WBC Count: 3.24 (03-23-23 @ 17:42)      < from: CT Abdomen and Pelvis w/ IV Cont (03.23.23 @ 20:25) >  ACC: [id number] EXAM:  CT ABDOMEN AND PELVIS IC   ORDERED BY: JOSE ANGEL LAMBERT     PROCEDURE DATE:  03/23/2023          INTERPRETATION:  CLINICAL INFORMATION: Periumbilical pain and fever.   Multiple myeloma, on chemotherapy.    COMPARISON: CT 3/3/2023    CONTRAST/COMPLICATIONS:  IV Contrast: Omnipaque 350  90 cc administered   10 cc discarded  Oral Contrast: None  Complications: None    PROCEDURE:  CT of the Abdomen and Pelvis was performed.  Sagittal and coronal reformats were performed.    FINDINGS:  LOWER CHEST: Aortic valve replacement.    LIVER: Within normal limits.  BILE DUCTS: Normal caliber.  GALLBLADDER: Cholelithiasis.  SPLEEN: Splenectomy.  PANCREAS: Distal pancreatectomy. Remaining portions of the pancreas are   within normal limits.  ADRENALS: Within normal limits.  KIDNEYS/URETERS: Small right renal cyst.    BLADDER: Mildly distended with mild diffuse wall thickening and a small   diverticulum at the right base.  REPRODUCTIVE ORGANS: Prostate within normal limits.    BOWEL: Partial gastrectomy. No bowel obstruction. Appendix is normal.  PERITONEUM: Trace ascites. Left upper quadrant fat necrosis.  VESSELS: Atherosclerotic changes.  RETROPERITONEUM/LYMPH NODES: Prominent peripancreatic and portacaval   nodes with a reference peripancreatic node (4:35) 3.1 x 1.7 cm.   Comparison with prior imaging would prove useful to demonstrate stability.  ABDOMINAL WALL: Within normal limits.  BONES: Sternotomy. Degenerative changes. Status post right hip ORIF.    IMPRESSION:  No acute pathology.    Prominent peripancreatic and portacaval nodes, etiology unclear.   Correlation with prior imaging would prove useful to demonstrate   stability.    --- End of Report ---      < end of copied text >         Patient is a 67y old  Male who presents with a chief complaint of fever (25 Mar 2023 12:40)    Being followed by ID for fevers        Interval history:  pt with high fever again yesterday , today better  denies localizing sxs  chronic SOB  no urinary sxs  soft stool  no cough  no chest pain  No other acute events          PAST MEDICAL & SURGICAL HISTORY:  Multiple myeloma      Hyperlipidemia      H/O aortic aneurysm      H/O splenectomy      S/P partial gastrectomy      History of pancreatic surgery        Allergies    No Known Allergies    Intolerances      Antimicrobials:    acyclovir   Oral Tab/Cap 400 milliGRAM(s) Oral daily  entecavir 0.5 milliGRAM(s) Oral daily  piperacillin/tazobactam IVPB.. 3.375 Gram(s) IV Intermittent every 8 hours    MEDICATIONS  (STANDING):  acyclovir   Oral Tab/Cap 400 milliGRAM(s) Oral daily  atorvastatin 40 milliGRAM(s) Oral at bedtime  dexAMETHasone     Tablet 4 milliGRAM(s) Oral daily  entecavir 0.5 milliGRAM(s) Oral daily  heparin  Infusion. 1900 Unit(s)/Hr (19 mL/Hr) IV Continuous <Continuous>  metoprolol tartrate 50 milliGRAM(s) Oral two times a day  piperacillin/tazobactam IVPB.. 3.375 Gram(s) IV Intermittent every 8 hours  sodium chloride 0.9%. 1000 milliLiter(s) (75 mL/Hr) IV Continuous <Continuous>      Vital Signs Last 24 Hrs  T(C): 36.6 (03-26-23 @ 08:27), Max: 39.4 (03-26-23 @ 05:08)  T(F): 97.8 (03-26-23 @ 08:27), Max: 102.9 (03-26-23 @ 05:08)  HR: 84 (03-26-23 @ 11:00) (73 - 103)  BP: 94/50 (03-26-23 @ 11:00) (94/48 - 133/69)  BP(mean): --  RR: 18 (03-26-23 @ 08:27) (18 - 18)  SpO2: 95% (03-26-23 @ 08:27) (95% - 100%)    Physical Exam:    Constitutional well preserved,comfortable, pleasant    HEENT PERRLA EOMI,No pallor or icterus    No oral exudate or erythema    Neck supple no JVD or LN    Chest Good AE,CTA    CVS  S1 S2    Abd soft BS normal No tenderness     Ext No cyanosis clubbing or edema    IV site no erythema tenderness or discharge    Joints no swelling or LOM    CNS AAO X 3 no focal    Lab Data:                          7.5    3.17  )-----------( 58       ( 26 Mar 2023 06:57 )             20.8       03-26    130<L>  |  95<L>  |  24<H>  ----------------------------<  132<H>  3.8   |  24  |  1.75<H>    Ca    8.7      26 Mar 2023 06:57    TPro  5.2<L>  /  Alb  3.1<L>  /  TBili  0.6  /  DBili  x   /  AST  18  /  ALT  29  /  AlkPhos  77  03-26          Clean Catch Clean Catch (Midstream)  03-23-23   <10,000 CFU/mL Normal Urogenital Lali  --  --      .Blood Blood-Peripheral  03-23-23   No growth to date.  --  --      .Blood Blood-Peripheral  03-23-23   No growth to date.  --  --      < from: [facility] Duplex Lower Ext Vein Scan, Bilat (03.24.23 @ 14:58) >  IMPRESSION:  Acute below the knee DVT confined to the soleal vein of the right calf.    < end of copied text >      < from: Xray Chest 1 View- PORTABLE-Urgent (Xray Chest 1 View- PORTABLE-Urgent .) (03.24.23 @ 05:43) >    IMPRESSION:  Clear lungs.  Unchanged right hemidiaphragm elevation.    --- End of Report ---      < end of copied text >      GI PCR Panel Stool (03.24.23 @ 22:30)    GI PCR Panel: NotDete: GI Panel PCR evaluates for:  Campylobacter, Plesiomonas shigelloides, Salmonella, Vibrio, Yersinia  enterocolitica, Enteroaggregative Escherichia (EAEC), Enteropathogenic E.  coli (EPEC), Enterotoxigenic E. coli (ETEC), Shiga-like toxin producing  E.coli (STEC), E. coli O157, Shigella/Enteroinvasive E. coli (EIEC),  Adenovirus, Astrovirus, Norovirus, Rotavirus, Sapovirus, Cryptosporidium,  Cyclospora cayetanensis, Entamoeba histolytica, Giardia lamblia.  For culture and susceptibility reports refer to “reflex stool culture”.          WBC Count: 3.17 (03-26-23 @ 06:57)  WBC Count: 3.76 (03-25-23 @ 05:46)  WBC Count: 3.48 (03-24-23 @ 23:10)  WBC Count: 3.48 (03-24-23 @ 04:23)  WBC Count: 3.24 (03-23-23 @ 17:42)      < from: CT Abdomen and Pelvis w/ IV Cont (03.23.23 @ 20:25) >  ACC: [id number] EXAM:  CT ABDOMEN AND PELVIS IC   ORDERED BY: JOSE ANGEL LAMBERT     PROCEDURE DATE:  03/23/2023          INTERPRETATION:  CLINICAL INFORMATION: Periumbilical pain and fever.   Multiple myeloma, on chemotherapy.    COMPARISON: CT 3/3/2023    CONTRAST/COMPLICATIONS:  IV Contrast: Omnipaque 350  90 cc administered   10 cc discarded  Oral Contrast: None  Complications: None    PROCEDURE:  CT of the Abdomen and Pelvis was performed.  Sagittal and coronal reformats were performed.    FINDINGS:  LOWER CHEST: Aortic valve replacement.    LIVER: Within normal limits.  BILE DUCTS: Normal caliber.  GALLBLADDER: Cholelithiasis.  SPLEEN: Splenectomy.  PANCREAS: Distal pancreatectomy. Remaining portions of the pancreas are   within normal limits.  ADRENALS: Within normal limits.  KIDNEYS/URETERS: Small right renal cyst.    BLADDER: Mildly distended with mild diffuse wall thickening and a small   diverticulum at the right base.  REPRODUCTIVE ORGANS: Prostate within normal limits.    BOWEL: Partial gastrectomy. No bowel obstruction. Appendix is normal.  PERITONEUM: Trace ascites. Left upper quadrant fat necrosis.  VESSELS: Atherosclerotic changes.  RETROPERITONEUM/LYMPH NODES: Prominent peripancreatic and portacaval   nodes with a reference peripancreatic node (4:35) 3.1 x 1.7 cm.   Comparison with prior imaging would prove useful to demonstrate stability.  ABDOMINAL WALL: Within normal limits.  BONES: Sternotomy. Degenerative changes. Status post right hip ORIF.    IMPRESSION:  No acute pathology.    Prominent peripancreatic and portacaval nodes, etiology unclear.   Correlation with prior imaging would prove useful to demonstrate   stability.    --- End of Report ---      < end of copied text >    Respiratory Viral Panel with COVID-19 by SANDRA (03.23.23 @ 17:42)    Rapid RVP Result: [facility]   SARS-CoV-2: [facility]: This Respiratory Panel uses polymerase chain reaction (PCR) to detect for  adenovirus; coronavirus (HKU1, NL63, 229E, OC43); human metapneumovirus  (hMPV); human enterovirus/rhinovirus (Entero/RV); influenza A; influenza  A/H1; influenza A/H3; influenza A/H1-2009; influenza B; parainfluenza  viruses 1, 2, 3, 4; respiratory syncytial virus; Mycoplasma pneumoniae;  Chlamydophila pneumoniae; and SARS-CoV-2.         Patient is a 67y old  Male who presents with a chief complaint of fever (25 Mar 2023 12:40)    Being followed by ID for fevers        Interval history:  pt with high fever again yesterday , today better  denies localizing sxs  chronic SOB  no urinary sxs  soft stool  no cough  no chest pain  No other acute events          PAST MEDICAL & SURGICAL HISTORY:  Multiple myeloma      Hyperlipidemia      H/O aortic aneurysm      H/O splenectomy      S/P partial gastrectomy      History of pancreatic surgery        Allergies    No Known Allergies    Intolerances      Antimicrobials:    acyclovir   Oral Tab/Cap 400 milliGRAM(s) Oral daily  entecavir 0.5 milliGRAM(s) Oral daily  piperacillin/tazobactam IVPB.. 3.375 Gram(s) IV Intermittent every 8 hours    MEDICATIONS  (STANDING):  acyclovir   Oral Tab/Cap 400 milliGRAM(s) Oral daily  atorvastatin 40 milliGRAM(s) Oral at bedtime  dexAMETHasone     Tablet 4 milliGRAM(s) Oral daily  entecavir 0.5 milliGRAM(s) Oral daily  heparin  Infusion. 1900 Unit(s)/Hr (19 mL/Hr) IV Continuous <Continuous>  metoprolol tartrate 50 milliGRAM(s) Oral two times a day  piperacillin/tazobactam IVPB.. 3.375 Gram(s) IV Intermittent every 8 hours  sodium chloride 0.9%. 1000 milliLiter(s) (75 mL/Hr) IV Continuous <Continuous>      Vital Signs Last 24 Hrs  T(C): 36.6 (03-26-23 @ 08:27), Max: 39.4 (03-26-23 @ 05:08)  T(F): 97.8 (03-26-23 @ 08:27), Max: 102.9 (03-26-23 @ 05:08)  HR: 84 (03-26-23 @ 11:00) (73 - 103)  BP: 94/50 (03-26-23 @ 11:00) (94/48 - 133/69)  BP(mean): --  RR: 18 (03-26-23 @ 08:27) (18 - 18)  SpO2: 95% (03-26-23 @ 08:27) (95% - 100%)    Physical Exam:    Constitutional well preserved,comfortable, pleasant    HEENT PERRLA EOMI,No pallor or icterus    No oral exudate or erythema    Neck supple no JVD or LN    Chest Good AE,CTA    CVS  S1 S2    Abd soft BS normal No tenderness     Ext No cyanosis clubbing or edema    IV site no erythema tenderness or discharge    Joints no swelling or LOM    CNS AAO X 3 no focal    Lab Data:                          7.5    3.17  )-----------( 58       ( 26 Mar 2023 06:57 )             20.8       03-26    130<L>  |  95<L>  |  24<H>  ----------------------------<  132<H>  3.8   |  24  |  1.75<H>    Ca    8.7      26 Mar 2023 06:57    TPro  5.2<L>  /  Alb  3.1<L>  /  TBili  0.6  /  DBili  x   /  AST  18  /  ALT  29  /  AlkPhos  77  03-26          Clean Catch Clean Catch (Midstream)  03-23-23   <10,000 CFU/mL Normal Urogenital Lali  --  --      .Blood Blood-Peripheral  03-23-23   No growth to date.  --  --      .Blood Blood-Peripheral  03-23-23   No growth to date.  --  --      < from: VA Duplex Lower Ext Vein Scan, Bilat (03.24.23 @ 14:58) >  IMPRESSION:  Acute below the knee DVT confined to the soleal vein of the right calf.    < end of copied text >      < from: Xray Chest 1 View- PORTABLE-Urgent (Xray Chest 1 View- PORTABLE-Urgent .) (03.24.23 @ 05:43) >    IMPRESSION:  Clear lungs.  Unchanged right hemidiaphragm elevation.    --- End of Report ---      < end of copied text >      GI PCR Panel Stool (03.24.23 @ 22:30)    GI PCR Panel: NotDete: GI Panel PCR evaluates for:  Campylobacter, Plesiomonas shigelloides, Salmonella, Vibrio, Yersinia  enterocolitica, Enteroaggregative Escherichia (EAEC), Enteropathogenic E.  coli (EPEC), Enterotoxigenic E. coli (ETEC), Shiga-like toxin producing  E.coli (STEC), E. coli O157, Shigella/Enteroinvasive E. coli (EIEC),  Adenovirus, Astrovirus, Norovirus, Rotavirus, Sapovirus, Cryptosporidium,  Cyclospora cayetanensis, Entamoeba histolytica, Giardia lamblia.  For culture and susceptibility reports refer to “reflex stool culture”.          WBC Count: 3.17 (03-26-23 @ 06:57)  WBC Count: 3.76 (03-25-23 @ 05:46)  WBC Count: 3.48 (03-24-23 @ 23:10)  WBC Count: 3.48 (03-24-23 @ 04:23)  WBC Count: 3.24 (03-23-23 @ 17:42)      < from: CT Abdomen and Pelvis w/ IV Cont (03.23.23 @ 20:25) >  ACC: [id number] EXAM:  CT ABDOMEN AND PELVIS IC   ORDERED BY: JOSE ANGEL LAMBERT     PROCEDURE DATE:  03/23/2023          INTERPRETATION:  CLINICAL INFORMATION: Periumbilical pain and fever.   Multiple myeloma, on chemotherapy.    COMPARISON: CT 3/3/2023    CONTRAST/COMPLICATIONS:  IV Contrast: Omnipaque 350  90 cc administered   10 cc discarded  Oral Contrast: None  Complications: None    PROCEDURE:  CT of the Abdomen and Pelvis was performed.  Sagittal and coronal reformats were performed.    FINDINGS:  LOWER CHEST: Aortic valve replacement.    LIVER: Within normal limits.  BILE DUCTS: Normal caliber.  GALLBLADDER: Cholelithiasis.  SPLEEN: Splenectomy.  PANCREAS: Distal pancreatectomy. Remaining portions of the pancreas are   within normal limits.  ADRENALS: Within normal limits.  KIDNEYS/URETERS: Small right renal cyst.    BLADDER: Mildly distended with mild diffuse wall thickening and a small   diverticulum at the right base.  REPRODUCTIVE ORGANS: Prostate within normal limits.    BOWEL: Partial gastrectomy. No bowel obstruction. Appendix is normal.  PERITONEUM: Trace ascites. Left upper quadrant fat necrosis.  VESSELS: Atherosclerotic changes.  RETROPERITONEUM/LYMPH NODES: Prominent peripancreatic and portacaval   nodes with a reference peripancreatic node (4:35) 3.1 x 1.7 cm.   Comparison with prior imaging would prove useful to demonstrate stability.  ABDOMINAL WALL: Within normal limits.  BONES: Sternotomy. Degenerative changes. Status post right hip ORIF.    IMPRESSION:  No acute pathology.    Prominent peripancreatic and portacaval nodes, etiology unclear.   Correlation with prior imaging would prove useful to demonstrate   stability.    --- End of Report ---      < end of copied text >    Respiratory Viral Panel with COVID-19 by SANDRA (03.23.23 @ 17:42)    Rapid RVP Result: [facility]   SARS-CoV-2: [facility]: This Respiratory Panel uses polymerase chain reaction (PCR) to detect for  adenovirus; coronavirus (HKU1, NL63, 229E, OC43); human metapneumovirus  (hMPV); human enterovirus/rhinovirus (Entero/RV); influenza A; influenza  A/H1; influenza A/H3; influenza A/H1-2009; influenza B; parainfluenza  viruses 1, 2, 3, 4; respiratory syncytial virus; Mycoplasma pneumoniae;  Chlamydophila pneumoniae; and SARS-CoV-2.         Patient is a 67y old  Male who presents with a chief complaint of fever (25 Mar 2023 12:40)    Being followed by ID for fevers        Interval history:  pt with high fever again yesterday , today better  denies localizing sxs  chronic SOB  no urinary sxs  soft stool  no cough  no chest pain  No other acute events          PAST MEDICAL & SURGICAL HISTORY:  Multiple myeloma      Hyperlipidemia      H/O aortic aneurysm      H/O splenectomy      S/P partial gastrectomy      History of pancreatic surgery        Allergies    No Known Allergies    Intolerances      Antimicrobials:    acyclovir   Oral Tab/Cap 400 milliGRAM(s) Oral daily  entecavir 0.5 milliGRAM(s) Oral daily  piperacillin/tazobactam IVPB.. 3.375 Gram(s) IV Intermittent every 8 hours    MEDICATIONS  (STANDING):  acyclovir   Oral Tab/Cap 400 milliGRAM(s) Oral daily  atorvastatin 40 milliGRAM(s) Oral at bedtime  dexAMETHasone     Tablet 4 milliGRAM(s) Oral daily  entecavir 0.5 milliGRAM(s) Oral daily  heparin  Infusion. 1900 Unit(s)/Hr (19 mL/Hr) IV Continuous <Continuous>  metoprolol tartrate 50 milliGRAM(s) Oral two times a day  piperacillin/tazobactam IVPB.. 3.375 Gram(s) IV Intermittent every 8 hours  sodium chloride 0.9%. 1000 milliLiter(s) (75 mL/Hr) IV Continuous <Continuous>      Vital Signs Last 24 Hrs  T(C): 36.6 (03-26-23 @ 08:27), Max: 39.4 (03-26-23 @ 05:08)  T(F): 97.8 (03-26-23 @ 08:27), Max: 102.9 (03-26-23 @ 05:08)  HR: 84 (03-26-23 @ 11:00) (73 - 103)  BP: 94/50 (03-26-23 @ 11:00) (94/48 - 133/69)  BP(mean): --  RR: 18 (03-26-23 @ 08:27) (18 - 18)  SpO2: 95% (03-26-23 @ 08:27) (95% - 100%)    Physical Exam:    Constitutional well preserved,comfortable, pleasant    HEENT PERRLA EOMI,No pallor or icterus    No oral exudate or erythema    Neck supple no JVD or LN    Chest Good AE,CTA    CVS  S1 S2    Abd soft BS normal No tenderness     Ext No cyanosis clubbing or edema    IV site no erythema tenderness or discharge    Joints no swelling or LOM    CNS AAO X 3 no focal    Lab Data:                          7.5    3.17  )-----------( 58       ( 26 Mar 2023 06:57 )             20.8       03-26    130<L>  |  95<L>  |  24<H>  ----------------------------<  132<H>  3.8   |  24  |  1.75<H>    Ca    8.7      26 Mar 2023 06:57    TPro  5.2<L>  /  Alb  3.1<L>  /  TBili  0.6  /  DBili  x   /  AST  18  /  ALT  29  /  AlkPhos  77  03-26          Clean Catch Clean Catch (Midstream)  03-23-23   <10,000 CFU/mL Normal Urogenital Lali  --  --      .Blood Blood-Peripheral  03-23-23   No growth to date.  --  --      .Blood Blood-Peripheral  03-23-23   No growth to date.  --  --      < from: VA Duplex Lower Ext Vein Scan, Bilat (03.24.23 @ 14:58) >  IMPRESSION:  Acute below the knee DVT confined to the soleal vein of the right calf.    < end of copied text >      < from: Xray Chest 1 View- PORTABLE-Urgent (Xray Chest 1 View- PORTABLE-Urgent .) (03.24.23 @ 05:43) >    IMPRESSION:  Clear lungs.  Unchanged right hemidiaphragm elevation.    --- End of Report ---      < end of copied text >      GI PCR Panel Stool (03.24.23 @ 22:30)    GI PCR Panel: NotDete: GI Panel PCR evaluates for:  Campylobacter, Plesiomonas shigelloides, Salmonella, Vibrio, Yersinia  enterocolitica, Enteroaggregative Escherichia (EAEC), Enteropathogenic E.  coli (EPEC), Enterotoxigenic E. coli (ETEC), Shiga-like toxin producing  E.coli (STEC), E. coli O157, Shigella/Enteroinvasive E. coli (EIEC),  Adenovirus, Astrovirus, Norovirus, Rotavirus, Sapovirus, Cryptosporidium,  Cyclospora cayetanensis, Entamoeba histolytica, Giardia lamblia.  For culture and susceptibility reports refer to “reflex stool culture”.          WBC Count: 3.17 (03-26-23 @ 06:57)  WBC Count: 3.76 (03-25-23 @ 05:46)  WBC Count: 3.48 (03-24-23 @ 23:10)  WBC Count: 3.48 (03-24-23 @ 04:23)  WBC Count: 3.24 (03-23-23 @ 17:42)      < from: CT Abdomen and Pelvis w/ IV Cont (03.23.23 @ 20:25) >  ACC: [id number] EXAM:  CT ABDOMEN AND PELVIS IC   ORDERED BY: JOSE ANGEL LAMBERT     PROCEDURE DATE:  03/23/2023          INTERPRETATION:  CLINICAL INFORMATION: Periumbilical pain and fever.   Multiple myeloma, on chemotherapy.    COMPARISON: CT 3/3/2023    CONTRAST/COMPLICATIONS:  IV Contrast: Omnipaque 350  90 cc administered   10 cc discarded  Oral Contrast: None  Complications: None    PROCEDURE:  CT of the Abdomen and Pelvis was performed.  Sagittal and coronal reformats were performed.    FINDINGS:  LOWER CHEST: Aortic valve replacement.    LIVER: Within normal limits.  BILE DUCTS: Normal caliber.  GALLBLADDER: Cholelithiasis.  SPLEEN: Splenectomy.  PANCREAS: Distal pancreatectomy. Remaining portions of the pancreas are   within normal limits.  ADRENALS: Within normal limits.  KIDNEYS/URETERS: Small right renal cyst.    BLADDER: Mildly distended with mild diffuse wall thickening and a small   diverticulum at the right base.  REPRODUCTIVE ORGANS: Prostate within normal limits.    BOWEL: Partial gastrectomy. No bowel obstruction. Appendix is normal.  PERITONEUM: Trace ascites. Left upper quadrant fat necrosis.  VESSELS: Atherosclerotic changes.  RETROPERITONEUM/LYMPH NODES: Prominent peripancreatic and portacaval   nodes with a reference peripancreatic node (4:35) 3.1 x 1.7 cm.   Comparison with prior imaging would prove useful to demonstrate stability.  ABDOMINAL WALL: Within normal limits.  BONES: Sternotomy. Degenerative changes. Status post right hip ORIF.    IMPRESSION:  No acute pathology.    Prominent peripancreatic and portacaval nodes, etiology unclear.   Correlation with prior imaging would prove useful to demonstrate   stability.    --- End of Report ---      < end of copied text >    Respiratory Viral Panel with COVID-19 by SANDRA (03.23.23 @ 17:42)    Rapid RVP Result: [facility]   SARS-CoV-2: [facility]: This Respiratory Panel uses polymerase chain reaction (PCR) to detect for  adenovirus; coronavirus (HKU1, NL63, 229E, OC43); human metapneumovirus  (hMPV); human enterovirus/rhinovirus (Entero/RV); influenza A; influenza  A/H1; influenza A/H3; influenza A/H1-2009; influenza B; parainfluenza  viruses 1, 2, 3, 4; respiratory syncytial virus; Mycoplasma pneumoniae;  Chlamydophila pneumoniae; and SARS-CoV-2.

## 2023-03-26 NOTE — PROGRESS NOTE ADULT - ASSESSMENT
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 Found to be febrile on admission, ID consulted for further management.    Tmax 102.4 (3/23)  WBC 3.5K today  Urinalysis unremarkable  RVP neg  CXR unremarkable    Impression:  #Fever  #Immunocompromised Status, H/O Splenectomy  #pancytopenia       Fever of unclear etiology, only localizing finding is watery BM x 1 day duration and abdominal discomfort. Non-infectious etiologies on DDx as well, DVT noted on LE Duplex.       - team d/shahbaz vancomycin and cefepime  - started  Zosyn 3.375g IV q8H   check CT A/P, wi noted  - V/Q scan to r/o PE  - follow up blood cultures  -  GI PCR- negative   - trend cbc for pancytopenia   - PLEASE REPEAT BLOOD CULTURES WITH FEVER, NONE SINCE 2/23  - check CMV PCR, cmv IGG and CMV IGM  - check cryptococcal ag   - check QuantiFeronTb       Tali Martinez M.D. ,   please reach via teams   If no answer, or after 5PM/ weekends,  then please call  [phone]    Assessment and plan discussed with the primary team .                   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 Found to be febrile on admission, ID consulted for further management.    Tmax 102.4 (3/23)  WBC 3.5K today  Urinalysis unremarkable  RVP neg  CXR unremarkable    Impression:  #Fever  #Immunocompromised Status, H/O Splenectomy  #pancytopenia       Fever of unclear etiology, only localizing finding is watery BM x 1 day duration and abdominal discomfort. Non-infectious etiologies on DDx as well, DVT noted on LE Duplex.       - team d/shahbaz vancomycin and cefepime  - started  Zosyn 3.375g IV q8H   check CT A/P, wi noted  - V/Q scan to r/o PE  - follow up blood cultures  -  GI PCR- negative   - trend cbc for pancytopenia   - PLEASE REPEAT BLOOD CULTURES WITH FEVER, NONE SINCE 2/23  - check CMV PCR, cmv IGG and CMV IGM  - check cryptococcal ag   - check QuantiFeronTb       Tali Martinez M.D. ,   please reach via teams   If no answer, or after 5PM/ weekends,  then please call  [phone]    Assessment and plan discussed with the primary team .                   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 Found to be febrile on admission, ID consulted for further management.    Tmax 102.4 (3/23)  WBC 3.5K today  Urinalysis unremarkable  RVP neg  CXR unremarkable    Impression:  #Fever  #Immunocompromised Status, H/O Splenectomy  #pancytopenia       Fever of unclear etiology, only localizing finding is watery BM x 1 day duration and abdominal discomfort. Non-infectious etiologies on DDx as well, DVT noted on LE Duplex.       - team d/shahbaz vancomycin and cefepime  - started  Zosyn 3.375g IV q8H   check CT A/P, wi noted  - V/Q scan to r/o PE  - follow up blood cultures  -  GI PCR- negative   - trend cbc for pancytopenia   - PLEASE REPEAT BLOOD CULTURES WITH FEVER, NONE SINCE 2/23  - check CMV PCR, cmv IGG and CMV IGM  - check cryptococcal ag   - check QuantiFeronTb       Tali Martinez M.D. ,   please reach via teams   If no answer, or after 5PM/ weekends,  then please call  [phone]    Assessment and plan discussed with the primary team .                   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 Found to be febrile on admission, ID consulted for further management.    Tmax 102.4 (3/23)  WBC 3.5K today  Urinalysis unremarkable  RVP neg  CXR unremarkable    Impression:  #Fever  #Immunocompromised Status, H/O Splenectomy  #pancytopenia       Fever of unclear etiology, only localizing finding is watery BM x 1 day duration and abdominal discomfort. Non-infectious etiologies on DDx as well, DVT noted on LE Duplex.       - team d/shahbaz vancomycin and cefepime  - started  Zosyn 3.375g IV q8H   check CT A/P, wi noted  - V/Q scan to r/o PE  - follow up blood cultures  -  GI PCR- negative   - trend cbc for pancytopenia   - PLEASE REPEAT BLOOD CULTURES WITH FEVER, NONE SINCE 2/23  - check CMV PCR, cmv IGG and CMV IGM  - check cryptococcal ag   - check QuantiFeronTb   - check fungitel      Tali Martinez M.D. ,   please reach via teams   If no answer, or after 5PM/ weekends,  then please call  [phone]    Assessment and plan discussed with the primary team .                   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 Found to be febrile on admission, ID consulted for further management.    Tmax 102.4 (3/23)  WBC 3.5K today  Urinalysis unremarkable  RVP neg  CXR unremarkable    Impression:  #Fever  #Immunocompromised Status, H/O Splenectomy  #pancytopenia       Fever of unclear etiology, only localizing finding is watery BM x 1 day duration and abdominal discomfort. Non-infectious etiologies on DDx as well, DVT noted on LE Duplex.       - team d/shahbaz vancomycin and cefepime  - started  Zosyn 3.375g IV q8H   check CT A/P, wi noted  - V/Q scan to r/o PE  - follow up blood cultures  -  GI PCR- negative   - trend cbc for pancytopenia   - PLEASE REPEAT BLOOD CULTURES WITH FEVER, NONE SINCE 2/23  - check CMV PCR, cmv IGG and CMV IGM  - check cryptococcal ag   - check QuantiFeronTb   - check fungitel      Tali Martinez M.D. ,   please reach via teams   If no answer, or after 5PM/ weekends,  then please call  [phone]    Assessment and plan discussed with the primary team .                   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 Found to be febrile on admission, ID consulted for further management.    Tmax 102.4 (3/23)  WBC 3.5K today  Urinalysis unremarkable  RVP neg  CXR unremarkable    Impression:  #Fever  #Immunocompromised Status, H/O Splenectomy  #pancytopenia       Fever of unclear etiology, only localizing finding is watery BM x 1 day duration and abdominal discomfort. Non-infectious etiologies on DDx as well, DVT noted on LE Duplex.       - team d/shahbaz vancomycin and cefepime  - started  Zosyn 3.375g IV q8H   check CT A/P, wi noted  - V/Q scan to r/o PE  - follow up blood cultures  -  GI PCR- negative   - trend cbc for pancytopenia   - PLEASE REPEAT BLOOD CULTURES WITH FEVER, NONE SINCE 2/23  - check CMV PCR, cmv IGG and CMV IGM  - check cryptococcal ag   - check QuantiFeronTb   - check fungitel      Tali Martinez M.D. ,   please reach via teams   If no answer, or after 5PM/ weekends,  then please call  [phone]    Assessment and plan discussed with the primary team .

## 2023-03-26 NOTE — PROGRESS NOTE ADULT - ASSESSMENT
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   - no obvious infectious symptoms  - U/A unremarkable  - F/u BCx2 and UCx   - CXR without consolidations or effusions   - less likely transfusion reaction as pt febrile prior to transfusion  - LE VA duplex --> + For DVT    - Was on empiric treatment with Vanc/cefepime --> ID consulted; started on Zosyn  - Check GI PCR   - Hold home penicillin while on Vanc/Cefepime.  - Check V/Q scan  - ID to sara redman, [facility]?U per ID     Multiple myeloma.   - pancytopenia largely at baseline although Hg 6.7 on arrival  - s/p 1U PRBC with good response   - c/w home acyclovir  - c/w home entecavir   - c/w home dexamethasone   - Pt reports his Revlimid was held  - Heme/Onc consulted; F/u recs   - Hold home aspirin.    DVT   - Duplex + For R soleal vein DVT  --> Will consider CT A chest once creatinine permits as patient received contrast; Monitor for LOPEZ   - Started on Hep gtt; Monitor PTT; Monitor H/H closely    Hypertension.   - C/w home metoprolol.    Hyperlipidemia.   - C/w home atorvastatin.    Abnormal CT  - Outpatient follow up for CT findings    Prophylactic measure.   dvt ppx: Hep GTT   diet: regular  ambulate: with assistance    fall precautions  aspiration precautions. 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   - no obvious infectious symptoms  - U/A unremarkable  - F/u BCx2 and UCx   - CXR without consolidations or effusions   - less likely transfusion reaction as pt febrile prior to transfusion  - LE VA duplex --> + For DVT    - Was on empiric treatment with Vanc/cefepime --> ID consulted; started on Zosyn  - Check GI PCR   - Hold home penicillin while on Vanc/Cefepime.  - Check V/Q scan  - ID to sara redman, [facility]?U per ID     Multiple myeloma.   - pancytopenia largely at baseline although Hg 6.7 on arrival  - s/p 1U PRBC with good response   - c/w home acyclovir  - c/w home entecavir   - c/w home dexamethasone   - Pt reports his Revlimid was held  - Heme/Onc consulted; F/u recs   - Hold home aspirin.    DVT   - Duplex + For R soleal vein DVT  --> Will consider CT A chest once creatinine permits as patient received contrast; Monitor for LOPEZ   - Started on Hep gtt; Monitor PTT; Monitor H/H closely    Hypertension.   - C/w home metoprolol.    Hyperlipidemia.   - C/w home atorvastatin.    Abnormal CT  - Outpatient follow up for CT findings    Prophylactic measure.   dvt ppx: Hep GTT   diet: regular  ambulate: with assistance    fall precautions  aspiration precautions.

## 2023-03-27 LAB
ANION GAP SERPL CALC-SCNC: 12 MMOL/L — SIGNIFICANT CHANGE UP (ref 5–17)
ANION GAP SERPL CALC-SCNC: 13 MMOL/L — SIGNIFICANT CHANGE UP (ref 5–17)
BUN SERPL-MCNC: 30 MG/DL — HIGH (ref 7–23)
BUN SERPL-MCNC: 32 MG/DL — HIGH (ref 7–23)
CALCIUM SERPL-MCNC: 8.7 MG/DL — SIGNIFICANT CHANGE UP (ref 8.4–10.5)
CALCIUM SERPL-MCNC: 8.9 MG/DL — SIGNIFICANT CHANGE UP (ref 8.4–10.5)
CHLORIDE SERPL-SCNC: 96 MMOL/L — SIGNIFICANT CHANGE UP (ref 96–108)
CHLORIDE SERPL-SCNC: 97 MMOL/L — SIGNIFICANT CHANGE UP (ref 96–108)
CMV IGG FLD QL: 3.1 U/ML — HIGH
CMV IGG SERPL-IMP: POSITIVE
CMV IGM FLD-ACNC: <8 AU/ML — SIGNIFICANT CHANGE UP
CMV IGM SERPL QL: NEGATIVE — SIGNIFICANT CHANGE UP
CO2 SERPL-SCNC: 21 MMOL/L — LOW (ref 22–31)
CO2 SERPL-SCNC: 24 MMOL/L — SIGNIFICANT CHANGE UP (ref 22–31)
CREAT SERPL-MCNC: 1.8 MG/DL — HIGH (ref 0.5–1.3)
CREAT SERPL-MCNC: 1.83 MG/DL — HIGH (ref 0.5–1.3)
EGFR: 40 ML/MIN/1.73M2 — LOW
GLUCOSE SERPL-MCNC: 123 MG/DL — HIGH (ref 70–99)
GLUCOSE SERPL-MCNC: 191 MG/DL — HIGH (ref 70–99)
HCT VFR BLD CALC: 21.8 % — LOW (ref 39–50)
HCT VFR BLD CALC: 22 % — LOW (ref 39–50)
HGB BLD-MCNC: 7.4 G/DL — LOW (ref 13–17)
HGB BLD-MCNC: 7.5 G/DL — LOW (ref 13–17)
MCHC RBC-ENTMCNC: 31.8 PG — SIGNIFICANT CHANGE UP (ref 27–34)
MCHC RBC-ENTMCNC: 32.6 PG — SIGNIFICANT CHANGE UP (ref 27–34)
MCHC RBC-ENTMCNC: 33.6 GM/DL — SIGNIFICANT CHANGE UP (ref 32–36)
MCHC RBC-ENTMCNC: 34.4 GM/DL — SIGNIFICANT CHANGE UP (ref 32–36)
MCV RBC AUTO: 94.4 FL — SIGNIFICANT CHANGE UP (ref 80–100)
MCV RBC AUTO: 94.8 FL — SIGNIFICANT CHANGE UP (ref 80–100)
NRBC # BLD: 2 /100 WBCS — HIGH (ref 0–0)
NRBC # BLD: 3 /100 WBCS — HIGH (ref 0–0)
OB PNL STL: POSITIVE
PLATELET # BLD AUTO: 52 K/UL — LOW (ref 150–400)
PLATELET # BLD AUTO: 56 K/UL — LOW (ref 150–400)
POTASSIUM SERPL-MCNC: 3.8 MMOL/L — SIGNIFICANT CHANGE UP (ref 3.5–5.3)
POTASSIUM SERPL-MCNC: 4 MMOL/L — SIGNIFICANT CHANGE UP (ref 3.5–5.3)
POTASSIUM SERPL-SCNC: 3.8 MMOL/L — SIGNIFICANT CHANGE UP (ref 3.5–5.3)
POTASSIUM SERPL-SCNC: 4 MMOL/L — SIGNIFICANT CHANGE UP (ref 3.5–5.3)
RBC # BLD: 2.3 M/UL — LOW (ref 4.2–5.8)
RBC # BLD: 2.33 M/UL — LOW (ref 4.2–5.8)
RBC # FLD: 23.9 % — HIGH (ref 10.3–14.5)
RBC # FLD: 24 % — HIGH (ref 10.3–14.5)
SODIUM SERPL-SCNC: 131 MMOL/L — LOW (ref 135–145)
SODIUM SERPL-SCNC: 132 MMOL/L — LOW (ref 135–145)
TRANSFUSION REACTION INTERP 2: SIGNIFICANT CHANGE UP
WBC # BLD: 2.84 K/UL — LOW (ref 3.8–10.5)
WBC # BLD: 2.98 K/UL — LOW (ref 3.8–10.5)
WBC # FLD AUTO: 2.84 K/UL — LOW (ref 3.8–10.5)
WBC # FLD AUTO: 2.98 K/UL — LOW (ref 3.8–10.5)

## 2023-03-27 PROCEDURE — 99232 SBSQ HOSP IP/OBS MODERATE 35: CPT

## 2023-03-27 PROCEDURE — 71045 X-RAY EXAM CHEST 1 VIEW: CPT | Mod: 26

## 2023-03-27 PROCEDURE — 78582 LUNG VENTILAT&PERFUS IMAGING: CPT | Mod: 26

## 2023-03-27 RX ORDER — ASPIRIN/CALCIUM CARB/MAGNESIUM 324 MG
81 TABLET ORAL DAILY
Refills: 0 | Status: DISCONTINUED | OUTPATIENT
Start: 2023-03-27 | End: 2023-04-05

## 2023-03-27 RX ORDER — SODIUM CHLORIDE 9 MG/ML
1000 INJECTION INTRAMUSCULAR; INTRAVENOUS; SUBCUTANEOUS
Refills: 0 | Status: DISCONTINUED | OUTPATIENT
Start: 2023-03-27 | End: 2023-03-28

## 2023-03-27 RX ADMIN — Medication 4 MILLIGRAM(S): at 06:07

## 2023-03-27 RX ADMIN — Medication 400 MILLIGRAM(S): at 11:28

## 2023-03-27 RX ADMIN — PIPERACILLIN AND TAZOBACTAM 25 GRAM(S): 4; .5 INJECTION, POWDER, LYOPHILIZED, FOR SOLUTION INTRAVENOUS at 16:53

## 2023-03-27 RX ADMIN — Medication 50 MILLIGRAM(S): at 06:07

## 2023-03-27 RX ADMIN — ATORVASTATIN CALCIUM 40 MILLIGRAM(S): 80 TABLET, FILM COATED ORAL at 21:13

## 2023-03-27 RX ADMIN — Medication 650 MILLIGRAM(S): at 15:54

## 2023-03-27 RX ADMIN — HEPARIN SODIUM 19 UNIT(S)/HR: 5000 INJECTION INTRAVENOUS; SUBCUTANEOUS at 08:55

## 2023-03-27 RX ADMIN — Medication 50 MILLIGRAM(S): at 16:52

## 2023-03-27 RX ADMIN — Medication 500 MILLIGRAM(S): at 00:00

## 2023-03-27 RX ADMIN — PIPERACILLIN AND TAZOBACTAM 25 GRAM(S): 4; .5 INJECTION, POWDER, LYOPHILIZED, FOR SOLUTION INTRAVENOUS at 01:46

## 2023-03-27 RX ADMIN — PIPERACILLIN AND TAZOBACTAM 25 GRAM(S): 4; .5 INJECTION, POWDER, LYOPHILIZED, FOR SOLUTION INTRAVENOUS at 10:39

## 2023-03-27 RX ADMIN — ENTECAVIR 0.5 MILLIGRAM(S): 0.5 TABLET ORAL at 11:28

## 2023-03-27 RX ADMIN — Medication 650 MILLIGRAM(S): at 22:33

## 2023-03-27 RX ADMIN — SODIUM CHLORIDE 75 MILLILITER(S): 9 INJECTION INTRAMUSCULAR; INTRAVENOUS; SUBCUTANEOUS at 04:10

## 2023-03-27 NOTE — PROGRESS NOTE ADULT - ASSESSMENT
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 Found to be febrile on admission, ID consulted for further management.    Tmax 102.4 (3/23)  WBC 3.5K today  Urinalysis unremarkable  RVP neg  CXR unremarkable      Impression:  #Fever  #Immunocompromised Status, H/O Splenectomy  #pancytopenia       Fever of unclear etiology, only localizing finding is watery BM x 1 day duration and abdominal discomfort. Non-infectious etiologies on DDx as well, DVT noted on LE Duplex.   could fever be due to his chemo regimen.       PLAN:  - c/w Zosyn 3.375g IV q8H  - V/Q scan with low probability of PE   - follow up blood cultures, NTD   - GI PCR- negative   - trend cbc for pancytopenia   - repeat blood cx in lab  - CMV PCR, cmv IGG and CMV IGM in lab  - cryptococcal ag, QuantiFeronTb, fungitell in lab       Dejuan Ralph  Please contact through MS Teams   If no response or past 5 pm/weekend call [phone].                    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 Found to be febrile on admission, ID consulted for further management.    Tmax 102.4 (3/23)  WBC 3.5K today  Urinalysis unremarkable  RVP neg  CXR unremarkable      Impression:  #Fever  #Immunocompromised Status, H/O Splenectomy  #pancytopenia       Fever of unclear etiology, only localizing finding is watery BM x 1 day duration and abdominal discomfort. Non-infectious etiologies on DDx as well, DVT noted on LE Duplex.   could fever be due to his chemo regimen.       PLAN:  - c/w Zosyn 3.375g IV q8H  - V/Q scan with low probability of PE   - follow up blood cultures, NTD   - GI PCR- negative   - trend cbc for pancytopenia   - repeat blood cx in lab  - CMV PCR, cmv IGG and CMV IGM in lab  - cryptococcal ag, QuantiFeronTb, fungitell in lab       Dejuan Ralph  Please contact through MS Teams   If no response or past 5 pm/weekend call [phone].                    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 Found to be febrile on admission, ID consulted for further management.    Tmax 102.4 (3/23)  WBC 3.5K today  Urinalysis unremarkable  RVP neg  CXR unremarkable      Impression:  #Fever  #Immunocompromised Status, H/O Splenectomy  #pancytopenia       Fever of unclear etiology, only localizing finding is watery BM x 1 day duration and abdominal discomfort. Non-infectious etiologies on DDx as well, DVT noted on LE Duplex.   could fever be due to his chemo regimen.       PLAN:  - c/w Zosyn 3.375g IV q8H  - V/Q scan with low probability of PE   - follow up blood cultures, NTD   - GI PCR- negative   - trend cbc for pancytopenia   - repeat blood cx in lab  - CMV PCR, cmv IGG and CMV IGM in lab  - cryptococcal ag, QuantiFeronTb, fungitell in lab       Dejuan Ralph  Please contact through MS Teams   If no response or past 5 pm/weekend call [phone].

## 2023-03-27 NOTE — PROGRESS NOTE ADULT - ASSESSMENT
PETER DE LA PAZ is a 67y Male who presents with a chief complaint of fever.    Multiple Myeloma  Pancytopenia  ·	Patient follows with Dr. Jean Claude Hubbard, [facility].  ·	Patient had been on lenalidomide maintenance up until February 2023, when he had PET/CT that showed progression of disease with worsening lymphadenopathy, bony lesions, and liver lesion.   ·	He was started then on daratumumab + cyclophosphamide + bortezomib + dexamethasone.  ·	Last dose of daratumumab was on March 23rd.  ·	Last dose of bortezomib was on March 23rd  ·	Last dose of cyclophosphamide was on March 13th  ·	Patient has had worsening pancytopenia since starting the latest chemotherapy regimen.  ·	Hold systemic treatment while inpatient.  ·	Monitor CBC and transfuse to maintain HGB > 7 and PLT > 10.  ·	Continue prophylactic acyclovir 400 mg BID as patient is on bortezomib  ·	counts are stable today, WBC slightly decreased at 3.2, hgb stable at 7.5, plts improved at 58  ·	FOB+  ·	folate, Fe, b12 adequate    Fever  ·	infection vs disease  ·	Tmax 102 on admission, now recurrent  ·	s/p VQ scan 3/27  r/o PE- unremarkable  ·	ID consulted, on IV abx  ·	w/up NTD, repeat fever w/u per ID  ·	outpatient respiratory PCR negative    diarrhea   -- neg stool PCR  -- monitor    LE distal DVT  -- on hep gtt at this time, vascular seen, plts so far adequate for AC  -- pt with high risk for propagation of distal DVT. However, if plts fall, this is distal and can monitor with serial dopplers as well  -- monitor, will need to determine timing of switching to PO AC, would wait until plts stable      Will continue to follow.  Steph Holly NP  Hematology/ Oncology  [facility]  [phone] (office)  [phone] (alt office)  Evenings and weekends please call MD on call or office     PETER DE LA PAZ is a 67y Male who presents with a chief complaint of fever.    Multiple Myeloma  Pancytopenia  ·	Patient follows with Dr. Jean Claude Hubbard, [facility].  ·	Patient had been on lenalidomide maintenance up until February 2023, when he had PET/CT that showed progression of disease with worsening lymphadenopathy, bony lesions, and liver lesion.   ·	He was started then on daratumumab + cyclophosphamide + bortezomib + dexamethasone.  ·	Last dose of daratumumab was on March 23rd.  ·	Last dose of bortezomib was on March 23rd  ·	Last dose of cyclophosphamide was on March 13th  ·	Patient has had worsening pancytopenia since starting the latest chemotherapy regimen.  ·	Hold systemic treatment while inpatient.  ·	Monitor CBC and transfuse to maintain HGB > 7 and PLT > 10.  ·	Continue prophylactic acyclovir 400 mg BID as patient is on bortezomib  ·	counts are stable today, WBC slightly decreased at 3.2, hgb stable at 7.5, plts improved at 58  ·	FOB+  ·	folate, Fe, b12 adequate    Fever  ·	infection vs disease  ·	Tmax 102 on admission, now recurrent  ·	s/p VQ scan 3/27  r/o PE- unremarkable  ·	ID consulted, on IV abx  ·	w/up NTD, repeat fever w/u per ID  ·	outpatient respiratory PCR negative    diarrhea   -- neg stool PCR  -- monitor    LE distal DVT  -- on hep gtt at this time, vascular seen, plts so far adequate for AC  -- pt with high risk for propagation of distal DVT. However, if plts fall, this is distal and can monitor with serial dopplers as well  -- monitor, will need to determine timing of switching to PO AC, would wait until plts stable      Will continue to follow.  Steph Holly NP  Hematology/ Oncology  [facility]  [phone] (office)  [phone] (alt office)  Evenings and weekends please call MD on call or office     PETER DE LA PAZ is a 67y Male who presents with a chief complaint of fever.    Multiple Myeloma  Pancytopenia  ·	Patient follows with Dr. Jean Claude Hubbard, [facility].  ·	Patient had been on lenalidomide maintenance up until February 2023, when he had PET/CT that showed progression of disease with worsening lymphadenopathy, bony lesions, and liver lesion.   ·	He was started then on daratumumab + cyclophosphamide + bortezomib + dexamethasone.  ·	Last dose of daratumumab was on March 23rd.  ·	Last dose of bortezomib was on March 23rd  ·	Last dose of cyclophosphamide was on March 13th  ·	Patient has had worsening pancytopenia since starting the latest chemotherapy regimen.  ·	Hold systemic treatment while inpatient.  ·	Monitor CBC and transfuse to maintain HGB > 7 and PLT > 10.  ·	Continue prophylactic acyclovir 400 mg BID as patient is on bortezomib  ·	counts are stable today, WBC slightly decreased at 3.2, hgb stable at 7.5, plts improved at 58  ·	FOB+  ·	folate, Fe, b12 adequate    Fever  ·	infection vs disease  ·	Tmax 102 on admission, now recurrent  ·	s/p VQ scan 3/27  r/o PE- unremarkable  ·	ID consulted, on IV abx  ·	w/up NTD, repeat fever w/u per ID  ·	outpatient respiratory PCR negative    diarrhea   -- neg stool PCR  -- monitor    LE distal DVT  -- on hep gtt at this time, vascular seen, plts so far adequate for AC  -- pt with high risk for propagation of distal DVT. However, if plts fall, this is distal and can monitor with serial dopplers as well  -- monitor, will need to determine timing of switching to PO AC, would wait until plts stable      Will continue to follow.  Steph Holly NP  Hematology/ Oncology  [facility]  [phone] (office)  [phone] (alt office)  Evenings and weekends please call MD on call or office

## 2023-03-27 NOTE — PROGRESS NOTE ADULT - ASSESSMENT
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   - no obvious infectious symptoms, CXR without consolidations or effusions   - U/A unremarkable  - F/u BCx2 and UCx  --> NGTD; F/u final   - Febrile 03/26. F/U repeat BCx2 sent 03/27. ABX as per ID   - less likely transfusion reaction as pt febrile prior to transfusion  - [facility] duplex --> + For DVT    - Was on empiric treatment with Vanc/cefepime --> ID consulted; Now on Zosyn  - Check GI PCR -- Neg   - Hold home penicillin while on ABX   - Check V/Q scan to R/O PE   - ID to follow up, infectious work up as per ID  --> F/u CMP PCR, CMV IGG and IgM, Cryptococcal Ag, Quantiferon Tb, Fungitell     Multiple myeloma.   - pancytopenia largely at baseline although Hg 6.7 on arrival  - s/p 1U PRBC with good response   - c/w home acyclovir  - c/w home entecavir   - c/w home dexamethasone   - Pt reports his Revlimid was held  - Heme/Onc consulted; F/u recs   - Hold home aspirin.    DVT   - Duplex + For R soleal vein DVT  --> Will consider CT A chest once creatinine permits as patient received contrast; Monitor for LOPEZ   - On Hep gtt; Monitor PTT; Monitor H/H closely  - Check VQ scan to R/O PE  - Vascular eval appreciated; C/w AC and Serial Duplex outpatient    RK  - S/P COntrast on 03/23   - Check bladder scan to R/O retention  - C/w IVF for hydration  - Monitor Cr closely; Avoid nephrotoxic agents     Pancytopenia  - Monitor counts closely  - Transfuse for Hgb < 7.0 and platelets < 10K  - Monitor H/H closely    Hypertension.   - C/w home metoprolol.    Hyperlipidemia.   - C/w home atorvastatin.    Abnormal CT  - Outpatient follow up for CT findings    Prophylactic measure.   dvt ppx: Hep GTT   diet: regular  ambulate: with assistance    fall precautions  aspiration precautions.      Discussed with patient and his wife at the bedside.  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   - no obvious infectious symptoms, CXR without consolidations or effusions   - U/A unremarkable  - F/u BCx2 and UCx  --> NGTD; F/u final   - Febrile 03/26. F/U repeat BCx2 sent 03/27. ABX as per ID   - less likely transfusion reaction as pt febrile prior to transfusion  - [facility] duplex --> + For DVT    - Was on empiric treatment with Vanc/cefepime --> ID consulted; Now on Zosyn  - Check GI PCR -- Neg   - Hold home penicillin while on ABX   - Check V/Q scan to R/O PE   - ID to follow up, infectious work up as per ID  --> F/u CMP PCR, CMV IGG and IgM, Cryptococcal Ag, Quantiferon Tb, Fungitell     Multiple myeloma.   - pancytopenia largely at baseline although Hg 6.7 on arrival  - s/p 1U PRBC with good response   - c/w home acyclovir  - c/w home entecavir   - c/w home dexamethasone   - Pt reports his Revlimid was held  - Heme/Onc consulted; F/u recs   - Hold home aspirin.    DVT   - Duplex + For R soleal vein DVT  --> Will consider CT A chest once creatinine permits as patient received contrast; Monitor for LOPEZ   - On Hep gtt; Monitor PTT; Monitor H/H closely  - Check VQ scan to R/O PE  - Vascular eval appreciated; C/w AC and Serial Duplex outpatient    RK  - S/P COntrast on 03/23   - Check bladder scan to R/O retention  - C/w IVF for hydration  - Monitor Cr closely; Avoid nephrotoxic agents     Pancytopenia  - Monitor counts closely  - Transfuse for Hgb < 7.0 and platelets < 10K  - Monitor H/H closely    Hypertension.   - C/w home metoprolol.    Hyperlipidemia.   - C/w home atorvastatin.    Abnormal CT  - Outpatient follow up for CT findings    Prophylactic measure.   dvt ppx: Hep GTT   diet: regular  ambulate: with assistance    fall precautions  aspiration precautions.      Discussed with patient and his wife at the bedside.  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   - no obvious infectious symptoms, CXR without consolidations or effusions   - U/A unremarkable  - F/u BCx2 and UCx  --> NGTD; F/u final   - Febrile 03/26. F/U repeat BCx2 sent 03/27. ABX as per ID   - less likely transfusion reaction as pt febrile prior to transfusion  - [facility] duplex --> + For DVT    - Was on empiric treatment with Vanc/cefepime --> ID consulted; Now on Zosyn  - Check GI PCR -- Neg   - Hold home penicillin while on ABX   - Check V/Q scan to R/O PE   - ID to follow up, infectious work up as per ID  --> F/u CMP PCR, CMV IGG and IgM, Cryptococcal Ag, Quantiferon Tb, Fungitell     Multiple myeloma.   - pancytopenia largely at baseline although Hg 6.7 on arrival  - s/p 1U PRBC with good response   - c/w home acyclovir  - c/w home entecavir   - c/w home dexamethasone   - Pt reports his Revlimid was held  - Heme/Onc consulted; F/u recs   - Hold home aspirin.    DVT   - Duplex + For R soleal vein DVT  --> Will consider CT A chest once creatinine permits as patient received contrast; Monitor for LOPEZ   - On Hep gtt; Monitor PTT; Monitor H/H closely  - Check VQ scan to R/O PE  - Vascular eval appreciated; C/w AC and Serial Duplex outpatient    RK  - S/P COntrast on 03/23   - Check bladder scan to R/O retention  - C/w IVF for hydration  - Monitor Cr closely; Avoid nephrotoxic agents     Pancytopenia  - Monitor counts closely  - Transfuse for Hgb < 7.0 and platelets < 10K  - Monitor H/H closely    Hypertension.   - C/w home metoprolol.    Hyperlipidemia.   - C/w home atorvastatin.    Abnormal CT  - Outpatient follow up for CT findings    Prophylactic measure.   dvt ppx: Hep GTT   diet: regular  ambulate: with assistance    fall precautions  aspiration precautions.      Discussed with patient and his wife at the bedside.  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   - no obvious infectious symptoms, CXR without consolidations or effusions   - U/A unremarkable  - F/u BCx2 and UCx  --> NGTD; F/u final   - Febrile 03/26. F/U repeat BCx2 sent 03/27. ABX as per ID   - less likely transfusion reaction as pt febrile prior to transfusion  - [facility] duplex --> + For DVT    - Was on empiric treatment with Vanc/cefepime --> ID consulted; Now on Zosyn  - Check GI PCR -- Neg   - Hold home penicillin while on ABX   - Check V/Q scan to R/O PE   - ID to follow up, infectious work up as per ID  --> F/u CMP PCR, CMV IGG and IgM, Cryptococcal Ag, Quantiferon Tb, Fungitell     Multiple myeloma.   - pancytopenia largely at baseline although Hg 6.7 on arrival  - s/p 1U PRBC with good response   - c/w home acyclovir  - c/w home entecavir   - c/w home dexamethasone   - Pt reports his Revlimid was held  - Heme/Onc consulted; F/u recs   - Resume home aspirin.    DVT   - Duplex + For R soleal vein DVT  --> Will consider CT A chest once creatinine permits as patient received contrast; Monitor for LOPEZ   - On Hep gtt; Monitor PTT; Monitor H/H closely  - Check VQ scan to R/O PE  - Vascular eval appreciated; C/w AC and Serial Duplex outpatient    RK  - S/P COntrast on 03/23   - Check bladder scan to R/O retention  - C/w IVF for hydration  - Monitor Cr closely; Avoid nephrotoxic agents     Pancytopenia  - Monitor counts closely  - Transfuse for Hgb < 7.0 and platelets < 10K  - Monitor H/H closely    Hypertension.   - C/w home metoprolol.    Hyperlipidemia.   - C/w home atorvastatin.    Abnormal CT  - Outpatient follow up for CT findings    Prophylactic measure.   dvt ppx: Hep GTT   diet: regular  ambulate: with assistance    fall precautions  aspiration precautions.      Discussed with patient and his wife at the bedside.  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   - no obvious infectious symptoms, CXR without consolidations or effusions   - U/A unremarkable  - F/u BCx2 and UCx  --> NGTD; F/u final   - Febrile 03/26. F/U repeat BCx2 sent 03/27. ABX as per ID   - less likely transfusion reaction as pt febrile prior to transfusion  - [facility] duplex --> + For DVT    - Was on empiric treatment with Vanc/cefepime --> ID consulted; Now on Zosyn  - Check GI PCR -- Neg   - Hold home penicillin while on ABX   - Check V/Q scan to R/O PE   - ID to follow up, infectious work up as per ID  --> F/u CMP PCR, CMV IGG and IgM, Cryptococcal Ag, Quantiferon Tb, Fungitell     Multiple myeloma.   - pancytopenia largely at baseline although Hg 6.7 on arrival  - s/p 1U PRBC with good response   - c/w home acyclovir  - c/w home entecavir   - c/w home dexamethasone   - Pt reports his Revlimid was held  - Heme/Onc consulted; F/u recs   - Resume home aspirin.    DVT   - Duplex + For R soleal vein DVT  --> Will consider CT A chest once creatinine permits as patient received contrast; Monitor for LOPEZ   - On Hep gtt; Monitor PTT; Monitor H/H closely  - Check VQ scan to R/O PE  - Vascular eval appreciated; C/w AC and Serial Duplex outpatient    RK  - S/P COntrast on 03/23   - Check bladder scan to R/O retention  - C/w IVF for hydration  - Monitor Cr closely; Avoid nephrotoxic agents     Pancytopenia  - Monitor counts closely  - Transfuse for Hgb < 7.0 and platelets < 10K  - Monitor H/H closely    Hypertension.   - C/w home metoprolol.    Hyperlipidemia.   - C/w home atorvastatin.    Abnormal CT  - Outpatient follow up for CT findings    Prophylactic measure.   dvt ppx: Hep GTT   diet: regular  ambulate: with assistance    fall precautions  aspiration precautions.      Discussed with patient and his wife at the bedside.  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   - no obvious infectious symptoms, CXR without consolidations or effusions   - U/A unremarkable  - F/u BCx2 and UCx  --> NGTD; F/u final   - Febrile 03/26. F/U repeat BCx2 sent 03/27. ABX as per ID   - less likely transfusion reaction as pt febrile prior to transfusion  - [facility] duplex --> + For DVT    - Was on empiric treatment with Vanc/cefepime --> ID consulted; Now on Zosyn  - Check GI PCR -- Neg   - Hold home penicillin while on ABX   - Check V/Q scan to R/O PE   - ID to follow up, infectious work up as per ID  --> F/u CMP PCR, CMV IGG and IgM, Cryptococcal Ag, Quantiferon Tb, Fungitell     Multiple myeloma.   - pancytopenia largely at baseline although Hg 6.7 on arrival  - s/p 1U PRBC with good response   - c/w home acyclovir  - c/w home entecavir   - c/w home dexamethasone   - Pt reports his Revlimid was held  - Heme/Onc consulted; F/u recs   - Resume home aspirin.    DVT   - Duplex + For R soleal vein DVT  --> Will consider CT A chest once creatinine permits as patient received contrast; Monitor for LOPEZ   - On Hep gtt; Monitor PTT; Monitor H/H closely  - Check VQ scan to R/O PE  - Vascular eval appreciated; C/w AC and Serial Duplex outpatient    RK  - S/P COntrast on 03/23   - Check bladder scan to R/O retention  - C/w IVF for hydration  - Monitor Cr closely; Avoid nephrotoxic agents     Pancytopenia  - Monitor counts closely  - Transfuse for Hgb < 7.0 and platelets < 10K  - Monitor H/H closely    Hypertension.   - C/w home metoprolol.    Hyperlipidemia.   - C/w home atorvastatin.    Abnormal CT  - Outpatient follow up for CT findings    Prophylactic measure.   dvt ppx: Hep GTT   diet: regular  ambulate: with assistance    fall precautions  aspiration precautions.      Discussed with patient and his wife at the bedside.

## 2023-03-27 NOTE — PROGRESS NOTE ADULT - SUBJECTIVE AND OBJECTIVE BOX
67yPatient is a 67y old  Male who presents with a chief complaint of fever (27 Mar 2023 17:02)      Interval history:  Febrile, no obvious localizing complains. Soft BM.       Allergies:   No Known Allergies      Antimicrobials:  acyclovir   Oral Tab/Cap 400 milliGRAM(s) Oral daily  entecavir 0.5 milliGRAM(s) Oral daily  piperacillin/tazobactam IVPB.. 3.375 Gram(s) IV Intermittent every 8 hours      REVIEW OF SYSTEMS:  No chest pain   No SOB  No abdominal pain  No rash.       Vital Signs Last 24 Hrs  T(C): 39.2 (03-27-23 @ 15:55), Max: 39.4 (03-26-23 @ 19:34)  T(F): 102.6 (03-27-23 @ 15:55), Max: 103 (03-26-23 @ 19:34)  HR: 94 (03-27-23 @ 15:55) (73 - 94)  BP: 100/56 (03-27-23 @ 15:55) (91/41 - 109/65)  BP(mean): --  RR: 18 (03-27-23 @ 15:55) (18 - 18)  SpO2: 94% (03-27-23 @ 15:55) (93% - 97%)      PHYSICAL EXAM:  Patient in no acute distress. AAOX3.  Cardiovascular: S1S2 normal.  Lungs: + air entry B/L lung fields.  Gastrointestinal: soft, nontender, nondistended.  Extremities: no edema.  IV sites not inflamed.                             7.5    2.84  )-----------( 56       ( 27 Mar 2023 11:05 )             21.8   03-27    131<L>  |  97  |  30<H>  ----------------------------<  191<H>  3.8   |  21<L>  |  1.83<H>    Ca    8.7      27 Mar 2023 11:05    TPro  5.2<L>  /  Alb  3.1<L>  /  TBili  0.6  /  DBili  x   /  AST  18  /  ALT  29  /  AlkPhos  77  03-26      LIVER FUNCTIONS - ( 26 Mar 2023 06:57 )  Alb: 3.1 g/dL / Pro: 5.2 g/dL / ALK PHOS: 77 U/L / ALT: 29 U/L / AST: 18 U/L / GGT: x               Culture - Urine (03.23.23 @ 18:13)   Specimen Source: Clean Catch Clean Catch (Midstream)  Culture Results:   <10,000 CFU/mL Normal Urogenital Lali    Culture - Blood (03.23.23 @ 17:35)   Specimen Source: .Blood Blood-Peripheral  Culture Results:   No growth to date.    Radiology:    < from: NM Pulmonary Ventilation/Perfusion Scan (03.27.23 @ 12:13) >  IMPRESSION:    Low probability of pulmonary embolus.        < from: Xray Chest 1 View AP/PA (03.27.23 @ 12:01) >  IMPRESSION:  Clear lungs.  Unchanged right hemidiaphragm elevation.

## 2023-03-27 NOTE — PROGRESS NOTE ADULT - NS ATTEND AMEND GEN_ALL_CORE FT
As above.    66 y/o male with multiple myeloma, recently switched to david-CyBorD, admitted with fever. Counts are stable thus far. Pending additional infectious workup.  Continues to be febrile. No therapy while inpatient. As above.    68 y/o male with multiple myeloma, recently switched to david-CyBorD, admitted with fever. Counts are stable thus far. Pending additional infectious workup.  Continues to be febrile. No therapy while inpatient.

## 2023-03-27 NOTE — PROGRESS NOTE ADULT - SUBJECTIVE AND OBJECTIVE BOX
Name of Patient : PETER DE LA PAZ  MRN: [id number]  Date of visit: 03-27-23 @ 14:10      Subjective: Patient seen and examined. No new events except as noted.   Patient seen earlier this AM. C/o generalized weakness  Febrile overnight wit Tmax of 103. Repeat cultures sent this AM  Wife at the bedside.       REVIEW OF SYSTEMS:    CONSTITUTIONAL: Generalized weakness; Febrile overnight Tmax of 103   EYES/ENT: No visual changes;  No vertigo or throat pain   NECK: No pain or stiffness  RESPIRATORY: No cough, wheezing, hemoptysis; No shortness of breath  CARDIOVASCULAR: No chest pain or palpitations  GASTROINTESTINAL: No abdominal or epigastric pain. No nausea, vomiting, or hematemesis; No diarrhea or constipation. No melena or hematochezia.  GENITOURINARY: No dysuria, frequency or hematuria  NEUROLOGICAL: No numbness or weakness  SKIN: No itching, burning, rashes, or lesions   All other review of systems is negative unless indicated above.    MEDICATIONS:  MEDICATIONS  (STANDING):  acyclovir   Oral Tab/Cap 400 milliGRAM(s) Oral daily  atorvastatin 40 milliGRAM(s) Oral at bedtime  dexAMETHasone     Tablet 4 milliGRAM(s) Oral daily  entecavir 0.5 milliGRAM(s) Oral daily  heparin  Infusion. 1900 Unit(s)/Hr (19 mL/Hr) IV Continuous <Continuous>  metoprolol tartrate 50 milliGRAM(s) Oral two times a day  piperacillin/tazobactam IVPB.. 3.375 Gram(s) IV Intermittent every 8 hours  sodium chloride 0.9%. 1000 milliLiter(s) (75 mL/Hr) IV Continuous <Continuous>  sodium chloride 0.9%. 1000 milliLiter(s) (75 mL/Hr) IV Continuous <Continuous>      PHYSICAL EXAM:  T(C): 37.4 (03-27-23 @ 11:15), Max: 39.4 (03-26-23 @ 19:34)  HR: 91 (03-27-23 @ 11:15) (73 - 91)  BP: 97/58 (03-27-23 @ 11:15) (91/41 - 109/65)  RR: 18 (03-27-23 @ 11:15) (18 - 18)  SpO2: 95% (03-27-23 @ 11:15) (93% - 97%)  Wt(kg): --  I&O's Summary    26 Mar 2023 07:01  -  27 Mar 2023 07:00  --------------------------------------------------------  IN: 1129 mL / OUT: 1100 mL / NET: 29 mL    27 Mar 2023 07:01  -  27 Mar 2023 14:10  --------------------------------------------------------  IN: 200 mL / OUT: 0 mL / NET: 200 mL          Appearance: Awake, weak appearing male, lying down in bed 	  HEENT:  Eyes are open   Lymphatic: No lymphadenopathy   Cardiovascular: Normal S1 S2, no JVD  Respiratory: normal effort , clear  Gastrointestinal:  Soft, Non-tender  Skin: No rashes,  warm to touch  Psychiatry:  Mood & affect appropriate  Musculoskeletal: No edema        03-26-23 @ 07:01  -  03-27-23 @ 07:00  --------------------------------------------------------  IN: 1129 mL / OUT: 1100 mL / NET: 29 mL    03-27-23 @ 07:01  -  03-27-23 @ 14:10  --------------------------------------------------------  IN: 200 mL / OUT: 0 mL / NET: 200 mL                                  7.5    2.84  )-----------( 56       ( 27 Mar 2023 11:05 )             21.8               03-27    131<L>  |  97  |  30<H>  ----------------------------<  191<H>  3.8   |  21<L>  |  1.83<H>    Ca    8.7      27 Mar 2023 11:05    TPro  5.2<L>  /  Alb  3.1<L>  /  TBili  0.6  /  DBili  x   /  AST  18  /  ALT  29  /  AlkPhos  77  03-26    PTT - ( 27 Mar 2023 08:06 )  PTT:69.6 sec                     Culture - Urine (03.23.23 @ 18:13)   Specimen Source: Clean Catch Clean Catch (Midstream)  Culture Results: <10,000 CFU/mL Normal Urogenital Lali      Culture - Blood (03.23.23 @ 17:35)   Specimen Source: .Blood Blood-Peripheral  Culture Results: No growth to date.      Culture - Blood (03.23.23 @ 17:20)   Specimen Source: .Blood Blood-Peripheral  Culture Results: No growth to date.     Name of Patient : PETER DE LA PAZ  MRN: [id number]  Date of visit: 03-27-23 @ 14:10      Subjective: Patient seen and examined. No new events except as noted.   Patient seen earlier this AM. C/o generalized weakness  Febrile overnight wit Tmax of 103. Repeat cultures sent this AM  Wife at the bedside.       REVIEW OF SYSTEMS:    CONSTITUTIONAL: Generalized weakness; Febrile overnight Tmax of 103   EYES/ENT: No visual changes;  No vertigo or throat pain   NECK: No pain or stiffness  RESPIRATORY: No cough, wheezing, hemoptysis; No shortness of breath  CARDIOVASCULAR: No chest pain or palpitations  GASTROINTESTINAL: No abdominal or epigastric pain. No nausea, vomiting, or hematemesis; No diarrhea or constipation. No melena or hematochezia.  GENITOURINARY: No dysuria, frequency or hematuria  NEUROLOGICAL: No numbness or weakness  SKIN: No itching, burning, rashes, or lesions   All other review of systems is negative unless indicated above.    MEDICATIONS:  MEDICATIONS  (STANDING):  acyclovir   Oral Tab/Cap 400 milliGRAM(s) Oral daily  atorvastatin 40 milliGRAM(s) Oral at bedtime  dexAMETHasone     Tablet 4 milliGRAM(s) Oral daily  entecavir 0.5 milliGRAM(s) Oral daily  heparin  Infusion. 1900 Unit(s)/Hr (19 mL/Hr) IV Continuous <Continuous>  metoprolol tartrate 50 milliGRAM(s) Oral two times a day  piperacillin/tazobactam IVPB.. 3.375 Gram(s) IV Intermittent every 8 hours  sodium chloride 0.9%. 1000 milliLiter(s) (75 mL/Hr) IV Continuous <Continuous>  sodium chloride 0.9%. 1000 milliLiter(s) (75 mL/Hr) IV Continuous <Continuous>      PHYSICAL EXAM:  T(C): 37.4 (03-27-23 @ 11:15), Max: 39.4 (03-26-23 @ 19:34)  HR: 91 (03-27-23 @ 11:15) (73 - 91)  BP: 97/58 (03-27-23 @ 11:15) (91/41 - 109/65)  RR: 18 (03-27-23 @ 11:15) (18 - 18)  SpO2: 95% (03-27-23 @ 11:15) (93% - 97%)  Wt(kg): --  I&O's Summary    26 Mar 2023 07:01  -  27 Mar 2023 07:00  --------------------------------------------------------  IN: 1129 mL / OUT: 1100 mL / NET: 29 mL    27 Mar 2023 07:01  -  27 Mar 2023 14:10  --------------------------------------------------------  IN: 200 mL / OUT: 0 mL / NET: 200 mL          Appearance: Awake, weak appearing male, lying down in bed 	  HEENT:  Eyes are open   Lymphatic: No lymphadenopathy   Cardiovascular: Normal S1 S2, no JVD  Respiratory: normal effort , clear  Gastrointestinal:  Soft, Non-tender  Skin: No rashes,  warm to touch  Psychiatry:  Mood & affect appropriate  Musculoskeletal: No edema        03-26-23 @ 07:01  -  03-27-23 @ 07:00  --------------------------------------------------------  IN: 1129 mL / OUT: 1100 mL / NET: 29 mL    03-27-23 @ 07:01  -  03-27-23 @ 14:10  --------------------------------------------------------  IN: 200 mL / OUT: 0 mL / NET: 200 mL                                  7.5    2.84  )-----------( 56       ( 27 Mar 2023 11:05 )             21.8               03-27    131<L>  |  97  |  30<H>  ----------------------------<  191<H>  3.8   |  21<L>  |  1.83<H>    Ca    8.7      27 Mar 2023 11:05    TPro  5.2<L>  /  Alb  3.1<L>  /  TBili  0.6  /  DBili  x   /  AST  18  /  ALT  29  /  AlkPhos  77  03-26    PTT - ( 27 Mar 2023 08:06 )  PTT:69.6 sec                     Culture - Urine (03.23.23 @ 18:13)   Specimen Source: Clean Catch Clean Catch (Midstream)  Culture Results: <10,000 CFU/mL Normal Urogenital Lali      Culture - Blood (03.23.23 @ 17:35)   Specimen Source: .Blood Blood-Peripheral  Culture Results: No growth to date.      Culture - Blood (03.23.23 @ 17:20)   Specimen Source: .Blood Blood-Peripheral  Culture Results: No growth to date.     Name of Patient : PETER DE LA PAZ  MRN: [id number]  Date of visit: 03-27-23 @ 14:10      Subjective: Patient seen and examined. No new events except as noted.   Patient seen earlier this AM. C/o generalized weakness  Febrile overnight wit Tmax of 103. Repeat cultures sent this AM  Wife at the bedside.       REVIEW OF SYSTEMS:    CONSTITUTIONAL: Generalized weakness; Febrile overnight Tmax of 103   EYES/ENT: No visual changes;  No vertigo or throat pain   NECK: No pain or stiffness  RESPIRATORY: No cough, wheezing, hemoptysis; No shortness of breath  CARDIOVASCULAR: No chest pain or palpitations  GASTROINTESTINAL: No abdominal or epigastric pain. No nausea, vomiting, or hematemesis; No diarrhea or constipation. No melena or hematochezia.  GENITOURINARY: No dysuria, frequency or hematuria  NEUROLOGICAL: No numbness or weakness  SKIN: No itching, burning, rashes, or lesions   All other review of systems is negative unless indicated above.    MEDICATIONS:  MEDICATIONS  (STANDING):  acyclovir   Oral Tab/Cap 400 milliGRAM(s) Oral daily  atorvastatin 40 milliGRAM(s) Oral at bedtime  dexAMETHasone     Tablet 4 milliGRAM(s) Oral daily  entecavir 0.5 milliGRAM(s) Oral daily  heparin  Infusion. 1900 Unit(s)/Hr (19 mL/Hr) IV Continuous <Continuous>  metoprolol tartrate 50 milliGRAM(s) Oral two times a day  piperacillin/tazobactam IVPB.. 3.375 Gram(s) IV Intermittent every 8 hours  sodium chloride 0.9%. 1000 milliLiter(s) (75 mL/Hr) IV Continuous <Continuous>  sodium chloride 0.9%. 1000 milliLiter(s) (75 mL/Hr) IV Continuous <Continuous>      PHYSICAL EXAM:  T(C): 37.4 (03-27-23 @ 11:15), Max: 39.4 (03-26-23 @ 19:34)  HR: 91 (03-27-23 @ 11:15) (73 - 91)  BP: 97/58 (03-27-23 @ 11:15) (91/41 - 109/65)  RR: 18 (03-27-23 @ 11:15) (18 - 18)  SpO2: 95% (03-27-23 @ 11:15) (93% - 97%)  Wt(kg): --  I&O's Summary    26 Mar 2023 07:01  -  27 Mar 2023 07:00  --------------------------------------------------------  IN: 1129 mL / OUT: 1100 mL / NET: 29 mL    27 Mar 2023 07:01  -  27 Mar 2023 14:10  --------------------------------------------------------  IN: 200 mL / OUT: 0 mL / NET: 200 mL          Appearance: Awake, weak appearing male, lying down in bed 	  HEENT:  Eyes are open   Lymphatic: No lymphadenopathy   Cardiovascular: Normal S1 S2, no JVD  Respiratory: normal effort , clear  Gastrointestinal:  Soft, Non-tender  Skin: No rashes,  warm to touch  Psychiatry:  Mood & affect appropriate  Musculoskeletal: No edema        03-26-23 @ 07:01  -  03-27-23 @ 07:00  --------------------------------------------------------  IN: 1129 mL / OUT: 1100 mL / NET: 29 mL    03-27-23 @ 07:01  -  03-27-23 @ 14:10  --------------------------------------------------------  IN: 200 mL / OUT: 0 mL / NET: 200 mL                                  7.5    2.84  )-----------( 56       ( 27 Mar 2023 11:05 )             21.8               03-27    131<L>  |  97  |  30<H>  ----------------------------<  191<H>  3.8   |  21<L>  |  1.83<H>    Ca    8.7      27 Mar 2023 11:05    TPro  5.2<L>  /  Alb  3.1<L>  /  TBili  0.6  /  DBili  x   /  AST  18  /  ALT  29  /  AlkPhos  77  03-26    PTT - ( 27 Mar 2023 08:06 )  PTT:69.6 sec                     Culture - Urine (03.23.23 @ 18:13)   Specimen Source: Clean Catch Clean Catch (Midstream)  Culture Results: <10,000 CFU/mL Normal Urogenital Lali      Culture - Blood (03.23.23 @ 17:35)   Specimen Source: .Blood Blood-Peripheral  Culture Results: No growth to date.      Culture - Blood (03.23.23 @ 17:20)   Specimen Source: .Blood Blood-Peripheral  Culture Results: No growth to date.

## 2023-03-27 NOTE — PROGRESS NOTE ADULT - SUBJECTIVE AND OBJECTIVE BOX
Patient is a 67y old  Male who presents with a chief complaint of fever (27 Mar 2023 14:10)    Patient seen and examined at bedside.  No new complaints offered.  resting comfortably.    MEDICATIONS  (STANDING):  acyclovir   Oral Tab/Cap 400 milliGRAM(s) Oral daily  aspirin enteric coated 81 milliGRAM(s) Oral daily  atorvastatin 40 milliGRAM(s) Oral at bedtime  dexAMETHasone     Tablet 4 milliGRAM(s) Oral daily  entecavir 0.5 milliGRAM(s) Oral daily  heparin  Infusion. 1900 Unit(s)/Hr (19 mL/Hr) IV Continuous <Continuous>  metoprolol tartrate 50 milliGRAM(s) Oral two times a day  piperacillin/tazobactam IVPB.. 3.375 Gram(s) IV Intermittent every 8 hours  sodium chloride 0.9%. 1000 milliLiter(s) (75 mL/Hr) IV Continuous <Continuous>  sodium chloride 0.9%. 1000 milliLiter(s) (75 mL/Hr) IV Continuous <Continuous>    MEDICATIONS  (PRN):  acetaminophen     Tablet .. 650 milliGRAM(s) Oral every 6 hours PRN Temp greater or equal to 38C (100.4F), Mild Pain (1 - 3)  heparin   Injectable 6500 Unit(s) IV Push every 6 hours PRN For aPTT less than 40  heparin   Injectable 3000 Unit(s) IV Push every 6 hours PRN For aPTT between 40 - 57      Vital Signs Last 24 Hrs  T(C): 39.2 (27 Mar 2023 15:55), Max: 39.4 (26 Mar 2023 19:34)  T(F): 102.6 (27 Mar 2023 15:55), Max: 103 (26 Mar 2023 19:34)  HR: 94 (27 Mar 2023 15:55) (73 - 94)  BP: 100/56 (27 Mar 2023 15:55) (91/41 - 109/65)  BP(mean): --  RR: 18 (27 Mar 2023 15:55) (18 - 18)  SpO2: 94% (27 Mar 2023 15:55) (93% - 97%)    Parameters below as of 27 Mar 2023 15:55  Patient On (Oxygen Delivery Method): room air    PE  NAD  Awake, alert  Anicteric, MMM  RRR  CTAB  Abd soft, NT, ND  No c/c/e  No rash grossly  FROM                          7.5    2.84  )-----------( 56       ( 27 Mar 2023 11:05 )             21.8       03-27    131<L>  |  97  |  30<H>  ----------------------------<  191<H>  3.8   |  21<L>  |  1.83<H>    Ca    8.7      27 Mar 2023 11:05    TPro  5.2<L>  /  Alb  3.1<L>  /  TBili  0.6  /  DBili  x   /  AST  18  /  ALT  29  /  AlkPhos  77  03-26

## 2023-03-28 LAB
ANION GAP SERPL CALC-SCNC: 12 MMOL/L — SIGNIFICANT CHANGE UP (ref 5–17)
APPEARANCE UR: ABNORMAL
BACTERIA # UR AUTO: NEGATIVE — SIGNIFICANT CHANGE UP
BILIRUB UR-MCNC: NEGATIVE — SIGNIFICANT CHANGE UP
BLD GP AB SCN SERPL QL: POSITIVE — SIGNIFICANT CHANGE UP
BUN SERPL-MCNC: 34 MG/DL — HIGH (ref 7–23)
CALCIUM SERPL-MCNC: 8.7 MG/DL — SIGNIFICANT CHANGE UP (ref 8.4–10.5)
CHLORIDE SERPL-SCNC: 97 MMOL/L — SIGNIFICANT CHANGE UP (ref 96–108)
CMV DNA CSF QL NAA+PROBE: SIGNIFICANT CHANGE UP
CMV DNA SPEC NAA+PROBE-LOG#: 4.45 LOG10IU/ML — HIGH
CO2 SERPL-SCNC: 20 MMOL/L — LOW (ref 22–31)
COLOR SPEC: SIGNIFICANT CHANGE UP
CREAT ?TM UR-MCNC: 96 MG/DL — SIGNIFICANT CHANGE UP
CREAT SERPL-MCNC: 2.21 MG/DL — HIGH (ref 0.5–1.3)
CRYPTOC AG FLD QL: NEGATIVE — SIGNIFICANT CHANGE UP
CULTURE RESULTS: SIGNIFICANT CHANGE UP
DIFF PNL FLD: ABNORMAL
EGFR: 32 ML/MIN/1.73M2 — LOW
EPI CELLS # UR: 6 /HPF — HIGH
GAMMA INTERFERON BACKGROUND BLD IA-ACNC: 4.04 IU/ML — SIGNIFICANT CHANGE UP
GLUCOSE SERPL-MCNC: 132 MG/DL — HIGH (ref 70–99)
GLUCOSE UR QL: NEGATIVE — SIGNIFICANT CHANGE UP
HCT VFR BLD CALC: 19.2 % — CRITICAL LOW (ref 39–50)
HCT VFR BLD CALC: 21.3 % — LOW (ref 39–50)
HGB BLD-MCNC: 6.7 G/DL — CRITICAL LOW (ref 13–17)
HGB BLD-MCNC: 7.3 G/DL — LOW (ref 13–17)
HYALINE CASTS # UR AUTO: 3 /LPF — HIGH (ref 0–2)
KETONES UR-MCNC: NEGATIVE — SIGNIFICANT CHANGE UP
LEUKOCYTE ESTERASE UR-ACNC: NEGATIVE — SIGNIFICANT CHANGE UP
M TB IFN-G BLD-IMP: NEGATIVE — SIGNIFICANT CHANGE UP
M TB IFN-G CD4+ BCKGRND COR BLD-ACNC: 0.42 IU/ML — SIGNIFICANT CHANGE UP
M TB IFN-G CD4+CD8+ BCKGRND COR BLD-ACNC: 0.56 IU/ML — SIGNIFICANT CHANGE UP
MCHC RBC-ENTMCNC: 31.2 PG — SIGNIFICANT CHANGE UP (ref 27–34)
MCHC RBC-ENTMCNC: 32.8 PG — SIGNIFICANT CHANGE UP (ref 27–34)
MCHC RBC-ENTMCNC: 34.3 GM/DL — SIGNIFICANT CHANGE UP (ref 32–36)
MCHC RBC-ENTMCNC: 34.9 GM/DL — SIGNIFICANT CHANGE UP (ref 32–36)
MCV RBC AUTO: 91 FL — SIGNIFICANT CHANGE UP (ref 80–100)
MCV RBC AUTO: 94.1 FL — SIGNIFICANT CHANGE UP (ref 80–100)
NITRITE UR-MCNC: NEGATIVE — SIGNIFICANT CHANGE UP
NRBC # BLD: 1 /100 WBCS — HIGH (ref 0–0)
NRBC # BLD: 2 /100 WBCS — HIGH (ref 0–0)
PH UR: 6 — SIGNIFICANT CHANGE UP (ref 5–8)
PLATELET # BLD AUTO: 44 K/UL — LOW (ref 150–400)
POTASSIUM SERPL-MCNC: 3.7 MMOL/L — SIGNIFICANT CHANGE UP (ref 3.5–5.3)
POTASSIUM SERPL-SCNC: 3.7 MMOL/L — SIGNIFICANT CHANGE UP (ref 3.5–5.3)
PROT UR-MCNC: ABNORMAL
QUANT TB PLUS MITOGEN MINUS NIL: 2.36 IU/ML — SIGNIFICANT CHANGE UP
RBC # BLD: 2.04 M/UL — LOW (ref 4.2–5.8)
RBC # BLD: 2.34 M/UL — LOW (ref 4.2–5.8)
RBC # FLD: 22.5 % — HIGH (ref 10.3–14.5)
RBC # FLD: 23.4 % — HIGH (ref 10.3–14.5)
RBC CASTS # UR COMP ASSIST: 2 /HPF — SIGNIFICANT CHANGE UP (ref 0–4)
RH IG SCN BLD-IMP: POSITIVE — SIGNIFICANT CHANGE UP
SODIUM SERPL-SCNC: 129 MMOL/L — LOW (ref 135–145)
SODIUM UR-SCNC: 45 MMOL/L — SIGNIFICANT CHANGE UP
SP GR SPEC: 1.02 — SIGNIFICANT CHANGE UP (ref 1.01–1.02)
SPECIMEN SOURCE: SIGNIFICANT CHANGE UP
UROBILINOGEN FLD QL: NEGATIVE — SIGNIFICANT CHANGE UP
WBC # BLD: 2.64 K/UL — LOW (ref 3.8–10.5)
WBC # BLD: 2.98 K/UL — LOW (ref 3.8–10.5)
WBC # FLD AUTO: 2.64 K/UL — LOW (ref 3.8–10.5)
WBC # FLD AUTO: 2.98 K/UL — LOW (ref 3.8–10.5)
WBC UR QL: 18 /HPF — HIGH (ref 0–5)

## 2023-03-28 PROCEDURE — 93306 TTE W/DOPPLER COMPLETE: CPT | Mod: 26

## 2023-03-28 PROCEDURE — 71250 CT THORAX DX C-: CPT | Mod: 26

## 2023-03-28 RX ORDER — MIDODRINE HYDROCHLORIDE 2.5 MG/1
5 TABLET ORAL THREE TIMES A DAY
Refills: 0 | Status: DISCONTINUED | OUTPATIENT
Start: 2023-03-28 | End: 2023-03-31

## 2023-03-28 RX ORDER — ACETAMINOPHEN 500 MG
1000 TABLET ORAL ONCE
Refills: 0 | Status: COMPLETED | OUTPATIENT
Start: 2023-03-28 | End: 2023-03-28

## 2023-03-28 RX ORDER — SODIUM CHLORIDE 9 MG/ML
1000 INJECTION INTRAMUSCULAR; INTRAVENOUS; SUBCUTANEOUS
Refills: 0 | Status: DISCONTINUED | OUTPATIENT
Start: 2023-03-28 | End: 2023-03-30

## 2023-03-28 RX ORDER — PIPERACILLIN AND TAZOBACTAM 4; .5 G/20ML; G/20ML
3.38 INJECTION, POWDER, LYOPHILIZED, FOR SOLUTION INTRAVENOUS EVERY 8 HOURS
Refills: 0 | Status: DISCONTINUED | OUTPATIENT
Start: 2023-03-28 | End: 2023-03-29

## 2023-03-28 RX ADMIN — Medication 400 MILLIGRAM(S): at 20:56

## 2023-03-28 RX ADMIN — Medication 650 MILLIGRAM(S): at 00:53

## 2023-03-28 RX ADMIN — ENTECAVIR 0.5 MILLIGRAM(S): 0.5 TABLET ORAL at 11:55

## 2023-03-28 RX ADMIN — Medication 81 MILLIGRAM(S): at 11:55

## 2023-03-28 RX ADMIN — Medication 650 MILLIGRAM(S): at 11:00

## 2023-03-28 RX ADMIN — Medication 400 MILLIGRAM(S): at 11:55

## 2023-03-28 RX ADMIN — Medication 650 MILLIGRAM(S): at 10:30

## 2023-03-28 RX ADMIN — Medication 400 MILLIGRAM(S): at 04:30

## 2023-03-28 RX ADMIN — PIPERACILLIN AND TAZOBACTAM 25 GRAM(S): 4; .5 INJECTION, POWDER, LYOPHILIZED, FOR SOLUTION INTRAVENOUS at 22:16

## 2023-03-28 RX ADMIN — Medication 1000 MILLIGRAM(S): at 05:53

## 2023-03-28 RX ADMIN — MIDODRINE HYDROCHLORIDE 5 MILLIGRAM(S): 2.5 TABLET ORAL at 17:28

## 2023-03-28 RX ADMIN — PIPERACILLIN AND TAZOBACTAM 25 GRAM(S): 4; .5 INJECTION, POWDER, LYOPHILIZED, FOR SOLUTION INTRAVENOUS at 01:42

## 2023-03-28 RX ADMIN — PIPERACILLIN AND TAZOBACTAM 25 GRAM(S): 4; .5 INJECTION, POWDER, LYOPHILIZED, FOR SOLUTION INTRAVENOUS at 13:29

## 2023-03-28 RX ADMIN — ATORVASTATIN CALCIUM 40 MILLIGRAM(S): 80 TABLET, FILM COATED ORAL at 22:16

## 2023-03-28 RX ADMIN — MIDODRINE HYDROCHLORIDE 5 MILLIGRAM(S): 2.5 TABLET ORAL at 13:30

## 2023-03-28 RX ADMIN — Medication 4 MILLIGRAM(S): at 05:02

## 2023-03-28 RX ADMIN — SODIUM CHLORIDE 100 MILLILITER(S): 9 INJECTION INTRAMUSCULAR; INTRAVENOUS; SUBCUTANEOUS at 13:27

## 2023-03-28 RX ADMIN — Medication 50 MILLIGRAM(S): at 05:02

## 2023-03-28 NOTE — PROGRESS NOTE ADULT - ASSESSMENT
PETER DE LA PAZ is a 67y Male who presents with a chief complaint of fever.    Multiple Myeloma  Pancytopenia  ·	Patient follows with Dr. Jean Claude Hubbard, [facility].  ·	Patient had been on lenalidomide maintenance up until February 2023, when he had PET/CT that showed progression of disease with worsening lymphadenopathy, bony lesions, and liver lesion.   ·	He was started then on daratumumab + cyclophosphamide + bortezomib + dexamethasone.  ·	Last dose of daratumumab was on March 23rd.  ·	Last dose of bortezomib was on March 23rd  ·	Last dose of cyclophosphamide was on March 13th  ·	Patient has had worsening pancytopenia since starting the latest chemotherapy regimen.  ·	Hold systemic treatment while inpatient.  ·	Monitor CBC and transfuse to maintain HGB > 7 and PLT > 10.  ·	Continue prophylactic acyclovir 400 mg BID as patient is on bortezomib  ·	counts are stable today, WBC slightly decreased at 3.2, hgb stable at 7.5, plts improved at 58  ·	FOB+  ·	folate, Fe, b12 adequate  ·	1u PRBCs given for Hgb 6.7 on 3/28    Fever  ·	infection vs disease  ·	Tmax 102 on admission, now recurrent  ·	s/p VQ scan 3/27  r/o PE- unremarkable  ·	ID consulted, on IV abx  ·	w/up NTD, repeat fever w/u per ID  ·	outpatient respiratory PCR negative    diarrhea   -- neg stool PCR  -- monitor    LE distal DVT  -- on hep gtt at this time, vascular seen, plts so far adequate for AC  -- pt with high risk for propagation of distal DVT. However, if plts fall, this is distal and can monitor with serial dopplers as well  -- monitor, will need to determine timing of switching to PO AC, would wait until plts stable      Will continue to follow.  Steph Holly NP  Hematology/ Oncology  [facility]  [phone] (office)  [phone] (alt office)  Evenings and weekends please call MD on call or office     PETER DE LA PAZ is a 67y Male who presents with a chief complaint of fever.    Multiple Myeloma  Pancytopenia  ·	Patient follows with Dr. Jean Claude Hubbard, [facility].  ·	Patient had been on lenalidomide maintenance up until February 2023, when he had PET/CT that showed progression of disease with worsening lymphadenopathy, bony lesions, and liver lesion.   ·	He was started then on daratumumab + cyclophosphamide + bortezomib + dexamethasone.  ·	Last dose of daratumumab was on March 23rd.  ·	Last dose of bortezomib was on March 23rd  ·	Last dose of cyclophosphamide was on March 13th  ·	Patient has had worsening pancytopenia since starting the latest chemotherapy regimen.  ·	Hold systemic treatment while inpatient.  ·	Monitor CBC and transfuse to maintain HGB > 7 and PLT > 10.  ·	Continue prophylactic acyclovir 400 mg BID as patient is on bortezomib  ·	counts are stable today, WBC slightly decreased at 3.2, hgb stable at 7.5, plts improved at 58  ·	FOB+  ·	folate, Fe, b12 adequate  ·	1u PRBCs given for Hgb 6.7 on 3/28    Fever  ·	infection vs disease  ·	Tmax 102 on admission, now recurrent  ·	s/p VQ scan 3/27  r/o PE- unremarkable  ·	ID consulted, on IV abx  ·	w/up NTD, repeat fever w/u per ID  ·	outpatient respiratory PCR negative    diarrhea   -- neg stool PCR  -- monitor    LE distal DVT  -- on hep gtt at this time, vascular seen, plts so far adequate for AC  -- pt with high risk for propagation of distal DVT. However, if plts fall, this is distal and can monitor with serial dopplers as well  -- monitor, will need to determine timing of switching to PO AC, would wait until plts stable      Will continue to follow.  Steph Holly NP  Hematology/ Oncology  [facility]  [phone] (office)  [phone] (alt office)  Evenings and weekends please call MD on call or office     PETER DE LA PAZ is a 67y Male who presents with a chief complaint of fever.    Multiple Myeloma  Pancytopenia  ·	Patient follows with Dr. Jean Claude Hubbard, [facility].  ·	Patient had been on lenalidomide maintenance up until February 2023, when he had PET/CT that showed progression of disease with worsening lymphadenopathy, bony lesions, and liver lesion.   ·	He was started then on daratumumab + cyclophosphamide + bortezomib + dexamethasone.  ·	Last dose of daratumumab was on March 23rd.  ·	Last dose of bortezomib was on March 23rd  ·	Last dose of cyclophosphamide was on March 13th  ·	Patient has had worsening pancytopenia since starting the latest chemotherapy regimen.  ·	Hold systemic treatment while inpatient.  ·	Monitor CBC and transfuse to maintain HGB > 7 and PLT > 10.  ·	Continue prophylactic acyclovir 400 mg BID as patient is on bortezomib  ·	counts are stable today, WBC slightly decreased at 3.2, hgb stable at 7.5, plts improved at 58  ·	FOB+  ·	folate, Fe, b12 adequate  ·	1u PRBCs given for Hgb 6.7 on 3/28    Fever  ·	infection vs disease  ·	Tmax 102 on admission, now recurrent  ·	s/p VQ scan 3/27  r/o PE- unremarkable  ·	ID consulted, on IV abx  ·	w/up NTD, repeat fever w/u per ID  ·	outpatient respiratory PCR negative    diarrhea   -- neg stool PCR  -- monitor    LE distal DVT  -- on hep gtt at this time, vascular seen, plts so far adequate for AC  -- pt with high risk for propagation of distal DVT. However, if plts fall, this is distal and can monitor with serial dopplers as well  -- monitor, will need to determine timing of switching to PO AC, would wait until plts stable      Will continue to follow.  Steph Holly NP  Hematology/ Oncology  [facility]  [phone] (office)  [phone] (alt office)  Evenings and weekends please call MD on call or office     PETER DE LA PAZ is a 67y Male who presents with a chief complaint of fever.    Multiple Myeloma  Pancytopenia  ·	Patient follows with Dr. Jean Claude Hubbard, [facility].  ·	Patient had been on lenalidomide maintenance up until February 2023, when he had PET/CT that showed progression of disease with worsening lymphadenopathy, bony lesions, and liver lesion.   ·	He was started then on daratumumab + cyclophosphamide + bortezomib + dexamethasone.  ·	Last dose of daratumumab was on March 23rd.  ·	Last dose of bortezomib was on March 23rd  ·	Last dose of cyclophosphamide was on March 13th  ·	Patient has had worsening pancytopenia since starting the latest chemotherapy regimen.  ·	Hold systemic treatment while inpatient.  ·	Monitor CBC and transfuse to maintain HGB > 7 and PLT > 10.  ·	Continue prophylactic acyclovir 400 mg BID as patient is on bortezomib  ·	counts are stable today, WBC slightly decreased at 3.2, hgb stable at 7.5, plts improved at 58  ·	FOB+  ·	folate, Fe, b12 adequate  ·	1u PRBCs given for Hgb 6.7 on 3/28    Fever  ·	infection vs disease  ·	Tmax 102 on admission, now recurrent  ·	s/p VQ scan 3/27  r/o PE- unremarkable  ·	ID consulted, on IV abx  ·	w/up NTD, repeat fever w/u per ID  ·	outpatient respiratory PCR negative    diarrhea   -- neg stool PCR  -- monitor    LE distal DVT  -- AV held   -- pt with high risk for propagation of distal DVT. However, if plts fall, this is distal and can monitor with serial dopplers as well  -- monitor, will need to determine timing of switching to PO AC, would wait until plts stable    positive guiac   - would consult GI     Will continue to follow.  Steph Holly NP  Hematology/ Oncology  [facility]  [phone] (office)  [phone] (alt office)  Evenings and weekends please call MD on call or office     PETER DE LA PAZ is a 67y Male who presents with a chief complaint of fever.    Multiple Myeloma  Pancytopenia  ·	Patient follows with Dr. Jean Claude Hubbard, [facility].  ·	Patient had been on lenalidomide maintenance up until February 2023, when he had PET/CT that showed progression of disease with worsening lymphadenopathy, bony lesions, and liver lesion.   ·	He was started then on daratumumab + cyclophosphamide + bortezomib + dexamethasone.  ·	Last dose of daratumumab was on March 23rd.  ·	Last dose of bortezomib was on March 23rd  ·	Last dose of cyclophosphamide was on March 13th  ·	Patient has had worsening pancytopenia since starting the latest chemotherapy regimen.  ·	Hold systemic treatment while inpatient.  ·	Monitor CBC and transfuse to maintain HGB > 7 and PLT > 10.  ·	Continue prophylactic acyclovir 400 mg BID as patient is on bortezomib  ·	counts are stable today, WBC slightly decreased at 3.2, hgb stable at 7.5, plts improved at 58  ·	FOB+  ·	folate, Fe, b12 adequate  ·	1u PRBCs given for Hgb 6.7 on 3/28    Fever  ·	infection vs disease  ·	Tmax 102 on admission, now recurrent  ·	s/p VQ scan 3/27  r/o PE- unremarkable  ·	ID consulted, on IV abx  ·	w/up NTD, repeat fever w/u per ID  ·	outpatient respiratory PCR negative    diarrhea   -- neg stool PCR  -- monitor    LE distal DVT  -- AV held   -- pt with high risk for propagation of distal DVT. However, if plts fall, this is distal and can monitor with serial dopplers as well  -- monitor, will need to determine timing of switching to PO AC, would wait until plts stable    positive guiac   - would consult GI     Will continue to follow.  Steph Holly NP  Hematology/ Oncology  [facility]  [phone] (office)  [phone] (alt office)  Evenings and weekends please call MD on call or office     PETER DE LA PAZ is a 67y Male who presents with a chief complaint of fever.    Multiple Myeloma  Pancytopenia  ·	Patient follows with Dr. Jean Claude Hubbard, [facility].  ·	Patient had been on lenalidomide maintenance up until February 2023, when he had PET/CT that showed progression of disease with worsening lymphadenopathy, bony lesions, and liver lesion.   ·	He was started then on daratumumab + cyclophosphamide + bortezomib + dexamethasone.  ·	Last dose of daratumumab was on March 23rd.  ·	Last dose of bortezomib was on March 23rd  ·	Last dose of cyclophosphamide was on March 13th  ·	Patient has had worsening pancytopenia since starting the latest chemotherapy regimen.  ·	Hold systemic treatment while inpatient.  ·	Monitor CBC and transfuse to maintain HGB > 7 and PLT > 10.  ·	Continue prophylactic acyclovir 400 mg BID as patient is on bortezomib  ·	counts are stable today, WBC slightly decreased at 3.2, hgb stable at 7.5, plts improved at 58  ·	FOB+  ·	folate, Fe, b12 adequate  ·	1u PRBCs given for Hgb 6.7 on 3/28    Fever  ·	infection vs disease  ·	Tmax 102 on admission, now recurrent  ·	s/p VQ scan 3/27  r/o PE- unremarkable  ·	ID consulted, on IV abx  ·	w/up NTD, repeat fever w/u per ID  ·	outpatient respiratory PCR negative    diarrhea   -- neg stool PCR  -- monitor    LE distal DVT  -- AV held   -- pt with high risk for propagation of distal DVT. However, if plts fall, this is distal and can monitor with serial dopplers as well  -- monitor, will need to determine timing of switching to PO AC, would wait until plts stable    positive guiac   - would consult GI     Will continue to follow.  Steph Holly NP  Hematology/ Oncology  [facility]  [phone] (office)  [phone] (alt office)  Evenings and weekends please call MD on call or office

## 2023-03-28 NOTE — CONSULT NOTE ADULT - SUBJECTIVE AND OBJECTIVE BOX
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, presents with fever, patient reports that upon waking this morning patient had chills and body aches, took his temperature which was 102, patient endorsing some mild periumbilical abdominal discomfort, but otherwise feels well, denies chest pain, shortness of breath, cough, nasal congestion, vomiting, diarrhea, dysuria, no sick contacts or recent travel, patient states that he went to his hematologist to get a blood transfusion and was instructed to come to the ER, in the ED, pt was febrile, tachycardic, otherwise VSS, labs notable for pancytopenia (Hg 6.7), elevated Cr (improved from prior baseline), BNP 3K, pt was given 1U PRBC, Ofirmev, 1L LR, admitted to general medicine for further management, of note pt had additional fever approx 15 minutes after finishing transfusion (24 Mar 2023 05:32)    Patient found to have R soleal DVT on ultrasound    PAST MEDICAL & SURGICAL HISTORY:  Multiple myeloma      Hyperlipidemia      H/O aortic aneurysm      H/O splenectomy      S/P partial gastrectomy      History of pancreatic surgery              MEDICATIONS:  aspirin enteric coated 81 milliGRAM(s) Oral daily  heparin   Injectable 6500 Unit(s) IV Push every 6 hours PRN  heparin   Injectable 3000 Unit(s) IV Push every 6 hours PRN  heparin  Infusion. 1900 Unit(s)/Hr IV Continuous <Continuous>  metoprolol tartrate 50 milliGRAM(s) Oral two times a day    acyclovir   Oral Tab/Cap 400 milliGRAM(s) Oral daily  entecavir 0.5 milliGRAM(s) Oral daily  piperacillin/tazobactam IVPB.. 3.375 Gram(s) IV Intermittent every 8 hours      acetaminophen     Tablet .. 650 milliGRAM(s) Oral every 6 hours PRN      atorvastatin 40 milliGRAM(s) Oral at bedtime  dexAMETHasone     Tablet 4 milliGRAM(s) Oral daily    sodium chloride 0.9%. 1000 milliLiter(s) IV Continuous <Continuous>  sodium chloride 0.9%. 1000 milliLiter(s) IV Continuous <Continuous>      FAMILY HISTORY:  FHx: lung cancer (Sibling)    FHx: ovarian cancer (Sibling)        Non-contributory    SOCIAL HISTORY:    No tobacco, drugs or etoh    Allergies    No Known Allergies    Intolerances    	    REVIEW OF SYSTEMS:  as above  The rest of the 14 points ROS reviewed and except above they are unremarkable.        PHYSICAL EXAM:  T(C): 36.3 (03-28-23 @ 05:53), Max: 39.3 (03-28-23 @ 04:12)  HR: 96 (03-28-23 @ 04:12) (78 - 96)  BP: 108/61 (03-28-23 @ 04:12) (91/46 - 108/61)  RR: 18 (03-28-23 @ 04:12) (18 - 18)  SpO2: 96% (03-28-23 @ 04:12) (94% - 96%)  Wt(kg): --  I&O's Summary    27 Mar 2023 07:01  -  28 Mar 2023 07:00  --------------------------------------------------------  IN: 1620 mL / OUT: 350 mL / NET: 1270 mL        JVP: Normal  Neck: supple  Lung: clear   CV: S1 S2 , Murmur:  Abd: soft  Ext: No edema  neuro: Awake / alert  Psych: flat affect  Skin: normal      LABS/DATA:    TELEMETRY: 	    ECG:  	   	  CARDIAC MARKERS:                                      6.7    2.64  )-----------( 44       ( 28 Mar 2023 07:06 )             19.2     03-28    129<L>  |  97  |  34<H>  ----------------------------<  132<H>  3.7   |  20<L>  |  2.21<H>    Ca    8.7      28 Mar 2023 07:07      proBNP:   Lipid Profile:   HgA1c:   TSH:            CHIEF COMPLAINT:Patient is a 67y old  Male who presents with a chief complaint of fever (27 Mar 2023 18:55)      HISTORY OF PRESENT ILLNESS:    68yo M w/ PMHx of aortic aneurysm and bioprosthetic AV valve replacement 2019, HLD, splenectomy, partial gastrectomy, partial pancreatectomy, 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, presents with fever, patient reports that upon waking this morning patient had chills and body aches, took his temperature which was 102, patient endorsing some mild periumbilical abdominal discomfort, but otherwise feels well, denies chest pain, shortness of breath, cough, nasal congestion, vomiting, diarrhea, dysuria, no sick contacts or recent travel, patient states that he went to his hematologist to get a blood transfusion and was instructed to come to the ER, in the ED, pt was febrile, tachycardic, otherwise VSS, labs notable for pancytopenia (Hg 6.7), elevated Cr (improved from prior baseline), BNP 3K, pt was given 1U PRBC, Ofirmev, 1L LR, admitted to general medicine for further management, of note pt had additional fever approx 15 minutes after finishing transfusion (24 Mar 2023 05:32)    Patient found to have R soleal DVT on ultrasound    PAST MEDICAL & SURGICAL HISTORY:  Multiple myeloma      Hyperlipidemia      H/O aortic aneurysm      H/O splenectomy      S/P partial gastrectomy      History of pancreatic surgery              MEDICATIONS:  aspirin enteric coated 81 milliGRAM(s) Oral daily  heparin   Injectable 6500 Unit(s) IV Push every 6 hours PRN  heparin   Injectable 3000 Unit(s) IV Push every 6 hours PRN  heparin  Infusion. 1900 Unit(s)/Hr IV Continuous <Continuous>  metoprolol tartrate 50 milliGRAM(s) Oral two times a day    acyclovir   Oral Tab/Cap 400 milliGRAM(s) Oral daily  entecavir 0.5 milliGRAM(s) Oral daily  piperacillin/tazobactam IVPB.. 3.375 Gram(s) IV Intermittent every 8 hours      acetaminophen     Tablet .. 650 milliGRAM(s) Oral every 6 hours PRN      atorvastatin 40 milliGRAM(s) Oral at bedtime  dexAMETHasone     Tablet 4 milliGRAM(s) Oral daily    sodium chloride 0.9%. 1000 milliLiter(s) IV Continuous <Continuous>  sodium chloride 0.9%. 1000 milliLiter(s) IV Continuous <Continuous>      FAMILY HISTORY:  FHx: lung cancer (Sibling)    FHx: ovarian cancer (Sibling)        Non-contributory    SOCIAL HISTORY:    No tobacco, drugs or etoh    Allergies    No Known Allergies    Intolerances    	    REVIEW OF SYSTEMS:  as above  The rest of the 14 points ROS reviewed and except above they are unremarkable.        PHYSICAL EXAM:  T(C): 36.3 (03-28-23 @ 05:53), Max: 39.3 (03-28-23 @ 04:12)  HR: 96 (03-28-23 @ 04:12) (78 - 96)  BP: 108/61 (03-28-23 @ 04:12) (91/46 - 108/61)  RR: 18 (03-28-23 @ 04:12) (18 - 18)  SpO2: 96% (03-28-23 @ 04:12) (94% - 96%)  Wt(kg): --  I&O's Summary    27 Mar 2023 07:01  -  28 Mar 2023 07:00  --------------------------------------------------------  IN: 1620 mL / OUT: 350 mL / NET: 1270 mL        JVP: Normal  Neck: supple  Lung: clear   CV: S1 S2 , Murmur:  Abd: soft  Ext: No edema  neuro: Awake / alert  Psych: flat affect  Skin: normal      LABS/DATA:    TELEMETRY: 	    ECG:  	   	  CARDIAC MARKERS:                                      6.7    2.64  )-----------( 44       ( 28 Mar 2023 07:06 )             19.2     03-28    129<L>  |  97  |  34<H>  ----------------------------<  132<H>  3.7   |  20<L>  |  2.21<H>    Ca    8.7      28 Mar 2023 07:07      proBNP:   Lipid Profile:   HgA1c:   TSH:

## 2023-03-28 NOTE — CONSULT NOTE ADULT - ASSESSMENT
1. anemia, FOBT positive. Hgb is at recent baseline. No overt GI bleeding symptoms.   - no plans for endoscopic evaluation at this time  - monitor H&H, transfuse PRR  - daily PPI     2. Diarrhea  - GI PCR neg  - if worsens, check C-diff. Otherwise can trial on low dose loperamide.     3. DVT   - on anticoagulation     4. Multiple myeloma, pancytopenia   - per heme / onc       I had a prolonged conversation with the patient regarding the hospital course, differential diagnosis, results of diagnostic tests this far, and therapeutic modalities available. Plan of care discussed with the patient after the evaluation. Patient expresses a clear understanding of the plan of care. Sixty five minutes spent on the total encounter, of which more than fifty percent of the encounter was spent on counseling and/or coordinating care by the attending physician.    Advanced care planning forms were discussed. Code status including forceful chest compressions, defibrillation and intubation were discussed. The risks benefits and alternatives to pertinent gastrointestinal procedures and interventions were discussed in detail and all questions were answered. Duration: 15 Minutes.    Jahaira Jennings M.D.   Gastroenterology and Hepatology  [address] [place], [place]  Office: [phone]  Cell: [phone] 1. anemia, FOBT positive. Hgb is at recent baseline. No overt GI bleeding symptoms.   - no plans for endoscopic evaluation at this time  - monitor H&H, transfuse PRR  - daily PPI     2. Diarrhea  - GI PCR neg  - if worsens, check C-diff. Otherwise can trial on low dose loperamide.     3. DVT   - on anticoagulation     4. Multiple myeloma, pancytopenia   - per heme / onc       I had a prolonged conversation with the patient regarding the hospital course, differential diagnosis, results of diagnostic tests this far, and therapeutic modalities available. Plan of care discussed with the patient after the evaluation. Patient expresses a clear understanding of the plan of care. Sixty five minutes spent on the total encounter, of which more than fifty percent of the encounter was spent on counseling and/or coordinating care by the attending physician.    Advanced care planning forms were discussed. Code status including forceful chest compressions, defibrillation and intubation were discussed. The risks benefits and alternatives to pertinent gastrointestinal procedures and interventions were discussed in detail and all questions were answered. Duration: 15 Minutes.    Jahaira Jennings M.D.   Gastroenterology and Hepatology  [address] [place], [place]  Office: [phone]  Cell: [phone] 1. anemia, FOBT positive. Hgb is at recent baseline. No overt GI bleeding symptoms.   - no plans for endoscopic evaluation at this time  - monitor H&H, transfuse PRR  - daily PPI     2. Diarrhea  - GI PCR neg  - if worsens, check C-diff. Otherwise can trial on low dose loperamide.     3. DVT   - on anticoagulation     4. Multiple myeloma, pancytopenia   - per heme / onc       I had a prolonged conversation with the patient regarding the hospital course, differential diagnosis, results of diagnostic tests this far, and therapeutic modalities available. Plan of care discussed with the patient after the evaluation. Patient expresses a clear understanding of the plan of care. Sixty five minutes spent on the total encounter, of which more than fifty percent of the encounter was spent on counseling and/or coordinating care by the attending physician.    Advanced care planning forms were discussed. Code status including forceful chest compressions, defibrillation and intubation were discussed. The risks benefits and alternatives to pertinent gastrointestinal procedures and interventions were discussed in detail and all questions were answered. Duration: 15 Minutes.    Jahaira Jennings M.D.   Gastroenterology and Hepatology  [address] [place], [place]  Office: [phone]  Cell: [phone]

## 2023-03-28 NOTE — CONSULT NOTE ADULT - SUBJECTIVE AND OBJECTIVE BOX
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 He was found to have a right soleal DVT on ultrasound, started on anticoagulation.     GI consulted for FOBT positive. He does endorse diarrhea, 2 episodes / day. GI PCR neg. The patient denies abdominal pain pain, black stool or BRBPR. He is pancytopenic. Hgb appears at recent baseline ~ 6.5-7 g/dL, plts ~ 40K.     Allergies:  No Known Allergies      Home Medications:    Hospital Medications:  acetaminophen     Tablet .. 650 milliGRAM(s) Oral every 6 hours PRN  acyclovir   Oral Tab/Cap 400 milliGRAM(s) Oral daily  aspirin enteric coated 81 milliGRAM(s) Oral daily  atorvastatin 40 milliGRAM(s) Oral at bedtime  dexAMETHasone     Tablet 4 milliGRAM(s) Oral daily  entecavir 0.5 milliGRAM(s) Oral daily  metoprolol tartrate 50 milliGRAM(s) Oral two times a day  midodrine. 5 milliGRAM(s) Oral three times a day  piperacillin/tazobactam IVPB.. 3.375 Gram(s) IV Intermittent every 8 hours  sodium chloride 0.9%. 1000 milliLiter(s) IV Continuous <Continuous>      PMHX/PSHX:  Multiple myeloma    Hyperlipidemia    H/O aortic aneurysm    H/O splenectomy    S/P partial gastrectomy    History of pancreatic surgery        Family history:  FHx: lung cancer (Sibling)    FHx: ovarian cancer (Sibling)        Social History:   Denies ethanol use.  Denies illicit drug use.    ROS:     General:  No wt loss, fevers, chills, night sweats, fatigue,   Eyes:  Good vision, no reported pain  ENT:  No sore throat, pain, runny nose, dysphagia  CV:  No pain, palpitations, hypo/hypertension  Resp:  No dyspnea, cough, tachypnea, wheezing  GI:  See HPI  :  No pain, bleeding, incontinence, nocturia  Muscle:  No pain, weakness  Neuro:  No weakness, tingling, memory problems  Psych:  No fatigue, insomnia, mood problems, depression  Endocrine:  No polyuria, polydipsia, cold/heat intolerance  Heme:  No petechiae, ecchymosis, easy bruisability  Integumentary:  No rash, edema      PHYSICAL EXAM:     GENERAL:  Appears stated age, well-groomed, well-nourished, no distress  HEENT:  NC/AT,  conjunctivae anicteric, clear and pink,   NECK: supple, trachea midline  CHEST:  Full & symmetric excursion, no increased effort, breath sounds clear  HEART:  Regular rhythm, no JVD  ABDOMEN:  Soft, non-tender, non-distended, normoactive bowel sounds,  no masses , no hepatosplenomegaly  EXTREMITIES:  no cyanosis,clubbing or edema  SKIN:  No rash, erythema, or, ecchymoses, no jaundice  NEURO:  Alert, non-focal, no asterixis  PSYCH: Appropriate affect, oriented to place and time  RECTAL: Deferred      Vital Signs:  Vital Signs Last 24 Hrs  T(C): 37.4 (28 Mar 2023 16:00), Max: 39.3 (28 Mar 2023 04:12)  T(F): 99.3 (28 Mar 2023 16:00), Max: 102.7 (28 Mar 2023 04:12)  HR: 81 (28 Mar 2023 16:00) (78 - 97)  BP: 93/49 (28 Mar 2023 16:00) (91/46 - 123/87)  BP(mean): --  RR: 18 (28 Mar 2023 16:00) (18 - 18)  SpO2: 94% (28 Mar 2023 16:00) (94% - 98%)    Parameters below as of 28 Mar 2023 16:00  Patient On (Oxygen Delivery Method): room air      Daily     Daily     LABS: Labs personally reviewed by me:                        7.3    2.98  )-----------( 44       ( 28 Mar 2023 16:30 )             21.3     03-    129<L>  |  97  |  34<H>  ----------------------------<  132<H>  3.7   |  20<L>  |  2.21<H>    Ca    8.7      28 Mar 2023 07:07        PTT - ( 28 Mar 2023 07:08 )  PTT:117.2 sec  Urinalysis Basic - ( 28 Mar 2023 14:14 )    Color: Light Yellow / Appearance: Slightly Turbid / S.020 / pH: x  Gluc: x / Ketone: Negative  / Bili: Negative / Urobili: Negative   Blood: x / Protein: 100 mg/dl / Nitrite: Negative   Leuk Esterase: Negative / RBC: 2 /hpf / WBC 18 /HPF   Sq Epi: x / Non Sq Epi: 6 /hpf / Bacteria: Negative          Imaging personally reviewed by me:

## 2023-03-28 NOTE — PROGRESS NOTE ADULT - SUBJECTIVE AND OBJECTIVE BOX
Patient is a 67y old  Male who presents with a chief complaint of fever (28 Mar 2023 08:03)    Patient seen and examined at bedside this morning. Patient reports feeling better, family at bedside.     MEDICATIONS  (STANDING):  acyclovir   Oral Tab/Cap 400 milliGRAM(s) Oral daily  aspirin enteric coated 81 milliGRAM(s) Oral daily  atorvastatin 40 milliGRAM(s) Oral at bedtime  dexAMETHasone     Tablet 4 milliGRAM(s) Oral daily  entecavir 0.5 milliGRAM(s) Oral daily  metoprolol tartrate 50 milliGRAM(s) Oral two times a day  piperacillin/tazobactam IVPB.. 3.375 Gram(s) IV Intermittent every 8 hours  sodium chloride 0.9%. 1000 milliLiter(s) (75 mL/Hr) IV Continuous <Continuous>  sodium chloride 0.9%. 1000 milliLiter(s) (75 mL/Hr) IV Continuous <Continuous>    MEDICATIONS  (PRN):  acetaminophen     Tablet .. 650 milliGRAM(s) Oral every 6 hours PRN Temp greater or equal to 38C (100.4F), Mild Pain (1 - 3)    Vital Signs Last 24 Hrs  T(C): 37.3 (28 Mar 2023 10:56), Max: 39.3 (28 Mar 2023 04:12)  T(F): 99.1 (28 Mar 2023 10:56), Max: 102.7 (28 Mar 2023 04:12)  HR: 83 (28 Mar 2023 10:56) (78 - 96)  BP: 94/52 (28 Mar 2023 10:56) (91/46 - 108/61)  BP(mean): --  RR: 18 (28 Mar 2023 10:56) (18 - 18)  SpO2: 95% (28 Mar 2023 10:56) (94% - 98%)    Parameters below as of 28 Mar 2023 10:37  Patient On (Oxygen Delivery Method): room air        PE  NAD  Awake, alert  Anicteric, MMM  RRR  CTAB  Abd soft, NT, ND  No c/c/e  No rash grossly  FROM                          6.7    2.64  )-----------( 44       ( 28 Mar 2023 07:06 )             19.2       03-28    129<L>  |  97  |  34<H>  ----------------------------<  132<H>  3.7   |  20<L>  |  2.21<H>    Ca    8.7      28 Mar 2023 07:07

## 2023-03-28 NOTE — PROVIDER CONTACT NOTE (OTHER) - SITUATION
pt spiked a fever of 102.7. BP is 108/61 HR is 98 and O2 sat is 96% on room air. last blood cultures sent on 3/27

## 2023-03-28 NOTE — CONSULT NOTE ADULT - SUBJECTIVE AND OBJECTIVE BOX
[facility] NEPHROLOGY- CONSULTATION NOTE    67y Male with history of below presents with fevers. Nephrology consulted for elevated Scr.    Patient known from prior admission for h/o RK thought to be secondary to ATN in setting of hypotension and diarrhea. Scr had been improving on prior admission with Scr decreased to 2.92 on discharge. Patient now returns with fevers noted to have Scr improved to 1.37 on admission. Patient underwent CT with IV contrast with mild rise in Scr within 48 hours. No NSAID administered. Patient on various abx and remains febrile with low normal BP. Scr has subsequently increased despite IVF for the past 48 hours.    REVIEW OF SYSTEMS:  Gen: + fevers  HEENT: no rhinorrhea  Neck: no sore throat  Cards: no chest pain  Resp: + dyspnea  GI: no nausea or vomiting or diarrhea  : no dysuria or hematuria  Vascular: no LE edema  Derm: no rashes  Neuro: no numbness/tingling    No Known Allergies      Home Medications Reviewed  Hospital Medications:   MEDICATIONS  (STANDING):  acyclovir   Oral Tab/Cap 400 milliGRAM(s) Oral daily  aspirin enteric coated 81 milliGRAM(s) Oral daily  atorvastatin 40 milliGRAM(s) Oral at bedtime  dexAMETHasone     Tablet 4 milliGRAM(s) Oral daily  entecavir 0.5 milliGRAM(s) Oral daily  metoprolol tartrate 50 milliGRAM(s) Oral two times a day  piperacillin/tazobactam IVPB.. 3.375 Gram(s) IV Intermittent every 8 hours  sodium chloride 0.9%. 1000 milliLiter(s) (75 mL/Hr) IV Continuous <Continuous>  sodium chloride 0.9%. 1000 milliLiter(s) (75 mL/Hr) IV Continuous <Continuous>      PAST MEDICAL & SURGICAL HISTORY:  Multiple myeloma      Hyperlipidemia      H/O aortic aneurysm      H/O splenectomy      S/P partial gastrectomy      History of pancreatic surgery          FAMILY HISTORY:  FHx: lung cancer (Sibling)    FHx: ovarian cancer (Sibling)        SOCIAL HISTORY:  Denies toxic substance use     VITALS:  T(F): 99.1 (23 @ 10:56), Max: 102.7 (23 @ 04:12)  HR: 83 (23 @ 10:56)  BP: 94/52 (23 @ 10:56)  RR: 18 (23 @ 10:56)  SpO2: 95% (23 @ 10:56)  Wt(kg): --     @ 07:  -   @ 07:00  --------------------------------------------------------  IN: 1620 mL / OUT: 350 mL / NET: 1270 mL     @ 07: @ 12:41  --------------------------------------------------------  IN: 240 mL / OUT: 0 mL / NET: 240 mL          PHYSICAL EXAM:  Gen: NAD, calm  HEENT: dry MM  Neck: no JVD  Cards: RRR, +S1/S2, no M/G/R  Resp: CTA B/L  GI: soft, NT/ND, NABS  : no CVA tenderness  Vascular: no LE edema B/L  Derm: no rashes  Neuro: non-focal    LABS:      129<L>  |  97  |  34<H>  ----------------------------<  132<H>  3.7   |  20<L>  |  2.21<H>    Ca    8.7      28 Mar 2023 07:07      Creatinine Trend: 2.21 <--, 1.83 <--, 1.80 <--, 1.75 <--, 1.46 <--, 1.46 <--, 1.37 <--                        6.7    2.64  )-----------( 44       ( 28 Mar 2023 07:06 )             19.2     Urine Studies:  Urinalysis Basic - ( 23 Mar 2023 18:13 )    Color: Light Yellow / Appearance: Clear / S.015 / pH:   Gluc:  / Ketone: Negative  / Bili: Negative / Urobili: Negative   Blood:  / Protein: Trace / Nitrite: Negative   Leuk Esterase: Negative / RBC: 7 /hpf / WBC 1 /HPF   Sq Epi:  / Non Sq Epi: 0 /hpf / Bacteria: Negative          RADIOLOGY & ADDITIONAL STUDIES:    < from: NM Pulmonary Ventilation/Perfusion Scan (23 @ 12:13) >  IMPRESSION:    Low probability of pulmonary embolus.        --- End of Report ---    < end of copied text >      < from: Xray Chest 1 View AP/PA (23 @ 12:01) >  IMPRESSION:  Clear lungs.  Unchanged right hemidiaphragm elevation.    --- End of Report ---    < end of copied text >      < from: [facility] Duplex Lower Ext Vein Scan, Bilat (23 @ 14:58) >  IMPRESSION:  Acute below the knee DVT confined to the soleal vein of the right calf.    Examination findings were conveyed toDr. Paredes by vascular technologist   Axel at 1430 hours on 3/24/2023 with read back.          --- End of Report ---    < end of copied text >      < from: Xray Chest 1 View- PORTABLE-Urgent (Xray Chest 1 View- PORTABLE-Urgent .) (23 @ 05:43) >  IMPRESSION:  Clear lungs.  Unchanged right hemidiaphragm elevation.    --- End of Report ---    < end of copied text >      < from: CT Abdomen and Pelvis w/ IV Cont (23 @ 20:25) >  IMPRESSION:  No acute pathology.    Prominent peripancreatic and portacaval nodes, etiology unclear.   Correlation with prior imaging would prove useful to demonstrate   stability.    --- End of Report ---    < end of copied text >      < from: CT Abdomen and Pelvis w/ IV Cont (23 @ 20:25) >  KIDNEYS/URETERS: Small right renal cyst.    BLADDER: Mildly distended with mild diffuse wall thickening and a small   diverticulum at the right base.    < end of copied text >   [facility] NEPHROLOGY- CONSULTATION NOTE    67y Male with history of below presents with fevers. Nephrology consulted for elevated Scr.    Patient known from prior admission for h/o RK thought to be secondary to ATN in setting of hypotension and diarrhea. Scr had been improving on prior admission with Scr decreased to 2.92 on discharge. Patient now returns with fevers noted to have Scr improved to 1.37 on admission. Patient underwent CT with IV contrast with mild rise in Scr within 48 hours. No NSAID administered. Patient on various abx and remains febrile with low normal BP. Scr has subsequently increased despite IVF for the past 48 hours.    REVIEW OF SYSTEMS:  Gen: + fevers  HEENT: no rhinorrhea  Neck: no sore throat  Cards: no chest pain  Resp: + dyspnea  GI: no nausea or vomiting or diarrhea  : no dysuria or hematuria  Vascular: no LE edema  Derm: no rashes  Neuro: no numbness/tingling    No Known Allergies      Home Medications Reviewed  Hospital Medications:   MEDICATIONS  (STANDING):  acyclovir   Oral Tab/Cap 400 milliGRAM(s) Oral daily  aspirin enteric coated 81 milliGRAM(s) Oral daily  atorvastatin 40 milliGRAM(s) Oral at bedtime  dexAMETHasone     Tablet 4 milliGRAM(s) Oral daily  entecavir 0.5 milliGRAM(s) Oral daily  metoprolol tartrate 50 milliGRAM(s) Oral two times a day  piperacillin/tazobactam IVPB.. 3.375 Gram(s) IV Intermittent every 8 hours  sodium chloride 0.9%. 1000 milliLiter(s) (75 mL/Hr) IV Continuous <Continuous>  sodium chloride 0.9%. 1000 milliLiter(s) (75 mL/Hr) IV Continuous <Continuous>      PAST MEDICAL & SURGICAL HISTORY:  Multiple myeloma      Hyperlipidemia      H/O aortic aneurysm      H/O splenectomy      S/P partial gastrectomy      History of pancreatic surgery          FAMILY HISTORY:  FHx: lung cancer (Sibling)    FHx: ovarian cancer (Sibling)        SOCIAL HISTORY:  Denies toxic substance use     VITALS:  T(F): 99.1 (23 @ 10:56), Max: 102.7 (23 @ 04:12)  HR: 83 (23 @ 10:56)  BP: 94/52 (23 @ 10:56)  RR: 18 (23 @ 10:56)  SpO2: 95% (23 @ 10:56)  Wt(kg): --     @ 07:  -   @ 07:00  --------------------------------------------------------  IN: 1620 mL / OUT: 350 mL / NET: 1270 mL     @ 07: @ 12:41  --------------------------------------------------------  IN: 240 mL / OUT: 0 mL / NET: 240 mL          PHYSICAL EXAM:  Gen: NAD, calm  HEENT: dry MM  Neck: no JVD  Cards: RRR, +S1/S2, no M/G/R  Resp: CTA B/L  GI: soft, NT/ND, NABS  : no CVA tenderness  Vascular: no LE edema B/L  Derm: no rashes  Neuro: non-focal    LABS:      129<L>  |  97  |  34<H>  ----------------------------<  132<H>  3.7   |  20<L>  |  2.21<H>    Ca    8.7      28 Mar 2023 07:07      Creatinine Trend: 2.21 <--, 1.83 <--, 1.80 <--, 1.75 <--, 1.46 <--, 1.46 <--, 1.37 <--                        6.7    2.64  )-----------( 44       ( 28 Mar 2023 07:06 )             19.2     Urine Studies:  Urinalysis Basic - ( 23 Mar 2023 18:13 )    Color: Light Yellow / Appearance: Clear / S.015 / pH:   Gluc:  / Ketone: Negative  / Bili: Negative / Urobili: Negative   Blood:  / Protein: Trace / Nitrite: Negative   Leuk Esterase: Negative / RBC: 7 /hpf / WBC 1 /HPF   Sq Epi:  / Non Sq Epi: 0 /hpf / Bacteria: Negative          RADIOLOGY & ADDITIONAL STUDIES:    < from: NM Pulmonary Ventilation/Perfusion Scan (23 @ 12:13) >  IMPRESSION:    Low probability of pulmonary embolus.        --- End of Report ---    < end of copied text >      < from: Xray Chest 1 View AP/PA (23 @ 12:01) >  IMPRESSION:  Clear lungs.  Unchanged right hemidiaphragm elevation.    --- End of Report ---    < end of copied text >      < from: VA Duplex Lower Ext Vein Scan, Bilat (23 @ 14:58) >  IMPRESSION:  Acute below the knee DVT confined to the soleal vein of the right calf.    Examination findings were conveyed toDr. Paredes by vascular technologist   Axel at 1430 hours on 3/24/2023 with read back.          --- End of Report ---    < end of copied text >      < from: Xray Chest 1 View- PORTABLE-Urgent (Xray Chest 1 View- PORTABLE-Urgent .) (23 @ 05:43) >  IMPRESSION:  Clear lungs.  Unchanged right hemidiaphragm elevation.    --- End of Report ---    < end of copied text >      < from: CT Abdomen and Pelvis w/ IV Cont (23 @ 20:25) >  IMPRESSION:  No acute pathology.    Prominent peripancreatic and portacaval nodes, etiology unclear.   Correlation with prior imaging would prove useful to demonstrate   stability.    --- End of Report ---    < end of copied text >      < from: CT Abdomen and Pelvis w/ IV Cont (23 @ 20:25) >  KIDNEYS/URETERS: Small right renal cyst.    BLADDER: Mildly distended with mild diffuse wall thickening and a small   diverticulum at the right base.    < end of copied text >   [facility] NEPHROLOGY- CONSULTATION NOTE    67y Male with history of below presents with fevers. Nephrology consulted for elevated Scr.    Patient known from prior admission for h/o RK thought to be secondary to ATN in setting of hypotension and diarrhea. Scr had been improving on prior admission with Scr decreased to 2.92 on discharge. Patient now returns with fevers noted to have Scr improved to 1.37 on admission. Patient underwent CT with IV contrast with mild rise in Scr within 48 hours. No NSAID administered. Patient on various abx and remains febrile with low normal BP. Scr has subsequently increased despite IVF for the past 48 hours.    REVIEW OF SYSTEMS:  Gen: + fevers  HEENT: no rhinorrhea  Neck: no sore throat  Cards: no chest pain  Resp: + dyspnea  GI: no nausea or vomiting or diarrhea  : no dysuria or hematuria  Vascular: no LE edema  Derm: no rashes  Neuro: no numbness/tingling    No Known Allergies      Home Medications Reviewed  Hospital Medications:   MEDICATIONS  (STANDING):  acyclovir   Oral Tab/Cap 400 milliGRAM(s) Oral daily  aspirin enteric coated 81 milliGRAM(s) Oral daily  atorvastatin 40 milliGRAM(s) Oral at bedtime  dexAMETHasone     Tablet 4 milliGRAM(s) Oral daily  entecavir 0.5 milliGRAM(s) Oral daily  metoprolol tartrate 50 milliGRAM(s) Oral two times a day  piperacillin/tazobactam IVPB.. 3.375 Gram(s) IV Intermittent every 8 hours  sodium chloride 0.9%. 1000 milliLiter(s) (75 mL/Hr) IV Continuous <Continuous>  sodium chloride 0.9%. 1000 milliLiter(s) (75 mL/Hr) IV Continuous <Continuous>      PAST MEDICAL & SURGICAL HISTORY:  Multiple myeloma      Hyperlipidemia      H/O aortic aneurysm      H/O splenectomy      S/P partial gastrectomy      History of pancreatic surgery          FAMILY HISTORY:  FHx: lung cancer (Sibling)    FHx: ovarian cancer (Sibling)        SOCIAL HISTORY:  Denies toxic substance use     VITALS:  T(F): 99.1 (23 @ 10:56), Max: 102.7 (23 @ 04:12)  HR: 83 (23 @ 10:56)  BP: 94/52 (23 @ 10:56)  RR: 18 (23 @ 10:56)  SpO2: 95% (23 @ 10:56)  Wt(kg): --     @ 07:  -   @ 07:00  --------------------------------------------------------  IN: 1620 mL / OUT: 350 mL / NET: 1270 mL     @ 07: @ 12:41  --------------------------------------------------------  IN: 240 mL / OUT: 0 mL / NET: 240 mL          PHYSICAL EXAM:  Gen: NAD, calm  HEENT: dry MM  Neck: no JVD  Cards: RRR, +S1/S2, no M/G/R  Resp: CTA B/L  GI: soft, NT/ND, NABS  : no CVA tenderness  Vascular: no LE edema B/L  Derm: no rashes  Neuro: non-focal    LABS:      129<L>  |  97  |  34<H>  ----------------------------<  132<H>  3.7   |  20<L>  |  2.21<H>    Ca    8.7      28 Mar 2023 07:07      Creatinine Trend: 2.21 <--, 1.83 <--, 1.80 <--, 1.75 <--, 1.46 <--, 1.46 <--, 1.37 <--                        6.7    2.64  )-----------( 44       ( 28 Mar 2023 07:06 )             19.2     Urine Studies:  Urinalysis Basic - ( 23 Mar 2023 18:13 )    Color: Light Yellow / Appearance: Clear / S.015 / pH:   Gluc:  / Ketone: Negative  / Bili: Negative / Urobili: Negative   Blood:  / Protein: Trace / Nitrite: Negative   Leuk Esterase: Negative / RBC: 7 /hpf / WBC 1 /HPF   Sq Epi:  / Non Sq Epi: 0 /hpf / Bacteria: Negative          RADIOLOGY & ADDITIONAL STUDIES:    < from: NM Pulmonary Ventilation/Perfusion Scan (23 @ 12:13) >  IMPRESSION:    Low probability of pulmonary embolus.        --- End of Report ---    < end of copied text >      < from: Xray Chest 1 View AP/PA (23 @ 12:01) >  IMPRESSION:  Clear lungs.  Unchanged right hemidiaphragm elevation.    --- End of Report ---    < end of copied text >      < from: VA Duplex Lower Ext Vein Scan, Bilat (23 @ 14:58) >  IMPRESSION:  Acute below the knee DVT confined to the soleal vein of the right calf.    Examination findings were conveyed toDr. Paredes by vascular technologist   Axel at 1430 hours on 3/24/2023 with read back.          --- End of Report ---    < end of copied text >      < from: Xray Chest 1 View- PORTABLE-Urgent (Xray Chest 1 View- PORTABLE-Urgent .) (23 @ 05:43) >  IMPRESSION:  Clear lungs.  Unchanged right hemidiaphragm elevation.    --- End of Report ---    < end of copied text >      < from: CT Abdomen and Pelvis w/ IV Cont (23 @ 20:25) >  IMPRESSION:  No acute pathology.    Prominent peripancreatic and portacaval nodes, etiology unclear.   Correlation with prior imaging would prove useful to demonstrate   stability.    --- End of Report ---    < end of copied text >      < from: CT Abdomen and Pelvis w/ IV Cont (23 @ 20:25) >  KIDNEYS/URETERS: Small right renal cyst.    BLADDER: Mildly distended with mild diffuse wall thickening and a small   diverticulum at the right base.    < end of copied text >

## 2023-03-28 NOTE — CONSULT NOTE ADULT - ASSESSMENT
History of bioAVR   obtain echo to eval LV function and AV hemodynamics    Anemia  Monitor hemoglobin, transfuse as needed.  plan as per Heme    DVT   on a/c  fu with vascular surgery     HTN  stable

## 2023-03-28 NOTE — PROGRESS NOTE ADULT - ASSESSMENT
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   - no obvious infectious symptoms, CXR without consolidations or effusions   - U/A unremarkable  - F/u BCx2 and UCx  --> NGTD; F/u final   - Recurrent fever. F/U repeat BCx2 sent 03/27. ABX as per ID   - less likely transfusion reaction as pt febrile prior to transfusion  - LE VA duplex --> + For DVT    - Was on empiric treatment with Vanc/cefepime --> ID consulted; Now on Zosyn  - Check GI PCR -- Neg   - Hold home penicillin while on ABX   - Check V/Q scan to R/O PE -- Low probability   - ID to follow up, infectious work up as per ID  --> F/u CMV PCR -- In lab , CMV IGG (+) and IgM (-), Cryptococcal Ag -- in lab, Quantiferon Tb -- in lab, Fungitell -- in lab  - Check CT Chest non-contrast     Anemia, Pancytopenia  - Drop in Hgb, Occult +  - Hold Hep gtt    - 1 unit of PRBCs 03/28  - Monitor post-transfusion CBC. Maintain active T+S. Transfuse for Hgb < 7.0, and platelets < 10.K  - GI eval consulted; F/u recs    HypoNa  - Na of 129, cont to monitor and trend  - Appreciate renal eval. Zosyn changed to NS instead of D5   - Repeat Labs in AM     Multiple myeloma.   - pancytopenia largely at baseline although Hg 6.7 on arrival  - s/p 1U PRBC with good response   - c/w home acyclovir  - c/w home entecavir   - c/w home dexamethasone   - Pt reports his Revlimid was held  - Heme/Onc consulted; F/u recs   - Resume home aspirin.    DVT   - Duplex + For R soleal vein DVT  --> Will consider CT A chest once creatinine permits as patient received contrast; Monitor for LOPEZ   - On Hep gtt; Monitor PTT; Monitor H/H closely --> Now on hold in view of drop in Hgb   - Check VQ scan to R/O PE -- Low probability   - Vascular eval appreciated; C/w AC and Serial Duplex outpatient    RK  - S/P Contrast on 03/23   - Check bladder scan to R/O retention  - C/w IVF for hydration  - Monitor Cr closely; Avoid nephrotoxic agents   - Cr up-trending. Likely due to drop in Hgb. Renal eval consulted; F/u recs    Hypertension, now hypotensive   - C/w home metoprolol.  - Started on Midodrine 5 TID. Hold for SBP > 140     Hyperlipidemia.   - C/w home atorvastatin.    Abnormal CT  - Outpatient follow up for CT findings    Prophylactic measure.   dvt ppx: Hep GTT   diet: regular  ambulate: with assistance    fall precautions  aspiration precautions.      Discussed with patient and his wife at the bedside.  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   - no obvious infectious symptoms, CXR without consolidations or effusions   - U/A unremarkable  - F/u BCx2 and UCx  --> NGTD; F/u final   - Recurrent fever. F/U repeat BCx2 sent 03/27. ABX as per ID   - less likely transfusion reaction as pt febrile prior to transfusion  - LE VA duplex --> + For DVT    - Was on empiric treatment with Vanc/cefepime --> ID consulted; Now on Zosyn  - Check GI PCR -- Neg   - Hold home penicillin while on ABX   - Check V/Q scan to R/O PE -- Low probability   - ID to follow up, infectious work up as per ID  --> F/u CMV PCR -- In lab , CMV IGG (+) and IgM (-), Cryptococcal Ag -- in lab, Quantiferon Tb -- in lab, Fungitell -- in lab  - Check CT Chest non-contrast     Anemia, Pancytopenia  - Drop in Hgb, Occult +  - Hold Hep gtt    - 1 unit of PRBCs 03/28  - Monitor post-transfusion CBC. Maintain active T+S. Transfuse for Hgb < 7.0, and platelets < 10.K  - GI eval consulted; F/u recs    HypoNa  - Na of 129, cont to monitor and trend  - Appreciate renal eval. Zosyn changed to NS instead of D5   - Repeat Labs in AM     Multiple myeloma.   - pancytopenia largely at baseline although Hg 6.7 on arrival  - s/p 1U PRBC with good response   - c/w home acyclovir  - c/w home entecavir   - c/w home dexamethasone   - Pt reports his Revlimid was held  - Heme/Onc consulted; F/u recs   - Resume home aspirin.    DVT   - Duplex + For R soleal vein DVT  --> Will consider CT A chest once creatinine permits as patient received contrast; Monitor for LOPEZ   - On Hep gtt; Monitor PTT; Monitor H/H closely --> Now on hold in view of drop in Hgb   - Check VQ scan to R/O PE -- Low probability   - Vascular eval appreciated; C/w AC and Serial Duplex outpatient    RK  - S/P Contrast on 03/23   - Check bladder scan to R/O retention  - C/w IVF for hydration  - Monitor Cr closely; Avoid nephrotoxic agents   - Cr up-trending. Likely due to drop in Hgb. Renal eval consulted; F/u recs    Hypertension, now hypotensive   - C/w home metoprolol.  - Started on Midodrine 5 TID. Hold for SBP > 140     Hyperlipidemia.   - C/w home atorvastatin.    Abnormal CT  - Outpatient follow up for CT findings    Prophylactic measure.   dvt ppx: Hep GTT   diet: regular  ambulate: with assistance    fall precautions  aspiration precautions.      Discussed with patient and his wife at the bedside.

## 2023-03-28 NOTE — PROGRESS NOTE ADULT - SUBJECTIVE AND OBJECTIVE BOX
Name of Patient : PETER DE LA PAZ  MRN: [id number]  Date of visit: 03-28-23 @ 13:59      Subjective: Patient seen and examined. No new events except as noted.   Patient seen earlier this AM. Lying down in bed. Recurrent fever overnight with Tmax of 102.7  Drop in Hgb. Occult +. Heparin gtt on hold  Planned for 1 unit of PRBCs today  GI eval consulted    REVIEW OF SYSTEMS:    CONSTITUTIONAL: Generalized weakness; Febrile overnight   EYES/ENT: No visual changes;  No vertigo or throat pain   NECK: No pain or stiffness  RESPIRATORY: No cough, wheezing, hemoptysis; No shortness of breath  CARDIOVASCULAR: No chest pain or palpitations  GASTROINTESTINAL: No abdominal or epigastric pain. No nausea, vomiting, or hematemesis; No diarrhea or constipation. No melena or hematochezia.  GENITOURINARY: No dysuria, frequency or hematuria  NEUROLOGICAL: No numbness or weakness  SKIN: No itching, burning, rashes, or lesions   All other review of systems is negative unless indicated above.    MEDICATIONS:  MEDICATIONS  (STANDING):  acyclovir   Oral Tab/Cap 400 milliGRAM(s) Oral daily  aspirin enteric coated 81 milliGRAM(s) Oral daily  atorvastatin 40 milliGRAM(s) Oral at bedtime  dexAMETHasone     Tablet 4 milliGRAM(s) Oral daily  entecavir 0.5 milliGRAM(s) Oral daily  metoprolol tartrate 50 milliGRAM(s) Oral two times a day  midodrine. 5 milliGRAM(s) Oral three times a day  piperacillin/tazobactam IVPB.. 3.375 Gram(s) IV Intermittent every 8 hours  sodium chloride 0.9%. 1000 milliLiter(s) (100 mL/Hr) IV Continuous <Continuous>      PHYSICAL EXAM:  T(C): 37.1 (03-28-23 @ 13:35), Max: 39.3 (03-28-23 @ 04:12)  HR: 97 (03-28-23 @ 13:35) (78 - 97)  BP: 123/87 (03-28-23 @ 13:35) (91/46 - 123/87)  RR: 18 (03-28-23 @ 13:35) (18 - 18)  SpO2: 97% (03-28-23 @ 13:35) (94% - 98%)  Wt(kg): --  I&O's Summary    27 Mar 2023 07:01  -  28 Mar 2023 07:00  --------------------------------------------------------  IN: 1620 mL / OUT: 350 mL / NET: 1270 mL    28 Mar 2023 07:01  -  28 Mar 2023 13:59  --------------------------------------------------------  IN: 1060 mL / OUT: 100 mL / NET: 960 mL          Appearance: Awake, weak appearing male, lying down in bed 	  HEENT:  Eyes are open   Lymphatic: No lymphadenopathy   Cardiovascular: Normal    Respiratory: normal effort , clear  Gastrointestinal:  Soft, Non-tender  Skin: No rashes,  warm to touch  Psychiatry:  Mood & affect appropriate  Musculoskeletal: No edema          03-27-23 @ 07:01  -  03-28-23 @ 07:00  --------------------------------------------------------  IN: 1620 mL / OUT: 350 mL / NET: 1270 mL    03-28-23 @ 07:01 - 03-28-23 @ 13:59  --------------------------------------------------------  IN: 1060 mL / OUT: 100 mL / NET: 960 mL                              6.7    2.64  )-----------( 44       ( 28 Mar 2023 07:06 )             19.2               03-28    129<L>  |  97  |  34<H>  ----------------------------<  132<H>  3.7   |  20<L>  |  2.21<H>    Ca    8.7      28 Mar 2023 07:07      PTT - ( 28 Mar 2023 07:08 )  PTT:117.2 sec                         Culture - Urine (03.23.23 @ 18:13)   Specimen Source: Clean Catch Clean Catch (Midstream)  Culture Results: <10,000 CFU/mL Normal Urogenital Lali    Culture - Blood (03.23.23 @ 17:35)   Specimen Source: .Blood Blood-Peripheral  Culture Results: No growth to date.    Culture - Blood (03.23.23 @ 17:20)   Specimen Source: .Blood Blood-Peripheral  Culture Results: No growth to date.      < from: NM Pulmonary Ventilation/Perfusion Scan (03.27.23 @ 12:13) >  IMPRESSION:    Low probability of pulmonary embolus.    < end of copied text >   Name of Patient : PETER DE LA PAZ  MRN: [id number]  Date of visit: 03-28-23 @ 13:59      Subjective: Patient seen and examined. No new events except as noted.   Patient seen earlier this AM. Lying down in bed. Recurrent fever overnight with Tmax of 102.7  Drop in Hgb. Occult +. Heparin gtt on hold  Planned for 1 unit of PRBCs today  GI eval consulted    REVIEW OF SYSTEMS:    CONSTITUTIONAL: Generalized weakness; Febrile overnight   EYES/ENT: No visual changes;  No vertigo or throat pain   NECK: No pain or stiffness  RESPIRATORY: No cough, wheezing, hemoptysis; No shortness of breath  CARDIOVASCULAR: No chest pain or palpitations  GASTROINTESTINAL: No abdominal or epigastric pain. No nausea, vomiting, or hematemesis; No diarrhea or constipation. No melena or hematochezia.  GENITOURINARY: No dysuria, frequency or hematuria  NEUROLOGICAL: No numbness or weakness  SKIN: No itching, burning, rashes, or lesions   All other review of systems is negative unless indicated above.    MEDICATIONS:  MEDICATIONS  (STANDING):  acyclovir   Oral Tab/Cap 400 milliGRAM(s) Oral daily  aspirin enteric coated 81 milliGRAM(s) Oral daily  atorvastatin 40 milliGRAM(s) Oral at bedtime  dexAMETHasone     Tablet 4 milliGRAM(s) Oral daily  entecavir 0.5 milliGRAM(s) Oral daily  metoprolol tartrate 50 milliGRAM(s) Oral two times a day  midodrine. 5 milliGRAM(s) Oral three times a day  piperacillin/tazobactam IVPB.. 3.375 Gram(s) IV Intermittent every 8 hours  sodium chloride 0.9%. 1000 milliLiter(s) (100 mL/Hr) IV Continuous <Continuous>      PHYSICAL EXAM:  T(C): 37.1 (03-28-23 @ 13:35), Max: 39.3 (03-28-23 @ 04:12)  HR: 97 (03-28-23 @ 13:35) (78 - 97)  BP: 123/87 (03-28-23 @ 13:35) (91/46 - 123/87)  RR: 18 (03-28-23 @ 13:35) (18 - 18)  SpO2: 97% (03-28-23 @ 13:35) (94% - 98%)  Wt(kg): --  I&O's Summary    27 Mar 2023 07:01  -  28 Mar 2023 07:00  --------------------------------------------------------  IN: 1620 mL / OUT: 350 mL / NET: 1270 mL    28 Mar 2023 07:01  -  28 Mar 2023 13:59  --------------------------------------------------------  IN: 1060 mL / OUT: 100 mL / NET: 960 mL          Appearance: Awake, weak appearing male, lying down in bed 	  HEENT:  Eyes are open   Lymphatic: No lymphadenopathy   Cardiovascular: Normal    Respiratory: normal effort , clear  Gastrointestinal:  Soft, Non-tender  Skin: No rashes,  warm to touch  Psychiatry:  Mood & affect appropriate  Musculoskeletal: No edema          03-27-23 @ 07:01  -  03-28-23 @ 07:00  --------------------------------------------------------  IN: 1620 mL / OUT: 350 mL / NET: 1270 mL    03-28-23 @ 07:01 - 03-28-23 @ 13:59  --------------------------------------------------------  IN: 1060 mL / OUT: 100 mL / NET: 960 mL                              6.7    2.64  )-----------( 44       ( 28 Mar 2023 07:06 )             19.2               03-28    129<L>  |  97  |  34<H>  ----------------------------<  132<H>  3.7   |  20<L>  |  2.21<H>    Ca    8.7      28 Mar 2023 07:07      PTT - ( 28 Mar 2023 07:08 )  PTT:117.2 sec                         Culture - Urine (03.23.23 @ 18:13)   Specimen Source: Clean Catch Clean Catch (Midstream)  Culture Results: <10,000 CFU/mL Normal Urogenital Lali    Culture - Blood (03.23.23 @ 17:35)   Specimen Source: .Blood Blood-Peripheral  Culture Results: No growth to date.    Culture - Blood (03.23.23 @ 17:20)   Specimen Source: .Blood Blood-Peripheral  Culture Results: No growth to date.      < from: NM Pulmonary Ventilation/Perfusion Scan (03.27.23 @ 12:13) >  IMPRESSION:    Low probability of pulmonary embolus.    < end of copied text >   Name of Patient : PETER DE LA PAZ  MRN: [id number]  Date of visit: 03-28-23 @ 13:59      Subjective: Patient seen and examined. No new events except as noted.   Patient seen earlier this AM. Lying down in bed. Recurrent fever overnight with Tmax of 102.7  Drop in Hgb. Occult +. Heparin gtt on hold  Planned for 1 unit of PRBCs today  GI eval consulted    REVIEW OF SYSTEMS:    CONSTITUTIONAL: Generalized weakness; Febrile overnight   EYES/ENT: No visual changes;  No vertigo or throat pain   NECK: No pain or stiffness  RESPIRATORY: No cough, wheezing, hemoptysis; No shortness of breath  CARDIOVASCULAR: No chest pain or palpitations  GASTROINTESTINAL: No abdominal or epigastric pain. No nausea, vomiting, or hematemesis; No diarrhea or constipation. No melena or hematochezia.  GENITOURINARY: No dysuria, frequency or hematuria  NEUROLOGICAL: No numbness or weakness  SKIN: No itching, burning, rashes, or lesions   All other review of systems is negative unless indicated above.    MEDICATIONS:  MEDICATIONS  (STANDING):  acyclovir   Oral Tab/Cap 400 milliGRAM(s) Oral daily  aspirin enteric coated 81 milliGRAM(s) Oral daily  atorvastatin 40 milliGRAM(s) Oral at bedtime  dexAMETHasone     Tablet 4 milliGRAM(s) Oral daily  entecavir 0.5 milliGRAM(s) Oral daily  metoprolol tartrate 50 milliGRAM(s) Oral two times a day  midodrine. 5 milliGRAM(s) Oral three times a day  piperacillin/tazobactam IVPB.. 3.375 Gram(s) IV Intermittent every 8 hours  sodium chloride 0.9%. 1000 milliLiter(s) (100 mL/Hr) IV Continuous <Continuous>      PHYSICAL EXAM:  T(C): 37.1 (03-28-23 @ 13:35), Max: 39.3 (03-28-23 @ 04:12)  HR: 97 (03-28-23 @ 13:35) (78 - 97)  BP: 123/87 (03-28-23 @ 13:35) (91/46 - 123/87)  RR: 18 (03-28-23 @ 13:35) (18 - 18)  SpO2: 97% (03-28-23 @ 13:35) (94% - 98%)  Wt(kg): --  I&O's Summary    27 Mar 2023 07:01  -  28 Mar 2023 07:00  --------------------------------------------------------  IN: 1620 mL / OUT: 350 mL / NET: 1270 mL    28 Mar 2023 07:01  -  28 Mar 2023 13:59  --------------------------------------------------------  IN: 1060 mL / OUT: 100 mL / NET: 960 mL          Appearance: Awake, weak appearing male, lying down in bed 	  HEENT:  Eyes are open   Lymphatic: No lymphadenopathy   Cardiovascular: Normal    Respiratory: normal effort , clear  Gastrointestinal:  Soft, Non-tender  Skin: No rashes,  warm to touch  Psychiatry:  Mood & affect appropriate  Musculoskeletal: No edema          03-27-23 @ 07:01  -  03-28-23 @ 07:00  --------------------------------------------------------  IN: 1620 mL / OUT: 350 mL / NET: 1270 mL    03-28-23 @ 07:01 - 03-28-23 @ 13:59  --------------------------------------------------------  IN: 1060 mL / OUT: 100 mL / NET: 960 mL                              6.7    2.64  )-----------( 44       ( 28 Mar 2023 07:06 )             19.2               03-28    129<L>  |  97  |  34<H>  ----------------------------<  132<H>  3.7   |  20<L>  |  2.21<H>    Ca    8.7      28 Mar 2023 07:07      PTT - ( 28 Mar 2023 07:08 )  PTT:117.2 sec                         Culture - Urine (03.23.23 @ 18:13)   Specimen Source: Clean Catch Clean Catch (Midstream)  Culture Results: <10,000 CFU/mL Normal Urogenital Lali    Culture - Blood (03.23.23 @ 17:35)   Specimen Source: .Blood Blood-Peripheral  Culture Results: No growth to date.    Culture - Blood (03.23.23 @ 17:20)   Specimen Source: .Blood Blood-Peripheral  Culture Results: No growth to date.      < from: NM Pulmonary Ventilation/Perfusion Scan (03.27.23 @ 12:13) >  IMPRESSION:    Low probability of pulmonary embolus.    < end of copied text >

## 2023-03-28 NOTE — CONSULT NOTE ADULT - ASSESSMENT
67y Male with history of MM on chemotherapy presents with fevers. Nephrology consulted for elevated Scr.    1) RK: Secondary to ATN on prior admission which had resolved with recurrent RK on current admission in setting of infection, relative hypotension and IV contrast administration. Change IVF to NS @ 100 ml/hour. Check UA, urine sodium and urine creatinine. Check bladder scan. TMA work up negative. Avoid nephrotoxins. Monitor electrolytes.    2) Hypotension: In setting of fevers. Start midodrine 5 mg PO TID.    3) Hyponatremia: In setting of RK and likely decreased diluting ability. Change Zosyn to NS base to limit free water. Monitor serum Na.    4) MM: As per Heme/Onc.        [facility] NEPHROLOGY  Leoncio Leonard M.D.  Tay Brooke D.O.  Precious Chen M.D.  Nidia Stallings, MSN, ANP-C    Telephone: [phone]  Facsimile: [phone]    [address] [place] [address]   67y Male with history of MM on chemotherapy presents with fevers. Nephrology consulted for elevated Scr.    1) RK: Secondary to ATN on prior admission which had resolved with recurrent RK on current admission in setting of infection, relative hypotension and IV contrast administration. Change IVF to NS @ 100 ml/hour. Check UA, urine sodium and urine creatinine. Check bladder scan. TMA work up negative. Avoid nephrotoxins. Monitor electrolytes.    2) Hypotension: In setting of fevers. Start midodrine 5 mg PO TID.    3) Hyponatremia: In setting of RK and likely decreased diluting ability. Change Zosyn to NS base to limit free water. Monitor serum Na.    4) MM: As per Heme/Onc.        [facility] NEPHROLOGY  Leoncio Leonard M.D.  Tay Brooke D.O.  Precious Chen M.D.  Nidia Stallings, MSN, ANP-C    Telephone: [phone]  Facsimile: [phone]    [address] [place] [address]   67y Male with history of MM on chemotherapy presents with fevers. Nephrology consulted for elevated Scr.    1) RK: Secondary to ATN on prior admission which had resolved with recurrent RK on current admission in setting of infection, relative hypotension and IV contrast administration. Change IVF to NS @ 100 ml/hour. Check UA, urine sodium and urine creatinine. Check bladder scan. TMA work up negative. Avoid nephrotoxins. Monitor electrolytes.    2) Hypotension: In setting of fevers. Start midodrine 5 mg PO TID.    3) Hyponatremia: In setting of RK and likely decreased diluting ability. Change Zosyn to NS base to limit free water. Monitor serum Na.    4) MM: As per Heme/Onc.        [facility] NEPHROLOGY  Leoncio Leonard M.D.  Tay Brooke D.O.  Precious Chen M.D.  Nidia Stallings, MSN, ANP-C    Telephone: [phone]  Facsimile: [phone]    [address] [place] [address]

## 2023-03-29 LAB
ANION GAP SERPL CALC-SCNC: 13 MMOL/L — SIGNIFICANT CHANGE UP (ref 5–17)
BUN SERPL-MCNC: 38 MG/DL — HIGH (ref 7–23)
CALCIUM SERPL-MCNC: 8.5 MG/DL — SIGNIFICANT CHANGE UP (ref 8.4–10.5)
CHLORIDE SERPL-SCNC: 98 MMOL/L — SIGNIFICANT CHANGE UP (ref 96–108)
CO2 SERPL-SCNC: 20 MMOL/L — LOW (ref 22–31)
CREAT SERPL-MCNC: 2.25 MG/DL — HIGH (ref 0.5–1.3)
EGFR: 31 ML/MIN/1.73M2 — LOW
FUNGITELL: <31 PG/ML — SIGNIFICANT CHANGE UP
GLUCOSE SERPL-MCNC: 107 MG/DL — HIGH (ref 70–99)
HCT VFR BLD CALC: 21.1 % — LOW (ref 39–50)
HGB BLD-MCNC: 7.5 G/DL — LOW (ref 13–17)
MCHC RBC-ENTMCNC: 32.5 PG — SIGNIFICANT CHANGE UP (ref 27–34)
MCHC RBC-ENTMCNC: 35.5 GM/DL — SIGNIFICANT CHANGE UP (ref 32–36)
MCV RBC AUTO: 91.3 FL — SIGNIFICANT CHANGE UP (ref 80–100)
NRBC # BLD: 3 /100 WBCS — HIGH (ref 0–0)
PLATELET # BLD AUTO: 42 K/UL — LOW (ref 150–400)
POTASSIUM SERPL-MCNC: 3.7 MMOL/L — SIGNIFICANT CHANGE UP (ref 3.5–5.3)
POTASSIUM SERPL-SCNC: 3.7 MMOL/L — SIGNIFICANT CHANGE UP (ref 3.5–5.3)
RBC # BLD: 2.31 M/UL — LOW (ref 4.2–5.8)
RBC # FLD: 23 % — HIGH (ref 10.3–14.5)
SODIUM SERPL-SCNC: 131 MMOL/L — LOW (ref 135–145)
WBC # BLD: 2.89 K/UL — LOW (ref 3.8–10.5)
WBC # FLD AUTO: 2.89 K/UL — LOW (ref 3.8–10.5)

## 2023-03-29 PROCEDURE — 99233 SBSQ HOSP IP/OBS HIGH 50: CPT

## 2023-03-29 RX ORDER — ENTECAVIR 0.5 MG/1
0.25 TABLET ORAL DAILY
Refills: 0 | Status: DISCONTINUED | OUTPATIENT
Start: 2023-03-30 | End: 2023-04-06

## 2023-03-29 RX ORDER — RIVAROXABAN 15 MG-20MG
10 KIT ORAL DAILY
Refills: 0 | Status: DISCONTINUED | OUTPATIENT
Start: 2023-03-29 | End: 2023-03-31

## 2023-03-29 RX ORDER — VALGANCICLOVIR 450 MG/1
450 TABLET, FILM COATED ORAL EVERY 24 HOURS
Refills: 0 | Status: DISCONTINUED | OUTPATIENT
Start: 2023-03-29 | End: 2023-03-31

## 2023-03-29 RX ADMIN — MIDODRINE HYDROCHLORIDE 5 MILLIGRAM(S): 2.5 TABLET ORAL at 17:39

## 2023-03-29 RX ADMIN — MIDODRINE HYDROCHLORIDE 5 MILLIGRAM(S): 2.5 TABLET ORAL at 12:40

## 2023-03-29 RX ADMIN — Medication 50 MILLIGRAM(S): at 06:22

## 2023-03-29 RX ADMIN — VALGANCICLOVIR 450 MILLIGRAM(S): 450 TABLET, FILM COATED ORAL at 15:29

## 2023-03-29 RX ADMIN — Medication 400 MILLIGRAM(S): at 13:10

## 2023-03-29 RX ADMIN — PIPERACILLIN AND TAZOBACTAM 25 GRAM(S): 4; .5 INJECTION, POWDER, LYOPHILIZED, FOR SOLUTION INTRAVENOUS at 06:21

## 2023-03-29 RX ADMIN — Medication 650 MILLIGRAM(S): at 09:00

## 2023-03-29 RX ADMIN — Medication 650 MILLIGRAM(S): at 17:52

## 2023-03-29 RX ADMIN — ATORVASTATIN CALCIUM 40 MILLIGRAM(S): 80 TABLET, FILM COATED ORAL at 21:01

## 2023-03-29 RX ADMIN — Medication 4 MILLIGRAM(S): at 06:23

## 2023-03-29 RX ADMIN — Medication 81 MILLIGRAM(S): at 12:40

## 2023-03-29 RX ADMIN — Medication 650 MILLIGRAM(S): at 17:45

## 2023-03-29 RX ADMIN — Medication 650 MILLIGRAM(S): at 08:35

## 2023-03-29 RX ADMIN — Medication 50 MILLIGRAM(S): at 17:39

## 2023-03-29 RX ADMIN — MIDODRINE HYDROCHLORIDE 5 MILLIGRAM(S): 2.5 TABLET ORAL at 06:22

## 2023-03-29 NOTE — PROGRESS NOTE ADULT - SUBJECTIVE AND OBJECTIVE BOX
67yPatient is a 67y old  Male who presents with a chief complaint of fever (29 Mar 2023 16:21)      Interval history:  febrile, denies any localizing symptoms.      Allergies:   No Known Allergies      Antimicrobials:  entecavir Solution 0.25 milliGRAM(s) Oral daily  valGANciclovir 450 milliGRAM(s) Oral every 24 hours      REVIEW OF SYSTEMS:  No chest pain   No SOB  No abdominal pain  No dysuria       Vital Signs Last 24 Hrs  T(C): 38.7 (03-29-23 @ 17:43), Max: 39.4 (03-28-23 @ 19:57)  T(F): 101.6 (03-29-23 @ 17:43), Max: 102.9 (03-28-23 @ 19:57)  HR: 89 (03-29-23 @ 16:03) (72 - 103)  BP: 104/62 (03-29-23 @ 16:03) (93/42 - 126/71)  BP(mean): --  RR: 18 (03-29-23 @ 16:03) (18 - 18)  SpO2: 96% (03-29-23 @ 16:03) (92% - 96%)      PHYSICAL EXAM:  Patient in no acute distress. AAOX3.  Cardiovascular: S1S2 normal.  breathing comfortably on RA  Gastrointestinal: soft, nontender, nondistended.  trace edema.  IV sites not inflamed.                             7.5    2.89  )-----------( 42       ( 29 Mar 2023 07:15 )             21.1   03-29    131<L>  |  98  |  38<H>  ----------------------------<  107<H>  3.7   |  20<L>  |  2.25<H>    Ca    8.5      29 Mar 2023 07:15              Culture - Blood (collected 27 Mar 2023 11:05)  Source: .Blood Blood  Preliminary Report (28 Mar 2023 15:02):    No growth to date.    Culture - Blood (collected 27 Mar 2023 11:05)  Source: .Blood Blood  Preliminary Report (28 Mar 2023 15:02):    No growth to date.        Radiology:

## 2023-03-29 NOTE — PROGRESS NOTE ADULT - ASSESSMENT
History of bioAVR   echo unremarkable     Anemia  Monitor hemoglobin, transfuse as needed.  plan as per Heme    DVT   a/c on hold  low plts  fu with vascular surgery / heme     HTN  BP on low side due to sepsis  on midodrine

## 2023-03-29 NOTE — PROGRESS NOTE ADULT - ASSESSMENT
1. anemia, FOBT positive. Hgb is at recent baseline. No overt GI bleeding symptoms.   - no plans for endoscopic evaluation at this time  - monitor H&H, transfuse PRR  - daily PPI     2. Diarrhea, likely related to chemo +/- abx   - GI PCR neg  - if worsens, check C-diff    3. DVT   - on anticoagulation    4. Multiple myeloma, pancytopenia   - per heme / onc       Jahaira Jennings M.D.   Gastroenterology and Hepatology  [address] [place], [place]  Office: [phone]  Cell: [phone] 1. anemia, FOBT positive. Hgb is at recent baseline. No overt GI bleeding symptoms.   - no plans for endoscopic evaluation at this time  - monitor H&H, transfuse PRR  - daily PPI     2. Diarrhea, likely related to chemo +/- abx   - GI PCR neg  - if worsens, check C-diff    3. DVT   - on anticoagulation    4. Multiple myeloma, pancytopenia   - per heme / onc       Jahaira Jennings M.D.   Gastroenterology and Hepatology  [address] [place], [place]  Office: [phone]  Cell: [phone] 1. anemia, FOBT positive. Hgb is at recent baseline. No overt GI bleeding symptoms.   - no plans for endoscopic evaluation at this time  - monitor H&H, transfuse PRR  - daily PPI     2. Diarrhea, likely related to chemo +/- abx   - GI PCR neg  - if worsens, check C-diff    3. DVT   - on anticoagulation    4. Multiple myeloma, pancytopenia   - per heme / onc       Jahaira Jennings M.D.   Gastroenterology and Hepatology  [address] [place], [place]  Office: [phone]  Cell: [phone]

## 2023-03-29 NOTE — PROGRESS NOTE ADULT - SUBJECTIVE AND OBJECTIVE BOX
[facility] NEPHROLOGY- PROGRESS NOTE    67y Male with history of MM on chemotherapy presents with fevers. Nephrology consulted for elevated Scr.    REVIEW OF SYSTEMS:  Gen: no fevers  Cards: no chest pain  Resp: no dyspnea  GI: no nausea or vomiting or diarrhea  Vascular: no LE edema    No Known Allergies      Hospital Medications: Medications reviewed    VITALS:  T(F): 100.1 (23 @ 09:33), Max: 102.9 (23 @ 19:57)  HR: 85 (23 @ 09:33)  BP: 110/58 (23 @ 09:33)  RR: 18 (23 @ 09:33)  SpO2: 93% (23 @ 09:33)  Wt(kg): --  Height (cm): 185.4 ( @ 17:02), 185.4 ( 18:18)  Weight (kg): 77.1 ( @ 17:02), 77.111 ( 18:18)  BMI (kg/m2): 22.4 ( @ 17:02), 22.4 ( @ 18:18)  BSA (m2): 2.01 ( @ 17:02), 2.01 ( @ 18:18)     @ 07:01  -   @ 07:00  --------------------------------------------------------  IN: 2120 mL / OUT: 1175 mL / NET: 945 mL        PHYSICAL EXAM:    Gen: NAD, calm  Cards: RRR, +S1/S2, no M/G/R  Resp: CTA B/L  GI: soft, NT/ND, NABS  Vascular: no LE edema B/L    LABS:      131<L>  |  98  |  38<H>  ----------------------------<  107<H>  3.7   |  20<L>  |  2.25<H>    Ca    8.5      29 Mar 2023 07:15      Creatinine Trend: 2.25 <--, 2.21 <--, 1.83 <--, 1.80 <--, 1.75 <--, 1.46 <--, 1.46 <--, 1.37 <--                        7.5    2.89  )-----------( 42       ( 29 Mar 2023 07:15 )             21.1     Urine Studies:  Urinalysis Basic - ( 28 Mar 2023 14:14 )    Color: Light Yellow / Appearance: Slightly Turbid / S.020 / pH:   Gluc:  / Ketone: Negative  / Bili: Negative / Urobili: Negative   Blood:  / Protein: 100 mg/dl / Nitrite: Negative   Leuk Esterase: Negative / RBC: 2 /hpf / WBC 18 /HPF   Sq Epi:  / Non Sq Epi: 6 /hpf / Bacteria: Negative      Sodium, Random Urine: 45 mmol/L ( @ 14:14)  Creatinine, Random Urine: 96 mg/dL ( @ 14:14)      RADIOLOGY & ADDITIONAL STUDIES:    < from: CT Chest No Cont (23 @ 19:55) >  INTERPRETATION:  No pneumonia. No pleural effusions. No pneumothorax.   Mild emphysematous change. Cholelithiasis.      < end of copied text >   [facility] NEPHROLOGY- PROGRESS NOTE    67y Male with history of MM on chemotherapy presents with fevers. Nephrology consulted for elevated Scr.    REVIEW OF SYSTEMS:  Gen: no fevers  Cards: no chest pain  Resp: no dyspnea  GI: no nausea or vomiting or diarrhea  Vascular: no LE edema    No Known Allergies      Hospital Medications: Medications reviewed    VITALS:  T(F): 100.1 (23 @ 09:33), Max: 102.9 (23 @ 19:57)  HR: 85 (23 @ 09:33)  BP: 110/58 (23 @ 09:33)  RR: 18 (23 @ 09:33)  SpO2: 93% (23 @ 09:33)  Wt(kg): --  Height (cm): 185.4 ( @ 17:02), 185.4 ( 18:18)  Weight (kg): 77.1 ( @ 17:02), 77.111 ( 18:18)  BMI (kg/m2): 22.4 ( @ 17:02), 22.4 ( @ 18:18)  BSA (m2): 2.01 ( @ 17:02), 2.01 ( @ 18:18)     @ 07:01  -   @ 07:00  --------------------------------------------------------  IN: 2120 mL / OUT: 1175 mL / NET: 945 mL        PHYSICAL EXAM:    Gen: NAD, calm  Cards: RRR, +S1/S2, no M/G/R  Resp: CTA B/L  GI: soft, NT/ND, NABS  Vascular: no LE edema B/L    LABS:      131<L>  |  98  |  38<H>  ----------------------------<  107<H>  3.7   |  20<L>  |  2.25<H>    Ca    8.5      29 Mar 2023 07:15      Creatinine Trend: 2.25 <--, 2.21 <--, 1.83 <--, 1.80 <--, 1.75 <--, 1.46 <--, 1.46 <--, 1.37 <--                        7.5    2.89  )-----------( 42       ( 29 Mar 2023 07:15 )             21.1     Urine Studies:  Urinalysis Basic - ( 28 Mar 2023 14:14 )    Color: Light Yellow / Appearance: Slightly Turbid / S.020 / pH:   Gluc:  / Ketone: Negative  / Bili: Negative / Urobili: Negative   Blood:  / Protein: 100 mg/dl / Nitrite: Negative   Leuk Esterase: Negative / RBC: 2 /hpf / WBC 18 /HPF   Sq Epi:  / Non Sq Epi: 6 /hpf / Bacteria: Negative      Sodium, Random Urine: 45 mmol/L ( @ 14:14)  Creatinine, Random Urine: 96 mg/dL ( @ 14:14)      RADIOLOGY & ADDITIONAL STUDIES:    < from: CT Chest No Cont (23 @ 19:55) >  INTERPRETATION:  No pneumonia. No pleural effusions. No pneumothorax.   Mild emphysematous change. Cholelithiasis.      < end of copied text >   [facility] NEPHROLOGY- PROGRESS NOTE    67y Male with history of MM on chemotherapy presents with fevers. Nephrology consulted for elevated Scr.    REVIEW OF SYSTEMS:  Gen: no fevers  Cards: no chest pain  Resp: no dyspnea  GI: no nausea or vomiting or diarrhea  Vascular: no LE edema    No Known Allergies      Hospital Medications: Medications reviewed    VITALS:  T(F): 100.1 (23 @ 09:33), Max: 102.9 (23 @ 19:57)  HR: 85 (23 @ 09:33)  BP: 110/58 (23 @ 09:33)  RR: 18 (23 @ 09:33)  SpO2: 93% (23 @ 09:33)  Wt(kg): --  Height (cm): 185.4 ( @ 17:02), 185.4 ( 18:18)  Weight (kg): 77.1 ( @ 17:02), 77.111 ( 18:18)  BMI (kg/m2): 22.4 ( @ 17:02), 22.4 ( @ 18:18)  BSA (m2): 2.01 ( @ 17:02), 2.01 ( @ 18:18)     @ 07:01  -   @ 07:00  --------------------------------------------------------  IN: 2120 mL / OUT: 1175 mL / NET: 945 mL        PHYSICAL EXAM:    Gen: NAD, calm  Cards: RRR, +S1/S2, no M/G/R  Resp: CTA B/L  GI: soft, NT/ND, NABS  Vascular: no LE edema B/L    LABS:      131<L>  |  98  |  38<H>  ----------------------------<  107<H>  3.7   |  20<L>  |  2.25<H>    Ca    8.5      29 Mar 2023 07:15      Creatinine Trend: 2.25 <--, 2.21 <--, 1.83 <--, 1.80 <--, 1.75 <--, 1.46 <--, 1.46 <--, 1.37 <--                        7.5    2.89  )-----------( 42       ( 29 Mar 2023 07:15 )             21.1     Urine Studies:  Urinalysis Basic - ( 28 Mar 2023 14:14 )    Color: Light Yellow / Appearance: Slightly Turbid / S.020 / pH:   Gluc:  / Ketone: Negative  / Bili: Negative / Urobili: Negative   Blood:  / Protein: 100 mg/dl / Nitrite: Negative   Leuk Esterase: Negative / RBC: 2 /hpf / WBC 18 /HPF   Sq Epi:  / Non Sq Epi: 6 /hpf / Bacteria: Negative      Sodium, Random Urine: 45 mmol/L ( @ 14:14)  Creatinine, Random Urine: 96 mg/dL ( @ 14:14)      RADIOLOGY & ADDITIONAL STUDIES:    < from: CT Chest No Cont (23 @ 19:55) >  INTERPRETATION:  No pneumonia. No pleural effusions. No pneumothorax.   Mild emphysematous change. Cholelithiasis.      < end of copied text >

## 2023-03-29 NOTE — PROGRESS NOTE ADULT - ASSESSMENT
PETER DE LA PAZ is a 67y Male who presents with a chief complaint of fever.    Multiple Myeloma  Pancytopenia  ·	Patient follows with Dr. Jean Claude Hubbard, [facility].  ·	Patient had been on lenalidomide maintenance up until February 2023, when he had PET/CT that showed progression of disease with worsening lymphadenopathy, bony lesions, and liver lesion.   ·	He was started then on daratumumab + cyclophosphamide + bortezomib + dexamethasone.  ·	Last dose of daratumumab was on March 23rd.  ·	Last dose of bortezomib was on March 23rd  ·	Last dose of cyclophosphamide was on March 13th  ·	Patient has had worsening pancytopenia since starting the latest chemotherapy regimen.  ·	Hold systemic treatment while inpatient.  ·	Monitor CBC and transfuse to maintain HGB > 7 and PLT > 10.  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 However, if plts fall, this is distal and can monitor with serial dopplers as well  -- monitor, will need to determine timing of switching to PO AC, would wait until plts stable    positive guiac   - would consult GI     Will continue to follow.  Steph Holly NP  Hematology/ Oncology  [facility]  [phone] (office)  [phone] (alt office)  Evenings and weekends please call MD on call or office     PETER DE LA PAZ is a 67y Male who presents with a chief complaint of fever.    Multiple Myeloma  Pancytopenia  ·	Patient follows with Dr. Jean Claude Hubbard, [facility].  ·	Patient had been on lenalidomide maintenance up until February 2023, when he had PET/CT that showed progression of disease with worsening lymphadenopathy, bony lesions, and liver lesion.   ·	He was started then on daratumumab + cyclophosphamide + bortezomib + dexamethasone.  ·	Last dose of daratumumab was on March 23rd.  ·	Last dose of bortezomib was on March 23rd  ·	Last dose of cyclophosphamide was on March 13th  ·	Patient has had worsening pancytopenia since starting the latest chemotherapy regimen.  ·	Hold systemic treatment while inpatient.  ·	Monitor CBC and transfuse to maintain HGB > 7 and PLT > 10.  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 However, if plts fall, this is distal and can monitor with serial dopplers as well  -- monitor, will need to determine timing of switching to PO AC, would wait until plts stable    positive guiac   - would consult GI     Will continue to follow.  Steph Holly NP  Hematology/ Oncology  [facility]  [phone] (office)  [phone] (alt office)  Evenings and weekends please call MD on call or office     PETER DE LA PAZ is a 67y Male who presents with a chief complaint of fever.    Multiple Myeloma  Pancytopenia  ·	Patient follows with Dr. Jean Claude Hubbadr, [facility].  ·	Patient had been on lenalidomide maintenance up until February 2023, when he had PET/CT that showed progression of disease with worsening lymphadenopathy, bony lesions, and liver lesion.   ·	He was started then on daratumumab + cyclophosphamide + bortezomib + dexamethasone.  ·	Last dose of daratumumab was on March 23rd.  ·	Last dose of bortezomib was on March 23rd  ·	Last dose of cyclophosphamide was on March 13th  ·	Patient has had worsening pancytopenia since starting the latest chemotherapy regimen.  ·	Hold systemic treatment while inpatient.  ·	Monitor CBC and transfuse to maintain HGB > 7 and PLT > 10.  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 However, if plts fall, this is distal and can monitor with serial dopplers as well  -- monitor, will need to determine timing of switching to PO AC, would wait until plts stable    positive guiac   - would consult GI     Will continue to follow.  Steph Holly NP  Hematology/ Oncology  [facility]  [phone] (office)  [phone] (alt office)  Evenings and weekends please call MD on call or office

## 2023-03-29 NOTE — PROGRESS NOTE ADULT - ASSESSMENT
67y Male with history of MM on chemotherapy presents with fevers. Nephrology consulted for elevated Scr.    1) RK: Secondary to ATN on prior admission which had resolved with recurrent RK likely due to ATN in setting of relative hypotension and IV contrast administration. Scr appears to have plateaued. Continue with IVF. UA with pyuria however sample contaminated (numerous squamous epithelial cells). FeNa low. Bladder scan negative. TMA work up negative. Avoid nephrotoxins. Monitor electrolytes.    2) Hypotension: In setting of fevers. Continue with midodrine 5 mg PO TID.    3) Hyponatremia: In setting of RK and likely decreased diluting ability. Serum Na improving. IVF as above. Zosyn in NS base. Monitor serum Na.    4) MM: As per Heme/Onc. Would decrease Entecavir to 0.25 mg daily given RK.        [facility] NEPHROLOGY  Leoncio Leonard M.D.  Tay Brooke D.O.  Precious Chen M.D.  Nidia Stallings, MSN, ANP-C    Telephone: [phone]  Facsimile: [phone]    [address] [place] [address]   67y Male with history of MM on chemotherapy presents with fevers. Nephrology consulted for elevated Scr.    1) RK: Secondary to ATN on prior admission which had resolved with recurrent RK likely due to ATN in setting of relative hypotension and IV contrast administration. Scr appears to have plateaued. Continue with IVF. UA with pyuria however sample contaminated (numerous squamous epithelial cells). FeNa low. Bladder scan negative. TMA work up negative. Avoid nephrotoxins. Monitor electrolytes.    2) Hypotension: In setting of fevers. Continue with midodrine 5 mg PO TID.    3) Hyponatremia: In setting of RK and likely decreased diluting ability. Serum Na improving. IVF as above. Zosyn in NS base. Monitor serum Na.    4) MM: As per Heme/Onc. Would decrease Entecavir to 0.25 mg daily given RK.        [facility] NEPHROLOGY  Leoncio Leonard M.D.  Tay Brooke D.O.  Precious Chen M.D.  Nidia Stallings, MSN, ANP-C    Telephone: [phone]  Facsimile: [phone]    [address] [place] [address]   67y Male with history of MM on chemotherapy presents with fevers. Nephrology consulted for elevated Scr.    1) RK: Secondary to ATN on prior admission which had resolved with recurrent RK likely due to ATN in setting of relative hypotension and IV contrast administration. Scr appears to have plateaued. Continue with IVF. UA with pyuria however sample contaminated (numerous squamous epithelial cells). FeNa low. Bladder scan negative. TMA work up negative. Avoid nephrotoxins. Monitor electrolytes.    2) Hypotension: In setting of fevers. Continue with midodrine 5 mg PO TID.    3) Hyponatremia: In setting of RK and likely decreased diluting ability. Serum Na improving. IVF as above. Zosyn in NS base. Monitor serum Na.    4) MM: As per Heme/Onc. Would decrease Entecavir to 0.25 mg daily given RK.        [facility] NEPHROLOGY  Leoncio Leonard M.D.  Tay Brooke D.O.  Precious Chen M.D.  Nidia Stallings, MSN, ANP-C    Telephone: [phone]  Facsimile: [phone]    [address] [place] [address]

## 2023-03-29 NOTE — PROGRESS NOTE ADULT - SUBJECTIVE AND OBJECTIVE BOX
Name of Patient : PETER DE LA PAZ  MRN: [id number]  Date of visit: 23 @ 16:22      Subjective: Patient seen and examined. No new events except as noted.   Patient seen earlier this AM. Febrile overnight. Reports 1 episode of diarrhea today.   S/P 1 unit of PRBCs yesterday   Wife at the bedside.     REVIEW OF SYSTEMS:    CONSTITUTIONAL: Febrile overnight; Generalized weakness   EYES/ENT: No visual changes;  No vertigo or throat pain   NECK: No pain or stiffness  RESPIRATORY: No cough, wheezing, hemoptysis; No shortness of breath  CARDIOVASCULAR: No chest pain or palpitations  GASTROINTESTINAL: + 1 episode of diarrhea; No abdominal or epigastric pain. No nausea, vomiting, or hematemesis;  No melena or hematochezia.  GENITOURINARY: No dysuria, frequency or hematuria  NEUROLOGICAL: No numbness or weakness  SKIN: No itching, burning, rashes, or lesions   All other review of systems is negative unless indicated above.    MEDICATIONS:  MEDICATIONS  (STANDING):  aspirin enteric coated 81 milliGRAM(s) Oral daily  atorvastatin 40 milliGRAM(s) Oral at bedtime  entecavir Solution 0.25 milliGRAM(s) Oral daily  metoprolol tartrate 50 milliGRAM(s) Oral two times a day  midodrine. 5 milliGRAM(s) Oral three times a day  sodium chloride 0.9%. 1000 milliLiter(s) (100 mL/Hr) IV Continuous <Continuous>  valGANciclovir 450 milliGRAM(s) Oral every 24 hours      PHYSICAL EXAM:  T(C): 36.6 (23 @ 11:45), Max: 39.4 (23 @ 19:57)  HR: 72 (23 @ 11:45) (72 - 103)  BP: 93/42 (23 @ 11:45) (93/42 - 126/71)  RR: 18 (23 @ 11:45) (18 - 18)  SpO2: 92% (23 @ 11:45) (92% - 96%)  Wt(kg): --  I&O's Summary    28 Mar 2023 07:01  -  29 Mar 2023 07:00  --------------------------------------------------------  IN: 0 mL / OUT: 1175 mL / NET: 945 mL    29 Mar 2023 07:  -  29 Mar 2023 16:22  --------------------------------------------------------  IN: 740 mL / OUT: 300 mL / NET: 440 mL          Appearance: Awake, generalized weakness; Lying down in bed 	  HEENT:  Eyes are open   Lymphatic: No lymphadenopathy   Cardiovascular: Normal S1 S2, no JVD  Respiratory: normal effort , clear  Gastrointestinal:  Soft, Non-tender  Skin: No rashes,  warm to touch  Psychiatry:  Mood & affect appropriate  Musculoskeletal: No edema        23 @ 07:  -  23 @ 07:00  --------------------------------------------------------  IN: 0 mL / OUT: 1175 mL / NET: 945 mL    23 @ 07:  -  23 @ 16:22  --------------------------------------------------------  IN: 740 mL / OUT: 300 mL / NET: 440 mL                                7.5    2.89  )-----------( 42       ( 29 Mar 2023 07:15 )             21.1                   131<L>  |  98  |  38<H>  ----------------------------<  107<H>  3.7   |  20<L>  |  2.25<H>    Ca    8.5      29 Mar 2023 07:15      PTT - ( 28 Mar 2023 07:08 )  PTT:117.2 sec                   Urinalysis Basic - ( 28 Mar 2023 14:14 )    Color: Light Yellow / Appearance: Slightly Turbid / S.020 / pH: x  Gluc: x / Ketone: Negative  / Bili: Negative / Urobili: Negative   Blood: x / Protein: 100 mg/dl / Nitrite: Negative   Leuk Esterase: Negative / RBC: 2 /hpf / WBC 18 /HPF   Sq Epi: x / Non Sq Epi: 6 /hpf / Bacteria: Negative          Culture - Blood (23 @ 11:05)   Specimen Source: .Blood Blood  Culture Results: No growth to date.    Culture - Blood (23 @ 11:05)   Specimen Source: .Blood Blood  Culture Results: No growth to date.      Culture - Blood (23 @ 17:20)   Specimen Source: .Blood Blood-Peripheral  Culture Results: No Growth Final    Culture - Blood (23 @ 17:35)   Specimen Source: .Blood Blood-Peripheral  Culture Results: No Growth Final    GI PCR Panel Stool (23 @ 22:30)   GI PCR Panel: NotDetec        Cytomegalovirus By PCR (23 @ 09:27)   Cytomegalovirus By PCR: [id number]   Name of Patient : PETER DE LA PAZ  MRN: [id number]  Date of visit: 23 @ 16:22      Subjective: Patient seen and examined. No new events except as noted.   Patient seen earlier this AM. Febrile overnight. Reports 1 episode of diarrhea today.   S/P 1 unit of PRBCs yesterday   Wife at the bedside.     REVIEW OF SYSTEMS:    CONSTITUTIONAL: Febrile overnight; Generalized weakness   EYES/ENT: No visual changes;  No vertigo or throat pain   NECK: No pain or stiffness  RESPIRATORY: No cough, wheezing, hemoptysis; No shortness of breath  CARDIOVASCULAR: No chest pain or palpitations  GASTROINTESTINAL: + 1 episode of diarrhea; No abdominal or epigastric pain. No nausea, vomiting, or hematemesis;  No melena or hematochezia.  GENITOURINARY: No dysuria, frequency or hematuria  NEUROLOGICAL: No numbness or weakness  SKIN: No itching, burning, rashes, or lesions   All other review of systems is negative unless indicated above.    MEDICATIONS:  MEDICATIONS  (STANDING):  aspirin enteric coated 81 milliGRAM(s) Oral daily  atorvastatin 40 milliGRAM(s) Oral at bedtime  entecavir Solution 0.25 milliGRAM(s) Oral daily  metoprolol tartrate 50 milliGRAM(s) Oral two times a day  midodrine. 5 milliGRAM(s) Oral three times a day  sodium chloride 0.9%. 1000 milliLiter(s) (100 mL/Hr) IV Continuous <Continuous>  valGANciclovir 450 milliGRAM(s) Oral every 24 hours      PHYSICAL EXAM:  T(C): 36.6 (23 @ 11:45), Max: 39.4 (23 @ 19:57)  HR: 72 (23 @ 11:45) (72 - 103)  BP: 93/42 (23 @ 11:45) (93/42 - 126/71)  RR: 18 (23 @ 11:45) (18 - 18)  SpO2: 92% (23 @ 11:45) (92% - 96%)  Wt(kg): --  I&O's Summary    28 Mar 2023 07:01  -  29 Mar 2023 07:00  --------------------------------------------------------  IN: 0 mL / OUT: 1175 mL / NET: 945 mL    29 Mar 2023 07:  -  29 Mar 2023 16:22  --------------------------------------------------------  IN: 740 mL / OUT: 300 mL / NET: 440 mL          Appearance: Awake, generalized weakness; Lying down in bed 	  HEENT:  Eyes are open   Lymphatic: No lymphadenopathy   Cardiovascular: Normal S1 S2, no JVD  Respiratory: normal effort , clear  Gastrointestinal:  Soft, Non-tender  Skin: No rashes,  warm to touch  Psychiatry:  Mood & affect appropriate  Musculoskeletal: No edema        23 @ 07:  -  23 @ 07:00  --------------------------------------------------------  IN: 0 mL / OUT: 1175 mL / NET: 945 mL    23 @ 07:  -  23 @ 16:22  --------------------------------------------------------  IN: 740 mL / OUT: 300 mL / NET: 440 mL                                7.5    2.89  )-----------( 42       ( 29 Mar 2023 07:15 )             21.1                   131<L>  |  98  |  38<H>  ----------------------------<  107<H>  3.7   |  20<L>  |  2.25<H>    Ca    8.5      29 Mar 2023 07:15      PTT - ( 28 Mar 2023 07:08 )  PTT:117.2 sec                   Urinalysis Basic - ( 28 Mar 2023 14:14 )    Color: Light Yellow / Appearance: Slightly Turbid / S.020 / pH: x  Gluc: x / Ketone: Negative  / Bili: Negative / Urobili: Negative   Blood: x / Protein: 100 mg/dl / Nitrite: Negative   Leuk Esterase: Negative / RBC: 2 /hpf / WBC 18 /HPF   Sq Epi: x / Non Sq Epi: 6 /hpf / Bacteria: Negative          Culture - Blood (23 @ 11:05)   Specimen Source: .Blood Blood  Culture Results: No growth to date.    Culture - Blood (23 @ 11:05)   Specimen Source: .Blood Blood  Culture Results: No growth to date.      Culture - Blood (23 @ 17:20)   Specimen Source: .Blood Blood-Peripheral  Culture Results: No Growth Final    Culture - Blood (23 @ 17:35)   Specimen Source: .Blood Blood-Peripheral  Culture Results: No Growth Final    GI PCR Panel Stool (23 @ 22:30)   GI PCR Panel: NotDetec        Cytomegalovirus By PCR (23 @ 09:27)   Cytomegalovirus By PCR: [id number]   Name of Patient : PETER DE LA PAZ  MRN: [id number]  Date of visit: 23 @ 16:22      Subjective: Patient seen and examined. No new events except as noted.   Patient seen earlier this AM. Febrile overnight. Reports 1 episode of diarrhea today.   S/P 1 unit of PRBCs yesterday   Wife at the bedside.     REVIEW OF SYSTEMS:    CONSTITUTIONAL: Febrile overnight; Generalized weakness   EYES/ENT: No visual changes;  No vertigo or throat pain   NECK: No pain or stiffness  RESPIRATORY: No cough, wheezing, hemoptysis; No shortness of breath  CARDIOVASCULAR: No chest pain or palpitations  GASTROINTESTINAL: + 1 episode of diarrhea; No abdominal or epigastric pain. No nausea, vomiting, or hematemesis;  No melena or hematochezia.  GENITOURINARY: No dysuria, frequency or hematuria  NEUROLOGICAL: No numbness or weakness  SKIN: No itching, burning, rashes, or lesions   All other review of systems is negative unless indicated above.    MEDICATIONS:  MEDICATIONS  (STANDING):  aspirin enteric coated 81 milliGRAM(s) Oral daily  atorvastatin 40 milliGRAM(s) Oral at bedtime  entecavir Solution 0.25 milliGRAM(s) Oral daily  metoprolol tartrate 50 milliGRAM(s) Oral two times a day  midodrine. 5 milliGRAM(s) Oral three times a day  sodium chloride 0.9%. 1000 milliLiter(s) (100 mL/Hr) IV Continuous <Continuous>  valGANciclovir 450 milliGRAM(s) Oral every 24 hours      PHYSICAL EXAM:  T(C): 36.6 (23 @ 11:45), Max: 39.4 (23 @ 19:57)  HR: 72 (23 @ 11:45) (72 - 103)  BP: 93/42 (23 @ 11:45) (93/42 - 126/71)  RR: 18 (23 @ 11:45) (18 - 18)  SpO2: 92% (23 @ 11:45) (92% - 96%)  Wt(kg): --  I&O's Summary    28 Mar 2023 07:01  -  29 Mar 2023 07:00  --------------------------------------------------------  IN: 0 mL / OUT: 1175 mL / NET: 945 mL    29 Mar 2023 07:  -  29 Mar 2023 16:22  --------------------------------------------------------  IN: 740 mL / OUT: 300 mL / NET: 440 mL          Appearance: Awake, generalized weakness; Lying down in bed 	  HEENT:  Eyes are open   Lymphatic: No lymphadenopathy   Cardiovascular: Normal S1 S2, no JVD  Respiratory: normal effort , clear  Gastrointestinal:  Soft, Non-tender  Skin: No rashes,  warm to touch  Psychiatry:  Mood & affect appropriate  Musculoskeletal: No edema        23 @ 07:  -  23 @ 07:00  --------------------------------------------------------  IN: 0 mL / OUT: 1175 mL / NET: 945 mL    23 @ 07:  -  23 @ 16:22  --------------------------------------------------------  IN: 740 mL / OUT: 300 mL / NET: 440 mL                                7.5    2.89  )-----------( 42       ( 29 Mar 2023 07:15 )             21.1                   131<L>  |  98  |  38<H>  ----------------------------<  107<H>  3.7   |  20<L>  |  2.25<H>    Ca    8.5      29 Mar 2023 07:15      PTT - ( 28 Mar 2023 07:08 )  PTT:117.2 sec                   Urinalysis Basic - ( 28 Mar 2023 14:14 )    Color: Light Yellow / Appearance: Slightly Turbid / S.020 / pH: x  Gluc: x / Ketone: Negative  / Bili: Negative / Urobili: Negative   Blood: x / Protein: 100 mg/dl / Nitrite: Negative   Leuk Esterase: Negative / RBC: 2 /hpf / WBC 18 /HPF   Sq Epi: x / Non Sq Epi: 6 /hpf / Bacteria: Negative          Culture - Blood (23 @ 11:05)   Specimen Source: .Blood Blood  Culture Results: No growth to date.    Culture - Blood (23 @ 11:05)   Specimen Source: .Blood Blood  Culture Results: No growth to date.      Culture - Blood (23 @ 17:20)   Specimen Source: .Blood Blood-Peripheral  Culture Results: No Growth Final    Culture - Blood (23 @ 17:35)   Specimen Source: .Blood Blood-Peripheral  Culture Results: No Growth Final    GI PCR Panel Stool (23 @ 22:30)   GI PCR Panel: NotDetec        Cytomegalovirus By PCR (23 @ 09:27)   Cytomegalovirus By PCR: [id number]   Name of Patient : PETER DE LA PAZ  MRN: [id number]  Date of visit: 23 @ 16:22      Subjective: Patient seen and examined. No new events except as noted.   Patient seen earlier this AM. Febrile overnight. Reports 1 episode of diarrhea today.   S/P 1 unit of PRBCs yesterday   Wife at the bedside.     REVIEW OF SYSTEMS:    CONSTITUTIONAL: Febrile overnight; Generalized weakness   EYES/ENT: No visual changes;  No vertigo or throat pain   NECK: No pain or stiffness  RESPIRATORY: No cough, wheezing, hemoptysis; No shortness of breath  CARDIOVASCULAR: No chest pain or palpitations  GASTROINTESTINAL: + 1 episode of diarrhea; No abdominal or epigastric pain. No nausea, vomiting, or hematemesis;  No melena or hematochezia.  GENITOURINARY: No dysuria, frequency or hematuria  NEUROLOGICAL: No numbness or weakness  SKIN: No itching, burning, rashes, or lesions   All other review of systems is negative unless indicated above.    MEDICATIONS:  MEDICATIONS  (STANDING):  aspirin enteric coated 81 milliGRAM(s) Oral daily  atorvastatin 40 milliGRAM(s) Oral at bedtime  entecavir Solution 0.25 milliGRAM(s) Oral daily  metoprolol tartrate 50 milliGRAM(s) Oral two times a day  midodrine. 5 milliGRAM(s) Oral three times a day  sodium chloride 0.9%. 1000 milliLiter(s) (100 mL/Hr) IV Continuous <Continuous>  valGANciclovir 450 milliGRAM(s) Oral every 24 hours      PHYSICAL EXAM:  T(C): 36.6 (23 @ 11:45), Max: 39.4 (23 @ 19:57)  HR: 72 (23 @ 11:45) (72 - 103)  BP: 93/42 (23 @ 11:45) (93/42 - 126/71)  RR: 18 (23 @ 11:45) (18 - 18)  SpO2: 92% (23 @ 11:45) (92% - 96%)  Wt(kg): --  I&O's Summary    28 Mar 2023 07:01  -  29 Mar 2023 07:00  --------------------------------------------------------  IN: 0 mL / OUT: 1175 mL / NET: 945 mL    29 Mar 2023 07:  -  29 Mar 2023 16:22  --------------------------------------------------------  IN: 740 mL / OUT: 300 mL / NET: 440 mL          Appearance: Awake, generalized weakness; Lying down in bed 	  HEENT:  Eyes are open   Lymphatic: No lymphadenopathy   Cardiovascular: Normal S1 S2, no JVD  Respiratory: normal effort , clear  Gastrointestinal:  Soft, Non-tender  Skin: No rashes,  warm to touch  Psychiatry:  Mood & affect appropriate  Musculoskeletal: No edema        23 @ 07:  -  23 @ 07:00  --------------------------------------------------------  IN: 0 mL / OUT: 1175 mL / NET: 945 mL    23 @ 07:  -  23 @ 16:22  --------------------------------------------------------  IN: 740 mL / OUT: 300 mL / NET: 440 mL                                7.5    2.89  )-----------( 42       ( 29 Mar 2023 07:15 )             21.1                   131<L>  |  98  |  38<H>  ----------------------------<  107<H>  3.7   |  20<L>  |  2.25<H>    Ca    8.5      29 Mar 2023 07:15      PTT - ( 28 Mar 2023 07:08 )  PTT:117.2 sec                   Urinalysis Basic - ( 28 Mar 2023 14:14 )    Color: Light Yellow / Appearance: Slightly Turbid / S.020 / pH: x  Gluc: x / Ketone: Negative  / Bili: Negative / Urobili: Negative   Blood: x / Protein: 100 mg/dl / Nitrite: Negative   Leuk Esterase: Negative / RBC: 2 /hpf / WBC 18 /HPF   Sq Epi: x / Non Sq Epi: 6 /hpf / Bacteria: Negative          Culture - Blood (23 @ 11:05)   Specimen Source: .Blood Blood  Culture Results: No growth to date.    Culture - Blood (23 @ 11:05)   Specimen Source: .Blood Blood  Culture Results: No growth to date.      Culture - Blood (23 @ 17:20)   Specimen Source: .Blood Blood-Peripheral  Culture Results: No Growth Final    Culture - Blood (23 @ 17:35)   Specimen Source: .Blood Blood-Peripheral  Culture Results: No Growth Final    GI PCR Panel Stool (23 @ 22:30)   GI PCR Panel: NotDetec      < from: CT Chest No Cont (23 @ 19:55) >  FINDINGS:    LUNGS AND AIRWAYS: Patent central airways. Mild emphysema. Right lower   lobe atelectasis. Minimal bronchiolectasis in the peripheral right middle   and right upper lobes.  PLEURA: No pleural effusion.  MEDIASTINUM AND MABEL: Small hiatal hernia.  VESSELS: Calcified plaque within the aortic arch, its branches, and   coronary arteries.  HEART: Aortic valve replacement. Heart size is normal. No pericardial   effusion.  CHEST WALL AND LOWER NECK: Within normal limits.  VISUALIZED UPPER ABDOMEN: Elevated right hemidiaphragm. Cholelithiasis.   Splenectomy. Partial gastrectomy.  BONES: Median sternotomy. Degenerative changes. Indeterminate lucent   lesion within the T6 vertebral body.    IMPRESSION:  No pneumonia.      Cytomegalovirus By PCR (23 @ 09:27)   Cytomegalovirus By PCR: [id number]     Name of Patient : PETER DE LA PAZ  MRN: [id number]  Date of visit: 23 @ 16:22      Subjective: Patient seen and examined. No new events except as noted.   Patient seen earlier this AM. Febrile overnight. Reports 1 episode of diarrhea today.   S/P 1 unit of PRBCs yesterday   Wife at the bedside.     REVIEW OF SYSTEMS:    CONSTITUTIONAL: Febrile overnight; Generalized weakness   EYES/ENT: No visual changes;  No vertigo or throat pain   NECK: No pain or stiffness  RESPIRATORY: No cough, wheezing, hemoptysis; No shortness of breath  CARDIOVASCULAR: No chest pain or palpitations  GASTROINTESTINAL: + 1 episode of diarrhea; No abdominal or epigastric pain. No nausea, vomiting, or hematemesis;  No melena or hematochezia.  GENITOURINARY: No dysuria, frequency or hematuria  NEUROLOGICAL: No numbness or weakness  SKIN: No itching, burning, rashes, or lesions   All other review of systems is negative unless indicated above.    MEDICATIONS:  MEDICATIONS  (STANDING):  aspirin enteric coated 81 milliGRAM(s) Oral daily  atorvastatin 40 milliGRAM(s) Oral at bedtime  entecavir Solution 0.25 milliGRAM(s) Oral daily  metoprolol tartrate 50 milliGRAM(s) Oral two times a day  midodrine. 5 milliGRAM(s) Oral three times a day  sodium chloride 0.9%. 1000 milliLiter(s) (100 mL/Hr) IV Continuous <Continuous>  valGANciclovir 450 milliGRAM(s) Oral every 24 hours      PHYSICAL EXAM:  T(C): 36.6 (23 @ 11:45), Max: 39.4 (23 @ 19:57)  HR: 72 (23 @ 11:45) (72 - 103)  BP: 93/42 (23 @ 11:45) (93/42 - 126/71)  RR: 18 (23 @ 11:45) (18 - 18)  SpO2: 92% (23 @ 11:45) (92% - 96%)  Wt(kg): --  I&O's Summary    28 Mar 2023 07:01  -  29 Mar 2023 07:00  --------------------------------------------------------  IN: 0 mL / OUT: 1175 mL / NET: 945 mL    29 Mar 2023 07:  -  29 Mar 2023 16:22  --------------------------------------------------------  IN: 740 mL / OUT: 300 mL / NET: 440 mL          Appearance: Awake, generalized weakness; Lying down in bed 	  HEENT:  Eyes are open   Lymphatic: No lymphadenopathy   Cardiovascular: Normal S1 S2, no JVD  Respiratory: normal effort , clear  Gastrointestinal:  Soft, Non-tender  Skin: No rashes,  warm to touch  Psychiatry:  Mood & affect appropriate  Musculoskeletal: No edema        23 @ 07:  -  23 @ 07:00  --------------------------------------------------------  IN: 0 mL / OUT: 1175 mL / NET: 945 mL    23 @ 07:  -  23 @ 16:22  --------------------------------------------------------  IN: 740 mL / OUT: 300 mL / NET: 440 mL                                7.5    2.89  )-----------( 42       ( 29 Mar 2023 07:15 )             21.1                   131<L>  |  98  |  38<H>  ----------------------------<  107<H>  3.7   |  20<L>  |  2.25<H>    Ca    8.5      29 Mar 2023 07:15      PTT - ( 28 Mar 2023 07:08 )  PTT:117.2 sec                   Urinalysis Basic - ( 28 Mar 2023 14:14 )    Color: Light Yellow / Appearance: Slightly Turbid / S.020 / pH: x  Gluc: x / Ketone: Negative  / Bili: Negative / Urobili: Negative   Blood: x / Protein: 100 mg/dl / Nitrite: Negative   Leuk Esterase: Negative / RBC: 2 /hpf / WBC 18 /HPF   Sq Epi: x / Non Sq Epi: 6 /hpf / Bacteria: Negative          Culture - Blood (23 @ 11:05)   Specimen Source: .Blood Blood  Culture Results: No growth to date.    Culture - Blood (23 @ 11:05)   Specimen Source: .Blood Blood  Culture Results: No growth to date.      Culture - Blood (23 @ 17:20)   Specimen Source: .Blood Blood-Peripheral  Culture Results: No Growth Final    Culture - Blood (23 @ 17:35)   Specimen Source: .Blood Blood-Peripheral  Culture Results: No Growth Final    GI PCR Panel Stool (23 @ 22:30)   GI PCR Panel: NotDetec      < from: CT Chest No Cont (23 @ 19:55) >  FINDINGS:    LUNGS AND AIRWAYS: Patent central airways. Mild emphysema. Right lower   lobe atelectasis. Minimal bronchiolectasis in the peripheral right middle   and right upper lobes.  PLEURA: No pleural effusion.  MEDIASTINUM AND MABEL: Small hiatal hernia.  VESSELS: Calcified plaque within the aortic arch, its branches, and   coronary arteries.  HEART: Aortic valve replacement. Heart size is normal. No pericardial   effusion.  CHEST WALL AND LOWER NECK: Within normal limits.  VISUALIZED UPPER ABDOMEN: Elevated right hemidiaphragm. Cholelithiasis.   Splenectomy. Partial gastrectomy.  BONES: Median sternotomy. Degenerative changes. Indeterminate lucent   lesion within the T6 vertebral body.    IMPRESSION:  No pneumonia.      Cytomegalovirus By PCR (23 @ 09:27)   Cytomegalovirus By PCR: [id number]     Name of Patient : PETER DE LA PAZ  MRN: [id number]  Date of visit: 23 @ 16:22      Subjective: Patient seen and examined. No new events except as noted.   Patient seen earlier this AM. Febrile overnight. Reports 1 episode of diarrhea today.   S/P 1 unit of PRBCs yesterday   Wife at the bedside.     REVIEW OF SYSTEMS:    CONSTITUTIONAL: Febrile overnight; Generalized weakness   EYES/ENT: No visual changes;  No vertigo or throat pain   NECK: No pain or stiffness  RESPIRATORY: No cough, wheezing, hemoptysis; No shortness of breath  CARDIOVASCULAR: No chest pain or palpitations  GASTROINTESTINAL: + 1 episode of diarrhea; No abdominal or epigastric pain. No nausea, vomiting, or hematemesis;  No melena or hematochezia.  GENITOURINARY: No dysuria, frequency or hematuria  NEUROLOGICAL: No numbness or weakness  SKIN: No itching, burning, rashes, or lesions   All other review of systems is negative unless indicated above.    MEDICATIONS:  MEDICATIONS  (STANDING):  aspirin enteric coated 81 milliGRAM(s) Oral daily  atorvastatin 40 milliGRAM(s) Oral at bedtime  entecavir Solution 0.25 milliGRAM(s) Oral daily  metoprolol tartrate 50 milliGRAM(s) Oral two times a day  midodrine. 5 milliGRAM(s) Oral three times a day  sodium chloride 0.9%. 1000 milliLiter(s) (100 mL/Hr) IV Continuous <Continuous>  valGANciclovir 450 milliGRAM(s) Oral every 24 hours      PHYSICAL EXAM:  T(C): 36.6 (23 @ 11:45), Max: 39.4 (23 @ 19:57)  HR: 72 (23 @ 11:45) (72 - 103)  BP: 93/42 (23 @ 11:45) (93/42 - 126/71)  RR: 18 (23 @ 11:45) (18 - 18)  SpO2: 92% (23 @ 11:45) (92% - 96%)  Wt(kg): --  I&O's Summary    28 Mar 2023 07:01  -  29 Mar 2023 07:00  --------------------------------------------------------  IN: 0 mL / OUT: 1175 mL / NET: 945 mL    29 Mar 2023 07:  -  29 Mar 2023 16:22  --------------------------------------------------------  IN: 740 mL / OUT: 300 mL / NET: 440 mL          Appearance: Awake, generalized weakness; Lying down in bed 	  HEENT:  Eyes are open   Lymphatic: No lymphadenopathy   Cardiovascular: Normal S1 S2, no JVD  Respiratory: normal effort , clear  Gastrointestinal:  Soft, Non-tender  Skin: No rashes,  warm to touch  Psychiatry:  Mood & affect appropriate  Musculoskeletal: No edema        23 @ 07:  -  23 @ 07:00  --------------------------------------------------------  IN: 0 mL / OUT: 1175 mL / NET: 945 mL    23 @ 07:  -  23 @ 16:22  --------------------------------------------------------  IN: 740 mL / OUT: 300 mL / NET: 440 mL                                7.5    2.89  )-----------( 42       ( 29 Mar 2023 07:15 )             21.1                   131<L>  |  98  |  38<H>  ----------------------------<  107<H>  3.7   |  20<L>  |  2.25<H>    Ca    8.5      29 Mar 2023 07:15      PTT - ( 28 Mar 2023 07:08 )  PTT:117.2 sec                   Urinalysis Basic - ( 28 Mar 2023 14:14 )    Color: Light Yellow / Appearance: Slightly Turbid / S.020 / pH: x  Gluc: x / Ketone: Negative  / Bili: Negative / Urobili: Negative   Blood: x / Protein: 100 mg/dl / Nitrite: Negative   Leuk Esterase: Negative / RBC: 2 /hpf / WBC 18 /HPF   Sq Epi: x / Non Sq Epi: 6 /hpf / Bacteria: Negative          Culture - Blood (23 @ 11:05)   Specimen Source: .Blood Blood  Culture Results: No growth to date.    Culture - Blood (23 @ 11:05)   Specimen Source: .Blood Blood  Culture Results: No growth to date.      Culture - Blood (23 @ 17:20)   Specimen Source: .Blood Blood-Peripheral  Culture Results: No Growth Final    Culture - Blood (23 @ 17:35)   Specimen Source: .Blood Blood-Peripheral  Culture Results: No Growth Final    GI PCR Panel Stool (23 @ 22:30)   GI PCR Panel: NotDetec      < from: CT Chest No Cont (23 @ 19:55) >  FINDINGS:    LUNGS AND AIRWAYS: Patent central airways. Mild emphysema. Right lower   lobe atelectasis. Minimal bronchiolectasis in the peripheral right middle   and right upper lobes.  PLEURA: No pleural effusion.  MEDIASTINUM AND MABEL: Small hiatal hernia.  VESSELS: Calcified plaque within the aortic arch, its branches, and   coronary arteries.  HEART: Aortic valve replacement. Heart size is normal. No pericardial   effusion.  CHEST WALL AND LOWER NECK: Within normal limits.  VISUALIZED UPPER ABDOMEN: Elevated right hemidiaphragm. Cholelithiasis.   Splenectomy. Partial gastrectomy.  BONES: Median sternotomy. Degenerative changes. Indeterminate lucent   lesion within the T6 vertebral body.    IMPRESSION:  No pneumonia.      Cytomegalovirus By PCR (23 @ 09:27)   Cytomegalovirus By PCR: [id number]

## 2023-03-29 NOTE — PROGRESS NOTE ADULT - ASSESSMENT
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 Found to be febrile on admission, ID consulted for further management.    Tmax 102.4 (3/23)  WBC 3.5K today  Urinalysis unremarkable  RVP neg  CXR unremarkable      Impression:  #Fever  #Immunocompromised Status, H/O Splenectomy  #pancytopenia   #CMV viremia         PLAN:  - all infectious work up in terms of bacterial infection negative, stopped zosyn   - V/Q scan with low probability of PE   - follow up blood cultures, NTD   - GI PCR- negative   - trend cbc for pancytopenia  - CMV PCR elevated, cmv IGG + and CMV IGM -  - cryptococcal ag, QuantiFeronTb, fungitell negative  - given immunosuppressed state and CMV viremia, ordered repeat CMV PCR and initiated valcyte based on CrCl.   - stop acyclovir,   - pt on dexamethasone, discuss with heme if it needs to be continued.       Plan discussed with Medicine, Heme Attending       Dejuan Ralph  Please contact through MS Teams   If no response or past 5 pm/weekend call [phone].                    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 Found to be febrile on admission, ID consulted for further management.    Tmax 102.4 (3/23)  WBC 3.5K today  Urinalysis unremarkable  RVP neg  CXR unremarkable      Impression:  #Fever  #Immunocompromised Status, H/O Splenectomy  #pancytopenia   #CMV viremia         PLAN:  - all infectious work up in terms of bacterial infection negative, stopped zosyn   - V/Q scan with low probability of PE   - follow up blood cultures, NTD   - GI PCR- negative   - trend cbc for pancytopenia  - CMV PCR elevated, cmv IGG + and CMV IGM -  - cryptococcal ag, QuantiFeronTb, fungitell negative  - given immunosuppressed state and CMV viremia, ordered repeat CMV PCR and initiated valcyte based on CrCl.   - stop acyclovir,   - pt on dexamethasone, discuss with heme if it needs to be continued.       Plan discussed with Medicine, Heme Attending       Dejuan Ralph  Please contact through MS Teams   If no response or past 5 pm/weekend call [phone].                    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 Found to be febrile on admission, ID consulted for further management.    Tmax 102.4 (3/23)  WBC 3.5K today  Urinalysis unremarkable  RVP neg  CXR unremarkable      Impression:  #Fever  #Immunocompromised Status, H/O Splenectomy  #pancytopenia   #CMV viremia         PLAN:  - all infectious work up in terms of bacterial infection negative, stopped zosyn   - V/Q scan with low probability of PE   - follow up blood cultures, NTD   - GI PCR- negative   - trend cbc for pancytopenia  - CMV PCR elevated, cmv IGG + and CMV IGM -  - cryptococcal ag, QuantiFeronTb, fungitell negative  - given immunosuppressed state and CMV viremia, ordered repeat CMV PCR and initiated valcyte based on CrCl.   - stop acyclovir,   - pt on dexamethasone, discuss with heme if it needs to be continued.       Plan discussed with Medicine, Heme Attending       Dejuan Ralph  Please contact through MS Teams   If no response or past 5 pm/weekend call [phone].

## 2023-03-29 NOTE — PROGRESS NOTE ADULT - SUBJECTIVE AND OBJECTIVE BOX
Patient is a 67y old  Male who presents with a chief complaint of fever (29 Mar 2023 11:51)    Patient seen and examined at bedside. patient resting in bed, reports ongoing fevers. family at bedside    MEDICATIONS  (STANDING):  acyclovir   Oral Tab/Cap 400 milliGRAM(s) Oral daily  aspirin enteric coated 81 milliGRAM(s) Oral daily  atorvastatin 40 milliGRAM(s) Oral at bedtime  dexAMETHasone     Tablet 4 milliGRAM(s) Oral daily  metoprolol tartrate 50 milliGRAM(s) Oral two times a day  midodrine. 5 milliGRAM(s) Oral three times a day  sodium chloride 0.9%. 1000 milliLiter(s) (100 mL/Hr) IV Continuous <Continuous>  valGANciclovir 450 milliGRAM(s) Oral every 24 hours    MEDICATIONS  (PRN):  acetaminophen     Tablet .. 650 milliGRAM(s) Oral every 6 hours PRN Temp greater or equal to 38C (100.4F), Mild Pain (1 - 3)      Vital Signs Last 24 Hrs  T(C): 36.6 (29 Mar 2023 11:45), Max: 39.4 (28 Mar 2023 19:57)  T(F): 97.8 (29 Mar 2023 11:45), Max: 102.9 (28 Mar 2023 19:57)  HR: 72 (29 Mar 2023 11:45) (72 - 103)  BP: 93/42 (29 Mar 2023 11:45) (93/42 - 126/71)  BP(mean): --  RR: 18 (29 Mar 2023 11:45) (18 - 18)  SpO2: 92% (29 Mar 2023 11:45) (92% - 97%)    Parameters below as of 29 Mar 2023 11:45  Patient On (Oxygen Delivery Method): room air        PE  NAD  Awake, alert  Anicteric, MMM  RRR  CTAB  Abd soft, NT, ND  No c/c/e  No rash grossly  FROM                          7.5    2.89  )-----------( 42       ( 29 Mar 2023 07:15 )             21.1       03-29    131<L>  |  98  |  38<H>  ----------------------------<  107<H>  3.7   |  20<L>  |  2.25<H>    Ca    8.5      29 Mar 2023 07:15

## 2023-03-29 NOTE — PROGRESS NOTE ADULT - SUBJECTIVE AND OBJECTIVE BOX
DATE OF SERVICE: 03-29-23 @ 08:02    Subjective: Patient seen and examined. No new events except as noted.     SUBJECTIVE/ROS:  pos fever       MEDICATIONS:  MEDICATIONS  (STANDING):  acyclovir   Oral Tab/Cap 400 milliGRAM(s) Oral daily  aspirin enteric coated 81 milliGRAM(s) Oral daily  atorvastatin 40 milliGRAM(s) Oral at bedtime  dexAMETHasone     Tablet 4 milliGRAM(s) Oral daily  entecavir 0.5 milliGRAM(s) Oral daily  metoprolol tartrate 50 milliGRAM(s) Oral two times a day  midodrine. 5 milliGRAM(s) Oral three times a day  piperacillin/tazobactam IVPB.. 3.375 Gram(s) IV Intermittent every 8 hours  sodium chloride 0.9%. 1000 milliLiter(s) (100 mL/Hr) IV Continuous <Continuous>      PHYSICAL EXAM:  T(C): 37.9 (03-29-23 @ 04:57), Max: 39.4 (03-28-23 @ 19:57)  HR: 98 (03-29-23 @ 04:57) (78 - 103)  BP: 112/64 (03-29-23 @ 04:57) (93/49 - 126/71)  RR: 18 (03-29-23 @ 04:57) (18 - 18)  SpO2: 96% (03-29-23 @ 04:57) (93% - 98%)  Wt(kg): --  I&O's Summary    28 Mar 2023 07:01  -  29 Mar 2023 07:00  --------------------------------------------------------  IN: 2000 mL / OUT: 1175 mL / NET: 825 mL            JVP: Normal  Neck: supple  Lung: clear   CV: S1 S2 , Murmur:  Abd: soft  Ext: No edema  neuro: Awake / alert  Psych: flat affect  Skin: normal``    LABS/DATA:    CARDIAC MARKERS:                                7.5    2.89  )-----------( 42       ( 29 Mar 2023 07:15 )             21.1     03-28    129<L>  |  97  |  34<H>  ----------------------------<  132<H>  3.7   |  20<L>  |  2.21<H>    Ca    8.7      28 Mar 2023 07:07      proBNP:   Lipid Profile:   HgA1c:   TSH:     TELE:  EKG:      < from: TTE W or WO Ultrasound Enhancing Agent (03.28.23 @ 14:16) >  FINDINGS:  Left Ventricle:  The left ventricular wall thickness isnormal. The left ventricular systolic function is normal with an ejection fraction visually estimated at 65%. There are no regional wall motion abnormalities seen. There is mild (grade 1) left ventricular diastolic dysfunction.     Right Ventricle:  Normal right ventricular cavity size, normal wall thickness and normal systolic function. Tricuspid annular plane systolic excursion (TAPSE) is 1.6 cm (normal >=1.7 cm).     Left Atrium:  The left atrium is normal, with an indexed volume of 23 23 ml/m².     Right Atrium:  The right atrium is normal.     Aortic Valve:  TAVR valve is noted in the aortic position. The aortic valve prosthesis is well seated with normal function. There is no valvular regurgitation.     Mitral Valve:  There is mild calcification of the mitral valve annulus. There is mild leaflet calcification. There is trace mitral regurgitation.     Tricuspid Valve:  Structurally normal tricuspid valve with normal leaflet excursion. There is trace tricuspid regurgitation. Normal pulmonary artery pressure.     Pulmonic Valve:  Structurally normal pulmonic valve with normal leaflet excursion. There is trace pulmonic regurgitation.     Aorta:  The aortic annulus and aortic root appear normal in size.     Pericardium:  No pericardial effusion seen.     Systemic Veins:  The inferior vena cava is normal (normal <2.1cm) with normal inspiratory collapse (normal >50%) consistent with normal right atrial pressure (~3, range 0-5mmHg).    < end of copied text >

## 2023-03-29 NOTE — PROGRESS NOTE ADULT - ASSESSMENT
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   - no obvious infectious symptoms, CXR without consolidations or effusions   - U/A unremarkable  - F/u BCx2 and UCx  --> Neg final   - Recurrent fever. F/U repeat BCx2 sent 03/27 --> NGTD; F/u final. ABX as per ID   - less likely transfusion reaction as pt febrile prior to transfusion  - LE VA duplex --> + For DVT    - Was on empiric treatment with Vanc/cefepime --> ID consulted; Now on Zosyn  - Check GI PCR -- Neg   - Hold home penicillin while on ABX   - V/Q scan to R/O PE -- Low probability   - ID to follow up, infectious work up as per ID  --> F/u CMV PCR -- + Started on Ganciclovir per ID , CMV IGG (+) and IgM (-), Cryptococcal Ag --Neg, Quantiferon Tb -- Neg, Fungitell -- <31   - Check CT Chest non-contrast     Anemia, Pancytopenia  - Drop in Hgb, Occult +  - Hold Hep gtt    - S/P 1 unit of PRBCs 03/28  - Maintain active T+S. Transfuse for Hgb < 7.0, and platelets < 10.K  - GI eval appreciated; F/u recs    HypoNa  - Na of 129 --> 131, cont to monitor and trend  - Appreciate renal eval. Zosyn changed to NS instead of D5   - Repeat Labs in AM     Multiple myeloma.   - pancytopenia largely at baseline although Hg 6.7 on arrival  - s/p 1U PRBC with good response   - Was on acyclovir, now on Ganciclovir as per ID   - C/w home entecavir   - C/w home dexamethasone   - Pt reports his Revlimid was held  - Heme/Onc consulted; F/u recs   - Resume home aspirin.    DVT   - Duplex + For R soleal vein DVT  --> Will consider CT A chest once creatinine permits as patient received contrast; Monitor for LOPEZ   - On Hep gtt; Monitor PTT; Monitor H/H closely --> Now on hold in view of drop in Hgb   - Check VQ scan to R/O PE -- Low probability   - Vascular eval appreciated; C/w AC and Serial Duplex outpatient    RK  - S/P Contrast on 03/23   - Check bladder scan to R/O retention  - C/w IVF for hydration  - Monitor Cr closely; Avoid nephrotoxic agents   - Cr up-trending. Likely due to drop in Hgb. Renal eval consulted; F/u recs    Hypertension, now hypotensive   - C/w home metoprolol.  - Started on Midodrine 5 TID. Hold for SBP > 140     Hyperlipidemia.   - C/w home atorvastatin.    Abnormal CT  - Outpatient follow up for CT findings    Prophylactic measure.   dvt ppx: Hep GTT   diet: regular  ambulate: with assistance    fall precautions  aspiration precautions.      Discussed with Patient and Wife at the bedside.  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   - no obvious infectious symptoms, CXR without consolidations or effusions   - U/A unremarkable  - F/u BCx2 and UCx  --> Neg final   - Recurrent fever. F/U repeat BCx2 sent 03/27 --> NGTD; F/u final. ABX as per ID   - less likely transfusion reaction as pt febrile prior to transfusion  - LE VA duplex --> + For DVT    - Was on empiric treatment with Vanc/cefepime --> ID consulted; Now on Zosyn  - Check GI PCR -- Neg   - Hold home penicillin while on ABX   - V/Q scan to R/O PE -- Low probability   - ID to follow up, infectious work up as per ID  --> F/u CMV PCR -- + Started on Ganciclovir per ID , CMV IGG (+) and IgM (-), Cryptococcal Ag --Neg, Quantiferon Tb -- Neg, Fungitell -- <31   - Check CT Chest non-contrast     Anemia, Pancytopenia  - Drop in Hgb, Occult +  - Hold Hep gtt    - S/P 1 unit of PRBCs 03/28  - Maintain active T+S. Transfuse for Hgb < 7.0, and platelets < 10.K  - GI eval appreciated; F/u recs    HypoNa  - Na of 129 --> 131, cont to monitor and trend  - Appreciate renal eval. Zosyn changed to NS instead of D5   - Repeat Labs in AM     Multiple myeloma.   - pancytopenia largely at baseline although Hg 6.7 on arrival  - s/p 1U PRBC with good response   - Was on acyclovir, now on Ganciclovir as per ID   - C/w home entecavir   - C/w home dexamethasone   - Pt reports his Revlimid was held  - Heme/Onc consulted; F/u recs   - Resume home aspirin.    DVT   - Duplex + For R soleal vein DVT  --> Will consider CT A chest once creatinine permits as patient received contrast; Monitor for LOPEZ   - On Hep gtt; Monitor PTT; Monitor H/H closely --> Now on hold in view of drop in Hgb   - Check VQ scan to R/O PE -- Low probability   - Vascular eval appreciated; C/w AC and Serial Duplex outpatient    RK  - S/P Contrast on 03/23   - Check bladder scan to R/O retention  - C/w IVF for hydration  - Monitor Cr closely; Avoid nephrotoxic agents   - Cr up-trending. Likely due to drop in Hgb. Renal eval consulted; F/u recs    Hypertension, now hypotensive   - C/w home metoprolol.  - Started on Midodrine 5 TID. Hold for SBP > 140     Hyperlipidemia.   - C/w home atorvastatin.    Abnormal CT  - Outpatient follow up for CT findings    Prophylactic measure.   dvt ppx: Hep GTT   diet: regular  ambulate: with assistance    fall precautions  aspiration precautions.      Discussed with Patient and Wife at the bedside.  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   - no obvious infectious symptoms, CXR without consolidations or effusions   - U/A unremarkable  - F/u BCx2 and UCx  --> Neg final   - Recurrent fever. F/U repeat BCx2 sent 03/27 --> NGTD; F/u final. ABX as per ID   - less likely transfusion reaction as pt febrile prior to transfusion  - LE VA duplex --> + For DVT    - Was on empiric treatment with Vanc/cefepime --> ID consulted; Now on Zosyn  - Check GI PCR -- Neg   - Hold home penicillin while on ABX   - V/Q scan to R/O PE -- Low probability   - ID to follow up, infectious work up as per ID  --> F/u CMV PCR -- + Started on Ganciclovir per ID , CMV IGG (+) and IgM (-), Cryptococcal Ag --Neg, Quantiferon Tb -- Neg, Fungitell -- <31   - CT Chest non-contrast, noted, no acute pathology    Anemia, Pancytopenia  - Drop in Hgb, Occult +  - Hold Hep gtt    - S/P 1 unit of PRBCs 03/28  - Maintain active T+S. Transfuse for Hgb < 7.0, and platelets < 10.K  - GI eval appreciated; F/u recs    HypoNa  - Na of 129 --> 131, cont to monitor and trend  - Appreciate renal eval. Zosyn changed to NS instead of D5   - Repeat Labs in AM     Multiple myeloma.   - pancytopenia largely at baseline although Hg 6.7 on arrival  - s/p 1U PRBC with good response   - Was on acyclovir, now on Ganciclovir as per ID   - C/w home entecavir   - C/w home dexamethasone   - Pt reports his Revlimid was held  - Heme/Onc consulted; F/u recs   - Resume home aspirin.    DVT   - Duplex + For R soleal vein DVT  --> Will consider CT A chest once creatinine permits as patient received contrast; Monitor for LOPEZ   - On Hep gtt; Monitor PTT; Monitor H/H closely --> Now on hold in view of drop in Hgb   - Check VQ scan to R/O PE -- Low probability   - Vascular eval appreciated; C/w AC and Serial Duplex outpatient    RK  - S/P Contrast on 03/23   - Check bladder scan to R/O retention  - C/w IVF for hydration  - Monitor Cr closely; Avoid nephrotoxic agents   - Cr up-trending. Likely due to drop in Hgb. Renal eval consulted; F/u recs    Hypertension, now hypotensive   - C/w home metoprolol.  - Started on Midodrine 5 TID. Hold for SBP > 140     Hyperlipidemia.   - C/w home atorvastatin.    Abnormal CT  - Outpatient follow up for CT findings    Prophylactic measure.   dvt ppx: Hep GTT   diet: regular  ambulate: with assistance    fall precautions  aspiration precautions.      Discussed with Patient and Wife at the bedside.  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   - no obvious infectious symptoms, CXR without consolidations or effusions   - U/A unremarkable  - F/u BCx2 and UCx  --> Neg final   - Recurrent fever. F/U repeat BCx2 sent 03/27 --> NGTD; F/u final. ABX as per ID   - less likely transfusion reaction as pt febrile prior to transfusion  - LE VA duplex --> + For DVT    - Was on empiric treatment with Vanc/cefepime --> ID consulted; Now on Zosyn  - Check GI PCR -- Neg   - Hold home penicillin while on ABX   - V/Q scan to R/O PE -- Low probability   - ID to follow up, infectious work up as per ID  --> F/u CMV PCR -- + Started on Ganciclovir per ID , CMV IGG (+) and IgM (-), Cryptococcal Ag --Neg, Quantiferon Tb -- Neg, Fungitell -- <31   - CT Chest non-contrast, noted, no acute pathology    Anemia, Pancytopenia  - Drop in Hgb, Occult +  - Hold Hep gtt    - S/P 1 unit of PRBCs 03/28  - Maintain active T+S. Transfuse for Hgb < 7.0, and platelets < 10.K  - GI eval appreciated; F/u recs    HypoNa  - Na of 129 --> 131, cont to monitor and trend  - Appreciate renal eval. Zosyn changed to NS instead of D5   - Repeat Labs in AM     Multiple myeloma.   - pancytopenia largely at baseline although Hg 6.7 on arrival  - s/p 1U PRBC with good response   - Was on acyclovir, now on Ganciclovir as per ID   - C/w home entecavir   - C/w home dexamethasone   - Pt reports his Revlimid was held  - Heme/Onc consulted; F/u recs   - Resume home aspirin.    DVT   - Duplex + For R soleal vein DVT  --> Will consider CT A chest once creatinine permits as patient received contrast; Monitor for LOPEZ   - On Hep gtt; Monitor PTT; Monitor H/H closely --> Now on hold in view of drop in Hgb   - Check VQ scan to R/O PE -- Low probability   - Vascular eval appreciated; C/w AC and Serial Duplex outpatient    RK  - S/P Contrast on 03/23   - Check bladder scan to R/O retention  - C/w IVF for hydration  - Monitor Cr closely; Avoid nephrotoxic agents   - Cr up-trending. Likely due to drop in Hgb. Renal eval consulted; F/u recs    Hypertension, now hypotensive   - C/w home metoprolol.  - Started on Midodrine 5 TID. Hold for SBP > 140     Hyperlipidemia.   - C/w home atorvastatin.    Abnormal CT  - Outpatient follow up for CT findings    Prophylactic measure.   dvt ppx: Hep GTT   diet: regular  ambulate: with assistance    fall precautions  aspiration precautions.      Discussed with Patient and Wife at the bedside.  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   - no obvious infectious symptoms, CXR without consolidations or effusions   - U/A unremarkable  - F/u BCx2 and UCx  --> Neg final   - Recurrent fever. F/U repeat BCx2 sent 03/27 --> NGTD; F/u final. ABX as per ID   - less likely transfusion reaction as pt febrile prior to transfusion  - LE VA duplex --> + For DVT    - Was on empiric treatment with Vanc/cefepime --> ID consulted; Now on Zosyn  - Check GI PCR -- Neg   - Hold home penicillin while on ABX   - V/Q scan to R/O PE -- Low probability   - ID to follow up, infectious work up as per ID  --> F/u CMV PCR -- + Started on Ganciclovir per ID , CMV IGG (+) and IgM (-), Cryptococcal Ag --Neg, Quantiferon Tb -- Neg, Fungitell -- <31   - CT Chest non-contrast, noted, no acute pathology    Anemia, Pancytopenia  - Drop in Hgb, Occult +  - Hold Hep gtt    - S/P 1 unit of PRBCs 03/28  - Maintain active T+S. Transfuse for Hgb < 7.0, and platelets < 10.K  - GI eval appreciated; F/u recs    HypoNa  - Na of 129 --> 131, cont to monitor and trend  - Appreciate renal eval. Zosyn changed to NS instead of D5   - Serial labs     Multiple myeloma.   - pancytopenia largely at baseline although Hg 6.7 on arrival  - s/p 1U PRBC with good response   - Was on acyclovir, now on Ganciclovir as per ID   - C/w home entecavir   - S/P dexamethasone, D/Seng by hematology , follow up outpatient after discharge    - Pt reports his Revlimid was held  - Heme/Onc consulted; F/u recs   - Resume home aspirin.    DVT   - Duplex + For R soleal vein DVT  --> Will consider CT A chest once creatinine permits as patient received contrast; Monitor for LOPEZ   - On Hep gtt; Monitor PTT; Monitor H/H closely --> Now on hold in view of drop in Hgb   - Check VQ scan to R/O PE -- Low probability   - Vascular eval appreciated; AC as tolerated,    RK  - S/P Contrast on 03/23   - Check bladder scan to R/O retention  - C/w IVF for hydration  - Monitor Cr closely; Avoid nephrotoxic agents   - Cr up-trending. Likely due to drop in Hgb. Renal eval consulted; F/u recs    Hypertension, now hypotensive   - C/w home metoprolol.  - Started on Midodrine 5 TID. Hold for SBP > 140     Hyperlipidemia.   - C/w home atorvastatin.    Abnormal CT  - Outpatient follow up for CT findings    Prophylactic measure.   dvt ppx: DVT PPX   diet: regular  ambulate: with assistance    fall precautions  aspiration precautions.      Discussed with Patient and Wife at the bedside.  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   - no obvious infectious symptoms, CXR without consolidations or effusions   - U/A unremarkable  - F/u BCx2 and UCx  --> Neg final   - Recurrent fever. F/U repeat BCx2 sent 03/27 --> NGTD; F/u final. ABX as per ID   - less likely transfusion reaction as pt febrile prior to transfusion  - LE VA duplex --> + For DVT    - Was on empiric treatment with Vanc/cefepime --> ID consulted; Now on Zosyn  - Check GI PCR -- Neg   - Hold home penicillin while on ABX   - V/Q scan to R/O PE -- Low probability   - ID to follow up, infectious work up as per ID  --> F/u CMV PCR -- + Started on Ganciclovir per ID , CMV IGG (+) and IgM (-), Cryptococcal Ag --Neg, Quantiferon Tb -- Neg, Fungitell -- <31   - CT Chest non-contrast, noted, no acute pathology    Anemia, Pancytopenia  - Drop in Hgb, Occult +  - Hold Hep gtt    - S/P 1 unit of PRBCs 03/28  - Maintain active T+S. Transfuse for Hgb < 7.0, and platelets < 10.K  - GI eval appreciated; F/u recs    HypoNa  - Na of 129 --> 131, cont to monitor and trend  - Appreciate renal eval. Zosyn changed to NS instead of D5   - Serial labs     Multiple myeloma.   - pancytopenia largely at baseline although Hg 6.7 on arrival  - s/p 1U PRBC with good response   - Was on acyclovir, now on Ganciclovir as per ID   - C/w home entecavir   - S/P dexamethasone, D/Seng by hematology , follow up outpatient after discharge    - Pt reports his Revlimid was held  - Heme/Onc consulted; F/u recs   - Resume home aspirin.    DVT   - Duplex + For R soleal vein DVT  --> Will consider CT A chest once creatinine permits as patient received contrast; Monitor for LOPEZ   - On Hep gtt; Monitor PTT; Monitor H/H closely --> Now on hold in view of drop in Hgb   - Check VQ scan to R/O PE -- Low probability   - Vascular eval appreciated; AC as tolerated,    RK  - S/P Contrast on 03/23   - Check bladder scan to R/O retention  - C/w IVF for hydration  - Monitor Cr closely; Avoid nephrotoxic agents   - Cr up-trending. Likely due to drop in Hgb. Renal eval consulted; F/u recs    Hypertension, now hypotensive   - C/w home metoprolol.  - Started on Midodrine 5 TID. Hold for SBP > 140     Hyperlipidemia.   - C/w home atorvastatin.    Abnormal CT  - Outpatient follow up for CT findings    Prophylactic measure.   dvt ppx: DVT PPX   diet: regular  ambulate: with assistance    fall precautions  aspiration precautions.      Discussed with Patient and Wife at the bedside.

## 2023-03-29 NOTE — PROGRESS NOTE ADULT - SUBJECTIVE AND OBJECTIVE BOX
Chief Complaint:  Patient is a 67y old  Male who presents with a chief complaint of fever (29 Mar 2023 11:30)      Date of service 23 @ 11:52      Interval Events:   1 loose BM today     Hospital Medications:  acetaminophen     Tablet .. 650 milliGRAM(s) Oral every 6 hours PRN  acyclovir   Oral Tab/Cap 400 milliGRAM(s) Oral daily  aspirin enteric coated 81 milliGRAM(s) Oral daily  atorvastatin 40 milliGRAM(s) Oral at bedtime  dexAMETHasone     Tablet 4 milliGRAM(s) Oral daily  metoprolol tartrate 50 milliGRAM(s) Oral two times a day  midodrine. 5 milliGRAM(s) Oral three times a day  sodium chloride 0.9%. 1000 milliLiter(s) IV Continuous <Continuous>        Review of Systems:  General:  No wt loss, fevers, chills, night sweats, fatigue,   Eyes:  Good vision, no reported pain  ENT:  No sore throat, pain, runny nose, dysphagia  CV:  No pain, palpitations, hypo/hypertension  Resp:  No dyspnea, cough, tachypnea, wheezing  GI:  See HPI  :  No pain, bleeding, incontinence, nocturia  Muscle:  No pain, weakness  Neuro:  No weakness, tingling, memory problems  Psych:  No fatigue, insomnia, mood problems, depression  Endocrine:  No polyuria, polydipsia, cold/heat intolerance  Heme:  No petechiae, ecchymosis, easy bruisability  Integumentary:  No rash, edema    PHYSICAL EXAM:   Vital Signs:  Vital Signs Last 24 Hrs  T(C): 36.6 (29 Mar 2023 11:45), Max: 39.4 (28 Mar 2023 19:57)  T(F): 97.8 (29 Mar 2023 11:45), Max: 102.9 (28 Mar 2023 19:57)  HR: 72 (29 Mar 2023 11:45) (72 - 103)  BP: 93/42 (29 Mar 2023 11:45) (93/42 - 126/71)  BP(mean): --  RR: 18 (29 Mar 2023 11:45) (18 - 18)  SpO2: 92% (29 Mar 2023 11:45) (92% - 97%)    Parameters below as of 29 Mar 2023 11:45  Patient On (Oxygen Delivery Method): room air      Daily     Daily       PHYSICAL EXAM:     GENERAL:  Appears stated age, well-groomed, well-nourished, no distress  HEENT:  NC/AT,  conjunctivae anicteric, clear and pink,   NECK: supple, trachea midline  CHEST:  Full & symmetric excursion, no increased effort, breath sounds clear  HEART:  Regular rhythm, no JVD  ABDOMEN:  Soft, non-tender, non-distended, normoactive bowel sounds,  no masses , no hepatosplenomegaly  EXTREMITIES:  no cyanosis,clubbing or edema  SKIN:  No rash, erythema, or, ecchymoses, no jaundice  NEURO:  Alert, non-focal, no asterixis  PSYCH: Appropriate affect, oriented to place and time  RECTAL: Deferred      LABS Personally reviewed by me:                        7.5    2.89  )-----------( 42       ( 29 Mar 2023 07:15 )             21.1     Mean Cell Volume: 91.3 fl (- @ 07:15)        131<L>  |  98  |  38<H>  ----------------------------<  107<H>  3.7   |  20<L>  |  2.25<H>    Ca    8.5      29 Mar 2023 07:15        PTT - ( 28 Mar 2023 07:08 )  PTT:117.2 sec  Urinalysis Basic - ( 28 Mar 2023 14:14 )    Color: Light Yellow / Appearance: Slightly Turbid / S.020 / pH: x  Gluc: x / Ketone: Negative  / Bili: Negative / Urobili: Negative   Blood: x / Protein: 100 mg/dl / Nitrite: Negative   Leuk Esterase: Negative / RBC: 2 /hpf / WBC 18 /HPF   Sq Epi: x / Non Sq Epi: 6 /hpf / Bacteria: Negative                              7.5    2.89  )-----------( 42       ( 29 Mar 2023 07:15 )             21.1                         7.3    2.98  )-----------( 44       ( 28 Mar 2023 16:30 )             21.3                         6.7    2.64  )-----------( 44       ( 28 Mar 2023 07:06 )             19.2                         7.5    2.84  )-----------( 56       ( 27 Mar 2023 11:05 )             21.8                         7.4    2.98  )-----------( 52       ( 27 Mar 2023 09:27 )             22.0       Imaging personally reviewed by me:

## 2023-03-29 NOTE — PROGRESS NOTE ADULT - NS ATTEND AMEND GEN_ALL_CORE FT
still having fever. CMV positive PCR. ID following. will likely need to taper steroid dex 4mg daily that he is on. confirmed with his primary oncologist he does not take dex daily

## 2023-03-29 NOTE — PROVIDER CONTACT NOTE (OTHER) - ACTION/TREATMENT ORDERED:
NP notified. Ordered stat IV tylenol, tylenol was given along with cold packs. Will continue to monitor

## 2023-03-30 LAB
ANION GAP SERPL CALC-SCNC: 13 MMOL/L — SIGNIFICANT CHANGE UP (ref 5–17)
BUN SERPL-MCNC: 42 MG/DL — HIGH (ref 7–23)
CALCIUM SERPL-MCNC: 9 MG/DL — SIGNIFICANT CHANGE UP (ref 8.4–10.5)
CHLORIDE SERPL-SCNC: 100 MMOL/L — SIGNIFICANT CHANGE UP (ref 96–108)
CMV DNA CSF QL NAA+PROBE: 6880 — SIGNIFICANT CHANGE UP
CMV DNA SPEC NAA+PROBE-LOG#: 3.84 LOG10IU/ML — HIGH
CO2 SERPL-SCNC: 20 MMOL/L — LOW (ref 22–31)
CREAT SERPL-MCNC: 2.18 MG/DL — HIGH (ref 0.5–1.3)
EGFR: 32 ML/MIN/1.73M2 — LOW
GLUCOSE SERPL-MCNC: 119 MG/DL — HIGH (ref 70–99)
HCT VFR BLD CALC: 22.5 % — LOW (ref 39–50)
HGB BLD-MCNC: 7.7 G/DL — LOW (ref 13–17)
MCHC RBC-ENTMCNC: 31.6 PG — SIGNIFICANT CHANGE UP (ref 27–34)
MCHC RBC-ENTMCNC: 34.2 GM/DL — SIGNIFICANT CHANGE UP (ref 32–36)
MCV RBC AUTO: 92.2 FL — SIGNIFICANT CHANGE UP (ref 80–100)
NRBC # BLD: 3 /100 WBCS — HIGH (ref 0–0)
PLATELET # BLD AUTO: 39 K/UL — LOW (ref 150–400)
POTASSIUM SERPL-MCNC: 4.2 MMOL/L — SIGNIFICANT CHANGE UP (ref 3.5–5.3)
POTASSIUM SERPL-SCNC: 4.2 MMOL/L — SIGNIFICANT CHANGE UP (ref 3.5–5.3)
RBC # BLD: 2.44 M/UL — LOW (ref 4.2–5.8)
RBC # FLD: 23.2 % — HIGH (ref 10.3–14.5)
SODIUM SERPL-SCNC: 133 MMOL/L — LOW (ref 135–145)
WBC # BLD: 2.98 K/UL — LOW (ref 3.8–10.5)
WBC # FLD AUTO: 2.98 K/UL — LOW (ref 3.8–10.5)

## 2023-03-30 PROCEDURE — 99232 SBSQ HOSP IP/OBS MODERATE 35: CPT

## 2023-03-30 PROCEDURE — 93970 EXTREMITY STUDY: CPT | Mod: 26

## 2023-03-30 RX ORDER — ACETAMINOPHEN 500 MG
325 TABLET ORAL ONCE
Refills: 0 | Status: COMPLETED | OUTPATIENT
Start: 2023-03-30 | End: 2023-03-30

## 2023-03-30 RX ORDER — SODIUM CHLORIDE 9 MG/ML
1000 INJECTION INTRAMUSCULAR; INTRAVENOUS; SUBCUTANEOUS
Refills: 0 | Status: DISCONTINUED | OUTPATIENT
Start: 2023-03-30 | End: 2023-03-31

## 2023-03-30 RX ORDER — SODIUM CHLORIDE 9 MG/ML
500 INJECTION, SOLUTION INTRAVENOUS ONCE
Refills: 0 | Status: COMPLETED | OUTPATIENT
Start: 2023-03-30 | End: 2023-03-30

## 2023-03-30 RX ADMIN — Medication 650 MILLIGRAM(S): at 22:10

## 2023-03-30 RX ADMIN — MIDODRINE HYDROCHLORIDE 5 MILLIGRAM(S): 2.5 TABLET ORAL at 05:18

## 2023-03-30 RX ADMIN — Medication 81 MILLIGRAM(S): at 12:30

## 2023-03-30 RX ADMIN — Medication 650 MILLIGRAM(S): at 21:06

## 2023-03-30 RX ADMIN — Medication 50 MILLIGRAM(S): at 05:19

## 2023-03-30 RX ADMIN — ATORVASTATIN CALCIUM 40 MILLIGRAM(S): 80 TABLET, FILM COATED ORAL at 21:05

## 2023-03-30 RX ADMIN — Medication 650 MILLIGRAM(S): at 02:22

## 2023-03-30 RX ADMIN — ENTECAVIR 0.25 MILLIGRAM(S): 0.5 TABLET ORAL at 12:31

## 2023-03-30 RX ADMIN — MIDODRINE HYDROCHLORIDE 5 MILLIGRAM(S): 2.5 TABLET ORAL at 16:36

## 2023-03-30 RX ADMIN — Medication 50 MILLIGRAM(S): at 16:37

## 2023-03-30 RX ADMIN — VALGANCICLOVIR 450 MILLIGRAM(S): 450 TABLET, FILM COATED ORAL at 14:16

## 2023-03-30 RX ADMIN — SODIUM CHLORIDE 500 MILLILITER(S): 9 INJECTION, SOLUTION INTRAVENOUS at 18:18

## 2023-03-30 RX ADMIN — MIDODRINE HYDROCHLORIDE 5 MILLIGRAM(S): 2.5 TABLET ORAL at 12:30

## 2023-03-30 RX ADMIN — SODIUM CHLORIDE 100 MILLILITER(S): 9 INJECTION INTRAMUSCULAR; INTRAVENOUS; SUBCUTANEOUS at 16:36

## 2023-03-30 RX ADMIN — RIVAROXABAN 10 MILLIGRAM(S): KIT at 12:30

## 2023-03-30 RX ADMIN — Medication 325 MILLIGRAM(S): at 21:06

## 2023-03-30 RX ADMIN — Medication 650 MILLIGRAM(S): at 04:00

## 2023-03-30 RX ADMIN — Medication 325 MILLIGRAM(S): at 22:00

## 2023-03-30 NOTE — PROGRESS NOTE ADULT - SUBJECTIVE AND OBJECTIVE BOX
Chief Complaint:  Patient is a 67y old  Male who presents with a chief complaint of fever (29 Mar 2023 18:45)      Date of service 23 @ 08:22      Interval Events:   no acute events     Hospital Medications:  acetaminophen     Tablet .. 650 milliGRAM(s) Oral every 6 hours PRN  aspirin enteric coated 81 milliGRAM(s) Oral daily  atorvastatin 40 milliGRAM(s) Oral at bedtime  entecavir Solution 0.25 milliGRAM(s) Oral daily  metoprolol tartrate 50 milliGRAM(s) Oral two times a day  midodrine. 5 milliGRAM(s) Oral three times a day  rivaroxaban 10 milliGRAM(s) Oral daily  sodium chloride 0.9%. 1000 milliLiter(s) IV Continuous <Continuous>  valGANciclovir 450 milliGRAM(s) Oral every 24 hours        Review of Systems:  General:  No wt loss, fevers, chills, night sweats, fatigue,   Eyes:  Good vision, no reported pain  ENT:  No sore throat, pain, runny nose, dysphagia  CV:  No pain, palpitations, hypo/hypertension  Resp:  No dyspnea, cough, tachypnea, wheezing  GI:  See HPI  :  No pain, bleeding, incontinence, nocturia  Muscle:  No pain, weakness  Neuro:  No weakness, tingling, memory problems  Psych:  No fatigue, insomnia, mood problems, depression  Endocrine:  No polyuria, polydipsia, cold/heat intolerance  Heme:  No petechiae, ecchymosis, easy bruisability  Integumentary:  No rash, edema    PHYSICAL EXAM:   Vital Signs:  Vital Signs Last 24 Hrs  T(C): 37.3 (30 Mar 2023 04:22), Max: 39.2 (30 Mar 2023 00:33)  T(F): 99.1 (30 Mar 2023 04:22), Max: 102.5 (30 Mar 2023 00:33)  HR: 78 (30 Mar 2023 04:22) (72 - 89)  BP: 102/54 (30 Mar 2023 04:22) (93/42 - 110/58)  BP(mean): --  RR: 20 (30 Mar 2023 04:22) (18 - 20)  SpO2: 97% (30 Mar 2023 04:22) (92% - 97%)    Parameters below as of 30 Mar 2023 04:22  Patient On (Oxygen Delivery Method): room air      Daily     Daily       PHYSICAL EXAM:     GENERAL:  Appears stated age, well-groomed, well-nourished, no distress  HEENT:  NC/AT,  conjunctivae anicteric, clear and pink,   NECK: supple, trachea midline  CHEST:  Full & symmetric excursion, no increased effort, breath sounds clear  HEART:  Regular rhythm, no JVD  ABDOMEN:  Soft, non-tender, non-distended, normoactive bowel sounds,  no masses , no hepatosplenomegaly  EXTREMITIES:  no cyanosis,clubbing or edema  SKIN:  No rash, erythema, or, ecchymoses, no jaundice  NEURO:  Alert, non-focal, no asterixis  PSYCH: Appropriate affect, oriented to place and time  RECTAL: Deferred      LABS Personally reviewed by me:                        7.5    2.89  )-----------( 42       ( 29 Mar 2023 07:15 )             21.1       03-30    133<L>  |  100  |  42<H>  ----------------------------<  119<H>  4.2   |  20<L>  |  2.18<H>    Ca    9.0      30 Mar 2023 07:26          Urinalysis Basic - ( 28 Mar 2023 14:14 )    Color: Light Yellow / Appearance: Slightly Turbid / S.020 / pH: x  Gluc: x / Ketone: Negative  / Bili: Negative / Urobili: Negative   Blood: x / Protein: 100 mg/dl / Nitrite: Negative   Leuk Esterase: Negative / RBC: 2 /hpf / WBC 18 /HPF   Sq Epi: x / Non Sq Epi: 6 /hpf / Bacteria: Negative                              7.5    2.89  )-----------( 42       ( 29 Mar 2023 07:15 )             21.1                         7.3    2.98  )-----------( 44       ( 28 Mar 2023 16:30 )             21.3                         6.7    2.64  )-----------( 44       ( 28 Mar 2023 07:06 )             19.2                         7.5    2.84  )-----------( 56       ( 27 Mar 2023 11:05 )             21.8                         7.4    2.98  )-----------( 52       ( 27 Mar 2023 09:27 )             22.0       Imaging personally reviewed by me:

## 2023-03-30 NOTE — PROGRESS NOTE ADULT - SUBJECTIVE AND OBJECTIVE BOX
67yPatient is a 67y old  Male who presents with a chief complaint of fever (30 Mar 2023 15:12)      Interval history:  febrile, no new symptoms, no diarrhea.       Allergies:   No Known Allergies      Antimicrobials:  entecavir Solution 0.25 milliGRAM(s) Oral daily  valGANciclovir 450 milliGRAM(s) Oral every 24 hours      REVIEW OF SYSTEMS:  No chest pain   No SOB  No abdominal pain  No rash.       Vital Signs Last 24 Hrs  T(C): 38.7 (03-30-23 @ 20:27), Max: 39.2 (03-30-23 @ 00:33)  T(F): 101.6 (03-30-23 @ 20:27), Max: 102.5 (03-30-23 @ 00:33)  HR: 88 (03-30-23 @ 20:27) (74 - 89)  BP: 105/56 (03-30-23 @ 20:27) (102/54 - 108/63)  BP(mean): --  RR: 18 (03-30-23 @ 20:27) (18 - 20)  SpO2: 93% (03-30-23 @ 20:27) (93% - 97%)      PHYSICAL EXAM:  Patient in no acute distress. AAOX3.  no icterus   breathing comfortably on RA  trace edema.  IV sites not inflamed.                             7.7    2.98  )-----------( 39       ( 30 Mar 2023 07:27 )             22.5   03-30    133<L>  |  100  |  42<H>  ----------------------------<  119<H>  4.2   |  20<L>  |  2.18<H>    Ca    9.0      30 Mar 2023 07:26        Culture - Blood (collected 29 Mar 2023 19:09)  Source: .Blood Blood-Peripheral  Preliminary Report (30 Mar 2023 22:02):    No growth to date.    Culture - Blood (collected 29 Mar 2023 18:36)  Source: .Blood Blood-Peripheral  Preliminary Report (30 Mar 2023 22:02):    No growth to date.        Radiology:    < from: [facility] Duplex Lower Ext Vein Scan, Bilat (03.30.23 @ 13:24) >  IMPRESSION:  Persistent below the knee DVT confined to the soleal vein of the right   calf.    No evidence of interval clot propagation.

## 2023-03-30 NOTE — PROGRESS NOTE ADULT - ASSESSMENT
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 Found to be febrile on admission, ID consulted for further management.    Tmax 102.4 (3/23)  WBC 3.5K today  Urinalysis unremarkable  RVP neg  CXR unremarkable      Impression:  #Fever  #Immunocompromised Status, H/O Splenectomy  #pancytopenia   #CMV viremia         PLAN:  - all infectious work up in terms of bacterial infection negative, stopped zosyn   - V/Q scan with low probability of PE   - follow up blood cultures, NTD   - GI PCR- negative   - trend cbc for pancytopenia  - CMV PCR elevated, cmv IGG + and CMV IGM -  - cryptococcal ag, QuantiFeronTb, fungitell negative  - given immunosuppressed state and CMV viremia, repeat CMV PCR downtrended to appox 6000  - c/w valcyte based on CrCl.       Dejuan Ralph  Please contact through MS Teams   If no response or past 5 pm/weekend call [phone].                    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 Found to be febrile on admission, ID consulted for further management.    Tmax 102.4 (3/23)  WBC 3.5K today  Urinalysis unremarkable  RVP neg  CXR unremarkable      Impression:  #Fever  #Immunocompromised Status, H/O Splenectomy  #pancytopenia   #CMV viremia         PLAN:  - all infectious work up in terms of bacterial infection negative, stopped zosyn   - V/Q scan with low probability of PE   - follow up blood cultures, NTD   - GI PCR- negative   - trend cbc for pancytopenia  - CMV PCR elevated, cmv IGG + and CMV IGM -  - cryptococcal ag, QuantiFeronTb, fungitell negative  - given immunosuppressed state and CMV viremia, repeat CMV PCR downtrended to appox 6000  - c/w valcyte based on CrCl.       Dejuan Ralph  Please contact through MS Teams   If no response or past 5 pm/weekend call [phone].                    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 Found to be febrile on admission, ID consulted for further management.    Tmax 102.4 (3/23)  WBC 3.5K today  Urinalysis unremarkable  RVP neg  CXR unremarkable      Impression:  #Fever  #Immunocompromised Status, H/O Splenectomy  #pancytopenia   #CMV viremia         PLAN:  - all infectious work up in terms of bacterial infection negative, stopped zosyn   - V/Q scan with low probability of PE   - follow up blood cultures, NTD   - GI PCR- negative   - trend cbc for pancytopenia  - CMV PCR elevated, cmv IGG + and CMV IGM -  - cryptococcal ag, QuantiFeronTb, fungitell negative  - given immunosuppressed state and CMV viremia, repeat CMV PCR downtrended to appox 6000  - c/w valcyte based on CrCl.       Dejuan Ralph  Please contact through MS Teams   If no response or past 5 pm/weekend call [phone].

## 2023-03-30 NOTE — PROVIDER CONTACT NOTE (MEDICATION) - ASSESSMENT
pt is A&ox4, vitals /64, temp 100.2, 98HR, O2 96 RA
patient A&Ox4, vitals stable, febrile, no signs and symptoms of distress, pt resting in bed.

## 2023-03-30 NOTE — PROGRESS NOTE ADULT - NS ATTEND AMEND GEN_ALL_CORE FT
68 y/o male with multiple myeloma, recently switched to david-CyBorD, admitted with fever.  He continues to be febrile ?source-  follow up cultures.  He was changed to valcyte- monitor his counts closely while on treatment.  No chemo/immunotherapy while inpatient.  Soleal vein DVT, no propagation.  Recommend to hold xarelto as his plt are <50. 66 y/o male with multiple myeloma, recently switched to david-CyBorD, admitted with fever.  He continues to be febrile ?source-  follow up cultures.  He was changed to valcyte- monitor his counts closely while on treatment.  No chemo/immunotherapy while inpatient.  Soleal vein DVT, no propagation.  Recommend to hold xarelto as his plt are <50.

## 2023-03-30 NOTE — PROGRESS NOTE ADULT - ASSESSMENT
1. fever   - all infectious work up in terms of bacterial infection negative, stopped zosyn   - V/Q scan with low probability of PE   - GI PCR- negative   - CMV PCR elevated, cmv IGG + and CMV IGM -  - cryptococcal ag, QuantiFeronTb, fungitell negative  - per ID, given immunosuppressed state and CMV viremia, ordered repeat CMV PCR and initiated valcyte.     2. anemia, FOBT positive. Hgb is at recent baseline. No overt bleeding symptoms.   - no plans for endoscopic evaluation at this time  - monitor H&H, transfuse PRN  - daily PPI     3. Diarrhea, likely related to chemo +/- abx   - GI PCR neg  - if worsens, check C-diff    4. DVT   - on anticoagulation    5. Multiple myeloma, pancytopenia   - per heme / onc       Jahaira Jennings M.D.   Gastroenterology and Hepatology  [address] [place], [place]  Office: [phone]  Cell: [phone] 1. fever   - all infectious work up in terms of bacterial infection negative, stopped zosyn   - V/Q scan with low probability of PE   - GI PCR- negative   - CMV PCR elevated, cmv IGG + and CMV IGM -  - cryptococcal ag, QuantiFeronTb, fungitell negative  - per ID, given immunosuppressed state and CMV viremia, ordered repeat CMV PCR and initiated valcyte.     2. anemia, FOBT positive. Hgb is at recent baseline. No overt bleeding symptoms.   - no plans for endoscopic evaluation at this time  - monitor H&H, transfuse PRN  - daily PPI     3. Diarrhea, likely related to chemo +/- abx   - GI PCR neg  - if worsens, check C-diff    4. DVT   - on anticoagulation    5. Multiple myeloma, pancytopenia   - per heme / onc       Jahaira Jennings M.D.   Gastroenterology and Hepatology  [address] [place], [place]  Office: [phone]  Cell: [phone] 1. fever   - all infectious work up in terms of bacterial infection negative, stopped zosyn   - V/Q scan with low probability of PE   - GI PCR- negative   - CMV PCR elevated, cmv IGG + and CMV IGM -  - cryptococcal ag, QuantiFeronTb, fungitell negative  - per ID, given immunosuppressed state and CMV viremia, ordered repeat CMV PCR and initiated valcyte.     2. anemia, FOBT positive. Hgb is at recent baseline. No overt bleeding symptoms.   - no plans for endoscopic evaluation at this time  - monitor H&H, transfuse PRN  - daily PPI     3. Diarrhea, likely related to chemo +/- abx   - GI PCR neg  - if worsens, check C-diff    4. DVT   - on anticoagulation    5. Multiple myeloma, pancytopenia   - per heme / onc       Jahaira Jennings M.D.   Gastroenterology and Hepatology  [address] [place], [place]  Office: [phone]  Cell: [phone]

## 2023-03-30 NOTE — PROGRESS NOTE ADULT - ASSESSMENT
PETER DE LA PAZ is a 67y Male who presents with a chief complaint of fever.    Multiple Myeloma  Pancytopenia  ·	Patient follows with Dr. Jean Claude Hubbard, [facility].  ·	Patient had been on lenalidomide maintenance up until February 2023, when he had PET/CT that showed progression of disease with worsening lymphadenopathy, bony lesions, and liver lesion.   ·	He was started then on daratumumab + cyclophosphamide + bortezomib + dexamethasone.  ·	Last dose of daratumumab was on March 23rd.  ·	Last dose of bortezomib was on March 23rd  ·	Last dose of cyclophosphamide was on March 13th  ·	Patient has had worsening pancytopenia since starting the latest chemotherapy regimen.  ·	Hold systemic treatment while inpatient.  ·	Monitor CBC and transfuse to maintain HGB > 7 and PLT > 10.  ·	Continue prophylactic acyclovir 400 mg BID as patient is on bortezomib  ·	FOB+  ·	folate, Fe, b12 adequate  ·	1u PRBCs given for Hgb 6.7 on 3/28  ·	Now off dexamethasone    Fever  ·	infection vs disease  ·	Tmax 102 on admission, now recurrent  ·	s/p VQ scan 3/27  r/o PE- unremarkable  ·	ID following, on IV abx  ·	per ID will start ganciclovir for CMV viremia  ·	Pending repeat CMV  ·	outpatient respiratory PCR negative    Diarrhea, + guaiac  ·	neg stool PCR  ·	monitor  ·	GI following    LE distal DVT  ·	Pending repeat doppler   ·	Continues xarelto  ·	Monitoring low platelets, if continue to fall may need to hold AC    Will continue to follow.    Quirino Holloway PA-C  Hematology/Oncology  [facility]  [phone] (office)     PETER DE LA PAZ is a 67y Male who presents with a chief complaint of fever.    Multiple Myeloma  Pancytopenia  ·	Patient follows with Dr. Jean Claude Hubbard, [facility].  ·	Patient had been on lenalidomide maintenance up until February 2023, when he had PET/CT that showed progression of disease with worsening lymphadenopathy, bony lesions, and liver lesion.   ·	He was started then on daratumumab + cyclophosphamide + bortezomib + dexamethasone.  ·	Last dose of daratumumab was on March 23rd.  ·	Last dose of bortezomib was on March 23rd  ·	Last dose of cyclophosphamide was on March 13th  ·	Patient has had worsening pancytopenia since starting the latest chemotherapy regimen.  ·	Hold systemic treatment while inpatient.  ·	Monitor CBC and transfuse to maintain HGB > 7 and PLT > 10.  ·	Continue prophylactic acyclovir 400 mg BID as patient is on bortezomib  ·	FOB+  ·	folate, Fe, b12 adequate  ·	1u PRBCs given for Hgb 6.7 on 3/28  ·	Now off dexamethasone    Fever  ·	infection vs disease  ·	Tmax 102 on admission, now recurrent  ·	s/p VQ scan 3/27  r/o PE- unremarkable  ·	ID following, on IV abx  ·	per ID will start ganciclovir for CMV viremia  ·	Pending repeat CMV  ·	outpatient respiratory PCR negative    Diarrhea, + guaiac  ·	neg stool PCR  ·	monitor  ·	GI following    LE distal DVT  ·	Pending repeat doppler   ·	Continues xarelto  ·	Monitoring low platelets, if continue to fall may need to hold AC    Will continue to follow.    Quirino Holloway PA-C  Hematology/Oncology  [facility]  [phone] (office)     PETER DE LA PAZ is a 67y Male who presents with a chief complaint of fever.    Multiple Myeloma  Pancytopenia  ·	Patient follows with Dr. Jean Claude Hubbard, [facility].  ·	Patient had been on lenalidomide maintenance up until February 2023, when he had PET/CT that showed progression of disease with worsening lymphadenopathy, bony lesions, and liver lesion.   ·	He was started then on daratumumab + cyclophosphamide + bortezomib + dexamethasone.  ·	Last dose of daratumumab was on March 23rd.  ·	Last dose of bortezomib was on March 23rd  ·	Last dose of cyclophosphamide was on March 13th  ·	Patient has had worsening pancytopenia since starting the latest chemotherapy regimen.  ·	Hold systemic treatment while inpatient.  ·	Monitor CBC and transfuse to maintain HGB > 7 and PLT > 10.  ·	Continue prophylactic acyclovir 400 mg BID as patient is on bortezomib  ·	FOB+  ·	folate, Fe, b12 adequate  ·	1u PRBCs given for Hgb 6.7 on 3/28  ·	Now off dexamethasone    Fever  ·	infection vs disease  ·	Tmax 102 on admission, now recurrent  ·	s/p VQ scan 3/27  r/o PE- unremarkable  ·	ID following, on IV abx  ·	per ID will start ganciclovir for CMV viremia  ·	Pending repeat CMV  ·	outpatient respiratory PCR negative    Diarrhea, + guaiac  ·	neg stool PCR  ·	monitor  ·	GI following    LE distal DVT  ·	Pending repeat doppler   ·	Continues xarelto  ·	Monitoring low platelets, if continue to fall may need to hold AC    Will continue to follow.    Quirino Holloway PA-C  Hematology/Oncology  [facility]  [phone] (office)

## 2023-03-30 NOTE — PROGRESS NOTE ADULT - SUBJECTIVE AND OBJECTIVE BOX
[facility] NEPHROLOGY- PROGRESS NOTE    67 year old Male with history of MM on chemotherapy presents with fevers. Nephrology consulted for elevated Scr.    REVIEW OF SYSTEMS:  Gen: no fevers  Cards: no chest pain  Resp: no dyspnea  GI: no nausea or vomiting or diarrhea  Vascular: no LE edema    No Known Allergies      Hospital Medications: Medications reviewed      VITALS:  T(F): 98.4 (23 @ 09:34), Max: 102.5 (23 @ 00:33)  HR: 74 (23 @ 09:34)  BP: 102/63 (23 @ 09:34)  RR: 18 (23 @ 09:34)  SpO2: 97% (23 @ 09:34)  Wt(kg): --     @ 07:01  -   @ 07:00  --------------------------------------------------------  IN: 980 mL / OUT: 2020 mL / NET: -1040 mL     @ 07:01  -   @ 15:12  --------------------------------------------------------  IN: 480 mL / OUT: 250 mL / NET: 230 mL        PHYSICAL EXAM:    Gen: NAD, calm  Cards: RRR, +S1/S2, no M/G/R  Resp: CTA B/L  GI: soft, NT/ND, NABS  Vascular: no LE edema B/L      LABS:      133<L>  |  100  |  42<H>  ----------------------------<  119<H>  4.2   |  20<L>  |  2.18<H>    Ca    9.0      30 Mar 2023 07:26      Creatinine Trend: 2.18 <--, 2.25 <--, 2.21 <--, 1.83 <--, 1.80 <--, 1.75 <--, 1.46 <--, 1.46 <--, 1.37 <--                        7.7    2.98  )-----------( 39       ( 30 Mar 2023 07:27 )             22.5     Urine Studies:  Urinalysis Basic - ( 28 Mar 2023 14:14 )    Color: Light Yellow / Appearance: Slightly Turbid / S.020 / pH:   Gluc:  / Ketone: Negative  / Bili: Negative / Urobili: Negative   Blood:  / Protein: 100 mg/dl / Nitrite: Negative   Leuk Esterase: Negative / RBC: 2 /hpf / WBC 18 /HPF   Sq Epi:  / Non Sq Epi: 6 /hpf / Bacteria: Negative      Sodium, Random Urine: 45 mmol/L ( @ 14:14)  Creatinine, Random Urine: 96 mg/dL ( @ 14:14)     [facility] NEPHROLOGY- PROGRESS NOTE    67 year old Male with history of MM on chemotherapy presents with fevers. Nephrology consulted for elevated Scr.    REVIEW OF SYSTEMS:  Gen: no fevers  Cards: no chest pain  Resp: no dyspnea  GI: no nausea or vomiting or diarrhea  Vascular: no LE edema    No Known Allergies      Hospital Medications: Medications reviewed      VITALS:  T(F): 98.4 (23 @ 09:34), Max: 102.5 (23 @ 00:33)  HR: 74 (23 @ 09:34)  BP: 102/63 (23 @ 09:34)  RR: 18 (23 @ 09:34)  SpO2: 97% (23 @ 09:34)  Wt(kg): --     @ 07:01  -   @ 07:00  --------------------------------------------------------  IN: 980 mL / OUT: 2020 mL / NET: -1040 mL     @ 07:01  -   @ 15:12  --------------------------------------------------------  IN: 480 mL / OUT: 250 mL / NET: 230 mL        PHYSICAL EXAM:    Gen: NAD, calm  Cards: RRR, +S1/S2, no M/G/R  Resp: CTA B/L  GI: soft, NT/ND, NABS  Vascular: no LE edema B/L      LABS:      133<L>  |  100  |  42<H>  ----------------------------<  119<H>  4.2   |  20<L>  |  2.18<H>    Ca    9.0      30 Mar 2023 07:26      Creatinine Trend: 2.18 <--, 2.25 <--, 2.21 <--, 1.83 <--, 1.80 <--, 1.75 <--, 1.46 <--, 1.46 <--, 1.37 <--                        7.7    2.98  )-----------( 39       ( 30 Mar 2023 07:27 )             22.5     Urine Studies:  Urinalysis Basic - ( 28 Mar 2023 14:14 )    Color: Light Yellow / Appearance: Slightly Turbid / S.020 / pH:   Gluc:  / Ketone: Negative  / Bili: Negative / Urobili: Negative   Blood:  / Protein: 100 mg/dl / Nitrite: Negative   Leuk Esterase: Negative / RBC: 2 /hpf / WBC 18 /HPF   Sq Epi:  / Non Sq Epi: 6 /hpf / Bacteria: Negative      Sodium, Random Urine: 45 mmol/L ( @ 14:14)  Creatinine, Random Urine: 96 mg/dL ( @ 14:14)     [facility] NEPHROLOGY- PROGRESS NOTE    67 year old Male with history of MM on chemotherapy presents with fevers. Nephrology consulted for elevated Scr.    REVIEW OF SYSTEMS:  Gen: no fevers  Cards: no chest pain  Resp: no dyspnea  GI: no nausea or vomiting or diarrhea  Vascular: no LE edema    No Known Allergies      Hospital Medications: Medications reviewed      VITALS:  T(F): 98.4 (23 @ 09:34), Max: 102.5 (23 @ 00:33)  HR: 74 (23 @ 09:34)  BP: 102/63 (23 @ 09:34)  RR: 18 (23 @ 09:34)  SpO2: 97% (23 @ 09:34)  Wt(kg): --     @ 07:01  -   @ 07:00  --------------------------------------------------------  IN: 980 mL / OUT: 2020 mL / NET: -1040 mL     @ 07:01  -   @ 15:12  --------------------------------------------------------  IN: 480 mL / OUT: 250 mL / NET: 230 mL        PHYSICAL EXAM:    Gen: NAD, calm  Cards: RRR, +S1/S2, no M/G/R  Resp: CTA B/L  GI: soft, NT/ND, NABS  Vascular: no LE edema B/L      LABS:      133<L>  |  100  |  42<H>  ----------------------------<  119<H>  4.2   |  20<L>  |  2.18<H>    Ca    9.0      30 Mar 2023 07:26      Creatinine Trend: 2.18 <--, 2.25 <--, 2.21 <--, 1.83 <--, 1.80 <--, 1.75 <--, 1.46 <--, 1.46 <--, 1.37 <--                        7.7    2.98  )-----------( 39       ( 30 Mar 2023 07:27 )             22.5     Urine Studies:  Urinalysis Basic - ( 28 Mar 2023 14:14 )    Color: Light Yellow / Appearance: Slightly Turbid / S.020 / pH:   Gluc:  / Ketone: Negative  / Bili: Negative / Urobili: Negative   Blood:  / Protein: 100 mg/dl / Nitrite: Negative   Leuk Esterase: Negative / RBC: 2 /hpf / WBC 18 /HPF   Sq Epi:  / Non Sq Epi: 6 /hpf / Bacteria: Negative      Sodium, Random Urine: 45 mmol/L ( @ 14:14)  Creatinine, Random Urine: 96 mg/dL ( @ 14:14)

## 2023-03-30 NOTE — PROGRESS NOTE ADULT - ASSESSMENT
67y Male with history of MM on chemotherapy presents with fevers. Nephrology consulted for elevated Scr.    1) RK: Secondary to ATN on prior admission which had resolved with recurrent RK likely due to ATN in setting of relative hypotension and IV contrast administration. Scr appears to have plateaued with good UO. Continue with IVF. UA with pyuria however sample contaminated (numerous squamous epithelial cells). FeNa low. Bladder scan negative. TMA work up negative. Avoid nephrotoxins. Monitor electrolytes.    2) Hypotension: In setting of fevers. Continue with midodrine 5 mg PO TID.    3) Hyponatremia: In setting of RK and likely decreased diluting ability. Serum Na improving. IVF as above. Monitor serum Na.    4) MM: As per Heme/Onc. On renal dose Entecavir.        [facility] NEPHROLOGY  Leoncio Leonard M.D.  Tay Brooke D.O.  Precious Chen M.D.  Nidia Stallings, MSN, ANP-C    Telephone: [phone]  Facsimile: [phone]    [address] [place] [address]   67y Male with history of MM on chemotherapy presents with fevers. Nephrology consulted for elevated Scr.    1) RK: Secondary to ATN on prior admission which had resolved with recurrent RK likely due to ATN in setting of relative hypotension and IV contrast administration. Scr appears to have plateaued with good UO. Continue with IVF. UA with pyuria however sample contaminated (numerous squamous epithelial cells). FeNa low. Bladder scan negative. TMA work up negative. Avoid nephrotoxins. Monitor electrolytes.    2) Hypotension: In setting of fevers. Continue with midodrine 5 mg PO TID.    3) Hyponatremia: In setting of RK and likely decreased diluting ability. Serum Na improving. IVF as above. Monitor serum Na.    4) MM: As per Heme/Onc. On renal dose Entecavir.        [facility] NEPHROLOGY  Leoncio Leonard M.D.  Tay Brooke D.O.  Precious Chen M.D.  Nidia Stallings, MSN, ANP-C    Telephone: [phone]  Facsimile: [phone]    [address] [place] [address]   67y Male with history of MM on chemotherapy presents with fevers. Nephrology consulted for elevated Scr.    1) RK: Secondary to ATN on prior admission which had resolved with recurrent RK likely due to ATN in setting of relative hypotension and IV contrast administration. Scr appears to have plateaued with good UO. Continue with IVF. UA with pyuria however sample contaminated (numerous squamous epithelial cells). FeNa low. Bladder scan negative. TMA work up negative. Avoid nephrotoxins. Monitor electrolytes.    2) Hypotension: In setting of fevers. Continue with midodrine 5 mg PO TID.    3) Hyponatremia: In setting of RK and likely decreased diluting ability. Serum Na improving. IVF as above. Monitor serum Na.    4) MM: As per Heme/Onc. On renal dose Entecavir.        [facility] NEPHROLOGY  Leoncio Leonard M.D.  Tay Brooke D.O.  Precious Chen M.D.  Nidia Stallings, MSN, ANP-C    Telephone: [phone]  Facsimile: [phone]    [address] [place] [address]

## 2023-03-30 NOTE — PROGRESS NOTE ADULT - ASSESSMENT
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   - no obvious infectious symptoms, CXR without consolidations or effusions   - U/A unremarkable  - F/u BCx2 and UCx  --> Neg final   - Recurrent fever. F/U repeat BCx2 sent 03/27 --> NGTD; F/u final. ABX as per ID   - less likely transfusion reaction as pt febrile prior to transfusion  - LE VA duplex --> + For DVT    - Was on empiric treatment with Vanc/cefepime --> ID consulted; Now on Zosyn  - Check GI PCR -- Neg   - Hold home penicillin while on ABX   - V/Q scan to R/O PE -- Low probability   - ID to follow up, infectious work up as per ID  --> F/u CMV PCR -- + Started on Ganciclovir per ID , CMV IGG (+) and IgM (-), Cryptococcal Ag --Neg, Quantiferon Tb -- Neg, Fungitell -- <31   - CT Chest non-contrast, noted, no acute pathology  - Recurrent fever 03/29 --> F/u BCx2 -- In lab    Anemia, Pancytopenia  - Drop in Hgb, Occult +  - Hold Hep gtt    - S/P 1 unit of PRBCs 03/28  - Maintain active T+S. Transfuse for Hgb < 7.0, and platelets < 10.K  - GI eval appreciated; F/u recs --> No plans for scope at this time     HypoNa  - Na of 133, cont to monitor and trend  - Appreciate renal eval. Zosyn changed to NS instead of D5   - Serial labs     Multiple myeloma.   - pancytopenia largely at baseline although Hg 6.7 on arrival  - s/p 1U PRBC with good response   - Was on acyclovir, now on Ganciclovir as per ID   - C/w home entecavir   - S/P dexamethasone, D/C'd by hematology , follow up outpatient after discharge    - Pt reports his Revlimid was held  - Heme/Onc consulted; F/u recs   - Resume home aspirin.    DVT   - Duplex + For R soleal vein DVT  --> Will consider CT A chest once creatinine permits as patient received contrast; Monitor for LOPEZ   - On Hep gtt; Monitor PTT; Monitor H/H closely --> Now on hold in view of drop in Hgb   - Check VQ scan to R/O PE -- Low probability   - Vascular eval appreciated; AC as tolerated, on Xarelto 10, monitor H/H   - Check serial Duplex to assess for propagation     RK  - S/P Contrast on 03/23   - Check bladder scan to R/O retention  - C/w IVF for hydration  - Monitor Cr closely; Avoid nephrotoxic agents   - Cr up-trending. On IVF     Hypertension, now hypotensive   - C/w home metoprolol.  - C/w Midodrine 5 TID. Hold for SBP > 140     Hyperlipidemia.   - C/w home atorvastatin.    Abnormal CT  - Outpatient follow up for CT findings    Prophylactic measure.   dvt ppx: DVT PPX   diet: regular  ambulate: with assistance    fall precautions  aspiration precautions.      Discussed with Patient and Wife at the bedside.  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   - no obvious infectious symptoms, CXR without consolidations or effusions   - U/A unremarkable  - F/u BCx2 and UCx  --> Neg final   - Recurrent fever. F/U repeat BCx2 sent 03/27 --> NGTD; F/u final. ABX as per ID   - less likely transfusion reaction as pt febrile prior to transfusion  - LE VA duplex --> + For DVT    - Was on empiric treatment with Vanc/cefepime --> ID consulted; Now on Zosyn  - Check GI PCR -- Neg   - Hold home penicillin while on ABX   - V/Q scan to R/O PE -- Low probability   - ID to follow up, infectious work up as per ID  --> F/u CMV PCR -- + Started on Ganciclovir per ID , CMV IGG (+) and IgM (-), Cryptococcal Ag --Neg, Quantiferon Tb -- Neg, Fungitell -- <31   - CT Chest non-contrast, noted, no acute pathology  - Recurrent fever 03/29 --> F/u BCx2 -- In lab    Anemia, Pancytopenia  - Drop in Hgb, Occult +  - Hold Hep gtt    - S/P 1 unit of PRBCs 03/28  - Maintain active T+S. Transfuse for Hgb < 7.0, and platelets < 10.K  - GI eval appreciated; F/u recs --> No plans for scope at this time     HypoNa  - Na of 133, cont to monitor and trend  - Appreciate renal eval. Zosyn changed to NS instead of D5   - Serial labs     Multiple myeloma.   - pancytopenia largely at baseline although Hg 6.7 on arrival  - s/p 1U PRBC with good response   - Was on acyclovir, now on Ganciclovir as per ID   - C/w home entecavir   - S/P dexamethasone, D/C'd by hematology , follow up outpatient after discharge    - Pt reports his Revlimid was held  - Heme/Onc consulted; F/u recs   - Resume home aspirin.    DVT   - Duplex + For R soleal vein DVT  --> Will consider CT A chest once creatinine permits as patient received contrast; Monitor for LOPEZ   - On Hep gtt; Monitor PTT; Monitor H/H closely --> Now on hold in view of drop in Hgb   - Check VQ scan to R/O PE -- Low probability   - Vascular eval appreciated; AC as tolerated, on Xarelto 10, monitor H/H   - Check serial Duplex to assess for propagation     RK  - S/P Contrast on 03/23   - Check bladder scan to R/O retention  - C/w IVF for hydration  - Monitor Cr closely; Avoid nephrotoxic agents   - Cr up-trending. On IVF     Hypertension, now hypotensive   - C/w home metoprolol.  - C/w Midodrine 5 TID. Hold for SBP > 140     Hyperlipidemia.   - C/w home atorvastatin.    Abnormal CT  - Outpatient follow up for CT findings    Prophylactic measure.   dvt ppx: DVT PPX   diet: regular  ambulate: with assistance    fall precautions  aspiration precautions.      Discussed with Patient and Wife at the bedside.

## 2023-03-30 NOTE — PROGRESS NOTE ADULT - SUBJECTIVE AND OBJECTIVE BOX
Patient is a 67y old  Male who presents with a chief complaint of fever (30 Mar 2023 13:11)    Patient seen and examined at bedside. Febrile overnight.     MEDICATIONS  (STANDING):  aspirin enteric coated 81 milliGRAM(s) Oral daily  atorvastatin 40 milliGRAM(s) Oral at bedtime  entecavir Solution 0.25 milliGRAM(s) Oral daily  metoprolol tartrate 50 milliGRAM(s) Oral two times a day  midodrine. 5 milliGRAM(s) Oral three times a day  rivaroxaban 10 milliGRAM(s) Oral daily  sodium chloride 0.9%. 1000 milliLiter(s) (100 mL/Hr) IV Continuous <Continuous>  valGANciclovir 450 milliGRAM(s) Oral every 24 hours    MEDICATIONS  (PRN):  acetaminophen     Tablet .. 650 milliGRAM(s) Oral every 6 hours PRN Temp greater or equal to 38C (100.4F), Mild Pain (1 - 3)    Vital Signs Last 24 Hrs  T(C): 36.9 (30 Mar 2023 09:34), Max: 39.2 (30 Mar 2023 00:33)  T(F): 98.4 (30 Mar 2023 09:34), Max: 102.5 (30 Mar 2023 00:33)  HR: 74 (30 Mar 2023 09:34) (74 - 89)  BP: 102/63 (30 Mar 2023 09:34) (98/54 - 108/63)  BP(mean): --  RR: 18 (30 Mar 2023 09:34) (18 - 20)  SpO2: 97% (30 Mar 2023 09:34) (95% - 97%)    Parameters below as of 30 Mar 2023 04:22  Patient On (Oxygen Delivery Method): room air    PE  NAD  Awake, alert  Anicteric, MMM  No c/c/e  No rash grossly  FROM                          7.7    2.98  )-----------( 39       ( 30 Mar 2023 07:27 )             22.5       03-30    133<L>  |  100  |  42<H>  ----------------------------<  119<H>  4.2   |  20<L>  |  2.18<H>    Ca    9.0      30 Mar 2023 07:26

## 2023-03-30 NOTE — PROVIDER CONTACT NOTE (MEDICATION) - ACTION/TREATMENT ORDERED:
Provider ordered additional 325mg of Tylenol to be given with PRN Tylenol. will continue with plan of care.
NP notified. NP to RN ok to give both meds with 6am meds, ice packs given for temp. will continue to monitor

## 2023-03-30 NOTE — PROGRESS NOTE ADULT - SUBJECTIVE AND OBJECTIVE BOX
Name of Patient : PETER DE LA PAZ  MRN: [id number]  Date of visit: 23 @ 13:12      Subjective: Patient seen and examined. No new events except as noted.   Patient seen earlier this AM. Lying down in bed. Wife at the bedside.   Febrile overnight.  BCx 2 in lab  On Ganciclovir     REVIEW OF SYSTEMS:    CONSTITUTIONAL: Generalized weakness; Febrile overnight   EYES/ENT: No visual changes;  No vertigo or throat pain   NECK: No pain or stiffness  RESPIRATORY: No cough, wheezing, hemoptysis; No shortness of breath  CARDIOVASCULAR: No chest pain or palpitations  GASTROINTESTINAL: No abdominal or epigastric pain. No nausea, vomiting, or hematemesis; No diarrhea or constipation. No melena or hematochezia.  GENITOURINARY: No dysuria, frequency or hematuria  NEUROLOGICAL: No numbness or weakness  SKIN: No itching, burning, rashes, or lesions   All other review of systems is negative unless indicated above.    MEDICATIONS:  MEDICATIONS  (STANDING):  aspirin enteric coated 81 milliGRAM(s) Oral daily  atorvastatin 40 milliGRAM(s) Oral at bedtime  entecavir Solution 0.25 milliGRAM(s) Oral daily  metoprolol tartrate 50 milliGRAM(s) Oral two times a day  midodrine. 5 milliGRAM(s) Oral three times a day  rivaroxaban 10 milliGRAM(s) Oral daily  sodium chloride 0.9%. 1000 milliLiter(s) (100 mL/Hr) IV Continuous <Continuous>  valGANciclovir 450 milliGRAM(s) Oral every 24 hours      PHYSICAL EXAM:  T(C): 36.9 (23 @ 09:34), Max: 39.2 (23 @ 00:33)  HR: 74 (23 @ 09:34) (74 - 89)  BP: 102/63 (23 @ 09:34) (98/54 - 108/63)  RR: 18 (23 @ 09:34) (18 - 20)  SpO2: 97% (23 @ 09:34) (95% - 97%)  Wt(kg): --  I&O's Summary    29 Mar 2023 07:01  -  30 Mar 2023 07:00  --------------------------------------------------------  IN: 980 mL / OUT:  mL / NET: -1040 mL    30 Mar 2023 07:  -  30 Mar 2023 13:12  --------------------------------------------------------  IN: 480 mL / OUT: 250 mL / NET: 230 mL          Appearance: Awake, weak appearing male, lying down in bed 	  HEENT:  Eyes are open   Lymphatic: No lymphadenopathy   Cardiovascular: Normal   Respiratory: normal effort , clear  Gastrointestinal:  Soft, Non-tender  Skin: No rashes,  warm to touch  Psychiatry:  Mood & affect appropriate  Musculoskeletal: No edema        23 @ 07:  -  23 @ 07:00  --------------------------------------------------------  IN: 980 mL / OUT:  mL / NET: -1040 mL    23 @ 07:  -  23 @ 13:12  --------------------------------------------------------  IN: 480 mL / OUT: 250 mL / NET: 230 mL                                  7.7    2.98  )-----------( 39       ( 30 Mar 2023 07:27 )             22.5                   133<L>  |  100  |  42<H>  ----------------------------<  119<H>  4.2   |  20<L>  |  2.18<H>    Ca    9.0      30 Mar 2023 07:26                         Urinalysis Basic - ( 28 Mar 2023 14:14 )    Color: Light Yellow / Appearance: Slightly Turbid / S.020 / pH: x  Gluc: x / Ketone: Negative  / Bili: Negative / Urobili: Negative   Blood: x / Protein: 100 mg/dl / Nitrite: Negative   Leuk Esterase: Negative / RBC: 2 /hpf / WBC 18 /HPF   Sq Epi: x / Non Sq Epi: 6 /hpf / Bacteria: Negative      Culture - Blood (23 @ 11:05)   Specimen Source: .Blood Blood  Culture Results: No growth to date.    Culture - Blood (23 @ 11:05)   Specimen Source: .Blood Blood  Culture Results: No growth to date.     Name of Patient : PETER DE LA PAZ  MRN: [id number]  Date of visit: 23 @ 13:12      Subjective: Patient seen and examined. No new events except as noted.   Patient seen earlier this AM. Lying down in bed. Wife at the bedside.   Febrile overnight.  BCx 2 in lab  On Ganciclovir     REVIEW OF SYSTEMS:    CONSTITUTIONAL: Generalized weakness; Febrile overnight   EYES/ENT: No visual changes;  No vertigo or throat pain   NECK: No pain or stiffness  RESPIRATORY: No cough, wheezing, hemoptysis; No shortness of breath  CARDIOVASCULAR: No chest pain or palpitations  GASTROINTESTINAL: No abdominal or epigastric pain. No nausea, vomiting, or hematemesis; No diarrhea or constipation. No melena or hematochezia.  GENITOURINARY: No dysuria, frequency or hematuria  NEUROLOGICAL: No numbness or weakness  SKIN: No itching, burning, rashes, or lesions   All other review of systems is negative unless indicated above.    MEDICATIONS:  MEDICATIONS  (STANDING):  aspirin enteric coated 81 milliGRAM(s) Oral daily  atorvastatin 40 milliGRAM(s) Oral at bedtime  entecavir Solution 0.25 milliGRAM(s) Oral daily  metoprolol tartrate 50 milliGRAM(s) Oral two times a day  midodrine. 5 milliGRAM(s) Oral three times a day  rivaroxaban 10 milliGRAM(s) Oral daily  sodium chloride 0.9%. 1000 milliLiter(s) (100 mL/Hr) IV Continuous <Continuous>  valGANciclovir 450 milliGRAM(s) Oral every 24 hours      PHYSICAL EXAM:  T(C): 36.9 (23 @ 09:34), Max: 39.2 (23 @ 00:33)  HR: 74 (23 @ 09:34) (74 - 89)  BP: 102/63 (23 @ 09:34) (98/54 - 108/63)  RR: 18 (23 @ 09:34) (18 - 20)  SpO2: 97% (23 @ 09:34) (95% - 97%)  Wt(kg): --  I&O's Summary    29 Mar 2023 07:01  -  30 Mar 2023 07:00  --------------------------------------------------------  IN: 980 mL / OUT:  mL / NET: -1040 mL    30 Mar 2023 07:  -  30 Mar 2023 13:12  --------------------------------------------------------  IN: 480 mL / OUT: 250 mL / NET: 230 mL          Appearance: Awake, weak appearing male, lying down in bed 	  HEENT:  Eyes are open   Lymphatic: No lymphadenopathy   Cardiovascular: Normal   Respiratory: normal effort , clear  Gastrointestinal:  Soft, Non-tender  Skin: No rashes,  warm to touch  Psychiatry:  Mood & affect appropriate  Musculoskeletal: No edema        23 @ 07:  -  23 @ 07:00  --------------------------------------------------------  IN: 980 mL / OUT:  mL / NET: -1040 mL    23 @ 07:  -  23 @ 13:12  --------------------------------------------------------  IN: 480 mL / OUT: 250 mL / NET: 230 mL                                  7.7    2.98  )-----------( 39       ( 30 Mar 2023 07:27 )             22.5                   133<L>  |  100  |  42<H>  ----------------------------<  119<H>  4.2   |  20<L>  |  2.18<H>    Ca    9.0      30 Mar 2023 07:26                         Urinalysis Basic - ( 28 Mar 2023 14:14 )    Color: Light Yellow / Appearance: Slightly Turbid / S.020 / pH: x  Gluc: x / Ketone: Negative  / Bili: Negative / Urobili: Negative   Blood: x / Protein: 100 mg/dl / Nitrite: Negative   Leuk Esterase: Negative / RBC: 2 /hpf / WBC 18 /HPF   Sq Epi: x / Non Sq Epi: 6 /hpf / Bacteria: Negative      Culture - Blood (23 @ 11:05)   Specimen Source: .Blood Blood  Culture Results: No growth to date.    Culture - Blood (23 @ 11:05)   Specimen Source: .Blood Blood  Culture Results: No growth to date.     Name of Patient : PETER DE LA PAZ  MRN: [id number]  Date of visit: 23 @ 13:12      Subjective: Patient seen and examined. No new events except as noted.   Patient seen earlier this AM. Lying down in bed. Wife at the bedside.   Febrile overnight.  BCx 2 in lab  On Ganciclovir     REVIEW OF SYSTEMS:    CONSTITUTIONAL: Generalized weakness; Febrile overnight   EYES/ENT: No visual changes;  No vertigo or throat pain   NECK: No pain or stiffness  RESPIRATORY: No cough, wheezing, hemoptysis; No shortness of breath  CARDIOVASCULAR: No chest pain or palpitations  GASTROINTESTINAL: No abdominal or epigastric pain. No nausea, vomiting, or hematemesis; No diarrhea or constipation. No melena or hematochezia.  GENITOURINARY: No dysuria, frequency or hematuria  NEUROLOGICAL: No numbness or weakness  SKIN: No itching, burning, rashes, or lesions   All other review of systems is negative unless indicated above.    MEDICATIONS:  MEDICATIONS  (STANDING):  aspirin enteric coated 81 milliGRAM(s) Oral daily  atorvastatin 40 milliGRAM(s) Oral at bedtime  entecavir Solution 0.25 milliGRAM(s) Oral daily  metoprolol tartrate 50 milliGRAM(s) Oral two times a day  midodrine. 5 milliGRAM(s) Oral three times a day  rivaroxaban 10 milliGRAM(s) Oral daily  sodium chloride 0.9%. 1000 milliLiter(s) (100 mL/Hr) IV Continuous <Continuous>  valGANciclovir 450 milliGRAM(s) Oral every 24 hours      PHYSICAL EXAM:  T(C): 36.9 (23 @ 09:34), Max: 39.2 (23 @ 00:33)  HR: 74 (23 @ 09:34) (74 - 89)  BP: 102/63 (23 @ 09:34) (98/54 - 108/63)  RR: 18 (23 @ 09:34) (18 - 20)  SpO2: 97% (23 @ 09:34) (95% - 97%)  Wt(kg): --  I&O's Summary    29 Mar 2023 07:01  -  30 Mar 2023 07:00  --------------------------------------------------------  IN: 980 mL / OUT:  mL / NET: -1040 mL    30 Mar 2023 07:  -  30 Mar 2023 13:12  --------------------------------------------------------  IN: 480 mL / OUT: 250 mL / NET: 230 mL          Appearance: Awake, weak appearing male, lying down in bed 	  HEENT:  Eyes are open   Lymphatic: No lymphadenopathy   Cardiovascular: Normal   Respiratory: normal effort , clear  Gastrointestinal:  Soft, Non-tender  Skin: No rashes,  warm to touch  Psychiatry:  Mood & affect appropriate  Musculoskeletal: No edema        23 @ 07:  -  23 @ 07:00  --------------------------------------------------------  IN: 980 mL / OUT:  mL / NET: -1040 mL    23 @ 07:  -  23 @ 13:12  --------------------------------------------------------  IN: 480 mL / OUT: 250 mL / NET: 230 mL                                  7.7    2.98  )-----------( 39       ( 30 Mar 2023 07:27 )             22.5                   133<L>  |  100  |  42<H>  ----------------------------<  119<H>  4.2   |  20<L>  |  2.18<H>    Ca    9.0      30 Mar 2023 07:26                         Urinalysis Basic - ( 28 Mar 2023 14:14 )    Color: Light Yellow / Appearance: Slightly Turbid / S.020 / pH: x  Gluc: x / Ketone: Negative  / Bili: Negative / Urobili: Negative   Blood: x / Protein: 100 mg/dl / Nitrite: Negative   Leuk Esterase: Negative / RBC: 2 /hpf / WBC 18 /HPF   Sq Epi: x / Non Sq Epi: 6 /hpf / Bacteria: Negative      Culture - Blood (23 @ 11:05)   Specimen Source: .Blood Blood  Culture Results: No growth to date.    Culture - Blood (23 @ 11:05)   Specimen Source: .Blood Blood  Culture Results: No growth to date.

## 2023-03-30 NOTE — PROGRESS NOTE ADULT - SUBJECTIVE AND OBJECTIVE BOX
DATE OF SERVICE: 03-30-23 @ 09:45    Subjective: Patient seen and examined. No new events except as noted.     SUBJECTIVE/ROS:    MEDICATIONS:  MEDICATIONS  (STANDING):  aspirin enteric coated 81 milliGRAM(s) Oral daily  atorvastatin 40 milliGRAM(s) Oral at bedtime  entecavir Solution 0.25 milliGRAM(s) Oral daily  metoprolol tartrate 50 milliGRAM(s) Oral two times a day  midodrine. 5 milliGRAM(s) Oral three times a day  rivaroxaban 10 milliGRAM(s) Oral daily  sodium chloride 0.9%. 1000 milliLiter(s) (100 mL/Hr) IV Continuous <Continuous>  valGANciclovir 450 milliGRAM(s) Oral every 24 hours      PHYSICAL EXAM:  T(C): 36.9 (03-30-23 @ 09:34), Max: 39.2 (03-30-23 @ 00:33)  HR: 74 (03-30-23 @ 09:34) (72 - 89)  BP: 102/63 (03-30-23 @ 09:34) (93/42 - 108/63)  RR: 18 (03-30-23 @ 09:34) (18 - 20)  SpO2: 97% (03-30-23 @ 09:34) (92% - 97%)  Wt(kg): --  I&O's Summary    29 Mar 2023 07:01  -  30 Mar 2023 07:00  --------------------------------------------------------  IN: 980 mL / OUT: 2020 mL / NET: -1040 mL    30 Mar 2023 07:01  -  30 Mar 2023 09:45  --------------------------------------------------------  IN: 480 mL / OUT: 250 mL / NET: 230 mL            JVP: Normal  Neck: supple  Lung: clear   CV: S1 S2 , Murmur:  Abd: soft  Ext: No edema  neuro: Awake / alert  Psych: flat affect  Skin: normal``    LABS/DATA:    CARDIAC MARKERS:                                7.7    2.98  )-----------( 39       ( 30 Mar 2023 07:27 )             22.5     03-30    133<L>  |  100  |  42<H>  ----------------------------<  119<H>  4.2   |  20<L>  |  2.18<H>    Ca    9.0      30 Mar 2023 07:26      proBNP:   Lipid Profile:   HgA1c:   TSH:     TELE:  EKG:

## 2023-03-31 LAB
ALBUMIN SERPL ELPH-MCNC: 2.2 G/DL — LOW (ref 3.3–5)
ALP SERPL-CCNC: 396 U/L — HIGH (ref 40–120)
ALT FLD-CCNC: 218 U/L — HIGH (ref 10–45)
ANION GAP SERPL CALC-SCNC: 11 MMOL/L — SIGNIFICANT CHANGE UP (ref 5–17)
APTT BLD: 32.8 SEC — SIGNIFICANT CHANGE UP (ref 27.5–35.5)
AST SERPL-CCNC: 114 U/L — HIGH (ref 10–40)
BASE EXCESS BLDV CALC-SCNC: -5.1 MMOL/L — LOW (ref -2–3)
BASOPHILS # BLD AUTO: 0 K/UL — SIGNIFICANT CHANGE UP (ref 0–0.2)
BASOPHILS NFR BLD AUTO: 0 % — SIGNIFICANT CHANGE UP (ref 0–2)
BILIRUB SERPL-MCNC: 0.8 MG/DL — SIGNIFICANT CHANGE UP (ref 0.2–1.2)
BLD GP AB SCN SERPL QL: POSITIVE — SIGNIFICANT CHANGE UP
BUN SERPL-MCNC: 43 MG/DL — HIGH (ref 7–23)
BUN SERPL-MCNC: 46 MG/DL — HIGH (ref 7–23)
CA-I SERPL-SCNC: 1.2 MMOL/L — SIGNIFICANT CHANGE UP (ref 1.15–1.33)
CALCIUM SERPL-MCNC: 8.1 MG/DL — LOW (ref 8.4–10.5)
CALCIUM SERPL-MCNC: 8.5 MG/DL — SIGNIFICANT CHANGE UP (ref 8.4–10.5)
CHLORIDE BLDV-SCNC: 105 MMOL/L — SIGNIFICANT CHANGE UP (ref 96–108)
CHLORIDE SERPL-SCNC: 102 MMOL/L — SIGNIFICANT CHANGE UP (ref 96–108)
CHLORIDE SERPL-SCNC: 103 MMOL/L — SIGNIFICANT CHANGE UP (ref 96–108)
CO2 BLDV-SCNC: 21 MMOL/L — LOW (ref 22–26)
CO2 SERPL-SCNC: 17 MMOL/L — LOW (ref 22–31)
CREAT SERPL-MCNC: 2.17 MG/DL — HIGH (ref 0.5–1.3)
CREAT SERPL-MCNC: 2.26 MG/DL — HIGH (ref 0.5–1.3)
EGFR: 31 ML/MIN/1.73M2 — LOW
EGFR: 32 ML/MIN/1.73M2 — LOW
EOSINOPHIL # BLD AUTO: 0 K/UL — SIGNIFICANT CHANGE UP (ref 0–0.5)
EOSINOPHIL NFR BLD AUTO: 0 % — SIGNIFICANT CHANGE UP (ref 0–6)
GAS PNL BLDV: 130 MMOL/L — LOW (ref 136–145)
GAS PNL BLDV: SIGNIFICANT CHANGE UP
GLUCOSE BLDV-MCNC: 120 MG/DL — HIGH (ref 70–99)
GLUCOSE SERPL-MCNC: 118 MG/DL — HIGH (ref 70–99)
GLUCOSE SERPL-MCNC: 119 MG/DL — HIGH (ref 70–99)
HCO3 BLDV-SCNC: 20 MMOL/L — LOW (ref 22–29)
HCT VFR BLD CALC: 21.4 % — LOW (ref 39–50)
HCT VFR BLD CALC: 21.8 % — LOW (ref 39–50)
HCT VFR BLDA CALC: 21 % — CRITICAL LOW (ref 39–51)
HGB BLD CALC-MCNC: 7.1 G/DL — LOW (ref 12.6–17.4)
HGB BLD-MCNC: 7.2 G/DL — LOW (ref 13–17)
HGB BLD-MCNC: 7.5 G/DL — LOW (ref 13–17)
INR BLD: 1.63 RATIO — HIGH (ref 0.88–1.16)
LACTATE BLDV-MCNC: 1.4 MMOL/L — SIGNIFICANT CHANGE UP (ref 0.5–2)
LYMPHOCYTES # BLD AUTO: 0.17 K/UL — LOW (ref 1–3.3)
LYMPHOCYTES # BLD AUTO: 8 % — LOW (ref 13–44)
MCHC RBC-ENTMCNC: 31.4 PG — SIGNIFICANT CHANGE UP (ref 27–34)
MCHC RBC-ENTMCNC: 33.6 GM/DL — SIGNIFICANT CHANGE UP (ref 32–36)
MCHC RBC-ENTMCNC: 34.4 GM/DL — SIGNIFICANT CHANGE UP (ref 32–36)
MCV RBC AUTO: 91.2 FL — SIGNIFICANT CHANGE UP (ref 80–100)
MCV RBC AUTO: 93.4 FL — SIGNIFICANT CHANGE UP (ref 80–100)
MONOCYTES # BLD AUTO: 0.12 K/UL — SIGNIFICANT CHANGE UP (ref 0–0.9)
MONOCYTES NFR BLD AUTO: 6 % — SIGNIFICANT CHANGE UP (ref 2–14)
NEUTROPHILS # BLD AUTO: 1.77 K/UL — LOW (ref 1.8–7.4)
NEUTROPHILS NFR BLD AUTO: 85 % — HIGH (ref 43–77)
NRBC # BLD: 2 /100 WBCS — HIGH (ref 0–0)
PCO2 BLDV: 35 MMHG — LOW (ref 42–55)
PH BLDV: 7.36 — SIGNIFICANT CHANGE UP (ref 7.32–7.43)
PLATELET # BLD AUTO: 25 K/UL — LOW (ref 150–400)
PLATELET # BLD AUTO: 31 K/UL — LOW (ref 150–400)
PO2 BLDV: 97 MMHG — HIGH (ref 25–45)
POTASSIUM BLDV-SCNC: 3.4 MMOL/L — LOW (ref 3.5–5.1)
POTASSIUM SERPL-MCNC: 3.8 MMOL/L — SIGNIFICANT CHANGE UP (ref 3.5–5.3)
POTASSIUM SERPL-SCNC: 3.8 MMOL/L — SIGNIFICANT CHANGE UP (ref 3.5–5.3)
PROT SERPL-MCNC: 4.4 G/DL — LOW (ref 6–8.3)
PROTHROM AB SERPL-ACNC: 19 SEC — HIGH (ref 10.5–13.4)
RAPID RVP RESULT: SIGNIFICANT CHANGE UP
RBC # BLD: 2.29 M/UL — LOW (ref 4.2–5.8)
RBC # BLD: 2.39 M/UL — LOW (ref 4.2–5.8)
RBC # FLD: 23.2 % — HIGH (ref 10.3–14.5)
RBC # FLD: 23.6 % — HIGH (ref 10.3–14.5)
RH IG SCN BLD-IMP: POSITIVE — SIGNIFICANT CHANGE UP
SAO2 % BLDV: 97.4 % — HIGH (ref 67–88)
SARS-COV-2 RNA SPEC QL NAA+PROBE: SIGNIFICANT CHANGE UP
SODIUM SERPL-SCNC: 130 MMOL/L — LOW (ref 135–145)
SODIUM SERPL-SCNC: 134 MMOL/L — LOW (ref 135–145)
WBC # BLD: 2.08 K/UL — LOW (ref 3.8–10.5)
WBC # BLD: 2.38 K/UL — LOW (ref 3.8–10.5)
WBC # FLD AUTO: 2.08 K/UL — LOW (ref 3.8–10.5)
WBC # FLD AUTO: 2.38 K/UL — LOW (ref 3.8–10.5)

## 2023-03-31 PROCEDURE — 99222 1ST HOSP IP/OBS MODERATE 55: CPT | Mod: GC

## 2023-03-31 RX ORDER — LOPERAMIDE HCL 2 MG
2 TABLET ORAL THREE TIMES A DAY
Refills: 0 | Status: DISCONTINUED | OUTPATIENT
Start: 2023-03-31 | End: 2023-04-04

## 2023-03-31 RX ORDER — GANCICLOVIR SODIUM 50 MG/ML
190 INJECTION, POWDER, LYOPHILIZED, FOR SOLUTION INTRAVENTRICULAR ONCE
Refills: 0 | Status: COMPLETED | OUTPATIENT
Start: 2023-03-31 | End: 2023-03-31

## 2023-03-31 RX ORDER — GANCICLOVIR SODIUM 50 MG/ML
95 INJECTION, POWDER, LYOPHILIZED, FOR SOLUTION INTRAVENTRICULAR DAILY
Refills: 0 | Status: DISCONTINUED | OUTPATIENT
Start: 2023-04-01 | End: 2023-04-01

## 2023-03-31 RX ORDER — SODIUM CHLORIDE 9 MG/ML
500 INJECTION, SOLUTION INTRAVENOUS ONCE
Refills: 0 | Status: DISCONTINUED | OUTPATIENT
Start: 2023-03-31 | End: 2023-04-01

## 2023-03-31 RX ORDER — PIPERACILLIN AND TAZOBACTAM 4; .5 G/20ML; G/20ML
3.38 INJECTION, POWDER, LYOPHILIZED, FOR SOLUTION INTRAVENOUS ONCE
Refills: 0 | Status: COMPLETED | OUTPATIENT
Start: 2023-03-31 | End: 2023-03-31

## 2023-03-31 RX ORDER — GANCICLOVIR SODIUM 50 MG/ML
190 INJECTION, POWDER, LYOPHILIZED, FOR SOLUTION INTRAVENTRICULAR EVERY 24 HOURS
Refills: 0 | Status: DISCONTINUED | OUTPATIENT
Start: 2023-03-31 | End: 2023-03-31

## 2023-03-31 RX ORDER — METOPROLOL TARTRATE 50 MG
25 TABLET ORAL
Refills: 0 | Status: DISCONTINUED | OUTPATIENT
Start: 2023-03-31 | End: 2023-03-31

## 2023-03-31 RX ORDER — MIDODRINE HYDROCHLORIDE 2.5 MG/1
10 TABLET ORAL THREE TIMES A DAY
Refills: 0 | Status: DISCONTINUED | OUTPATIENT
Start: 2023-03-31 | End: 2023-04-14

## 2023-03-31 RX ORDER — PIPERACILLIN AND TAZOBACTAM 4; .5 G/20ML; G/20ML
3.38 INJECTION, POWDER, LYOPHILIZED, FOR SOLUTION INTRAVENOUS EVERY 8 HOURS
Refills: 0 | Status: DISCONTINUED | OUTPATIENT
Start: 2023-04-01 | End: 2023-04-02

## 2023-03-31 RX ORDER — PIPERACILLIN AND TAZOBACTAM 4; .5 G/20ML; G/20ML
3.38 INJECTION, POWDER, LYOPHILIZED, FOR SOLUTION INTRAVENOUS ONCE
Refills: 0 | Status: COMPLETED | OUTPATIENT
Start: 2023-04-01 | End: 2023-04-01

## 2023-03-31 RX ORDER — SODIUM CHLORIDE 9 MG/ML
1000 INJECTION INTRAMUSCULAR; INTRAVENOUS; SUBCUTANEOUS
Refills: 0 | Status: DISCONTINUED | OUTPATIENT
Start: 2023-03-31 | End: 2023-03-31

## 2023-03-31 RX ORDER — SODIUM CHLORIDE 9 MG/ML
1000 INJECTION, SOLUTION INTRAVENOUS
Refills: 0 | Status: DISCONTINUED | OUTPATIENT
Start: 2023-03-31 | End: 2023-04-01

## 2023-03-31 RX ORDER — VANCOMYCIN HCL 1 G
1000 VIAL (EA) INTRAVENOUS EVERY 12 HOURS
Refills: 0 | Status: DISCONTINUED | OUTPATIENT
Start: 2023-03-31 | End: 2023-04-01

## 2023-03-31 RX ADMIN — VALGANCICLOVIR 450 MILLIGRAM(S): 450 TABLET, FILM COATED ORAL at 17:09

## 2023-03-31 RX ADMIN — MIDODRINE HYDROCHLORIDE 10 MILLIGRAM(S): 2.5 TABLET ORAL at 17:15

## 2023-03-31 RX ADMIN — Medication 2 MILLIGRAM(S): at 23:37

## 2023-03-31 RX ADMIN — SODIUM CHLORIDE 70 MILLILITER(S): 9 INJECTION INTRAMUSCULAR; INTRAVENOUS; SUBCUTANEOUS at 15:03

## 2023-03-31 RX ADMIN — ENTECAVIR 0.25 MILLIGRAM(S): 0.5 TABLET ORAL at 13:49

## 2023-03-31 RX ADMIN — Medication 2 MILLIGRAM(S): at 13:27

## 2023-03-31 RX ADMIN — Medication 650 MILLIGRAM(S): at 18:10

## 2023-03-31 RX ADMIN — MIDODRINE HYDROCHLORIDE 5 MILLIGRAM(S): 2.5 TABLET ORAL at 13:22

## 2023-03-31 RX ADMIN — Medication 650 MILLIGRAM(S): at 17:15

## 2023-03-31 RX ADMIN — Medication 50 MILLIGRAM(S): at 17:11

## 2023-03-31 RX ADMIN — SODIUM CHLORIDE 70 MILLILITER(S): 9 INJECTION, SOLUTION INTRAVENOUS at 17:15

## 2023-03-31 RX ADMIN — Medication 81 MILLIGRAM(S): at 13:22

## 2023-03-31 RX ADMIN — ATORVASTATIN CALCIUM 40 MILLIGRAM(S): 80 TABLET, FILM COATED ORAL at 23:37

## 2023-03-31 RX ADMIN — MIDODRINE HYDROCHLORIDE 5 MILLIGRAM(S): 2.5 TABLET ORAL at 05:56

## 2023-03-31 NOTE — PROGRESS NOTE ADULT - ASSESSMENT
PETER DE LA PAZ is a 67y Male who presents with a chief complaint of fever.    Multiple Myeloma  Pancytopenia  ·	Patient follows with Dr. Jean Claude Hubbard, [facility].  ·	Patient had been on lenalidomide maintenance up until February 2023, when he had PET/CT that showed progression of disease with worsening lymphadenopathy, bony lesions, and liver lesion.   ·	He was started then on daratumumab + cyclophosphamide + bortezomib + dexamethasone.  ·	Last dose of daratumumab was on March 23rd.  ·	Last dose of bortezomib was on March 23rd  ·	Last dose of cyclophosphamide was on March 13th  ·	Patient has had worsening pancytopenia since starting the latest chemotherapy regimen.  ·	Hold systemic treatment while inpatient.  ·	Monitor CBC and transfuse to maintain HGB > 7 and PLT > 10.  ·	Continue prophylactic acyclovir 400 mg BID as patient is on bortezomib  ·	FOB+  ·	folate, Fe, b12 adequate  ·	1u PRBCs given on 3/28  ·	Discontinued dexamethasone    Fever  ·	infection vs disease  ·	Tmax 102 on admission, now recurrent  ·	s/p VQ scan 3/27  r/o PE- unremarkable  ·	ID following, s/p IV abx. Continues ganciclovir for CMV, may be contributing to thrombocytopenia  ·	outpatient respiratory PCR negative    Diarrhea, + guaiac  ·	neg stool PCR  ·	GI following, started imodium    LE distal DVT, soleal vein  ·	Repeat doppler w/o propagation of clot  ·	Holding xarelto due to downtrending platelet count     Will continue to follow.    Quirino Holloway PA-C  Hematology/Oncology  [facility]  [phone] (office) PETER DE LA PAZ is a 67y Male who presents with a chief complaint of fever.    Multiple Myeloma  Pancytopenia  ·	Patient follows with Dr. Jean Claude Hubbard, [facility].  ·	Patient had been on lenalidomide maintenance up until February 2023, when he had PET/CT that showed progression of disease with worsening lymphadenopathy, bony lesions, and liver lesion.   ·	He was started then on daratumumab + cyclophosphamide + bortezomib + dexamethasone.  ·	Last dose of daratumumab was on March 23rd.  ·	Last dose of bortezomib was on March 23rd  ·	Last dose of cyclophosphamide was on March 13th  ·	Patient has had worsening pancytopenia since starting the latest chemotherapy regimen.  ·	Hold systemic treatment while inpatient.  ·	Monitor CBC and transfuse to maintain HGB > 7 and PLT > 10.  ·	Continue prophylactic acyclovir 400 mg BID as patient is on bortezomib  ·	FOB+  ·	folate, Fe, b12 adequate  ·	1u PRBCs given on 3/28  ·	Discontinued dexamethasone    Fever  ·	infection vs disease  ·	Tmax 102 on admission, now recurrent  ·	s/p VQ scan 3/27  r/o PE- unremarkable  ·	ID following, s/p IV abx. Continues ganciclovir for CMV, may be contributing to thrombocytopenia  ·	outpatient respiratory PCR negative    Diarrhea, + guaiac  ·	neg stool PCR  ·	GI following, started imodium    LE distal DVT, soleal vein  ·	Repeat doppler w/o propagation of clot  ·	Holding xarelto due to downtrending platelet count     Will continue to follow.    Quirino Holloway PA-C  Hematology/Oncology  [facility]  [phone] (office) PETER DE LA PAZ is a 67y Male who presents with a chief complaint of fever.    Multiple Myeloma  Pancytopenia  ·	Patient follows with Dr. Jean Claude Hubbard, [facility].  ·	Patient had been on lenalidomide maintenance up until February 2023, when he had PET/CT that showed progression of disease with worsening lymphadenopathy, bony lesions, and liver lesion.   ·	He was started then on daratumumab + cyclophosphamide + bortezomib + dexamethasone.  ·	Last dose of daratumumab was on March 23rd.  ·	Last dose of bortezomib was on March 23rd  ·	Last dose of cyclophosphamide was on March 13th  ·	Patient has had worsening pancytopenia since starting the latest chemotherapy regimen.  ·	Hold systemic treatment while inpatient.  ·	Monitor CBC and transfuse to maintain HGB > 7 and PLT > 10.  ·	Continue prophylactic acyclovir 400 mg BID as patient is on bortezomib  ·	FOB+  ·	folate, Fe, b12 adequate  ·	1u PRBCs given on 3/28  ·	Discontinued dexamethasone    Fever  ·	infection vs disease  ·	Tmax 102 on admission, now recurrent  ·	s/p VQ scan 3/27  r/o PE- unremarkable  ·	ID following, s/p IV abx. Continues ganciclovir for CMV, may be contributing to thrombocytopenia  ·	outpatient respiratory PCR negative    Diarrhea, + guaiac  ·	neg stool PCR  ·	GI following, started imodium    LE distal DVT, soleal vein  ·	Repeat doppler w/o propagation of clot  ·	Holding xarelto due to downtrending platelet count     Will continue to follow.    Quirino Holloway PA-C  Hematology/Oncology  [facility]  [phone] (office)

## 2023-03-31 NOTE — PROGRESS NOTE ADULT - ASSESSMENT
1. fever  - V/Q scan with low probability of PE   - GI PCR- negative   - CMV PCR elevated, cmv IGG + and CMV IGM -  - cryptococcal ag, QuantiFeronTb, fungitell negative  - per ID, given immunosuppressed state and CMV viremia, ordered repeat CMV PCR and initiated valcyte.     2. anemia, FOBT positive in the setting of thrombocytopenia. Hgb remains at recent baseline. No overt bleeding symptoms.   - no plans for endoscopic evaluation at this time  - monitor H&H, transfuse PRN  - daily PPI     3. Chronic diarrhea, likely related to chemo +/- abx. 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 Query if he has a component of pancreatic insufficiency (post partial pancreatectomy) and may benefit from Creon   - GI PCR neg, C-diff neg on 03.03  - fecal elastase ordered   - will trial on low dose imodium     4. DVT   - on anticoagulation    5. Multiple myeloma, pancytopenia   - per heme / onc       Jahaira Jennings M.D.   Gastroenterology and Hepatology  [address] [place], [place]  Office: [phone]  Cell: [phone] 1. fever  - V/Q scan with low probability of PE   - GI PCR- negative   - CMV PCR elevated, cmv IGG + and CMV IGM -  - cryptococcal ag, QuantiFeronTb, fungitell negative  - per ID, given immunosuppressed state and CMV viremia, ordered repeat CMV PCR and initiated valcyte.     2. anemia, FOBT positive in the setting of thrombocytopenia. Hgb remains at recent baseline. No overt bleeding symptoms.   - no plans for endoscopic evaluation at this time  - monitor H&H, transfuse PRN  - daily PPI     3. Chronic diarrhea, likely related to chemo +/- abx. 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 Query if he has a component of pancreatic insufficiency (post partial pancreatectomy) and may benefit from Creon   - GI PCR neg, C-diff neg on 03.03  - fecal elastase ordered   - will trial on low dose imodium     4. DVT   - on anticoagulation    5. Multiple myeloma, pancytopenia   - per heme / onc       Jahaira Jennings M.D.   Gastroenterology and Hepatology  [address] [place], [place]  Office: [phone]  Cell: [phone] 1. fever  - V/Q scan with low probability of PE   - GI PCR- negative   - CMV PCR elevated, cmv IGG + and CMV IGM -  - cryptococcal ag, QuantiFeronTb, fungitell negative  - per ID, given immunosuppressed state and CMV viremia, ordered repeat CMV PCR and initiated valcyte.     2. anemia, FOBT positive in the setting of thrombocytopenia. Hgb remains at recent baseline. No overt bleeding symptoms.   - no plans for endoscopic evaluation at this time  - monitor H&H, transfuse PRN  - daily PPI     3. Chronic diarrhea, likely related to chemo +/- abx. 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 Query if he has a component of pancreatic insufficiency (post partial pancreatectomy) and may benefit from Creon   - GI PCR neg, C-diff neg on 03.03  - fecal elastase ordered   - will trial on low dose imodium     4. DVT   - on anticoagulation    5. Multiple myeloma, pancytopenia   - per heme / onc       Jahaira Jennings M.D.   Gastroenterology and Hepatology  [address] [place], [place]  Office: [phone]  Cell: [phone]

## 2023-03-31 NOTE — PHYSICAL THERAPY INITIAL EVALUATION ADULT - PHYSICAL ASSIST/NONPHYSICAL ASSIST: SCOOT/BRIDGE, REHAB EVAL
[place]. Now c/o difficulty breathing and chest pains. CT scan showed  A pneumothorax. Chest tube set up at bedside.
Report to [facility] transport team. Stat Carmella Hutchins post insertion of chest tube to right lung with underwater seal drainage apparatus. Patient tolerated procedure fairly well. Wanda Azul
TRANSFER - OUT REPORT:    Verbal report given to Tc Snowden RN) on Nam Yadav  being transferred to [facility] ED for routine progression of care       Report consisted of patients Situation, Background, Assessment and   Recommendations(SBAR). Information from the following report(s) ED Summary, MAR, Recent Results, Cardiac Rhythm NSR and Procedure Verification was reviewed with the receiving nurse. Lines:   Peripheral IV 07/13/21 Right Arm (Active)   Site Assessment Clean, dry, & intact 07/13/21 0046   Phlebitis Assessment 0 07/13/21 0046   Infiltration Assessment 0 07/13/21 0046   Dressing Status Clean, dry, & intact 07/13/21 0046        Opportunity for questions and clarification was provided.       Patient transported with:   Monitor  O2 @ 3 liters  Tech
verbal cues

## 2023-03-31 NOTE — CHART NOTE - NSCHARTNOTEFT_GEN_A_CORE
56 y/o male admitted with fever ---now febrile ( tmax 100.9) f/u repeat BC/UA. 58 y/o male admitted with fever ---now febrile ( tmax 100.9) f/u repeat BC/UA.

## 2023-03-31 NOTE — RAPID RESPONSE TEAM SUMMARY - NSSITUATIONBACKGROUNDRRT_GEN_ALL_CORE
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  RRT was called for hypotension. BP was 60s/30s. 500cc bolus was started. EKG with sinus rhythm, no ischemic signs. Patient was sweaty and endorsed dizziness. On PE: AOx3, neuro exam at baseline consistent with Bell's palsy on R face and intermittent twitching of the R face. Last BM this morning with dark stool. T was 98.6, HR was 70s. Given that patient has been admitted for unknown fever, c/f hypotension iso sepsis vs GIB. Labs were sent including blood cultures, CBC, CMP, INR, VBG with lactate, type and cross. Another 500cc bolus was ordered. 1u of RBC and 1u of PLT were ordered. Patient's BP improved to 90s/50s. Updated primary team at bedside.

## 2023-03-31 NOTE — PROGRESS NOTE ADULT - SUBJECTIVE AND OBJECTIVE BOX
Name of Patient : PETER DE LA PAZ  MRN: [id number]  Date of visit: 03-31-23 @ 14:12      Subjective: Patient seen and examined. No new events except as noted.   Patient seen earlier this AM. Sitting up in bed. Wife at the beside.   Febrile overnight with Tmax of 101.6  Reports 1 BM   Xarelto on hold due to drop in platelets    REVIEW OF SYSTEMS:    CONSTITUTIONAL: Febrile overnight   EYES/ENT: No visual changes;  No vertigo or throat pain   NECK: No pain or stiffness  RESPIRATORY: No cough, wheezing, hemoptysis; No shortness of breath  CARDIOVASCULAR: No chest pain or palpitations  GASTROINTESTINAL: No abdominal or epigastric pain. No nausea, vomiting, or hematemesis; No diarrhea or constipation. No melena or hematochezia.  GENITOURINARY: No dysuria, frequency or hematuria  NEUROLOGICAL: No numbness or weakness  SKIN: No itching, burning, rashes, or lesions   All other review of systems is negative unless indicated above.    MEDICATIONS:  MEDICATIONS  (STANDING):  aspirin enteric coated 81 milliGRAM(s) Oral daily  atorvastatin 40 milliGRAM(s) Oral at bedtime  entecavir Solution 0.25 milliGRAM(s) Oral daily  loperamide 2 milliGRAM(s) Oral three times a day  metoprolol tartrate 50 milliGRAM(s) Oral two times a day  midodrine. 5 milliGRAM(s) Oral three times a day  sodium chloride 0.9%. 1000 milliLiter(s) (100 mL/Hr) IV Continuous <Continuous>  valGANciclovir 450 milliGRAM(s) Oral every 24 hours      PHYSICAL EXAM:  T(C): 36.6 (03-31-23 @ 11:29), Max: 38.7 (03-30-23 @ 20:27)  HR: 93 (03-31-23 @ 13:19) (79 - 93)  BP: 96/54 (03-31-23 @ 13:19) (93/58 - 105/62)  RR: 18 (03-31-23 @ 11:29) (18 - 18)  SpO2: 98% (03-31-23 @ 11:29) (93% - 98%)  Wt(kg): --  I&O's Summary    30 Mar 2023 07:01  -  31 Mar 2023 07:00  --------------------------------------------------------  IN: 2220 mL / OUT: 1250 mL / NET: 970 mL          Appearance: Awake, weak appearing male, Sitting up in bed	  HEENT: Eyes are open   Lymphatic: No lymphadenopathy   Cardiovascular: Normal S1 S2   Respiratory: normal effort , clear  Gastrointestinal:  Soft, Non-tender  Skin: No rashes,  warm to touch  Psychiatry:  Mood & affect appropriate  Musculoskeletal: No edema        03-30-23 @ 07:01  -  03-31-23 @ 07:00  --------------------------------------------------------  IN: 2220 mL / OUT: 1250 mL / NET: 970 mL                                  7.5    2.38  )-----------( 31       ( 31 Mar 2023 06:54 )             21.8               03-31    134<L>  |  103  |  46<H>  ----------------------------<  119<H>  3.8   |  17<L>  |  2.17<H>    Ca    8.5      31 Mar 2023 06:54                           Culture - Blood (03.29.23 @ 19:09)   Specimen Source: .Blood Blood-Peripheral  Culture Results:   No growth to date.    Culture - Blood (03.29.23 @ 18:36)   Specimen Source: .Blood Blood-Peripheral  Culture Results:   No growth to date.    Culture - Blood (03.27.23 @ 11:05)   Specimen Source: .Blood Blood  Culture Results:   No growth to date.    Culture - Blood (03.27.23 @ 11:05)   Specimen Source: .Blood Blood  Culture Results:   No growth to date.    < from: [facility] Duplex Lower Ext Vein Scan, Bilat (03.30.23 @ 13:24) >  IMPRESSION:  Persistent below the knee DVT confined to the soleal vein of the right   calf.    No evidence of interval clot propagation.    < end of copied text >   Name of Patient : PETER DE LA PAZ  MRN: [id number]  Date of visit: 03-31-23 @ 14:12      Subjective: Patient seen and examined. No new events except as noted.   Patient seen earlier this AM. Sitting up in bed. Wife at the beside.   Febrile overnight with Tmax of 101.6  Reports 1 BM   Xarelto on hold due to drop in platelets    REVIEW OF SYSTEMS:    CONSTITUTIONAL: Febrile overnight   EYES/ENT: No visual changes;  No vertigo or throat pain   NECK: No pain or stiffness  RESPIRATORY: No cough, wheezing, hemoptysis; No shortness of breath  CARDIOVASCULAR: No chest pain or palpitations  GASTROINTESTINAL: No abdominal or epigastric pain. No nausea, vomiting, or hematemesis; No diarrhea or constipation. No melena or hematochezia.  GENITOURINARY: No dysuria, frequency or hematuria  NEUROLOGICAL: No numbness or weakness  SKIN: No itching, burning, rashes, or lesions   All other review of systems is negative unless indicated above.    MEDICATIONS:  MEDICATIONS  (STANDING):  aspirin enteric coated 81 milliGRAM(s) Oral daily  atorvastatin 40 milliGRAM(s) Oral at bedtime  entecavir Solution 0.25 milliGRAM(s) Oral daily  loperamide 2 milliGRAM(s) Oral three times a day  metoprolol tartrate 50 milliGRAM(s) Oral two times a day  midodrine. 5 milliGRAM(s) Oral three times a day  sodium chloride 0.9%. 1000 milliLiter(s) (100 mL/Hr) IV Continuous <Continuous>  valGANciclovir 450 milliGRAM(s) Oral every 24 hours      PHYSICAL EXAM:  T(C): 36.6 (03-31-23 @ 11:29), Max: 38.7 (03-30-23 @ 20:27)  HR: 93 (03-31-23 @ 13:19) (79 - 93)  BP: 96/54 (03-31-23 @ 13:19) (93/58 - 105/62)  RR: 18 (03-31-23 @ 11:29) (18 - 18)  SpO2: 98% (03-31-23 @ 11:29) (93% - 98%)  Wt(kg): --  I&O's Summary    30 Mar 2023 07:01  -  31 Mar 2023 07:00  --------------------------------------------------------  IN: 2220 mL / OUT: 1250 mL / NET: 970 mL          Appearance: Awake, weak appearing male, Sitting up in bed	  HEENT: Eyes are open   Lymphatic: No lymphadenopathy   Cardiovascular: Normal S1 S2   Respiratory: normal effort , clear  Gastrointestinal:  Soft, Non-tender  Skin: No rashes,  warm to touch  Psychiatry:  Mood & affect appropriate  Musculoskeletal: No edema        03-30-23 @ 07:01  -  03-31-23 @ 07:00  --------------------------------------------------------  IN: 2220 mL / OUT: 1250 mL / NET: 970 mL                                  7.5    2.38  )-----------( 31       ( 31 Mar 2023 06:54 )             21.8               03-31    134<L>  |  103  |  46<H>  ----------------------------<  119<H>  3.8   |  17<L>  |  2.17<H>    Ca    8.5      31 Mar 2023 06:54                           Culture - Blood (03.29.23 @ 19:09)   Specimen Source: .Blood Blood-Peripheral  Culture Results:   No growth to date.    Culture - Blood (03.29.23 @ 18:36)   Specimen Source: .Blood Blood-Peripheral  Culture Results:   No growth to date.    Culture - Blood (03.27.23 @ 11:05)   Specimen Source: .Blood Blood  Culture Results:   No growth to date.    Culture - Blood (03.27.23 @ 11:05)   Specimen Source: .Blood Blood  Culture Results:   No growth to date.    < from: [facility] Duplex Lower Ext Vein Scan, Bilat (03.30.23 @ 13:24) >  IMPRESSION:  Persistent below the knee DVT confined to the soleal vein of the right   calf.    No evidence of interval clot propagation.    < end of copied text >   Name of Patient : PETER DE LA PAZ  MRN: [id number]  Date of visit: 03-31-23 @ 14:12      Subjective: Patient seen and examined. No new events except as noted.   Patient seen earlier this AM. Sitting up in bed. Wife at the beside.   Febrile overnight with Tmax of 101.6  Reports 1 BM   Xarelto on hold due to drop in platelets    REVIEW OF SYSTEMS:    CONSTITUTIONAL: Febrile overnight   EYES/ENT: No visual changes;  No vertigo or throat pain   NECK: No pain or stiffness  RESPIRATORY: No cough, wheezing, hemoptysis; No shortness of breath  CARDIOVASCULAR: No chest pain or palpitations  GASTROINTESTINAL: No abdominal or epigastric pain. No nausea, vomiting, or hematemesis; No diarrhea or constipation. No melena or hematochezia.  GENITOURINARY: No dysuria, frequency or hematuria  NEUROLOGICAL: No numbness or weakness  SKIN: No itching, burning, rashes, or lesions   All other review of systems is negative unless indicated above.    MEDICATIONS:  MEDICATIONS  (STANDING):  aspirin enteric coated 81 milliGRAM(s) Oral daily  atorvastatin 40 milliGRAM(s) Oral at bedtime  entecavir Solution 0.25 milliGRAM(s) Oral daily  loperamide 2 milliGRAM(s) Oral three times a day  metoprolol tartrate 50 milliGRAM(s) Oral two times a day  midodrine. 5 milliGRAM(s) Oral three times a day  sodium chloride 0.9%. 1000 milliLiter(s) (100 mL/Hr) IV Continuous <Continuous>  valGANciclovir 450 milliGRAM(s) Oral every 24 hours      PHYSICAL EXAM:  T(C): 36.6 (03-31-23 @ 11:29), Max: 38.7 (03-30-23 @ 20:27)  HR: 93 (03-31-23 @ 13:19) (79 - 93)  BP: 96/54 (03-31-23 @ 13:19) (93/58 - 105/62)  RR: 18 (03-31-23 @ 11:29) (18 - 18)  SpO2: 98% (03-31-23 @ 11:29) (93% - 98%)  Wt(kg): --  I&O's Summary    30 Mar 2023 07:01  -  31 Mar 2023 07:00  --------------------------------------------------------  IN: 2220 mL / OUT: 1250 mL / NET: 970 mL          Appearance: Awake, weak appearing male, Sitting up in bed	  HEENT: Eyes are open   Lymphatic: No lymphadenopathy   Cardiovascular: Normal S1 S2   Respiratory: normal effort , clear  Gastrointestinal:  Soft, Non-tender  Skin: No rashes,  warm to touch  Psychiatry:  Mood & affect appropriate  Musculoskeletal: No edema        03-30-23 @ 07:01  -  03-31-23 @ 07:00  --------------------------------------------------------  IN: 2220 mL / OUT: 1250 mL / NET: 970 mL                                  7.5    2.38  )-----------( 31       ( 31 Mar 2023 06:54 )             21.8               03-31    134<L>  |  103  |  46<H>  ----------------------------<  119<H>  3.8   |  17<L>  |  2.17<H>    Ca    8.5      31 Mar 2023 06:54                           Culture - Blood (03.29.23 @ 19:09)   Specimen Source: .Blood Blood-Peripheral  Culture Results:   No growth to date.    Culture - Blood (03.29.23 @ 18:36)   Specimen Source: .Blood Blood-Peripheral  Culture Results:   No growth to date.    Culture - Blood (03.27.23 @ 11:05)   Specimen Source: .Blood Blood  Culture Results:   No growth to date.    Culture - Blood (03.27.23 @ 11:05)   Specimen Source: .Blood Blood  Culture Results:   No growth to date.    < from: [facility] Duplex Lower Ext Vein Scan, Bilat (03.30.23 @ 13:24) >  IMPRESSION:  Persistent below the knee DVT confined to the soleal vein of the right   calf.    No evidence of interval clot propagation.    < end of copied text >

## 2023-03-31 NOTE — PROGRESS NOTE ADULT - SUBJECTIVE AND OBJECTIVE BOX
Patient is a 67y old  Male who presents with a chief complaint of fever (31 Mar 2023 12:34)    Patient seen and examined at bedside. Feeling okay, reports one episode of diarrhea overnight. No bleeding.    MEDICATIONS  (STANDING):  aspirin enteric coated 81 milliGRAM(s) Oral daily  atorvastatin 40 milliGRAM(s) Oral at bedtime  entecavir Solution 0.25 milliGRAM(s) Oral daily  loperamide 2 milliGRAM(s) Oral three times a day  metoprolol tartrate 50 milliGRAM(s) Oral two times a day  midodrine. 5 milliGRAM(s) Oral three times a day  sodium chloride 0.9%. 1000 milliLiter(s) (100 mL/Hr) IV Continuous <Continuous>  valGANciclovir 450 milliGRAM(s) Oral every 24 hours    MEDICATIONS  (PRN):  acetaminophen     Tablet .. 650 milliGRAM(s) Oral every 6 hours PRN Temp greater or equal to 38C (100.4F), Mild Pain (1 - 3)    Vital Signs Last 24 Hrs  T(C): 36.6 (31 Mar 2023 11:29), Max: 38.7 (30 Mar 2023 20:27)  T(F): 97.8 (31 Mar 2023 11:29), Max: 101.6 (30 Mar 2023 20:27)  HR: 89 (31 Mar 2023 11:29) (79 - 89)  BP: 93/58 (31 Mar 2023 11:29) (93/58 - 105/62)  BP(mean): --  RR: 18 (31 Mar 2023 11:29) (18 - 18)  SpO2: 98% (31 Mar 2023 11:29) (93% - 98%)    Parameters below as of 31 Mar 2023 11:29  Patient On (Oxygen Delivery Method): room air        PE  NAD  Awake, alert  Anicteric, MMM  No c/c/e  No rash grossly  FROM                          7.5    2.38  )-----------( 31       ( 31 Mar 2023 06:54 )             21.8       03-31    134<L>  |  103  |  46<H>  ----------------------------<  119<H>  3.8   |  17<L>  |  2.17<H>    Ca    8.5      31 Mar 2023 06:54

## 2023-03-31 NOTE — PROGRESS NOTE ADULT - NS ATTEND AMEND GEN_ALL_CORE FT
Body Location Override (Optional - Billing Will Still Be Based On Selected Body Map Location If Applicable): subxiphoid Add 33740 Cpt? (Important Note: In 2017 The Use Of 85831 Is Being Tracked By Cms To Determine Future Global Period Reimbursement For Global Periods): yes Detail Level: Simple Body Location Override (Optional - Billing Will Still Be Based On Selected Body Map Location If Applicable): Right Anterior Proximal Thigh Pt care and plan discussed and reviewed with PA. Plan as outlined above edited by me to reflect our discussion. Advanced care planning/advanced directives discussed with patient/family. DNR status including forceful chest compressions to attempt to restart the heart, ventilator support/artificial breathing, electric shock, artificial nutrition, health care proxy, Molst form all discussed with pt.

## 2023-03-31 NOTE — CONSULT NOTE ADULT - SUBJECTIVE AND OBJECTIVE BOX
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, presents with fever, patient reports that upon waking this morning patient had chills and body aches, took his temperature which was 102, patient endorsing some mild periumbilical abdominal discomfort, but otherwise feels well, denies chest pain, shortness of breath, cough, nasal congestion, vomiting, diarrhea, dysuria, no sick contacts or recent travel, patient states that he went to his hematologist to get a blood transfusion today and was instructed to come to the ER, in the ED, pt was febrile, tachycardic, otherwise VSS, labs notable for pancytopenia (Hg 6.7), elevated Cr (improved from prior baseline), BNP 3K, pt was given 1U PRBC, Ofirmev, 1L LR, admitted to general medicine for further management, of note pt had additional fever approx 15 minutes after finishing transfusion (24 Mar 2023 05:32)    Infectious w/u positive for CMV viremia on ganciclovir, otherwise bacterial w/u so far negative. RRT 3/31 for hypotension to 70s. MICU c/s for hypotension    PAST MEDICAL & SURGICAL HISTORY:  Multiple myeloma      Hyperlipidemia      H/O aortic aneurysm      H/O splenectomy      S/P partial gastrectomy      History of pancreatic surgery          FAMILY HISTORY:  FHx: lung cancer (Sibling)    FHx: ovarian cancer (Sibling)      Allergies    No Known Allergies    Intolerances        HOME MEDICATIONS:    REVIEW OF SYSTEMS:  CONSTITUTIONAL: No fever, chills, night sweats, or fatigue  EYES: No eye pain, visual disturbances, or discharge  ENMT:  No difficulty hearing, tinnitus, vertigo; No sinus or throat pain  NECK: No pain or stiffness  RESPIRATORY: No cough, wheezing, or hemoptysis; No shortness of breath  CARDIOVASCULAR: No chest pain, palpitations, dizziness, or leg swelling  GASTROINTESTINAL: No abdominal or epigastric pain. No nausea, vomiting, or hematemesis; No diarrhea or constipation. No melena or hematochezia.  GENITOURINARY:++dysuria, trouble urinating. No frequency, hematuria, or incontinence  NEUROLOGICAL: No headaches, memory loss, loss of strength, numbness, or tremors  MUSCULOSKELETAL: No joint pain or swelling; No muscle, back, or extremity pain    OBJECTIVE:  ICU Vital Signs Last 24 Hrs  T(C): 38.3 (31 Mar 2023 16:26), Max: 38.3 (31 Mar 2023 16:26)  T(F): 100.9 (31 Mar 2023 16:26), Max: 100.9 (31 Mar 2023 16:26)  HR: 95 (31 Mar 2023 16:26) (79 - 95)  BP: 103/58 (31 Mar 2023 16:26) (93/58 - 103/58)  BP(mean): --  ABP: --  ABP(mean): --  RR: 18 (31 Mar 2023 16:26) (18 - 18)  SpO2: 96% (31 Mar 2023 16:26) (96% - 98%)    O2 Parameters below as of 31 Mar 2023 16:26  Patient On (Oxygen Delivery Method): room air              03-30 @ 07:01  - 03-31 @ 07:00  --------------------------------------------------------  IN: 2220 mL / OUT: 1250 mL / NET: 970 mL    03-31 @ 07:01  - 03-31 @ 21:15  --------------------------------------------------------  IN: 150 mL / OUT: 0 mL / NET: 150 mL      CAPILLARY BLOOD GLUCOSE      POCT Blood Glucose.: 138 mg/dL (31 Mar 2023 19:03)      GENERAL: NAD, lying in bed comfortably  HEAD:  Atraumatic, normocephalic  EYES: EOMI, PERRLA, conjunctiva and sclera clear  HEART: Regular rate and rhythm  LUNGS: Unlabored respirations.  Clear to auscultation bilaterally, no crackles, wheezing, or rhonchi  ABDOMEN: Soft, nondistended, suprapubic tenderness  EXTREMITIES: 2+ peripheral pulses bilaterally. No clubbing, cyanosis, or edema  NERVOUS SYSTEM:  A&Ox3, no focal deficits       HOSPITAL MEDICATIONS:  MEDICATIONS  (STANDING):  aspirin enteric coated 81 milliGRAM(s) Oral daily  atorvastatin 40 milliGRAM(s) Oral at bedtime  entecavir Solution 0.25 milliGRAM(s) Oral daily  ganciclovir IVPB 190 milliGRAM(s) IV Intermittent once  ganciclovir IVPB 95 milliGRAM(s) IV Intermittent daily  lactated ringers Bolus 500 milliLiter(s) IV Bolus once  lactated ringers. 1000 milliLiter(s) (70 mL/Hr) IV Continuous <Continuous>  loperamide 2 milliGRAM(s) Oral three times a day  midodrine. 10 milliGRAM(s) Oral three times a day  piperacillin/tazobactam IVPB. 3.375 Gram(s) IV Intermittent once  vancomycin  IVPB 1000 milliGRAM(s) IV Intermittent every 12 hours    MEDICATIONS  (PRN):  acetaminophen     Tablet .. 650 milliGRAM(s) Oral every 6 hours PRN Temp greater or equal to 38C (100.4F), Mild Pain (1 - 3)      LABS:                        7.2    2.08  )-----------( 25       ( 31 Mar 2023 19:46 )             21.4     03-31    130<L>  |  102  |  43<H>  ----------------------------<  118<H>  3.8   |  17<L>  |  2.26<H>    Ca    8.1<L>      31 Mar 2023 19:46    TPro  4.4<L>  /  Alb  2.2<L>  /  TBili  0.8  /  DBili  x   /  AST  114<H>  /  ALT  218<H>  /  AlkPhos  396<H>  03-31    PT/INR - ( 31 Mar 2023 19:53 )   PT: 19.0 sec;   INR: 1.63 ratio         PTT - ( 31 Mar 2023 19:53 )  PTT:32.8 sec      Venous Blood Gas:  03-31 @ 19:45  7.36/35/97/20/97.4  VBG Lactate: 1.4      MICROBIOLOGY:     RADIOLOGY:  [ ] Reviewed and interpreted by me    EKG: HPI:  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, presents with fever, patient reports that upon waking this morning patient had chills and body aches, took his temperature which was 102, patient endorsing some mild periumbilical abdominal discomfort, but otherwise feels well, denies chest pain, shortness of breath, cough, nasal congestion, vomiting, diarrhea, dysuria, no sick contacts or recent travel, patient states that he went to his hematologist to get a blood transfusion today and was instructed to come to the ER, in the ED, pt was febrile, tachycardic, otherwise VSS, labs notable for pancytopenia (Hg 6.7), elevated Cr (improved from prior baseline), BNP 3K, pt was given 1U PRBC, Ofirmev, 1L LR, admitted to general medicine for further management, of note pt had additional fever approx 15 minutes after finishing transfusion (24 Mar 2023 05:32)    Infectious w/u positive for CMV viremia on ganciclovir, otherwise bacterial w/u so far negative. RRT 3/31 for hypotension to 70s. MICU c/s for hypotension    PAST MEDICAL & SURGICAL HISTORY:  Multiple myeloma      Hyperlipidemia      H/O aortic aneurysm      H/O splenectomy      S/P partial gastrectomy      History of pancreatic surgery          FAMILY HISTORY:  FHx: lung cancer (Sibling)    FHx: ovarian cancer (Sibling)      Allergies    No Known Allergies    Intolerances        HOME MEDICATIONS:    REVIEW OF SYSTEMS:  CONSTITUTIONAL: No fever, chills, night sweats, or fatigue  EYES: No eye pain, visual disturbances, or discharge  ENMT:  No difficulty hearing, tinnitus, vertigo; No sinus or throat pain  NECK: No pain or stiffness  RESPIRATORY: No cough, wheezing, or hemoptysis; No shortness of breath  CARDIOVASCULAR: No chest pain, palpitations, dizziness, or leg swelling  GASTROINTESTINAL: No abdominal or epigastric pain. No nausea, vomiting, or hematemesis; No diarrhea or constipation. No melena or hematochezia.  GENITOURINARY:++dysuria, trouble urinating. No frequency, hematuria, or incontinence  NEUROLOGICAL: No headaches, memory loss, loss of strength, numbness, or tremors  MUSCULOSKELETAL: No joint pain or swelling; No muscle, back, or extremity pain    OBJECTIVE:  ICU Vital Signs Last 24 Hrs  T(C): 38.3 (31 Mar 2023 16:26), Max: 38.3 (31 Mar 2023 16:26)  T(F): 100.9 (31 Mar 2023 16:26), Max: 100.9 (31 Mar 2023 16:26)  HR: 95 (31 Mar 2023 16:26) (79 - 95)  BP: 103/58 (31 Mar 2023 16:26) (93/58 - 103/58)  BP(mean): --  ABP: --  ABP(mean): --  RR: 18 (31 Mar 2023 16:26) (18 - 18)  SpO2: 96% (31 Mar 2023 16:26) (96% - 98%)    O2 Parameters below as of 31 Mar 2023 16:26  Patient On (Oxygen Delivery Method): room air              03-30 @ 07:01  - 03-31 @ 07:00  --------------------------------------------------------  IN: 2220 mL / OUT: 1250 mL / NET: 970 mL    03-31 @ 07:01  - 03-31 @ 21:15  --------------------------------------------------------  IN: 150 mL / OUT: 0 mL / NET: 150 mL      CAPILLARY BLOOD GLUCOSE      POCT Blood Glucose.: 138 mg/dL (31 Mar 2023 19:03)      GENERAL: NAD, lying in bed comfortably  HEAD:  Atraumatic, normocephalic  EYES: EOMI, PERRLA, conjunctiva and sclera clear  HEART: Regular rate and rhythm  LUNGS: Unlabored respirations.  Clear to auscultation bilaterally, no crackles, wheezing, or rhonchi  ABDOMEN: Soft, nondistended, suprapubic tenderness  EXTREMITIES: 2+ peripheral pulses bilaterally. No clubbing, cyanosis, or edema  NERVOUS SYSTEM:  A&Ox3, no focal deficits       HOSPITAL MEDICATIONS:  MEDICATIONS  (STANDING):  aspirin enteric coated 81 milliGRAM(s) Oral daily  atorvastatin 40 milliGRAM(s) Oral at bedtime  entecavir Solution 0.25 milliGRAM(s) Oral daily  ganciclovir IVPB 190 milliGRAM(s) IV Intermittent once  ganciclovir IVPB 95 milliGRAM(s) IV Intermittent daily  lactated ringers Bolus 500 milliLiter(s) IV Bolus once  lactated ringers. 1000 milliLiter(s) (70 mL/Hr) IV Continuous <Continuous>  loperamide 2 milliGRAM(s) Oral three times a day  midodrine. 10 milliGRAM(s) Oral three times a day  piperacillin/tazobactam IVPB. 3.375 Gram(s) IV Intermittent once  vancomycin  IVPB 1000 milliGRAM(s) IV Intermittent every 12 hours    MEDICATIONS  (PRN):  acetaminophen     Tablet .. 650 milliGRAM(s) Oral every 6 hours PRN Temp greater or equal to 38C (100.4F), Mild Pain (1 - 3)      LABS:                        7.2    2.08  )-----------( 25       ( 31 Mar 2023 19:46 )             21.4     03-31    130<L>  |  102  |  43<H>  ----------------------------<  118<H>  3.8   |  17<L>  |  2.26<H>    Ca    8.1<L>      31 Mar 2023 19:46    TPro  4.4<L>  /  Alb  2.2<L>  /  TBili  0.8  /  DBili  x   /  AST  114<H>  /  ALT  218<H>  /  AlkPhos  396<H>  03-31    PT/INR - ( 31 Mar 2023 19:53 )   PT: 19.0 sec;   INR: 1.63 ratio         PTT - ( 31 Mar 2023 19:53 )  PTT:32.8 sec      Venous Blood Gas:  03-31 @ 19:45  7.36/35/97/20/97.4  VBG Lactate: 1.4      MICROBIOLOGY:     RADIOLOGY:  [ ] Reviewed and interpreted by me    EKG:

## 2023-03-31 NOTE — PROGRESS NOTE ADULT - NS ATTEND AMEND GEN_ALL_CORE FT
66 y/o male with multiple myeloma, recently switched to david-CyBorD, admitted with fever.  He continues to be febrile uncertain ?source-  follow up cultures.  He was changed to valcyte- monitor his counts closely while on treatment.  He should be restarted on acyclovir prophylaxis.  If he remains febrile would reimage his abdomen.    No chemo/immunotherapy while inpatient.  Soleal vein DVT, no propagation.  Recommend to hold xarelto as his plt are <50. 68 y/o male with multiple myeloma, recently switched to david-CyBorD, admitted with fever.  He continues to be febrile uncertain ?source-  follow up cultures.  He was changed to valcyte- monitor his counts closely while on treatment.  He should be restarted on acyclovir prophylaxis.  If he remains febrile would reimage his abdomen.    No chemo/immunotherapy while inpatient.  Soleal vein DVT, no propagation.  Recommend to hold xarelto as his plt are <50.

## 2023-03-31 NOTE — CHART NOTE - NSCHARTNOTEFT_GEN_A_CORE
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 Pt was RRT 3/31 for hypotension to 70s. Pt complains of dizziness, denies chest pain, no shortness of breath, no N/V, no numbness/tingling or other complaints. Pt found to be diaphoretic.   Vital Signs Last 24 Hrs  T(C): 36.6 (31 Mar 2023 22:20), Max: 38.3 (31 Mar 2023 16:26)  T(F): 97.9 (31 Mar 2023 22:20), Max: 100.9 (31 Mar 2023 16:26)  HR: 79 (31 Mar 2023 22:20) (79 - 95)  BP: 107/61 (31 Mar 2023 22:20) (93/58 - 107/61)  BP(mean): --  RR: 18 (31 Mar 2023 22:20) (18 - 18)  SpO2: 95% (31 Mar 2023 22:20) (95% - 98%)     GENERAL: diaphoretic, appears tired,   HEAD:  Atraumatic, Normocephalic  EYES: EOMI, PERRLA, conjunctiva and sclera clear  ENMT:  Moist mucous membranes   NECK: Supple, No JVD   NERVOUS SYSTEM:  Alert & Oriented X 3,moves all ext.   CHEST/LUNG: CTA bilaterally; No rales, rhonchi, wheezing,   HEART: RRR  ABDOMEN: Soft, Nontender, Nondistended; Bowel sounds present  EXTREMITIES:  2+ Peripheral Pulses, No clubbing, cyanosis, or edema  SKIN: No rashes or lesions     A/P: Episode of Hypotension w/hx of immunosuppression s/p chemo tx   Pt given IVF 500cc IV bolus followed by .9%NS 70cc/hr   s/p RRT for Hypotension, given 1 unit pRBC, plt, last /58   - pt complaining of dysuria and trouble urinating with suprapubic tenderness on exam and febrile; f/u UA and urine cx    - f/u blood cultures and RVP  Dr Addison aware of situation  MICU c/s called: no MICU needs at this time. Re-consult as necessary   Head CT ordered urgent for dizziness  Pt to be transferred to tele unit for further monitoring while receiving blood and platelets

## 2023-03-31 NOTE — PROGRESS NOTE ADULT - SUBJECTIVE AND OBJECTIVE BOX
Chief Complaint:  Patient is a 67y old  Male who presents with a chief complaint of fever (31 Mar 2023 11:30)      Date of service 03-31-23 @ 12:35      Interval Events:   having diarrhea    Hospital Medications:  acetaminophen     Tablet .. 650 milliGRAM(s) Oral every 6 hours PRN  aspirin enteric coated 81 milliGRAM(s) Oral daily  atorvastatin 40 milliGRAM(s) Oral at bedtime  entecavir Solution 0.25 milliGRAM(s) Oral daily  metoprolol tartrate 50 milliGRAM(s) Oral two times a day  midodrine. 5 milliGRAM(s) Oral three times a day  sodium chloride 0.9%. 1000 milliLiter(s) IV Continuous <Continuous>  valGANciclovir 450 milliGRAM(s) Oral every 24 hours        Review of Systems:  General:  No wt loss, fevers, chills, night sweats, fatigue,   Eyes:  Good vision, no reported pain  ENT:  No sore throat, pain, runny nose, dysphagia  CV:  No pain, palpitations, hypo/hypertension  Resp:  No dyspnea, cough, tachypnea, wheezing  GI:  See HPI  :  No pain, bleeding, incontinence, nocturia  Muscle:  No pain, weakness  Neuro:  No weakness, tingling, memory problems  Psych:  No fatigue, insomnia, mood problems, depression  Endocrine:  No polyuria, polydipsia, cold/heat intolerance  Heme:  No petechiae, ecchymosis, easy bruisability  Integumentary:  No rash, edema    PHYSICAL EXAM:   Vital Signs:  Vital Signs Last 24 Hrs  T(C): 36.6 (31 Mar 2023 11:29), Max: 38.7 (30 Mar 2023 20:27)  T(F): 97.8 (31 Mar 2023 11:29), Max: 101.6 (30 Mar 2023 20:27)  HR: 89 (31 Mar 2023 11:29) (79 - 89)  BP: 93/58 (31 Mar 2023 11:29) (93/58 - 105/62)  BP(mean): --  RR: 18 (31 Mar 2023 11:29) (18 - 18)  SpO2: 98% (31 Mar 2023 11:29) (93% - 98%)    Parameters below as of 31 Mar 2023 11:29  Patient On (Oxygen Delivery Method): room air      Daily     Daily       PHYSICAL EXAM:     GENERAL:  Appears stated age, well-groomed, well-nourished, no distress  HEENT:  NC/AT,  conjunctivae anicteric, clear and pink,   NECK: supple, trachea midline  CHEST:  Full & symmetric excursion, no increased effort, breath sounds clear  HEART:  Regular rhythm, no JVD  ABDOMEN:  Soft, non-tender, non-distended, normoactive bowel sounds,  no masses , no hepatosplenomegaly  EXTREMITIES:  no cyanosis,clubbing or edema  SKIN:  No rash, erythema, or, ecchymoses, no jaundice  NEURO:  Alert, non-focal, no asterixis  PSYCH: Appropriate affect, oriented to place and time  RECTAL: Deferred      LABS Personally reviewed by me:                        7.5    2.38  )-----------( 31       ( 31 Mar 2023 06:54 )             21.8     Mean Cell Volume: 91.2 fl (03-31-23 @ 06:54)    03-31    134<L>  |  103  |  46<H>  ----------------------------<  119<H>  3.8   |  17<L>  |  2.17<H>    Ca    8.5      31 Mar 2023 06:54                                    7.5    2.38  )-----------( 31       ( 31 Mar 2023 06:54 )             21.8                         7.7    2.98  )-----------( 39       ( 30 Mar 2023 07:27 )             22.5                         7.5    2.89  )-----------( 42       ( 29 Mar 2023 07:15 )             21.1                         7.3    2.98  )-----------( 44       ( 28 Mar 2023 16:30 )             21.3       Imaging personally reviewed by me:

## 2023-03-31 NOTE — PROGRESS NOTE ADULT - SUBJECTIVE AND OBJECTIVE BOX
[facility] NEPHROLOGY- PROGRESS NOTE    67 year old Male with history of MM on chemotherapy presents with fevers. Nephrology consulted for elevated Scr.    REVIEW OF SYSTEMS:  Gen: + fevers  Cards: no chest pain  Resp: no dyspnea  GI: no nausea or vomiting or diarrhea  Vascular: no LE edema    No Known Allergies      Hospital Medications: Medications reviewed      VITALS:  T(F): 97.8 (23 @ 11:29), Max: 101.6 (23 @ 20:27)  HR: 93 (23 @ 13:19)  BP: 96/54 (23 @ 13:19)  RR: 18 (23 @ 11:29)  SpO2: 98% (23 @ 11:29)  Wt(kg): --     @ 07:01  -   @ 07:00  --------------------------------------------------------  IN: 2220 mL / OUT: 1250 mL / NET: 970 mL      PHYSICAL EXAM:    Gen: NAD, calm  Cards: RRR, +S1/S2, no M/G/R  Resp: CTA B/L  GI: soft, NT/ND, NABS  Vascular: no LE edema B/L        LABS:      134<L>  |  103  |  46<H>  ----------------------------<  119<H>  3.8   |  17<L>  |  2.17<H>    Ca    8.5      31 Mar 2023 06:54      Creatinine Trend: 2.17 <--, 2.18 <--, 2.25 <--, 2.21 <--, 1.83 <--, 1.80 <--, 1.75 <--, 1.46 <--                        7.5    2.38  )-----------(        ( 31 Mar 2023 06:54 )             21.8     Urine Studies:  Urinalysis Basic - ( 28 Mar 2023 14:14 )    Color: Light Yellow / Appearance: Slightly Turbid / S.020 / pH:   Gluc:  / Ketone: Negative  / Bili: Negative / Urobili: Negative   Blood:  / Protein: 100 mg/dl / Nitrite: Negative   Leuk Esterase: Negative / RBC: 2 /hpf / WBC 18 /HPF   Sq Epi:  / Non Sq Epi: 6 /hpf / Bacteria: Negative      Sodium, Random Urine: 45 mmol/L ( @ 14:14)  Creatinine, Random Urine: 96 mg/dL ( @ 14:14)     [facility] NEPHROLOGY- PROGRESS NOTE    67 year old Male with history of MM on chemotherapy presents with fevers. Nephrology consulted for elevated Scr.    REVIEW OF SYSTEMS:  Gen: + fevers  Cards: no chest pain  Resp: no dyspnea  GI: no nausea or vomiting or diarrhea  Vascular: no LE edema    No Known Allergies      Hospital Medications: Medications reviewed      VITALS:  T(F): 97.8 (23 @ 11:29), Max: 101.6 (23 @ 20:27)  HR: 93 (23 @ 13:19)  BP: 96/54 (23 @ 13:19)  RR: 18 (23 @ 11:29)  SpO2: 98% (23 @ 11:29)  Wt(kg): --     @ 07:01  -   @ 07:00  --------------------------------------------------------  IN: 2220 mL / OUT: 1250 mL / NET: 970 mL      PHYSICAL EXAM:    Gen: NAD, calm  Cards: RRR, +S1/S2, no M/G/R  Resp: CTA B/L  GI: soft, NT/ND, NABS  Vascular: no LE edema B/L        LABS:      134<L>  |  103  |  46<H>  ----------------------------<  119<H>  3.8   |  17<L>  |  2.17<H>    Ca    8.5      31 Mar 2023 06:54      Creatinine Trend: 2.17 <--, 2.18 <--, 2.25 <--, 2.21 <--, 1.83 <--, 1.80 <--, 1.75 <--, 1.46 <--                        7.5    2.38  )-----------(        ( 31 Mar 2023 06:54 )             21.8     Urine Studies:  Urinalysis Basic - ( 28 Mar 2023 14:14 )    Color: Light Yellow / Appearance: Slightly Turbid / S.020 / pH:   Gluc:  / Ketone: Negative  / Bili: Negative / Urobili: Negative   Blood:  / Protein: 100 mg/dl / Nitrite: Negative   Leuk Esterase: Negative / RBC: 2 /hpf / WBC 18 /HPF   Sq Epi:  / Non Sq Epi: 6 /hpf / Bacteria: Negative      Sodium, Random Urine: 45 mmol/L ( @ 14:14)  Creatinine, Random Urine: 96 mg/dL ( @ 14:14)     [facility] NEPHROLOGY- PROGRESS NOTE    67 year old Male with history of MM on chemotherapy presents with fevers. Nephrology consulted for elevated Scr.    REVIEW OF SYSTEMS:  Gen: + fevers  Cards: no chest pain  Resp: no dyspnea  GI: no nausea or vomiting or diarrhea  Vascular: no LE edema    No Known Allergies      Hospital Medications: Medications reviewed      VITALS:  T(F): 97.8 (23 @ 11:29), Max: 101.6 (23 @ 20:27)  HR: 93 (23 @ 13:19)  BP: 96/54 (23 @ 13:19)  RR: 18 (23 @ 11:29)  SpO2: 98% (23 @ 11:29)  Wt(kg): --     @ 07:01  -   @ 07:00  --------------------------------------------------------  IN: 2220 mL / OUT: 1250 mL / NET: 970 mL      PHYSICAL EXAM:    Gen: NAD, calm  Cards: RRR, +S1/S2, no M/G/R  Resp: CTA B/L  GI: soft, NT/ND, NABS  Vascular: no LE edema B/L        LABS:      134<L>  |  103  |  46<H>  ----------------------------<  119<H>  3.8   |  17<L>  |  2.17<H>    Ca    8.5      31 Mar 2023 06:54      Creatinine Trend: 2.17 <--, 2.18 <--, 2.25 <--, 2.21 <--, 1.83 <--, 1.80 <--, 1.75 <--, 1.46 <--                        7.5    2.38  )-----------(        ( 31 Mar 2023 06:54 )             21.8     Urine Studies:  Urinalysis Basic - ( 28 Mar 2023 14:14 )    Color: Light Yellow / Appearance: Slightly Turbid / S.020 / pH:   Gluc:  / Ketone: Negative  / Bili: Negative / Urobili: Negative   Blood:  / Protein: 100 mg/dl / Nitrite: Negative   Leuk Esterase: Negative / RBC: 2 /hpf / WBC 18 /HPF   Sq Epi:  / Non Sq Epi: 6 /hpf / Bacteria: Negative      Sodium, Random Urine: 45 mmol/L ( @ 14:14)  Creatinine, Random Urine: 96 mg/dL ( @ 14:14)

## 2023-03-31 NOTE — PHYSICAL THERAPY INITIAL EVALUATION ADULT - PERTINENT HX OF CURRENT PROBLEM, REHAB EVAL
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 Took his temperature and it was noted to be 102.  Patient endorsing some mild periumbilical abdominal discomfort, but otherwise feels well. Patient states that he went to his hematologist to get a blood transfusion and was instructed to come to the ER.    Dx:	-Fever of unknown origin-  w/u neg, s/p abx  -Anemia (Hgb 6.7)- pRBC x2, FOBT positive, no GI intervention -3/24 Acute RLE DVT - s/p heparin drip on eliquis ; VQ scan low for dvt; repeat duplex neg for propagation Multiple myeloma.- Thrombocytopenia Plts 50's   	-

## 2023-03-31 NOTE — CONSULT NOTE ADULT - ASSESSMENT
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 S/p RRT 3/31 for hypotension to 70s. MICU consulted for hypotension.    #hypotension  #fevers  #CMV viremia    - s/p RRT for hypotension, given 1u pRBC, plts, and 500cc IVF  - last /58   - pt complaining of dysuria and trouble urinating with suprapubic tenderness on exam and febrile; would check UA with subsequent urine culture if appropriate   - f/u blood cultures and RVP    Pt with no MICU needs at this time. 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 S/p RRT 3/31 for hypotension to 70s. MICU consulted for hypotension.    #hypotension  #fevers  #CMV viremia    - s/p RRT for hypotension, given 1u pRBC, plts, and 500cc IVF  - last /58   - pt complaining of dysuria and trouble urinating with suprapubic tenderness on exam and febrile; would check UA with subsequent urine culture if appropriate   - f/u blood cultures and RVP    Pt with no MICU needs at this time. Re-consult as necessary

## 2023-03-31 NOTE — PROGRESS NOTE ADULT - SUBJECTIVE AND OBJECTIVE BOX
DATE OF SERVICE: 03-31-23 @ 11:30    Subjective: Patient seen and examined. No new events except as noted.     SUBJECTIVE/ROS:  nad      MEDICATIONS:  MEDICATIONS  (STANDING):  aspirin enteric coated 81 milliGRAM(s) Oral daily  atorvastatin 40 milliGRAM(s) Oral at bedtime  entecavir Solution 0.25 milliGRAM(s) Oral daily  metoprolol tartrate 50 milliGRAM(s) Oral two times a day  midodrine. 5 milliGRAM(s) Oral three times a day  sodium chloride 0.9%. 1000 milliLiter(s) (100 mL/Hr) IV Continuous <Continuous>  valGANciclovir 450 milliGRAM(s) Oral every 24 hours      PHYSICAL EXAM:  T(C): 36.6 (03-31-23 @ 11:29), Max: 38.7 (03-30-23 @ 20:27)  HR: 89 (03-31-23 @ 11:29) (79 - 89)  BP: 93/58 (03-31-23 @ 11:29) (93/58 - 105/62)  RR: 18 (03-31-23 @ 11:29) (18 - 18)  SpO2: 98% (03-31-23 @ 11:29) (93% - 98%)  Wt(kg): --  I&O's Summary    30 Mar 2023 07:01  -  31 Mar 2023 07:00  --------------------------------------------------------  IN: 2220 mL / OUT: 1250 mL / NET: 970 mL            JVP: Normal  Neck: supple  Lung: clear   CV: S1 S2 , Murmur:  Abd: soft  Ext: No edema  neuro: Awake / alert  Psych: flat affect  Skin: normal``    LABS/DATA:    CARDIAC MARKERS:                                7.5    2.38  )-----------( 31       ( 31 Mar 2023 06:54 )             21.8     03-31    134<L>  |  103  |  46<H>  ----------------------------<  119<H>  3.8   |  17<L>  |  2.17<H>    Ca    8.5      31 Mar 2023 06:54      proBNP:   Lipid Profile:   HgA1c:   TSH:     TELE:  EKG:

## 2023-03-31 NOTE — CONSULT NOTE ADULT - ATTENDING COMMENTS
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   - A&O x 3 on RAO2 SpO2 97%   - Mild distress from SPA tenderness and difficult urination   - Hypotension resolved since receiving 1 PRBC and Plt along with 500 cc IV Bolus   - Pancytopenia with choric anemia rather acute GI Bleed to f/u Hemolytic and DIC Panels  - Agreeable with Midodrine, Telemonitoring     Patient seen and examined with ICU Resident/Fellow at bedside after lab data, medical records and radiology reports reviewed. I have read and agreeable in general with resident's Documented Note, Assessment and Management Plan which reflected my opinions from bedside round and discussion.

## 2023-03-31 NOTE — PROGRESS NOTE ADULT - ASSESSMENT
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   - no obvious infectious symptoms, CXR without consolidations or effusions   - U/A unremarkable  - F/u BCx2 and UCx  --> Neg final   - Recurrent fever. F/U repeat BCx2 sent 03/27 --> NGTD; F/u final. ABX as per ID   - less likely transfusion reaction as pt febrile prior to transfusion  - LE VA duplex --> + For DVT    - Was on empiric treatment with Vanc/cefepime --> ID consulted; Now on Zosyn  - Check GI PCR -- Neg   - Hold home penicillin while on ABX   - V/Q scan to R/O PE -- Low probability   - ID to follow up, infectious work up as per ID  --> F/u CMV PCR -- + Started on Ganciclovir per ID , CMV IGG (+) and IgM (-), Cryptococcal Ag --Neg, Quantiferon Tb -- Neg, Fungitell -- <31   - CT Chest non-contrast, noted, no acute pathology  - Recurrent fever 03/29 --> F/u BCx2 -- NGTD; F/u final   - IVF X 24 hours     Anemia, Pancytopenia  - Drop in Hgb, Occult +  - Hold Hep gtt    - S/P 1 unit of PRBCs 03/28  - Maintain active T+S. Transfuse for Hgb < 7.0, and platelets < 10.K  - GI eval appreciated; F/u recs --> No plans for scope at this time   - Was on Xarelto, held due to drop in Platelets as per Heme/Onc. Monitor platelet count     HypoNa  - Na of 134, cont to monitor and trend  - Appreciate renal eval.   - Serial labs     Multiple myeloma.   - pancytopenia largely at baseline although Hg 6.7 on arrival  - s/p 1U PRBC with good response   - Was on acyclovir, now on Ganciclovir as per ID   - C/w home entecavir   - S/P dexamethasone, D/C'd by hematology , follow up outpatient after discharge    - Pt reports his Revlimid was held  - Heme/Onc consulted; F/u recs   - Resume home aspirin.    DVT   - Duplex + For R soleal vein DVT  --> Will consider CT A chest once creatinine permits as patient received contrast; Monitor for LOPEZ   - On Hep gtt; Monitor PTT; Monitor H/H closely --> Now on hold in view of drop in Hgb   - Check VQ scan to R/O PE -- Low probability   - Vascular eval appreciated; AC as tolerated, on Xarelto 10, monitor H/H   - Serial Duplex to assess for propagation  -- Without propagation     RK  - S/P Contrast on 03/23   - Check bladder scan to R/O retention  - C/w IVF for hydration  - Monitor Cr closely; Avoid nephrotoxic agents   - Cr up-trending. On IVF     Hypertension, now hypotensive   - C/w home metoprolol.  - C/w Midodrine 5 TID. Hold for SBP > 140     Hyperlipidemia.   - C/w home atorvastatin.    Abnormal CT  - Outpatient follow up for CT findings    Prophylactic measure.   dvt ppx: DVT PPX   diet: regular  ambulate: with assistance    fall precautions  aspiration precautions.      Discussed with Patient and Wife at the bedside.  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   - no obvious infectious symptoms, CXR without consolidations or effusions   - U/A unremarkable  - F/u BCx2 and UCx  --> Neg final   - Recurrent fever. F/U repeat BCx2 sent 03/27 --> NGTD; F/u final. ABX as per ID   - less likely transfusion reaction as pt febrile prior to transfusion  - LE VA duplex --> + For DVT    - Was on empiric treatment with Vanc/cefepime --> ID consulted; Now on Zosyn  - Check GI PCR -- Neg   - Hold home penicillin while on ABX   - V/Q scan to R/O PE -- Low probability   - ID to follow up, infectious work up as per ID  --> F/u CMV PCR -- + Started on Ganciclovir per ID , CMV IGG (+) and IgM (-), Cryptococcal Ag --Neg, Quantiferon Tb -- Neg, Fungitell -- <31   - CT Chest non-contrast, noted, no acute pathology  - Recurrent fever 03/29 --> F/u BCx2 -- NGTD; F/u final   - IVF X 24 hours     Anemia, Pancytopenia  - Drop in Hgb, Occult +  - Hold Hep gtt    - S/P 1 unit of PRBCs 03/28  - Maintain active T+S. Transfuse for Hgb < 7.0, and platelets < 10.K  - GI eval appreciated; F/u recs --> No plans for scope at this time   - Was on Xarelto, held due to drop in Platelets as per Heme/Onc. Monitor platelet count     HypoNa  - Na of 134, cont to monitor and trend  - Appreciate renal eval.   - Serial labs     Multiple myeloma.   - pancytopenia largely at baseline although Hg 6.7 on arrival  - s/p 1U PRBC with good response   - Was on acyclovir, now on Ganciclovir as per ID   - C/w home entecavir   - S/P dexamethasone, D/C'd by hematology , follow up outpatient after discharge    - Pt reports his Revlimid was held  - Heme/Onc consulted; F/u recs   - Resume home aspirin.    DVT   - Duplex + For R soleal vein DVT  --> Will consider CT A chest once creatinine permits as patient received contrast; Monitor for LOPEZ   - On Hep gtt; Monitor PTT; Monitor H/H closely --> Now on hold in view of drop in Hgb   - Check VQ scan to R/O PE -- Low probability   - Vascular eval appreciated; AC as tolerated, on Xarelto 10, monitor H/H   - Serial Duplex to assess for propagation  -- Without propagation     RK  - S/P Contrast on 03/23   - Check bladder scan to R/O retention  - C/w IVF for hydration  - Monitor Cr closely; Avoid nephrotoxic agents   - Cr up-trending. On IVF     Hypertension, now hypotensive   - C/w home metoprolol.  - C/w Midodrine 5 TID. Hold for SBP > 140     Hyperlipidemia.   - C/w home atorvastatin.    Abnormal CT  - Outpatient follow up for CT findings    Prophylactic measure.   dvt ppx: DVT PPX   diet: regular  ambulate: with assistance    fall precautions  aspiration precautions.      Discussed with Patient and Wife at the bedside.  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   - no obvious infectious symptoms, CXR without consolidations or effusions   - U/A unremarkable  - F/u BCx2 and UCx  --> Neg final   - Recurrent fever. F/U repeat BCx2 sent 03/27 --> NGTD; F/u final. ABX as per ID   - less likely transfusion reaction as pt febrile prior to transfusion  - LE VA duplex --> + For DVT    - Was on empiric treatment with Vanc/cefepime --> ID consulted; Now on Zosyn  - Check GI PCR -- Neg   - Hold home penicillin while on ABX   - V/Q scan to R/O PE -- Low probability   - ID to follow up, infectious work up as per ID  --> F/u CMV PCR -- + Started on Ganciclovir per ID , CMV IGG (+) and IgM (-), Cryptococcal Ag --Neg, Quantiferon Tb -- Neg, Fungitell -- <31   - CT Chest non-contrast, noted, no acute pathology  - Recurrent fever 03/29 --> F/u BCx2 -- NGTD; F/u final   - IVF X 24 hours, monitor Cr in AM   - F/u Fecal elastase as per GI     Anemia, Pancytopenia  - Drop in Hgb, Occult +  - Hold Hep gtt    - S/P 1 unit of PRBCs 03/28  - Maintain active T+S. Transfuse for Hgb < 7.0, and platelets < 10.K  - GI eval appreciated; F/u recs --> No plans for scope at this time   - Was on Xarelto, held due to drop in Platelets as per Heme/Onc. Monitor platelet count     HypoNa  - Na of 134, cont to monitor and trend  - Appreciate renal eval.   - Serial labs     Multiple myeloma.   - pancytopenia largely at baseline although Hg 6.7 on arrival  - s/p 1U PRBC with good response   - Was on acyclovir, now on Ganciclovir as per ID   - C/w home entecavir   - S/P dexamethasone, D/C'd by hematology , follow up outpatient after discharge    - Pt reports his Revlimid was held  - Heme/Onc consulted; F/u recs   - Resume home aspirin.    DVT   - Duplex + For R soleal vein DVT  --> Will consider CT A chest once creatinine permits as patient received contrast; Monitor for LOPEZ   - On Hep gtt; Monitor PTT; Monitor H/H closely --> Now on hold in view of drop in Hgb   - Check VQ scan to R/O PE -- Low probability   - Vascular eval appreciated; AC as tolerated, on Xarelto 10, monitor H/H   - Serial Duplex to assess for propagation  -- Without propagation     RK  - S/P Contrast on 03/23   - Check bladder scan to R/O retention  - C/w IVF for hydration  - Monitor Cr closely; Avoid nephrotoxic agents   - Cr up-trending. On IVF     Hypertension, now hypotensive   - C/w home metoprolol.  - C/w Midodrine 5 TID. Hold for SBP > 140     Hyperlipidemia.   - C/w home atorvastatin.    Abnormal CT  - Outpatient follow up for CT findings    Prophylactic measure.   dvt ppx: DVT PPX   diet: regular  ambulate: with assistance    fall precautions  aspiration precautions.      Discussed with Patient and Wife at the bedside.  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   - no obvious infectious symptoms, CXR without consolidations or effusions   - U/A unremarkable  - F/u BCx2 and UCx  --> Neg final   - Recurrent fever. F/U repeat BCx2 sent 03/27 --> NGTD; F/u final. ABX as per ID   - less likely transfusion reaction as pt febrile prior to transfusion  - LE VA duplex --> + For DVT    - Was on empiric treatment with Vanc/cefepime --> ID consulted; Now on Zosyn  - Check GI PCR -- Neg   - Hold home penicillin while on ABX   - V/Q scan to R/O PE -- Low probability   - ID to follow up, infectious work up as per ID  --> F/u CMV PCR -- + Started on Ganciclovir per ID , CMV IGG (+) and IgM (-), Cryptococcal Ag --Neg, Quantiferon Tb -- Neg, Fungitell -- <31   - CT Chest non-contrast, noted, no acute pathology  - Recurrent fever 03/29 --> F/u BCx2 -- NGTD; F/u final   - IVF X 24 hours, monitor Cr in AM   - F/u Fecal elastase as per GI     Anemia, Pancytopenia  - Drop in Hgb, Occult +  - Hold Hep gtt    - S/P 1 unit of PRBCs 03/28  - Maintain active T+S. Transfuse for Hgb < 7.0, and platelets < 10.K  - GI eval appreciated; F/u recs --> No plans for scope at this time   - Was on Xarelto, held due to drop in Platelets as per Heme/Onc. Monitor platelet count     HypoNa  - Na of 134, cont to monitor and trend  - Appreciate renal eval.   - Serial labs     Multiple myeloma.   - pancytopenia largely at baseline although Hg 6.7 on arrival  - s/p 1U PRBC with good response   - Was on acyclovir, now on Ganciclovir as per ID   - C/w home entecavir   - S/P dexamethasone, D/C'd by hematology , follow up outpatient after discharge    - Pt reports his Revlimid was held  - Heme/Onc consulted; F/u recs   - Resume home aspirin.    DVT   - Duplex + For R soleal vein DVT  --> Will consider CT A chest once creatinine permits as patient received contrast; Monitor for LOPEZ   - On Hep gtt; Monitor PTT; Monitor H/H closely --> Now on hold in view of drop in Hgb   - Check VQ scan to R/O PE -- Low probability   - Vascular eval appreciated; AC as tolerated, on Xarelto 10, monitor H/H   - Serial Duplex to assess for propagation  -- Without propagation     RK  - S/P Contrast on 03/23   - Check bladder scan to R/O retention  - C/w IVF for hydration  - Monitor Cr closely; Avoid nephrotoxic agents   - Cr up-trending. On IVF     Hypertension, now hypotensive   - C/w home metoprolol.  - C/w Midodrine 5 TID. Hold for SBP > 140     Hyperlipidemia.   - C/w home atorvastatin.    Abnormal CT  - Outpatient follow up for CT findings    Prophylactic measure.   dvt ppx: DVT PPX   diet: regular  ambulate: with assistance    fall precautions  aspiration precautions.      Discussed with Patient and Wife at the bedside.

## 2023-03-31 NOTE — PROGRESS NOTE ADULT - ASSESSMENT
67y Male with history of MM on chemotherapy presents with fevers. Nephrology consulted for elevated Scr.    1) RK: Secondary to ATN on prior admission which had resolved with recurrent RK likely due to ATN in setting of relative hypotension and IV contrast administration. Scr appears to have plateaued with good UO. Continue with IVF. UA with pyuria however sample contaminated (numerous squamous epithelial cells). FeNa low. Bladder scan negative. TMA work up negative. Avoid nephrotoxins. Monitor electrolytes.    2) Hypotension: In setting of fevers. Increase midodrine to 10 mg PO TID. Monitor BP.    3) Hyponatremia: In setting of RK and likely decreased diluting ability. Serum Na improving. IVF. Monitor serum Na.    4) Metabolic acidosis: Change IVF to LR and check AM VBG with lactate and ketones in AM. Monitor pH.    5) MM: As per Heme/Onc. On renal dose Entecavir.        [facility] NEPHROLOGY  Leoncio Leonard M.D.  Tay Brooke D.O.  Precious Chen M.D.  Nidia Stallings, NURIS, ANP-C    Telephone: [phone]  Facsimile: [phone]    [address] [place] [address]   67y Male with history of MM on chemotherapy presents with fevers. Nephrology consulted for elevated Scr.    1) RK: Secondary to ATN on prior admission which had resolved with recurrent RK likely due to ATN in setting of relative hypotension and IV contrast administration. Scr appears to have plateaued with good UO. Continue with IVF. UA with pyuria however sample contaminated (numerous squamous epithelial cells). FeNa low. Bladder scan negative. TMA work up negative. Avoid nephrotoxins. Monitor electrolytes.    2) Hypotension: In setting of fevers. Increase midodrine to 10 mg PO TID. Monitor BP.    3) Hyponatremia: In setting of RK and likely decreased diluting ability. Serum Na improving. IVF. Monitor serum Na.    4) Metabolic acidosis: Change IVF to LR and check AM VBG with lactate and ketones in AM. Monitor pH.    5) MM: As per Heme/Onc. On renal dose Entecavir.        [facility] NEPHROLOGY  Leoncio Leonard M.D.  Tay Brooke D.O.  Precious Chen M.D.  Nidia Stallings, NURIS, ANP-C    Telephone: [phone]  Facsimile: [phone]    [address] [place] [address]   67y Male with history of MM on chemotherapy presents with fevers. Nephrology consulted for elevated Scr.    1) RK: Secondary to ATN on prior admission which had resolved with recurrent RK likely due to ATN in setting of relative hypotension and IV contrast administration. Scr appears to have plateaued with good UO. Continue with IVF. UA with pyuria however sample contaminated (numerous squamous epithelial cells). FeNa low. Bladder scan negative. TMA work up negative. Avoid nephrotoxins. Monitor electrolytes.    2) Hypotension: In setting of fevers. Increase midodrine to 10 mg PO TID. Monitor BP.    3) Hyponatremia: In setting of RK and likely decreased diluting ability. Serum Na improving. IVF. Monitor serum Na.    4) Metabolic acidosis: Change IVF to LR and check AM VBG with lactate and ketones in AM. Monitor pH.    5) MM: As per Heme/Onc. On renal dose Entecavir.        [facility] NEPHROLOGY  Leoncio Leonard M.D.  Tay Brooke D.O.  Precious Chen M.D.  Nidia Stallings, NURIS, ANP-C    Telephone: [phone]  Facsimile: [phone]    [address] [place] [address]

## 2023-04-01 LAB
ACANTHOCYTES BLD QL SMEAR: SLIGHT — SIGNIFICANT CHANGE UP
ANION GAP SERPL CALC-SCNC: 13 MMOL/L — SIGNIFICANT CHANGE UP (ref 5–17)
ANISOCYTOSIS BLD QL: SLIGHT — SIGNIFICANT CHANGE UP
B-OH-BUTYR SERPL-SCNC: 0.3 MMOL/L — SIGNIFICANT CHANGE UP
BASE EXCESS BLDV CALC-SCNC: -7.1 MMOL/L — LOW (ref -2–3)
BUN SERPL-MCNC: 40 MG/DL — HIGH (ref 7–23)
BURR CELLS BLD QL SMEAR: SIGNIFICANT CHANGE UP
CALCIUM SERPL-MCNC: 7.9 MG/DL — LOW (ref 8.4–10.5)
CHLORIDE SERPL-SCNC: 102 MMOL/L — SIGNIFICANT CHANGE UP (ref 96–108)
CO2 BLDV-SCNC: 17 MMOL/L — LOW (ref 22–26)
CO2 SERPL-SCNC: 17 MMOL/L — LOW (ref 22–31)
CREAT SERPL-MCNC: 2.05 MG/DL — HIGH (ref 0.5–1.3)
CULTURE RESULTS: SIGNIFICANT CHANGE UP
DACRYOCYTES BLD QL SMEAR: SLIGHT — SIGNIFICANT CHANGE UP
EGFR: 35 ML/MIN/1.73M2 — LOW
ELLIPTOCYTES BLD QL SMEAR: SLIGHT — SIGNIFICANT CHANGE UP
GAS PNL BLDV: SIGNIFICANT CHANGE UP
GLUCOSE SERPL-MCNC: 114 MG/DL — HIGH (ref 70–99)
HCO3 BLDV-SCNC: 16 MMOL/L — LOW (ref 22–29)
HCT VFR BLD CALC: 22.9 % — LOW (ref 39–50)
HGB BLD-MCNC: 8.1 G/DL — LOW (ref 13–17)
HOWELL-JOLLY BOD BLD QL SMEAR: PRESENT — SIGNIFICANT CHANGE UP
LACTATE SERPL-SCNC: 0.8 MMOL/L — SIGNIFICANT CHANGE UP (ref 0.5–2)
MACROCYTES BLD QL: SLIGHT — SIGNIFICANT CHANGE UP
MANUAL SMEAR VERIFICATION: SIGNIFICANT CHANGE UP
MCHC RBC-ENTMCNC: 31.3 PG — SIGNIFICANT CHANGE UP (ref 27–34)
MCHC RBC-ENTMCNC: 35.4 GM/DL — SIGNIFICANT CHANGE UP (ref 32–36)
MCV RBC AUTO: 88.4 FL — SIGNIFICANT CHANGE UP (ref 80–100)
MICROCYTES BLD QL: SLIGHT — SIGNIFICANT CHANGE UP
MRSA PCR RESULT.: SIGNIFICANT CHANGE UP
NRBC # BLD: 2 /100 WBCS — HIGH (ref 0–0)
OVALOCYTES BLD QL SMEAR: SLIGHT — SIGNIFICANT CHANGE UP
PCO2 BLDV: 27 MMHG — LOW (ref 42–55)
PH BLDV: 7.39 — SIGNIFICANT CHANGE UP (ref 7.32–7.43)
PLAT MORPH BLD: NORMAL — SIGNIFICANT CHANGE UP
PLATELET # BLD AUTO: 21 K/UL — LOW (ref 150–400)
PO2 BLDV: 93 MMHG — HIGH (ref 25–45)
POLYCHROMASIA BLD QL SMEAR: SLIGHT — SIGNIFICANT CHANGE UP
POTASSIUM SERPL-MCNC: 3.6 MMOL/L — SIGNIFICANT CHANGE UP (ref 3.5–5.3)
POTASSIUM SERPL-SCNC: 3.6 MMOL/L — SIGNIFICANT CHANGE UP (ref 3.5–5.3)
RBC # BLD: 2.59 M/UL — LOW (ref 4.2–5.8)
RBC # FLD: 22.1 % — HIGH (ref 10.3–14.5)
RBC BLD AUTO: ABNORMAL
S AUREUS DNA NOSE QL NAA+PROBE: SIGNIFICANT CHANGE UP
SAO2 % BLDV: 98.5 % — HIGH (ref 67–88)
SCHISTOCYTES BLD QL AUTO: SLIGHT — SIGNIFICANT CHANGE UP
SMUDGE CELLS # BLD: PRESENT — SIGNIFICANT CHANGE UP
SODIUM SERPL-SCNC: 132 MMOL/L — LOW (ref 135–145)
SPECIMEN SOURCE: SIGNIFICANT CHANGE UP
WBC # BLD: 2.1 K/UL — LOW (ref 3.8–10.5)
WBC # FLD AUTO: 2.1 K/UL — LOW (ref 3.8–10.5)

## 2023-04-01 PROCEDURE — 99232 SBSQ HOSP IP/OBS MODERATE 35: CPT

## 2023-04-01 PROCEDURE — 70450 CT HEAD/BRAIN W/O DYE: CPT | Mod: 26

## 2023-04-01 RX ORDER — SODIUM CHLORIDE 9 MG/ML
1000 INJECTION, SOLUTION INTRAVENOUS
Refills: 0 | Status: DISCONTINUED | OUTPATIENT
Start: 2023-04-01 | End: 2023-04-06

## 2023-04-01 RX ORDER — GANCICLOVIR SODIUM 50 MG/ML
190 INJECTION, POWDER, LYOPHILIZED, FOR SOLUTION INTRAVENTRICULAR EVERY 24 HOURS
Refills: 0 | Status: DISCONTINUED | OUTPATIENT
Start: 2023-04-02 | End: 2023-04-06

## 2023-04-01 RX ORDER — ACYCLOVIR SODIUM 500 MG
400 VIAL (EA) INTRAVENOUS EVERY 12 HOURS
Refills: 0 | Status: DISCONTINUED | OUTPATIENT
Start: 2023-04-01 | End: 2023-04-04

## 2023-04-01 RX ADMIN — MIDODRINE HYDROCHLORIDE 10 MILLIGRAM(S): 2.5 TABLET ORAL at 17:04

## 2023-04-01 RX ADMIN — PIPERACILLIN AND TAZOBACTAM 25 GRAM(S): 4; .5 INJECTION, POWDER, LYOPHILIZED, FOR SOLUTION INTRAVENOUS at 08:07

## 2023-04-01 RX ADMIN — PIPERACILLIN AND TAZOBACTAM 25 GRAM(S): 4; .5 INJECTION, POWDER, LYOPHILIZED, FOR SOLUTION INTRAVENOUS at 13:26

## 2023-04-01 RX ADMIN — MIDODRINE HYDROCHLORIDE 10 MILLIGRAM(S): 2.5 TABLET ORAL at 11:44

## 2023-04-01 RX ADMIN — Medication 2 MILLIGRAM(S): at 13:26

## 2023-04-01 RX ADMIN — GANCICLOVIR SODIUM 100 MILLIGRAM(S): 50 INJECTION, POWDER, LYOPHILIZED, FOR SOLUTION INTRAVENTRICULAR at 07:57

## 2023-04-01 RX ADMIN — ATORVASTATIN CALCIUM 40 MILLIGRAM(S): 80 TABLET, FILM COATED ORAL at 21:15

## 2023-04-01 RX ADMIN — MIDODRINE HYDROCHLORIDE 10 MILLIGRAM(S): 2.5 TABLET ORAL at 06:08

## 2023-04-01 RX ADMIN — Medication 250 MILLIGRAM(S): at 08:01

## 2023-04-01 RX ADMIN — SODIUM CHLORIDE 70 MILLILITER(S): 9 INJECTION, SOLUTION INTRAVENOUS at 15:46

## 2023-04-01 RX ADMIN — Medication 81 MILLIGRAM(S): at 11:39

## 2023-04-01 RX ADMIN — ENTECAVIR 0.25 MILLIGRAM(S): 0.5 TABLET ORAL at 15:51

## 2023-04-01 RX ADMIN — PIPERACILLIN AND TAZOBACTAM 25 GRAM(S): 4; .5 INJECTION, POWDER, LYOPHILIZED, FOR SOLUTION INTRAVENOUS at 21:14

## 2023-04-01 RX ADMIN — Medication 650 MILLIGRAM(S): at 11:50

## 2023-04-01 RX ADMIN — Medication 2 MILLIGRAM(S): at 06:08

## 2023-04-01 RX ADMIN — Medication 400 MILLIGRAM(S): at 17:03

## 2023-04-01 RX ADMIN — Medication 2 MILLIGRAM(S): at 21:15

## 2023-04-01 RX ADMIN — PIPERACILLIN AND TAZOBACTAM 200 GRAM(S): 4; .5 INJECTION, POWDER, LYOPHILIZED, FOR SOLUTION INTRAVENOUS at 04:07

## 2023-04-01 NOTE — PROGRESS NOTE ADULT - ASSESSMENT
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   - no obvious infectious symptoms, CXR without consolidations or effusions   - U/A unremarkable  - F/u BCx2 and UCx  --> Neg final   - Recurrent fever. F/U repeat BCx2 sent 03/27 --> NGTD; F/u final. ABX as per ID   - less likely transfusion reaction as pt febrile prior to transfusion  - LE VA duplex --> + For DVT    - Was on empiric treatment with Vanc/cefepime --> ID consulted; Now on Zosyn  - Check GI PCR -- Neg   - Hold home penicillin while on ABX   - V/Q scan to R/O PE -- Low probability   - ID to follow up, infectious work up as per ID  --> F/u CMV PCR -- + Started on Ganciclovir per ID , CMV IGG (+) and IgM (-), Cryptococcal Ag --Neg, Quantiferon Tb -- Neg, Fungitell -- <31   - CT Chest non-contrast, noted, no acute pathology  - Recurrent fever 03/29 --> F/u BCx2 -- NGTD; F/u final   - Shock, RRT, resume antibiotics IV fluid, MICU eval , ID follow up , ? neeed to resume steroids?    Anemia, Pancytopenia  - Drop in Hgb, Occult +  - Hold Hep gtt    - S/P 1 unit of PRBCs 03/28  - Maintain active T+S. Transfuse for Hgb < 7.0, and platelets < 10.K  - GI eval appreciated; F/u recs --> No plans for scope at this time   - Was on Xarelto, held due to drop in Platelets as per Heme/Onc. Monitor platelet count     HypoNa  - Na of 134, cont to monitor and trend  - Appreciate renal eval.   - Serial labs     Multiple myeloma.   - pancytopenia largely at baseline although Hg 6.7 on arrival  - s/p 1U PRBC with good response   - Was on acyclovir, now on Ganciclovir as per ID   - C/w home entecavir   - S/P dexamethasone, D/C'd by hematology , follow up outpatient after discharge    - Pt reports his Revlimid was held  - Heme/Onc consulted; F/u recs   - Resume home aspirin.    DVT   - Duplex + For R soleal vein DVT  --> Will consider CT A chest once creatinine permits as patient received contrast; Monitor for LOPEZ   - On Hep gtt; Monitor PTT; Monitor H/H closely --> Now on hold in view of drop in Hgb   - Check VQ scan to R/O PE -- Low probability   - Vascular eval appreciated; AC as tolerated, on Xarelto 10, monitor H/H   - Serial Duplex to assess for propagation  -- Without propagation     RK  - S/P Contrast on 03/23   - Check bladder scan to R/O retention  - C/w IVF for hydration  - Monitor Cr closely; Avoid nephrotoxic agents   - Cr up-trending. On IVF     Hypertension, now hypotensive   - C/w home metoprolol.  - C/w Midodrine 5 TID. Hold for SBP > 140     Hyperlipidemia.   - C/w home atorvastatin.    Abnormal CT  - Outpatient follow up for CT findings    Prophylactic measure.   dvt ppx: DVT PPX   diet: regular  ambulate: with assistance    fall precautions  aspiration precautions.      Discussed with Patient and Wife at the bedside.  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   - no obvious infectious symptoms, CXR without consolidations or effusions   - U/A unremarkable  - F/u BCx2 and UCx  --> Neg final   - Recurrent fever. F/U repeat BCx2 sent 03/27 --> NGTD; F/u final. ABX as per ID   - less likely transfusion reaction as pt febrile prior to transfusion  - LE VA duplex --> + For DVT    - Was on empiric treatment with Vanc/cefepime --> ID consulted; Now on Zosyn  - Check GI PCR -- Neg   - Hold home penicillin while on ABX   - V/Q scan to R/O PE -- Low probability   - ID to follow up, infectious work up as per ID  --> F/u CMV PCR -- + Started on Ganciclovir per ID , CMV IGG (+) and IgM (-), Cryptococcal Ag --Neg, Quantiferon Tb -- Neg, Fungitell -- <31   - CT Chest non-contrast, noted, no acute pathology  - Recurrent fever 03/29 --> F/u BCx2 -- NGTD; F/u final   - Shock, RRT, resume antibiotics IV fluid, MICU eval , ID follow up , ? neeed to resume steroids?    Anemia, Pancytopenia  - Drop in Hgb, Occult +  - Hold Hep gtt    - S/P 1 unit of PRBCs 03/28  - Maintain active T+S. Transfuse for Hgb < 7.0, and platelets < 10.K  - GI eval appreciated; F/u recs --> No plans for scope at this time   - Was on Xarelto, held due to drop in Platelets as per Heme/Onc. Monitor platelet count     HypoNa  - Na of 134, cont to monitor and trend  - Appreciate renal eval.   - Serial labs     Multiple myeloma.   - pancytopenia largely at baseline although Hg 6.7 on arrival  - s/p 1U PRBC with good response   - Was on acyclovir, now on Ganciclovir as per ID   - C/w home entecavir   - S/P dexamethasone, D/C'd by hematology , follow up outpatient after discharge    - Pt reports his Revlimid was held  - Heme/Onc consulted; F/u recs   - Resume home aspirin.    DVT   - Duplex + For R soleal vein DVT  --> Will consider CT A chest once creatinine permits as patient received contrast; Monitor for LOPEZ   - On Hep gtt; Monitor PTT; Monitor H/H closely --> Now on hold in view of drop in Hgb   - Check VQ scan to R/O PE -- Low probability   - Vascular eval appreciated; AC as tolerated, on Xarelto 10, monitor H/H   - Serial Duplex to assess for propagation  -- Without propagation     RK  - S/P Contrast on 03/23   - Check bladder scan to R/O retention  - C/w IVF for hydration  - Monitor Cr closely; Avoid nephrotoxic agents   - Cr up-trending. On IVF     Hypertension, now hypotensive   - C/w home metoprolol.  - C/w Midodrine 5 TID. Hold for SBP > 140     Hyperlipidemia.   - C/w home atorvastatin.    Abnormal CT  - Outpatient follow up for CT findings    Prophylactic measure.   dvt ppx: DVT PPX   diet: regular  ambulate: with assistance    fall precautions  aspiration precautions.      Discussed with Patient and Wife at the bedside.

## 2023-04-01 NOTE — PROGRESS NOTE ADULT - ASSESSMENT
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 Found to be febrile on admission, ID consulted for further management.    Tmax 102.4 (3/23)  WBC 3.5K today  Urinalysis unremarkable  RVP neg  CXR unremarkable      Impression:  #Fever, persistent   #Immunocompromised Status, H/O Splenectomy  #pancytopenia   #CMV viremia         PLAN:  - all infectious work up in terms of bacterial infection negative,   -given hypotensive episode, restarted on zosyn   - V/Q scan with low probability of PE   - follow up blood cultures, NTD   - GI PCR- negative   - trend cbc for pancytopenia  - CMV PCR elevated, cmv IGG + and CMV IGM -  - repeat CMV PCR downtrended spontaneously, and on antiviral for more than 3 days with persistent fever, doubt CMV viremia contributing to fevers at this time.   - cryptococcal ag, QuantiFeronTb, fungitell negative  - c/w valcyte based on CrCl.       Dejuan Ralph  Please contact through MS Teams   If no response or past 5 pm/weekend call [phone].                    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 Found to be febrile on admission, ID consulted for further management.    Tmax 102.4 (3/23)  WBC 3.5K today  Urinalysis unremarkable  RVP neg  CXR unremarkable      Impression:  #Fever, persistent   #Immunocompromised Status, H/O Splenectomy  #pancytopenia   #CMV viremia         PLAN:  - all infectious work up in terms of bacterial infection negative,   -given hypotensive episode, restarted on zosyn   - V/Q scan with low probability of PE   - follow up blood cultures, NTD   - GI PCR- negative   - trend cbc for pancytopenia  - CMV PCR elevated, cmv IGG + and CMV IGM -  - repeat CMV PCR downtrended spontaneously, and on antiviral for more than 3 days with persistent fever, doubt CMV viremia contributing to fevers at this time.   - cryptococcal ag, QuantiFeronTb, fungitell negative  - c/w valcyte based on CrCl.       Dejuan Ralph  Please contact through MS Teams   If no response or past 5 pm/weekend call [phone].                    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 Found to be febrile on admission, ID consulted for further management.    Tmax 102.4 (3/23)  WBC 3.5K today  Urinalysis unremarkable  RVP neg  CXR unremarkable      Impression:  #Fever, persistent   #Immunocompromised Status, H/O Splenectomy  #pancytopenia   #CMV viremia         PLAN:  - all infectious work up in terms of bacterial infection negative,   -given hypotensive episode, restarted on zosyn   - V/Q scan with low probability of PE   - follow up blood cultures, NTD   - GI PCR- negative   - trend cbc for pancytopenia  - CMV PCR elevated, cmv IGG + and CMV IGM -  - repeat CMV PCR downtrended spontaneously, and on antiviral for more than 3 days with persistent fever, doubt CMV viremia contributing to fevers at this time.   - cryptococcal ag, QuantiFeronTb, fungitell negative  - c/w valcyte based on CrCl.       Dejuan Ralph  Please contact through MS Teams   If no response or past 5 pm/weekend call [phone].

## 2023-04-01 NOTE — PROVIDER CONTACT NOTE (OTHER) - ACTION/TREATMENT ORDERED:
MARI mast made aware and ordered to straight cath. patient felt relief post straight cath. will keep on monitoring

## 2023-04-01 NOTE — PROGRESS NOTE ADULT - ASSESSMENT
67y Male with history of MM on chemotherapy presents with fevers. Nephrology consulted for elevated Scr.    1) RK: Secondary to ATN in setting of relative hypotension and IV contrast administration. Scr improving. Continue with IVF. UA with pyuria however sample contaminated (numerous squamous epithelial cells). FeNa low. Bladder scan negative. TMA work up negative. Avoid nephrotoxins. Monitor electrolytes.    2) Hypotension: BP improving. Continue with midodrine 10 mg PO TID. Monitor BP.    3) Hyponatremia: In setting of RK and likely decreased diluting ability. Continue with IVF. Monitor serum Na.    4) Metabolic acidosis: Mixed disorder with concurrent respiratory alkalosis and normal pH. No need for sodium bicarbonate. Monitor pH.    5) MM: As per Heme/Onc. On renal dose Entecavir.    Would D/C vancomycin 1 gram IV twice daily and confer with ID about appropriate dosing given RK.        [facility] NEPHROLOGY  Leoncio Leonard M.D.  Tay Brooke D.O.  Precious Chen M.D.  Nidia Stallings, NURIS, ANP-C    Telephone: [phone]  Facsimile: [phone]    [address] [place] [address]   67y Male with history of MM on chemotherapy presents with fevers. Nephrology consulted for elevated Scr.    1) RK: Secondary to ATN in setting of relative hypotension and IV contrast administration. Scr improving. Continue with IVF. UA with pyuria however sample contaminated (numerous squamous epithelial cells). FeNa low. Bladder scan negative. TMA work up negative. Avoid nephrotoxins. Monitor electrolytes.    2) Hypotension: BP improving. Continue with midodrine 10 mg PO TID. Monitor BP.    3) Hyponatremia: In setting of RK and likely decreased diluting ability. Continue with IVF. Monitor serum Na.    4) Metabolic acidosis: Mixed disorder with concurrent respiratory alkalosis and normal pH. No need for sodium bicarbonate. Monitor pH.    5) MM: As per Heme/Onc. On renal dose Entecavir.    Would D/C vancomycin 1 gram IV twice daily and confer with ID about appropriate dosing given RK.        [facility] NEPHROLOGY  Leoncio Leonard M.D.  Tay Brooke D.O.  Precious Chen M.D.  Nidia Stallings, NURSI, ANP-C    Telephone: [phone]  Facsimile: [phone]    [address] [place] [address]   67y Male with history of MM on chemotherapy presents with fevers. Nephrology consulted for elevated Scr.    1) RK: Secondary to ATN in setting of relative hypotension and IV contrast administration. Scr improving. Continue with IVF. UA with pyuria however sample contaminated (numerous squamous epithelial cells). FeNa low. Bladder scan negative. TMA work up negative. Avoid nephrotoxins. Monitor electrolytes.    2) Hypotension: BP improving. Continue with midodrine 10 mg PO TID. Monitor BP.    3) Hyponatremia: In setting of RK and likely decreased diluting ability. Continue with IVF. Monitor serum Na.    4) Metabolic acidosis: Mixed disorder with concurrent respiratory alkalosis and normal pH. No need for sodium bicarbonate. Monitor pH.    5) MM: As per Heme/Onc. On renal dose Entecavir.    Would D/C vancomycin 1 gram IV twice daily and confer with ID about appropriate dosing given RK.        [facility] NEPHROLOGY  Leoncio Leonard M.D.  Tay Brooke D.O.  Precious Chen M.D.  Nidia Stallings, NURIS, ANP-C    Telephone: [phone]  Facsimile: [phone]    [address] [place] [address]   67y Male with history of MM on chemotherapy presents with fevers. Nephrology consulted for elevated Scr.    1) RK: Secondary to ATN in setting of relative hypotension and IV contrast administration. Scr improving. Continue with IVF. UA with pyuria however sample contaminated (numerous squamous epithelial cells). FeNa low. Given complaints of difficulty voiding, repeat bladder scan. TMA work up negative. Avoid nephrotoxins. Monitor electrolytes.    2) Hypotension: BP improving. Continue with midodrine 10 mg PO TID. Monitor BP.    3) Hyponatremia: In setting of RK and likely decreased diluting ability. Continue with IVF. Monitor serum Na.    4) Metabolic acidosis: Mixed disorder with concurrent respiratory alkalosis and normal pH. No need for sodium bicarbonate. Monitor pH.    5) MM: As per Heme/Onc. On renal dose Entecavir.    Would D/C vancomycin 1 gram IV twice daily and confer with ID about appropriate dosing given RK.        [facility] NEPHROLOGY  Leoncio Leonard M.D.  Tay Brooke D.O.  Precious Chen M.D.  Nidia Stallings, MSN, ANP-C    Telephone: [phone]  Facsimile: [phone]    [address] [place] [address]   67y Male with history of MM on chemotherapy presents with fevers. Nephrology consulted for elevated Scr.    1) RK: Secondary to ATN in setting of relative hypotension and IV contrast administration. Scr improving. Continue with IVF. UA with pyuria however sample contaminated (numerous squamous epithelial cells). FeNa low. Given complaints of difficulty voiding, repeat bladder scan. TMA work up negative. Avoid nephrotoxins. Monitor electrolytes.    2) Hypotension: BP improving. Continue with midodrine 10 mg PO TID. Monitor BP.    3) Hyponatremia: In setting of RK and likely decreased diluting ability. Continue with IVF. Monitor serum Na.    4) Metabolic acidosis: Mixed disorder with concurrent respiratory alkalosis and normal pH. No need for sodium bicarbonate. Monitor pH.    5) MM: As per Heme/Onc. On renal dose Entecavir.    Would D/C vancomycin 1 gram IV twice daily and confer with ID about appropriate dosing given RK.        [facility] NEPHROLOGY  Leoncio Leonard M.D.  Tay Brooke D.O.  Precious Chen M.D.  Nidia Stallings, MSN, ANP-C    Telephone: [phone]  Facsimile: [phone]    [address] [place] [address]   67y Male with history of MM on chemotherapy presents with fevers. Nephrology consulted for elevated Scr.    1) RK: Secondary to ATN in setting of relative hypotension and IV contrast administration. Scr improving. Continue with IVF. UA with pyuria however sample contaminated (numerous squamous epithelial cells). FeNa low. Given complaints of difficulty voiding, repeat bladder scan. TMA work up negative. Avoid nephrotoxins. Monitor electrolytes.    2) Hypotension: BP improving. Continue with midodrine 10 mg PO TID. Monitor BP.    3) Hyponatremia: In setting of RK and likely decreased diluting ability. Continue with IVF. Monitor serum Na.    4) Metabolic acidosis: Mixed disorder with concurrent respiratory alkalosis and normal pH. No need for sodium bicarbonate. Monitor pH.    5) MM: As per Heme/Onc. On renal dose Entecavir.    Would D/C vancomycin 1 gram IV twice daily and confer with ID about appropriate dosing given RK.        [facility] NEPHROLOGY  Leoncio Leonard M.D.  Tay Brooke D.O.  Precious Chen M.D.  Nidia Stallings, MSN, ANP-C    Telephone: [phone]  Facsimile: [phone]    [address] [place] [address]

## 2023-04-01 NOTE — PROGRESS NOTE ADULT - SUBJECTIVE AND OBJECTIVE BOX
DATE OF SERVICE: 04-01-23 @ 08:11    Subjective: Patient seen and examined. No new events except as noted.     SUBJECTIVE/ROS:  transferred to [facility] for hypotension       MEDICATIONS:  MEDICATIONS  (STANDING):  aspirin enteric coated 81 milliGRAM(s) Oral daily  atorvastatin 40 milliGRAM(s) Oral at bedtime  entecavir Solution 0.25 milliGRAM(s) Oral daily  ganciclovir IVPB 95 milliGRAM(s) IV Intermittent daily  lactated ringers Bolus 500 milliLiter(s) IV Bolus once  lactated ringers. 1000 milliLiter(s) (70 mL/Hr) IV Continuous <Continuous>  loperamide 2 milliGRAM(s) Oral three times a day  midodrine. 10 milliGRAM(s) Oral three times a day  piperacillin/tazobactam IVPB.- 3.375 Gram(s) IV Intermittent once  piperacillin/tazobactam IVPB.. 3.375 Gram(s) IV Intermittent every 8 hours  vancomycin  IVPB 1000 milliGRAM(s) IV Intermittent every 12 hours      PHYSICAL EXAM:  T(C): 36.7 (04-01-23 @ 04:09), Max: 38.3 (03-31-23 @ 16:26)  HR: 96 (04-01-23 @ 04:09) (79 - 96)  BP: 123/65 (04-01-23 @ 04:09) (93/58 - 123/65)  RR: 18 (04-01-23 @ 04:09) (18 - 18)  SpO2: 94% (04-01-23 @ 04:09) (94% - 98%)  Wt(kg): --  I&O's Summary    31 Mar 2023 07:01  -  01 Apr 2023 07:00  --------------------------------------------------------  IN: 150 mL / OUT: 200 mL / NET: -50 mL            JVP: Normal  Neck: supple  Lung: clear   CV: S1 S2 , Murmur:  Abd: soft  Ext: No edema  neuro: Awake / alert  Psych: flat affect  Skin: normal``    LABS/DATA:    CARDIAC MARKERS:                                8.1    2.10  )-----------( 21       ( 01 Apr 2023 06:05 )             22.9     04-01    132<L>  |  102  |  40<H>  ----------------------------<  114<H>  3.6   |  17<L>  |  2.05<H>    Ca    7.9<L>      01 Apr 2023 06:05    TPro  4.4<L>  /  Alb  2.2<L>  /  TBili  0.8  /  DBili  x   /  AST  114<H>  /  ALT  218<H>  /  AlkPhos  396<H>  03-31    proBNP:   Lipid Profile:   HgA1c:   TSH:     TELE:  EKG:         DATE OF SERVICE: 04-01-23 @ 08:11    Subjective: Patient seen and examined. No new events except as noted.     SUBJECTIVE/ROS:  transferred to [facility] for hypotension       MEDICATIONS:  MEDICATIONS  (STANDING):  aspirin enteric coated 81 milliGRAM(s) Oral daily  atorvastatin 40 milliGRAM(s) Oral at bedtime  entecavir Solution 0.25 milliGRAM(s) Oral daily  ganciclovir IVPB 95 milliGRAM(s) IV Intermittent daily  lactated ringers Bolus 500 milliLiter(s) IV Bolus once  lactated ringers. 1000 milliLiter(s) (70 mL/Hr) IV Continuous <Continuous>  loperamide 2 milliGRAM(s) Oral three times a day  midodrine. 10 milliGRAM(s) Oral three times a day  piperacillin/tazobactam IVPB.- 3.375 Gram(s) IV Intermittent once  piperacillin/tazobactam IVPB.. 3.375 Gram(s) IV Intermittent every 8 hours  vancomycin  IVPB 1000 milliGRAM(s) IV Intermittent every 12 hours      PHYSICAL EXAM:  T(C): 36.7 (04-01-23 @ 04:09), Max: 38.3 (03-31-23 @ 16:26)  HR: 96 (04-01-23 @ 04:09) (79 - 96)  BP: 123/65 (04-01-23 @ 04:09) (93/58 - 123/65)  RR: 18 (04-01-23 @ 04:09) (18 - 18)  SpO2: 94% (04-01-23 @ 04:09) (94% - 98%)  Wt(kg): --  I&O's Summary    31 Mar 2023 07:01  -  01 Apr 2023 07:00  --------------------------------------------------------  IN: 150 mL / OUT: 200 mL / NET: -50 mL            JVP: Normal  Neck: supple  Lung: clear   CV: S1 S2 , Murmur:  Abd: soft  Ext: No edema  neuro: Awake / alert  Psych: flat affect  Skin: normal``    LABS/DATA:    CARDIAC MARKERS:                                8.1    2.10  )-----------( 21       ( 01 Apr 2023 06:05 )             22.9     04-01    132<L>  |  102  |  40<H>  ----------------------------<  114<H>  3.6   |  17<L>  |  2.05<H>    Ca    7.9<L>      01 Apr 2023 06:05    TPro  4.4<L>  /  Alb  2.2<L>  /  TBili  0.8  /  DBili  x   /  AST  114<H>  /  ALT  218<H>  /  AlkPhos  396<H>  03-31    proBNP:   Lipid Profile:   HgA1c:   TSH:     TELE:  EKG:         DATE OF SERVICE: 04-01-23 @ 08:11    Subjective: Patient seen and examined. No new events except as noted.     SUBJECTIVE/ROS:  transferred to [facility] for hypotension       MEDICATIONS:  MEDICATIONS  (STANDING):  aspirin enteric coated 81 milliGRAM(s) Oral daily  atorvastatin 40 milliGRAM(s) Oral at bedtime  entecavir Solution 0.25 milliGRAM(s) Oral daily  ganciclovir IVPB 95 milliGRAM(s) IV Intermittent daily  lactated ringers Bolus 500 milliLiter(s) IV Bolus once  lactated ringers. 1000 milliLiter(s) (70 mL/Hr) IV Continuous <Continuous>  loperamide 2 milliGRAM(s) Oral three times a day  midodrine. 10 milliGRAM(s) Oral three times a day  piperacillin/tazobactam IVPB.- 3.375 Gram(s) IV Intermittent once  piperacillin/tazobactam IVPB.. 3.375 Gram(s) IV Intermittent every 8 hours  vancomycin  IVPB 1000 milliGRAM(s) IV Intermittent every 12 hours      PHYSICAL EXAM:  T(C): 36.7 (04-01-23 @ 04:09), Max: 38.3 (03-31-23 @ 16:26)  HR: 96 (04-01-23 @ 04:09) (79 - 96)  BP: 123/65 (04-01-23 @ 04:09) (93/58 - 123/65)  RR: 18 (04-01-23 @ 04:09) (18 - 18)  SpO2: 94% (04-01-23 @ 04:09) (94% - 98%)  Wt(kg): --  I&O's Summary    31 Mar 2023 07:01  -  01 Apr 2023 07:00  --------------------------------------------------------  IN: 150 mL / OUT: 200 mL / NET: -50 mL            JVP: Normal  Neck: supple  Lung: clear   CV: S1 S2 , Murmur:  Abd: soft  Ext: No edema  neuro: Awake / alert  Psych: flat affect  Skin: normal``    LABS/DATA:    CARDIAC MARKERS:                                8.1    2.10  )-----------( 21       ( 01 Apr 2023 06:05 )             22.9     04-01    132<L>  |  102  |  40<H>  ----------------------------<  114<H>  3.6   |  17<L>  |  2.05<H>    Ca    7.9<L>      01 Apr 2023 06:05    TPro  4.4<L>  /  Alb  2.2<L>  /  TBili  0.8  /  DBili  x   /  AST  114<H>  /  ALT  218<H>  /  AlkPhos  396<H>  03-31    proBNP:   Lipid Profile:   HgA1c:   TSH:     TELE:  EKG:

## 2023-04-01 NOTE — PROGRESS NOTE ADULT - SUBJECTIVE AND OBJECTIVE BOX
CHIEF COMPLAINT  Fever    HISTORY OF PRESENT ILLNESS  PETER DE LA PAZ is a 67y Male who presents with a chief complaint of fever    No acute events. No complaints.    REVIEW OF SYSTEMS  A complete review of systems was performed; negative except per HPI    PHYSICAL EXAM  T(C): 38.6 (04-01-23 @ 11:40), Max: 38.6 (04-01-23 @ 11:40)  HR: 78 (04-01-23 @ 11:37) (78 - 96)  BP: 106/51 (04-01-23 @ 11:37) (96/54 - 123/65)  RR: 18 (04-01-23 @ 11:37) (18 - 18)  SpO2: 93% (04-01-23 @ 11:37) (93% - 96%)  Constitutional: alert, awake, in no acute distress  Eyes: PERRL, EOMI  HEENT: normocephalic, atraumatic  Neck: supple, non-tender  Cardiovascular: normal perfusion, no peripheral edema  Respiratory: normal respiratory efforts; no increased use of accessory muscles  Gastrointestinal: soft, non-tender  Musculoskeletal: normal range of motion, no deformities noted  Neurological: alert, CN II to XI grossly intact  Skin: warm, dry    LABORATORY DATA                        8.1    2.10  )-----------( 21       ( 01 Apr 2023 06:05 )             22.9     04-01    132<L>  |  102  |  40<H>  ----------------------------<  114<H>  3.6   |  17<L>  |  2.05<H>    Ca    7.9<L>      01 Apr 2023 06:05    TPro  4.4<L>  /  Alb  2.2<L>  /  TBili  0.8  /  DBili  x   /  AST  114<H>  /  ALT  218<H>  /  AlkPhos  396<H>  03-31    RADIOLOGY REVIEW  IMPRESSION:  Mild chronic microvascular changes without evidence of an   acute transcortical infarction or hemorrhage.   CHIEF COMPLAINT  Fever    HISTORY OF PRESENT ILLNESS  PETRE DE LA PAZ is a 67y Male who presents with a chief complaint of fever    No acute events. No complaints.    REVIEW OF SYSTEMS  A complete review of systems was performed; negative except per HPI    PHYSICAL EXAM  T(C): 38.6 (04-01-23 @ 11:40), Max: 38.6 (04-01-23 @ 11:40)  HR: 78 (04-01-23 @ 11:37) (78 - 96)  BP: 106/51 (04-01-23 @ 11:37) (96/54 - 123/65)  RR: 18 (04-01-23 @ 11:37) (18 - 18)  SpO2: 93% (04-01-23 @ 11:37) (93% - 96%)  Constitutional: alert, awake, in no acute distress  Eyes: PERRL, EOMI  HEENT: normocephalic, atraumatic  Neck: supple, non-tender  Cardiovascular: normal perfusion, no peripheral edema  Respiratory: normal respiratory efforts; no increased use of accessory muscles  Gastrointestinal: soft, non-tender  Musculoskeletal: normal range of motion, no deformities noted  Neurological: alert, CN II to XI grossly intact  Skin: warm, dry    LABORATORY DATA                        8.1    2.10  )-----------( 21       ( 01 Apr 2023 06:05 )             22.9     04-01    132<L>  |  102  |  40<H>  ----------------------------<  114<H>  3.6   |  17<L>  |  2.05<H>    Ca    7.9<L>      01 Apr 2023 06:05    TPro  4.4<L>  /  Alb  2.2<L>  /  TBili  0.8  /  DBili  x   /  AST  114<H>  /  ALT  218<H>  /  AlkPhos  396<H>  03-31    RADIOLOGY REVIEW  IMPRESSION:  Mild chronic microvascular changes without evidence of an   acute transcortical infarction or hemorrhage.   CHIEF COMPLAINT  Fever    HISTORY OF PRESENT ILLNESS  PETER DE LA PAZ is a 67y Male who presents with a chief complaint of fever    No acute events.  Still having fever    REVIEW OF SYSTEMS  A complete review of systems was performed; negative except per HPI    PHYSICAL EXAM  T(C): 38.6 (04-01-23 @ 11:40), Max: 38.6 (04-01-23 @ 11:40)  HR: 78 (04-01-23 @ 11:37) (78 - 96)  BP: 106/51 (04-01-23 @ 11:37) (96/54 - 123/65)  RR: 18 (04-01-23 @ 11:37) (18 - 18)  SpO2: 93% (04-01-23 @ 11:37) (93% - 96%)  Constitutional: alert, awake, in no acute distress  Eyes: PERRL, EOMI  HEENT: normocephalic, atraumatic  Neck: supple, non-tender  Cardiovascular: normal perfusion, no peripheral edema  Respiratory: normal respiratory efforts; no increased use of accessory muscles  Gastrointestinal: soft, non-tender  Musculoskeletal: normal range of motion, no deformities noted  Neurological: alert, CN II to XI grossly intact  Skin: warm, dry    LABORATORY DATA                        8.1    2.10  )-----------( 21       ( 01 Apr 2023 06:05 )             22.9     04-01    132<L>  |  102  |  40<H>  ----------------------------<  114<H>  3.6   |  17<L>  |  2.05<H>    Ca    7.9<L>      01 Apr 2023 06:05    TPro  4.4<L>  /  Alb  2.2<L>  /  TBili  0.8  /  DBili  x   /  AST  114<H>  /  ALT  218<H>  /  AlkPhos  396<H>  03-31    RADIOLOGY REVIEW  IMPRESSION:  Mild chronic microvascular changes without evidence of an   acute transcortical infarction or hemorrhage.

## 2023-04-01 NOTE — PROGRESS NOTE ADULT - ASSESSMENT
PETER DE LA PAZ is a 67y Male who presents with a chief complaint of fever    Multiple Myeloma  ·	Patient follows with Dr. Jean Claude Hubbard, [facility].  ·	Patient was on lenalidomide maintenance until February 2023 when he had progression of disease.  ·	He was then switched to daratumumab + CyBorD; last dose March 23rd, 2023.  ·	No systemic therapy while inpatient or during rehabilitation.  ·	Continue acyclovir 400 mg BID.    Pancytopenia  ·	Pancytopenia has been ongoing since patient was started on latest chemotherapy regimen.  ·	Monitor CBC and transfuse to maintain HGB > 7; PLT > 10.  ·	Possible slight iron deficiency, but otherwise no evidence of nutritional deficits or hemolysis.  ·	Continue to hold anticoagulation.  ·	No plans for gastrointestinal interventions at this time.   ·	Leukopenia mostly lymphocytopenia; neutrophil last remains above 1.5.    Fever  ·	Unclear etiology; malignancy vs CMV viremia.  ·	Follow-up infectious disease recommendations.  ·	Currently on ganciclovir, piperacillin-tazobactam, and entecavir.     Will continue to follow.    Darwin Ham MD  Hematology/Oncology  O: [phone] PETER DE LA PAZ is a 67y Male who presents with a chief complaint of fever    Multiple Myeloma  ·	Patient follows with Dr. Jean Claude Hubbard, [facility].  ·	Patient was on lenalidomide maintenance until February 2023 when he had progression of disease.  ·	He was then switched to daratumumab + CyBorD; last dose March 23rd, 2023.  ·	No systemic therapy while inpatient or during rehabilitation.  ·	Continue acyclovir 400 mg BID.    Pancytopenia  ·	Pancytopenia has been ongoing since patient was started on latest chemotherapy regimen.  ·	Monitor CBC and transfuse to maintain HGB > 7; PLT > 10.  ·	Possible slight iron deficiency, but otherwise no evidence of nutritional deficits or hemolysis.  ·	Continue to hold anticoagulation.  ·	No plans for gastrointestinal interventions at this time.   ·	Leukopenia mostly lymphocytopenia; neutrophil last remains above 1.5.    Fever  ·	Unclear etiology; malignancy vs CMV viremia.  ·	Follow-up infectious disease recommendations.  ·	Currently on ganciclovir, piperacillin-tazobactam, and entecavir.     Will continue to follow.    Darwin Ham MD  Hematology/Oncology  O: [phone] PETER DE LA PAZ is a 67y Male who presents with a chief complaint of fever    Multiple Myeloma  ·	Patient follows with Dr. Jean Claude Hubbard, [facility].  ·	Patient was on lenalidomide maintenance until February 2023 when he had progression of disease.  ·	He was then switched to daratumumab + CyBorD; last dose March 23rd, 2023.  ·	No systemic therapy while inpatient or during rehabilitation.  ·	Continue acyclovir 400 mg BID.    Pancytopenia  ·	Pancytopenia has been ongoing since patient was started on latest chemotherapy regimen.  ·	Monitor CBC and transfuse to maintain HGB > 7; PLT > 10.  ·	Possible slight iron deficiency, but otherwise no evidence of nutritional deficits or hemolysis.  ·	Continue to hold anticoagulation.  ·	No plans for gastrointestinal interventions at this time.   ·	Leukopenia mostly lymphocytopenia; neutrophil last remains above 1.5.    Fever  ·	Unclear etiology; malignancy vs CMV viremia.  ·	Follow-up infectious disease recommendations.  ·	Currently on ganciclovir, piperacillin-tazobactam, and entecavir.     Will continue to follow.    Darwin Ham MD  Hematology/Oncology  O: [phone]

## 2023-04-01 NOTE — PROGRESS NOTE ADULT - SUBJECTIVE AND OBJECTIVE BOX
[facility] NEPHROLOGY- PROGRESS NOTE    67 year old Male with history of MM on chemotherapy presents with fevers. Nephrology consulted for elevated Scr.    Overnight patient with hypotension requiring IVF bolus.    REVIEW OF SYSTEMS:  Gen: + fevers  Cards: no chest pain  Resp: no dyspnea  GI: no nausea or vomiting or diarrhea  Vascular: no LE edema    No Known Allergies      Hospital Medications: Medications reviewed      VITALS:  T(F): 101.5 (23 @ 11:40), Max: 101.5 (23 @ 11:40)  HR: 78 (23 @ 11:37)  BP: 106/51 (23 @ 11:37)  RR: 18 (23 @ 11:37)  SpO2: 93% (23 @ 11:37)  Wt(kg): --     @ 07:01  -   @ 07:00  --------------------------------------------------------  IN: 150 mL / OUT: 200 mL / NET: -50 mL        PHYSICAL EXAM:    Gen: NAD, calm  Cards: RRR, +S1/S2, no M/G/R  Resp: CTA B/L  GI: soft, NT/ND, NABS  : + suprapubic distention  Vascular: no LE edema B/L        LABS:      132<L>  |  102  |  40<H>  ----------------------------<  114<H>  3.6   |  17<L>  |  2.05<H>    Ca    7.9<L>      2023 06:05    TPro  4.4<L>  /  Alb  2.2<L>  /  TBili  0.8  /  DBili      /  AST  114<H>  /  ALT  218<H>  /  AlkPhos  396<H>      Creatinine Trend: 2.05 <--, 2.26 <--, 2.17 <--, 2.18 <--, 2.25 <--, 2.21 <--, 1.83 <--, 1.80 <--, 1.75 <--                        8.1    2.10  )-----------( 21       ( 2023 06:05 )             22.9     Urine Studies:  Urinalysis Basic - ( 28 Mar 2023 14:14 )    Color: Light Yellow / Appearance: Slightly Turbid / S.020 / pH:   Gluc:  / Ketone: Negative  / Bili: Negative / Urobili: Negative   Blood:  / Protein: 100 mg/dl / Nitrite: Negative   Leuk Esterase: Negative / RBC: 2 /hpf / WBC 18 /HPF   Sq Epi:  / Non Sq Epi: 6 /hpf / Bacteria: Negative      Sodium, Random Urine: 45 mmol/L ( @ 14:14)  Creatinine, Random Urine: 96 mg/dL ( @ 14:14)         [facility] NEPHROLOGY- PROGRESS NOTE    67 year old Male with history of MM on chemotherapy presents with fevers. Nephrology consulted for elevated Scr.    Overnight patient with hypotension requiring IVF bolus.    REVIEW OF SYSTEMS:  Gen: + fevers  Cards: no chest pain  Resp: no dyspnea  GI: no nausea or vomiting or diarrhea  Vascular: no LE edema    No Known Allergies      Hospital Medications: Medications reviewed      VITALS:  T(F): 101.5 (23 @ 11:40), Max: 101.5 (23 @ 11:40)  HR: 78 (23 @ 11:37)  BP: 106/51 (23 @ 11:37)  RR: 18 (23 @ 11:37)  SpO2: 93% (23 @ 11:37)  Wt(kg): --     @ 07:01  -   @ 07:00  --------------------------------------------------------  IN: 150 mL / OUT: 200 mL / NET: -50 mL        PHYSICAL EXAM:    Gen: NAD, calm  Cards: RRR, +S1/S2, no M/G/R  Resp: CTA B/L  GI: soft, NT/ND, NABS  : + suprapubic distention  Vascular: no LE edema B/L        LABS:      132<L>  |  102  |  40<H>  ----------------------------<  114<H>  3.6   |  17<L>  |  2.05<H>    Ca    7.9<L>      2023 06:05    TPro  4.4<L>  /  Alb  2.2<L>  /  TBili  0.8  /  DBili      /  AST  114<H>  /  ALT  218<H>  /  AlkPhos  396<H>      Creatinine Trend: 2.05 <--, 2.26 <--, 2.17 <--, 2.18 <--, 2.25 <--, 2.21 <--, 1.83 <--, 1.80 <--, 1.75 <--                        8.1    2.10  )-----------( 21       ( 2023 06:05 )             22.9     Urine Studies:  Urinalysis Basic - ( 28 Mar 2023 14:14 )    Color: Light Yellow / Appearance: Slightly Turbid / S.020 / pH:   Gluc:  / Ketone: Negative  / Bili: Negative / Urobili: Negative   Blood:  / Protein: 100 mg/dl / Nitrite: Negative   Leuk Esterase: Negative / RBC: 2 /hpf / WBC 18 /HPF   Sq Epi:  / Non Sq Epi: 6 /hpf / Bacteria: Negative      Sodium, Random Urine: 45 mmol/L ( @ 14:14)  Creatinine, Random Urine: 96 mg/dL ( @ 14:14)         [facility] NEPHROLOGY- PROGRESS NOTE    67 year old Male with history of MM on chemotherapy presents with fevers. Nephrology consulted for elevated Scr.    Overnight patient with hypotension requiring IVF bolus.    REVIEW OF SYSTEMS:  Gen: + fevers  Cards: no chest pain  Resp: no dyspnea  GI: no nausea or vomiting or diarrhea  Vascular: no LE edema    No Known Allergies      Hospital Medications: Medications reviewed      VITALS:  T(F): 101.5 (23 @ 11:40), Max: 101.5 (23 @ 11:40)  HR: 78 (23 @ 11:37)  BP: 106/51 (23 @ 11:37)  RR: 18 (23 @ 11:37)  SpO2: 93% (23 @ 11:37)  Wt(kg): --     @ 07:01  -   @ 07:00  --------------------------------------------------------  IN: 150 mL / OUT: 200 mL / NET: -50 mL        PHYSICAL EXAM:    Gen: NAD, calm  Cards: RRR, +S1/S2, no M/G/R  Resp: CTA B/L  GI: soft, NT/ND, NABS  : + suprapubic distention  Vascular: no LE edema B/L        LABS:      132<L>  |  102  |  40<H>  ----------------------------<  114<H>  3.6   |  17<L>  |  2.05<H>    Ca    7.9<L>      2023 06:05    TPro  4.4<L>  /  Alb  2.2<L>  /  TBili  0.8  /  DBili      /  AST  114<H>  /  ALT  218<H>  /  AlkPhos  396<H>      Creatinine Trend: 2.05 <--, 2.26 <--, 2.17 <--, 2.18 <--, 2.25 <--, 2.21 <--, 1.83 <--, 1.80 <--, 1.75 <--                        8.1    2.10  )-----------( 21       ( 2023 06:05 )             22.9     Urine Studies:  Urinalysis Basic - ( 28 Mar 2023 14:14 )    Color: Light Yellow / Appearance: Slightly Turbid / S.020 / pH:   Gluc:  / Ketone: Negative  / Bili: Negative / Urobili: Negative   Blood:  / Protein: 100 mg/dl / Nitrite: Negative   Leuk Esterase: Negative / RBC: 2 /hpf / WBC 18 /HPF   Sq Epi:  / Non Sq Epi: 6 /hpf / Bacteria: Negative      Sodium, Random Urine: 45 mmol/L ( @ 14:14)  Creatinine, Random Urine: 96 mg/dL ( @ 14:14)

## 2023-04-01 NOTE — PROGRESS NOTE ADULT - SUBJECTIVE AND OBJECTIVE BOX
67yPatient is a 67y old  Male who presents with a chief complaint of fever (01 Apr 2023 13:14)      Interval history:  Febrile, no new symptoms, had hypotensive episode yesterday, responded to fluids.       Allergies:   No Known Allergies      Antimicrobials:  acyclovir   Oral Tab/Cap 400 milliGRAM(s) Oral every 12 hours  entecavir Solution 0.25 milliGRAM(s) Oral daily  ganciclovir IVPB 190 milliGRAM(s) IV Intermittent every 24 hours  piperacillin/tazobactam IVPB.. 3.375 Gram(s) IV Intermittent every 8 hours      REVIEW OF SYSTEMS:  No chest pain   No SOB  No abdominal pain  No dysuria   No rash.       Vital Signs Last 24 Hrs  T(C): 37.4 (04-01-23 @ 20:57), Max: 38.6 (04-01-23 @ 11:40)  T(F): 99.3 (04-01-23 @ 20:57), Max: 101.5 (04-01-23 @ 11:40)  HR: 84 (04-01-23 @ 20:57) (78 - 96)  BP: 114/68 (04-01-23 @ 20:57) (106/51 - 123/65)  BP(mean): --  RR: 18 (04-01-23 @ 20:57) (18 - 18)  SpO2: 94% (04-01-23 @ 20:57) (93% - 95%)      PHYSICAL EXAM:  Patient in no acute distress. AAOX3. sitting in chair   no icterus   breathing comfortably on RA  trace edema.  IV sites not inflamed.                             8.1    2.10  )-----------( 21       ( 01 Apr 2023 06:05 )             22.9   04-01    132<L>  |  102  |  40<H>  ----------------------------<  114<H>  3.6   |  17<L>  |  2.05<H>    Ca    7.9<L>      01 Apr 2023 06:05    TPro  4.4<L>  /  Alb  2.2<L>  /  TBili  0.8  /  DBili  x   /  AST  114<H>  /  ALT  218<H>  /  AlkPhos  396<H>  03-31      LIVER FUNCTIONS - ( 31 Mar 2023 19:46 )  Alb: 2.2 g/dL / Pro: 4.4 g/dL / ALK PHOS: 396 U/L / ALT: 218 U/L / AST: 114 U/L / GGT: x             Culture - Blood (03.29.23 @ 19:09)   Specimen Source: .Blood Blood-Peripheral  Culture Results:   No growth to date.      Radiology:    < from: CT Head No Cont (04.01.23 @ 09:19) >  IMPRESSION:  Mild chronic microvascular changes without evidence of an   acute transcortical infarction or hemorrhage.

## 2023-04-01 NOTE — PROGRESS NOTE ADULT - SUBJECTIVE AND OBJECTIVE BOX
Name of Patient : PETER DE LA PAZ  MRN: [id number]  Date of visit: 04-01-23      Subjective: Patient seen and examined. No new events except as noted.   hypotensive and febrile yesterday     REVIEW OF SYSTEMS:    CONSTITUTIONAL: No weakness, fevers or chills  EYES/ENT: No visual changes;  No vertigo or throat pain   NECK: No pain or stiffness  RESPIRATORY: No cough, wheezing, hemoptysis; No shortness of breath  CARDIOVASCULAR: No chest pain or palpitations  GASTROINTESTINAL: No abdominal or epigastric pain. No nausea, vomiting, or hematemesis; No diarrhea or constipation. No melena or hematochezia.  GENITOURINARY: No dysuria, frequency or hematuria  NEUROLOGICAL: No numbness or weakness  SKIN: No itching, burning, rashes, or lesions   All other review of systems is negative unless indicated above.    MEDICATIONS:  MEDICATIONS  (STANDING):  acyclovir   Oral Tab/Cap 400 milliGRAM(s) Oral every 12 hours  aspirin enteric coated 81 milliGRAM(s) Oral daily  atorvastatin 40 milliGRAM(s) Oral at bedtime  entecavir Solution 0.25 milliGRAM(s) Oral daily  lactated ringers. 1000 milliLiter(s) (70 mL/Hr) IV Continuous <Continuous>  loperamide 2 milliGRAM(s) Oral three times a day  midodrine. 10 milliGRAM(s) Oral three times a day  piperacillin/tazobactam IVPB.. 3.375 Gram(s) IV Intermittent every 8 hours      PHYSICAL EXAM:  T(C): 36.8 (04-01-23 @ 13:00), Max: 38.6 (04-01-23 @ 11:40)  HR: 78 (04-01-23 @ 11:37) (78 - 96)  BP: 106/51 (04-01-23 @ 11:37) (106/51 - 123/65)  RR: 18 (04-01-23 @ 11:37) (18 - 18)  SpO2: 93% (04-01-23 @ 11:37) (93% - 95%)  Wt(kg): --  I&O's Summary    31 Mar 2023 07:01  -  01 Apr 2023 07:00  --------------------------------------------------------  IN: 150 mL / OUT: 200 mL / NET: -50 mL    01 Apr 2023 07:01  -  01 Apr 2023 20:13  --------------------------------------------------------  IN: 120 mL / OUT: 950 mL / NET: -830 mL          Appearance: Normal	  HEENT:  PERRLA   Lymphatic: No lymphadenopathy   Cardiovascular: Normal S1 S2, no JVD  Respiratory: normal effort , clear  Gastrointestinal:  Soft, Non-tender  Skin: No rashes,  warm to touch  Psychiatry:  Mood & affect appropriate  Musculuskeletal: No edema    recent labs, Imaging and EKGs personally reviewed     03-31-23 @ 07:01  -  04-01-23 @ 07:00  --------------------------------------------------------  IN: 150 mL / OUT: 200 mL / NET: -50 mL    04-01-23 @ 07:01  -  04-01-23 @ 20:13  --------------------------------------------------------  IN: 120 mL / OUT: 950 mL / NET: -830 mL                            8.1    2.10  )-----------( 21       ( 01 Apr 2023 06:05 )             22.9               04-01    132<L>  |  102  |  40<H>  ----------------------------<  114<H>  3.6   |  17<L>  |  2.05<H>    Ca    7.9<L>      01 Apr 2023 06:05    TPro  4.4<L>  /  Alb  2.2<L>  /  TBili  0.8  /  DBili  x   /  AST  114<H>  /  ALT  218<H>  /  AlkPhos  396<H>  03-31    PT/INR - ( 31 Mar 2023 19:53 )   PT: 19.0 sec;   INR: 1.63 ratio         PTT - ( 31 Mar 2023 19:53 )  PTT:32.8 sec                            Name of Patient : PETER DE LA PAZ  MRN: [id number]  Date of visit: 04-01-23      Subjective: Patient seen and examined. No new events except as noted.   hypotensive and febrile yesterday     REVIEW OF SYSTEMS:    CONSTITUTIONAL: No weakness, fevers or chills  EYES/ENT: No visual changes;  No vertigo or throat pain   NECK: No pain or stiffness  RESPIRATORY: No cough, wheezing, hemoptysis; No shortness of breath  CARDIOVASCULAR: No chest pain or palpitations  GASTROINTESTINAL: No abdominal or epigastric pain. No nausea, vomiting, or hematemesis; No diarrhea or constipation. No melena or hematochezia.  GENITOURINARY: No dysuria, frequency or hematuria  NEUROLOGICAL: No numbness or weakness  SKIN: No itching, burning, rashes, or lesions   All other review of systems is negative unless indicated above.    MEDICATIONS:  MEDICATIONS  (STANDING):  acyclovir   Oral Tab/Cap 400 milliGRAM(s) Oral every 12 hours  aspirin enteric coated 81 milliGRAM(s) Oral daily  atorvastatin 40 milliGRAM(s) Oral at bedtime  entecavir Solution 0.25 milliGRAM(s) Oral daily  lactated ringers. 1000 milliLiter(s) (70 mL/Hr) IV Continuous <Continuous>  loperamide 2 milliGRAM(s) Oral three times a day  midodrine. 10 milliGRAM(s) Oral three times a day  piperacillin/tazobactam IVPB.. 3.375 Gram(s) IV Intermittent every 8 hours      PHYSICAL EXAM:  T(C): 36.8 (04-01-23 @ 13:00), Max: 38.6 (04-01-23 @ 11:40)  HR: 78 (04-01-23 @ 11:37) (78 - 96)  BP: 106/51 (04-01-23 @ 11:37) (106/51 - 123/65)  RR: 18 (04-01-23 @ 11:37) (18 - 18)  SpO2: 93% (04-01-23 @ 11:37) (93% - 95%)  Wt(kg): --  I&O's Summary    31 Mar 2023 07:01  -  01 Apr 2023 07:00  --------------------------------------------------------  IN: 150 mL / OUT: 200 mL / NET: -50 mL    01 Apr 2023 07:01  -  01 Apr 2023 20:13  --------------------------------------------------------  IN: 120 mL / OUT: 950 mL / NET: -830 mL          Appearance: Normal	  HEENT:  PERRLA   Lymphatic: No lymphadenopathy   Cardiovascular: Normal S1 S2, no JVD  Respiratory: normal effort , clear  Gastrointestinal:  Soft, Non-tender  Skin: No rashes,  warm to touch  Psychiatry:  Mood & affect appropriate  Musculuskeletal: No edema    recent labs, Imaging and EKGs personally reviewed     03-31-23 @ 07:01  -  04-01-23 @ 07:00  --------------------------------------------------------  IN: 150 mL / OUT: 200 mL / NET: -50 mL    04-01-23 @ 07:01  -  04-01-23 @ 20:13  --------------------------------------------------------  IN: 120 mL / OUT: 950 mL / NET: -830 mL                            8.1    2.10  )-----------( 21       ( 01 Apr 2023 06:05 )             22.9               04-01    132<L>  |  102  |  40<H>  ----------------------------<  114<H>  3.6   |  17<L>  |  2.05<H>    Ca    7.9<L>      01 Apr 2023 06:05    TPro  4.4<L>  /  Alb  2.2<L>  /  TBili  0.8  /  DBili  x   /  AST  114<H>  /  ALT  218<H>  /  AlkPhos  396<H>  03-31    PT/INR - ( 31 Mar 2023 19:53 )   PT: 19.0 sec;   INR: 1.63 ratio         PTT - ( 31 Mar 2023 19:53 )  PTT:32.8 sec                            Name of Patient : PETER DE LA PAZ  MRN: [id number]  Date of visit: 04-01-23      Subjective: Patient seen and examined. No new events except as noted.   hypotensive and febrile yesterday     REVIEW OF SYSTEMS:    CONSTITUTIONAL: No weakness, fevers or chills  EYES/ENT: No visual changes;  No vertigo or throat pain   NECK: No pain or stiffness  RESPIRATORY: No cough, wheezing, hemoptysis; No shortness of breath  CARDIOVASCULAR: No chest pain or palpitations  GASTROINTESTINAL: No abdominal or epigastric pain. No nausea, vomiting, or hematemesis; No diarrhea or constipation. No melena or hematochezia.  GENITOURINARY: No dysuria, frequency or hematuria  NEUROLOGICAL: No numbness or weakness  SKIN: No itching, burning, rashes, or lesions   All other review of systems is negative unless indicated above.    MEDICATIONS:  MEDICATIONS  (STANDING):  acyclovir   Oral Tab/Cap 400 milliGRAM(s) Oral every 12 hours  aspirin enteric coated 81 milliGRAM(s) Oral daily  atorvastatin 40 milliGRAM(s) Oral at bedtime  entecavir Solution 0.25 milliGRAM(s) Oral daily  lactated ringers. 1000 milliLiter(s) (70 mL/Hr) IV Continuous <Continuous>  loperamide 2 milliGRAM(s) Oral three times a day  midodrine. 10 milliGRAM(s) Oral three times a day  piperacillin/tazobactam IVPB.. 3.375 Gram(s) IV Intermittent every 8 hours      PHYSICAL EXAM:  T(C): 36.8 (04-01-23 @ 13:00), Max: 38.6 (04-01-23 @ 11:40)  HR: 78 (04-01-23 @ 11:37) (78 - 96)  BP: 106/51 (04-01-23 @ 11:37) (106/51 - 123/65)  RR: 18 (04-01-23 @ 11:37) (18 - 18)  SpO2: 93% (04-01-23 @ 11:37) (93% - 95%)  Wt(kg): --  I&O's Summary    31 Mar 2023 07:01  -  01 Apr 2023 07:00  --------------------------------------------------------  IN: 150 mL / OUT: 200 mL / NET: -50 mL    01 Apr 2023 07:01  -  01 Apr 2023 20:13  --------------------------------------------------------  IN: 120 mL / OUT: 950 mL / NET: -830 mL          Appearance: Normal	  HEENT:  PERRLA   Lymphatic: No lymphadenopathy   Cardiovascular: Normal S1 S2, no JVD  Respiratory: normal effort , clear  Gastrointestinal:  Soft, Non-tender  Skin: No rashes,  warm to touch  Psychiatry:  Mood & affect appropriate  Musculuskeletal: No edema    recent labs, Imaging and EKGs personally reviewed     03-31-23 @ 07:01  -  04-01-23 @ 07:00  --------------------------------------------------------  IN: 150 mL / OUT: 200 mL / NET: -50 mL    04-01-23 @ 07:01  -  04-01-23 @ 20:13  --------------------------------------------------------  IN: 120 mL / OUT: 950 mL / NET: -830 mL                            8.1    2.10  )-----------( 21       ( 01 Apr 2023 06:05 )             22.9               04-01    132<L>  |  102  |  40<H>  ----------------------------<  114<H>  3.6   |  17<L>  |  2.05<H>    Ca    7.9<L>      01 Apr 2023 06:05    TPro  4.4<L>  /  Alb  2.2<L>  /  TBili  0.8  /  DBili  x   /  AST  114<H>  /  ALT  218<H>  /  AlkPhos  396<H>  03-31    PT/INR - ( 31 Mar 2023 19:53 )   PT: 19.0 sec;   INR: 1.63 ratio         PTT - ( 31 Mar 2023 19:53 )  PTT:32.8 sec

## 2023-04-01 NOTE — PROVIDER CONTACT NOTE (OTHER) - ACTION/TREATMENT ORDERED:
Provider made aware. Per provider, administer 1 unit PRBC, initial dose of Zosyn, Plts, Ganciclovir.

## 2023-04-01 NOTE — PROVIDER CONTACT NOTE (OTHER) - ASSESSMENT
Pt alert & oriented x 4. bladder scan showed 861ml. patient stated hes having a hard time peeing and it was dribbling all the time. patient also complaint of bladder pain when palpated. bladder was distended.

## 2023-04-02 LAB
ANION GAP SERPL CALC-SCNC: 13 MMOL/L — SIGNIFICANT CHANGE UP (ref 5–17)
APPEARANCE UR: ABNORMAL
BACTERIA # UR AUTO: NEGATIVE — SIGNIFICANT CHANGE UP
BILIRUB UR-MCNC: NEGATIVE — SIGNIFICANT CHANGE UP
BUN SERPL-MCNC: 44 MG/DL — HIGH (ref 7–23)
CALCIUM SERPL-MCNC: 8.3 MG/DL — LOW (ref 8.4–10.5)
CHLORIDE SERPL-SCNC: 101 MMOL/L — SIGNIFICANT CHANGE UP (ref 96–108)
CO2 SERPL-SCNC: 18 MMOL/L — LOW (ref 22–31)
COLOR SPEC: ABNORMAL
COMMENT - URINE: SIGNIFICANT CHANGE UP
CREAT ?TM UR-MCNC: 74 MG/DL — SIGNIFICANT CHANGE UP
CREAT SERPL-MCNC: 2.53 MG/DL — HIGH (ref 0.5–1.3)
DIFF PNL FLD: ABNORMAL
EGFR: 27 ML/MIN/1.73M2 — LOW
EPI CELLS # UR: 13 /HPF — HIGH
GLUCOSE SERPL-MCNC: 106 MG/DL — HIGH (ref 70–99)
GLUCOSE UR QL: NEGATIVE — SIGNIFICANT CHANGE UP
GRAN CASTS # UR COMP ASSIST: 5 /LPF — SIGNIFICANT CHANGE UP
HCT VFR BLD CALC: 23 % — LOW (ref 39–50)
HGB BLD-MCNC: 8 G/DL — LOW (ref 13–17)
HYALINE CASTS # UR AUTO: 5 /LPF — SIGNIFICANT CHANGE UP (ref 0–7)
KETONES UR-MCNC: NEGATIVE — SIGNIFICANT CHANGE UP
LEUKOCYTE ESTERASE UR-ACNC: ABNORMAL
MCHC RBC-ENTMCNC: 30.7 PG — SIGNIFICANT CHANGE UP (ref 27–34)
MCHC RBC-ENTMCNC: 34.8 GM/DL — SIGNIFICANT CHANGE UP (ref 32–36)
MCV RBC AUTO: 88.1 FL — SIGNIFICANT CHANGE UP (ref 80–100)
NITRITE UR-MCNC: NEGATIVE — SIGNIFICANT CHANGE UP
NRBC # BLD: 0 /100 — SIGNIFICANT CHANGE UP (ref 0–0)
NRBC # BLD: 2 /100 WBCS — HIGH (ref 0–0)
PH UR: 6 — SIGNIFICANT CHANGE UP (ref 5–8)
PLATELET # BLD AUTO: 23 K/UL — LOW (ref 150–400)
POTASSIUM SERPL-MCNC: 3.7 MMOL/L — SIGNIFICANT CHANGE UP (ref 3.5–5.3)
POTASSIUM SERPL-SCNC: 3.7 MMOL/L — SIGNIFICANT CHANGE UP (ref 3.5–5.3)
PROMYELOCYTES # FLD: 1 % — HIGH (ref 0–0)
PROT UR-MCNC: ABNORMAL
RBC # BLD: 2.61 M/UL — LOW (ref 4.2–5.8)
RBC # FLD: 23.3 % — HIGH (ref 10.3–14.5)
RBC CASTS # UR COMP ASSIST: 41 /HPF — HIGH (ref 0–4)
SODIUM SERPL-SCNC: 132 MMOL/L — LOW (ref 135–145)
SODIUM UR-SCNC: 53 MMOL/L — SIGNIFICANT CHANGE UP
SP GR SPEC: 1.01 — SIGNIFICANT CHANGE UP (ref 1.01–1.02)
UROBILINOGEN FLD QL: NEGATIVE — SIGNIFICANT CHANGE UP
WBC # BLD: 1.68 K/UL — LOW (ref 3.8–10.5)
WBC # FLD AUTO: 1.68 K/UL — LOW (ref 3.8–10.5)
WBC UR QL: 147 /HPF — HIGH (ref 0–5)

## 2023-04-02 RX ORDER — PIPERACILLIN AND TAZOBACTAM 4; .5 G/20ML; G/20ML
3.38 INJECTION, POWDER, LYOPHILIZED, FOR SOLUTION INTRAVENOUS EVERY 8 HOURS
Refills: 0 | Status: DISCONTINUED | OUTPATIENT
Start: 2023-04-02 | End: 2023-04-04

## 2023-04-02 RX ADMIN — SODIUM CHLORIDE 100 MILLILITER(S): 9 INJECTION, SOLUTION INTRAVENOUS at 14:00

## 2023-04-02 RX ADMIN — Medication 400 MILLIGRAM(S): at 06:13

## 2023-04-02 RX ADMIN — Medication 2 MILLIGRAM(S): at 06:13

## 2023-04-02 RX ADMIN — Medication 2 MILLIGRAM(S): at 22:14

## 2023-04-02 RX ADMIN — Medication 400 MILLIGRAM(S): at 17:04

## 2023-04-02 RX ADMIN — ATORVASTATIN CALCIUM 40 MILLIGRAM(S): 80 TABLET, FILM COATED ORAL at 22:14

## 2023-04-02 RX ADMIN — MIDODRINE HYDROCHLORIDE 10 MILLIGRAM(S): 2.5 TABLET ORAL at 17:04

## 2023-04-02 RX ADMIN — PIPERACILLIN AND TAZOBACTAM 25 GRAM(S): 4; .5 INJECTION, POWDER, LYOPHILIZED, FOR SOLUTION INTRAVENOUS at 14:32

## 2023-04-02 RX ADMIN — MIDODRINE HYDROCHLORIDE 10 MILLIGRAM(S): 2.5 TABLET ORAL at 11:45

## 2023-04-02 RX ADMIN — Medication 650 MILLIGRAM(S): at 17:50

## 2023-04-02 RX ADMIN — GANCICLOVIR SODIUM 100 MILLIGRAM(S): 50 INJECTION, POWDER, LYOPHILIZED, FOR SOLUTION INTRAVENTRICULAR at 08:04

## 2023-04-02 RX ADMIN — Medication 2 MILLIGRAM(S): at 14:02

## 2023-04-02 RX ADMIN — PIPERACILLIN AND TAZOBACTAM 25 GRAM(S): 4; .5 INJECTION, POWDER, LYOPHILIZED, FOR SOLUTION INTRAVENOUS at 22:14

## 2023-04-02 RX ADMIN — PIPERACILLIN AND TAZOBACTAM 25 GRAM(S): 4; .5 INJECTION, POWDER, LYOPHILIZED, FOR SOLUTION INTRAVENOUS at 06:13

## 2023-04-02 RX ADMIN — SODIUM CHLORIDE 70 MILLILITER(S): 9 INJECTION, SOLUTION INTRAVENOUS at 06:27

## 2023-04-02 RX ADMIN — Medication 50 MILLIGRAM(S): at 14:02

## 2023-04-02 RX ADMIN — MIDODRINE HYDROCHLORIDE 10 MILLIGRAM(S): 2.5 TABLET ORAL at 06:12

## 2023-04-02 RX ADMIN — Medication 81 MILLIGRAM(S): at 11:44

## 2023-04-02 RX ADMIN — ENTECAVIR 0.25 MILLIGRAM(S): 0.5 TABLET ORAL at 14:02

## 2023-04-02 RX ADMIN — Medication 650 MILLIGRAM(S): at 17:04

## 2023-04-02 NOTE — PROGRESS NOTE ADULT - SUBJECTIVE AND OBJECTIVE BOX
Name of Patient : PETER DE LA PAZ  MRN: [id number]  Date of visit: 23       Subjective: Patient seen and examined. No new events except as noted.   Febrile  BP better     REVIEW OF SYSTEMS:    CONSTITUTIONAL: No weakness, fevers or chills  EYES/ENT: No visual changes;  No vertigo or throat pain   NECK: No pain or stiffness  RESPIRATORY: No cough, wheezing, hemoptysis; No shortness of breath  CARDIOVASCULAR: No chest pain or palpitations  GASTROINTESTINAL: No abdominal or epigastric pain. No nausea, vomiting, or hematemesis; No diarrhea or constipation. No melena or hematochezia.  GENITOURINARY: No dysuria, frequency or hematuria  NEUROLOGICAL: No numbness or weakness  SKIN: No itching, burning, rashes, or lesions   All other review of systems is negative unless indicated above.    MEDICATIONS:  MEDICATIONS  (STANDING):  acyclovir   Oral Tab/Cap 400 milliGRAM(s) Oral every 12 hours  aspirin enteric coated 81 milliGRAM(s) Oral daily  atorvastatin 40 milliGRAM(s) Oral at bedtime  entecavir Solution 0.25 milliGRAM(s) Oral daily  ganciclovir IVPB 190 milliGRAM(s) IV Intermittent every 24 hours  lactated ringers. 1000 milliLiter(s) (100 mL/Hr) IV Continuous <Continuous>  loperamide 2 milliGRAM(s) Oral three times a day  midodrine. 10 milliGRAM(s) Oral three times a day  piperacillin/tazobactam IVPB.. 3.375 Gram(s) IV Intermittent every 8 hours  predniSONE   Tablet 50 milliGRAM(s) Oral daily      PHYSICAL EXAM:  T(C): 37.2 (23 @ 21:34), Max: 38.5 (23 @ 16:42)  HR: 60 (23 @ 21:34) (60 - 95)  BP: 120/73 (23 @ 21:34) (104/52 - 120/73)  RR: 18 (23 @ 21:34) (18 - 18)  SpO2: 97% (23 @ 21:34) (93% - 99%)  Wt(kg): --  I&O's Summary    2023 07:01  -  2023 07:00  --------------------------------------------------------  IN: 360 mL / OUT: 2375 mL / NET: - mL    2023 07:  -  2023 21:58  --------------------------------------------------------  IN: 1500 mL / OUT: 1920 mL / NET: -420 mL          Appearance: Normal	  HEENT:  PERRLA   Lymphatic: No lymphadenopathy   Cardiovascular: Normal S1 S2, no JVD  Respiratory: normal effort , clear  Gastrointestinal:  Soft, Non-tender  Skin: No rashes,  warm to touch  Psychiatry:  Mood & affect appropriate  Musculuskeletal: No edema    recent labs, Imaging and EKGs personally reviewed     23 @ 07:  -  23 @ 07:00  --------------------------------------------------------  IN: 360 mL / OUT: 2375 mL / NET: - mL    23 @ 07:01  -  23 @ 21:58  --------------------------------------------------------  IN: 1500 mL / OUT: 1920 mL / NET: -420 mL                          8.0    1.68  )-----------(  07:13 )             23.0               04    132<L>  |  101  |  44<H>  ----------------------------<  106<H>  3.7   |  18<L>  |  2.53<H>    Ca    8.3<L>      2023 07:10                         Urinalysis Basic - ( 2023 14:40 )    Color: Light Orange / Appearance: Turbid / S.015 / pH: x  Gluc: x / Ketone: Negative  / Bili: Negative / Urobili: Negative   Blood: x / Protein: 100 mg/dl / Nitrite: Negative   Leuk Esterase: Small / RBC: 41 /hpf /  /HPF   Sq Epi: x / Non Sq Epi: 13 /hpf / Bacteria: Negative               Name of Patient : PETER DE LA PAZ  MRN: [id number]  Date of visit: 23       Subjective: Patient seen and examined. No new events except as noted.   Febrile  BP better     REVIEW OF SYSTEMS:    CONSTITUTIONAL: No weakness, fevers or chills  EYES/ENT: No visual changes;  No vertigo or throat pain   NECK: No pain or stiffness  RESPIRATORY: No cough, wheezing, hemoptysis; No shortness of breath  CARDIOVASCULAR: No chest pain or palpitations  GASTROINTESTINAL: No abdominal or epigastric pain. No nausea, vomiting, or hematemesis; No diarrhea or constipation. No melena or hematochezia.  GENITOURINARY: No dysuria, frequency or hematuria  NEUROLOGICAL: No numbness or weakness  SKIN: No itching, burning, rashes, or lesions   All other review of systems is negative unless indicated above.    MEDICATIONS:  MEDICATIONS  (STANDING):  acyclovir   Oral Tab/Cap 400 milliGRAM(s) Oral every 12 hours  aspirin enteric coated 81 milliGRAM(s) Oral daily  atorvastatin 40 milliGRAM(s) Oral at bedtime  entecavir Solution 0.25 milliGRAM(s) Oral daily  ganciclovir IVPB 190 milliGRAM(s) IV Intermittent every 24 hours  lactated ringers. 1000 milliLiter(s) (100 mL/Hr) IV Continuous <Continuous>  loperamide 2 milliGRAM(s) Oral three times a day  midodrine. 10 milliGRAM(s) Oral three times a day  piperacillin/tazobactam IVPB.. 3.375 Gram(s) IV Intermittent every 8 hours  predniSONE   Tablet 50 milliGRAM(s) Oral daily      PHYSICAL EXAM:  T(C): 37.2 (23 @ 21:34), Max: 38.5 (23 @ 16:42)  HR: 60 (23 @ 21:34) (60 - 95)  BP: 120/73 (23 @ 21:34) (104/52 - 120/73)  RR: 18 (23 @ 21:34) (18 - 18)  SpO2: 97% (23 @ 21:34) (93% - 99%)  Wt(kg): --  I&O's Summary    2023 07:01  -  2023 07:00  --------------------------------------------------------  IN: 360 mL / OUT: 2375 mL / NET: - mL    2023 07:  -  2023 21:58  --------------------------------------------------------  IN: 1500 mL / OUT: 1920 mL / NET: -420 mL          Appearance: Normal	  HEENT:  PERRLA   Lymphatic: No lymphadenopathy   Cardiovascular: Normal S1 S2, no JVD  Respiratory: normal effort , clear  Gastrointestinal:  Soft, Non-tender  Skin: No rashes,  warm to touch  Psychiatry:  Mood & affect appropriate  Musculuskeletal: No edema    recent labs, Imaging and EKGs personally reviewed     23 @ 07:  -  23 @ 07:00  --------------------------------------------------------  IN: 360 mL / OUT: 2375 mL / NET: - mL    23 @ 07:01  -  23 @ 21:58  --------------------------------------------------------  IN: 1500 mL / OUT: 1920 mL / NET: -420 mL                          8.0    1.68  )-----------(  07:13 )             23.0               04    132<L>  |  101  |  44<H>  ----------------------------<  106<H>  3.7   |  18<L>  |  2.53<H>    Ca    8.3<L>      2023 07:10                         Urinalysis Basic - ( 2023 14:40 )    Color: Light Orange / Appearance: Turbid / S.015 / pH: x  Gluc: x / Ketone: Negative  / Bili: Negative / Urobili: Negative   Blood: x / Protein: 100 mg/dl / Nitrite: Negative   Leuk Esterase: Small / RBC: 41 /hpf /  /HPF   Sq Epi: x / Non Sq Epi: 13 /hpf / Bacteria: Negative               Name of Patient : PETER DE LA PAZ  MRN: [id number]  Date of visit: 23       Subjective: Patient seen and examined. No new events except as noted.   Febrile  BP better     REVIEW OF SYSTEMS:    CONSTITUTIONAL: No weakness, fevers or chills  EYES/ENT: No visual changes;  No vertigo or throat pain   NECK: No pain or stiffness  RESPIRATORY: No cough, wheezing, hemoptysis; No shortness of breath  CARDIOVASCULAR: No chest pain or palpitations  GASTROINTESTINAL: No abdominal or epigastric pain. No nausea, vomiting, or hematemesis; No diarrhea or constipation. No melena or hematochezia.  GENITOURINARY: No dysuria, frequency or hematuria  NEUROLOGICAL: No numbness or weakness  SKIN: No itching, burning, rashes, or lesions   All other review of systems is negative unless indicated above.    MEDICATIONS:  MEDICATIONS  (STANDING):  acyclovir   Oral Tab/Cap 400 milliGRAM(s) Oral every 12 hours  aspirin enteric coated 81 milliGRAM(s) Oral daily  atorvastatin 40 milliGRAM(s) Oral at bedtime  entecavir Solution 0.25 milliGRAM(s) Oral daily  ganciclovir IVPB 190 milliGRAM(s) IV Intermittent every 24 hours  lactated ringers. 1000 milliLiter(s) (100 mL/Hr) IV Continuous <Continuous>  loperamide 2 milliGRAM(s) Oral three times a day  midodrine. 10 milliGRAM(s) Oral three times a day  piperacillin/tazobactam IVPB.. 3.375 Gram(s) IV Intermittent every 8 hours  predniSONE   Tablet 50 milliGRAM(s) Oral daily      PHYSICAL EXAM:  T(C): 37.2 (23 @ 21:34), Max: 38.5 (23 @ 16:42)  HR: 60 (23 @ 21:34) (60 - 95)  BP: 120/73 (23 @ 21:34) (104/52 - 120/73)  RR: 18 (23 @ 21:34) (18 - 18)  SpO2: 97% (23 @ 21:34) (93% - 99%)  Wt(kg): --  I&O's Summary    2023 07:01  -  2023 07:00  --------------------------------------------------------  IN: 360 mL / OUT: 2375 mL / NET: - mL    2023 07:  -  2023 21:58  --------------------------------------------------------  IN: 1500 mL / OUT: 1920 mL / NET: -420 mL          Appearance: Normal	  HEENT:  PERRLA   Lymphatic: No lymphadenopathy   Cardiovascular: Normal S1 S2, no JVD  Respiratory: normal effort , clear  Gastrointestinal:  Soft, Non-tender  Skin: No rashes,  warm to touch  Psychiatry:  Mood & affect appropriate  Musculuskeletal: No edema    recent labs, Imaging and EKGs personally reviewed     23 @ 07:  -  23 @ 07:00  --------------------------------------------------------  IN: 360 mL / OUT: 2375 mL / NET: - mL    23 @ 07:01  -  23 @ 21:58  --------------------------------------------------------  IN: 1500 mL / OUT: 1920 mL / NET: -420 mL                          8.0    1.68  )-----------(  07:13 )             23.0               04    132<L>  |  101  |  44<H>  ----------------------------<  106<H>  3.7   |  18<L>  |  2.53<H>    Ca    8.3<L>      2023 07:10                         Urinalysis Basic - ( 2023 14:40 )    Color: Light Orange / Appearance: Turbid / S.015 / pH: x  Gluc: x / Ketone: Negative  / Bili: Negative / Urobili: Negative   Blood: x / Protein: 100 mg/dl / Nitrite: Negative   Leuk Esterase: Small / RBC: 41 /hpf /  /HPF   Sq Epi: x / Non Sq Epi: 13 /hpf / Bacteria: Negative

## 2023-04-02 NOTE — PROGRESS NOTE ADULT - ASSESSMENT
History of bioAVR   echo unremarkable     Anemia  Monitor hemoglobin, transfuse as needed.  plan as per Heme    DVT   a/c on hold  low plts  fu with vascular surgery / heme     Hypotension   due to sepsis   better now   on midodrine

## 2023-04-02 NOTE — PROGRESS NOTE ADULT - ASSESSMENT
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   - no obvious infectious symptoms, CXR without consolidations or effusions   - U/A unremarkable  - F/u BCx2 and UCx  --> Neg final   - Recurrent fever. F/U repeat BCx2 sent 03/27 --> NGTD; F/u final. ABX as per ID   - less likely transfusion reaction as pt febrile prior to transfusion  - LE VA duplex --> + For DVT    - Was on empiric treatment with Vanc/cefepime --> ID consulted; Now on Zosyn  - Check GI PCR -- Neg   - Hold home penicillin while on ABX   - V/Q scan to R/O PE -- Low probability   - ID to follow up, infectious work up as per ID  --> F/u CMV PCR -- + Started on Ganciclovir per ID , CMV IGG (+) and IgM (-), Cryptococcal Ag --Neg, Quantiferon Tb -- Neg, Fungitell -- <31   - CT Chest non-contrast, noted, no acute pathology  - Recurrent fever 03/29 --> F/u BCx2 -- NGTD; F/u final   - Shock, RRT, resume antibiotics IV fluid, MICU eval , ID follow up , ? neeed to resume steroids?    Anemia, Pancytopenia  - Drop in Hgb, Occult +  - Hold Hep gtt    - S/P 1 unit of PRBCs 03/28  - Maintain active T+S. Transfuse for Hgb < 7.0, and platelets < 10.K  - GI eval appreciated; F/u recs --> No plans for scope at this time   - Was on Xarelto, held due to drop in Platelets as per Heme/Onc. Monitor platelet count     HypoNa  - Na of 134, cont to monitor and trend  - Appreciate renal eval.   - Serial labs     Multiple myeloma.   - pancytopenia largely at baseline although Hg 6.7 on arrival  - s/p 1U PRBC with good response   - Was on acyclovir, now on Ganciclovir as per ID   - C/w home entecavir   - S/P dexamethasone, D/C'd by hematology , follow up outpatient after discharge    - Pt reports his Revlimid was held  - Heme/Onc consulted; F/u recs   - Resume home aspirin.    DVT   - Duplex + For R soleal vein DVT  --> Will consider CT A chest once creatinine permits as patient received contrast; Monitor for LOPEZ   - On Hep gtt; Monitor PTT; Monitor H/H closely --> Now on hold in view of drop in Hgb   - Check VQ scan to R/O PE -- Low probability   - Vascular eval appreciated; AC as tolerated, on Xarelto 10, monitor H/H   - Serial Duplex to assess for propagation  -- Without propagation     RK  - S/P Contrast on 03/23   - Check bladder scan to R/O retention  - C/w IVF for hydration  - Monitor Cr closely; Avoid nephrotoxic agents   - Cr up-trending. On IVF     Hypertension, now hypotensive   - C/w home metoprolol.  - C/w Midodrine 5 TID. Hold for SBP > 140     Hyperlipidemia.   - C/w home atorvastatin.    Abnormal CT  - Outpatient follow up for CT findings    Prophylactic measure.   dvt ppx: DVT PPX   diet: regular  ambulate: with assistance    fall precautions  aspiration precautions.      Discussed with Patient and Wife at the bedside.  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   - no obvious infectious symptoms, CXR without consolidations or effusions   - U/A unremarkable  - F/u BCx2 and UCx  --> Neg final   - Recurrent fever. F/U repeat BCx2 sent 03/27 --> NGTD; F/u final. ABX as per ID   - less likely transfusion reaction as pt febrile prior to transfusion  - LE VA duplex --> + For DVT    - Was on empiric treatment with Vanc/cefepime --> ID consulted; Now on Zosyn  - Check GI PCR -- Neg   - Hold home penicillin while on ABX   - V/Q scan to R/O PE -- Low probability   - ID to follow up, infectious work up as per ID  --> F/u CMV PCR -- + Started on Ganciclovir per ID , CMV IGG (+) and IgM (-), Cryptococcal Ag --Neg, Quantiferon Tb -- Neg, Fungitell -- <31   - CT Chest non-contrast, noted, no acute pathology  - Recurrent fever 03/29 --> F/u BCx2 -- NGTD; F/u final   - Shock, RRT, resume antibiotics IV fluid, MICU eval , ID follow up , ? neeed to resume steroids?    Anemia, Pancytopenia  - Drop in Hgb, Occult +  - Hold Hep gtt    - S/P 1 unit of PRBCs 03/28  - Maintain active T+S. Transfuse for Hgb < 7.0, and platelets < 10.K  - GI eval appreciated; F/u recs --> No plans for scope at this time   - Was on Xarelto, held due to drop in Platelets as per Heme/Onc. Monitor platelet count     HypoNa  - Na of 134, cont to monitor and trend  - Appreciate renal eval.   - Serial labs     Multiple myeloma.   - pancytopenia largely at baseline although Hg 6.7 on arrival  - s/p 1U PRBC with good response   - Was on acyclovir, now on Ganciclovir as per ID   - C/w home entecavir   - S/P dexamethasone, D/C'd by hematology , follow up outpatient after discharge    - Pt reports his Revlimid was held  - Heme/Onc consulted; F/u recs   - Resume home aspirin.    DVT   - Duplex + For R soleal vein DVT  --> Will consider CT A chest once creatinine permits as patient received contrast; Monitor for LOPEZ   - On Hep gtt; Monitor PTT; Monitor H/H closely --> Now on hold in view of drop in Hgb   - Check VQ scan to R/O PE -- Low probability   - Vascular eval appreciated; AC as tolerated, on Xarelto 10, monitor H/H   - Serial Duplex to assess for propagation  -- Without propagation     RK  - S/P Contrast on 03/23   - Check bladder scan to R/O retention  - C/w IVF for hydration  - Monitor Cr closely; Avoid nephrotoxic agents   - Cr up-trending. On IVF     Hypertension, now hypotensive   - C/w home metoprolol.  - C/w Midodrine 5 TID. Hold for SBP > 140     Hyperlipidemia.   - C/w home atorvastatin.    Abnormal CT  - Outpatient follow up for CT findings    Prophylactic measure.   dvt ppx: DVT PPX   diet: regular  ambulate: with assistance    fall precautions  aspiration precautions.      Discussed with Patient and Wife at the bedside.  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   - no obvious infectious symptoms, CXR without consolidations or effusions   - U/A unremarkable  - F/u BCx2 and UCx  --> Neg final   - Recurrent fever. F/U repeat BCx2 sent 03/27 --> NGTD; F/u final. ABX as per ID   - less likely transfusion reaction as pt febrile prior to transfusion  - LE VA duplex --> + For DVT    - Was on empiric treatment with Vanc/cefepime --> ID consulted; Now on Zosyn  - Check GI PCR -- Neg   - Hold home penicillin while on ABX   - V/Q scan to R/O PE -- Low probability   - ID to follow up, infectious work up as per ID  --> F/u CMV PCR -- + Started on Ganciclovir per ID , CMV IGG (+) and IgM (-), Cryptococcal Ag --Neg, Quantiferon Tb -- Neg, Fungitell -- <31   - CT Chest non-contrast, noted, no acute pathology  - Recurrent fever 03/29 --> F/u BCx2 -- NGTD; F/u final   - Shock, RRT, resume antibiotics IV fluid, MICU eval , ID follow up , ? neeed to resume steroids?  - D/C antibtioics, short course steroid per hematology     Anemia, Pancytopenia  - Drop in Hgb, Occult +  - Hold Hep gtt    - S/P 1 unit of PRBCs 03/28  - Maintain active T+S. Transfuse for Hgb < 7.0, and platelets < 10.K  - GI eval appreciated; F/u recs --> No plans for scope at this time   - Was on Xarelto, held due to drop in Platelets as per Heme/Onc. Monitor platelet count     HypoNa  - Na of 134, cont to monitor and trend  - Appreciate renal eval.   - Serial labs     Multiple myeloma.   - pancytopenia largely at baseline although Hg 6.7 on arrival  - s/p 1U PRBC with good response   - Was on acyclovir, now on Ganciclovir as per ID   - C/w home entecavir   - S/P dexamethasone, D/C'd by hematology , follow up outpatient after discharge    - Pt reports his Revlimid was held  - Heme/Onc consulted; F/u recs   - Resume home aspirin.    DVT   - Duplex + For R soleal vein DVT  --> Will consider CT A chest once creatinine permits as patient received contrast; Monitor for LOPEZ   - On Hep gtt; Monitor PTT; Monitor H/H closely --> Now on hold in view of drop in Hgb   - Check VQ scan to R/O PE -- Low probability   - Vascular eval appreciated; AC as tolerated, on Xarelto 10, monitor H/H   - Serial Duplex to assess for propagation  -- Without propagation     RK  - S/P Contrast on 03/23   - Check bladder scan to R/O retention  - C/w IVF for hydration  - Monitor Cr closely; Avoid nephrotoxic agents   - Cr up-trending. On IVF     Hypertension, now hypotensive   - C/w home metoprolol.  - C/w Midodrine 5 TID. Hold for SBP > 140     Hyperlipidemia.   - C/w home atorvastatin.    Abnormal CT  - Outpatient follow up for CT findings    Prophylactic measure.   dvt ppx: DVT PPX   diet: regular  ambulate: with assistance    fall precautions  aspiration precautions.      Discussed with Patient and Wife at the bedside.  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   - no obvious infectious symptoms, CXR without consolidations or effusions   - U/A unremarkable  - F/u BCx2 and UCx  --> Neg final   - Recurrent fever. F/U repeat BCx2 sent 03/27 --> NGTD; F/u final. ABX as per ID   - less likely transfusion reaction as pt febrile prior to transfusion  - LE VA duplex --> + For DVT    - Was on empiric treatment with Vanc/cefepime --> ID consulted; Now on Zosyn  - Check GI PCR -- Neg   - Hold home penicillin while on ABX   - V/Q scan to R/O PE -- Low probability   - ID to follow up, infectious work up as per ID  --> F/u CMV PCR -- + Started on Ganciclovir per ID , CMV IGG (+) and IgM (-), Cryptococcal Ag --Neg, Quantiferon Tb -- Neg, Fungitell -- <31   - CT Chest non-contrast, noted, no acute pathology  - Recurrent fever 03/29 --> F/u BCx2 -- NGTD; F/u final   - Shock, RRT, resume antibiotics IV fluid, MICU eval , ID follow up , ? neeed to resume steroids?  - D/C antibtioics, short course steroid per hematology     Anemia, Pancytopenia  - Drop in Hgb, Occult +  - Hold Hep gtt    - S/P 1 unit of PRBCs 03/28  - Maintain active T+S. Transfuse for Hgb < 7.0, and platelets < 10.K  - GI eval appreciated; F/u recs --> No plans for scope at this time   - Was on Xarelto, held due to drop in Platelets as per Heme/Onc. Monitor platelet count     HypoNa  - Na of 134, cont to monitor and trend  - Appreciate renal eval.   - Serial labs     Multiple myeloma.   - pancytopenia largely at baseline although Hg 6.7 on arrival  - s/p 1U PRBC with good response   - Was on acyclovir, now on Ganciclovir as per ID   - C/w home entecavir   - S/P dexamethasone, D/C'd by hematology , follow up outpatient after discharge    - Pt reports his Revlimid was held  - Heme/Onc consulted; F/u recs   - Resume home aspirin.    DVT   - Duplex + For R soleal vein DVT  --> Will consider CT A chest once creatinine permits as patient received contrast; Monitor for LOPEZ   - On Hep gtt; Monitor PTT; Monitor H/H closely --> Now on hold in view of drop in Hgb   - Check VQ scan to R/O PE -- Low probability   - Vascular eval appreciated; AC as tolerated, on Xarelto 10, monitor H/H   - Serial Duplex to assess for propagation  -- Without propagation     RK  - S/P Contrast on 03/23   - Check bladder scan to R/O retention  - C/w IVF for hydration  - Monitor Cr closely; Avoid nephrotoxic agents   - Cr up-trending. On IVF     Hypertension, now hypotensive   - C/w home metoprolol.  - C/w Midodrine 5 TID. Hold for SBP > 140     Hyperlipidemia.   - C/w home atorvastatin.    Abnormal CT  - Outpatient follow up for CT findings    Prophylactic measure.   dvt ppx: DVT PPX   diet: regular  ambulate: with assistance    fall precautions  aspiration precautions.      Discussed with Patient and Wife at the bedside.

## 2023-04-02 NOTE — PROGRESS NOTE ADULT - SUBJECTIVE AND OBJECTIVE BOX
Patient seen and examined at bedside. feeling better. no active complaints     MEDICATIONS  (STANDING):  acyclovir   Oral Tab/Cap 400 milliGRAM(s) Oral every 12 hours  aspirin enteric coated 81 milliGRAM(s) Oral daily  atorvastatin 40 milliGRAM(s) Oral at bedtime  entecavir Solution 0.25 milliGRAM(s) Oral daily  ganciclovir IVPB 190 milliGRAM(s) IV Intermittent every 24 hours  lactated ringers. 1000 milliLiter(s) (70 mL/Hr) IV Continuous <Continuous>  loperamide 2 milliGRAM(s) Oral three times a day  midodrine. 10 milliGRAM(s) Oral three times a day  piperacillin/tazobactam IVPB.. 3.375 Gram(s) IV Intermittent every 8 hours    MEDICATIONS  (PRN):  acetaminophen     Tablet .. 650 milliGRAM(s) Oral every 6 hours PRN Temp greater or equal to 38C (100.4F), Mild Pain (1 - 3)        Vital Signs Last 24 Hrs  T(C): 37.7 (02 Apr 2023 04:43), Max: 38.6 (01 Apr 2023 11:40)  T(F): 99.9 (02 Apr 2023 04:43), Max: 101.5 (01 Apr 2023 11:40)  HR: 95 (02 Apr 2023 04:43) (78 - 95)  BP: 118/65 (02 Apr 2023 04:43) (106/51 - 118/65)  BP(mean): --  RR: 18 (02 Apr 2023 04:43) (18 - 18)  SpO2: 99% (02 Apr 2023 04:43) (93% - 99%)    Parameters below as of 02 Apr 2023 04:43  Patient On (Oxygen Delivery Method): room air          PHYSICAL EXAM:     GENERAL:  Appears stated age, well-groomed  HEENT:  NC/AT,  conjunctivae clear and pink  CHEST:  CTA b/l  HEART:  S1 s2+   ABDOMEN:  Soft, non-tender, non-distended  EXTEREMITIES:  no cyanosis,clubbing or edema  NEURO:  Alert, oriented, no asterixis                            8.0    1.68  )-----------( 23       ( 02 Apr 2023 07:13 )             23.0       04-02    132<L>  |  101  |  44<H>  ----------------------------<  106<H>  3.7   |  18<L>  |  2.53<H>    Ca    8.3<L>      02 Apr 2023 07:10    TPro  4.4<L>  /  Alb  2.2<L>  /  TBili  0.8  /  DBili  x   /  AST  114<H>  /  ALT  218<H>  /  AlkPhos  396<H>  03-31

## 2023-04-02 NOTE — PROGRESS NOTE ADULT - SUBJECTIVE AND OBJECTIVE BOX
[facility] NEPHROLOGY- PROGRESS NOTE    67 year old Male with history of MM on chemotherapy presents with fevers. Nephrology consulted for elevated Scr.      REVIEW OF SYSTEMS:  Gen: + fevers  Cards: no chest pain  Resp: no dyspnea  GI: no nausea or vomiting or diarrhea  Vascular: no LE edema    No Known Allergies      Hospital Medications: Medications reviewed      VITALS:  T(F): 99.3 (23 @ 10:49), Max: 99.9 (23 @ 04:43)  HR: 86 (23 @ 10:49)  BP: 104/52 (23 @ 10:49)  RR: 18 (23 @ 10:49)  SpO2: 94% (23 @ 10:49)  Wt(kg): --     @ 07:01  -   @ 07:00  --------------------------------------------------------  IN: 360 mL / OUT: 2375 mL / NET: - mL        PHYSICAL EXAM:    Gen: NAD, calm  Cards: RRR, +S1/S2, no M/G/R  Resp: CTA B/L  GI: soft, NT/ND, NABS  : + lloyd  Vascular: no LE edema B/L        LABS:      132<L>  |  101  |  44<H>  ----------------------------<  106<H>  3.7   |  18<L>  |  2.53<H>    Ca    8.3<L>      2023 07:10    TPro  4.4<L>  /  Alb  2.2<L>  /  TBili  0.8  /  DBili      /  AST  114<H>  /  ALT  218<H>  /  AlkPhos  396<H>      Creatinine Trend: 2.53 <--, 2.05 <--, 2.26 <--, 2.17 <--, 2.18 <--, 2.25 <--, 2.21 <--, 1.83 <--, 1.80 <--                        8.0    1.68  )-----------( 23       ( 2023 07:13 )             23.0     Urine Studies:  Urinalysis Basic - ( 28 Mar 2023 14:14 )    Color: Light Yellow / Appearance: Slightly Turbid / S.020 / pH:   Gluc:  / Ketone: Negative  / Bili: Negative / Urobili: Negative   Blood:  / Protein: 100 mg/dl / Nitrite: Negative   Leuk Esterase: Negative / RBC: 2 /hpf / WBC 18 /HPF   Sq Epi:  / Non Sq Epi: 6 /hpf / Bacteria: Negative      Sodium, Random Urine: 45 mmol/L ( @ 14:14)  Creatinine, Random Urine: 96 mg/dL ( @ 14:14)            < from: CT Head No Cont (23 @ 09:19) >    IMPRESSION:  Mild chronic microvascular changes without evidence of an   acute transcortical infarction or hemorrhage.    --- End of Report ---    < end of copied text >   [facility] NEPHROLOGY- PROGRESS NOTE    67 year old Male with history of MM on chemotherapy presents with fevers. Nephrology consulted for elevated Scr.      REVIEW OF SYSTEMS:  Gen: + fevers  Cards: no chest pain  Resp: no dyspnea  GI: no nausea or vomiting or diarrhea  Vascular: no LE edema    No Known Allergies      Hospital Medications: Medications reviewed      VITALS:  T(F): 99.3 (23 @ 10:49), Max: 99.9 (23 @ 04:43)  HR: 86 (23 @ 10:49)  BP: 104/52 (23 @ 10:49)  RR: 18 (23 @ 10:49)  SpO2: 94% (23 @ 10:49)  Wt(kg): --     @ 07:01  -   @ 07:00  --------------------------------------------------------  IN: 360 mL / OUT: 2375 mL / NET: - mL        PHYSICAL EXAM:    Gen: NAD, calm  Cards: RRR, +S1/S2, no M/G/R  Resp: CTA B/L  GI: soft, NT/ND, NABS  : + lloyd  Vascular: no LE edema B/L        LABS:      132<L>  |  101  |  44<H>  ----------------------------<  106<H>  3.7   |  18<L>  |  2.53<H>    Ca    8.3<L>      2023 07:10    TPro  4.4<L>  /  Alb  2.2<L>  /  TBili  0.8  /  DBili      /  AST  114<H>  /  ALT  218<H>  /  AlkPhos  396<H>      Creatinine Trend: 2.53 <--, 2.05 <--, 2.26 <--, 2.17 <--, 2.18 <--, 2.25 <--, 2.21 <--, 1.83 <--, 1.80 <--                        8.0    1.68  )-----------( 23       ( 2023 07:13 )             23.0     Urine Studies:  Urinalysis Basic - ( 28 Mar 2023 14:14 )    Color: Light Yellow / Appearance: Slightly Turbid / S.020 / pH:   Gluc:  / Ketone: Negative  / Bili: Negative / Urobili: Negative   Blood:  / Protein: 100 mg/dl / Nitrite: Negative   Leuk Esterase: Negative / RBC: 2 /hpf / WBC 18 /HPF   Sq Epi:  / Non Sq Epi: 6 /hpf / Bacteria: Negative      Sodium, Random Urine: 45 mmol/L ( @ 14:14)  Creatinine, Random Urine: 96 mg/dL ( @ 14:14)            < from: CT Head No Cont (23 @ 09:19) >    IMPRESSION:  Mild chronic microvascular changes without evidence of an   acute transcortical infarction or hemorrhage.    --- End of Report ---    < end of copied text >   [facility] NEPHROLOGY- PROGRESS NOTE    67 year old Male with history of MM on chemotherapy presents with fevers. Nephrology consulted for elevated Scr.      REVIEW OF SYSTEMS:  Gen: + fevers  Cards: no chest pain  Resp: no dyspnea  GI: no nausea or vomiting or diarrhea  Vascular: no LE edema    No Known Allergies      Hospital Medications: Medications reviewed      VITALS:  T(F): 99.3 (23 @ 10:49), Max: 99.9 (23 @ 04:43)  HR: 86 (23 @ 10:49)  BP: 104/52 (23 @ 10:49)  RR: 18 (23 @ 10:49)  SpO2: 94% (23 @ 10:49)  Wt(kg): --     @ 07:01  -   @ 07:00  --------------------------------------------------------  IN: 360 mL / OUT: 2375 mL / NET: - mL        PHYSICAL EXAM:    Gen: NAD, calm  Cards: RRR, +S1/S2, no M/G/R  Resp: CTA B/L  GI: soft, NT/ND, NABS  : + lloyd  Vascular: no LE edema B/L        LABS:      132<L>  |  101  |  44<H>  ----------------------------<  106<H>  3.7   |  18<L>  |  2.53<H>    Ca    8.3<L>      2023 07:10    TPro  4.4<L>  /  Alb  2.2<L>  /  TBili  0.8  /  DBili      /  AST  114<H>  /  ALT  218<H>  /  AlkPhos  396<H>      Creatinine Trend: 2.53 <--, 2.05 <--, 2.26 <--, 2.17 <--, 2.18 <--, 2.25 <--, 2.21 <--, 1.83 <--, 1.80 <--                        8.0    1.68  )-----------( 23       ( 2023 07:13 )             23.0     Urine Studies:  Urinalysis Basic - ( 28 Mar 2023 14:14 )    Color: Light Yellow / Appearance: Slightly Turbid / S.020 / pH:   Gluc:  / Ketone: Negative  / Bili: Negative / Urobili: Negative   Blood:  / Protein: 100 mg/dl / Nitrite: Negative   Leuk Esterase: Negative / RBC: 2 /hpf / WBC 18 /HPF   Sq Epi:  / Non Sq Epi: 6 /hpf / Bacteria: Negative      Sodium, Random Urine: 45 mmol/L ( @ 14:14)  Creatinine, Random Urine: 96 mg/dL ( @ 14:14)            < from: CT Head No Cont (23 @ 09:19) >    IMPRESSION:  Mild chronic microvascular changes without evidence of an   acute transcortical infarction or hemorrhage.    --- End of Report ---    < end of copied text >

## 2023-04-02 NOTE — PROGRESS NOTE ADULT - ASSESSMENT
67y Male with history of MM on chemotherapy presents with fevers. Nephrology consulted for elevated Scr.    1) RK: Secondary to ATN which had plateaued with acute rise in Scr this morning unclear if due to crystal induced nephropathy versus hypotension on 4/1. Increase LR to 100 ml/hour for possible crystal induced RK due to Cytovene (currently renally dosed). Repeat UA with urine sodium and urine creatinine. Rodriguez placed for urinary retention. TMA work up negative. Avoid nephrotoxins. Monitor electrolytes.    2) Hypotension: BP improving. Continue with midodrine 10 mg PO TID. Monitor BP.    3) Hyponatremia: In setting of RK and likely decreased diluting ability. Continue with IVF. Change Zosyn to NS base to limit free water. Monitor serum Na.    4) Metabolic acidosis: Mixed disorder with concurrent respiratory alkalosis and normal pH. No need for sodium bicarbonate. Repeat blood gas in AM. Monitor pH.    5) MM: As per Heme/Onc. On renal dose Entecavir.          [facility] NEPHROLOGY  Leoncio Leonard M.D.  Tay Brooke D.O.  Precious Chen M.D.  Nidia Stallings, NURIS, ANP-C    Telephone: [phone]  Facsimile: [phone]    [address] [place] [id number]   67y Male with history of MM on chemotherapy presents with fevers. Nephrology consulted for elevated Scr.    1) RK: Secondary to ATN which had plateaued with acute rise in Scr this morning unclear if due to crystal induced nephropathy versus hypotension on 4/1. Increase LR to 100 ml/hour for possible crystal induced RK due to Cytovene (currently renally dosed). Repeat UA with urine sodium and urine creatinine. Rodriguez placed for urinary retention. TMA work up negative. Avoid nephrotoxins. Monitor electrolytes.    2) Hypotension: BP improving. Continue with midodrine 10 mg PO TID. Monitor BP.    3) Hyponatremia: In setting of RK and likely decreased diluting ability. Continue with IVF. Change Zosyn to NS base to limit free water. Monitor serum Na.    4) Metabolic acidosis: Mixed disorder with concurrent respiratory alkalosis and normal pH. No need for sodium bicarbonate. Repeat blood gas in AM. Monitor pH.    5) MM: As per Heme/Onc. On renal dose Entecavir.          [facility] NEPHROLOGY  Leoncio Leonard M.D.  Tay Brooke D.O.  Precious Chen M.D.  Nidia Stallings, NURIS, ANP-C    Telephone: [phone]  Facsimile: [phone]    [address] [place] [id number]   67y Male with history of MM on chemotherapy presents with fevers. Nephrology consulted for elevated Scr.    1) RK: Secondary to ATN which had plateaued with acute rise in Scr this morning unclear if due to crystal induced nephropathy versus hypotension on 4/1. Increase LR to 100 ml/hour for possible crystal induced RK due to Cytovene (currently renally dosed). Repeat UA with urine sodium and urine creatinine. Rodriguez placed for urinary retention. TMA work up negative. Avoid nephrotoxins. Monitor electrolytes.    2) Hypotension: BP improving. Continue with midodrine 10 mg PO TID. Monitor BP.    3) Hyponatremia: In setting of RK and likely decreased diluting ability. Continue with IVF. Change Zosyn to NS base to limit free water. Monitor serum Na.    4) Metabolic acidosis: Mixed disorder with concurrent respiratory alkalosis and normal pH. No need for sodium bicarbonate. Repeat blood gas in AM. Monitor pH.    5) MM: As per Heme/Onc. On renal dose Entecavir.          [facility] NEPHROLOGY  Leoncio Leonard M.D.  Tay Brooke D.O.  Precious Chen M.D.  Nidia Stallings, NURIS, ANP-C    Telephone: [phone]  Facsimile: [phone]    [address] [place] [id number]

## 2023-04-02 NOTE — PROVIDER CONTACT NOTE (OTHER) - ASSESSMENT
Patient is alert and oriented X4. Denies chest pain and shortness of breath. No acute signs/symptoms of distress present. Patient denies and abdominal/suprapubic pain. Reports no urge to urinate.

## 2023-04-02 NOTE — PROGRESS NOTE ADULT - ASSESSMENT
PETER DE LA PAZ is a 67y Male who presents with a chief complaint of fever    Multiple Myeloma  ·	Patient follows with Dr. Jean Claude Hubbard, [facility].  ·	Patient was on lenalidomide maintenance until February 2023 when he had progression of disease.  ·	He was then switched to daratumumab + CyBorD; last dose March 23rd, 2023.  ·	No systemic therapy while inpatient or during rehabilitation.  ·	Continue acyclovir 400 mg BID.    Pancytopenia  ·	Pancytopenia has been ongoing since patient was started on latest chemotherapy regimen.  ·	Monitor CBC and transfuse to maintain HGB > 7; PLT > 10.  ·	Possible slight iron deficiency, but otherwise no evidence of nutritional deficits or hemolysis.  ·	Continue to hold anticoagulation.  ·	No plans for gastrointestinal interventions at this time.   ·	Leukopenia mostly lymphocytopenia; neutrophil last remains above 1.5.    Fever  ·	Unclear etiology; malignancy vs CMV viremia.  ·	Follow-up infectious disease recommendations.  ·	Currently on ganciclovir, piperacillin-tazobactam, and entecavir.    had fever yesterday     DVT   - below knee DVT   - hold ac for now  - will need repeat duplex in 6 weeks       Nile Pedro MD  HematologyOncology   O: [phone]  PETER DE LA PAZ is a 67y Male who presents with a chief complaint of fever    Multiple Myeloma  ·	Patient follows with Dr. Jean Claude Hubbard, [facility].  ·	Patient was on lenalidomide maintenance until February 2023 when he had progression of disease.  ·	He was then switched to daratumumab + CyBorD; last dose March 23rd, 2023.  ·	No systemic therapy while inpatient or during rehabilitation.  ·	Continue acyclovir 400 mg BID.    Pancytopenia  ·	Pancytopenia has been ongoing since patient was started on latest chemotherapy regimen.  ·	Monitor CBC and transfuse to maintain HGB > 7; PLT > 10.  ·	Possible slight iron deficiency, but otherwise no evidence of nutritional deficits or hemolysis.  ·	Continue to hold anticoagulation.  ·	No plans for gastrointestinal interventions at this time.   ·	Leukopenia mostly lymphocytopenia; neutrophil last remains above 1.5.    Fever  ·	Unclear etiology; malignancy vs CMV viremia.  ·	Follow-up infectious disease recommendations.  ·	Currently on ganciclovir, piperacillin-tazobactam, and entecavir.    had fever yesterday     DVT   - below knee DVT   - hold ac for now  - will need repeat duplex in 6 weeks       Nile Pedro MD  HematologyOncology   O: [phone]  PETER DE LA PAZ is a 67y Male who presents with a chief complaint of fever    Multiple Myeloma  ·	Patient follows with Dr. Jean Claude Hubbard, [facility].  ·	Patient was on lenalidomide maintenance until February 2023 when he had progression of disease.  ·	He was then switched to daratumumab + CyBorD; last dose March 23rd, 2023.  ·	No systemic therapy while inpatient or during rehabilitation.  ·	Continue acyclovir 400 mg BID.    Pancytopenia  ·	Pancytopenia has been ongoing since patient was started on latest chemotherapy regimen.  ·	Monitor CBC and transfuse to maintain HGB > 7; PLT > 10.  ·	Possible slight iron deficiency, but otherwise no evidence of nutritional deficits or hemolysis.  ·	Continue to hold anticoagulation.  ·	No plans for gastrointestinal interventions at this time.   ·	Leukopenia mostly lymphocytopenia; neutrophil last remains above 1.5.    Fever  ·	Unclear etiology; malignancy vs CMV viremia.  ·	Follow-up infectious disease recommendations.  ·	Currently on ganciclovir, piperacillin-tazobactam, and entecavir.    had fever yesterday     DVT   - below knee DVT   - hold ac for now  - will need repeat duplex in 6 weeks       Nile Pedro MD  HematologyOncology   O: [phone]

## 2023-04-03 LAB
ANION GAP SERPL CALC-SCNC: 12 MMOL/L — SIGNIFICANT CHANGE UP (ref 5–17)
BASE EXCESS BLDV CALC-SCNC: -4.7 MMOL/L — LOW (ref -2–3)
BLD GP AB SCN SERPL QL: POSITIVE — SIGNIFICANT CHANGE UP
BUN SERPL-MCNC: 39 MG/DL — HIGH (ref 7–23)
CALCIUM SERPL-MCNC: 8.4 MG/DL — SIGNIFICANT CHANGE UP (ref 8.4–10.5)
CHLORIDE SERPL-SCNC: 101 MMOL/L — SIGNIFICANT CHANGE UP (ref 96–108)
CO2 BLDV-SCNC: 20 MMOL/L — LOW (ref 22–26)
CO2 SERPL-SCNC: 18 MMOL/L — LOW (ref 22–31)
CREAT SERPL-MCNC: 2.17 MG/DL — HIGH (ref 0.5–1.3)
CULTURE RESULTS: SIGNIFICANT CHANGE UP
EGFR: 32 ML/MIN/1.73M2 — LOW
FUNGITELL: <31 PG/ML — SIGNIFICANT CHANGE UP
GAS PNL BLDV: SIGNIFICANT CHANGE UP
GLUCOSE SERPL-MCNC: 166 MG/DL — HIGH (ref 70–99)
HCO3 BLDV-SCNC: 20 MMOL/L — LOW (ref 22–29)
HCT VFR BLD CALC: 23.7 % — LOW (ref 39–50)
HGB BLD-MCNC: 8.1 G/DL — LOW (ref 13–17)
MCHC RBC-ENTMCNC: 30.5 PG — SIGNIFICANT CHANGE UP (ref 27–34)
MCHC RBC-ENTMCNC: 34.2 GM/DL — SIGNIFICANT CHANGE UP (ref 32–36)
MCV RBC AUTO: 89.1 FL — SIGNIFICANT CHANGE UP (ref 80–100)
NRBC # BLD: 0 /100 WBCS — SIGNIFICANT CHANGE UP (ref 0–0)
PCO2 BLDV: 33 MMHG — LOW (ref 42–55)
PH BLDV: 7.38 — SIGNIFICANT CHANGE UP (ref 7.32–7.43)
PLATELET # BLD AUTO: 14 K/UL — CRITICAL LOW (ref 150–400)
PO2 BLDV: 163 MMHG — HIGH (ref 25–45)
POTASSIUM SERPL-MCNC: 3.9 MMOL/L — SIGNIFICANT CHANGE UP (ref 3.5–5.3)
POTASSIUM SERPL-SCNC: 3.9 MMOL/L — SIGNIFICANT CHANGE UP (ref 3.5–5.3)
PROT SERPL-MCNC: 4.1 G/DL — LOW (ref 6–8.3)
RBC # BLD: 2.66 M/UL — LOW (ref 4.2–5.8)
RBC # FLD: 23 % — HIGH (ref 10.3–14.5)
RH IG SCN BLD-IMP: POSITIVE — SIGNIFICANT CHANGE UP
SAO2 % BLDV: 97.9 % — HIGH (ref 67–88)
SODIUM SERPL-SCNC: 131 MMOL/L — LOW (ref 135–145)
SPECIMEN SOURCE: SIGNIFICANT CHANGE UP
WBC # BLD: 2.63 K/UL — LOW (ref 3.8–10.5)
WBC # FLD AUTO: 2.63 K/UL — LOW (ref 3.8–10.5)

## 2023-04-03 PROCEDURE — 99232 SBSQ HOSP IP/OBS MODERATE 35: CPT

## 2023-04-03 PROCEDURE — 93970 EXTREMITY STUDY: CPT | Mod: 26

## 2023-04-03 RX ORDER — FUROSEMIDE 40 MG
40 TABLET ORAL ONCE
Refills: 0 | Status: COMPLETED | OUTPATIENT
Start: 2023-04-03 | End: 2023-04-03

## 2023-04-03 RX ADMIN — Medication 50 MILLIGRAM(S): at 05:45

## 2023-04-03 RX ADMIN — Medication 2 MILLIGRAM(S): at 14:52

## 2023-04-03 RX ADMIN — MIDODRINE HYDROCHLORIDE 10 MILLIGRAM(S): 2.5 TABLET ORAL at 11:40

## 2023-04-03 RX ADMIN — PIPERACILLIN AND TAZOBACTAM 25 GRAM(S): 4; .5 INJECTION, POWDER, LYOPHILIZED, FOR SOLUTION INTRAVENOUS at 21:49

## 2023-04-03 RX ADMIN — SODIUM CHLORIDE 100 MILLILITER(S): 9 INJECTION, SOLUTION INTRAVENOUS at 14:49

## 2023-04-03 RX ADMIN — Medication 2 MILLIGRAM(S): at 05:42

## 2023-04-03 RX ADMIN — MIDODRINE HYDROCHLORIDE 10 MILLIGRAM(S): 2.5 TABLET ORAL at 17:58

## 2023-04-03 RX ADMIN — Medication 2 MILLIGRAM(S): at 21:49

## 2023-04-03 RX ADMIN — MIDODRINE HYDROCHLORIDE 10 MILLIGRAM(S): 2.5 TABLET ORAL at 05:45

## 2023-04-03 RX ADMIN — PIPERACILLIN AND TAZOBACTAM 25 GRAM(S): 4; .5 INJECTION, POWDER, LYOPHILIZED, FOR SOLUTION INTRAVENOUS at 14:51

## 2023-04-03 RX ADMIN — SODIUM CHLORIDE 100 MILLILITER(S): 9 INJECTION, SOLUTION INTRAVENOUS at 08:08

## 2023-04-03 RX ADMIN — PIPERACILLIN AND TAZOBACTAM 25 GRAM(S): 4; .5 INJECTION, POWDER, LYOPHILIZED, FOR SOLUTION INTRAVENOUS at 07:38

## 2023-04-03 RX ADMIN — ATORVASTATIN CALCIUM 40 MILLIGRAM(S): 80 TABLET, FILM COATED ORAL at 21:49

## 2023-04-03 RX ADMIN — Medication 400 MILLIGRAM(S): at 17:58

## 2023-04-03 RX ADMIN — GANCICLOVIR SODIUM 100 MILLIGRAM(S): 50 INJECTION, POWDER, LYOPHILIZED, FOR SOLUTION INTRAVENTRICULAR at 06:10

## 2023-04-03 RX ADMIN — Medication 81 MILLIGRAM(S): at 11:40

## 2023-04-03 RX ADMIN — ENTECAVIR 0.25 MILLIGRAM(S): 0.5 TABLET ORAL at 14:50

## 2023-04-03 RX ADMIN — Medication 40 MILLIGRAM(S): at 15:46

## 2023-04-03 RX ADMIN — Medication 400 MILLIGRAM(S): at 05:42

## 2023-04-03 NOTE — PROGRESS NOTE ADULT - ASSESSMENT
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   - no obvious infectious symptoms, CXR without consolidations or effusions   - U/A unremarkable  - F/u BCx2 and UCx  --> Neg final   - Recurrent fever. F/U repeat BCx2 sent 03/27 --> NGTD; F/u final. ABX as per ID   - less likely transfusion reaction as pt febrile prior to transfusion  - [facility] duplex --> + For DVT    - Was on empiric treatment with Vanc/cefepime --> ID consulted; Now on Zosyn  - Check GI PCR -- Neg   - Hold home penicillin while on ABX   - V/Q scan to R/O PE -- Low probability   - ID to follow up, infectious work up as per ID  --> F/u CMV PCR -- + Started on Ganciclovir per ID , CMV IGG (+) and IgM (-), Cryptococcal Ag --Neg, Quantiferon Tb -- Neg, Fungitell -- <31   - CT Chest non-contrast, noted, no acute pathology  - Recurrent fever 03/29 --> F/u BCx2 -- NGTD; F/u final   - Shock, RRT, resume antibiotics IV fluid, MICU eval , ID follow up  --> Prednisone 50 PO Qd   - On Zosyn; Defer ABX as per ID'  - short course steroid per hematology     Anemia, Pancytopenia  - Drop in Hgb, Occult +  - Hold Hep gtt    - S/P 1 unit of PRBCs 03/28  - Maintain active T+S. Transfuse for Hgb < 7.0, and platelets < 10.K  - GI eval appreciated; F/u recs --> No plans for scope at this time   - Was on Xarelto, held due to drop in Platelets as per Heme/Onc. Monitor platelet count   - Drop in Platelets. Monitor and transfuse < 10 K, discussed with Heme/ Onc, no transfusion today     HypoNa  - Cont to monitor and trend  - Appreciate renal eval.   - Serial labs     Multiple myeloma.   - pancytopenia largely at baseline although Hg 6.7 on arrival  - s/p 1U PRBC with good response   - Was on acyclovir, now on Ganciclovir as per ID   - C/w home entecavir   - S/P dexamethasone, D/C'd by hematology , follow up outpatient after discharge  --> No on Prednisone 50 Qd   - Pt reports his Revlimid was held  - Heme/Onc consulted; F/u recs   - Resume home aspirin.    DVT   - Duplex + For R soleal vein DVT  --> Will consider CT A chest once creatinine permits as patient received contrast; Monitor for LOPEZ   - On Hep gtt; Monitor PTT; Monitor H/H closely --> Now on hold in view of drop in Hgb   - Check VQ scan to R/O PE -- Low probability   - Vascular eval appreciated; AC as tolerated, on Xarelto 10, monitor H/H   - Serial Duplex to assess for propagation  -- Without propagation   - Plan for repeat Duplex 04/06    RK  - S/P Contrast on 03/23   - Check bladder scan to R/O retention  - C/w IVF for hydration  - Monitor Cr closely; Avoid nephrotoxic agents   - Cr up-trending. On IVF     Hypertension, now hypotensive   - C/w home metoprolol.  - C/w Midodrine 10 TID. Hold for SBP > 140     Hyperlipidemia.   - C/w home atorvastatin.    Abnormal CT  - Outpatient follow up for CT findings    Prophylactic measure.   dvt ppx: DVT PPX   diet: regular  ambulate: with assistance    fall precautions  aspiration precautions.      Discussed with Patient in detail at the bedside. Discussed with Heme/Onc.  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   - no obvious infectious symptoms, CXR without consolidations or effusions   - U/A unremarkable  - F/u BCx2 and UCx  --> Neg final   - Recurrent fever. F/U repeat BCx2 sent 03/27 --> NGTD; F/u final. ABX as per ID   - less likely transfusion reaction as pt febrile prior to transfusion  - [facility] duplex --> + For DVT    - Was on empiric treatment with Vanc/cefepime --> ID consulted; Now on Zosyn  - Check GI PCR -- Neg   - Hold home penicillin while on ABX   - V/Q scan to R/O PE -- Low probability   - ID to follow up, infectious work up as per ID  --> F/u CMV PCR -- + Started on Ganciclovir per ID , CMV IGG (+) and IgM (-), Cryptococcal Ag --Neg, Quantiferon Tb -- Neg, Fungitell -- <31   - CT Chest non-contrast, noted, no acute pathology  - Recurrent fever 03/29 --> F/u BCx2 -- NGTD; F/u final   - Shock, RRT, resume antibiotics IV fluid, MICU eval , ID follow up  --> Prednisone 50 PO Qd   - On Zosyn; Defer ABX as per ID'  - short course steroid per hematology     Anemia, Pancytopenia  - Drop in Hgb, Occult +  - Hold Hep gtt    - S/P 1 unit of PRBCs 03/28  - Maintain active T+S. Transfuse for Hgb < 7.0, and platelets < 10.K  - GI eval appreciated; F/u recs --> No plans for scope at this time   - Was on Xarelto, held due to drop in Platelets as per Heme/Onc. Monitor platelet count   - Drop in Platelets. Monitor and transfuse < 10 K, discussed with Heme/ Onc, no transfusion today     HypoNa  - Cont to monitor and trend  - Appreciate renal eval.   - Serial labs     Multiple myeloma.   - pancytopenia largely at baseline although Hg 6.7 on arrival  - s/p 1U PRBC with good response   - Was on acyclovir, now on Ganciclovir as per ID   - C/w home entecavir   - S/P dexamethasone, D/C'd by hematology , follow up outpatient after discharge  --> No on Prednisone 50 Qd   - Pt reports his Revlimid was held  - Heme/Onc consulted; F/u recs   - Resume home aspirin.    DVT   - Duplex + For R soleal vein DVT  --> Will consider CT A chest once creatinine permits as patient received contrast; Monitor for LOPEZ   - On Hep gtt; Monitor PTT; Monitor H/H closely --> Now on hold in view of drop in Hgb   - Check VQ scan to R/O PE -- Low probability   - Vascular eval appreciated; AC as tolerated, on Xarelto 10, monitor H/H   - Serial Duplex to assess for propagation  -- Without propagation   - Plan for repeat Duplex 04/06    RK  - S/P Contrast on 03/23   - Check bladder scan to R/O retention  - C/w IVF for hydration  - Monitor Cr closely; Avoid nephrotoxic agents   - Cr up-trending. On IVF     Hypertension, now hypotensive   - C/w home metoprolol.  - C/w Midodrine 10 TID. Hold for SBP > 140     Hyperlipidemia.   - C/w home atorvastatin.    Abnormal CT  - Outpatient follow up for CT findings    Prophylactic measure.   dvt ppx: DVT PPX   diet: regular  ambulate: with assistance    fall precautions  aspiration precautions.      Discussed with Patient in detail at the bedside. Discussed with Heme/Onc.  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   - no obvious infectious symptoms, CXR without consolidations or effusions   - U/A unremarkable  - F/u BCx2 and UCx  --> Neg final   - Recurrent fever. F/U repeat BCx2 sent 03/27 --> NGTD; F/u final. ABX as per ID   - less likely transfusion reaction as pt febrile prior to transfusion  - [facility] duplex --> + For DVT    - Was on empiric treatment with Vanc/cefepime --> ID consulted; Now on Zosyn  - Check GI PCR -- Neg   - Hold home penicillin while on ABX   - V/Q scan to R/O PE -- Low probability   - ID to follow up, infectious work up as per ID  --> F/u CMV PCR -- + Started on Ganciclovir per ID , CMV IGG (+) and IgM (-), Cryptococcal Ag --Neg, Quantiferon Tb -- Neg, Fungitell -- <31   - CT Chest non-contrast, noted, no acute pathology  - Recurrent fever 03/29 --> F/u BCx2 -- NGTD; F/u final   - Shock, RRT, resume antibiotics IV fluid, MICU eval , ID follow up  --> Prednisone 50 PO Qd   - On Zosyn; Defer ABX as per ID'  - short course steroid per hematology     Anemia, Pancytopenia  - Drop in Hgb, Occult +  - Hold Hep gtt    - S/P 1 unit of PRBCs 03/28  - Maintain active T+S. Transfuse for Hgb < 7.0, and platelets < 10.K  - GI eval appreciated; F/u recs --> No plans for scope at this time   - Was on Xarelto, held due to drop in Platelets as per Heme/Onc. Monitor platelet count   - Drop in Platelets. Monitor and transfuse < 10 K, discussed with Heme/ Onc, no transfusion today     HypoNa  - Cont to monitor and trend  - Appreciate renal eval.   - Serial labs     Multiple myeloma.   - pancytopenia largely at baseline although Hg 6.7 on arrival  - s/p 1U PRBC with good response   - Was on acyclovir, now on Ganciclovir as per ID   - C/w home entecavir   - S/P dexamethasone, D/C'd by hematology , follow up outpatient after discharge  --> No on Prednisone 50 Qd   - Pt reports his Revlimid was held  - Heme/Onc consulted; F/u recs   - Resume home aspirin.    DVT   - Duplex + For R soleal vein DVT  --> Will consider CT A chest once creatinine permits as patient received contrast; Monitor for LOPEZ   - On Hep gtt; Monitor PTT; Monitor H/H closely --> Now on hold in view of drop in Hgb   - Check VQ scan to R/O PE -- Low probability   - Vascular eval appreciated; AC as tolerated, on Xarelto 10, monitor H/H   - Serial Duplex to assess for propagation  -- Without propagation   - Plan for repeat Duplex 04/06    RK  - S/P Contrast on 03/23   - Check bladder scan to R/O retention  - C/w IVF for hydration  - Monitor Cr closely; Avoid nephrotoxic agents   - Cr up-trending. On IVF     Hypertension, now hypotensive   - C/w home metoprolol.  - C/w Midodrine 10 TID. Hold for SBP > 140     Hyperlipidemia.   - C/w home atorvastatin.    Abnormal CT  - Outpatient follow up for CT findings    Prophylactic measure.   dvt ppx: DVT PPX   diet: regular  ambulate: with assistance    fall precautions  aspiration precautions.      Discussed with Patient in detail at the bedside. Discussed with Heme/Onc.

## 2023-04-03 NOTE — PROGRESS NOTE ADULT - NS ATTEND AMEND GEN_ALL_CORE FT
As above.    68 y/o male with multiple myeloma, admitted with recurrent fever. Unlikely infectious source per infectious disease; possibly malignancy. Will obtain myeloma markers. Also noted worsening pancytopenia which may be due to tumor involvement. Will discuss with [facility] regarding possible treatment As above.    68 y/o male with multiple myeloma, admitted with recurrent fever. Unlikely infectious source per infectious disease; possibly malignancy. Will obtain myeloma markers. Also noted worsening pancytopenia which may be due to tumor involvement. Will discuss with [facility] regarding possible treatment As above.    66 y/o male with multiple myeloma, admitted with recurrent fever. Unlikely infectious source per infectious disease; possibly malignancy. Will obtain myeloma markers. Also noted worsening pancytopenia which may be due to tumor involvement. Will discuss with [facility] regarding possible treatment

## 2023-04-03 NOTE — PROGRESS NOTE ADULT - ASSESSMENT
1. fever  - V/Q scan with low probability of PE   - GI PCR- negative   - CMV PCR elevated, cmv IGG + and CMV IGM -  - cryptococcal ag, QuantiFeronTb, fungitell negative  - per ID, given immunosuppressed state and CMV viremia, ordered repeat CMV PCR and initiated valcyte.     2. anemia, FOBT positive in the setting of thrombocytopenia. Hgb remains at recent baseline. No overt bleeding symptoms.   - no plans for endoscopic evaluation at this time  - monitor H&H, transfuse PRN  - daily PPI     3. Chronic diarrhea, likely related to chemo +/- abx. 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   - GI PCR neg, C-diff neg on 03.03  - cw Imodium 2mg tid for now     4. DVT   - on anticoagulation    5. Multiple myeloma, pancytopenia   - per heme / onc           Attending supervision statement: I have personally seen and examined the patient. I fully participated in the care of this patient. I have made amendments to the documentation where necessary, and agree with the history, physical exam, and plan as outlined by the ACP.    [facility]   Gastroenterology and Hepatology  Office: [phone]   1. fever  - V/Q scan with low probability of PE   - GI PCR- negative   - CMV PCR elevated, cmv IGG + and CMV IGM -  - cryptococcal ag, QuantiFeronTb, fungitell negative  - per ID, given immunosuppressed state and CMV viremia, ordered repeat CMV PCR and initiated valcyte.     2. anemia, FOBT positive in the setting of thrombocytopenia. Hgb remains at recent baseline. No overt bleeding symptoms.   - no plans for endoscopic evaluation at this time  - monitor H&H, transfuse PRN  - daily PPI     3. Chronic diarrhea, likely related to chemo +/- abx. 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   - GI PCR neg, C-diff neg on 03.03  - cw Imodium 2mg tid for now     4. DVT   - on anticoagulation    5. Multiple myeloma, pancytopenia   - per heme / onc           Attending supervision statement: I have personally seen and examined the patient. I fully participated in the care of this patient. I have made amendments to the documentation where necessary, and agree with the history, physical exam, and plan as outlined by the ACP.    [facility]   Gastroenterology and Hepatology  Office: [phone]   1. fever  - V/Q scan with low probability of PE   - GI PCR- negative   - CMV PCR elevated, cmv IGG + and CMV IGM -  - cryptococcal ag, QuantiFeronTb, fungitell negative  - per ID, given immunosuppressed state and CMV viremia, ordered repeat CMV PCR and initiated valcyte.     2. anemia, FOBT positive in the setting of thrombocytopenia. Hgb remains at recent baseline. No overt bleeding symptoms.   - no plans for endoscopic evaluation at this time  - monitor H&H, transfuse PRN  - daily PPI     3. Chronic diarrhea, likely related to chemo +/- abx. 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   - GI PCR neg, C-diff neg on 03.03  - cw Imodium 2mg tid for now     4. DVT   - on anticoagulation    5. Multiple myeloma, pancytopenia   - per heme / onc           Attending supervision statement: I have personally seen and examined the patient. I fully participated in the care of this patient. I have made amendments to the documentation where necessary, and agree with the history, physical exam, and plan as outlined by the ACP.    [facility]   Gastroenterology and Hepatology  Office: [phone]

## 2023-04-03 NOTE — PROGRESS NOTE ADULT - SUBJECTIVE AND OBJECTIVE BOX
Name of Patient : PETER DE LA PAZ  MRN: [id number]  Date of visit: 23 @ 14:10      Subjective: Patient seen and examined. No new events except as noted.   Patient seen earlier this AM.   Lying down in bed. Febrile overnight with Tmax of 101.3   Denies diarrhea, chills     REVIEW OF SYSTEMS:    CONSTITUTIONAL: + Febrile overnight with Tmax of 101.3   EYES/ENT: No visual changes;  No vertigo or throat pain   NECK: No pain or stiffness  RESPIRATORY: No cough, wheezing, hemoptysis; No shortness of breath  CARDIOVASCULAR: No chest pain or palpitations  GASTROINTESTINAL: No abdominal or epigastric pain. No nausea, vomiting, or hematemesis; No diarrhea or constipation. No melena or hematochezia.  GENITOURINARY: No dysuria, frequency or hematuria  NEUROLOGICAL: No numbness or weakness  SKIN: No itching, burning, rashes, or lesions   All other review of systems is negative unless indicated above.    MEDICATIONS:  MEDICATIONS  (STANDING):  acyclovir   Oral Tab/Cap 400 milliGRAM(s) Oral every 12 hours  aspirin enteric coated 81 milliGRAM(s) Oral daily  atorvastatin 40 milliGRAM(s) Oral at bedtime  entecavir Solution 0.25 milliGRAM(s) Oral daily  furosemide   Injectable 40 milliGRAM(s) IV Push once  ganciclovir IVPB 190 milliGRAM(s) IV Intermittent every 24 hours  lactated ringers. 1000 milliLiter(s) (100 mL/Hr) IV Continuous <Continuous>  loperamide 2 milliGRAM(s) Oral three times a day  midodrine. 10 milliGRAM(s) Oral three times a day  piperacillin/tazobactam IVPB.. 3.375 Gram(s) IV Intermittent every 8 hours  predniSONE   Tablet 50 milliGRAM(s) Oral daily      PHYSICAL EXAM:  T(C): 36.5 (23 @ 10:55), Max: 38.5 (23 @ 16:42)  HR: 85 (23 @ 10:55) (60 - 94)  BP: 105/54 (23 @ 10:55) (96/42 - 120/73)  RR: 18 (23 @ 10:55) (18 - 18)  SpO2: 93% (23 @ 10:55) (93% - 97%)  Wt(kg): --  I&O's Summary    2023 07:01  -  2023 07:00  --------------------------------------------------------  IN: 2600 mL / OUT: 2520 mL / NET: 80 mL          Appearance: Normal	  HEENT:  Eyes are open; Glasses   Lymphatic: No lymphadenopathy   Cardiovascular: Normal S1 S2, no JVD  Respiratory: normal effort , clear  Gastrointestinal:  Soft, Non-tender  Skin: No rashes,  warm to touch  Psychiatry:  Mood & affect appropriate  Musculoskeletal: No edema      23 @ 07:01  -  23 @ 07:00  --------------------------------------------------------  IN: 2600 mL / OUT: 2520 mL / NET: 80 mL                              8.1    2.63  )-----------( 14       ( 2023 06:12 )             23.7               04-03    131<L>  |  101  |  39<H>  ----------------------------<  166<H>  3.9   |  18<L>  |  2.17<H>    Ca    8.4      2023 06:12                         Urinalysis Basic - ( 2023 14:40 )    Color: Light Orange / Appearance: Turbid / S.015 / pH: x  Gluc: x / Ketone: Negative  / Bili: Negative / Urobili: Negative   Blood: x / Protein: 100 mg/dl / Nitrite: Negative   Leuk Esterase: Small / RBC: 41 /hpf /  /HPF   Sq Epi: x / Non Sq Epi: 13 /hpf / Bacteria: Negative          Culture - Blood (23 @ 19:46)   Specimen Source: .Blood Blood-Peripheral  Culture Results: No growth to date.    Culture - Blood (23 @ 19:09)   Specimen Source: .Blood Blood-Peripheral  Culture Results: No growth to date.    Culture - Blood (23 @ 18:36)   Specimen Source: .Blood Blood-Peripheral  Culture Results: No growth to date.    Culture - Blood (23 @ 11:05)   Specimen Source: .Blood Blood  Culture Results: No Growth Final    Culture - Blood (23 @ 11:05)   Specimen Source: .Blood Blood  Culture Results: No Growth Final      < from: CT Head No Cont (23 @ 09:19) >    IMPRESSION:  Mild chronic microvascular changes without evidence of an   acute transcortical infarction or hemorrhage.    < end of copied text >       Name of Patient : PETER DE LA PAZ  MRN: [id number]  Date of visit: 23 @ 14:10      Subjective: Patient seen and examined. No new events except as noted.   Patient seen earlier this AM.   Lying down in bed. Febrile overnight with Tmax of 101.3   Denies diarrhea, chills     REVIEW OF SYSTEMS:    CONSTITUTIONAL: + Febrile overnight with Tmax of 101.3   EYES/ENT: No visual changes;  No vertigo or throat pain   NECK: No pain or stiffness  RESPIRATORY: No cough, wheezing, hemoptysis; No shortness of breath  CARDIOVASCULAR: No chest pain or palpitations  GASTROINTESTINAL: No abdominal or epigastric pain. No nausea, vomiting, or hematemesis; No diarrhea or constipation. No melena or hematochezia.  GENITOURINARY: No dysuria, frequency or hematuria  NEUROLOGICAL: No numbness or weakness  SKIN: No itching, burning, rashes, or lesions   All other review of systems is negative unless indicated above.    MEDICATIONS:  MEDICATIONS  (STANDING):  acyclovir   Oral Tab/Cap 400 milliGRAM(s) Oral every 12 hours  aspirin enteric coated 81 milliGRAM(s) Oral daily  atorvastatin 40 milliGRAM(s) Oral at bedtime  entecavir Solution 0.25 milliGRAM(s) Oral daily  furosemide   Injectable 40 milliGRAM(s) IV Push once  ganciclovir IVPB 190 milliGRAM(s) IV Intermittent every 24 hours  lactated ringers. 1000 milliLiter(s) (100 mL/Hr) IV Continuous <Continuous>  loperamide 2 milliGRAM(s) Oral three times a day  midodrine. 10 milliGRAM(s) Oral three times a day  piperacillin/tazobactam IVPB.. 3.375 Gram(s) IV Intermittent every 8 hours  predniSONE   Tablet 50 milliGRAM(s) Oral daily      PHYSICAL EXAM:  T(C): 36.5 (23 @ 10:55), Max: 38.5 (23 @ 16:42)  HR: 85 (23 @ 10:55) (60 - 94)  BP: 105/54 (23 @ 10:55) (96/42 - 120/73)  RR: 18 (23 @ 10:55) (18 - 18)  SpO2: 93% (23 @ 10:55) (93% - 97%)  Wt(kg): --  I&O's Summary    2023 07:01  -  2023 07:00  --------------------------------------------------------  IN: 2600 mL / OUT: 2520 mL / NET: 80 mL          Appearance: Normal	  HEENT:  Eyes are open; Glasses   Lymphatic: No lymphadenopathy   Cardiovascular: Normal S1 S2, no JVD  Respiratory: normal effort , clear  Gastrointestinal:  Soft, Non-tender  Skin: No rashes,  warm to touch  Psychiatry:  Mood & affect appropriate  Musculoskeletal: No edema      23 @ 07:01  -  23 @ 07:00  --------------------------------------------------------  IN: 2600 mL / OUT: 2520 mL / NET: 80 mL                              8.1    2.63  )-----------( 14       ( 2023 06:12 )             23.7               04-03    131<L>  |  101  |  39<H>  ----------------------------<  166<H>  3.9   |  18<L>  |  2.17<H>    Ca    8.4      2023 06:12                         Urinalysis Basic - ( 2023 14:40 )    Color: Light Orange / Appearance: Turbid / S.015 / pH: x  Gluc: x / Ketone: Negative  / Bili: Negative / Urobili: Negative   Blood: x / Protein: 100 mg/dl / Nitrite: Negative   Leuk Esterase: Small / RBC: 41 /hpf /  /HPF   Sq Epi: x / Non Sq Epi: 13 /hpf / Bacteria: Negative          Culture - Blood (23 @ 19:46)   Specimen Source: .Blood Blood-Peripheral  Culture Results: No growth to date.    Culture - Blood (23 @ 19:09)   Specimen Source: .Blood Blood-Peripheral  Culture Results: No growth to date.    Culture - Blood (23 @ 18:36)   Specimen Source: .Blood Blood-Peripheral  Culture Results: No growth to date.    Culture - Blood (23 @ 11:05)   Specimen Source: .Blood Blood  Culture Results: No Growth Final    Culture - Blood (23 @ 11:05)   Specimen Source: .Blood Blood  Culture Results: No Growth Final      < from: CT Head No Cont (23 @ 09:19) >    IMPRESSION:  Mild chronic microvascular changes without evidence of an   acute transcortical infarction or hemorrhage.    < end of copied text >       Name of Patient : PETER DE LA PAZ  MRN: [id number]  Date of visit: 23 @ 14:10      Subjective: Patient seen and examined. No new events except as noted.   Patient seen earlier this AM.   Lying down in bed. Febrile overnight with Tmax of 101.3   Denies diarrhea, chills     REVIEW OF SYSTEMS:    CONSTITUTIONAL: + Febrile overnight with Tmax of 101.3   EYES/ENT: No visual changes;  No vertigo or throat pain   NECK: No pain or stiffness  RESPIRATORY: No cough, wheezing, hemoptysis; No shortness of breath  CARDIOVASCULAR: No chest pain or palpitations  GASTROINTESTINAL: No abdominal or epigastric pain. No nausea, vomiting, or hematemesis; No diarrhea or constipation. No melena or hematochezia.  GENITOURINARY: No dysuria, frequency or hematuria  NEUROLOGICAL: No numbness or weakness  SKIN: No itching, burning, rashes, or lesions   All other review of systems is negative unless indicated above.    MEDICATIONS:  MEDICATIONS  (STANDING):  acyclovir   Oral Tab/Cap 400 milliGRAM(s) Oral every 12 hours  aspirin enteric coated 81 milliGRAM(s) Oral daily  atorvastatin 40 milliGRAM(s) Oral at bedtime  entecavir Solution 0.25 milliGRAM(s) Oral daily  furosemide   Injectable 40 milliGRAM(s) IV Push once  ganciclovir IVPB 190 milliGRAM(s) IV Intermittent every 24 hours  lactated ringers. 1000 milliLiter(s) (100 mL/Hr) IV Continuous <Continuous>  loperamide 2 milliGRAM(s) Oral three times a day  midodrine. 10 milliGRAM(s) Oral three times a day  piperacillin/tazobactam IVPB.. 3.375 Gram(s) IV Intermittent every 8 hours  predniSONE   Tablet 50 milliGRAM(s) Oral daily      PHYSICAL EXAM:  T(C): 36.5 (23 @ 10:55), Max: 38.5 (23 @ 16:42)  HR: 85 (23 @ 10:55) (60 - 94)  BP: 105/54 (23 @ 10:55) (96/42 - 120/73)  RR: 18 (23 @ 10:55) (18 - 18)  SpO2: 93% (23 @ 10:55) (93% - 97%)  Wt(kg): --  I&O's Summary    2023 07:01  -  2023 07:00  --------------------------------------------------------  IN: 2600 mL / OUT: 2520 mL / NET: 80 mL          Appearance: Normal	  HEENT:  Eyes are open; Glasses   Lymphatic: No lymphadenopathy   Cardiovascular: Normal S1 S2, no JVD  Respiratory: normal effort , clear  Gastrointestinal:  Soft, Non-tender  Skin: No rashes,  warm to touch  Psychiatry:  Mood & affect appropriate  Musculoskeletal: No edema      23 @ 07:01  -  23 @ 07:00  --------------------------------------------------------  IN: 2600 mL / OUT: 2520 mL / NET: 80 mL                              8.1    2.63  )-----------( 14       ( 2023 06:12 )             23.7               04-03    131<L>  |  101  |  39<H>  ----------------------------<  166<H>  3.9   |  18<L>  |  2.17<H>    Ca    8.4      2023 06:12                         Urinalysis Basic - ( 2023 14:40 )    Color: Light Orange / Appearance: Turbid / S.015 / pH: x  Gluc: x / Ketone: Negative  / Bili: Negative / Urobili: Negative   Blood: x / Protein: 100 mg/dl / Nitrite: Negative   Leuk Esterase: Small / RBC: 41 /hpf /  /HPF   Sq Epi: x / Non Sq Epi: 13 /hpf / Bacteria: Negative          Culture - Blood (23 @ 19:46)   Specimen Source: .Blood Blood-Peripheral  Culture Results: No growth to date.    Culture - Blood (23 @ 19:09)   Specimen Source: .Blood Blood-Peripheral  Culture Results: No growth to date.    Culture - Blood (23 @ 18:36)   Specimen Source: .Blood Blood-Peripheral  Culture Results: No growth to date.    Culture - Blood (23 @ 11:05)   Specimen Source: .Blood Blood  Culture Results: No Growth Final    Culture - Blood (23 @ 11:05)   Specimen Source: .Blood Blood  Culture Results: No Growth Final      < from: CT Head No Cont (23 @ 09:19) >    IMPRESSION:  Mild chronic microvascular changes without evidence of an   acute transcortical infarction or hemorrhage.    < end of copied text >

## 2023-04-03 NOTE — PROVIDER CONTACT NOTE (CRITICAL VALUE NOTIFICATION) - BACKGROUND
pt has hx of valve replacement, multiple myeloma, on chemo. presented to ED with fever. on ganciclovir and zosyn IVPB

## 2023-04-03 NOTE — PROGRESS NOTE ADULT - SUBJECTIVE AND OBJECTIVE BOX
Chief Complaint:  Patient is a 67y old  Male who presents with a chief complaint of fever (2023 10:14)      Date of service 23 @ 13:20      Interval Events:   Patient seen and examined.   No abdominal pain, nausea, vomiting.  Ate 100% of lunch.   States he has not had diarrhea today.    Hospital Medications:  acetaminophen     Tablet .. 650 milliGRAM(s) Oral every 6 hours PRN  acyclovir   Oral Tab/Cap 400 milliGRAM(s) Oral every 12 hours  aspirin enteric coated 81 milliGRAM(s) Oral daily  atorvastatin 40 milliGRAM(s) Oral at bedtime  entecavir Solution 0.25 milliGRAM(s) Oral daily  ganciclovir IVPB 190 milliGRAM(s) IV Intermittent every 24 hours  lactated ringers. 1000 milliLiter(s) IV Continuous <Continuous>  loperamide 2 milliGRAM(s) Oral three times a day  midodrine. 10 milliGRAM(s) Oral three times a day  piperacillin/tazobactam IVPB.. 3.375 Gram(s) IV Intermittent every 8 hours  predniSONE   Tablet 50 milliGRAM(s) Oral daily        Review of Systems:  General:  No wt loss, fevers, chills, night sweats, fatigue,   Eyes:  Good vision, no reported pain  ENT:  No sore throat, pain, runny nose, dysphagia  CV:  No pain, palpitations, hypo/hypertension  Resp:  No dyspnea, cough, tachypnea, wheezing  GI:  See HPI  :  No pain, bleeding, incontinence, nocturia  Muscle:  No pain, weakness  Neuro:  No weakness, tingling, memory problems  Psych:  No fatigue, insomnia, mood problems, depression  Endocrine:  No polyuria, polydipsia, cold/heat intolerance  Heme:  No petechiae, ecchymosis, easy bruisability  Integumentary:  No rash, edema    PHYSICAL EXAM:   Vital Signs:  Vital Signs Last 24 Hrs  T(C): 36.5 (2023 10:55), Max: 38.5 (2023 16:42)  T(F): 97.7 (2023 10:55), Max: 101.3 (2023 16:42)  HR: 85 (2023 10:55) (60 - 94)  BP: 105/54 (2023 10:55) (96/42 - 120/73)  BP(mean): --  RR: 18 (2023 10:55) (18 - 18)  SpO2: 93% (2023 10:55) (93% - 97%)    Parameters below as of 2023 10:55  Patient On (Oxygen Delivery Method): room air      Daily     Daily       PHYSICAL EXAM:     GENERAL:  Appears stated age, well-groomed, well-nourished, no distress  HEENT:  NC/AT,  conjunctivae anicteric, clear and pink,   NECK: supple, trachea midline  CHEST:  Full & symmetric excursion, no increased effort, breath sounds clear  HEART:  Regular rhythm, no JVD  ABDOMEN:  Soft, non-tender, non-distended, normoactive bowel sounds,  no masses , no hepatosplenomegaly  EXTREMITIES:  no cyanosis,clubbing or edema  SKIN:  No rash, erythema, or, ecchymoses, no jaundice  NEURO:  Alert, non-focal, no asterixis  PSYCH: Appropriate affect, oriented to place and time  RECTAL: Deferred      LABS Personally reviewed by me:                        8.1    2.63  )-----------( 14       ( 2023 06:12 )             23.7     Mean Cell Volume: 89.1 fl (-23 @ 06:12)    04-03    131<L>  |  101  |  39<H>  ----------------------------<  166<H>  3.9   |  18<L>  |  2.17<H>    Ca    8.4      2023 06:12          Urinalysis Basic - ( 2023 14:40 )    Color: Light Orange / Appearance: Turbid / S.015 / pH: x  Gluc: x / Ketone: Negative  / Bili: Negative / Urobili: Negative   Blood: x / Protein: 100 mg/dl / Nitrite: Negative   Leuk Esterase: Small / RBC: 41 /hpf /  /HPF   Sq Epi: x / Non Sq Epi: 13 /hpf / Bacteria: Negative                              8.1    2.63  )-----------( 14       ( 2023 06:12 )             23.7                         8.0    1.68  )-----------( 23       ( 2023 07:13 )             23.0                         8.1    2.10  )-----------( 21       ( 2023 06:05 )             22.9                         7.2    2.08  )-----------( 25       ( 31 Mar 2023 19:46 )             21.4       Imaging personally reviewed by me:

## 2023-04-03 NOTE — PROGRESS NOTE ADULT - SUBJECTIVE AND OBJECTIVE BOX
[facility] NEPHROLOGY- PROGRESS NOTE    67 year old Male with history of MM on chemotherapy presents with fevers. Nephrology consulted for elevated Scr.      REVIEW OF SYSTEMS:  Gen: + fevers  Cards: no chest pain  Resp: no dyspnea  GI: no nausea or vomiting or diarrhea  Vascular: no LE edema    No Known Allergies      Hospital Medications: Medications reviewed      VITALS:  T(F): 97.7 (23 @ 10:55), Max: 101.3 (23 @ 16:42)  HR: 85 (23 @ 10:55)  BP: 105/54 (23 @ 10:55)  RR: 18 (23 @ 10:55)  SpO2: 93% (23 @ 10:55)  Wt(kg): --     @ 07:01  -   @ 07:00  --------------------------------------------------------  IN: 2600 mL / OUT: 2520 mL / NET: 80 mL        PHYSICAL EXAM:    Gen: NAD, calm  Cards: RRR, +S1/S2, no M/G/R  Resp: CTA B/L  GI: soft, NT/ND, NABS  : + lloyd  Vascular: trace LE edema B/L        LABS:      131<L>  |  101  |  39<H>  ----------------------------<  166<H>  3.9   |  18<L>  |  2.17<H>    Ca    8.4      2023 06:12      Creatinine Trend: 2.17 <--, 2.53 <--, 2.05 <--, 2.26 <--, 2.17 <--, 2.18 <--, 2.25 <--, 2.21 <--                        8.1    2.63  )-----------( 14       ( 2023 06:12 )             23.7     Urine Studies:  Urinalysis Basic - ( 2023 14:40 )    Color: Light Orange / Appearance: Turbid / S.015 / pH:   Gluc:  / Ketone: Negative  / Bili: Negative / Urobili: Negative   Blood:  / Protein: 100 mg/dl / Nitrite: Negative   Leuk Esterase: Small / RBC: 41 /hpf /  /HPF   Sq Epi:  / Non Sq Epi: 13 /hpf / Bacteria: Negative      Sodium, Random Urine: 53 mmol/L ( @ 14:40)  Creatinine, Random Urine: 74 mg/dL ( @ 14:40)  Sodium, Random Urine: 45 mmol/L ( @ 14:14)  Creatinine, Random Urine: 96 mg/dL ( @ 14:14)     [facility] NEPHROLOGY- PROGRESS NOTE    67 year old Male with history of MM on chemotherapy presents with fevers. Nephrology consulted for elevated Scr.      REVIEW OF SYSTEMS:  Gen: + fevers  Cards: no chest pain  Resp: no dyspnea  GI: no nausea or vomiting or diarrhea  Vascular: no LE edema    No Known Allergies      Hospital Medications: Medications reviewed      VITALS:  T(F): 97.7 (23 @ 10:55), Max: 101.3 (23 @ 16:42)  HR: 85 (23 @ 10:55)  BP: 105/54 (23 @ 10:55)  RR: 18 (23 @ 10:55)  SpO2: 93% (23 @ 10:55)  Wt(kg): --     @ 07:01  -   @ 07:00  --------------------------------------------------------  IN: 2600 mL / OUT: 2520 mL / NET: 80 mL        PHYSICAL EXAM:    Gen: NAD, calm  Cards: RRR, +S1/S2, no M/G/R  Resp: CTA B/L  GI: soft, NT/ND, NABS  : + lloyd  Vascular: trace LE edema B/L        LABS:      131<L>  |  101  |  39<H>  ----------------------------<  166<H>  3.9   |  18<L>  |  2.17<H>    Ca    8.4      2023 06:12      Creatinine Trend: 2.17 <--, 2.53 <--, 2.05 <--, 2.26 <--, 2.17 <--, 2.18 <--, 2.25 <--, 2.21 <--                        8.1    2.63  )-----------( 14       ( 2023 06:12 )             23.7     Urine Studies:  Urinalysis Basic - ( 2023 14:40 )    Color: Light Orange / Appearance: Turbid / S.015 / pH:   Gluc:  / Ketone: Negative  / Bili: Negative / Urobili: Negative   Blood:  / Protein: 100 mg/dl / Nitrite: Negative   Leuk Esterase: Small / RBC: 41 /hpf /  /HPF   Sq Epi:  / Non Sq Epi: 13 /hpf / Bacteria: Negative      Sodium, Random Urine: 53 mmol/L ( @ 14:40)  Creatinine, Random Urine: 74 mg/dL ( @ 14:40)  Sodium, Random Urine: 45 mmol/L ( @ 14:14)  Creatinine, Random Urine: 96 mg/dL ( @ 14:14)     [facility] NEPHROLOGY- PROGRESS NOTE    67 year old Male with history of MM on chemotherapy presents with fevers. Nephrology consulted for elevated Scr.      REVIEW OF SYSTEMS:  Gen: + fevers  Cards: no chest pain  Resp: no dyspnea  GI: no nausea or vomiting or diarrhea  Vascular: no LE edema    No Known Allergies      Hospital Medications: Medications reviewed      VITALS:  T(F): 97.7 (23 @ 10:55), Max: 101.3 (23 @ 16:42)  HR: 85 (23 @ 10:55)  BP: 105/54 (23 @ 10:55)  RR: 18 (23 @ 10:55)  SpO2: 93% (23 @ 10:55)  Wt(kg): --     @ 07:01  -   @ 07:00  --------------------------------------------------------  IN: 2600 mL / OUT: 2520 mL / NET: 80 mL        PHYSICAL EXAM:    Gen: NAD, calm  Cards: RRR, +S1/S2, no M/G/R  Resp: CTA B/L  GI: soft, NT/ND, NABS  : + lloyd  Vascular: trace LE edema B/L        LABS:      131<L>  |  101  |  39<H>  ----------------------------<  166<H>  3.9   |  18<L>  |  2.17<H>    Ca    8.4      2023 06:12      Creatinine Trend: 2.17 <--, 2.53 <--, 2.05 <--, 2.26 <--, 2.17 <--, 2.18 <--, 2.25 <--, 2.21 <--                        8.1    2.63  )-----------( 14       ( 2023 06:12 )             23.7     Urine Studies:  Urinalysis Basic - ( 2023 14:40 )    Color: Light Orange / Appearance: Turbid / S.015 / pH:   Gluc:  / Ketone: Negative  / Bili: Negative / Urobili: Negative   Blood:  / Protein: 100 mg/dl / Nitrite: Negative   Leuk Esterase: Small / RBC: 41 /hpf /  /HPF   Sq Epi:  / Non Sq Epi: 13 /hpf / Bacteria: Negative      Sodium, Random Urine: 53 mmol/L ( @ 14:40)  Creatinine, Random Urine: 74 mg/dL ( @ 14:40)  Sodium, Random Urine: 45 mmol/L ( @ 14:14)  Creatinine, Random Urine: 96 mg/dL ( @ 14:14)

## 2023-04-03 NOTE — PROGRESS NOTE ADULT - SUBJECTIVE AND OBJECTIVE BOX
67yPatient is a 67y old  Male who presents with a chief complaint of fever (03 Apr 2023 14:10)      Interval history:  Febrile, no new complains     Allergies:   No Known Allergies      Antimicrobials:  acyclovir   Oral Tab/Cap 400 milliGRAM(s) Oral every 12 hours  entecavir Solution 0.25 milliGRAM(s) Oral daily  ganciclovir IVPB 190 milliGRAM(s) IV Intermittent every 24 hours  piperacillin/tazobactam IVPB.. 3.375 Gram(s) IV Intermittent every 8 hours      REVIEW OF SYSTEMS:  No chest pain   No SOB  No abdominal pain   No rash.     Vital Signs Last 24 Hrs  T(C): 36.2 (04-03-23 @ 20:13), Max: 37.1 (04-03-23 @ 04:41)  T(F): 97.2 (04-03-23 @ 20:13), Max: 98.8 (04-03-23 @ 04:41)  HR: 77 (04-03-23 @ 20:13) (77 - 85)  BP: 128/70 (04-03-23 @ 20:13) (96/42 - 128/70)  BP(mean): --  RR: 18 (04-03-23 @ 20:13) (18 - 18)  SpO2: 95% (04-03-23 @ 20:13) (93% - 97%)    PHYSICAL EXAM:  Patient in no acute distress. AAOX3.   no icterus   breathing comfortably on RA  trace edema.  IV sites not inflamed.                             8.1    2.63  )-----------( 14       ( 03 Apr 2023 06:12 )             23.7   04-03    131<L>  |  101  |  39<H>  ----------------------------<  166<H>  3.9   |  18<L>  |  2.17<H>    Ca    8.4      03 Apr 2023 06:12    TPro  4.1<L>  /  Alb  x   /  TBili  x   /  DBili  x   /  AST  x   /  ALT  x   /  AlkPhos  x   04-03      LIVER FUNCTIONS - ( 03 Apr 2023 11:58 )  Alb: x     / Pro: 4.1 g/dL / ALK PHOS: x     / ALT: x     / AST: x     / GGT: x               Culture - Blood (03.31.23 @ 19:46)   Specimen Source: .Blood Blood-Peripheral  Culture Results:   No growth to date.

## 2023-04-03 NOTE — PROGRESS NOTE ADULT - ASSESSMENT
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 Found to be febrile on admission, ID consulted for further management.    Tmax 102.4 (3/23)  WBC 3.5K today  Urinalysis unremarkable  RVP neg  CXR unremarkable      Impression:  #Fever, persistent   #Immunocompromised Status, H/O Splenectomy  #pancytopenia   #CMV viremia         PLAN:  - all infectious work up in terms of bacterial infection negative,   -given hypotensive episode, on zosyn, given cx negative and pt continues to spike even on zosyn, recommend stopping zosyn.   - V/Q scan with low probability of PE   - follow up blood cultures, NTD   - GI PCR- negative   - trend cbc for pancytopenia  - CMV PCR elevated, cmv IGG + and CMV IGM -  - repeat CMV PCR downtrended spontaneously, and on antiviral for more than 3 days with persistent fever, doubt CMV viremia contributing to fevers at this time.   - cryptococcal ag, QuantiFeronTb, fungitell negative  - c/w ganciclovir  based on CrCl.   - no need for acyclovir when pt on ganciclovir   - discussed with hematology regarding hematological source of fever.     Plan discussed with Heme  Attending.    Dejuan Ralph  Please contact through MS Teams   If no response or past 5 pm/weekend call [phone].                    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 Found to be febrile on admission, ID consulted for further management.    Tmax 102.4 (3/23)  WBC 3.5K today  Urinalysis unremarkable  RVP neg  CXR unremarkable      Impression:  #Fever, persistent   #Immunocompromised Status, H/O Splenectomy  #pancytopenia   #CMV viremia         PLAN:  - all infectious work up in terms of bacterial infection negative,   -given hypotensive episode, on zosyn, given cx negative and pt continues to spike even on zosyn, recommend stopping zosyn.   - V/Q scan with low probability of PE   - follow up blood cultures, NTD   - GI PCR- negative   - trend cbc for pancytopenia  - CMV PCR elevated, cmv IGG + and CMV IGM -  - repeat CMV PCR downtrended spontaneously, and on antiviral for more than 3 days with persistent fever, doubt CMV viremia contributing to fevers at this time.   - cryptococcal ag, QuantiFeronTb, fungitell negative  - c/w ganciclovir  based on CrCl.   - no need for acyclovir when pt on ganciclovir   - discussed with hematology regarding hematological source of fever.     Plan discussed with Heme  Attending.    Dejuan Ralph  Please contact through MS Teams   If no response or past 5 pm/weekend call [phone].                    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 Found to be febrile on admission, ID consulted for further management.    Tmax 102.4 (3/23)  WBC 3.5K today  Urinalysis unremarkable  RVP neg  CXR unremarkable      Impression:  #Fever, persistent   #Immunocompromised Status, H/O Splenectomy  #pancytopenia   #CMV viremia         PLAN:  - all infectious work up in terms of bacterial infection negative,   -given hypotensive episode, on zosyn, given cx negative and pt continues to spike even on zosyn, recommend stopping zosyn.   - V/Q scan with low probability of PE   - follow up blood cultures, NTD   - GI PCR- negative   - trend cbc for pancytopenia  - CMV PCR elevated, cmv IGG + and CMV IGM -  - repeat CMV PCR downtrended spontaneously, and on antiviral for more than 3 days with persistent fever, doubt CMV viremia contributing to fevers at this time.   - cryptococcal ag, QuantiFeronTb, fungitell negative  - c/w ganciclovir  based on CrCl.   - no need for acyclovir when pt on ganciclovir   - discussed with hematology regarding hematological source of fever.     Plan discussed with Heme  Attending.    Dejuan Ralph  Please contact through MS Teams   If no response or past 5 pm/weekend call [phone].

## 2023-04-03 NOTE — PROGRESS NOTE ADULT - ASSESSMENT
67y Male with history of MM on chemotherapy presents with fevers. Nephrology consulted for elevated Scr.    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 Scr improving. Continue with LR @ 100 ml/hour for possible crystal induced RK due to Cytovene (currently renally dosed) and will give concurrent lasix 40 mg IV X 1 dose today to prevent volume overload. UA active likely due to lloyd with granular casts suggestive of ATN. FeNa indeterminate. Lloyd placed for urinary retention. TMA work up negative. Avoid nephrotoxins. Monitor electrolytes.    2) Hypotension: BP low normal. Continue with midodrine 10 mg PO TID. Monitor BP.    3) Hyponatremia: In setting of RK and likely decreased diluting ability. Continue with IVF. Zosyn in NS base. Monitor serum Na.    4) Metabolic acidosis: Mixed disorder with concurrent respiratory alkalosis and normal pH. No need for sodium bicarbonate. Monitor pH.    5) MM: As per Heme/Onc. On renal dose Entecavir.          [facility] NEPHROLOGY  Leoncio Leonard M.D.  Tay Brooke D.O.  Precious Chen M.D.  Nidia Stallings, NURIS, ANP-C    Telephone: [phone]  Facsimile: [phone]    [address] [place] [address]   67y Male with history of MM on chemotherapy presents with fevers. Nephrology consulted for elevated Scr.    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 Scr improving. Continue with LR @ 100 ml/hour for possible crystal induced RK due to Cytovene (currently renally dosed) and will give concurrent lasix 40 mg IV X 1 dose today to prevent volume overload. UA active likely due to lloyd with granular casts suggestive of ATN. FeNa indeterminate. Lloyd placed for urinary retention. TMA work up negative. Avoid nephrotoxins. Monitor electrolytes.    2) Hypotension: BP low normal. Continue with midodrine 10 mg PO TID. Monitor BP.    3) Hyponatremia: In setting of RK and likely decreased diluting ability. Continue with IVF. Zosyn in NS base. Monitor serum Na.    4) Metabolic acidosis: Mixed disorder with concurrent respiratory alkalosis and normal pH. No need for sodium bicarbonate. Monitor pH.    5) MM: As per Heme/Onc. On renal dose Entecavir.          [facility] NEPHROLOGY  Leoncio Leonard M.D.  Tay Brooke D.O.  Precious Chen M.D.  Nidia Stallings, NURIS, ANP-C    Telephone: [phone]  Facsimile: [phone]    [address] [place] [address]   67y Male with history of MM on chemotherapy presents with fevers. Nephrology consulted for elevated Scr.    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 Scr improving. Continue with LR @ 100 ml/hour for possible crystal induced RK due to Cytovene (currently renally dosed) and will give concurrent lasix 40 mg IV X 1 dose today to prevent volume overload. UA active likely due to lloyd with granular casts suggestive of ATN. FeNa indeterminate. Lloyd placed for urinary retention. TMA work up negative. Avoid nephrotoxins. Monitor electrolytes.    2) Hypotension: BP low normal. Continue with midodrine 10 mg PO TID. Monitor BP.    3) Hyponatremia: In setting of RK and likely decreased diluting ability. Continue with IVF. Zosyn in NS base. Monitor serum Na.    4) Metabolic acidosis: Mixed disorder with concurrent respiratory alkalosis and normal pH. No need for sodium bicarbonate. Monitor pH.    5) MM: As per Heme/Onc. On renal dose Entecavir.          [facility] NEPHROLOGY  Leoncio Leonard M.D.  Tay Brooke D.O.  Precious hCen M.D.  Nidia Stallings, NURIS, ANP-C    Telephone: [phone]  Facsimile: [phone]    [address] [place] [address]

## 2023-04-03 NOTE — PROGRESS NOTE ADULT - ASSESSMENT
PETER DE LA PAZ is a 67y Male who presents with a chief complaint of fever    Multiple Myeloma  ·	Patient follows with Dr. Jean Claude Hubbard, [facility].  ·	Patient was on lenalidomide maintenance until February 2023 when he had progression of disease.  ·	He was then switched to daratumumab + CyBorD; last dose March 23rd, 2023.  ·	No systemic therapy while inpatient or during rehabilitation.  ·	Continue acyclovir 400 mg BID.    Pancytopenia  ·	Pancytopenia has been ongoing since patient was started on latest chemotherapy regimen.  ·	Monitor CBC and transfuse to maintain HGB > 7; PLT > 10.  ·	Possible slight iron deficiency, but otherwise no evidence of nutritional deficits or hemolysis.  ·	Continue to hold anticoagulation.  ·	No plans for gastrointestinal interventions at this time.   ·	Leukopenia mostly lymphocytopenia; neutrophil last remains above 1.5.  ·	Platelets 14k today  ·	Rechecking myeloma labs    Fever  ·	Febrile on 4-2  ·	Unclear etiology; malignancy vs CMV viremia.  ·	Follow-up infectious disease recommendations.  ·	Currently on ganciclovir, piperacillin-tazobactam, and entecavir.     DVT  ·	below knee DVT   ·	hold ac for now  ·	will need repeat duplex in 6 weeks     Will continue to follow.     Quirino Holloway PA-C  Hematology/Oncology  [facility]  [phone] (office) PETER DE LA PAZ is a 67y Male who presents with a chief complaint of fever    Multiple Myeloma  ·	Patient follows with Dr. Jean Claude Hubbard, [facility].  ·	Patient was on lenalidomide maintenance until February 2023 when he had progression of disease.  ·	He was then switched to daratumumab + CyBorD; last dose March 23rd, 2023.  ·	No systemic therapy while inpatient or during rehabilitation.  ·	Continue acyclovir 400 mg BID.    Pancytopenia  ·	Pancytopenia has been ongoing since patient was started on latest chemotherapy regimen.  ·	Monitor CBC and transfuse to maintain HGB > 7; PLT > 10.  ·	Possible slight iron deficiency, but otherwise no evidence of nutritional deficits or hemolysis.  ·	Continue to hold anticoagulation.  ·	No plans for gastrointestinal interventions at this time.   ·	Leukopenia mostly lymphocytopenia; neutrophil last remains above 1.5.  ·	Platelets 14k today  ·	Rechecking myeloma labs    Fever  ·	Febrile on 4-2  ·	Unclear etiology; malignancy vs CMV viremia.  ·	Follow-up infectious disease recommendations.  ·	Currently on ganciclovir, piperacillin-tazobactam, and entecavir.     DVT  ·	below knee DVT   ·	hold ac for now  ·	will need repeat duplex in 6 weeks     Will continue to follow.     Quirino Holloway PA-C  Hematology/Oncology  [facility]  [phone] (office) PETER DE LA PAZ is a 67y Male who presents with a chief complaint of fever    Multiple Myeloma  ·	Patient follows with Dr. Jean Claude Hubbard, [facility].  ·	Patient was on lenalidomide maintenance until February 2023 when he had progression of disease.  ·	He was then switched to daratumumab + CyBorD; last dose March 23rd, 2023.  ·	No systemic therapy while inpatient or during rehabilitation.  ·	Continue acyclovir 400 mg BID.    Pancytopenia  ·	Pancytopenia has been ongoing since patient was started on latest chemotherapy regimen.  ·	Monitor CBC and transfuse to maintain HGB > 7; PLT > 10.  ·	Possible slight iron deficiency, but otherwise no evidence of nutritional deficits or hemolysis.  ·	Continue to hold anticoagulation.  ·	No plans for gastrointestinal interventions at this time.   ·	Leukopenia mostly lymphocytopenia; neutrophil last remains above 1.5.  ·	Platelets 14k today  ·	Rechecking myeloma labs    Fever  ·	Febrile on 4-2  ·	Unclear etiology; malignancy vs CMV viremia.  ·	Follow-up infectious disease recommendations.  ·	Currently on ganciclovir, piperacillin-tazobactam, and entecavir.     DVT  ·	below knee DVT   ·	hold ac for now  ·	will need repeat duplex in 6 weeks     Will continue to follow.     Quirino Holloway PA-C  Hematology/Oncology  [facility]  [phone] (office)

## 2023-04-03 NOTE — PROGRESS NOTE ADULT - SUBJECTIVE AND OBJECTIVE BOX
Patient is a 67y old  Male who presents with a chief complaint of fever (02 Apr 2023 13:00)    Patient seen and examined at bedside. Febrile yesterday.     MEDICATIONS  (STANDING):  acyclovir   Oral Tab/Cap 400 milliGRAM(s) Oral every 12 hours  aspirin enteric coated 81 milliGRAM(s) Oral daily  atorvastatin 40 milliGRAM(s) Oral at bedtime  entecavir Solution 0.25 milliGRAM(s) Oral daily  ganciclovir IVPB 190 milliGRAM(s) IV Intermittent every 24 hours  lactated ringers. 1000 milliLiter(s) (100 mL/Hr) IV Continuous <Continuous>  loperamide 2 milliGRAM(s) Oral three times a day  midodrine. 10 milliGRAM(s) Oral three times a day  piperacillin/tazobactam IVPB.. 3.375 Gram(s) IV Intermittent every 8 hours  predniSONE   Tablet 50 milliGRAM(s) Oral daily    MEDICATIONS  (PRN):  acetaminophen     Tablet .. 650 milliGRAM(s) Oral every 6 hours PRN Temp greater or equal to 38C (100.4F), Mild Pain (1 - 3)      Vital Signs Last 24 Hrs  T(C): 37.1 (03 Apr 2023 04:41), Max: 38.5 (02 Apr 2023 16:42)  T(F): 98.8 (03 Apr 2023 04:41), Max: 101.3 (02 Apr 2023 16:42)  HR: 85 (03 Apr 2023 04:41) (60 - 94)  BP: 96/42 (03 Apr 2023 04:41) (96/42 - 120/73)  BP(mean): --  RR: 18 (03 Apr 2023 04:41) (18 - 18)  SpO2: 94% (03 Apr 2023 04:41) (93% - 97%)    Parameters below as of 03 Apr 2023 04:41  Patient On (Oxygen Delivery Method): room air        PE  NAD  Awake, alert  Anicteric, MMM  RRR  CTAB  Abd soft, NT, ND  No c/c/e  No rash grossly  FROM                          8.1    2.63  )-----------( 14       ( 03 Apr 2023 06:12 )             23.7       04-03    131<L>  |  101  |  39<H>  ----------------------------<  166<H>  3.9   |  18<L>  |  2.17<H>    Ca    8.4      03 Apr 2023 06:12

## 2023-04-04 LAB
ACANTHOCYTES BLD QL SMEAR: SLIGHT — SIGNIFICANT CHANGE UP
ANION GAP SERPL CALC-SCNC: 12 MMOL/L — SIGNIFICANT CHANGE UP (ref 5–17)
ANISOCYTOSIS BLD QL: SLIGHT — SIGNIFICANT CHANGE UP
BASOPHILS # BLD AUTO: 0 K/UL — SIGNIFICANT CHANGE UP (ref 0–0.2)
BASOPHILS NFR BLD AUTO: 0 % — SIGNIFICANT CHANGE UP (ref 0–2)
BUN SERPL-MCNC: 35 MG/DL — HIGH (ref 7–23)
BURR CELLS BLD QL SMEAR: PRESENT — SIGNIFICANT CHANGE UP
CALCIUM SERPL-MCNC: 7.8 MG/DL — LOW (ref 8.4–10.5)
CHLORIDE SERPL-SCNC: 100 MMOL/L — SIGNIFICANT CHANGE UP (ref 96–108)
CO2 SERPL-SCNC: 21 MMOL/L — LOW (ref 22–31)
CREAT SERPL-MCNC: 1.97 MG/DL — HIGH (ref 0.5–1.3)
DACRYOCYTES BLD QL SMEAR: SLIGHT — SIGNIFICANT CHANGE UP
EGFR: 36 ML/MIN/1.73M2 — LOW
ELLIPTOCYTES BLD QL SMEAR: SLIGHT — SIGNIFICANT CHANGE UP
EOSINOPHIL # BLD AUTO: 0 K/UL — SIGNIFICANT CHANGE UP (ref 0–0.5)
EOSINOPHIL NFR BLD AUTO: 0 % — SIGNIFICANT CHANGE UP (ref 0–6)
GLUCOSE SERPL-MCNC: 145 MG/DL — HIGH (ref 70–99)
HCT VFR BLD CALC: 20.9 % — CRITICAL LOW (ref 39–50)
HCT VFR BLD CALC: 21.5 % — LOW (ref 39–50)
HGB BLD-MCNC: 7.2 G/DL — LOW (ref 13–17)
HGB BLD-MCNC: 7.5 G/DL — LOW (ref 13–17)
HOWELL-JOLLY BOD BLD QL SMEAR: PRESENT — SIGNIFICANT CHANGE UP
HYPOCHROMIA BLD QL: SLIGHT — SIGNIFICANT CHANGE UP
IGA FLD-MCNC: 56 MG/DL — LOW (ref 84–499)
IGA FLD-MCNC: 57 MG/DL — LOW (ref 84–499)
IGG FLD-MCNC: 285 MG/DL — LOW (ref 610–1660)
IGG FLD-MCNC: 304 MG/DL — LOW (ref 610–1660)
IGM SERPL-MCNC: 29 MG/DL — LOW (ref 35–242)
IGM SERPL-MCNC: 31 MG/DL — LOW (ref 35–242)
KAPPA LC SER QL IFE: 1.01 MG/DL — SIGNIFICANT CHANGE UP (ref 0.33–1.94)
KAPPA LC SER QL IFE: 1.24 MG/DL — SIGNIFICANT CHANGE UP (ref 0.33–1.94)
KAPPA/LAMBDA FREE LIGHT CHAIN RATIO, SERUM: 0.97 RATIO — SIGNIFICANT CHANGE UP (ref 0.26–1.65)
KAPPA/LAMBDA FREE LIGHT CHAIN RATIO, SERUM: 0.98 RATIO — SIGNIFICANT CHANGE UP (ref 0.26–1.65)
LAMBDA LC SER QL IFE: 1.04 MG/DL — SIGNIFICANT CHANGE UP (ref 0.57–2.63)
LAMBDA LC SER QL IFE: 1.26 MG/DL — SIGNIFICANT CHANGE UP (ref 0.57–2.63)
LYMPHOCYTES # BLD AUTO: 0.09 K/UL — LOW (ref 1–3.3)
LYMPHOCYTES # BLD AUTO: 2.6 % — LOW (ref 13–44)
MACROCYTES BLD QL: SLIGHT — SIGNIFICANT CHANGE UP
MANUAL SMEAR VERIFICATION: SIGNIFICANT CHANGE UP
MCHC RBC-ENTMCNC: 31 PG — SIGNIFICANT CHANGE UP (ref 27–34)
MCHC RBC-ENTMCNC: 34.4 GM/DL — SIGNIFICANT CHANGE UP (ref 32–36)
MCV RBC AUTO: 90.1 FL — SIGNIFICANT CHANGE UP (ref 80–100)
METAMYELOCYTES # FLD: 0.9 % — HIGH (ref 0–0)
MICROCYTES BLD QL: SLIGHT — SIGNIFICANT CHANGE UP
MONOCYTES # BLD AUTO: 0.03 K/UL — SIGNIFICANT CHANGE UP (ref 0–0.9)
MONOCYTES NFR BLD AUTO: 0.9 % — LOW (ref 2–14)
NEUTROPHILS # BLD AUTO: 3.13 K/UL — SIGNIFICANT CHANGE UP (ref 1.8–7.4)
NEUTROPHILS NFR BLD AUTO: 93 % — HIGH (ref 43–77)
NEUTS BAND # BLD: 2.6 % — SIGNIFICANT CHANGE UP (ref 0–8)
NRBC # BLD: 1 /100 — HIGH (ref 0–0)
OVALOCYTES BLD QL SMEAR: SLIGHT — SIGNIFICANT CHANGE UP
PLAT MORPH BLD: NORMAL — SIGNIFICANT CHANGE UP
PLATELET # BLD AUTO: 12 K/UL — CRITICAL LOW (ref 150–400)
POIKILOCYTOSIS BLD QL AUTO: SLIGHT — SIGNIFICANT CHANGE UP
POLYCHROMASIA BLD QL SMEAR: SLIGHT — SIGNIFICANT CHANGE UP
POTASSIUM SERPL-MCNC: 4 MMOL/L — SIGNIFICANT CHANGE UP (ref 3.5–5.3)
POTASSIUM SERPL-SCNC: 4 MMOL/L — SIGNIFICANT CHANGE UP (ref 3.5–5.3)
PROT SERPL-MCNC: 4.1 G/DL — LOW (ref 6–8.3)
RBC # BLD: 2.32 M/UL — LOW (ref 4.2–5.8)
RBC # FLD: 23 % — HIGH (ref 10.3–14.5)
RBC BLD AUTO: ABNORMAL
SCHISTOCYTES BLD QL AUTO: SLIGHT — SIGNIFICANT CHANGE UP
SODIUM SERPL-SCNC: 133 MMOL/L — LOW (ref 135–145)
WBC # BLD: 3.27 K/UL — LOW (ref 3.8–10.5)
WBC # FLD AUTO: 3.27 K/UL — LOW (ref 3.8–10.5)

## 2023-04-04 RX ORDER — LOPERAMIDE HCL 2 MG
2 TABLET ORAL THREE TIMES A DAY
Refills: 0 | Status: DISCONTINUED | OUTPATIENT
Start: 2023-04-04 | End: 2023-04-18

## 2023-04-04 RX ORDER — FUROSEMIDE 40 MG
40 TABLET ORAL ONCE
Refills: 0 | Status: COMPLETED | OUTPATIENT
Start: 2023-04-04 | End: 2023-04-05

## 2023-04-04 RX ADMIN — ATORVASTATIN CALCIUM 40 MILLIGRAM(S): 80 TABLET, FILM COATED ORAL at 21:28

## 2023-04-04 RX ADMIN — GANCICLOVIR SODIUM 100 MILLIGRAM(S): 50 INJECTION, POWDER, LYOPHILIZED, FOR SOLUTION INTRAVENTRICULAR at 05:49

## 2023-04-04 RX ADMIN — PIPERACILLIN AND TAZOBACTAM 25 GRAM(S): 4; .5 INJECTION, POWDER, LYOPHILIZED, FOR SOLUTION INTRAVENOUS at 13:16

## 2023-04-04 RX ADMIN — ENTECAVIR 0.25 MILLIGRAM(S): 0.5 TABLET ORAL at 12:53

## 2023-04-04 RX ADMIN — MIDODRINE HYDROCHLORIDE 10 MILLIGRAM(S): 2.5 TABLET ORAL at 05:38

## 2023-04-04 RX ADMIN — PIPERACILLIN AND TAZOBACTAM 25 GRAM(S): 4; .5 INJECTION, POWDER, LYOPHILIZED, FOR SOLUTION INTRAVENOUS at 06:39

## 2023-04-04 RX ADMIN — Medication 81 MILLIGRAM(S): at 11:50

## 2023-04-04 RX ADMIN — SODIUM CHLORIDE 75 MILLILITER(S): 9 INJECTION, SOLUTION INTRAVENOUS at 12:52

## 2023-04-04 RX ADMIN — MIDODRINE HYDROCHLORIDE 10 MILLIGRAM(S): 2.5 TABLET ORAL at 11:49

## 2023-04-04 RX ADMIN — Medication 650 MILLIGRAM(S): at 17:39

## 2023-04-04 RX ADMIN — SODIUM CHLORIDE 100 MILLILITER(S): 9 INJECTION, SOLUTION INTRAVENOUS at 11:47

## 2023-04-04 RX ADMIN — MIDODRINE HYDROCHLORIDE 10 MILLIGRAM(S): 2.5 TABLET ORAL at 17:39

## 2023-04-04 RX ADMIN — Medication 2 MILLIGRAM(S): at 05:38

## 2023-04-04 RX ADMIN — Medication 400 MILLIGRAM(S): at 05:37

## 2023-04-04 RX ADMIN — Medication 50 MILLIGRAM(S): at 05:38

## 2023-04-04 NOTE — PROGRESS NOTE ADULT - SUBJECTIVE AND OBJECTIVE BOX
DATE OF SERVICE: 04-04-23 @ 07:26    Subjective: Patient seen and examined. No new events except as noted.     SUBJECTIVE/ROS:  nad      MEDICATIONS:  MEDICATIONS  (STANDING):  acyclovir   Oral Tab/Cap 400 milliGRAM(s) Oral every 12 hours  aspirin enteric coated 81 milliGRAM(s) Oral daily  atorvastatin 40 milliGRAM(s) Oral at bedtime  entecavir Solution 0.25 milliGRAM(s) Oral daily  ganciclovir IVPB 190 milliGRAM(s) IV Intermittent every 24 hours  lactated ringers. 1000 milliLiter(s) (100 mL/Hr) IV Continuous <Continuous>  loperamide 2 milliGRAM(s) Oral three times a day  midodrine. 10 milliGRAM(s) Oral three times a day  piperacillin/tazobactam IVPB.. 3.375 Gram(s) IV Intermittent every 8 hours  predniSONE   Tablet 50 milliGRAM(s) Oral daily      PHYSICAL EXAM:  T(C): 37 (04-04-23 @ 05:10), Max: 37 (04-04-23 @ 05:10)  HR: 75 (04-04-23 @ 05:10) (75 - 85)  BP: 107/61 (04-04-23 @ 05:10) (105/54 - 128/70)  RR: 18 (04-04-23 @ 05:10) (18 - 18)  SpO2: 93% (04-04-23 @ 05:10) (93% - 97%)  Wt(kg): --  I&O's Summary    03 Apr 2023 07:01  -  04 Apr 2023 07:00  --------------------------------------------------------  IN: 2280 mL / OUT: 4075 mL / NET: -1795 mL            JVP: Normal  Neck: supple  Lung: clear   CV: S1 S2 , Murmur:  Abd: soft  Ext: No edema  neuro: Awake / alert  Psych: flat affect  Skin: normal``    LABS/DATA:    CARDIAC MARKERS:                                8.1    2.63  )-----------( 14       ( 03 Apr 2023 06:12 )             23.7     04-03    131<L>  |  101  |  39<H>  ----------------------------<  166<H>  3.9   |  18<L>  |  2.17<H>    Ca    8.4      03 Apr 2023 06:12    TPro  4.1<L>  /  Alb  x   /  TBili  x   /  DBili  x   /  AST  x   /  ALT  x   /  AlkPhos  x   04-03    proBNP:   Lipid Profile:   HgA1c:   TSH:     TELE:  EKG:

## 2023-04-04 NOTE — PROGRESS NOTE ADULT - ASSESSMENT
67y Male with history of MM on chemotherapy presents with fevers. Nephrology consulted for elevated Scr.    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 Scr improving. On LR @ 100 ml/hour for possible crystal induced RK due to Cytovene (currently renally dosed) and received  lasix 40 mg IV X 1 dose t yesterday 4/3/23  to prevent volume overload. Decrease IVF rate to 75ml/hour, patient is for PRBC x one unit today plan Lasix after transfusion. UA active likely due to lloyd with granular casts suggestive of ATN. FeNa indeterminate. Lloyd placed for urinary retention. TMA work up negative. Avoid nephrotoxins. Monitor electrolytes.    2) Hypotension: BP low normal. Continue with midodrine 10 mg PO TID. Monitor BP.    3) Hyponatremia: In setting of RK and likely decreased diluting ability. Continue with IVF. Zosyn in NS base. Monitor serum Na.    4) Metabolic acidosis: Mixed disorder with concurrent respiratory alkalosis and normal pH. No need for sodium bicarbonate. Monitor pH.    5) MM: As per Heme/Onc. On renal dose Entecavir. For Bone bx.           [facility] NEPHROLOGY  Leoncio Leonard M.D.  Tay Brooke D.O.  Precious Chen M.D.  Nidia Stallings, MSN, ANP-C    Telephone: [phone]  Facsimile: [phone]   67y Male with history of MM on chemotherapy presents with fevers. Nephrology consulted for elevated Scr.    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 Scr improving. On LR @ 100 ml/hour for possible crystal induced RK due to Cytovene (currently renally dosed) and received  lasix 40 mg IV X 1 dose t yesterday 4/3/23  to prevent volume overload. Decrease IVF rate to 75ml/hour, patient is for PRBC x one unit today plan Lasix after transfusion. UA active likely due to lloyd with granular casts suggestive of ATN. FeNa indeterminate. Lloyd placed for urinary retention. TMA work up negative. Avoid nephrotoxins. Monitor electrolytes.    2) Hypotension: BP low normal. Continue with midodrine 10 mg PO TID. Monitor BP.    3) Hyponatremia: In setting of RK and likely decreased diluting ability. Continue with IVF. Zosyn in NS base. Monitor serum Na.    4) Metabolic acidosis: Mixed disorder with concurrent respiratory alkalosis and normal pH. No need for sodium bicarbonate. Monitor pH.    5) MM: As per Heme/Onc. On renal dose Entecavir. For Bone bx.           [facility] NEPHROLOGY  Leoncio Leonard M.D.  Tay Brooke D.O.  Precious Chen M.D.  Nidia Stallings, MSN, ANP-C    Telephone: [phone]  Facsimile: [phone]   67y Male with history of MM on chemotherapy presents with fevers. Nephrology consulted for elevated Scr.    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 Scr improving. On LR @ 100 ml/hour for possible crystal induced RK due to Cytovene (currently renally dosed) and received  lasix 40 mg IV X 1 dose t yesterday 4/3/23  to prevent volume overload. Decrease IVF rate to 75ml/hour, patient is for PRBC x one unit today plan Lasix after transfusion. UA active likely due to lloyd with granular casts suggestive of ATN. FeNa indeterminate. Lloyd placed for urinary retention. TMA work up negative. Avoid nephrotoxins. Monitor electrolytes.    2) Hypotension: BP low normal. Continue with midodrine 10 mg PO TID. Monitor BP.    3) Hyponatremia: In setting of RK and likely decreased diluting ability. Continue with IVF. Zosyn in NS base. Monitor serum Na.    4) Metabolic acidosis: Mixed disorder with concurrent respiratory alkalosis and normal pH. No need for sodium bicarbonate. Monitor pH.    5) MM: As per Heme/Onc. On renal dose Entecavir. For Bone bx.           [facility] NEPHROLOGY  Leoncio Leonard M.D.  Tay Brooke D.O.  Precious Chen M.D.  Nidia Stallings, MSN, ANP-C    Telephone: [phone]  Facsimile: [phone]

## 2023-04-04 NOTE — PROGRESS NOTE ADULT - ASSESSMENT
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   - no obvious infectious symptoms, CXR without consolidations or effusions   - U/A unremarkable  - F/u BCx2 and UCx  --> Neg final   - Recurrent fever. F/U repeat BCx2 sent 03/27 --> NGTD; F/u final. ABX as per ID   - less likely transfusion reaction as pt febrile prior to transfusion  - [facility] duplex --> + For DVT    - Was on empiric treatment with Vanc/cefepime --> ID consulted; Now on Zosyn  - Check GI PCR -- Neg   - Hold home penicillin while on ABX   - V/Q scan to R/O PE -- Low probability   - ID to follow up, infectious work up as per ID  --> F/u CMV PCR -- + Started on Ganciclovir per ID , CMV IGG (+) and IgM (-), Cryptococcal Ag --Neg, Quantiferon Tb -- Neg, Fungitell -- <31   - CT Chest non-contrast, noted, no acute pathology  - Recurrent fever 03/29 --> F/u BCx2 -- Neg; F/u 03/31 BCx -- NGTD; F/u final   - Shock, RRT, resume antibiotics IV fluid, MICU eval , ID follow up  --> Prednisone 50 PO Qd   - On Zosyn; Defer ABX as per ID   - short course steroid per hematology   - IR eval for BM BX as per Heme/Onc     Anemia, Pancytopenia  - Drop in Hgb, Occult +  - Hold Hep gtt    - S/P 1 unit of PRBCs 03/28  - Maintain active T+S. Transfuse for Hgb < 7.0, and platelets < 10.K  - GI eval appreciated; F/u recs --> No plans for scope at this time   - Was on Xarelto, held due to drop in Platelets as per Heme/Onc. Monitor platelet count   - Drop in Platelets. 1 unit PRBCs and 1 unit Platelets today 04/04; F/u post-transfusion CBC     HypoNa  - Cont to monitor and trend  - Appreciate renal eval.   - Serial labs     Multiple myeloma.   - pancytopenia largely at baseline although Hg 6.7 on arrival  - s/p 1U PRBC with good response   - Was on acyclovir, now on Ganciclovir as per ID   - C/w home entecavir   - S/P dexamethasone, D/C'd by hematology , follow up outpatient after discharge  --> No on Prednisone 50 Qd   - Pt reports his Revlimid was held  - Heme/Onc consulted; F/u recs   - Resume home aspirin.  - Planned for IR BM Bx     DVT   - Duplex + For R soleal vein DVT  --> Will consider CT A chest once creatinine permits as patient received contrast; Monitor for LOPEZ   - On Hep gtt; Monitor PTT; Monitor H/H closely --> Now on hold in view of drop in Hgb   - Check VQ scan to R/O PE -- Low probability   - Vascular eval appreciated; AC as tolerated, on Xarelto 10, monitor H/H   - Serial Duplex to assess for propagation  -- Without propagation   - 04/03 Duplex without propagation     RK  - S/P Contrast on 03/23   - Check bladder scan to R/O retention  - C/w IVF for hydration  - Monitor Cr closely; Avoid nephrotoxic agents   - Cr down-trending. On IVF     Hypertension, now hypotensive   - C/w home metoprolol.  - C/w Midodrine 10 TID. Hold for SBP > 140     Hyperlipidemia.   - C/w home atorvastatin.    Abnormal CT  - Outpatient follow up for CT findings    Prophylactic measure.   dvt ppx: DVT PPX   diet: regular  ambulate: with assistance    fall precautions  aspiration precautions.      Discussed with Patient, Wife and family in detail at the bedside. Discussed with Heme/Onc. Discussed with ACP.  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   - no obvious infectious symptoms, CXR without consolidations or effusions   - U/A unremarkable  - F/u BCx2 and UCx  --> Neg final   - Recurrent fever. F/U repeat BCx2 sent 03/27 --> NGTD; F/u final. ABX as per ID   - less likely transfusion reaction as pt febrile prior to transfusion  - [facility] duplex --> + For DVT    - Was on empiric treatment with Vanc/cefepime --> ID consulted; Now on Zosyn  - Check GI PCR -- Neg   - Hold home penicillin while on ABX   - V/Q scan to R/O PE -- Low probability   - ID to follow up, infectious work up as per ID  --> F/u CMV PCR -- + Started on Ganciclovir per ID , CMV IGG (+) and IgM (-), Cryptococcal Ag --Neg, Quantiferon Tb -- Neg, Fungitell -- <31   - CT Chest non-contrast, noted, no acute pathology  - Recurrent fever 03/29 --> F/u BCx2 -- Neg; F/u 03/31 BCx -- NGTD; F/u final   - Shock, RRT, resume antibiotics IV fluid, MICU eval , ID follow up  --> Prednisone 50 PO Qd   - On Zosyn; Defer ABX as per ID   - short course steroid per hematology   - IR eval for BM BX as per Heme/Onc     Anemia, Pancytopenia  - Drop in Hgb, Occult +  - Hold Hep gtt    - S/P 1 unit of PRBCs 03/28  - Maintain active T+S. Transfuse for Hgb < 7.0, and platelets < 10.K  - GI eval appreciated; F/u recs --> No plans for scope at this time   - Was on Xarelto, held due to drop in Platelets as per Heme/Onc. Monitor platelet count   - Drop in Platelets. 1 unit PRBCs and 1 unit Platelets today 04/04; F/u post-transfusion CBC     HypoNa  - Cont to monitor and trend  - Appreciate renal eval.   - Serial labs     Multiple myeloma.   - pancytopenia largely at baseline although Hg 6.7 on arrival  - s/p 1U PRBC with good response   - Was on acyclovir, now on Ganciclovir as per ID   - C/w home entecavir   - S/P dexamethasone, D/C'd by hematology , follow up outpatient after discharge  --> No on Prednisone 50 Qd   - Pt reports his Revlimid was held  - Heme/Onc consulted; F/u recs   - Resume home aspirin.  - Planned for IR BM Bx     DVT   - Duplex + For R soleal vein DVT  --> Will consider CT A chest once creatinine permits as patient received contrast; Monitor for LOPEZ   - On Hep gtt; Monitor PTT; Monitor H/H closely --> Now on hold in view of drop in Hgb   - Check VQ scan to R/O PE -- Low probability   - Vascular eval appreciated; AC as tolerated, on Xarelto 10, monitor H/H   - Serial Duplex to assess for propagation  -- Without propagation   - 04/03 Duplex without propagation     RK  - S/P Contrast on 03/23   - Check bladder scan to R/O retention  - C/w IVF for hydration  - Monitor Cr closely; Avoid nephrotoxic agents   - Cr down-trending. On IVF     Hypertension, now hypotensive   - C/w home metoprolol.  - C/w Midodrine 10 TID. Hold for SBP > 140     Hyperlipidemia.   - C/w home atorvastatin.    Abnormal CT  - Outpatient follow up for CT findings    Prophylactic measure.   dvt ppx: DVT PPX   diet: regular  ambulate: with assistance    fall precautions  aspiration precautions.      Discussed with Patient, Wife and family in detail at the bedside. Discussed with Heme/Onc. Discussed with ACP.  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   - no obvious infectious symptoms, CXR without consolidations or effusions   - U/A unremarkable  - F/u BCx2 and UCx  --> Neg final   - Recurrent fever. F/U repeat BCx2 sent 03/27 --> NGTD; F/u final. ABX as per ID   - less likely transfusion reaction as pt febrile prior to transfusion  - [facility] duplex --> + For DVT    - Was on empiric treatment with Vanc/cefepime --> ID consulted; Now on Zosyn  - Check GI PCR -- Neg   - Hold home penicillin while on ABX   - V/Q scan to R/O PE -- Low probability   - ID to follow up, infectious work up as per ID  --> F/u CMV PCR -- + Started on Ganciclovir per ID , CMV IGG (+) and IgM (-), Cryptococcal Ag --Neg, Quantiferon Tb -- Neg, Fungitell -- <31   - CT Chest non-contrast, noted, no acute pathology  - Recurrent fever 03/29 --> F/u BCx2 -- Neg; F/u 03/31 BCx -- NGTD; F/u final   - Shock, RRT, resume antibiotics IV fluid, MICU eval , ID follow up  --> Prednisone 50 PO Qd   - On Zosyn; Defer ABX as per ID   - short course steroid per hematology   - IR eval for BM BX as per Heme/Onc     Anemia, Pancytopenia  - Drop in Hgb, Occult +  - Hold Hep gtt    - S/P 1 unit of PRBCs 03/28  - Maintain active T+S. Transfuse for Hgb < 7.0, and platelets < 10.K  - GI eval appreciated; F/u recs --> No plans for scope at this time   - Was on Xarelto, held due to drop in Platelets as per Heme/Onc. Monitor platelet count   - Drop in Platelets. 1 unit PRBCs and 1 unit Platelets today 04/04; F/u post-transfusion CBC     HypoNa  - Cont to monitor and trend  - Appreciate renal eval.   - Serial labs     Multiple myeloma.   - pancytopenia largely at baseline although Hg 6.7 on arrival  - s/p 1U PRBC with good response   - Was on acyclovir, now on Ganciclovir as per ID   - C/w home entecavir   - S/P dexamethasone, D/C'd by hematology , follow up outpatient after discharge  --> No on Prednisone 50 Qd   - Pt reports his Revlimid was held  - Heme/Onc consulted; F/u recs   - Resume home aspirin.  - Planned for IR BM Bx     DVT   - Duplex + For R soleal vein DVT  --> Will consider CT A chest once creatinine permits as patient received contrast; Monitor for LOPEZ   - On Hep gtt; Monitor PTT; Monitor H/H closely --> Now on hold in view of drop in Hgb   - Check VQ scan to R/O PE -- Low probability   - Vascular eval appreciated; AC as tolerated, on Xarelto 10, monitor H/H   - Serial Duplex to assess for propagation  -- Without propagation   - 04/03 Duplex without propagation     RK  - S/P Contrast on 03/23   - Check bladder scan to R/O retention  - C/w IVF for hydration  - Monitor Cr closely; Avoid nephrotoxic agents   - Cr down-trending. On IVF     Hypertension, now hypotensive   - C/w home metoprolol.  - C/w Midodrine 10 TID. Hold for SBP > 140     Hyperlipidemia.   - C/w home atorvastatin.    Abnormal CT  - Outpatient follow up for CT findings    Prophylactic measure.   dvt ppx: DVT PPX   diet: regular  ambulate: with assistance    fall precautions  aspiration precautions.      Discussed with Patient, Wife and family in detail at the bedside. Discussed with Heme/Onc. Discussed with ACP.  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   - no obvious infectious symptoms, CXR without consolidations or effusions   - U/A unremarkable  - F/u BCx2 and UCx  --> Neg final   - Recurrent fever. F/U repeat BCx2 sent 03/27 --> NGTD; F/u final. ABX as per ID   - less likely transfusion reaction as pt febrile prior to transfusion  - [facility] duplex --> + For DVT    - Was on empiric treatment with Vanc/cefepime --> ID consulted; Now on Zosyn  - Check GI PCR -- Neg   - Hold home penicillin while on ABX   - V/Q scan to R/O PE -- Low probability   - ID to follow up, infectious work up as per ID  --> F/u CMV PCR -- + Started on Ganciclovir per ID , CMV IGG (+) and IgM (-), Cryptococcal Ag --Neg, Quantiferon Tb -- Neg, Fungitell -- <31   - CT Chest non-contrast, noted, no acute pathology  - Recurrent fever 03/29 --> F/u BCx2 -- Neg; F/u 03/31 BCx -- NGTD; F/u final   - Shock, RRT, resume antibiotics IV fluid, MICU eval , ID follow up  --> Prednisone 50 PO Qd   - On Zosyn; Defer ABX as per ID   - short course steroid per hematology   - IR eval for BM BX as per Heme/Onc     Anemia, Pancytopenia  - Drop in Hgb, Occult +  - Hold Hep gtt    - S/P 1 unit of PRBCs 03/28  - Maintain active T+S. Transfuse for Hgb < 7.0, and platelets < 10.K  - GI eval appreciated; F/u recs --> No plans for scope at this time   - Was on Xarelto, held due to drop in Platelets as per Heme/Onc. Monitor platelet count   - Drop in Platelets. 1 unit PRBCs and 1 unit Platelets today 04/04; F/u post-transfusion CBC     HypoNa  - Cont to monitor and trend  - Appreciate renal eval.   - Serial labs     Multiple myeloma.   - pancytopenia largely at baseline although Hg 6.7 on arrival  - s/p 1U PRBC with good response   - Was on acyclovir, now on Ganciclovir as per ID   - C/w home entecavir   - S/P dexamethasone, D/C'd by hematology , follow up outpatient after discharge  --> No on Prednisone 50 Qd   - Pt reports his Revlimid was held  - Heme/Onc consulted; F/u recs   - Resume home aspirin.  - Planned for IR BM Bx     Chronic diarrhea, now with Constipation   - Standing Imodium switched to PRN  - Monitor for BM     DVT   - Duplex + For R soleal vein DVT  --> Will consider CT A chest once creatinine permits as patient received contrast; Monitor for LOPEZ   - On Hep gtt; Monitor PTT; Monitor H/H closely --> Now on hold in view of drop in Hgb   - Check VQ scan to R/O PE -- Low probability   - Vascular eval appreciated; AC as tolerated, on Xarelto 10, monitor H/H   - Serial Duplex to assess for propagation  -- Without propagation   - 04/03 Duplex without propagation     RK  - S/P Contrast on 03/23   - Check bladder scan to R/O retention  - C/w IVF for hydration  - Monitor Cr closely; Avoid nephrotoxic agents   - Cr down-trending. On IVF     Hypertension, now hypotensive   - C/w home metoprolol.  - C/w Midodrine 10 TID. Hold for SBP > 140     Hyperlipidemia.   - C/w home atorvastatin.    Abnormal CT  - Outpatient follow up for CT findings    Prophylactic measure.   dvt ppx: DVT PPX   diet: regular  ambulate: with assistance    fall precautions  aspiration precautions.      Discussed with Patient, Wife and family in detail at the bedside. Discussed with Heme/Onc. Discussed with ACP.  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   - no obvious infectious symptoms, CXR without consolidations or effusions   - U/A unremarkable  - F/u BCx2 and UCx  --> Neg final   - Recurrent fever. F/U repeat BCx2 sent 03/27 --> NGTD; F/u final. ABX as per ID   - less likely transfusion reaction as pt febrile prior to transfusion  - [facility] duplex --> + For DVT    - Was on empiric treatment with Vanc/cefepime --> ID consulted; Now on Zosyn  - Check GI PCR -- Neg   - Hold home penicillin while on ABX   - V/Q scan to R/O PE -- Low probability   - ID to follow up, infectious work up as per ID  --> F/u CMV PCR -- + Started on Ganciclovir per ID , CMV IGG (+) and IgM (-), Cryptococcal Ag --Neg, Quantiferon Tb -- Neg, Fungitell -- <31   - CT Chest non-contrast, noted, no acute pathology  - Recurrent fever 03/29 --> F/u BCx2 -- Neg; F/u 03/31 BCx -- NGTD; F/u final   - Shock, RRT, resume antibiotics IV fluid, MICU eval , ID follow up  --> Prednisone 50 PO Qd   - On Zosyn; Defer ABX as per ID   - short course steroid per hematology   - IR eval for BM BX as per Heme/Onc     Anemia, Pancytopenia  - Drop in Hgb, Occult +  - Hold Hep gtt    - S/P 1 unit of PRBCs 03/28  - Maintain active T+S. Transfuse for Hgb < 7.0, and platelets < 10.K  - GI eval appreciated; F/u recs --> No plans for scope at this time   - Was on Xarelto, held due to drop in Platelets as per Heme/Onc. Monitor platelet count   - Drop in Platelets. 1 unit PRBCs and 1 unit Platelets today 04/04; F/u post-transfusion CBC     HypoNa  - Cont to monitor and trend  - Appreciate renal eval.   - Serial labs     Multiple myeloma.   - pancytopenia largely at baseline although Hg 6.7 on arrival  - s/p 1U PRBC with good response   - Was on acyclovir, now on Ganciclovir as per ID   - C/w home entecavir   - S/P dexamethasone, D/C'd by hematology , follow up outpatient after discharge  --> No on Prednisone 50 Qd   - Pt reports his Revlimid was held  - Heme/Onc consulted; F/u recs   - Resume home aspirin.  - Planned for IR BM Bx     Chronic diarrhea, now with Constipation   - Standing Imodium switched to PRN  - Monitor for BM     DVT   - Duplex + For R soleal vein DVT  --> Will consider CT A chest once creatinine permits as patient received contrast; Monitor for LOPEZ   - On Hep gtt; Monitor PTT; Monitor H/H closely --> Now on hold in view of drop in Hgb   - Check VQ scan to R/O PE -- Low probability   - Vascular eval appreciated; AC as tolerated, on Xarelto 10, monitor H/H   - Serial Duplex to assess for propagation  -- Without propagation   - 04/03 Duplex without propagation     RK  - S/P Contrast on 03/23   - Check bladder scan to R/O retention  - C/w IVF for hydration  - Monitor Cr closely; Avoid nephrotoxic agents   - Cr down-trending. On IVF     Hypertension, now hypotensive   - C/w home metoprolol.  - C/w Midodrine 10 TID. Hold for SBP > 140     Hyperlipidemia.   - C/w home atorvastatin.    Abnormal CT  - Outpatient follow up for CT findings    Prophylactic measure.   dvt ppx: DVT PPX   diet: regular  ambulate: with assistance    fall precautions  aspiration precautions.      Discussed with Patient, Wife and family in detail at the bedside. Discussed with Heme/Onc. Discussed with ACP.  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   - no obvious infectious symptoms, CXR without consolidations or effusions   - U/A unremarkable  - F/u BCx2 and UCx  --> Neg final   - Recurrent fever. F/U repeat BCx2 sent 03/27 --> NGTD; F/u final. ABX as per ID   - less likely transfusion reaction as pt febrile prior to transfusion  - [facility] duplex --> + For DVT    - Was on empiric treatment with Vanc/cefepime --> ID consulted; Now on Zosyn  - Check GI PCR -- Neg   - Hold home penicillin while on ABX   - V/Q scan to R/O PE -- Low probability   - ID to follow up, infectious work up as per ID  --> F/u CMV PCR -- + Started on Ganciclovir per ID , CMV IGG (+) and IgM (-), Cryptococcal Ag --Neg, Quantiferon Tb -- Neg, Fungitell -- <31   - CT Chest non-contrast, noted, no acute pathology  - Recurrent fever 03/29 --> F/u BCx2 -- Neg; F/u 03/31 BCx -- NGTD; F/u final   - Shock, RRT, resume antibiotics IV fluid, MICU eval , ID follow up  --> Prednisone 50 PO Qd   - On Zosyn; Defer ABX as per ID   - short course steroid per hematology   - IR eval for BM BX as per Heme/Onc     Anemia, Pancytopenia  - Drop in Hgb, Occult +  - Hold Hep gtt    - S/P 1 unit of PRBCs 03/28  - Maintain active T+S. Transfuse for Hgb < 7.0, and platelets < 10.K  - GI eval appreciated; F/u recs --> No plans for scope at this time   - Was on Xarelto, held due to drop in Platelets as per Heme/Onc. Monitor platelet count   - Drop in Platelets. 1 unit PRBCs and 1 unit Platelets today 04/04; F/u post-transfusion CBC     HypoNa  - Cont to monitor and trend  - Appreciate renal eval.   - Serial labs     Multiple myeloma.   - pancytopenia largely at baseline although Hg 6.7 on arrival  - s/p 1U PRBC with good response   - Was on acyclovir, now on Ganciclovir as per ID   - C/w home entecavir   - S/P dexamethasone, D/C'd by hematology , follow up outpatient after discharge  --> No on Prednisone 50 Qd   - Pt reports his Revlimid was held  - Heme/Onc consulted; F/u recs   - Resume home aspirin.  - Planned for IR BM Bx     Chronic diarrhea, now with Constipation   - Standing Imodium switched to PRN  - Monitor for BM     DVT   - Duplex + For R soleal vein DVT  --> Will consider CT A chest once creatinine permits as patient received contrast; Monitor for LOPEZ   - On Hep gtt; Monitor PTT; Monitor H/H closely --> Now on hold in view of drop in Hgb   - Check VQ scan to R/O PE -- Low probability   - Vascular eval appreciated; AC as tolerated, on Xarelto 10, monitor H/H   - Serial Duplex to assess for propagation  -- Without propagation   - 04/03 Duplex without propagation     RK  - S/P Contrast on 03/23   - Check bladder scan to R/O retention  - C/w IVF for hydration  - Monitor Cr closely; Avoid nephrotoxic agents   - Cr down-trending. On IVF     Hypertension, now hypotensive   - C/w home metoprolol.  - C/w Midodrine 10 TID. Hold for SBP > 140     Hyperlipidemia.   - C/w home atorvastatin.    Abnormal CT  - Outpatient follow up for CT findings    Prophylactic measure.   dvt ppx: DVT PPX   diet: regular  ambulate: with assistance    fall precautions  aspiration precautions.      Discussed with Patient, Wife and family in detail at the bedside. Discussed with Heme/Onc. Discussed with ACP.

## 2023-04-04 NOTE — PROGRESS NOTE ADULT - SUBJECTIVE AND OBJECTIVE BOX
Name of Patient : PETER DE LA PAZ  MRN: [id number]  Date of visit: 23 @ 12:19      Subjective: Patient seen and examined. No new events except as noted.   Patient seen earlier this AM. Sitting up in bed. Wife and family at the bedside. Patient afebrile.   Planned for 1 unit of PRBCs and 1 unit of platelets today.   Denies gross bleeding.   IR eval for BM Biopsy as per Heme/Onc recs. Discussed with patient.     REVIEW OF SYSTEMS:    CONSTITUTIONAL: Generalized weakness; Afebrile   EYES/ENT: No visual changes;  No vertigo or throat pain   NECK: No pain or stiffness  RESPIRATORY: No cough, wheezing, hemoptysis; No shortness of breath  CARDIOVASCULAR: No chest pain or palpitations  GASTROINTESTINAL: No abdominal or epigastric pain. No nausea, vomiting, or hematemesis; No diarrhea or constipation. No melena or hematochezia.  GENITOURINARY: No dysuria, frequency or hematuria  NEUROLOGICAL: No numbness or weakness  SKIN: No itching, burning, rashes, or lesions   All other review of systems is negative unless indicated above.    MEDICATIONS:  MEDICATIONS  (STANDING):  acyclovir   Oral Tab/Cap 400 milliGRAM(s) Oral every 12 hours  aspirin enteric coated 81 milliGRAM(s) Oral daily  atorvastatin 40 milliGRAM(s) Oral at bedtime  entecavir Solution 0.25 milliGRAM(s) Oral daily  ganciclovir IVPB 190 milliGRAM(s) IV Intermittent every 24 hours  lactated ringers. 1000 milliLiter(s) (100 mL/Hr) IV Continuous <Continuous>  midodrine. 10 milliGRAM(s) Oral three times a day  piperacillin/tazobactam IVPB.. 3.375 Gram(s) IV Intermittent every 8 hours  predniSONE   Tablet 50 milliGRAM(s) Oral daily      PHYSICAL EXAM:  T(C): 36.8 (23 @ 11:28), Max: 37 (23 @ 05:10)  HR: 73 (23 @ 11:28) (73 - 81)  BP: 102/52 (23 @ 11:28) (102/52 - 128/70)  RR: 18 (23 @ 11:28) (18 - 18)  SpO2: 93% (23 @ 11:28) (93% - 97%)  Wt(kg): --  I&O's Summary    2023 07:01  -  2023 07:00  --------------------------------------------------------  IN: 2280 mL / OUT: 4075 mL / NET: -1795 mL          Appearance: Normal; Sitting up in bed   HEENT:  Eyes are open   Lymphatic: No lymphadenopathy   Cardiovascular: Normal    Respiratory: normal effort , clear  Gastrointestinal:  Soft, Non-tender  Skin: No rashes,  warm to touch  Psychiatry:  Mood & affect appropriate  Musculoskeletal: No edema        23 @ 07:01  -  23 @ 07:00  --------------------------------------------------------  IN: 2280 mL / OUT: 4075 mL / NET: -1795 mL                                  7.5    x     )-----------( x        ( 2023 08:13 )             21.5               04-04    133<L>  |  100  |  35<H>  ----------------------------<  145<H>  4.0   |  21<L>  |  1.97<H>    Ca    7.8<L>      2023 07:01    TPro  4.1<L>  /  Alb  x   /  TBili  x   /  DBili  x   /  AST  x   /  ALT  x   /  AlkPhos  x   -                       Urinalysis Basic - ( 2023 14:40 )    Color: Light Orange / Appearance: Turbid / S.015 / pH: x  Gluc: x / Ketone: Negative  / Bili: Negative / Urobili: Negative   Blood: x / Protein: 100 mg/dl / Nitrite: Negative   Leuk Esterase: Small / RBC: 41 /hpf /  /HPF   Sq Epi: x / Non Sq Epi: 13 /hpf / Bacteria: Negative      Culture - Blood (03.31.23 @ 19:46)   Specimen Source: .Blood Blood-Peripheral  Culture Results: No growth to date.    Culture - Blood (23 @ 19:09)   Specimen Source: .Blood Blood-Peripheral  Culture Results: No Growth Final    Culture - Blood (23 @ 18:36)   Specimen Source: .Blood Blood-Peripheral  Culture Results: No Growth Final    Culture - Blood (23 @ 11:05)   Specimen Source: .Blood Blood  Culture Results: No Growth Final    Culture - Blood (23 @ 11:05)   Specimen Source: .Blood Blood  Culture Results: No Growth Final     Name of Patient : PETER DE LA PAZ  MRN: [id number]  Date of visit: 23 @ 12:19      Subjective: Patient seen and examined. No new events except as noted.   Patient seen earlier this AM. Sitting up in bed. Wife and family at the bedside. Patient afebrile.   Planned for 1 unit of PRBCs and 1 unit of platelets today.   Denies gross bleeding.   IR eval for BM Biopsy as per Heme/Onc recs. Discussed with patient.     REVIEW OF SYSTEMS:    CONSTITUTIONAL: Generalized weakness; Afebrile   EYES/ENT: No visual changes;  No vertigo or throat pain   NECK: No pain or stiffness  RESPIRATORY: No cough, wheezing, hemoptysis; No shortness of breath  CARDIOVASCULAR: No chest pain or palpitations  GASTROINTESTINAL: No abdominal or epigastric pain. No nausea, vomiting, or hematemesis; No diarrhea or constipation. No melena or hematochezia.  GENITOURINARY: No dysuria, frequency or hematuria  NEUROLOGICAL: No numbness or weakness  SKIN: No itching, burning, rashes, or lesions   All other review of systems is negative unless indicated above.    MEDICATIONS:  MEDICATIONS  (STANDING):  acyclovir   Oral Tab/Cap 400 milliGRAM(s) Oral every 12 hours  aspirin enteric coated 81 milliGRAM(s) Oral daily  atorvastatin 40 milliGRAM(s) Oral at bedtime  entecavir Solution 0.25 milliGRAM(s) Oral daily  ganciclovir IVPB 190 milliGRAM(s) IV Intermittent every 24 hours  lactated ringers. 1000 milliLiter(s) (100 mL/Hr) IV Continuous <Continuous>  midodrine. 10 milliGRAM(s) Oral three times a day  piperacillin/tazobactam IVPB.. 3.375 Gram(s) IV Intermittent every 8 hours  predniSONE   Tablet 50 milliGRAM(s) Oral daily      PHYSICAL EXAM:  T(C): 36.8 (23 @ 11:28), Max: 37 (23 @ 05:10)  HR: 73 (23 @ 11:28) (73 - 81)  BP: 102/52 (23 @ 11:28) (102/52 - 128/70)  RR: 18 (23 @ 11:28) (18 - 18)  SpO2: 93% (23 @ 11:28) (93% - 97%)  Wt(kg): --  I&O's Summary    2023 07:01  -  2023 07:00  --------------------------------------------------------  IN: 2280 mL / OUT: 4075 mL / NET: -1795 mL          Appearance: Normal; Sitting up in bed   HEENT:  Eyes are open   Lymphatic: No lymphadenopathy   Cardiovascular: Normal    Respiratory: normal effort , clear  Gastrointestinal:  Soft, Non-tender  Skin: No rashes,  warm to touch  Psychiatry:  Mood & affect appropriate  Musculoskeletal: No edema        23 @ 07:01  -  23 @ 07:00  --------------------------------------------------------  IN: 2280 mL / OUT: 4075 mL / NET: -1795 mL                                  7.5    x     )-----------( x        ( 2023 08:13 )             21.5               04-04    133<L>  |  100  |  35<H>  ----------------------------<  145<H>  4.0   |  21<L>  |  1.97<H>    Ca    7.8<L>      2023 07:01    TPro  4.1<L>  /  Alb  x   /  TBili  x   /  DBili  x   /  AST  x   /  ALT  x   /  AlkPhos  x   -                       Urinalysis Basic - ( 2023 14:40 )    Color: Light Orange / Appearance: Turbid / S.015 / pH: x  Gluc: x / Ketone: Negative  / Bili: Negative / Urobili: Negative   Blood: x / Protein: 100 mg/dl / Nitrite: Negative   Leuk Esterase: Small / RBC: 41 /hpf /  /HPF   Sq Epi: x / Non Sq Epi: 13 /hpf / Bacteria: Negative      Culture - Blood (03.31.23 @ 19:46)   Specimen Source: .Blood Blood-Peripheral  Culture Results: No growth to date.    Culture - Blood (23 @ 19:09)   Specimen Source: .Blood Blood-Peripheral  Culture Results: No Growth Final    Culture - Blood (23 @ 18:36)   Specimen Source: .Blood Blood-Peripheral  Culture Results: No Growth Final    Culture - Blood (23 @ 11:05)   Specimen Source: .Blood Blood  Culture Results: No Growth Final    Culture - Blood (23 @ 11:05)   Specimen Source: .Blood Blood  Culture Results: No Growth Final     Name of Patient : PETER DE LA PAZ  MRN: [id number]  Date of visit: 23 @ 12:19      Subjective: Patient seen and examined. No new events except as noted.   Patient seen earlier this AM. Sitting up in bed. Wife and family at the bedside. Patient afebrile.   Planned for 1 unit of PRBCs and 1 unit of platelets today.   Denies gross bleeding.   IR eval for BM Biopsy as per Heme/Onc recs. Discussed with patient.     REVIEW OF SYSTEMS:    CONSTITUTIONAL: Generalized weakness; Afebrile   EYES/ENT: No visual changes;  No vertigo or throat pain   NECK: No pain or stiffness  RESPIRATORY: No cough, wheezing, hemoptysis; No shortness of breath  CARDIOVASCULAR: No chest pain or palpitations  GASTROINTESTINAL: No abdominal or epigastric pain. No nausea, vomiting, or hematemesis; No diarrhea or constipation. No melena or hematochezia.  GENITOURINARY: No dysuria, frequency or hematuria  NEUROLOGICAL: No numbness or weakness  SKIN: No itching, burning, rashes, or lesions   All other review of systems is negative unless indicated above.    MEDICATIONS:  MEDICATIONS  (STANDING):  acyclovir   Oral Tab/Cap 400 milliGRAM(s) Oral every 12 hours  aspirin enteric coated 81 milliGRAM(s) Oral daily  atorvastatin 40 milliGRAM(s) Oral at bedtime  entecavir Solution 0.25 milliGRAM(s) Oral daily  ganciclovir IVPB 190 milliGRAM(s) IV Intermittent every 24 hours  lactated ringers. 1000 milliLiter(s) (100 mL/Hr) IV Continuous <Continuous>  midodrine. 10 milliGRAM(s) Oral three times a day  piperacillin/tazobactam IVPB.. 3.375 Gram(s) IV Intermittent every 8 hours  predniSONE   Tablet 50 milliGRAM(s) Oral daily      PHYSICAL EXAM:  T(C): 36.8 (23 @ 11:28), Max: 37 (23 @ 05:10)  HR: 73 (23 @ 11:28) (73 - 81)  BP: 102/52 (23 @ 11:28) (102/52 - 128/70)  RR: 18 (23 @ 11:28) (18 - 18)  SpO2: 93% (23 @ 11:28) (93% - 97%)  Wt(kg): --  I&O's Summary    2023 07:01  -  2023 07:00  --------------------------------------------------------  IN: 2280 mL / OUT: 4075 mL / NET: -1795 mL          Appearance: Normal; Sitting up in bed   HEENT:  Eyes are open   Lymphatic: No lymphadenopathy   Cardiovascular: Normal    Respiratory: normal effort , clear  Gastrointestinal:  Soft, Non-tender  Skin: No rashes,  warm to touch  Psychiatry:  Mood & affect appropriate  Musculoskeletal: No edema        23 @ 07:01  -  23 @ 07:00  --------------------------------------------------------  IN: 2280 mL / OUT: 4075 mL / NET: -1795 mL                                  7.5    x     )-----------( x        ( 2023 08:13 )             21.5               04-04    133<L>  |  100  |  35<H>  ----------------------------<  145<H>  4.0   |  21<L>  |  1.97<H>    Ca    7.8<L>      2023 07:01    TPro  4.1<L>  /  Alb  x   /  TBili  x   /  DBili  x   /  AST  x   /  ALT  x   /  AlkPhos  x   -                       Urinalysis Basic - ( 2023 14:40 )    Color: Light Orange / Appearance: Turbid / S.015 / pH: x  Gluc: x / Ketone: Negative  / Bili: Negative / Urobili: Negative   Blood: x / Protein: 100 mg/dl / Nitrite: Negative   Leuk Esterase: Small / RBC: 41 /hpf /  /HPF   Sq Epi: x / Non Sq Epi: 13 /hpf / Bacteria: Negative      Culture - Blood (03.31.23 @ 19:46)   Specimen Source: .Blood Blood-Peripheral  Culture Results: No growth to date.    Culture - Blood (23 @ 19:09)   Specimen Source: .Blood Blood-Peripheral  Culture Results: No Growth Final    Culture - Blood (23 @ 18:36)   Specimen Source: .Blood Blood-Peripheral  Culture Results: No Growth Final    Culture - Blood (23 @ 11:05)   Specimen Source: .Blood Blood  Culture Results: No Growth Final    Culture - Blood (23 @ 11:05)   Specimen Source: .Blood Blood  Culture Results: No Growth Final     Name of Patient : PETER DE LA PAZ  MRN: [id number]  Date of visit: 23 @ 12:19      Subjective: Patient seen and examined. No new events except as noted.   Patient seen earlier this AM. Sitting up in bed. Wife and family at the bedside. Patient afebrile.   Patient reports no BM x 3 days. Imodium switched to PRN.   Planned for 1 unit of PRBCs and 1 unit of platelets today.   Denies gross bleeding.   IR eval for BM Biopsy as per Heme/Onc recs. Discussed with patient.     REVIEW OF SYSTEMS:    CONSTITUTIONAL: Generalized weakness; Afebrile   EYES/ENT: No visual changes;  No vertigo or throat pain   NECK: No pain or stiffness  RESPIRATORY: No cough, wheezing, hemoptysis; No shortness of breath  CARDIOVASCULAR: No chest pain or palpitations  GASTROINTESTINAL: + No BM X 3 days; No abdominal or epigastric pain. No nausea, vomiting, or hematemesis; No melena or hematochezia.  GENITOURINARY: No dysuria, frequency or hematuria  NEUROLOGICAL: No numbness or weakness  SKIN: No itching, burning, rashes, or lesions   All other review of systems is negative unless indicated above.    MEDICATIONS:  MEDICATIONS  (STANDING):  acyclovir   Oral Tab/Cap 400 milliGRAM(s) Oral every 12 hours  aspirin enteric coated 81 milliGRAM(s) Oral daily  atorvastatin 40 milliGRAM(s) Oral at bedtime  entecavir Solution 0.25 milliGRAM(s) Oral daily  ganciclovir IVPB 190 milliGRAM(s) IV Intermittent every 24 hours  lactated ringers. 1000 milliLiter(s) (100 mL/Hr) IV Continuous <Continuous>  midodrine. 10 milliGRAM(s) Oral three times a day  piperacillin/tazobactam IVPB.. 3.375 Gram(s) IV Intermittent every 8 hours  predniSONE   Tablet 50 milliGRAM(s) Oral daily      PHYSICAL EXAM:  T(C): 36.8 (23 @ 11:28), Max: 37 (23 @ 05:10)  HR: 73 (23 @ 11:28) (73 - 81)  BP: 102/52 (23 @ 11:28) (102/52 - 128/70)  RR: 18 (23 @ 11:28) (18 - 18)  SpO2: 93% (23 @ 11:28) (93% - 97%)  Wt(kg): --  I&O's Summary    2023 07:01  -  2023 07:00  --------------------------------------------------------  IN: 2280 mL / OUT: 4075 mL / NET: -1795 mL          Appearance: Normal; Sitting up in bed   HEENT:  Eyes are open   Lymphatic: No lymphadenopathy   Cardiovascular: Normal    Respiratory: normal effort , clear  Gastrointestinal:  Soft, Non-tender  Skin: No rashes,  warm to touch  Psychiatry:  Mood & affect appropriate  Musculoskeletal: No edema        23 @ 07:01  -  23 @ 07:00  --------------------------------------------------------  IN: 2280 mL / OUT: 4075 mL / NET: -1795 mL                                  7.5    x     )-----------( x        ( 2023 08:13 )             21.5               04-04    133<L>  |  100  |  35<H>  ----------------------------<  145<H>  4.0   |  21<L>  |  1.97<H>    Ca    7.8<L>      2023 07:01    TPro  4.1<L>  /  Alb  x   /  TBili  x   /  DBili  x   /  AST  x   /  ALT  x   /  AlkPhos  x                          Urinalysis Basic - ( 2023 14:40 )    Color: Light Orange / Appearance: Turbid / S.015 / pH: x  Gluc: x / Ketone: Negative  / Bili: Negative / Urobili: Negative   Blood: x / Protein: 100 mg/dl / Nitrite: Negative   Leuk Esterase: Small / RBC: 41 /hpf /  /HPF   Sq Epi: x / Non Sq Epi: 13 /hpf / Bacteria: Negative      Culture - Blood (23 @ 19:46)   Specimen Source: .Blood Blood-Peripheral  Culture Results: No growth to date.    Culture - Blood (23 @ 19:09)   Specimen Source: .Blood Blood-Peripheral  Culture Results: No Growth Final    Culture - Blood (23 @ 18:36)   Specimen Source: .Blood Blood-Peripheral  Culture Results: No Growth Final    Culture - Blood (23 @ 11:05)   Specimen Source: .Blood Blood  Culture Results: No Growth Final    Culture - Blood (23 @ 11:05)   Specimen Source: .Blood Blood  Culture Results: No Growth Final     Name of Patient : PETER DE LA PAZ  MRN: [id number]  Date of visit: 23 @ 12:19      Subjective: Patient seen and examined. No new events except as noted.   Patient seen earlier this AM. Sitting up in bed. Wife and family at the bedside. Patient afebrile.   Patient reports no BM x 3 days. Imodium switched to PRN.   Planned for 1 unit of PRBCs and 1 unit of platelets today.   Denies gross bleeding.   IR eval for BM Biopsy as per Heme/Onc recs. Discussed with patient.     REVIEW OF SYSTEMS:    CONSTITUTIONAL: Generalized weakness; Afebrile   EYES/ENT: No visual changes;  No vertigo or throat pain   NECK: No pain or stiffness  RESPIRATORY: No cough, wheezing, hemoptysis; No shortness of breath  CARDIOVASCULAR: No chest pain or palpitations  GASTROINTESTINAL: + No BM X 3 days; No abdominal or epigastric pain. No nausea, vomiting, or hematemesis; No melena or hematochezia.  GENITOURINARY: No dysuria, frequency or hematuria  NEUROLOGICAL: No numbness or weakness  SKIN: No itching, burning, rashes, or lesions   All other review of systems is negative unless indicated above.    MEDICATIONS:  MEDICATIONS  (STANDING):  acyclovir   Oral Tab/Cap 400 milliGRAM(s) Oral every 12 hours  aspirin enteric coated 81 milliGRAM(s) Oral daily  atorvastatin 40 milliGRAM(s) Oral at bedtime  entecavir Solution 0.25 milliGRAM(s) Oral daily  ganciclovir IVPB 190 milliGRAM(s) IV Intermittent every 24 hours  lactated ringers. 1000 milliLiter(s) (100 mL/Hr) IV Continuous <Continuous>  midodrine. 10 milliGRAM(s) Oral three times a day  piperacillin/tazobactam IVPB.. 3.375 Gram(s) IV Intermittent every 8 hours  predniSONE   Tablet 50 milliGRAM(s) Oral daily      PHYSICAL EXAM:  T(C): 36.8 (23 @ 11:28), Max: 37 (23 @ 05:10)  HR: 73 (23 @ 11:28) (73 - 81)  BP: 102/52 (23 @ 11:28) (102/52 - 128/70)  RR: 18 (23 @ 11:28) (18 - 18)  SpO2: 93% (23 @ 11:28) (93% - 97%)  Wt(kg): --  I&O's Summary    2023 07:01  -  2023 07:00  --------------------------------------------------------  IN: 2280 mL / OUT: 4075 mL / NET: -1795 mL          Appearance: Normal; Sitting up in bed   HEENT:  Eyes are open   Lymphatic: No lymphadenopathy   Cardiovascular: Normal    Respiratory: normal effort , clear  Gastrointestinal:  Soft, Non-tender  Skin: No rashes,  warm to touch  Psychiatry:  Mood & affect appropriate  Musculoskeletal: No edema        23 @ 07:01  -  23 @ 07:00  --------------------------------------------------------  IN: 2280 mL / OUT: 4075 mL / NET: -1795 mL                                  7.5    x     )-----------( x        ( 2023 08:13 )             21.5               04-04    133<L>  |  100  |  35<H>  ----------------------------<  145<H>  4.0   |  21<L>  |  1.97<H>    Ca    7.8<L>      2023 07:01    TPro  4.1<L>  /  Alb  x   /  TBili  x   /  DBili  x   /  AST  x   /  ALT  x   /  AlkPhos  x                          Urinalysis Basic - ( 2023 14:40 )    Color: Light Orange / Appearance: Turbid / S.015 / pH: x  Gluc: x / Ketone: Negative  / Bili: Negative / Urobili: Negative   Blood: x / Protein: 100 mg/dl / Nitrite: Negative   Leuk Esterase: Small / RBC: 41 /hpf /  /HPF   Sq Epi: x / Non Sq Epi: 13 /hpf / Bacteria: Negative      Culture - Blood (23 @ 19:46)   Specimen Source: .Blood Blood-Peripheral  Culture Results: No growth to date.    Culture - Blood (23 @ 19:09)   Specimen Source: .Blood Blood-Peripheral  Culture Results: No Growth Final    Culture - Blood (23 @ 18:36)   Specimen Source: .Blood Blood-Peripheral  Culture Results: No Growth Final    Culture - Blood (23 @ 11:05)   Specimen Source: .Blood Blood  Culture Results: No Growth Final    Culture - Blood (23 @ 11:05)   Specimen Source: .Blood Blood  Culture Results: No Growth Final     Name of Patient : PETER DE LA PAZ  MRN: [id number]  Date of visit: 23 @ 12:19      Subjective: Patient seen and examined. No new events except as noted.   Patient seen earlier this AM. Sitting up in bed. Wife and family at the bedside. Patient afebrile.   Patient reports no BM x 3 days. Imodium switched to PRN.   Planned for 1 unit of PRBCs and 1 unit of platelets today.   Denies gross bleeding.   IR eval for BM Biopsy as per Heme/Onc recs. Discussed with patient.     REVIEW OF SYSTEMS:    CONSTITUTIONAL: Generalized weakness; Afebrile   EYES/ENT: No visual changes;  No vertigo or throat pain   NECK: No pain or stiffness  RESPIRATORY: No cough, wheezing, hemoptysis; No shortness of breath  CARDIOVASCULAR: No chest pain or palpitations  GASTROINTESTINAL: + No BM X 3 days; No abdominal or epigastric pain. No nausea, vomiting, or hematemesis; No melena or hematochezia.  GENITOURINARY: No dysuria, frequency or hematuria  NEUROLOGICAL: No numbness or weakness  SKIN: No itching, burning, rashes, or lesions   All other review of systems is negative unless indicated above.    MEDICATIONS:  MEDICATIONS  (STANDING):  acyclovir   Oral Tab/Cap 400 milliGRAM(s) Oral every 12 hours  aspirin enteric coated 81 milliGRAM(s) Oral daily  atorvastatin 40 milliGRAM(s) Oral at bedtime  entecavir Solution 0.25 milliGRAM(s) Oral daily  ganciclovir IVPB 190 milliGRAM(s) IV Intermittent every 24 hours  lactated ringers. 1000 milliLiter(s) (100 mL/Hr) IV Continuous <Continuous>  midodrine. 10 milliGRAM(s) Oral three times a day  piperacillin/tazobactam IVPB.. 3.375 Gram(s) IV Intermittent every 8 hours  predniSONE   Tablet 50 milliGRAM(s) Oral daily      PHYSICAL EXAM:  T(C): 36.8 (23 @ 11:28), Max: 37 (23 @ 05:10)  HR: 73 (23 @ 11:28) (73 - 81)  BP: 102/52 (23 @ 11:28) (102/52 - 128/70)  RR: 18 (23 @ 11:28) (18 - 18)  SpO2: 93% (23 @ 11:28) (93% - 97%)  Wt(kg): --  I&O's Summary    2023 07:01  -  2023 07:00  --------------------------------------------------------  IN: 2280 mL / OUT: 4075 mL / NET: -1795 mL          Appearance: Normal; Sitting up in bed   HEENT:  Eyes are open   Lymphatic: No lymphadenopathy   Cardiovascular: Normal    Respiratory: normal effort , clear  Gastrointestinal:  Soft, Non-tender  Skin: No rashes,  warm to touch  Psychiatry:  Mood & affect appropriate  Musculoskeletal: No edema        23 @ 07:01  -  23 @ 07:00  --------------------------------------------------------  IN: 2280 mL / OUT: 4075 mL / NET: -1795 mL                                  7.5    x     )-----------( x        ( 2023 08:13 )             21.5               04-04    133<L>  |  100  |  35<H>  ----------------------------<  145<H>  4.0   |  21<L>  |  1.97<H>    Ca    7.8<L>      2023 07:01    TPro  4.1<L>  /  Alb  x   /  TBili  x   /  DBili  x   /  AST  x   /  ALT  x   /  AlkPhos  x                          Urinalysis Basic - ( 2023 14:40 )    Color: Light Orange / Appearance: Turbid / S.015 / pH: x  Gluc: x / Ketone: Negative  / Bili: Negative / Urobili: Negative   Blood: x / Protein: 100 mg/dl / Nitrite: Negative   Leuk Esterase: Small / RBC: 41 /hpf /  /HPF   Sq Epi: x / Non Sq Epi: 13 /hpf / Bacteria: Negative      Culture - Blood (23 @ 19:46)   Specimen Source: .Blood Blood-Peripheral  Culture Results: No growth to date.    Culture - Blood (23 @ 19:09)   Specimen Source: .Blood Blood-Peripheral  Culture Results: No Growth Final    Culture - Blood (23 @ 18:36)   Specimen Source: .Blood Blood-Peripheral  Culture Results: No Growth Final    Culture - Blood (23 @ 11:05)   Specimen Source: .Blood Blood  Culture Results: No Growth Final    Culture - Blood (23 @ 11:05)   Specimen Source: .Blood Blood  Culture Results: No Growth Final

## 2023-04-04 NOTE — PROGRESS NOTE ADULT - ASSESSMENT
1. fever  - V/Q scan with low probability of PE   - GI PCR- negative   - CMV PCR elevated, cmv IGG + and CMV IGM -  - cryptococcal ag, QuantiFeronTb, fungitell negative  - per ID, given immunosuppressed state and CMV viremia, ordered repeat CMV PCR and initiated valcyte.     2. anemia, FOBT positive in the setting of thrombocytopenia. Hgb remains at recent baseline. No overt bleeding symptoms.   - no plans for endoscopic evaluation at this time  - monitor H&H, transfuse PRN  - daily PPI     3. Chronic diarrhea, likely related to chemo +/- abx. 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   - GI PCR neg, C-diff neg on 03.03  - diarrhea now resolved, last BM x 3 days ago and reports feeling "backed up".   Standing Imodium discontinued and switched to prn.     4. DVT   - on anticoagulation    5. Multiple myeloma, pancytopenia   - per heme / onc           Attending supervision statement: I have personally seen and examined the patient. I fully participated in the care of this patient. I have made amendments to the documentation where necessary, and agree with the history, physical exam, and plan as outlined by the ACP.    [facility]   Gastroenterology and Hepatology  Office: [phone]   1. fever  - V/Q scan with low probability of PE   - GI PCR- negative   - CMV PCR elevated, cmv IGG + and CMV IGM -  - cryptococcal ag, QuantiFeronTb, fungitell negative  - per ID, given immunosuppressed state and CMV viremia, ordered repeat CMV PCR and initiated valcyte.     2. anemia, FOBT positive in the setting of thrombocytopenia. Hgb remains at recent baseline. No overt bleeding symptoms.   - no plans for endoscopic evaluation at this time  - monitor H&H, transfuse PRN  - daily PPI     3. Chronic diarrhea, likely related to chemo +/- abx. 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   - GI PCR neg, C-diff neg on 03.03  - diarrhea now resolved, last BM x 3 days ago and reports feeling "backed up".   Standing Imodium discontinued and switched to prn.     4. DVT   - on anticoagulation    5. Multiple myeloma, pancytopenia   - per heme / onc           Attending supervision statement: I have personally seen and examined the patient. I fully participated in the care of this patient. I have made amendments to the documentation where necessary, and agree with the history, physical exam, and plan as outlined by the ACP.    [facility]   Gastroenterology and Hepatology  Office: [phone]   1. fever  - V/Q scan with low probability of PE   - GI PCR- negative   - CMV PCR elevated, cmv IGG + and CMV IGM -  - cryptococcal ag, QuantiFeronTb, fungitell negative  - per ID, given immunosuppressed state and CMV viremia, ordered repeat CMV PCR and initiated valcyte.     2. anemia, FOBT positive in the setting of thrombocytopenia. Hgb remains at recent baseline. No overt bleeding symptoms.   - no plans for endoscopic evaluation at this time  - monitor H&H, transfuse PRN  - daily PPI     3. Chronic diarrhea, likely related to chemo +/- abx. 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   - GI PCR neg, C-diff neg on 03.03  - diarrhea now resolved, last BM x 3 days ago and reports feeling "backed up".   Standing Imodium discontinued and switched to prn.     4. DVT   - on anticoagulation    5. Multiple myeloma, pancytopenia   - per heme / onc           Attending supervision statement: I have personally seen and examined the patient. I fully participated in the care of this patient. I have made amendments to the documentation where necessary, and agree with the history, physical exam, and plan as outlined by the ACP.    [facility]   Gastroenterology and Hepatology  Office: [phone]

## 2023-04-04 NOTE — PROGRESS NOTE ADULT - SUBJECTIVE AND OBJECTIVE BOX
[facility] NEPHROLOGY- PROGRESS NOTE    67 year old Male with history of MM on chemotherapy presents with fevers. Nephrology consulted for elevated Scr.      REVIEW OF SYSTEMS:  Gen: No fever or chills   Cards: no chest pain  Resp: no dyspnea  GI: no nausea or vomiting or diarrhea   + lloyd   Vascular: no LE edema    No Known Allergies      Hospital Medications: Medications reviewed    Vital Signs Last 24 Hrs  T(C): 36.8 (2023 11:28), Max: 37 (2023 05:10)  T(F): 98.3 (2023 11:28), Max: 98.6 (2023 05:10)  HR: 73 (2023 11:28) (73 - 81)  BP: 102/52 (2023 11:28) (102/52 - 128/70)  BP(mean): --  RR: 18 (2023 11:28) (18 - 18)  SpO2: 93% (2023 11:28) (93% - 97%)    Parameters below as of 2023 11:28  Patient On (Oxygen Delivery Method): room air      PHYSICAL EXAM:    Gen: NAD, calm  Cards: RRR, +S1/S2, no M/G/R  Resp: CTA B/L  GI: soft, NT/ND, NABS  : + lloyd  Vascular: trace LE edema B/L        LABS:      133<L>  |  100  |  35<H>  ----------------------------<  145<H>  4.0   |  21<L>  |  1.97<H>    Ca    7.8<L>      2023 07:01    TPro  4.1<L>  /  Alb      /  TBili      /  DBili      /  AST      /  ALT      /  AlkPhos          Creatinine Trend: 1.97 <--, 2.17 <--, 2.53 <--, 2.05 <--, 2.26 <--, 2.17 <--, 2.18 <--, 2.25 <--                        7.5    x     )-----------( x        ( 2023 08:13 )             21.5           Urine Studies:  Urinalysis Basic - ( 2023 14:40 )    Color: Light Orange / Appearance: Turbid / S.015 / pH:   Gluc:  / Ketone: Negative  / Bili: Negative / Urobili: Negative   Blood:  / Protein: 100 mg/dl / Nitrite: Negative   Leuk Esterase: Small / RBC: 41 /hpf /  /HPF   Sq Epi:  / Non Sq Epi: 13 /hpf / Bacteria: Negative      Sodium, Random Urine: 53 mmol/L ( @ 14:40)  Creatinine, Random Urine: 74 mg/dL ( @ 14:40)  Sodium, Random Urine: 45 mmol/L ( @ 14:14)  Creatinine, Random Urine: 96 mg/dL ( @ 14:14)      < from: [facility] Duplex Lower Ext Vein Scan, Bilat (23 @ 17:54) >    ACC: [id number] EXAM:  DUPLEX SCAN EXT VEINS LOWER BI   ORDERED BY: MOLLY BROWN     PROCEDURE DATE:  2023          INTERPRETATION:  CLINICAL INFORMATION: Assess for clot propagation    COMPARISON: 3/30/2023    TECHNIQUE: Duplex sonography of the BILATERAL LOWER extremity veins with   color and spectral Doppler, with and without compression.    FINDINGS:    RIGHT:  Normal compressibility of the RIGHT common femoral, femoral and popliteal   veins.  Doppler examination shows normal spontaneous and phasic flow.  Solitary soleal vein thrombus without propagation. Posterior tibial and   peroneal veins are intact.    LEFT:  Normal compressibility of the LEFT common femoral, femoral and popliteal   veins.  Doppler examination shows normal spontaneous and phasic flow.  No LEFT calf vein thrombosis is detected.  Baker's cyst is appreciated.    IMPRESSION:  Persistent isolated right soleal vein DVT without propagation    --- End of Report ---            VENICE NICOLE MD; Attending Radiologist  This document has been electronically signed. Apr  3 2023  6:43PM    < end of copied text >         [facility] NEPHROLOGY- PROGRESS NOTE    67 year old Male with history of MM on chemotherapy presents with fevers. Nephrology consulted for elevated Scr.      REVIEW OF SYSTEMS:  Gen: No fever or chills   Cards: no chest pain  Resp: no dyspnea  GI: no nausea or vomiting or diarrhea   + lloyd   Vascular: no LE edema    No Known Allergies      Hospital Medications: Medications reviewed    Vital Signs Last 24 Hrs  T(C): 36.8 (2023 11:28), Max: 37 (2023 05:10)  T(F): 98.3 (2023 11:28), Max: 98.6 (2023 05:10)  HR: 73 (2023 11:28) (73 - 81)  BP: 102/52 (2023 11:28) (102/52 - 128/70)  BP(mean): --  RR: 18 (2023 11:28) (18 - 18)  SpO2: 93% (2023 11:28) (93% - 97%)    Parameters below as of 2023 11:28  Patient On (Oxygen Delivery Method): room air      PHYSICAL EXAM:    Gen: NAD, calm  Cards: RRR, +S1/S2, no M/G/R  Resp: CTA B/L  GI: soft, NT/ND, NABS  : + lloyd  Vascular: trace LE edema B/L        LABS:      133<L>  |  100  |  35<H>  ----------------------------<  145<H>  4.0   |  21<L>  |  1.97<H>    Ca    7.8<L>      2023 07:01    TPro  4.1<L>  /  Alb      /  TBili      /  DBili      /  AST      /  ALT      /  AlkPhos          Creatinine Trend: 1.97 <--, 2.17 <--, 2.53 <--, 2.05 <--, 2.26 <--, 2.17 <--, 2.18 <--, 2.25 <--                        7.5    x     )-----------( x        ( 2023 08:13 )             21.5           Urine Studies:  Urinalysis Basic - ( 2023 14:40 )    Color: Light Orange / Appearance: Turbid / S.015 / pH:   Gluc:  / Ketone: Negative  / Bili: Negative / Urobili: Negative   Blood:  / Protein: 100 mg/dl / Nitrite: Negative   Leuk Esterase: Small / RBC: 41 /hpf /  /HPF   Sq Epi:  / Non Sq Epi: 13 /hpf / Bacteria: Negative      Sodium, Random Urine: 53 mmol/L ( @ 14:40)  Creatinine, Random Urine: 74 mg/dL ( @ 14:40)  Sodium, Random Urine: 45 mmol/L ( @ 14:14)  Creatinine, Random Urine: 96 mg/dL ( @ 14:14)      < from: [facility] Duplex Lower Ext Vein Scan, Bilat (23 @ 17:54) >    ACC: [id number] EXAM:  DUPLEX SCAN EXT VEINS LOWER BI   ORDERED BY: MOLLY BROWN     PROCEDURE DATE:  2023          INTERPRETATION:  CLINICAL INFORMATION: Assess for clot propagation    COMPARISON: 3/30/2023    TECHNIQUE: Duplex sonography of the BILATERAL LOWER extremity veins with   color and spectral Doppler, with and without compression.    FINDINGS:    RIGHT:  Normal compressibility of the RIGHT common femoral, femoral and popliteal   veins.  Doppler examination shows normal spontaneous and phasic flow.  Solitary soleal vein thrombus without propagation. Posterior tibial and   peroneal veins are intact.    LEFT:  Normal compressibility of the LEFT common femoral, femoral and popliteal   veins.  Doppler examination shows normal spontaneous and phasic flow.  No LEFT calf vein thrombosis is detected.  Baker's cyst is appreciated.    IMPRESSION:  Persistent isolated right soleal vein DVT without propagation    --- End of Report ---            VENICE NICOLE MD; Attending Radiologist  This document has been electronically signed. Apr  3 2023  6:43PM    < end of copied text >         [facility] NEPHROLOGY- PROGRESS NOTE    67 year old Male with history of MM on chemotherapy presents with fevers. Nephrology consulted for elevated Scr.      REVIEW OF SYSTEMS:  Gen: No fever or chills   Cards: no chest pain  Resp: no dyspnea  GI: no nausea or vomiting or diarrhea   + lloyd   Vascular: no LE edema    No Known Allergies      Hospital Medications: Medications reviewed    Vital Signs Last 24 Hrs  T(C): 36.8 (2023 11:28), Max: 37 (2023 05:10)  T(F): 98.3 (2023 11:28), Max: 98.6 (2023 05:10)  HR: 73 (2023 11:28) (73 - 81)  BP: 102/52 (2023 11:28) (102/52 - 128/70)  BP(mean): --  RR: 18 (2023 11:28) (18 - 18)  SpO2: 93% (2023 11:28) (93% - 97%)    Parameters below as of 2023 11:28  Patient On (Oxygen Delivery Method): room air      PHYSICAL EXAM:    Gen: NAD, calm  Cards: RRR, +S1/S2, no M/G/R  Resp: CTA B/L  GI: soft, NT/ND, NABS  : + lloyd  Vascular: trace LE edema B/L        LABS:      133<L>  |  100  |  35<H>  ----------------------------<  145<H>  4.0   |  21<L>  |  1.97<H>    Ca    7.8<L>      2023 07:01    TPro  4.1<L>  /  Alb      /  TBili      /  DBili      /  AST      /  ALT      /  AlkPhos          Creatinine Trend: 1.97 <--, 2.17 <--, 2.53 <--, 2.05 <--, 2.26 <--, 2.17 <--, 2.18 <--, 2.25 <--                        7.5    x     )-----------( x        ( 2023 08:13 )             21.5           Urine Studies:  Urinalysis Basic - ( 2023 14:40 )    Color: Light Orange / Appearance: Turbid / S.015 / pH:   Gluc:  / Ketone: Negative  / Bili: Negative / Urobili: Negative   Blood:  / Protein: 100 mg/dl / Nitrite: Negative   Leuk Esterase: Small / RBC: 41 /hpf /  /HPF   Sq Epi:  / Non Sq Epi: 13 /hpf / Bacteria: Negative      Sodium, Random Urine: 53 mmol/L ( @ 14:40)  Creatinine, Random Urine: 74 mg/dL ( @ 14:40)  Sodium, Random Urine: 45 mmol/L ( @ 14:14)  Creatinine, Random Urine: 96 mg/dL ( @ 14:14)      < from: [facility] Duplex Lower Ext Vein Scan, Bilat (23 @ 17:54) >    ACC: [id number] EXAM:  DUPLEX SCAN EXT VEINS LOWER BI   ORDERED BY: MOLLY BROWN     PROCEDURE DATE:  2023          INTERPRETATION:  CLINICAL INFORMATION: Assess for clot propagation    COMPARISON: 3/30/2023    TECHNIQUE: Duplex sonography of the BILATERAL LOWER extremity veins with   color and spectral Doppler, with and without compression.    FINDINGS:    RIGHT:  Normal compressibility of the RIGHT common femoral, femoral and popliteal   veins.  Doppler examination shows normal spontaneous and phasic flow.  Solitary soleal vein thrombus without propagation. Posterior tibial and   peroneal veins are intact.    LEFT:  Normal compressibility of the LEFT common femoral, femoral and popliteal   veins.  Doppler examination shows normal spontaneous and phasic flow.  No LEFT calf vein thrombosis is detected.  Baker's cyst is appreciated.    IMPRESSION:  Persistent isolated right soleal vein DVT without propagation    --- End of Report ---            VENICE NICOLE MD; Attending Radiologist  This document has been electronically signed. Apr  3 2023  6:43PM    < end of copied text >

## 2023-04-04 NOTE — PROVIDER CONTACT NOTE (OTHER) - ACTION/TREATMENT ORDERED:
Provider ordered to send blood back to blood bank. provider assessed patient and ordered to give Tylenol

## 2023-04-04 NOTE — CONSULT NOTE ADULT - SUBJECTIVE AND OBJECTIVE BOX
Interventional Radiology  Evaluate for Procedure: bmbx    HPI: 67y Male with concern for MM referred to IR for bmbx    Allergies: No Known Allergies    Medications (Abx/Cardiac/Anticoagulation/Blood Products)  acyclovir   Oral Tab/Cap: 400 milliGRAM(s) Oral (04-04 @ 05:37)  aspirin enteric coated: 81 milliGRAM(s) Oral (04-04 @ 11:50)  entecavir Solution: 0.25 milliGRAM(s) Oral (04-03 @ 14:50)  furosemide   Injectable: 40 milliGRAM(s) IV Push (04-03 @ 15:46)  ganciclovir IVPB: 100 mL/Hr IV Intermittent (04-04 @ 05:49)  midodrine.: 10 milliGRAM(s) Oral (04-04 @ 11:49)  piperacillin/tazobactam IVPB..: 25 mL/Hr IV Intermittent (04-04 @ 06:39)    Data:  T(C): 36.8  HR: 73  BP: 102/52  RR: 18  SpO2: 93%    -WBC -- / HgB 7.5 / Hct 21.5 / Plt --  -Na 133 / Cl 100 / BUN 35 / Glucose 145  -K 4.0 / CO2 21 / Cr 1.97  -ALT -- / Alk Phos -- / T.Bili --  -INR 1.63 / PTT 32.8    Assessment/Plan:  67y Male with concern for MM referred to IR for bmbx    - case reviewed and approved for Thursday, 4/6  - please place IR procedure order under RAMONA Ching  - STAT labs in AM (cbc,coags, bmp, T&S)  - hold AC x24hrs  - NPO on Wednesday, 4/5 at 11pm  - COVID PCR results within 5 days of planned procedure  - d/w primary team    Raeann Ching NP  Available on Teams

## 2023-04-04 NOTE — PROGRESS NOTE ADULT - NS ATTEND AMEND GEN_ALL_CORE FT
worsening pancytopenia. willobtain bone marrow. repeat myeloma labs. possible pancytopenia secondary to cmv

## 2023-04-04 NOTE — PROGRESS NOTE ADULT - SUBJECTIVE AND OBJECTIVE BOX
Chief Complaint:  Patient is a 67y old  Male who presents with a chief complaint of fever (2023 07:26)      Date of service 23 @ 11:36      Interval Events:   Patient seen and examined.   States he hasn't had a BM in 3 days and  feels "backed up".     Hospital Medications:  acetaminophen     Tablet .. 650 milliGRAM(s) Oral every 6 hours PRN  acyclovir   Oral Tab/Cap 400 milliGRAM(s) Oral every 12 hours  aspirin enteric coated 81 milliGRAM(s) Oral daily  atorvastatin 40 milliGRAM(s) Oral at bedtime  entecavir Solution 0.25 milliGRAM(s) Oral daily  ganciclovir IVPB 190 milliGRAM(s) IV Intermittent every 24 hours  lactated ringers. 1000 milliLiter(s) IV Continuous <Continuous>  loperamide 2 milliGRAM(s) Oral three times a day PRN  midodrine. 10 milliGRAM(s) Oral three times a day  piperacillin/tazobactam IVPB.. 3.375 Gram(s) IV Intermittent every 8 hours  predniSONE   Tablet 50 milliGRAM(s) Oral daily        Review of Systems:  General:  No wt loss, fevers, chills, night sweats, fatigue,   Eyes:  Good vision, no reported pain  ENT:  No sore throat, pain, runny nose, dysphagia  CV:  No pain, palpitations, hypo/hypertension  Resp:  No dyspnea, cough, tachypnea, wheezing  GI:  See HPI  :  No pain, bleeding, incontinence, nocturia  Muscle:  No pain, weakness  Neuro:  No weakness, tingling, memory problems  Psych:  No fatigue, insomnia, mood problems, depression  Endocrine:  No polyuria, polydipsia, cold/heat intolerance  Heme:  No petechiae, ecchymosis, easy bruisability  Integumentary:  No rash, edema    PHYSICAL EXAM:   Vital Signs:  Vital Signs Last 24 Hrs  T(C): 36.8 (2023 11:28), Max: 37 (2023 05:10)  T(F): 98.3 (2023 11:28), Max: 98.6 (2023 05:10)  HR: 73 (2023 11:28) (73 - 81)  BP: 102/52 (2023 11:28) (102/52 - 128/70)  BP(mean): --  RR: 18 (2023 11:28) (18 - 18)  SpO2: 93% (2023 11:28) (93% - 97%)    Parameters below as of 2023 11:28  Patient On (Oxygen Delivery Method): room air      Daily     Daily       PHYSICAL EXAM:     GENERAL:  Appears stated age, well-groomed, well-nourished, no distress  HEENT:  NC/AT,  conjunctivae anicteric, clear and pink,   NECK: supple, trachea midline  CHEST:  Full & symmetric excursion, no increased effort, breath sounds clear  HEART:  Regular rhythm, no JVD  ABDOMEN:  Soft, non-tender, non-distended, normoactive bowel sounds,  no masses , no hepatosplenomegaly  EXTREMITIES:  no cyanosis,clubbing or edema  SKIN:  No rash, erythema, or, ecchymoses, no jaundice  NEURO:  Alert, non-focal, no asterixis  PSYCH: Appropriate affect, oriented to place and time  RECTAL: Deferred      LABS Personally reviewed by me:                        7.5    x     )-----------( x        ( 2023 08:13 )             21.5     Mean Cell Volume: 90.1 fl (23 @ 07:00)    -    133<L>  |  100  |  35<H>  ----------------------------<  145<H>  4.0   |  21<L>  |  1.97<H>    Ca    7.8<L>      2023 07:01    TPro  4.1<L>  /  Alb  x   /  TBili  x   /  DBili  x   /  AST  x   /  ALT  x   /  AlkPhos  x   -03    LIVER FUNCTIONS - ( 2023 11:58 )  Alb: x     / Pro: 4.1 g/dL / ALK PHOS: x     / ALT: x     / AST: x     / GGT: x             Urinalysis Basic - ( 2023 14:40 )    Color: Light Orange / Appearance: Turbid / S.015 / pH: x  Gluc: x / Ketone: Negative  / Bili: Negative / Urobili: Negative   Blood: x / Protein: 100 mg/dl / Nitrite: Negative   Leuk Esterase: Small / RBC: 41 /hpf /  /HPF   Sq Epi: x / Non Sq Epi: 13 /hpf / Bacteria: Negative                              7.5    x     )-----------( x        ( 2023 08:13 )             21.5                         7.2    3.27  )-----------( 12       ( 2023 07:00 )             20.9                         8.1    2.63  )-----------( 14       ( 2023 06:12 )             23.7                         8.0    1.68  )-----------( 23       ( 2023 07:13 )             23.0       Imaging personally reviewed by me:

## 2023-04-04 NOTE — PROGRESS NOTE ADULT - ASSESSMENT
PETER DE LA PAZ is a 67y Male who presents with a chief complaint of fever    Multiple Myeloma  ·	Patient follows with Dr. Jean Claude Hubbard, [facility].  ·	Patient was on lenalidomide maintenance until February 2023 when he had progression of disease.  ·	He was then switched to daratumumab + CyBorD; last dose March 23rd, 2023.  ·	No systemic therapy while inpatient or during rehabilitation.  ·	Continue acyclovir 400 mg BID.    Pancytopenia  ·	Pancytopenia has been ongoing since patient was started on latest chemotherapy regimen.  ·	Monitor CBC and transfuse to maintain HGB > 7; PLT > 10.  ·	Possible slight iron deficiency, but otherwise no evidence of nutritional deficits or hemolysis.  ·	Continue to hold anticoagulation.  ·	No plans for gastrointestinal interventions at this time.   ·	Leukopenia mostly lymphocytopenia; neutrophil count normal.  ·	Platelets 12k, planned for PRBC and platelet transfusion  ·	IR consult for bone marrow biopsy  ·	Pending repeat myeloma labs    Fever  ·	Currently afebrile  ·	Unclear etiology; malignancy vs CMV viremia.  ·	Unlikely infectious source per infectious disease; possibly malignancy.  ·	Currently on ganciclovir, piperacillin-tazobactam, and entecavir.     DVT  ·	below knee DVT   ·	hold ac for now  ·	will need repeat duplex in 6 weeks     Will continue to follow.     Quirino Holloway PA-C  Hematology/Oncology  [facility]  [phone] (office) PETER DE LA PAZ is a 67y Male who presents with a chief complaint of fever    Multiple Myeloma  ·	Patient follows with Dr. Jean Claude Hubbard, [facility].  ·	Patient was on lenalidomide maintenance until February 2023 when he had progression of disease.  ·	He was then switched to daratumumab + CyBorD; last dose March 23rd, 2023.  ·	No systemic therapy while inpatient or during rehabilitation.  ·	Continue acyclovir 400 mg BID.    Pancytopenia  ·	Pancytopenia has been ongoing since patient was started on latest chemotherapy regimen.  ·	Monitor CBC and transfuse to maintain HGB > 7; PLT > 10.  ·	Possible slight iron deficiency, but otherwise no evidence of nutritional deficits or hemolysis.  ·	Continue to hold anticoagulation.  ·	No plans for gastrointestinal interventions at this time.   ·	Leukopenia mostly lymphocytopenia; neutrophil count normal.  ·	Platelets 12k, planned for PRBC and platelet transfusion  ·	IR consult for bone marrow biopsy  ·	Pending repeat myeloma labs    Fever  ·	Currently afebrile  ·	Unclear etiology; malignancy vs CMV viremia.  ·	Unlikely infectious source per infectious disease; possibly malignancy.  ·	Currently on ganciclovir, piperacillin-tazobactam, and entecavir.     DVT  ·	below knee DVT   ·	hold ac for now  ·	will need repeat duplex in 6 weeks     Will continue to follow.     Quirino Holloway PA-C  Hematology/Oncology  [facility]  [phone] (office) PETER DE LA PAZ is a 67y Male who presents with a chief complaint of fever    Multiple Myeloma  ·	Patient follows with Dr. Jean Claude Hubbard, [facility] [place].  ·	Patient was on lenalidomide maintenance until February 2023 when he had progression of disease.  ·	He was then switched to daratumumab + CyBorD; last dose March 23rd, 2023.  ·	No systemic therapy while inpatient or during rehabilitation.  ·	Continue acyclovir 400 mg BID.    Pancytopenia  ·	Pancytopenia has been ongoing since patient was started on latest chemotherapy regimen.  ·	Monitor CBC and transfuse to maintain HGB > 7; PLT > 10.  ·	Possible slight iron deficiency, but otherwise no evidence of nutritional deficits or hemolysis.  ·	Continue to hold anticoagulation.  ·	No plans for gastrointestinal interventions at this time.   ·	Leukopenia mostly lymphocytopenia; neutrophil count normal.  ·	Platelets 12k, planned for PRBC and platelet transfusion  ·	IR consult for bone marrow biopsy  ·	Pending repeat myeloma labs    Fever  ·	Currently afebrile  ·	Unclear etiology; malignancy vs CMV viremia.  ·	Unlikely infectious source per infectious disease; possibly malignancy.  ·	Currently on ganciclovir, piperacillin-tazobactam, and entecavir.     DVT  ·	below knee DVT   ·	hold ac for now  ·	will need repeat duplex in 6 weeks     Will continue to follow.     Quirino Holloway PA-C  Hematology/Oncology  [facility]  [phone] (office)

## 2023-04-04 NOTE — PROGRESS NOTE ADULT - NS ATTEND AMEND GEN_ALL_CORE FT
Pt care and plan discussed and reviewed with PA. Plan as outlined above edited by me to reflect our discussion

## 2023-04-04 NOTE — PROGRESS NOTE ADULT - SUBJECTIVE AND OBJECTIVE BOX
Patient is a 67y old  Male who presents with a chief complaint of fever (04 Apr 2023 11:36)    Pt seen and examined at bedside. Afebrile. Feels better overall.     MEDICATIONS  (STANDING):  acyclovir   Oral Tab/Cap 400 milliGRAM(s) Oral every 12 hours  aspirin enteric coated 81 milliGRAM(s) Oral daily  atorvastatin 40 milliGRAM(s) Oral at bedtime  entecavir Solution 0.25 milliGRAM(s) Oral daily  ganciclovir IVPB 190 milliGRAM(s) IV Intermittent every 24 hours  lactated ringers. 1000 milliLiter(s) (100 mL/Hr) IV Continuous <Continuous>  midodrine. 10 milliGRAM(s) Oral three times a day  piperacillin/tazobactam IVPB.. 3.375 Gram(s) IV Intermittent every 8 hours  predniSONE   Tablet 50 milliGRAM(s) Oral daily    MEDICATIONS  (PRN):  acetaminophen     Tablet .. 650 milliGRAM(s) Oral every 6 hours PRN Temp greater or equal to 38C (100.4F), Mild Pain (1 - 3)  loperamide 2 milliGRAM(s) Oral three times a day PRN Diarrhea      Vital Signs Last 24 Hrs  T(C): 36.8 (04 Apr 2023 11:28), Max: 37 (04 Apr 2023 05:10)  T(F): 98.3 (04 Apr 2023 11:28), Max: 98.6 (04 Apr 2023 05:10)  HR: 73 (04 Apr 2023 11:28) (73 - 81)  BP: 102/52 (04 Apr 2023 11:28) (102/52 - 128/70)  BP(mean): --  RR: 18 (04 Apr 2023 11:28) (18 - 18)  SpO2: 93% (04 Apr 2023 11:28) (93% - 97%)    Parameters below as of 04 Apr 2023 11:28  Patient On (Oxygen Delivery Method): room air        PE  NAD  Awake, alert  Anicteric, MMM  No c/c/e  No rash grossly  FROM                          7.5    x     )-----------( x        ( 04 Apr 2023 08:13 )             21.5       04-04    133<L>  |  100  |  35<H>  ----------------------------<  145<H>  4.0   |  21<L>  |  1.97<H>    Ca    7.8<L>      04 Apr 2023 07:01    TPro  4.1<L>  /  Alb  x   /  TBili  x   /  DBili  x   /  AST  x   /  ALT  x   /  AlkPhos  x   04-03

## 2023-04-05 LAB
% ALBUMIN: 49.9 % — SIGNIFICANT CHANGE UP
% ALBUMIN: SIGNIFICANT CHANGE UP %
% ALPHA 1: 13.9 % — SIGNIFICANT CHANGE UP
% ALPHA 1: SIGNIFICANT CHANGE UP %
% ALPHA 2: 19 % — SIGNIFICANT CHANGE UP
% ALPHA 2: SIGNIFICANT CHANGE UP %
% BETA: 10.6 % — SIGNIFICANT CHANGE UP
% BETA: SIGNIFICANT CHANGE UP %
% GAMMA: 6.6 % — SIGNIFICANT CHANGE UP
% GAMMA: SIGNIFICANT CHANGE UP %
% M SPIKE: SIGNIFICANT CHANGE UP
ALBUMIN SERPL ELPH-MCNC: 2 G/DL — LOW (ref 3.6–5.5)
ALBUMIN SERPL ELPH-MCNC: SIGNIFICANT CHANGE UP G/DL (ref 3.6–5.5)
ALBUMIN/GLOB SERPL ELPH: 1 RATIO — SIGNIFICANT CHANGE UP
ALPHA1 GLOB SERPL ELPH-MCNC: 0.6 G/DL — HIGH (ref 0.1–0.4)
ALPHA1 GLOB SERPL ELPH-MCNC: SIGNIFICANT CHANGE UP G/DL (ref 0.1–0.4)
ALPHA2 GLOB SERPL ELPH-MCNC: 0.8 G/DL — SIGNIFICANT CHANGE UP (ref 0.5–1)
ALPHA2 GLOB SERPL ELPH-MCNC: SIGNIFICANT CHANGE UP G/DL (ref 0.5–1)
ANION GAP SERPL CALC-SCNC: 11 MMOL/L — SIGNIFICANT CHANGE UP (ref 5–17)
APTT BLD: 28.8 SEC — SIGNIFICANT CHANGE UP (ref 27.5–35.5)
B-GLOBULIN SERPL ELPH-MCNC: 0.4 G/DL — LOW (ref 0.5–1)
B-GLOBULIN SERPL ELPH-MCNC: SIGNIFICANT CHANGE UP G/DL (ref 0.5–1)
BUN SERPL-MCNC: 34 MG/DL — HIGH (ref 7–23)
CALCIUM SERPL-MCNC: 8 MG/DL — LOW (ref 8.4–10.5)
CHLORIDE SERPL-SCNC: 98 MMOL/L — SIGNIFICANT CHANGE UP (ref 96–108)
CO2 SERPL-SCNC: 26 MMOL/L — SIGNIFICANT CHANGE UP (ref 22–31)
CREAT SERPL-MCNC: 1.68 MG/DL — HIGH (ref 0.5–1.3)
EGFR: 44 ML/MIN/1.73M2 — LOW
GAMMA GLOBULIN: 0.3 G/DL — LOW (ref 0.6–1.6)
GAMMA GLOBULIN: SIGNIFICANT CHANGE UP G/DL (ref 0.6–1.6)
GLUCOSE SERPL-MCNC: 196 MG/DL — HIGH (ref 70–99)
HCT VFR BLD CALC: 24.8 % — LOW (ref 39–50)
HCT VFR BLD CALC: 27.4 % — LOW (ref 39–50)
HEPARIN-PF4 AB RESULT: <0.6 U/ML — SIGNIFICANT CHANGE UP (ref 0–0.9)
HGB BLD-MCNC: 8.8 G/DL — LOW (ref 13–17)
HGB BLD-MCNC: 9.5 G/DL — LOW (ref 13–17)
INR BLD: 1.33 RATIO — HIGH (ref 0.88–1.16)
INTERPRETATION SERPL IFE-IMP: SIGNIFICANT CHANGE UP
M-SPIKE: SIGNIFICANT CHANGE UP (ref 0–0)
MCHC RBC-ENTMCNC: 31.3 PG — SIGNIFICANT CHANGE UP (ref 27–34)
MCHC RBC-ENTMCNC: 31.5 PG — SIGNIFICANT CHANGE UP (ref 27–34)
MCHC RBC-ENTMCNC: 34.7 GM/DL — SIGNIFICANT CHANGE UP (ref 32–36)
MCHC RBC-ENTMCNC: 35.5 GM/DL — SIGNIFICANT CHANGE UP (ref 32–36)
MCV RBC AUTO: 88.9 FL — SIGNIFICANT CHANGE UP (ref 80–100)
MCV RBC AUTO: 90.1 FL — SIGNIFICANT CHANGE UP (ref 80–100)
NRBC # BLD: 0 /100 WBCS — SIGNIFICANT CHANGE UP (ref 0–0)
PF4 HEPARIN CMPLX AB SER-ACNC: NEGATIVE — SIGNIFICANT CHANGE UP
PLATELET # BLD AUTO: 15 K/UL — CRITICAL LOW (ref 150–400)
PLATELET # BLD AUTO: 33 K/UL — LOW (ref 150–400)
POTASSIUM SERPL-MCNC: 3.8 MMOL/L — SIGNIFICANT CHANGE UP (ref 3.5–5.3)
POTASSIUM SERPL-SCNC: 3.8 MMOL/L — SIGNIFICANT CHANGE UP (ref 3.5–5.3)
PROT PATTERN SERPL ELPH-IMP: SIGNIFICANT CHANGE UP
PROTHROM AB SERPL-ACNC: 15.3 SEC — HIGH (ref 10.5–13.4)
RBC # BLD: 2.79 M/UL — LOW (ref 4.2–5.8)
RBC # BLD: 3.04 M/UL — LOW (ref 4.2–5.8)
RBC # FLD: 21.1 % — HIGH (ref 10.3–14.5)
RBC # FLD: 21.7 % — HIGH (ref 10.3–14.5)
SARS-COV-2 RNA SPEC QL NAA+PROBE: SIGNIFICANT CHANGE UP
SODIUM SERPL-SCNC: 135 MMOL/L — SIGNIFICANT CHANGE UP (ref 135–145)
WBC # BLD: 2.91 K/UL — LOW (ref 3.8–10.5)
WBC # BLD: 3.39 K/UL — LOW (ref 3.8–10.5)
WBC # FLD AUTO: 2.91 K/UL — LOW (ref 3.8–10.5)
WBC # FLD AUTO: 3.39 K/UL — LOW (ref 3.8–10.5)

## 2023-04-05 RX ORDER — METOCLOPRAMIDE HCL 10 MG
5 TABLET ORAL THREE TIMES A DAY
Refills: 0 | Status: DISCONTINUED | OUTPATIENT
Start: 2023-04-05 | End: 2023-04-07

## 2023-04-05 RX ORDER — FUROSEMIDE 40 MG
40 TABLET ORAL ONCE
Refills: 0 | Status: COMPLETED | OUTPATIENT
Start: 2023-04-05 | End: 2023-04-05

## 2023-04-05 RX ADMIN — Medication 40 MILLIGRAM(S): at 02:13

## 2023-04-05 RX ADMIN — Medication 5 MILLIGRAM(S): at 14:10

## 2023-04-05 RX ADMIN — MIDODRINE HYDROCHLORIDE 10 MILLIGRAM(S): 2.5 TABLET ORAL at 06:34

## 2023-04-05 RX ADMIN — Medication 5 MILLIGRAM(S): at 22:21

## 2023-04-05 RX ADMIN — SODIUM CHLORIDE 100 MILLILITER(S): 9 INJECTION, SOLUTION INTRAVENOUS at 14:11

## 2023-04-05 RX ADMIN — Medication 81 MILLIGRAM(S): at 12:31

## 2023-04-05 RX ADMIN — Medication 40 MILLIGRAM(S): at 15:38

## 2023-04-05 RX ADMIN — Medication 50 MILLIGRAM(S): at 06:34

## 2023-04-05 RX ADMIN — GANCICLOVIR SODIUM 100 MILLIGRAM(S): 50 INJECTION, POWDER, LYOPHILIZED, FOR SOLUTION INTRAVENTRICULAR at 06:34

## 2023-04-05 RX ADMIN — ATORVASTATIN CALCIUM 40 MILLIGRAM(S): 80 TABLET, FILM COATED ORAL at 22:21

## 2023-04-05 RX ADMIN — ENTECAVIR 0.25 MILLIGRAM(S): 0.5 TABLET ORAL at 12:32

## 2023-04-05 RX ADMIN — MIDODRINE HYDROCHLORIDE 10 MILLIGRAM(S): 2.5 TABLET ORAL at 16:45

## 2023-04-05 RX ADMIN — Medication 650 MILLIGRAM(S): at 18:36

## 2023-04-05 RX ADMIN — MIDODRINE HYDROCHLORIDE 10 MILLIGRAM(S): 2.5 TABLET ORAL at 12:31

## 2023-04-05 NOTE — PROGRESS NOTE ADULT - ASSESSMENT
PETER DE LA PAZ is a 67y Male who presents with a chief complaint of fever    Multiple Myeloma  ·	Patient follows with Dr. Jean Claude Hubbard, [facility].  ·	Patient was on lenalidomide maintenance until February 2023 when he had progression of disease.  ·	He was then switched to daratumumab + CyBorD; last dose March 23rd, 2023.  ·	No systemic therapy while inpatient or during rehabilitation.  ·	Continue acyclovir 400 mg BID.    Pancytopenia  ·	Pancytopenia has been ongoing since patient was started on latest chemotherapy regimen.  ·	Monitor CBC and transfuse to maintain HGB > 7; PLT > 10.  ·	Possible slight iron deficiency, but otherwise no evidence of nutritional deficits or hemolysis.  ·	Continue to hold anticoagulation.  ·	No plans for gastrointestinal interventions at this time.   ·	Leukopenia mostly lymphocytopenia; neutrophil count normal.  ·	IR consult for bone marrow biopsy, planned for tomorrow. Receiving additional platelet transfusion to attempt to reach goal platelet > 30k.  ·	Myeloma labs w/ normal free light chain ratio, KIRA w/ weak IgG kappa band, IgG 285, IgA 57, IgM 29    Fever  ·	Currently afebrile  ·	Unclear etiology; malignancy vs CMV viremia.  ·	Unlikely infectious source per infectious disease; possibly malignancy.  ·	Currently on ganciclovir, piperacillin-tazobactam, and entecavir.     DVT  ·	below knee DVT   ·	hold ac for now  ·	will need repeat duplex in 6 weeks     Will continue to follow.     Quirino Holloway PA-C  Hematology/Oncology  [facility]  [phone] (office) PETER DE LA PAZ is a 67y Male who presents with a chief complaint of fever    Multiple Myeloma  ·	Patient follows with Dr. Jean Claude Hubbard, [facility].  ·	Patient was on lenalidomide maintenance until February 2023 when he had progression of disease.  ·	He was then switched to daratumumab + CyBorD; last dose March 23rd, 2023.  ·	No systemic therapy while inpatient or during rehabilitation.  ·	Continue acyclovir 400 mg BID.    Pancytopenia  ·	Pancytopenia has been ongoing since patient was started on latest chemotherapy regimen.  ·	Monitor CBC and transfuse to maintain HGB > 7; PLT > 10.  ·	Possible slight iron deficiency, but otherwise no evidence of nutritional deficits or hemolysis.  ·	Continue to hold anticoagulation.  ·	No plans for gastrointestinal interventions at this time.   ·	Leukopenia mostly lymphocytopenia; neutrophil count normal.  ·	IR consult for bone marrow biopsy, planned for tomorrow. Receiving additional platelet transfusion to attempt to reach goal platelet > 30k.  ·	Myeloma labs w/ normal free light chain ratio, KIRA w/ weak IgG kappa band, IgG 285, IgA 57, IgM 29    Fever  ·	Currently afebrile  ·	Unclear etiology; malignancy vs CMV viremia.  ·	Unlikely infectious source per infectious disease; possibly malignancy.  ·	Currently on ganciclovir, piperacillin-tazobactam, and entecavir.     DVT  ·	below knee DVT   ·	hold ac for now  ·	will need repeat duplex in 6 weeks     Will continue to follow.     Quirino Holloway PA-C  Hematology/Oncology  [facility]  [phone] (office) PETER DE LA PAZ is a 67y Male who presents with a chief complaint of fever    Multiple Myeloma  ·	Patient follows with Dr. Jean Claude Hubbard, [facility].  ·	Patient was on lenalidomide maintenance until February 2023 when he had progression of disease.  ·	He was then switched to daratumumab + CyBorD; last dose March 23rd, 2023.  ·	No systemic therapy while inpatient or during rehabilitation.  ·	Continue acyclovir 400 mg BID.    Pancytopenia  ·	Pancytopenia has been ongoing since patient was started on latest chemotherapy regimen.  ·	Monitor CBC and transfuse to maintain HGB > 7; PLT > 10.  ·	Possible slight iron deficiency, but otherwise no evidence of nutritional deficits or hemolysis.  ·	Continue to hold anticoagulation.  ·	No plans for gastrointestinal interventions at this time.   ·	Leukopenia mostly lymphocytopenia; neutrophil count normal.  ·	IR consult for bone marrow biopsy, planned for tomorrow. Receiving additional platelet transfusion to attempt to reach goal platelet > 30k.  ·	Myeloma labs w/ normal free light chain ratio, KIRA w/ weak IgG kappa band, IgG 285, IgA 57, IgM 29    Fever  ·	Currently afebrile  ·	Unclear etiology; malignancy vs CMV viremia.  ·	Unlikely infectious source per infectious disease; possibly malignancy.  ·	Currently on ganciclovir, piperacillin-tazobactam, and entecavir.     DVT  ·	below knee DVT   ·	hold ac for now  ·	will need repeat duplex in 6 weeks     Will continue to follow.     Quirino Holloway PA-C  Hematology/Oncology  [facility]  [phone] (office) PETER DE LA PAZ is a 67y Male who presents with a chief complaint of fever    Multiple Myeloma  ·	Patient follows with Dr. Jean Claude Hubbard, [facility].  ·	Patient was on lenalidomide maintenance until February 2023 when he had progression of disease.  ·	He was then switched to daratumumab + CyBorD; last dose March 23rd, 2023.  ·	No systemic therapy while inpatient or during rehabilitation.  ·	Continue acyclovir 400 mg BID.    Pancytopenia  ·	Pancytopenia has been ongoing since patient was started on latest chemotherapy regimen.  ·	Monitor CBC and transfuse to maintain HGB > 7; PLT > 10.  ·	Possible slight iron deficiency, but otherwise no evidence of nutritional deficits or hemolysis.  ·	Continue to hold anticoagulation.  ·	No plans for gastrointestinal interventions at this time.   ·	Leukopenia mostly lymphocytopenia; neutrophil count normal.  ·	IR consult for bone marrow biopsy, planned for tomorrow. Receiving additional platelet transfusion to attempt to reach goal platelet > 30k.  ·	Myeloma labs w/ normal free light chain ratio, KIRA w/ weak IgG kappa band, IgG 285, IgA 57, IgM 29  ·	Checking HIT antibody and ALEK (patient was on heparin on 3/28)    Fever  ·	Currently afebrile  ·	Unclear etiology; malignancy vs CMV viremia.  ·	Unlikely infectious source per infectious disease; possibly malignancy.  ·	Currently on ganciclovir, piperacillin-tazobactam, and entecavir.     DVT  ·	below knee DVT   ·	hold ac for now  ·	will need repeat duplex in 6 weeks     Will continue to follow.     Quirino Holloway PA-C  Hematology/Oncology  [facility]  [phone] (office) PETER DE LA PAZ is a 67y Male who presents with a chief complaint of fever    Multiple Myeloma  ·	Patient follows with Dr. Jean Claude Hubbard, [facility].  ·	Patient was on lenalidomide maintenance until February 2023 when he had progression of disease.  ·	He was then switched to daratumumab + CyBorD; last dose March 23rd, 2023.  ·	No systemic therapy while inpatient or during rehabilitation.  ·	Continue acyclovir 400 mg BID.    Pancytopenia  ·	Pancytopenia has been ongoing since patient was started on latest chemotherapy regimen.  ·	Monitor CBC and transfuse to maintain HGB > 7; PLT > 10.  ·	Possible slight iron deficiency, but otherwise no evidence of nutritional deficits or hemolysis.  ·	Continue to hold anticoagulation.  ·	No plans for gastrointestinal interventions at this time.   ·	Leukopenia mostly lymphocytopenia; neutrophil count normal.  ·	IR consult for bone marrow biopsy, planned for tomorrow. Receiving additional platelet transfusion to attempt to reach goal platelet > 30k.  ·	Myeloma labs w/ normal free light chain ratio, KIRA w/ weak IgG kappa band, IgG 285, IgA 57, IgM 29  ·	Checking HIT antibody and ALEK (patient was on heparin on 3/28)    Fever  ·	Currently afebrile  ·	Unclear etiology; malignancy vs CMV viremia.  ·	Unlikely infectious source per infectious disease; possibly malignancy.  ·	Currently on ganciclovir, piperacillin-tazobactam, and entecavir.     DVT  ·	below knee DVT   ·	hold ac for now  ·	will need repeat duplex in 6 weeks     Will continue to follow.     Quirino Holloway PA-C  Hematology/Oncology  [facility]  [phone] (office) PETER DE LA PAZ is a 67y Male who presents with a chief complaint of fever    Multiple Myeloma  ·	Patient follows with Dr. Jean Claude Hubbard, [facility].  ·	Patient was on lenalidomide maintenance until February 2023 when he had progression of disease.  ·	He was then switched to daratumumab + CyBorD; last dose March 23rd, 2023.  ·	No systemic therapy while inpatient or during rehabilitation.  ·	Continue acyclovir 400 mg BID.    Pancytopenia  ·	Pancytopenia has been ongoing since patient was started on latest chemotherapy regimen.  ·	Monitor CBC and transfuse to maintain HGB > 7; PLT > 10.  ·	Possible slight iron deficiency, but otherwise no evidence of nutritional deficits or hemolysis.  ·	Continue to hold anticoagulation.  ·	No plans for gastrointestinal interventions at this time.   ·	Leukopenia mostly lymphocytopenia; neutrophil count normal.  ·	IR consult for bone marrow biopsy, planned for tomorrow. Receiving additional platelet transfusion to attempt to reach goal platelet > 30k.  ·	Myeloma labs w/ normal free light chain ratio, KIRA w/ weak IgG kappa band, IgG 285, IgA 57, IgM 29  ·	Checking HIT antibody and ALEK (patient was on heparin on 3/28)    Fever  ·	Currently afebrile  ·	Unclear etiology; malignancy vs CMV viremia.  ·	Unlikely infectious source per infectious disease; possibly malignancy.  ·	Currently on ganciclovir, piperacillin-tazobactam, and entecavir.     DVT  ·	below knee DVT   ·	hold ac for now  ·	will need repeat duplex in 6 weeks     Will continue to follow.     Quirino Holloway PA-C  Hematology/Oncology  [facility]  [phone] (office)

## 2023-04-05 NOTE — PROGRESS NOTE ADULT - SUBJECTIVE AND OBJECTIVE BOX
Patient is a 67y old  Male who presents with a chief complaint of fever (05 Apr 2023 13:27)    Pt seen and examined at bedside. Hiccups on exam. Feels okay.    MEDICATIONS  (STANDING):  atorvastatin 40 milliGRAM(s) Oral at bedtime  entecavir Solution 0.25 milliGRAM(s) Oral daily  furosemide   Injectable 40 milliGRAM(s) IV Push once  ganciclovir IVPB 190 milliGRAM(s) IV Intermittent every 24 hours  lactated ringers. 1000 milliLiter(s) (100 mL/Hr) IV Continuous <Continuous>  metoclopramide Injectable 5 milliGRAM(s) IV Push three times a day  midodrine. 10 milliGRAM(s) Oral three times a day  predniSONE   Tablet 50 milliGRAM(s) Oral daily    MEDICATIONS  (PRN):  acetaminophen     Tablet .. 650 milliGRAM(s) Oral every 6 hours PRN Temp greater or equal to 38C (100.4F), Mild Pain (1 - 3)  loperamide 2 milliGRAM(s) Oral three times a day PRN Diarrhea    Vital Signs Last 24 Hrs  T(C): 37.1 (05 Apr 2023 11:49), Max: 37.9 (04 Apr 2023 16:32)  T(F): 98.8 (05 Apr 2023 11:49), Max: 100.2 (04 Apr 2023 16:32)  HR: 79 (05 Apr 2023 11:49) (65 - 79)  BP: 105/54 (05 Apr 2023 11:49) (105/54 - 143/82)  BP(mean): --  RR: 16 (05 Apr 2023 11:49) (16 - 18)  SpO2: 95% (05 Apr 2023 11:49) (94% - 98%)    Parameters below as of 05 Apr 2023 11:49  Patient On (Oxygen Delivery Method): room air        PE  NAD  Awake, alert  Anicteric, MMM  No c/c/e  No rash grossly  FROM                          8.8    2.91  )-----------( 15       ( 05 Apr 2023 06:23 )             24.8       04-05    135  |  98  |  34<H>  ----------------------------<  196<H>  3.8   |  26  |  1.68<H>    Ca    8.0<L>      05 Apr 2023 10:19    TPro  4.1<L>  /  Alb  See Note  /  TBili  x   /  DBili  x   /  AST  x   /  ALT  x   /  AlkPhos  x   04-04

## 2023-04-05 NOTE — PROVIDER CONTACT NOTE (CRITICAL VALUE NOTIFICATION) - ASSESSMENT
Patient platelet count 15k-Provider made aware, 1 unit of Platelets given, consent/Transfusion form in the chart- Vital stable, No reaction symptoms noted, tolerated transfusion, Recheck labs results 33k.

## 2023-04-05 NOTE — PROGRESS NOTE ADULT - ASSESSMENT
67y Male with history of MM on chemotherapy presents with fevers. Nephrology consulted for elevated Scr.    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 Scr improving. Continue with LR but increase to 100 ml/hour for possible crystal induced RK due to Cytovene (currently renally dosed) and will give concurrent lasix 40 mg IV X 1 dose today to prevent volume overload. UA active likely due to lloyd with granular casts suggestive of ATN. FeNa indeterminate. Lloyd placed for urinary retention. TMA work up negative. Avoid nephrotoxins. Monitor electrolytes.    2) Hypotension: BP low normal. Continue with midodrine 10 mg PO TID. Monitor BP.    3) Hyponatremia: In setting of RK and likely decreased diluting ability. Hyponatremia resolved. Monitor serum Na.    4) Metabolic acidosis: Resolved. Monitor pH.    5) MM: As per Heme/Onc. On renal dose Entecavir.          [facility] NEPHROLOGY  Leoncio Leonard M.D.  Tay Brooke D.O.  Precious Chen M.D.  Nidia Stallings, NURIS, ANP-C    Telephone: [phone]  Facsimile: [phone]    [address] [place] [address]   67y Male with history of MM on chemotherapy presents with fevers. Nephrology consulted for elevated Scr.    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 Scr improving. Continue with LR but increase to 100 ml/hour for possible crystal induced RK due to Cytovene (currently renally dosed) and will give concurrent lasix 40 mg IV X 1 dose today to prevent volume overload. UA active likely due to lloyd with granular casts suggestive of ATN. FeNa indeterminate. Lloyd placed for urinary retention. TMA work up negative. Avoid nephrotoxins. Monitor electrolytes.    2) Hypotension: BP low normal. Continue with midodrine 10 mg PO TID. Monitor BP.    3) Hyponatremia: In setting of RK and likely decreased diluting ability. Hyponatremia resolved. Monitor serum Na.    4) Metabolic acidosis: Resolved. Monitor pH.    5) MM: As per Heme/Onc. On renal dose Entecavir.          [facility] NEPHROLOGY  Leoncio Leonard M.D.  Tay Brooke D.O.  Precious Chen M.D.  Nidia Stallings, NURIS, ANP-C    Telephone: [phone]  Facsimile: [phone]    [address] [place] [address]   67y Male with history of MM on chemotherapy presents with fevers. Nephrology consulted for elevated Scr.    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 Scr improving. Continue with LR but increase to 100 ml/hour for possible crystal induced RK due to Cytovene (currently renally dosed) and will give concurrent lasix 40 mg IV X 1 dose today to prevent volume overload. UA active likely due to lloyd with granular casts suggestive of ATN. FeNa indeterminate. Lloyd placed for urinary retention. TMA work up negative. Avoid nephrotoxins. Monitor electrolytes.    2) Hypotension: BP low normal. Continue with midodrine 10 mg PO TID. Monitor BP.    3) Hyponatremia: In setting of RK and likely decreased diluting ability. Hyponatremia resolved. Monitor serum Na.    4) Metabolic acidosis: Resolved. Monitor pH.    5) MM: As per Heme/Onc. On renal dose Entecavir.          [facility] NEPHROLOGY  Leoncio Leonard M.D.  Tay Brooke D.O.  Precious Chen M.D.  Nidia Stallings, NURIS, ANP-C    Telephone: [phone]  Facsimile: [phone]    [address] [place] [address]

## 2023-04-05 NOTE — PROGRESS NOTE ADULT - NS ATTEND AMEND GEN_ALL_CORE FT
Chart reviewed, d/w pt and PA, agree with above. Plts acutely lower starting on 3/31 or 4/1, had received hep from 3/24-3/28, will check HIT ab and ALEK to r/o HIT but can certainly have exacerbation of thrombocytopenia from viral infection, antivirals, zosyn, vanc, etc. Monitor for now, also check PT, PTT, and fibrinogen in am. AC on hold for now, lower suspicion for HIT, hold off on starting argatroban as well. Chart reviewed, d/w pt and PA, agree with above. Plts acutely lower starting on 3/31 or 4/1, had received hep from 3/24-3/28, will check HIT ab and ALEK to r/o HIT but can certainly have exacerbation of thrombocytopenia from viral infection, antivirals, zosyn, vanc, etc. Monitor for now, PTT not elevated, not DIC. AC on hold for now, lower suspicion for HIT, hold off on starting argatroban as well.

## 2023-04-05 NOTE — PROGRESS NOTE ADULT - SUBJECTIVE AND OBJECTIVE BOX
[facility] NEPHROLOGY- PROGRESS NOTE    67 year old Male with history of MM on chemotherapy presents with fevers. Nephrology consulted for elevated Scr.      REVIEW OF SYSTEMS:  Gen: no fevers  Cards: no chest pain  Resp: no dyspnea  GI: no nausea or vomiting or diarrhea  Vascular: no LE edema    No Known Allergies      Hospital Medications: Medications reviewed      VITALS:  T(F): 98.8 (23 @ 11:49), Max: 100.2 (23 @ 16:32)  HR: 79 (23 @ 11:49)  BP: 105/54 (23 @ 11:49)  RR: 16 (23 @ 11:49)  SpO2: 95% (23 @ 11:49)  Wt(kg): --     @ 07:01  -   @ 07:00  --------------------------------------------------------  IN: 1780 mL / OUT: 4500 mL / NET: -2720 mL     @ 07:01  -   @ 13:00  --------------------------------------------------------  IN: 0 mL / OUT: 1000 mL / NET: -1000 mL        PHYSICAL EXAM:    Gen: NAD, calm  Cards: RRR, +S1/S2, no M/G/R  Resp: CTA B/L  GI: soft, NT/ND, NABS  : + lloyd  Vascular: trace LE edema B/L      LABS:      135  |  98  |  34<H>  ----------------------------<  196<H>  3.8   |  26  |  1.68<H>    Ca    8.0<L>      2023 10:19    TPro  4.1<L>  /  Alb  See Note  /  TBili      /  DBili      /  AST      /  ALT      /  AlkPhos      04-04    Creatinine Trend: 1.68 <--, 1.97 <--, 2.17 <--, 2.53 <--, 2.05 <--, 2.26 <--, 2.17 <--, 2.18 <--                        8.8    2.91  )-----------( 15       ( 2023 06:23 )             24.8     Urine Studies:  Urinalysis Basic - ( 2023 14:40 )    Color: Light Orange / Appearance: Turbid / S.015 / pH:   Gluc:  / Ketone: Negative  / Bili: Negative / Urobili: Negative   Blood:  / Protein: 100 mg/dl / Nitrite: Negative   Leuk Esterase: Small / RBC: 41 /hpf /  /HPF   Sq Epi:  / Non Sq Epi: 13 /hpf / Bacteria: Negative      Sodium, Random Urine: 53 mmol/L ( @ 14:40)  Creatinine, Random Urine: 74 mg/dL ( @ 14:40)     [facility] NEPHROLOGY- PROGRESS NOTE    67 year old Male with history of MM on chemotherapy presents with fevers. Nephrology consulted for elevated Scr.      REVIEW OF SYSTEMS:  Gen: no fevers  Cards: no chest pain  Resp: no dyspnea  GI: no nausea or vomiting or diarrhea  Vascular: no LE edema    No Known Allergies      Hospital Medications: Medications reviewed      VITALS:  T(F): 98.8 (23 @ 11:49), Max: 100.2 (23 @ 16:32)  HR: 79 (23 @ 11:49)  BP: 105/54 (23 @ 11:49)  RR: 16 (23 @ 11:49)  SpO2: 95% (23 @ 11:49)  Wt(kg): --     @ 07:01  -   @ 07:00  --------------------------------------------------------  IN: 1780 mL / OUT: 4500 mL / NET: -2720 mL     @ 07:01  -   @ 13:00  --------------------------------------------------------  IN: 0 mL / OUT: 1000 mL / NET: -1000 mL        PHYSICAL EXAM:    Gen: NAD, calm  Cards: RRR, +S1/S2, no M/G/R  Resp: CTA B/L  GI: soft, NT/ND, NABS  : + lloyd  Vascular: trace LE edema B/L      LABS:      135  |  98  |  34<H>  ----------------------------<  196<H>  3.8   |  26  |  1.68<H>    Ca    8.0<L>      2023 10:19    TPro  4.1<L>  /  Alb  See Note  /  TBili      /  DBili      /  AST      /  ALT      /  AlkPhos      04-04    Creatinine Trend: 1.68 <--, 1.97 <--, 2.17 <--, 2.53 <--, 2.05 <--, 2.26 <--, 2.17 <--, 2.18 <--                        8.8    2.91  )-----------( 15       ( 2023 06:23 )             24.8     Urine Studies:  Urinalysis Basic - ( 2023 14:40 )    Color: Light Orange / Appearance: Turbid / S.015 / pH:   Gluc:  / Ketone: Negative  / Bili: Negative / Urobili: Negative   Blood:  / Protein: 100 mg/dl / Nitrite: Negative   Leuk Esterase: Small / RBC: 41 /hpf /  /HPF   Sq Epi:  / Non Sq Epi: 13 /hpf / Bacteria: Negative      Sodium, Random Urine: 53 mmol/L ( @ 14:40)  Creatinine, Random Urine: 74 mg/dL ( @ 14:40)     [facility] NEPHROLOGY- PROGRESS NOTE    67 year old Male with history of MM on chemotherapy presents with fevers. Nephrology consulted for elevated Scr.      REVIEW OF SYSTEMS:  Gen: no fevers  Cards: no chest pain  Resp: no dyspnea  GI: no nausea or vomiting or diarrhea  Vascular: no LE edema    No Known Allergies      Hospital Medications: Medications reviewed      VITALS:  T(F): 98.8 (23 @ 11:49), Max: 100.2 (23 @ 16:32)  HR: 79 (23 @ 11:49)  BP: 105/54 (23 @ 11:49)  RR: 16 (23 @ 11:49)  SpO2: 95% (23 @ 11:49)  Wt(kg): --     @ 07:01  -   @ 07:00  --------------------------------------------------------  IN: 1780 mL / OUT: 4500 mL / NET: -2720 mL     @ 07:01  -   @ 13:00  --------------------------------------------------------  IN: 0 mL / OUT: 1000 mL / NET: -1000 mL        PHYSICAL EXAM:    Gen: NAD, calm  Cards: RRR, +S1/S2, no M/G/R  Resp: CTA B/L  GI: soft, NT/ND, NABS  : + lloyd  Vascular: trace LE edema B/L      LABS:      135  |  98  |  34<H>  ----------------------------<  196<H>  3.8   |  26  |  1.68<H>    Ca    8.0<L>      2023 10:19    TPro  4.1<L>  /  Alb  See Note  /  TBili      /  DBili      /  AST      /  ALT      /  AlkPhos      04-04    Creatinine Trend: 1.68 <--, 1.97 <--, 2.17 <--, 2.53 <--, 2.05 <--, 2.26 <--, 2.17 <--, 2.18 <--                        8.8    2.91  )-----------( 15       ( 2023 06:23 )             24.8     Urine Studies:  Urinalysis Basic - ( 2023 14:40 )    Color: Light Orange / Appearance: Turbid / S.015 / pH:   Gluc:  / Ketone: Negative  / Bili: Negative / Urobili: Negative   Blood:  / Protein: 100 mg/dl / Nitrite: Negative   Leuk Esterase: Small / RBC: 41 /hpf /  /HPF   Sq Epi:  / Non Sq Epi: 13 /hpf / Bacteria: Negative      Sodium, Random Urine: 53 mmol/L ( @ 14:40)  Creatinine, Random Urine: 74 mg/dL ( @ 14:40)

## 2023-04-05 NOTE — PROGRESS NOTE ADULT - ASSESSMENT
1. fever  - V/Q scan with low probability of PE   - GI PCR- negative   - CMV PCR elevated, cmv IGG + and CMV IGM -  - cryptococcal ag, QuantiFeronTb, fungitell negative  - per ID, given immunosuppressed state and CMV viremia, ordered repeat CMV PCR and initiated valcyte.     2. anemia, FOBT positive in the setting of thrombocytopenia. Hgb remains at recent baseline. No overt bleeding symptoms.   - no plans for endoscopic evaluation at this time  - monitor H&H, transfuse PRN  - daily PPI     3. Chronic diarrhea, likely related to chemo +/- abx. 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   - GI PCR neg, C-diff neg on 03.03  - diarrhea now resolved, last BM x 3 days ago and reports feeling "backed up".   Standing Imodium discontinued and switched to prn.     4. DVT   - on anticoagulation    5. Multiple myeloma, pancytopenia   - per heme / onc     6. Hiccups  - Reglan 5mg IV TID ordered           Attending supervision statement: I have personally seen and examined the patient. I fully participated in the care of this patient. I have made amendments to the documentation where necessary, and agree with the history, physical exam, and plan as outlined by the ACP.    [facility]   Gastroenterology and Hepatology  Office: [phone]   1. fever  - V/Q scan with low probability of PE   - GI PCR- negative   - CMV PCR elevated, cmv IGG + and CMV IGM -  - cryptococcal ag, QuantiFeronTb, fungitell negative  - per ID, given immunosuppressed state and CMV viremia, ordered repeat CMV PCR and initiated valcyte.     2. anemia, FOBT positive in the setting of thrombocytopenia. Hgb remains at recent baseline. No overt bleeding symptoms.   - no plans for endoscopic evaluation at this time  - monitor H&H, transfuse PRN  - daily PPI     3. Chronic diarrhea, likely related to chemo +/- abx. 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   - GI PCR neg, C-diff neg on 03.03  - diarrhea now resolved, last BM x 3 days ago and reports feeling "backed up".   Standing Imodium discontinued and switched to prn.     4. DVT   - on anticoagulation    5. Multiple myeloma, pancytopenia   - per heme / onc     6. Hiccups  - Reglan 5mg IV TID ordered           Attending supervision statement: I have personally seen and examined the patient. I fully participated in the care of this patient. I have made amendments to the documentation where necessary, and agree with the history, physical exam, and plan as outlined by the ACP.    [facility]   Gastroenterology and Hepatology  Office: [phone]   1. fever  - V/Q scan with low probability of PE   - GI PCR- negative   - CMV PCR elevated, cmv IGG + and CMV IGM -  - cryptococcal ag, QuantiFeronTb, fungitell negative  - per ID, given immunosuppressed state and CMV viremia, ordered repeat CMV PCR and initiated valcyte.     2. anemia, FOBT positive in the setting of thrombocytopenia. Hgb remains at recent baseline. No overt bleeding symptoms.   - no plans for endoscopic evaluation at this time  - monitor H&H, transfuse PRN  - daily PPI     3. Chronic diarrhea, likely related to chemo +/- abx. 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   - GI PCR neg, C-diff neg on 03.03  - diarrhea now resolved, last BM x 3 days ago and reports feeling "backed up".   Standing Imodium discontinued and switched to prn.     4. DVT   - on anticoagulation    5. Multiple myeloma, pancytopenia   - per heme / onc     6. Hiccups  - Reglan 5mg IV TID ordered           Attending supervision statement: I have personally seen and examined the patient. I fully participated in the care of this patient. I have made amendments to the documentation where necessary, and agree with the history, physical exam, and plan as outlined by the ACP.    [facility]   Gastroenterology and Hepatology  Office: [phone]   1. fever  - V/Q scan with low probability of PE   - GI PCR- negative   - CMV PCR elevated, cmv IGG + and CMV IGM -  - cryptococcal ag, QuantiFeronTb, fungitell negative  - per ID, given immunosuppressed state and CMV viremia, ordered repeat CMV PCR and initiated valcyte.     2. anemia, FOBT positive in the setting of thrombocytopenia. Hgb remains at recent baseline. No overt bleeding symptoms.   - no plans for endoscopic evaluation at this time  - monitor H&H, transfuse PRN  - daily PPI     3. Chronic diarrhea, likely related to chemo +/- abx. 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   - GI PCR neg, C-diff neg on 03.03  - diarrhea now resolved, last BM x 3 days ago and reports feeling "backed up".   Standing Imodium discontinued and switched to prn.     4. DVT   - on anticoagulation    5. Multiple myeloma, pancytopenia   - per heme / onc   - planned for bone marrow biopsy 4/6 with IR    6. Hiccups  - Reglan 5mg IV TID ordered           Attending supervision statement: I have personally seen and examined the patient. I fully participated in the care of this patient. I have made amendments to the documentation where necessary, and agree with the history, physical exam, and plan as outlined by the ACP.    [facility]   Gastroenterology and Hepatology  Office: [phone]   1. fever  - V/Q scan with low probability of PE   - GI PCR- negative   - CMV PCR elevated, cmv IGG + and CMV IGM -  - cryptococcal ag, QuantiFeronTb, fungitell negative  - per ID, given immunosuppressed state and CMV viremia, ordered repeat CMV PCR and initiated valcyte.     2. anemia, FOBT positive in the setting of thrombocytopenia. Hgb remains at recent baseline. No overt bleeding symptoms.   - no plans for endoscopic evaluation at this time  - monitor H&H, transfuse PRN  - daily PPI     3. Chronic diarrhea, likely related to chemo +/- abx. 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   - GI PCR neg, C-diff neg on 03.03  - diarrhea now resolved, last BM x 3 days ago and reports feeling "backed up".   Standing Imodium discontinued and switched to prn.     4. DVT   - on anticoagulation    5. Multiple myeloma, pancytopenia   - per heme / onc   - planned for bone marrow biopsy 4/6 with IR    6. Hiccups  - Reglan 5mg IV TID ordered           Attending supervision statement: I have personally seen and examined the patient. I fully participated in the care of this patient. I have made amendments to the documentation where necessary, and agree with the history, physical exam, and plan as outlined by the ACP.    [facility]   Gastroenterology and Hepatology  Office: [phone]   1. fever  - V/Q scan with low probability of PE   - GI PCR- negative   - CMV PCR elevated, cmv IGG + and CMV IGM -  - cryptococcal ag, QuantiFeronTb, fungitell negative  - per ID, given immunosuppressed state and CMV viremia, ordered repeat CMV PCR and initiated valcyte.     2. anemia, FOBT positive in the setting of thrombocytopenia. Hgb remains at recent baseline. No overt bleeding symptoms.   - no plans for endoscopic evaluation at this time  - monitor H&H, transfuse PRN  - daily PPI     3. Chronic diarrhea, likely related to chemo +/- abx. 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   - GI PCR neg, C-diff neg on 03.03  - diarrhea now resolved, last BM x 3 days ago and reports feeling "backed up".   Standing Imodium discontinued and switched to prn.     4. DVT   - on anticoagulation    5. Multiple myeloma, pancytopenia   - per heme / onc   - planned for bone marrow biopsy 4/6 with IR    6. Hiccups  - Reglan 5mg IV TID ordered           Attending supervision statement: I have personally seen and examined the patient. I fully participated in the care of this patient. I have made amendments to the documentation where necessary, and agree with the history, physical exam, and plan as outlined by the ACP.    [facility]   Gastroenterology and Hepatology  Office: [phone]

## 2023-04-05 NOTE — PROGRESS NOTE ADULT - ASSESSMENT
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   - no obvious infectious symptoms, CXR without consolidations or effusions   - U/A unremarkable  - F/u BCx2 and UCx  --> Neg final   - Recurrent fever. F/U repeat BCx2 sent 03/27 --> Neg final; ABX as per ID   - less likely transfusion reaction as pt febrile prior to transfusion  - LE VA duplex --> + For DVT    - Was on empiric treatment with Vanc/cefepime --> ID consulted; S/P Zosyn  - Check GI PCR -- Neg   - Hold home penicillin while on ABX   - V/Q scan to R/O PE -- Low probability   - ID to follow up, infectious work up as per ID  --> F/u CMV PCR -- + Started on Ganciclovir per ID , CMV IGG (+) and IgM (-), Cryptococcal Ag --Neg, Quantiferon Tb -- Neg, Fungitell -- <31   - CT Chest non-contrast, noted, no acute pathology  - Recurrent fever 03/29 --> F/u BCx2 -- Neg; F/u 03/31 BCx -- NGTD; F/u final   - Shock, RRT, resume antibiotics IV fluid, MICU eval , ID follow up  --> Prednisone 50 PO Qd   - S/P Zosyn; Defer ABX as per ID   - short course steroid per hematology   - IR eval for BM BX as per Heme/Onc --> Planned for 04/06    Anemia, Pancytopenia  - Drop in Hgb, Occult +  - Hold Hep gtt    - S/P 1 unit of PRBCs 03/28  - Maintain active T+S. Transfuse for Hgb < 7.0, and platelets < 10.K  - GI eval appreciated; F/u recs --> No plans for scope at this time   - Was on Xarelto, held due to drop in Platelets as per Heme/Onc. Monitor platelet count   - S/P 1 unit PRBCs and 1 unit Platelets 04/04; W/ lasix  - Planned for 1 unit of platelets 04/05; F/u post-transfusion CBC     HypoNa  - Cont to monitor and trend  - Appreciate renal eval.   - Serial labs     Multiple myeloma.   - pancytopenia largely at baseline although Hg 6.7 on arrival  - s/p 1U PRBC with good response   - Was on acyclovir, now on Ganciclovir as per ID   - C/w home entecavir   - S/P dexamethasone, D/C'd by hematology , follow up outpatient after discharge  --> No on Prednisone 50 Qd   - Pt reports his Revlimid was held  - Heme/Onc consulted; F/u recs   - Resume home aspirin.  - Planned for IR BM Bx     Chronic diarrhea, now with Constipation   - Standing Imodium switched to PRN  - Monitor for BM     DVT   - Duplex + For R soleal vein DVT  --> Will consider CT A chest once creatinine permits as patient received contrast; Monitor for LOPEZ   - On Hep gtt; Monitor PTT; Monitor H/H closely --> Now on hold in view of drop in Hgb   - Check VQ scan to R/O PE -- Low probability   - Vascular eval appreciated; AC as tolerated, on Xarelto 10, monitor H/H   - Serial Duplex to assess for propagation  -- Without propagation   - 04/03 Duplex without propagation   - Repeat Duplex 04/10     RK  - S/P Contrast on 03/23   - Check bladder scan to R/O retention  - C/w IVF for hydration  - Monitor Cr closely; Avoid nephrotoxic agents   - Cr down-trending. On IVF   - Renal eval appreciated; F/u recs    Hypertension, now hypotensive   - C/w home metoprolol.  - C/w Midodrine 10 TID. Hold for SBP > 140     Hyperlipidemia.   - C/w home atorvastatin.    Abnormal CT  - Outpatient follow up for CT findings    Prophylactic measure.   dvt ppx: DVT PPX   diet: regular  ambulate: with assistance    fall precautions  aspiration precautions.      Discussed with Patient, and family in detail at the bedside.   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   - no obvious infectious symptoms, CXR without consolidations or effusions   - U/A unremarkable  - F/u BCx2 and UCx  --> Neg final   - Recurrent fever. F/U repeat BCx2 sent 03/27 --> Neg final; ABX as per ID   - less likely transfusion reaction as pt febrile prior to transfusion  - LE VA duplex --> + For DVT    - Was on empiric treatment with Vanc/cefepime --> ID consulted; S/P Zosyn  - Check GI PCR -- Neg   - Hold home penicillin while on ABX   - V/Q scan to R/O PE -- Low probability   - ID to follow up, infectious work up as per ID  --> F/u CMV PCR -- + Started on Ganciclovir per ID , CMV IGG (+) and IgM (-), Cryptococcal Ag --Neg, Quantiferon Tb -- Neg, Fungitell -- <31   - CT Chest non-contrast, noted, no acute pathology  - Recurrent fever 03/29 --> F/u BCx2 -- Neg; F/u 03/31 BCx -- NGTD; F/u final   - Shock, RRT, resume antibiotics IV fluid, MICU eval , ID follow up  --> Prednisone 50 PO Qd   - S/P Zosyn; Defer ABX as per ID   - short course steroid per hematology   - IR eval for BM BX as per Heme/Onc --> Planned for 04/06    Anemia, Pancytopenia  - Drop in Hgb, Occult +  - Hold Hep gtt    - S/P 1 unit of PRBCs 03/28  - Maintain active T+S. Transfuse for Hgb < 7.0, and platelets < 10.K  - GI eval appreciated; F/u recs --> No plans for scope at this time   - Was on Xarelto, held due to drop in Platelets as per Heme/Onc. Monitor platelet count   - S/P 1 unit PRBCs and 1 unit Platelets 04/04; W/ lasix  - Planned for 1 unit of platelets 04/05; F/u post-transfusion CBC     HypoNa  - Cont to monitor and trend  - Appreciate renal eval.   - Serial labs     Multiple myeloma.   - pancytopenia largely at baseline although Hg 6.7 on arrival  - s/p 1U PRBC with good response   - Was on acyclovir, now on Ganciclovir as per ID   - C/w home entecavir   - S/P dexamethasone, D/C'd by hematology , follow up outpatient after discharge  --> No on Prednisone 50 Qd   - Pt reports his Revlimid was held  - Heme/Onc consulted; F/u recs   - Resume home aspirin.  - Planned for IR BM Bx     Chronic diarrhea, now with Constipation   - Standing Imodium switched to PRN  - Monitor for BM     DVT   - Duplex + For R soleal vein DVT  --> Will consider CT A chest once creatinine permits as patient received contrast; Monitor for LOPEZ   - On Hep gtt; Monitor PTT; Monitor H/H closely --> Now on hold in view of drop in Hgb   - Check VQ scan to R/O PE -- Low probability   - Vascular eval appreciated; AC as tolerated, on Xarelto 10, monitor H/H   - Serial Duplex to assess for propagation  -- Without propagation   - 04/03 Duplex without propagation   - Repeat Duplex 04/10     RK  - S/P Contrast on 03/23   - Check bladder scan to R/O retention  - C/w IVF for hydration  - Monitor Cr closely; Avoid nephrotoxic agents   - Cr down-trending. On IVF   - Renal eval appreciated; F/u recs    Hypertension, now hypotensive   - C/w home metoprolol.  - C/w Midodrine 10 TID. Hold for SBP > 140     Hyperlipidemia.   - C/w home atorvastatin.    Abnormal CT  - Outpatient follow up for CT findings    Prophylactic measure.   dvt ppx: DVT PPX   diet: regular  ambulate: with assistance    fall precautions  aspiration precautions.      Discussed with Patient, and family in detail at the bedside.   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   - CXR without consolidations or effusions; U/A unremarkable; GI PCR Neg   - F/u BCx2 and UCx  --> Neg final   - Recurrent fever. Repeat BCx2 sent 03/27 --> Neg final; ABX as per ID   - LE VA duplex --> + For DVT    - Was on empiric treatment with Vanc/cefepime --> ID consulted; S/P Zosyn  - Hold home penicillin while on ABX   - V/Q scan to R/O PE -- Low probability   - ID to follow up, infectious work up as per ID  --> CMV PCR -- + C/w Ganciclovir per ID , CMV IGG (+) and IgM (-), Cryptococcal Ag --Neg, Quantiferon Tb -- Neg, Fungitell -- <31   - CT Chest non-contrast, noted, no acute pathology  - Recurrent fever 03/29 --> F/u BCx2 -- Neg; F/u 03/31 BCx -- NGTD; F/u final   - Shock, RRT, resume antibiotics IV fluid, MICU eval , ID follow up  --> Prednisone 50 PO Qd   - S/P Zosyn; Defer ABX as per ID   - short course steroid per hematology   - IR eval for BM BX as per Heme/Onc --> Planned for 04/06 with IR     Anemia, Thrombocytopenia, Pancytopenia  - Drop in Hgb, Occult +  - Hold Hep gtt ; S/P 1 unit of PRBCs 03/28  - Maintain active T+S. Transfuse for Hgb < 7.0, and platelets < 10.K  - GI eval appreciated; F/u recs --> No plans for scope at this time   - Was on Xarelto, held due to drop in Platelets as per Heme/Onc. Monitor platelet count   - S/P 1 unit PRBCs and 1 unit Platelets 04/04; W/ lasix  - Planned for 1 unit of platelets 04/05; F/u post-transfusion CBC     HypoNa  - Cont to monitor and trend  - Appreciate renal eval.   - Serial labs     Multiple myeloma.   - pancytopenia largely at baseline although Hg 6.7 on arrival; s/p 1U PRBC with good response   - Was on acyclovir, now on Ganciclovir as per ID   - C/w home entecavir   - S/P dexamethasone, D/C'd by hematology , follow up outpatient after discharge  --> No on Prednisone 50 Qd   - Pt reports his Revlimid was held  - Heme/Onc consulted; F/u recs   - Resume home aspirin. --> Now on hold   - Planned for IR BM Bx  04/06     Chronic diarrhea, now with Constipation   - Standing Imodium switched to PRN  - Monitor for BM     DVT   - Duplex + For R soleal vein DVT  --> Will consider CT A chest once creatinine permits as patient received contrast; Monitor for LOPEZ   - On Hep gtt; Monitor PTT; Monitor H/H closely --> Now on hold in view of drop in Hgb   - Check VQ scan to R/O PE -- Low probability   - Vascular eval appreciated; AC as tolerated, on Xarelto 10, monitor H/H   - Serial Duplex to assess for propagation  -- Without propagation   - 04/03 Duplex without propagation   - Repeat Duplex 04/10 -- ordered     RK  - S/P Contrast on 03/23   - Check bladder scan to R/O retention  - C/w IVF for hydration  - Monitor Cr closely; Avoid nephrotoxic agents   - Cr down-trending. On IVF   - Renal eval appreciated; F/u recs    Hypertension, now hypotensive   - C/w home metoprolol.  - C/w Midodrine 10 TID. Hold for SBP > 140     Hiccups  - Started on Reglan, Monitor     Hyperlipidemia.   - C/w home atorvastatin.    Abnormal CT  - Outpatient follow up for CT findings    Prophylactic measure.   dvt ppx: DVT PPX   diet: regular  ambulate: with assistance    fall precautions  aspiration precautions.      Discussed with Patient, and family in detail at the bedside.   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   - CXR without consolidations or effusions; U/A unremarkable; GI PCR Neg   - F/u BCx2 and UCx  --> Neg final   - Recurrent fever. Repeat BCx2 sent 03/27 --> Neg final; ABX as per ID   - LE VA duplex --> + For DVT    - Was on empiric treatment with Vanc/cefepime --> ID consulted; S/P Zosyn  - Hold home penicillin while on ABX   - V/Q scan to R/O PE -- Low probability   - ID to follow up, infectious work up as per ID  --> CMV PCR -- + C/w Ganciclovir per ID , CMV IGG (+) and IgM (-), Cryptococcal Ag --Neg, Quantiferon Tb -- Neg, Fungitell -- <31   - CT Chest non-contrast, noted, no acute pathology  - Recurrent fever 03/29 --> F/u BCx2 -- Neg; F/u 03/31 BCx -- NGTD; F/u final   - Shock, RRT, resume antibiotics IV fluid, MICU eval , ID follow up  --> Prednisone 50 PO Qd   - S/P Zosyn; Defer ABX as per ID   - short course steroid per hematology   - IR eval for BM BX as per Heme/Onc --> Planned for 04/06 with IR     Anemia, Thrombocytopenia, Pancytopenia  - Drop in Hgb, Occult +  - Hold Hep gtt ; S/P 1 unit of PRBCs 03/28  - Maintain active T+S. Transfuse for Hgb < 7.0, and platelets < 10.K  - GI eval appreciated; F/u recs --> No plans for scope at this time   - Was on Xarelto, held due to drop in Platelets as per Heme/Onc. Monitor platelet count   - S/P 1 unit PRBCs and 1 unit Platelets 04/04; W/ lasix  - Planned for 1 unit of platelets 04/05; F/u post-transfusion CBC     HypoNa  - Cont to monitor and trend  - Appreciate renal eval.   - Serial labs     Multiple myeloma.   - pancytopenia largely at baseline although Hg 6.7 on arrival; s/p 1U PRBC with good response   - Was on acyclovir, now on Ganciclovir as per ID   - C/w home entecavir   - S/P dexamethasone, D/C'd by hematology , follow up outpatient after discharge  --> No on Prednisone 50 Qd   - Pt reports his Revlimid was held  - Heme/Onc consulted; F/u recs   - Resume home aspirin. --> Now on hold   - Planned for IR BM Bx  04/06     Chronic diarrhea, now with Constipation   - Standing Imodium switched to PRN  - Monitor for BM     DVT   - Duplex + For R soleal vein DVT  --> Will consider CT A chest once creatinine permits as patient received contrast; Monitor for LOPEZ   - On Hep gtt; Monitor PTT; Monitor H/H closely --> Now on hold in view of drop in Hgb   - Check VQ scan to R/O PE -- Low probability   - Vascular eval appreciated; AC as tolerated, on Xarelto 10, monitor H/H   - Serial Duplex to assess for propagation  -- Without propagation   - 04/03 Duplex without propagation   - Repeat Duplex 04/10 -- ordered     RK  - S/P Contrast on 03/23   - Check bladder scan to R/O retention  - C/w IVF for hydration  - Monitor Cr closely; Avoid nephrotoxic agents   - Cr down-trending. On IVF   - Renal eval appreciated; F/u recs    Hypertension, now hypotensive   - C/w home metoprolol.  - C/w Midodrine 10 TID. Hold for SBP > 140     Hiccups  - Started on Reglan, Monitor     Hyperlipidemia.   - C/w home atorvastatin.    Abnormal CT  - Outpatient follow up for CT findings    Prophylactic measure.   dvt ppx: DVT PPX   diet: regular  ambulate: with assistance    fall precautions  aspiration precautions.      Discussed with Patient, and family in detail at the bedside.   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   - CXR without consolidations or effusions; U/A unremarkable; GI PCR Neg   - F/u BCx2 and UCx  --> Neg final   - Recurrent fever. Repeat BCx2 sent 03/27 --> Neg final; ABX as per ID   - LE VA duplex --> + For DVT    - Was on empiric treatment with Vanc/cefepime --> ID consulted; S/P Zosyn  - Hold home penicillin while on ABX   - V/Q scan to R/O PE -- Low probability   - ID to follow up, infectious work up as per ID  --> CMV PCR -- + C/w Ganciclovir per ID , CMV IGG (+) and IgM (-), Cryptococcal Ag --Neg, Quantiferon Tb -- Neg, Fungitell -- <31   - CT Chest non-contrast, noted, no acute pathology  - Recurrent fever 03/29 --> F/u BCx2 -- Neg; F/u 03/31 BCx -- NGTD; F/u final   - Shock, RRT, resume antibiotics IV fluid, MICU eval , ID follow up  --> Prednisone 50 PO Qd   - S/P Zosyn; Defer ABX as per ID   - short course steroid per hematology   - IR eval for BM BX as per Heme/Onc --> Planned for 04/06 with IR     Anemia, Thrombocytopenia, Pancytopenia  - Drop in Hgb, Occult +  - Hold Hep gtt ; S/P 1 unit of PRBCs 03/28  - Maintain active T+S. Transfuse for Hgb < 7.0, and platelets < 10.K  - GI eval appreciated; F/u recs --> No plans for scope at this time   - Was on Xarelto, held due to drop in Platelets as per Heme/Onc. Monitor platelet count   - S/P 1 unit PRBCs and 1 unit Platelets 04/04; W/ lasix  - Planned for 1 unit of platelets 04/05; F/u post-transfusion CBC   - F/u HIT work up as per Heme/Onc  - ASA on hold    HypoNa  - Cont to monitor and trend  - Appreciate renal eval.   - Serial labs     Multiple myeloma.   - pancytopenia largely at baseline although Hg 6.7 on arrival; s/p 1U PRBC with good response   - Was on acyclovir, now on Ganciclovir as per ID   - C/w home entecavir   - S/P dexamethasone, D/C'd by hematology , follow up outpatient after discharge  --> No on Prednisone 50 Qd   - Pt reports his Revlimid was held  - Heme/Onc consulted; F/u recs   - Resume home aspirin. --> Now on hold   - Planned for IR BM Bx  04/06     Chronic diarrhea, now with Constipation   - Standing Imodium switched to PRN  - Monitor for BM     DVT   - Duplex + For R soleal vein DVT  --> Will consider CT A chest once creatinine permits as patient received contrast; Monitor for LOPEZ   - On Hep gtt; Monitor PTT; Monitor H/H closely --> Now on hold in view of drop in Hgb   - Check VQ scan to R/O PE -- Low probability   - Vascular eval appreciated; AC as tolerated, on Xarelto 10, monitor H/H   - Serial Duplex to assess for propagation  -- Without propagation   - 04/03 Duplex without propagation   - Repeat Duplex 04/10 -- ordered     RK  - S/P Contrast on 03/23   - Check bladder scan to R/O retention  - C/w IVF for hydration  - Monitor Cr closely; Avoid nephrotoxic agents   - Cr down-trending. On IVF   - Renal eval appreciated; F/u recs    Hypertension, now hypotensive   - C/w home metoprolol.  - C/w Midodrine 10 TID. Hold for SBP > 140     Hiccups  - Started on Reglan, Monitor     Hyperlipidemia.   - C/w home atorvastatin.    Abnormal CT  - Outpatient follow up for CT findings    Prophylactic measure.   dvt ppx: DVT PPX   diet: regular  ambulate: with assistance    fall precautions  aspiration precautions.      Discussed with Patient, and family in detail at the bedside.

## 2023-04-05 NOTE — PROGRESS NOTE ADULT - SUBJECTIVE AND OBJECTIVE BOX
DATE OF SERVICE: 04-05-23 @ 06:53    Subjective: Patient seen and examined. No new events except as noted.     SUBJECTIVE/ROS:  nad      MEDICATIONS:  MEDICATIONS  (STANDING):  aspirin enteric coated 81 milliGRAM(s) Oral daily  atorvastatin 40 milliGRAM(s) Oral at bedtime  entecavir Solution 0.25 milliGRAM(s) Oral daily  ganciclovir IVPB 190 milliGRAM(s) IV Intermittent every 24 hours  lactated ringers. 1000 milliLiter(s) (75 mL/Hr) IV Continuous <Continuous>  midodrine. 10 milliGRAM(s) Oral three times a day  predniSONE   Tablet 50 milliGRAM(s) Oral daily      PHYSICAL EXAM:  T(C): 36.3 (04-05-23 @ 05:18), Max: 37.9 (04-04-23 @ 16:32)  HR: 74 (04-05-23 @ 05:18) (65 - 75)  BP: 106/60 (04-05-23 @ 05:18) (102/52 - 143/82)  RR: 18 (04-05-23 @ 05:18) (18 - 18)  SpO2: 94% (04-05-23 @ 05:18) (93% - 98%)  Wt(kg): --  I&O's Summary    03 Apr 2023 07:01  -  04 Apr 2023 07:00  --------------------------------------------------------  IN: 2280 mL / OUT: 4075 mL / NET: -1795 mL    04 Apr 2023 07:01  -  05 Apr 2023 06:53  --------------------------------------------------------  IN: 1780 mL / OUT: 4500 mL / NET: -2720 mL            JVP: Normal  Neck: supple  Lung: clear   CV: S1 S2 , Murmur:  Abd: soft  Ext: No edema  neuro: Awake / alert  Psych: flat affect  Skin: normal``    LABS/DATA:    CARDIAC MARKERS:                                7.5    x     )-----------( x        ( 04 Apr 2023 08:13 )             21.5     04-04    133<L>  |  100  |  35<H>  ----------------------------<  145<H>  4.0   |  21<L>  |  1.97<H>    Ca    7.8<L>      04 Apr 2023 07:01    TPro  4.1<L>  /  Alb  x   /  TBili  x   /  DBili  x   /  AST  x   /  ALT  x   /  AlkPhos  x   04-04    proBNP:   Lipid Profile:   HgA1c:   TSH:     TELE:  EKG:

## 2023-04-05 NOTE — PROGRESS NOTE ADULT - SUBJECTIVE AND OBJECTIVE BOX
Chief Complaint:  Patient is a 67y old  Male who presents with a chief complaint of fever (05 Apr 2023 06:52)      Date of service 04-05-23 @ 11:40      Interval Events:   Patient seen and examined.   No BM today, although states he feels he may have one today.  No abdominal pain or distention.   Endorses hiccups.     Hospital Medications:  acetaminophen     Tablet .. 650 milliGRAM(s) Oral every 6 hours PRN  aspirin enteric coated 81 milliGRAM(s) Oral daily  atorvastatin 40 milliGRAM(s) Oral at bedtime  entecavir Solution 0.25 milliGRAM(s) Oral daily  ganciclovir IVPB 190 milliGRAM(s) IV Intermittent every 24 hours  lactated ringers. 1000 milliLiter(s) IV Continuous <Continuous>  loperamide 2 milliGRAM(s) Oral three times a day PRN  metoclopramide Injectable 5 milliGRAM(s) IV Push three times a day  midodrine. 10 milliGRAM(s) Oral three times a day  predniSONE   Tablet 50 milliGRAM(s) Oral daily        Review of Systems:  General:  No wt loss, fevers, chills, night sweats, fatigue,   Eyes:  Good vision, no reported pain  ENT:  No sore throat, pain, runny nose, dysphagia  CV:  No pain, palpitations, hypo/hypertension  Resp:  No dyspnea, cough, tachypnea, wheezing  GI:  See HPI  :  No pain, bleeding, incontinence, nocturia  Muscle:  No pain, weakness  Neuro:  No weakness, tingling, memory problems  Psych:  No fatigue, insomnia, mood problems, depression  Endocrine:  No polyuria, polydipsia, cold/heat intolerance  Heme:  No petechiae, ecchymosis, easy bruisability  Integumentary:  No rash, edema    PHYSICAL EXAM:   Vital Signs:  Vital Signs Last 24 Hrs  T(C): 36.8 (05 Apr 2023 11:20), Max: 37.9 (04 Apr 2023 16:32)  T(F): 98.2 (05 Apr 2023 11:20), Max: 100.2 (04 Apr 2023 16:32)  HR: 79 (05 Apr 2023 11:20) (65 - 79)  BP: 108/57 (05 Apr 2023 11:20) (106/60 - 143/82)  BP(mean): --  RR: 18 (05 Apr 2023 11:20) (18 - 18)  SpO2: 95% (05 Apr 2023 11:20) (94% - 98%)    Parameters below as of 05 Apr 2023 11:20  Patient On (Oxygen Delivery Method): room air      Daily     Daily       PHYSICAL EXAM:     GENERAL:  Appears stated age, well-groomed, well-nourished, no distress  HEENT:  NC/AT,  conjunctivae anicteric, clear and pink,   NECK: supple, trachea midline  CHEST:  Full & symmetric excursion, no increased effort, breath sounds clear  HEART:  Regular rhythm, no JVD  ABDOMEN:  Soft, non-tender, non-distended, normoactive bowel sounds,  no masses , no hepatosplenomegaly  EXTREMITIES:  no cyanosis,clubbing or edema  SKIN:  No rash, erythema, or, ecchymoses, no jaundice  NEURO:  Alert, non-focal, no asterixis  PSYCH: Appropriate affect, oriented to place and time  RECTAL: Deferred      LABS Personally reviewed by me:                        8.8    2.91  )-----------( 15       ( 05 Apr 2023 06:23 )             24.8     Mean Cell Volume: 88.9 fl (04-05-23 @ 06:23)    04-05    135  |  98  |  34<H>  ----------------------------<  196<H>  3.8   |  26  |  1.68<H>    Ca    8.0<L>      05 Apr 2023 10:19    TPro  4.1<L>  /  Alb  See Note  /  TBili  x   /  DBili  x   /  AST  x   /  ALT  x   /  AlkPhos  x   04-04    LIVER FUNCTIONS - ( 04 Apr 2023 07:01 )  Alb: See Note g/dL / Pro: 4.1 g/dL / ALK PHOS: x     / ALT: x     / AST: x     / GGT: x           PT/INR - ( 05 Apr 2023 06:23 )   PT: 15.3 sec;   INR: 1.33 ratio         PTT - ( 05 Apr 2023 06:23 )  PTT:28.8 sec                            8.8    2.91  )-----------( 15       ( 05 Apr 2023 06:23 )             24.8                         7.5    x     )-----------( x        ( 04 Apr 2023 08:13 )             21.5                         7.2    3.27  )-----------( 12       ( 04 Apr 2023 07:00 )             20.9                         8.1    2.63  )-----------( 14       ( 03 Apr 2023 06:12 )             23.7       Imaging personally reviewed by me:

## 2023-04-05 NOTE — PROGRESS NOTE ADULT - SUBJECTIVE AND OBJECTIVE BOX
Name of Patient : PETER DE LA PAZ  MRN: [id number]  Date of visit: 04-05-23 @ 13:27      Subjective: Patient seen and examined. No new events except as noted.   Patient seen earlier this AM. Low grade temperature overnight with Tmax of 100.2  S/P 1 unit of PRBCs and Platelets yesterday   Planned for 1 unit platelets today   ASA on hold   Family at the bedside     REVIEW OF SYSTEMS:    CONSTITUTIONAL: Generalized weakness   EYES/ENT: No visual changes;  No vertigo or throat pain   NECK: No pain or stiffness  RESPIRATORY: No cough, wheezing, hemoptysis; No shortness of breath  CARDIOVASCULAR: No chest pain or palpitations  GASTROINTESTINAL: No abdominal or epigastric pain. No nausea, vomiting, or hematemesis; No diarrhea or constipation. No melena or hematochezia.  GENITOURINARY: No dysuria, frequency or hematuria  NEUROLOGICAL: No numbness or weakness  SKIN: No itching, burning, rashes, or lesions   All other review of systems is negative unless indicated above.    MEDICATIONS:  MEDICATIONS  (STANDING):  atorvastatin 40 milliGRAM(s) Oral at bedtime  entecavir Solution 0.25 milliGRAM(s) Oral daily  furosemide   Injectable 40 milliGRAM(s) IV Push once  ganciclovir IVPB 190 milliGRAM(s) IV Intermittent every 24 hours  lactated ringers. 1000 milliLiter(s) (100 mL/Hr) IV Continuous <Continuous>  metoclopramide Injectable 5 milliGRAM(s) IV Push three times a day  midodrine. 10 milliGRAM(s) Oral three times a day  predniSONE   Tablet 50 milliGRAM(s) Oral daily      PHYSICAL EXAM:  T(C): 37.1 (04-05-23 @ 11:49), Max: 37.9 (04-04-23 @ 16:32)  HR: 79 (04-05-23 @ 11:49) (65 - 79)  BP: 105/54 (04-05-23 @ 11:49) (105/54 - 143/82)  RR: 16 (04-05-23 @ 11:49) (16 - 18)  SpO2: 95% (04-05-23 @ 11:49) (94% - 98%)  Wt(kg): --  I&O's Summary    04 Apr 2023 07:01  -  05 Apr 2023 07:00  --------------------------------------------------------  IN: 1780 mL / OUT: 4500 mL / NET: -2720 mL    05 Apr 2023 07:01  -  05 Apr 2023 13:27  --------------------------------------------------------  IN: 0 mL / OUT: 1000 mL / NET: -1000 mL          Appearance: Awake, weak appearing male, sitting up in bed 	  HEENT: Eyes are open   Lymphatic: No lymphadenopathy   Cardiovascular: Normal S1 S2, no JVD  Respiratory: normal effort , clear  Gastrointestinal:  Soft, Non-tender  Skin: No rashes,  warm to touch  Psychiatry:  Mood & affect appropriate  Musculoskeletal: No edema        04-04-23 @ 07:01  -  04-05-23 @ 07:00  --------------------------------------------------------  IN: 1780 mL / OUT: 4500 mL / NET: -2720 mL    04-05-23 @ 07:01  -  04-05-23 @ 13:27  --------------------------------------------------------  IN: 0 mL / OUT: 1000 mL / NET: -1000 mL                            8.8    2.91  )-----------( 15       ( 05 Apr 2023 06:23 )             24.8               04-05    135  |  98  |  34<H>  ----------------------------<  196<H>  3.8   |  26  |  1.68<H>    Ca    8.0<L>      05 Apr 2023 10:19    TPro  4.1<L>  /  Alb  See Note  /  TBili  x   /  DBili  x   /  AST  x   /  ALT  x   /  AlkPhos  x   04-04    PT/INR - ( 05 Apr 2023 06:23 )   PT: 15.3 sec;   INR: 1.33 ratio         PTT - ( 05 Apr 2023 06:23 )  PTT:28.8 sec                           Culture - Blood (03.31.23 @ 19:46)   Specimen Source: .Blood Blood-Peripheral  Culture Results:   No growth to date.      Culture - Blood (03.29.23 @ 19:09)   Specimen Source: .Blood Blood-Peripheral  Culture Results:   No Growth Final    Culture - Blood (03.29.23 @ 18:36)   Specimen Source: .Blood Blood-Peripheral  Culture Results:   No Growth Final Name of Patient : PETER DE LA PAZ  MRN: [id number]  Date of visit: 04-05-23 @ 13:27      Subjective: Patient seen and examined. No new events except as noted.   Patient seen earlier this AM. Low grade temperature overnight with Tmax of 100.2  S/P 1 unit of PRBCs and Platelets yesterday   Planned for 1 unit platelets today   ASA on hold   Family at the bedside     REVIEW OF SYSTEMS:    CONSTITUTIONAL: Generalized weakness   EYES/ENT: No visual changes;  No vertigo or throat pain   NECK: No pain or stiffness  RESPIRATORY: No cough, wheezing, hemoptysis; No shortness of breath  CARDIOVASCULAR: No chest pain or palpitations  GASTROINTESTINAL: No abdominal or epigastric pain. No nausea, vomiting, or hematemesis; No diarrhea or constipation. No melena or hematochezia.  GENITOURINARY: No dysuria, frequency or hematuria  NEUROLOGICAL: No numbness or weakness  SKIN: No itching, burning, rashes, or lesions   All other review of systems is negative unless indicated above.    MEDICATIONS:  MEDICATIONS  (STANDING):  atorvastatin 40 milliGRAM(s) Oral at bedtime  entecavir Solution 0.25 milliGRAM(s) Oral daily  furosemide   Injectable 40 milliGRAM(s) IV Push once  ganciclovir IVPB 190 milliGRAM(s) IV Intermittent every 24 hours  lactated ringers. 1000 milliLiter(s) (100 mL/Hr) IV Continuous <Continuous>  metoclopramide Injectable 5 milliGRAM(s) IV Push three times a day  midodrine. 10 milliGRAM(s) Oral three times a day  predniSONE   Tablet 50 milliGRAM(s) Oral daily      PHYSICAL EXAM:  T(C): 37.1 (04-05-23 @ 11:49), Max: 37.9 (04-04-23 @ 16:32)  HR: 79 (04-05-23 @ 11:49) (65 - 79)  BP: 105/54 (04-05-23 @ 11:49) (105/54 - 143/82)  RR: 16 (04-05-23 @ 11:49) (16 - 18)  SpO2: 95% (04-05-23 @ 11:49) (94% - 98%)  Wt(kg): --  I&O's Summary    04 Apr 2023 07:01  -  05 Apr 2023 07:00  --------------------------------------------------------  IN: 1780 mL / OUT: 4500 mL / NET: -2720 mL    05 Apr 2023 07:01  -  05 Apr 2023 13:27  --------------------------------------------------------  IN: 0 mL / OUT: 1000 mL / NET: -1000 mL          Appearance: Awake, weak appearing male, sitting up in bed 	  HEENT: Eyes are open   Lymphatic: No lymphadenopathy   Cardiovascular: Normal S1 S2, no JVD  Respiratory: normal effort , clear  Gastrointestinal:  Soft, Non-tender  Skin: No rashes,  warm to touch  Psychiatry:  Mood & affect appropriate  Musculoskeletal: No edema        04-04-23 @ 07:01  -  04-05-23 @ 07:00  --------------------------------------------------------  IN: 1780 mL / OUT: 4500 mL / NET: -2720 mL    04-05-23 @ 07:01  -  04-05-23 @ 13:27  --------------------------------------------------------  IN: 0 mL / OUT: 1000 mL / NET: -1000 mL                            8.8    2.91  )-----------( 15       ( 05 Apr 2023 06:23 )             24.8               04-05    135  |  98  |  34<H>  ----------------------------<  196<H>  3.8   |  26  |  1.68<H>    Ca    8.0<L>      05 Apr 2023 10:19    TPro  4.1<L>  /  Alb  See Note  /  TBili  x   /  DBili  x   /  AST  x   /  ALT  x   /  AlkPhos  x   04-04    PT/INR - ( 05 Apr 2023 06:23 )   PT: 15.3 sec;   INR: 1.33 ratio         PTT - ( 05 Apr 2023 06:23 )  PTT:28.8 sec                           Culture - Blood (03.31.23 @ 19:46)   Specimen Source: .Blood Blood-Peripheral  Culture Results:   No growth to date.      Culture - Blood (03.29.23 @ 19:09)   Specimen Source: .Blood Blood-Peripheral  Culture Results:   No Growth Final    Culture - Blood (03.29.23 @ 18:36)   Specimen Source: .Blood Blood-Peripheral  Culture Results:   No Growth Final Name of Patient : PETER DE LA PAZ  MRN: [id number]  Date of visit: 04-05-23 @ 13:27      Subjective: Patient seen and examined. No new events except as noted.   Patient seen earlier this AM. Low grade temperature overnight with Tmax of 100.2  S/P 1 unit of PRBCs and Platelets yesterday   Planned for 1 unit platelets today   ASA on hold   Family at the bedside     REVIEW OF SYSTEMS:    CONSTITUTIONAL: Generalized weakness   EYES/ENT: No visual changes;  No vertigo or throat pain   NECK: No pain or stiffness  RESPIRATORY: No cough, wheezing, hemoptysis; No shortness of breath  CARDIOVASCULAR: No chest pain or palpitations  GASTROINTESTINAL: No abdominal or epigastric pain. No nausea, vomiting, or hematemesis; No diarrhea or constipation. No melena or hematochezia.  GENITOURINARY: No dysuria, frequency or hematuria  NEUROLOGICAL: No numbness or weakness  SKIN: No itching, burning, rashes, or lesions   All other review of systems is negative unless indicated above.    MEDICATIONS:  MEDICATIONS  (STANDING):  atorvastatin 40 milliGRAM(s) Oral at bedtime  entecavir Solution 0.25 milliGRAM(s) Oral daily  furosemide   Injectable 40 milliGRAM(s) IV Push once  ganciclovir IVPB 190 milliGRAM(s) IV Intermittent every 24 hours  lactated ringers. 1000 milliLiter(s) (100 mL/Hr) IV Continuous <Continuous>  metoclopramide Injectable 5 milliGRAM(s) IV Push three times a day  midodrine. 10 milliGRAM(s) Oral three times a day  predniSONE   Tablet 50 milliGRAM(s) Oral daily      PHYSICAL EXAM:  T(C): 37.1 (04-05-23 @ 11:49), Max: 37.9 (04-04-23 @ 16:32)  HR: 79 (04-05-23 @ 11:49) (65 - 79)  BP: 105/54 (04-05-23 @ 11:49) (105/54 - 143/82)  RR: 16 (04-05-23 @ 11:49) (16 - 18)  SpO2: 95% (04-05-23 @ 11:49) (94% - 98%)  Wt(kg): --  I&O's Summary    04 Apr 2023 07:01  -  05 Apr 2023 07:00  --------------------------------------------------------  IN: 1780 mL / OUT: 4500 mL / NET: -2720 mL    05 Apr 2023 07:01  -  05 Apr 2023 13:27  --------------------------------------------------------  IN: 0 mL / OUT: 1000 mL / NET: -1000 mL          Appearance: Awake, weak appearing male, sitting up in bed 	  HEENT: Eyes are open   Lymphatic: No lymphadenopathy   Cardiovascular: Normal S1 S2, no JVD  Respiratory: normal effort , clear  Gastrointestinal:  Soft, Non-tender  Skin: No rashes,  warm to touch  Psychiatry:  Mood & affect appropriate  Musculoskeletal: No edema        04-04-23 @ 07:01  -  04-05-23 @ 07:00  --------------------------------------------------------  IN: 1780 mL / OUT: 4500 mL / NET: -2720 mL    04-05-23 @ 07:01  -  04-05-23 @ 13:27  --------------------------------------------------------  IN: 0 mL / OUT: 1000 mL / NET: -1000 mL                            8.8    2.91  )-----------( 15       ( 05 Apr 2023 06:23 )             24.8               04-05    135  |  98  |  34<H>  ----------------------------<  196<H>  3.8   |  26  |  1.68<H>    Ca    8.0<L>      05 Apr 2023 10:19    TPro  4.1<L>  /  Alb  See Note  /  TBili  x   /  DBili  x   /  AST  x   /  ALT  x   /  AlkPhos  x   04-04    PT/INR - ( 05 Apr 2023 06:23 )   PT: 15.3 sec;   INR: 1.33 ratio         PTT - ( 05 Apr 2023 06:23 )  PTT:28.8 sec                           Culture - Blood (03.31.23 @ 19:46)   Specimen Source: .Blood Blood-Peripheral  Culture Results:   No growth to date.      Culture - Blood (03.29.23 @ 19:09)   Specimen Source: .Blood Blood-Peripheral  Culture Results:   No Growth Final    Culture - Blood (03.29.23 @ 18:36)   Specimen Source: .Blood Blood-Peripheral  Culture Results:   No Growth Final

## 2023-04-06 ENCOUNTER — RESULT REVIEW (OUTPATIENT)
Age: 68
End: 2023-04-06

## 2023-04-06 LAB
ANION GAP SERPL CALC-SCNC: 10 MMOL/L — SIGNIFICANT CHANGE UP (ref 5–17)
APTT BLD: 28.4 SEC — SIGNIFICANT CHANGE UP (ref 27.5–35.5)
BLD GP AB SCN SERPL QL: POSITIVE — SIGNIFICANT CHANGE UP
BUN SERPL-MCNC: 35 MG/DL — HIGH (ref 7–23)
CALCIUM SERPL-MCNC: 8.2 MG/DL — LOW (ref 8.4–10.5)
CHLORIDE SERPL-SCNC: 95 MMOL/L — LOW (ref 96–108)
CO2 SERPL-SCNC: 31 MMOL/L — SIGNIFICANT CHANGE UP (ref 22–31)
CREAT SERPL-MCNC: 1.41 MG/DL — HIGH (ref 0.5–1.3)
CULTURE RESULTS: SIGNIFICANT CHANGE UP
EGFR: 54 ML/MIN/1.73M2 — LOW
GLUCOSE SERPL-MCNC: 136 MG/DL — HIGH (ref 70–99)
HCT VFR BLD CALC: 24.8 % — LOW (ref 39–50)
HGB BLD-MCNC: 8.6 G/DL — LOW (ref 13–17)
INR BLD: 1.35 RATIO — HIGH (ref 0.88–1.16)
MCHC RBC-ENTMCNC: 30.9 PG — SIGNIFICANT CHANGE UP (ref 27–34)
MCHC RBC-ENTMCNC: 34.7 GM/DL — SIGNIFICANT CHANGE UP (ref 32–36)
MCV RBC AUTO: 89.2 FL — SIGNIFICANT CHANGE UP (ref 80–100)
NRBC # BLD: 0 /100 WBCS — SIGNIFICANT CHANGE UP (ref 0–0)
PLATELET # BLD AUTO: 17 K/UL — CRITICAL LOW (ref 150–400)
POTASSIUM SERPL-MCNC: 3.5 MMOL/L — SIGNIFICANT CHANGE UP (ref 3.5–5.3)
POTASSIUM SERPL-SCNC: 3.5 MMOL/L — SIGNIFICANT CHANGE UP (ref 3.5–5.3)
PROTHROM AB SERPL-ACNC: 15.6 SEC — HIGH (ref 10.5–13.4)
RBC # BLD: 2.78 M/UL — LOW (ref 4.2–5.8)
RBC # FLD: 21.1 % — HIGH (ref 10.3–14.5)
RH IG SCN BLD-IMP: POSITIVE — SIGNIFICANT CHANGE UP
SODIUM SERPL-SCNC: 136 MMOL/L — SIGNIFICANT CHANGE UP (ref 135–145)
SPECIMEN SOURCE: SIGNIFICANT CHANGE UP
WBC # BLD: 2.42 K/UL — LOW (ref 3.8–10.5)
WBC # FLD AUTO: 2.42 K/UL — LOW (ref 3.8–10.5)

## 2023-04-06 PROCEDURE — 88305 TISSUE EXAM BY PATHOLOGIST: CPT | Mod: 26

## 2023-04-06 PROCEDURE — 99232 SBSQ HOSP IP/OBS MODERATE 35: CPT

## 2023-04-06 PROCEDURE — 77012 CT SCAN FOR NEEDLE BIOPSY: CPT | Mod: 26

## 2023-04-06 PROCEDURE — 38222 DX BONE MARROW BX & ASPIR: CPT | Mod: RT

## 2023-04-06 PROCEDURE — 88313 SPECIAL STAINS GROUP 2: CPT | Mod: 26

## 2023-04-06 PROCEDURE — 88342 IMHCHEM/IMCYTCHM 1ST ANTB: CPT | Mod: 26,59

## 2023-04-06 PROCEDURE — 88312 SPECIAL STAINS GROUP 1: CPT | Mod: 26

## 2023-04-06 PROCEDURE — 88365 INSITU HYBRIDIZATION (FISH): CPT | Mod: 26

## 2023-04-06 PROCEDURE — 88189 FLOWCYTOMETRY/READ 16 & >: CPT

## 2023-04-06 PROCEDURE — 85097 BONE MARROW INTERPRETATION: CPT

## 2023-04-06 PROCEDURE — 88341 IMHCHEM/IMCYTCHM EA ADD ANTB: CPT | Mod: 26,59

## 2023-04-06 RX ORDER — ENTECAVIR 0.5 MG/1
0.5 TABLET ORAL DAILY
Refills: 0 | Status: DISCONTINUED | OUTPATIENT
Start: 2023-04-07 | End: 2023-04-12

## 2023-04-06 RX ORDER — FUROSEMIDE 40 MG
40 TABLET ORAL ONCE
Refills: 0 | Status: COMPLETED | OUTPATIENT
Start: 2023-04-06 | End: 2023-04-06

## 2023-04-06 RX ORDER — VALGANCICLOVIR 450 MG/1
450 TABLET, FILM COATED ORAL EVERY 12 HOURS
Refills: 0 | Status: DISCONTINUED | OUTPATIENT
Start: 2023-04-07 | End: 2023-04-11

## 2023-04-06 RX ORDER — POLYETHYLENE GLYCOL 3350 17 G/17G
17 POWDER, FOR SOLUTION ORAL ONCE
Refills: 0 | Status: COMPLETED | OUTPATIENT
Start: 2023-04-06 | End: 2023-04-06

## 2023-04-06 RX ADMIN — Medication 40 MILLIGRAM(S): at 18:43

## 2023-04-06 RX ADMIN — Medication 5 MILLIGRAM(S): at 05:18

## 2023-04-06 RX ADMIN — MIDODRINE HYDROCHLORIDE 10 MILLIGRAM(S): 2.5 TABLET ORAL at 13:15

## 2023-04-06 RX ADMIN — ATORVASTATIN CALCIUM 40 MILLIGRAM(S): 80 TABLET, FILM COATED ORAL at 21:20

## 2023-04-06 RX ADMIN — ENTECAVIR 0.25 MILLIGRAM(S): 0.5 TABLET ORAL at 13:15

## 2023-04-06 RX ADMIN — GANCICLOVIR SODIUM 100 MILLIGRAM(S): 50 INJECTION, POWDER, LYOPHILIZED, FOR SOLUTION INTRAVENTRICULAR at 05:19

## 2023-04-06 RX ADMIN — SODIUM CHLORIDE 100 MILLILITER(S): 9 INJECTION, SOLUTION INTRAVENOUS at 08:36

## 2023-04-06 RX ADMIN — Medication 5 MILLIGRAM(S): at 21:20

## 2023-04-06 RX ADMIN — MIDODRINE HYDROCHLORIDE 10 MILLIGRAM(S): 2.5 TABLET ORAL at 05:18

## 2023-04-06 RX ADMIN — Medication 5 MILLIGRAM(S): at 13:15

## 2023-04-06 RX ADMIN — Medication 50 MILLIGRAM(S): at 05:17

## 2023-04-06 RX ADMIN — POLYETHYLENE GLYCOL 3350 17 GRAM(S): 17 POWDER, FOR SOLUTION ORAL at 13:14

## 2023-04-06 NOTE — PROGRESS NOTE ADULT - ASSESSMENT
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 Found to be febrile on admission, ID consulted for further management.    Tmax 102.4 (3/23)  WBC 3.5K today  Urinalysis unremarkable  RVP neg  CXR unremarkable      Impression:  #Fever, now resolved since initiating prednisone   #Immunocompromised Status, H/O Splenectomy  #pancytopenia   #CMV viremia         PLAN:  - all infectious work up in terms of bacterial infection negative,   - follow up blood cultures, NTD   - GI PCR- negative   - trend cbc for pancytopenia   - cryptococcal ag, QuantiFeronTb, fungitell negative  - on ganciclovir  for CMV viremia, transition to po valcyte  - repeat CMV PCR ordered.   - s/p BM bx   - recommend repeat CT chest/abd/pelvis if spikes again.      Plan discussed with Heme  Attending.    Dejuan Ralph  Please contact through MS Teams   If no response or past 5 pm/weekend call [phone].                    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 Found to be febrile on admission, ID consulted for further management.    Tmax 102.4 (3/23)  WBC 3.5K today  Urinalysis unremarkable  RVP neg  CXR unremarkable      Impression:  #Fever, now resolved since initiating prednisone   #Immunocompromised Status, H/O Splenectomy  #pancytopenia   #CMV viremia         PLAN:  - all infectious work up in terms of bacterial infection negative,   - follow up blood cultures, NTD   - GI PCR- negative   - trend cbc for pancytopenia   - cryptococcal ag, QuantiFeronTb, fungitell negative  - on ganciclovir  for CMV viremia, transition to po valcyte  - repeat CMV PCR ordered.   - s/p BM bx   - recommend repeat CT chest/abd/pelvis if spikes again.      Plan discussed with Heme  Attending.    Dejuan Ralph  Please contact through MS Teams   If no response or past 5 pm/weekend call [phone].                    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 Found to be febrile on admission, ID consulted for further management.    Tmax 102.4 (3/23)  WBC 3.5K today  Urinalysis unremarkable  RVP neg  CXR unremarkable      Impression:  #Fever, now resolved since initiating prednisone   #Immunocompromised Status, H/O Splenectomy  #pancytopenia   #CMV viremia         PLAN:  - all infectious work up in terms of bacterial infection negative,   - follow up blood cultures, NTD   - GI PCR- negative   - trend cbc for pancytopenia   - cryptococcal ag, QuantiFeronTb, fungitell negative  - on ganciclovir  for CMV viremia, transition to po valcyte  - repeat CMV PCR ordered.   - s/p BM bx   - recommend repeat CT chest/abd/pelvis if spikes again.      Plan discussed with Heme  Attending.    Dejuan Ralph  Please contact through MS Teams   If no response or past 5 pm/weekend call [phone].

## 2023-04-06 NOTE — PROGRESS NOTE ADULT - ASSESSMENT
67y Male with history of MM on chemotherapy presents with fevers. Nephrology consulted for elevated Scr.    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 Scr improving. D/C LR and will give lasix 40 mg IV X 1 dose given volume overload. UA active likely due to lloyd with granular casts suggestive of ATN. FeNa indeterminate. Lloyd placed for urinary retention. TMA work up negative. Avoid nephrotoxins. Monitor electrolytes.    2) Hypotension: BP low normal. Continue with midodrine 10 mg PO TID. Monitor BP.    3) Hyponatremia: In setting of RK and likely decreased diluting ability. Hyponatremia resolved. Monitor serum Na.    4) Metabolic acidosis: Resolved. Check VBG given rising serum CO2. Monitor pH.    5) MM: As per Heme/Onc. Can increase Entecavir to 0.5 mg daily given resolving RK.          [facility] NEPHROLOGY  Leoncio Leonard M.D.  Tay Brooke D.O.  Precious Chen M.D.  Nidia Stallings, NURIS, ANP-C    Telephone: [phone]  Facsimile: [phone]    [address] [place] [address]   67y Male with history of MM on chemotherapy presents with fevers. Nephrology consulted for elevated Scr.    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 Scr improving. D/C LR and will give lasix 40 mg IV X 1 dose given volume overload. UA active likely due to lloyd with granular casts suggestive of ATN. FeNa indeterminate. Lloyd placed for urinary retention. TMA work up negative. Avoid nephrotoxins. Monitor electrolytes.    2) Hypotension: BP low normal. Continue with midodrine 10 mg PO TID. Monitor BP.    3) Hyponatremia: In setting of RK and likely decreased diluting ability. Hyponatremia resolved. Monitor serum Na.    4) Metabolic acidosis: Resolved. Check VBG given rising serum CO2. Monitor pH.    5) MM: As per Heme/Onc. Can increase Entecavir to 0.5 mg daily given resolving RK.          [facility] NEPHROLOGY  Leoncio Leonard M.D.  Tay Brooke D.O.  Precious Chen M.D.  Nidia Stallings, NURIS, ANP-C    Telephone: [phone]  Facsimile: [phone]    [address] [place] [address]   67y Male with history of MM on chemotherapy presents with fevers. Nephrology consulted for elevated Scr.    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 Scr improving. D/C LR and will give lasix 40 mg IV X 1 dose given volume overload. UA active likely due to lloyd with granular casts suggestive of ATN. FeNa indeterminate. Lloyd placed for urinary retention. TMA work up negative. Avoid nephrotoxins. Monitor electrolytes.    2) Hypotension: BP low normal. Continue with midodrine 10 mg PO TID. Monitor BP.    3) Hyponatremia: In setting of RK and likely decreased diluting ability. Hyponatremia resolved. Monitor serum Na.    4) Metabolic acidosis: Resolved. Check VBG given rising serum CO2. Monitor pH.    5) MM: As per Heme/Onc. Can increase Entecavir to 0.5 mg daily given resolving RK.          [facility] NEPHROLOGY  Leoncio Leonard M.D.  Tay Brooke D.O.  Precious Chen M.D.  Nidia Stallings, NURIS, ANP-C    Telephone: [phone]  Facsimile: [phone]    [address] [place] [address]

## 2023-04-06 NOTE — PROGRESS NOTE ADULT - SUBJECTIVE AND OBJECTIVE BOX
Patient is a 67y old  Male who presents with a chief complaint of fever (06 Apr 2023 11:49)    No acute events overnight.  No new complaints.  family at bedside.    MEDICATIONS  (STANDING):  atorvastatin 40 milliGRAM(s) Oral at bedtime  entecavir Solution 0.25 milliGRAM(s) Oral daily  lactated ringers. 1000 milliLiter(s) (100 mL/Hr) IV Continuous <Continuous>  metoclopramide Injectable 5 milliGRAM(s) IV Push three times a day  midodrine. 10 milliGRAM(s) Oral three times a day  predniSONE   Tablet 50 milliGRAM(s) Oral daily    Vital Signs Last 24 Hrs  T(C): 37.2 (06 Apr 2023 13:12), Max: 37.8 (05 Apr 2023 18:20)  T(F): 98.9 (06 Apr 2023 13:12), Max: 100 (05 Apr 2023 18:20)  HR: 100 (06 Apr 2023 13:12) (70 - 100)  BP: 114/62 (06 Apr 2023 13:12) (102/75 - 147/79)  BP(mean): 91 (06 Apr 2023 12:00) (83 - 93)  RR: 18 (06 Apr 2023 13:12) (16 - 19)  SpO2: 92% (06 Apr 2023 13:12) (92% - 97%)    Parameters below as of 06 Apr 2023 13:12  Patient On (Oxygen Delivery Method): room air        PE  NAD  Awake, alert  Anicteric, MMM  RRR  CTAB  Abd soft, NT, ND  No c/c/e                          8.6    2.42  )-----------( 17       ( 06 Apr 2023 06:44 )             24.8       04-06    136  |  95<L>  |  35<H>  ----------------------------<  136<H>  3.5   |  31  |  1.41<H>    Ca    8.2<L>      06 Apr 2023 06:44

## 2023-04-06 NOTE — PROGRESS NOTE ADULT - SUBJECTIVE AND OBJECTIVE BOX
IR Post Procedure Note    Diagnosis: Pancytopenia    Procedure: BM Bx    : Tommy Graham MD    Contrast: None    Anesthesia: 1% Lidocaine Subcutaneous, Sedation administered by Anesthesiology    Estimated Blood Loss: Less than 10cc    Specimens: Identified, Labeled, Confirmed and Sent to Lab. Adequacy of Specimen Confirmed by Cytopathology    Complications: No Immediate Complications    Anticoagulation: Resume in 24 Hours    Findings & Plan: CT guided BM Bx of Right Iliac bone performed w 11g Orthogem BM Bx Kit.       Please call Interventional Radiology with any questions, concerns, or issues.  IR Post Procedure Note    Diagnosis: Pancytopenia    Procedure: BM Bx    : Tommy Graham MD    Contrast: None    Anesthesia: 1% Lidocaine Subcutaneous, Sedation administered by Anesthesiology    Estimated Blood Loss: Less than 10cc    Specimens: Identified, Labeled, Confirmed and Sent to Lab. Adequacy of Specimen Confirmed by Cytopathology    Complications: No Immediate Complications    Anticoagulation: Resume in 24 Hours    Findings & Plan: CT guided BM Bx of Right Iliac bone performed w 11g bMenu BM Bx Kit.       Please call Interventional Radiology with any questions, concerns, or issues.  IR Post Procedure Note    Diagnosis: Pancytopenia    Procedure: BM Bx    : Tommy Graham MD    Contrast: None    Anesthesia: 1% Lidocaine Subcutaneous, Sedation administered by Anesthesiology    Estimated Blood Loss: Less than 10cc    Specimens: Identified, Labeled, Confirmed and Sent to Lab. Adequacy of Specimen Confirmed by Cytopathology    Complications: No Immediate Complications    Anticoagulation: Resume in 24 Hours    Findings & Plan: CT guided BM Bx of Right Iliac bone performed w 11g [employer] BM Bx Kit.       Please call Interventional Radiology with any questions, concerns, or issues.

## 2023-04-06 NOTE — PROGRESS NOTE ADULT - ASSESSMENT
1. fever  - V/Q scan with low probability of PE   - GI PCR- negative   - CMV PCR elevated, cmv IGG + and CMV IGM -  - cryptococcal ag, QuantiFeronTb, fungitell negative  - per ID, given immunosuppressed state and CMV viremia, ordered repeat CMV PCR and initiated valcyte.     2. anemia, FOBT positive in the setting of thrombocytopenia. Hgb remains at recent baseline. No overt bleeding symptoms.   - no plans for endoscopic evaluation at this time  - monitor H&H, transfuse PRN  - daily PPI     3. Chronic diarrhea, likely related to chemo +/- abx. 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   - GI PCR neg, C-diff neg on 03.03  - diarrhea now resolved, last BM x 3 days ago and reports feeling "backed up".   Standing Imodium discontinued and switched to prn.     4. DVT   - on anticoagulation    5. Multiple myeloma, pancytopenia   - per heme / onc   - planned for bone marrow biopsy 4/6 with IR    6. Hiccups--resolved   - cw Reglan 5mg IV TID    7. Had diarrhea, now constipated           Attending supervision statement: I have personally seen and examined the patient. I fully participated in the care of this patient. I have made amendments to the documentation where necessary, and agree with the history, physical exam, and plan as outlined by the ACP.    [facility]   Gastroenterology and Hepatology  Office: [phone]   1. fever  - V/Q scan with low probability of PE   - GI PCR- negative   - CMV PCR elevated, cmv IGG + and CMV IGM -  - cryptococcal ag, QuantiFeronTb, fungitell negative  - per ID, given immunosuppressed state and CMV viremia, ordered repeat CMV PCR and initiated valcyte.     2. anemia, FOBT positive in the setting of thrombocytopenia. Hgb remains at recent baseline. No overt bleeding symptoms.   - no plans for endoscopic evaluation at this time  - monitor H&H, transfuse PRN  - daily PPI     3. Chronic diarrhea, likely related to chemo +/- abx. 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   - GI PCR neg, C-diff neg on 03.03  - diarrhea now resolved, last BM x 3 days ago and reports feeling "backed up".   Standing Imodium discontinued and switched to prn.     4. DVT   - on anticoagulation    5. Multiple myeloma, pancytopenia   - per heme / onc   - planned for bone marrow biopsy 4/6 with IR    6. Hiccups--resolved   - cw Reglan 5mg IV TID    7. Had diarrhea, now constipated           Attending supervision statement: I have personally seen and examined the patient. I fully participated in the care of this patient. I have made amendments to the documentation where necessary, and agree with the history, physical exam, and plan as outlined by the ACP.    [facility]   Gastroenterology and Hepatology  Office: [phone]   1. fever  - V/Q scan with low probability of PE   - GI PCR- negative   - CMV PCR elevated, cmv IGG + and CMV IGM -  - cryptococcal ag, QuantiFeronTb, fungitell negative  - per ID, given immunosuppressed state and CMV viremia, ordered repeat CMV PCR and initiated valcyte.     2. anemia, FOBT positive in the setting of thrombocytopenia. Hgb remains at recent baseline. No overt bleeding symptoms.   - no plans for endoscopic evaluation at this time  - monitor H&H, transfuse PRN  - daily PPI     3. Chronic diarrhea, likely related to chemo +/- abx. 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   - GI PCR neg, C-diff neg on 03.03  - diarrhea now resolved, last BM x 3 days ago and reports feeling "backed up".   Standing Imodium discontinued and switched to prn.     4. DVT   - on anticoagulation    5. Multiple myeloma, pancytopenia   - per heme / onc   - planned for bone marrow biopsy 4/6 with IR    6. Hiccups--resolved   - cw Reglan 5mg IV TID    7. Had diarrhea, now constipated           Attending supervision statement: I have personally seen and examined the patient. I fully participated in the care of this patient. I have made amendments to the documentation where necessary, and agree with the history, physical exam, and plan as outlined by the ACP.    [facility]   Gastroenterology and Hepatology  Office: [phone]   1. fever  - V/Q scan with low probability of PE   - GI PCR- negative   - CMV PCR elevated, cmv IGG + and CMV IGM -  - cryptococcal ag, QuantiFeronTb, fungitell negative  - per ID, given immunosuppressed state and CMV viremia, ordered repeat CMV PCR and initiated valcyte.     2. anemia, FOBT positive in the setting of thrombocytopenia. Hgb remains at recent baseline. No overt bleeding symptoms.   - no plans for endoscopic evaluation at this time  - monitor H&H, transfuse PRN  - daily PPI     3. Chronic diarrhea, likely related to chemo +/- abx. 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   - GI PCR neg, C-diff neg on 03.03  - diarrhea now resolved, last BM x 3 days ago and reports feeling "backed up".   Standing Imodium discontinued and switched to prn.     4. DVT   - on anticoagulation    5. Multiple myeloma, pancytopenia   - per heme / onc   - planned for bone marrow biopsy 4/6 with IR    6. Hiccups--resolved   - cw Reglan 5mg IV TID    7. Had diarrhea, now constipated   - will order one dose of miralax 17g           Attending supervision statement: I have personally seen and examined the patient. I fully participated in the care of this patient. I have made amendments to the documentation where necessary, and agree with the history, physical exam, and plan as outlined by the ACP.    [facility]   Gastroenterology and Hepatology  Office: [phone]   1. fever  - V/Q scan with low probability of PE   - GI PCR- negative   - CMV PCR elevated, cmv IGG + and CMV IGM -  - cryptococcal ag, QuantiFeronTb, fungitell negative  - per ID, given immunosuppressed state and CMV viremia, ordered repeat CMV PCR and initiated valcyte.     2. anemia, FOBT positive in the setting of thrombocytopenia. Hgb remains at recent baseline. No overt bleeding symptoms.   - no plans for endoscopic evaluation at this time  - monitor H&H, transfuse PRN  - daily PPI     3. Chronic diarrhea, likely related to chemo +/- abx. 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   - GI PCR neg, C-diff neg on 03.03  - diarrhea now resolved, last BM x 3 days ago and reports feeling "backed up".   Standing Imodium discontinued and switched to prn.     4. DVT   - on anticoagulation    5. Multiple myeloma, pancytopenia   - per heme / onc   - planned for bone marrow biopsy 4/6 with IR    6. Hiccups--resolved   - cw Reglan 5mg IV TID    7. Had diarrhea, now constipated   - will order one dose of miralax 17g           Attending supervision statement: I have personally seen and examined the patient. I fully participated in the care of this patient. I have made amendments to the documentation where necessary, and agree with the history, physical exam, and plan as outlined by the ACP.    [facility]   Gastroenterology and Hepatology  Office: [phone]   1. fever  - V/Q scan with low probability of PE   - GI PCR- negative   - CMV PCR elevated, cmv IGG + and CMV IGM -  - cryptococcal ag, QuantiFeronTb, fungitell negative  - per ID, given immunosuppressed state and CMV viremia, ordered repeat CMV PCR and initiated valcyte.     2. anemia, FOBT positive in the setting of thrombocytopenia. Hgb remains at recent baseline. No overt bleeding symptoms.   - no plans for endoscopic evaluation at this time  - monitor H&H, transfuse PRN  - daily PPI     3. Chronic diarrhea, likely related to chemo +/- abx. 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   - GI PCR neg, C-diff neg on 03.03  - diarrhea now resolved, last BM x 3 days ago and reports feeling "backed up".   Standing Imodium discontinued and switched to prn.     4. DVT   - on anticoagulation    5. Multiple myeloma, pancytopenia   - per heme / onc   - planned for bone marrow biopsy 4/6 with IR    6. Hiccups--resolved   - cw Reglan 5mg IV TID    7. Had diarrhea, now constipated   - will order one dose of miralax 17g           Attending supervision statement: I have personally seen and examined the patient. I fully participated in the care of this patient. I have made amendments to the documentation where necessary, and agree with the history, physical exam, and plan as outlined by the ACP.    [facility]   Gastroenterology and Hepatology  Office: [phone]   1. fever  - V/Q scan with low probability of PE   - GI PCR- negative   - CMV PCR elevated, cmv IGG + and CMV IGM -  - cryptococcal ag, QuantiFeronTb, fungitell negative  - per ID, given immunosuppressed state and CMV viremia, ordered repeat CMV PCR and initiated valcyte.     2. anemia, FOBT positive in the setting of thrombocytopenia. Hgb remains at recent baseline. No overt bleeding symptoms.   - no plans for endoscopic evaluation at this time  - monitor H&H, transfuse PRN  - daily PPI     3. Chronic diarrhea, likely related to chemo +/- abx. 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   - GI PCR neg, C-diff neg on 03.03  - diarrhea now resolved, last BM x 3 days ago and reports feeling "backed up".   Standing Imodium discontinued and switched to prn.     4. DVT   - on anticoagulation    5. Multiple myeloma, pancytopenia   - per heme / onc   - planned for bone marrow biopsy 4/6 with IR    6. Hiccups--resolved   - cw Reglan 5mg IV TID, will reassess in am    7. Had diarrhea, now constipated   - will order one dose of miralax 17g           Attending supervision statement: I have personally seen and examined the patient. I fully participated in the care of this patient. I have made amendments to the documentation where necessary, and agree with the history, physical exam, and plan as outlined by the ACP.    [facility]   Gastroenterology and Hepatology  Office: [phone]   1. fever  - V/Q scan with low probability of PE   - GI PCR- negative   - CMV PCR elevated, cmv IGG + and CMV IGM -  - cryptococcal ag, QuantiFeronTb, fungitell negative  - per ID, given immunosuppressed state and CMV viremia, ordered repeat CMV PCR and initiated valcyte.     2. anemia, FOBT positive in the setting of thrombocytopenia. Hgb remains at recent baseline. No overt bleeding symptoms.   - no plans for endoscopic evaluation at this time  - monitor H&H, transfuse PRN  - daily PPI     3. Chronic diarrhea, likely related to chemo +/- abx. 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   - GI PCR neg, C-diff neg on 03.03  - diarrhea now resolved, last BM x 3 days ago and reports feeling "backed up".   Standing Imodium discontinued and switched to prn.     4. DVT   - on anticoagulation    5. Multiple myeloma, pancytopenia   - per heme / onc   - planned for bone marrow biopsy 4/6 with IR    6. Hiccups--resolved   - cw Reglan 5mg IV TID, will reassess in am    7. Had diarrhea, now constipated   - will order one dose of miralax 17g           Attending supervision statement: I have personally seen and examined the patient. I fully participated in the care of this patient. I have made amendments to the documentation where necessary, and agree with the history, physical exam, and plan as outlined by the ACP.    [facility]   Gastroenterology and Hepatology  Office: [phone]   1. fever  - V/Q scan with low probability of PE   - GI PCR- negative   - CMV PCR elevated, cmv IGG + and CMV IGM -  - cryptococcal ag, QuantiFeronTb, fungitell negative  - per ID, given immunosuppressed state and CMV viremia, ordered repeat CMV PCR and initiated valcyte.     2. anemia, FOBT positive in the setting of thrombocytopenia. Hgb remains at recent baseline. No overt bleeding symptoms.   - no plans for endoscopic evaluation at this time  - monitor H&H, transfuse PRN  - daily PPI     3. Chronic diarrhea, likely related to chemo +/- abx. 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   - GI PCR neg, C-diff neg on 03.03  - diarrhea now resolved, last BM x 3 days ago and reports feeling "backed up".   Standing Imodium discontinued and switched to prn.     4. DVT   - on anticoagulation    5. Multiple myeloma, pancytopenia   - per heme / onc   - planned for bone marrow biopsy 4/6 with IR    6. Hiccups--resolved   - cw Reglan 5mg IV TID, will reassess in am    7. Had diarrhea, now constipated   - will order one dose of miralax 17g           Attending supervision statement: I have personally seen and examined the patient. I fully participated in the care of this patient. I have made amendments to the documentation where necessary, and agree with the history, physical exam, and plan as outlined by the ACP.    [facility] [place]   Gastroenterology and Hepatology  Office: [phone]

## 2023-04-06 NOTE — PROGRESS NOTE ADULT - ASSESSMENT
PETER DE LA PAZ is a 67y Male who presents with a chief complaint of fever    Multiple Myeloma  ·	Patient follows with Dr. Jean Claude Hubbard, [facility].  ·	Patient was on lenalidomide maintenance until February 2023 when he had progression of disease.  ·	He was then switched to daratumumab + CyBorD; last dose March 23rd, 2023.  ·	No systemic therapy while inpatient or during rehabilitation.  ·	Continue acyclovir 400 mg BID.    Pancytopenia  ·	Pancytopenia has been ongoing since patient was started on latest chemotherapy regimen.  ·	Monitor CBC and transfuse to maintain HGB > 7; PLT > 10.  ·	Possible slight iron deficiency, but otherwise no evidence of nutritional deficits or hemolysis.  ·	Continue to hold anticoagulation.  ·	No plans for gastrointestinal interventions at this time.   ·	Leukopenia mostly lymphocytopenia; neutrophil count normal.  ·	IR bone marrow biopsy today. S/p platelet transfusion to attempt to reach goal platelet > 30k.  ·	Myeloma labs w/ normal free light chain ratio, KIRA w/ weak IgG kappa band, IgG 285, IgA 57, IgM 29  ·	Checking HIT antibody and ALEK (patient was on heparin on 3/28)    Fever  ·	Currently afebrile, unclear etiology  ·	Unlikely infectious source per infectious disease; possibly malignancy.  ·	infectious w/up unremarkable  ·	off IV abx, ganciclovir  ·	if febrile will recommend re-scanning patient     DVT  ·	below knee DVT   ·	hold ac for now  ·	will need repeat duplex in 6 weeks     Will continue to follow.     Steph Holly NP  Hematology/ Oncology  [facility]  [phone] (office)  [phone] (alt office)  Evenings and weekends please call MD on call or office   PETER DE LA PAZ is a 67y Male who presents with a chief complaint of fever    Multiple Myeloma  ·	Patient follows with Dr. Jean Claude Hubbard, [facility].  ·	Patient was on lenalidomide maintenance until February 2023 when he had progression of disease.  ·	He was then switched to daratumumab + CyBorD; last dose March 23rd, 2023.  ·	No systemic therapy while inpatient or during rehabilitation.  ·	Continue acyclovir 400 mg BID.    Pancytopenia  ·	Pancytopenia has been ongoing since patient was started on latest chemotherapy regimen.  ·	Monitor CBC and transfuse to maintain HGB > 7; PLT > 10.  ·	Possible slight iron deficiency, but otherwise no evidence of nutritional deficits or hemolysis.  ·	Continue to hold anticoagulation.  ·	No plans for gastrointestinal interventions at this time.   ·	Leukopenia mostly lymphocytopenia; neutrophil count normal.  ·	IR bone marrow biopsy today. S/p platelet transfusion to attempt to reach goal platelet > 30k.  ·	Myeloma labs w/ normal free light chain ratio, KIRA w/ weak IgG kappa band, IgG 285, IgA 57, IgM 29  ·	Checking HIT antibody and ALEK (patient was on heparin on 3/28)    Fever  ·	Currently afebrile, unclear etiology  ·	Unlikely infectious source per infectious disease; possibly malignancy.  ·	infectious w/up unremarkable  ·	off IV abx, ganciclovir  ·	if febrile will recommend re-scanning patient     DVT  ·	below knee DVT   ·	hold ac for now  ·	will need repeat duplex in 6 weeks     Will continue to follow.     Steph Holly NP  Hematology/ Oncology  [facility]  [phone] (office)  [phone] (alt office)  Evenings and weekends please call MD on call or office   PETER DE LA PAZ is a 67y Male who presents with a chief complaint of fever    Multiple Myeloma  ·	Patient follows with Dr. Jean Claude Hubbard, [facility].  ·	Patient was on lenalidomide maintenance until February 2023 when he had progression of disease.  ·	He was then switched to daratumumab + CyBorD; last dose March 23rd, 2023.  ·	No systemic therapy while inpatient or during rehabilitation.  ·	Continue acyclovir 400 mg BID.    Pancytopenia  ·	Pancytopenia has been ongoing since patient was started on latest chemotherapy regimen.  ·	Monitor CBC and transfuse to maintain HGB > 7; PLT > 10.  ·	Possible slight iron deficiency, but otherwise no evidence of nutritional deficits or hemolysis.  ·	Continue to hold anticoagulation.  ·	No plans for gastrointestinal interventions at this time.   ·	Leukopenia mostly lymphocytopenia; neutrophil count normal.  ·	IR bone marrow biopsy today. S/p platelet transfusion to attempt to reach goal platelet > 30k.  ·	Myeloma labs w/ normal free light chain ratio, KIRA w/ weak IgG kappa band, IgG 285, IgA 57, IgM 29  ·	Checking HIT antibody and ALEK (patient was on heparin on 3/28)    Fever  ·	Currently afebrile, unclear etiology  ·	Unlikely infectious source per infectious disease; possibly malignancy.  ·	infectious w/up unremarkable  ·	off IV abx, ganciclovir  ·	if febrile will recommend re-scanning patient     DVT  ·	below knee DVT   ·	hold ac for now  ·	will need repeat duplex in 6 weeks     Will continue to follow.     Steph Holly NP  Hematology/ Oncology  [facility]  [phone] (office)  [phone] (alt office)  Evenings and weekends please call MD on call or office

## 2023-04-06 NOTE — PROGRESS NOTE ADULT - ASSESSMENT
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    Fever of unknown origin.   - CXR without consolidations or effusions; U/A unremarkable; GI PCR Neg   - BCx2 and UCx  --> Neg final   - Recurrent fever. Repeat BCx2 sent 03/27 --> Neg final; ABX as per ID   - LE VA duplex --> + For DVT    - Was on empiric treatment with Vanc/cefepime --> ID consulted; S/P Zosyn  - Hold home penicillin while on ABX   - V/Q scan to R/O PE -- Low probability   - ID to follow up, infectious work up as per ID  --> CMV PCR -- + C/w Ganciclovir per ID , CMV IGG (+) and IgM (-), Cryptococcal Ag --Neg, Quantiferon Tb -- Neg, Fungitell -- <31   - CT Chest non-contrast, noted, no acute pathology  - Recurrent fever 03/29 --> F/u BCx2 -- Neg; F/u 03/31 BCx -- NGTD; F/u final   - Shock, RRT, resume antibiotics IV fluid, MICU eval , ID follow up  --> Prednisone 50 PO Qd   - S/P Zosyn; Defer ABX as per ID   - short course steroid per hematology   - IR eval for BM BX as per Heme/Onc --> done on 04/06 with IR, f/u path    Anemia, Thrombocytopenia, Pancytopenia  - Drop in Hgb, Occult +  - Hold Hep gtt ; S/P 1 unit of PRBCs 03/28  - Maintain active T+S. Transfuse for Hgb < 7.0, and platelets < 10.K  - GI eval appreciated; F/u recs --> No plans for scope at this time   - Was on Xarelto, held due to drop in Platelets as per Heme/Onc. Monitor platelet count   - S/P 1 unit PRBCs and 1 unit Platelets 04/04; W/ lasix  - Planned for 1 unit of platelets 04/05; F/u post-transfusion CBC   - F/u HIT work up as per Heme/Onc  - ASA on hold    HypoNa  - Cont to monitor and trend  - Appreciate renal eval.   - Serial labs     Multiple myeloma.   - pancytopenia largely at baseline although Hg 6.7 on arrival; s/p 1U PRBC with good response   - Was on acyclovir, now on Ganciclovir as per ID   - C/w home entecavir   - S/P dexamethasone, D/C'd by hematology , follow up outpatient after discharge  --> No on Prednisone 50 Qd   - Pt reports his Revlimid was held  - Heme/Onc consulted; F/u recs   - Resume home aspirin. --> Now on hold   - s/p IR BM Bx  04/06     Chronic diarrhea, now with Constipation   - Standing Imodium switched to PRN  - Monitor for BM     DVT   - Duplex + For R soleal vein DVT  --> Will consider CT A chest once creatinine permits as patient received contrast; Monitor for LOPEZ   - On Hep gtt; Monitor PTT; Monitor H/H closely --> Now on hold in view of drop in Hgb   - VQ scan to R/O PE -- Low probability   - Vascular eval appreciated; AC as tolerated, on Xarelto 10, monitor H/H   - Serial Duplex to assess for propagation  -- Without propagation   - 04/03 Duplex without propagation   - Repeat Duplex 04/10 -- ordered   - given severe thrombocytopenia, holding Xarelto for now. Risk of bleeding greater than benefit.     RK  - S/P Contrast on 03/23   - Check bladder scan to R/O retention  - C/w IVF for hydration  - Monitor Cr closely; Avoid nephrotoxic agents   - Cr down-trending. On IVF   - Renal eval appreciated; F/u recs    Hypertension, now hypotensive   - C/w home metoprolol.  - C/w Midodrine 10 TID. Hold for SBP > 140     Hiccups  - Started on Reglan, Monitor     Hyperlipidemia.   - C/w home atorvastatin.    Abnormal CT  - Outpatient follow up for CT findings    Prophylactic measure.   dvt ppx: DVT PPX   diet: regular  ambulate: with assistance    fall precautions  aspiration precautions.      Discussed with Patient, and family in detail at the bedside.   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    Fever of unknown origin.   - CXR without consolidations or effusions; U/A unremarkable; GI PCR Neg   - BCx2 and UCx  --> Neg final   - Recurrent fever. Repeat BCx2 sent 03/27 --> Neg final; ABX as per ID   - LE VA duplex --> + For DVT    - Was on empiric treatment with Vanc/cefepime --> ID consulted; S/P Zosyn  - Hold home penicillin while on ABX   - V/Q scan to R/O PE -- Low probability   - ID to follow up, infectious work up as per ID  --> CMV PCR -- + C/w Ganciclovir per ID , CMV IGG (+) and IgM (-), Cryptococcal Ag --Neg, Quantiferon Tb -- Neg, Fungitell -- <31   - CT Chest non-contrast, noted, no acute pathology  - Recurrent fever 03/29 --> F/u BCx2 -- Neg; F/u 03/31 BCx -- NGTD; F/u final   - Shock, RRT, resume antibiotics IV fluid, MICU eval , ID follow up  --> Prednisone 50 PO Qd   - S/P Zosyn; Defer ABX as per ID   - short course steroid per hematology   - IR eval for BM BX as per Heme/Onc --> done on 04/06 with IR, f/u path    Anemia, Thrombocytopenia, Pancytopenia  - Drop in Hgb, Occult +  - Hold Hep gtt ; S/P 1 unit of PRBCs 03/28  - Maintain active T+S. Transfuse for Hgb < 7.0, and platelets < 10.K  - GI eval appreciated; F/u recs --> No plans for scope at this time   - Was on Xarelto, held due to drop in Platelets as per Heme/Onc. Monitor platelet count   - S/P 1 unit PRBCs and 1 unit Platelets 04/04; W/ lasix  - Planned for 1 unit of platelets 04/05; F/u post-transfusion CBC   - F/u HIT work up as per Heme/Onc  - ASA on hold    HypoNa  - Cont to monitor and trend  - Appreciate renal eval.   - Serial labs     Multiple myeloma.   - pancytopenia largely at baseline although Hg 6.7 on arrival; s/p 1U PRBC with good response   - Was on acyclovir, now on Ganciclovir as per ID   - C/w home entecavir   - S/P dexamethasone, D/C'd by hematology , follow up outpatient after discharge  --> No on Prednisone 50 Qd   - Pt reports his Revlimid was held  - Heme/Onc consulted; F/u recs   - Resume home aspirin. --> Now on hold   - s/p IR BM Bx  04/06     Chronic diarrhea, now with Constipation   - Standing Imodium switched to PRN  - Monitor for BM     DVT   - Duplex + For R soleal vein DVT  --> Will consider CT A chest once creatinine permits as patient received contrast; Monitor for LOPEZ   - On Hep gtt; Monitor PTT; Monitor H/H closely --> Now on hold in view of drop in Hgb   - VQ scan to R/O PE -- Low probability   - Vascular eval appreciated; AC as tolerated, on Xarelto 10, monitor H/H   - Serial Duplex to assess for propagation  -- Without propagation   - 04/03 Duplex without propagation   - Repeat Duplex 04/10 -- ordered   - given severe thrombocytopenia, holding Xarelto for now. Risk of bleeding greater than benefit.     RK  - S/P Contrast on 03/23   - Check bladder scan to R/O retention  - C/w IVF for hydration  - Monitor Cr closely; Avoid nephrotoxic agents   - Cr down-trending. On IVF   - Renal eval appreciated; F/u recs    Hypertension, now hypotensive   - C/w home metoprolol.  - C/w Midodrine 10 TID. Hold for SBP > 140     Hiccups  - Started on Reglan, Monitor     Hyperlipidemia.   - C/w home atorvastatin.    Abnormal CT  - Outpatient follow up for CT findings    Prophylactic measure.   dvt ppx: DVT PPX   diet: regular  ambulate: with assistance    fall precautions  aspiration precautions.      Discussed with Patient, and family in detail at the bedside.

## 2023-04-06 NOTE — PROGRESS NOTE ADULT - SUBJECTIVE AND OBJECTIVE BOX
Subjective: Patient seen and examined. No new events except as noted.     SUBJECTIVE/ROS:        MEDICATIONS:  MEDICATIONS  (STANDING):  atorvastatin 40 milliGRAM(s) Oral at bedtime  entecavir Solution 0.25 milliGRAM(s) Oral daily  lactated ringers. 1000 milliLiter(s) (100 mL/Hr) IV Continuous <Continuous>  metoclopramide Injectable 5 milliGRAM(s) IV Push three times a day  midodrine. 10 milliGRAM(s) Oral three times a day  predniSONE   Tablet 50 milliGRAM(s) Oral daily      PHYSICAL EXAM:  T(C): 37.2 (04-06-23 @ 13:12), Max: 37.8 (04-05-23 @ 18:20)  HR: 100 (04-06-23 @ 13:12) (70 - 100)  BP: 114/62 (04-06-23 @ 13:12) (102/75 - 147/79)  RR: 18 (04-06-23 @ 13:12) (16 - 19)  SpO2: 92% (04-06-23 @ 13:12) (92% - 97%)  Wt(kg): --  I&O's Summary    05 Apr 2023 07:01  -  06 Apr 2023 07:00  --------------------------------------------------------  IN: 1680 mL / OUT: 6450 mL / NET: -4770 mL    06 Apr 2023 07:01  -  06 Apr 2023 14:58  --------------------------------------------------------  IN: 0 mL / OUT: 700 mL / NET: -700 mL      Height (cm): 185.4 (04-06 @ 10:43)  Weight (kg): 77.1 (04-06 @ 10:43)  BMI (kg/m2): 22.4 (04-06 @ 10:43)  BSA (m2): 2.01 (04-06 @ 10:43)      JVP: Normal  Neck: supple  Lung: clear   CV: S1 S2 , Murmur:  Abd: soft  Ext: No edema  neuro: Awake / alert  Psych: flat affect  Skin: normal``    LABS/DATA:    CARDIAC MARKERS:                                8.6    2.42  )-----------( 17       ( 06 Apr 2023 06:44 )             24.8     04-06    136  |  95<L>  |  35<H>  ----------------------------<  136<H>  3.5   |  31  |  1.41<H>    Ca    8.2<L>      06 Apr 2023 06:44      proBNP:   Lipid Profile:   HgA1c:   TSH:     TELE:  EKG:

## 2023-04-06 NOTE — PROGRESS NOTE ADULT - SUBJECTIVE AND OBJECTIVE BOX
67yPatient is a 67y old  Male who presents with a chief complaint of fever (06 Apr 2023 18:06)      Interval history:  Afebrile, no new symptoms. s/p BM bx.       Allergies:   No Known Allergies      Antimicrobials:      REVIEW OF SYSTEMS:  No chest pain   No  SOB  No abdominal pain  No rash.       Vital Signs Last 24 Hrs  T(C): 37.1 (04-06-23 @ 20:42), Max: 37.2 (04-06-23 @ 13:12)  T(F): 98.7 (04-06-23 @ 20:42), Max: 98.9 (04-06-23 @ 13:12)  HR: 92 (04-06-23 @ 20:42) (70 - 100)  BP: 128/68 (04-06-23 @ 20:42) (102/75 - 141/73)  BP(mean): 91 (04-06-23 @ 12:00) (83 - 93)  RR: 18 (04-06-23 @ 20:42) (16 - 19)  SpO2: 94% (04-06-23 @ 20:42) (92% - 96%)      PHYSICAL EXAM:  Patient in no acute distress. AAOX3.   no icterus   breathing comfortably on RA  trace edema.  IV sites not inflamed.                           8.6    2.42  )-----------( 17       ( 06 Apr 2023 06:44 )             24.8   04-06    136  |  95<L>  |  35<H>  ----------------------------<  136<H>  3.5   |  31  |  1.41<H>    Ca    8.2<L>      06 Apr 2023 06:44

## 2023-04-06 NOTE — PROGRESS NOTE ADULT - SUBJECTIVE AND OBJECTIVE BOX
Chief Complaint:  Patient is a 67y old  Male who presents with a chief complaint of fever (05 Apr 2023 14:21)      Date of service 04-06-23 @ 10:32      Interval Events:   Patient seen and examined.   Hiccups resolved.  No BM.     Hospital Medications:  acetaminophen     Tablet .. 650 milliGRAM(s) Oral every 6 hours PRN  atorvastatin 40 milliGRAM(s) Oral at bedtime  entecavir Solution 0.25 milliGRAM(s) Oral daily  ganciclovir IVPB 190 milliGRAM(s) IV Intermittent every 24 hours  lactated ringers. 1000 milliLiter(s) IV Continuous <Continuous>  loperamide 2 milliGRAM(s) Oral three times a day PRN  metoclopramide Injectable 5 milliGRAM(s) IV Push three times a day  midodrine. 10 milliGRAM(s) Oral three times a day  predniSONE   Tablet 50 milliGRAM(s) Oral daily        Review of Systems:  General:  No wt loss, fevers, chills, night sweats, fatigue,   Eyes:  Good vision, no reported pain  ENT:  No sore throat, pain, runny nose, dysphagia  CV:  No pain, palpitations, hypo/hypertension  Resp:  No dyspnea, cough, tachypnea, wheezing  GI:  See HPI  :  No pain, bleeding, incontinence, nocturia  Muscle:  No pain, weakness  Neuro:  No weakness, tingling, memory problems  Psych:  No fatigue, insomnia, mood problems, depression  Endocrine:  No polyuria, polydipsia, cold/heat intolerance  Heme:  No petechiae, ecchymosis, easy bruisability  Integumentary:  No rash, edema    PHYSICAL EXAM:   Vital Signs:  Vital Signs Last 24 Hrs  T(C): 37 (06 Apr 2023 09:42), Max: 37.8 (05 Apr 2023 18:20)  T(F): 98.6 (06 Apr 2023 09:42), Max: 100 (05 Apr 2023 18:20)  HR: 70 (06 Apr 2023 09:42) (70 - 86)  BP: 137/80 (06 Apr 2023 09:42) (105/54 - 147/79)  BP(mean): 93 (05 Apr 2023 15:17) (93 - 93)  RR: 16 (06 Apr 2023 09:42) (16 - 18)  SpO2: 92% (06 Apr 2023 09:42) (92% - 97%)    Parameters below as of 06 Apr 2023 05:11  Patient On (Oxygen Delivery Method): room air      Daily     Daily       PHYSICAL EXAM:     GENERAL:  Appears stated age, well-groomed, well-nourished, no distress  HEENT:  NC/AT,  conjunctivae anicteric, clear and pink,   NECK: supple, trachea midline  CHEST:  Full & symmetric excursion, no increased effort, breath sounds clear  HEART:  Regular rhythm, no JVD  ABDOMEN:  Soft, non-tender, non-distended, normoactive bowel sounds,  no masses , no hepatosplenomegaly  EXTREMITIES:  no cyanosis,clubbing or edema  SKIN:  No rash, erythema, or, ecchymoses, no jaundice  NEURO:  Alert, non-focal, no asterixis  PSYCH: Appropriate affect, oriented to place and time  RECTAL: Deferred      LABS Personally reviewed by me:                        8.6    2.42  )-----------( 17       ( 06 Apr 2023 06:44 )             24.8     Mean Cell Volume: 89.2 fl (04-06-23 @ 06:44)    04-06    136  |  95<L>  |  35<H>  ----------------------------<  136<H>  3.5   |  31  |  1.41<H>    Ca    8.2<L>      06 Apr 2023 06:44        PT/INR - ( 06 Apr 2023 06:44 )   PT: 15.6 sec;   INR: 1.35 ratio         PTT - ( 06 Apr 2023 06:44 )  PTT:28.4 sec                            8.6    2.42  )-----------( 17       ( 06 Apr 2023 06:44 )             24.8                         9.5    3.39  )-----------( 33       ( 05 Apr 2023 16:11 )             27.4                         8.8    2.91  )-----------( 15       ( 05 Apr 2023 06:23 )             24.8                         7.5    x     )-----------( x        ( 04 Apr 2023 08:13 )             21.5                         7.2    3.27  )-----------( 12       ( 04 Apr 2023 07:00 )             20.9       Imaging personally reviewed by me:             Chief Complaint:  Patient is a 67y old  Male who presents with a chief complaint of fever (05 Apr 2023 14:21)      Date of service 04-06-23 @ 10:32      Interval Events:   Patient seen and examined.   Hiccups resolved.  No BM.   Planned for bone marrow biopsy today    Hospital Medications:  acetaminophen     Tablet .. 650 milliGRAM(s) Oral every 6 hours PRN  atorvastatin 40 milliGRAM(s) Oral at bedtime  entecavir Solution 0.25 milliGRAM(s) Oral daily  ganciclovir IVPB 190 milliGRAM(s) IV Intermittent every 24 hours  lactated ringers. 1000 milliLiter(s) IV Continuous <Continuous>  loperamide 2 milliGRAM(s) Oral three times a day PRN  metoclopramide Injectable 5 milliGRAM(s) IV Push three times a day  midodrine. 10 milliGRAM(s) Oral three times a day  predniSONE   Tablet 50 milliGRAM(s) Oral daily        Review of Systems:  General:  No wt loss, fevers, chills, night sweats, fatigue,   Eyes:  Good vision, no reported pain  ENT:  No sore throat, pain, runny nose, dysphagia  CV:  No pain, palpitations, hypo/hypertension  Resp:  No dyspnea, cough, tachypnea, wheezing  GI:  See HPI  :  No pain, bleeding, incontinence, nocturia  Muscle:  No pain, weakness  Neuro:  No weakness, tingling, memory problems  Psych:  No fatigue, insomnia, mood problems, depression  Endocrine:  No polyuria, polydipsia, cold/heat intolerance  Heme:  No petechiae, ecchymosis, easy bruisability  Integumentary:  No rash, edema    PHYSICAL EXAM:   Vital Signs:  Vital Signs Last 24 Hrs  T(C): 37 (06 Apr 2023 09:42), Max: 37.8 (05 Apr 2023 18:20)  T(F): 98.6 (06 Apr 2023 09:42), Max: 100 (05 Apr 2023 18:20)  HR: 70 (06 Apr 2023 09:42) (70 - 86)  BP: 137/80 (06 Apr 2023 09:42) (105/54 - 147/79)  BP(mean): 93 (05 Apr 2023 15:17) (93 - 93)  RR: 16 (06 Apr 2023 09:42) (16 - 18)  SpO2: 92% (06 Apr 2023 09:42) (92% - 97%)    Parameters below as of 06 Apr 2023 05:11  Patient On (Oxygen Delivery Method): room air      Daily     Daily       PHYSICAL EXAM:     GENERAL:  Appears stated age, well-groomed, well-nourished, no distress  HEENT:  NC/AT,  conjunctivae anicteric, clear and pink,   NECK: supple, trachea midline  CHEST:  Full & symmetric excursion, no increased effort, breath sounds clear  HEART:  Regular rhythm, no JVD  ABDOMEN:  Soft, non-tender, non-distended, normoactive bowel sounds,  no masses , no hepatosplenomegaly  EXTREMITIES:  no cyanosis,clubbing or edema  SKIN:  No rash, erythema, or, ecchymoses, no jaundice  NEURO:  Alert, non-focal, no asterixis  PSYCH: Appropriate affect, oriented to place and time  RECTAL: Deferred      LABS Personally reviewed by me:                        8.6    2.42  )-----------( 17       ( 06 Apr 2023 06:44 )             24.8     Mean Cell Volume: 89.2 fl (04-06-23 @ 06:44)    04-06    136  |  95<L>  |  35<H>  ----------------------------<  136<H>  3.5   |  31  |  1.41<H>    Ca    8.2<L>      06 Apr 2023 06:44        PT/INR - ( 06 Apr 2023 06:44 )   PT: 15.6 sec;   INR: 1.35 ratio         PTT - ( 06 Apr 2023 06:44 )  PTT:28.4 sec                            8.6    2.42  )-----------( 17       ( 06 Apr 2023 06:44 )             24.8                         9.5    3.39  )-----------( 33       ( 05 Apr 2023 16:11 )             27.4                         8.8    2.91  )-----------( 15       ( 05 Apr 2023 06:23 )             24.8                         7.5    x     )-----------( x        ( 04 Apr 2023 08:13 )             21.5                         7.2    3.27  )-----------( 12 ( 04 Apr 2023 07:00 )             20.9       Imaging personally reviewed by me:

## 2023-04-06 NOTE — PROGRESS NOTE ADULT - NS ATTEND AMEND GEN_ALL_CORE FT
68 y/o male with multiple myeloma, recently switched to david-CyBorD, admitted with fever.    His worsening pancytopenia is likely due to ganciclovir/valcyte.  Uncertain why switched back to ganciclovir.  Discussed with Dr. Oneil from ID, would recommend to change back to valcye.  His CMV PCR is improving.  His myeloma labs are unremarkable, bone marrow biopsy completed by IR today.   Transfusional support to keep his Hg >7, plt >15.  If his ANC drops, he may need neupogen with valcyte.  He is on steroids which may mask his fevers, if he spikes temperature again, recommend repeat imaging.    He should be restarted on acyclovir prophylaxis as he is on daratumumab and velcade.   Soleal vein DVT, no propagation.  Recommend to hold xarelto as his plt are <50. 66 y/o male with multiple myeloma, recently switched to david-CyBorD, admitted with fever.    His worsening pancytopenia is likely due to ganciclovir/valcyte.  Uncertain why switched back to ganciclovir.  Discussed with Dr. Oneil from ID, would recommend to change back to valcye.  His CMV PCR is improving.  His myeloma labs are unremarkable, bone marrow biopsy completed by IR today.   Transfusional support to keep his Hg >7, plt >15.  If his ANC drops, he may need neupogen with valcyte.  He is on steroids which may mask his fevers, if he spikes temperature again, recommend repeat imaging.    He should be restarted on acyclovir prophylaxis as he is on daratumumab and velcade.   Soleal vein DVT, no propagation.  Recommend to hold xarelto as his plt are <50.

## 2023-04-06 NOTE — PROGRESS NOTE ADULT - NSPROGADDITIONALINFOA_GEN_ALL_CORE
d/w pt and family.  d/w NP.     --- Coverage for Dr. Addison ---  - Dr. WAN Pizarro ([facility])  - [phone] d/w pt and family.  d/w NP.     --- Coverage for Dr. Addison ---  - Dr. WAN Pizarro ([facility])  - [phone] d/w pt and family.  d/w NP.     --- Coverage for Dr. Addison ---  - Dr. WAN Pizarro ([facility])  - [phone]

## 2023-04-06 NOTE — PROGRESS NOTE ADULT - SUBJECTIVE AND OBJECTIVE BOX
[facility] NEPHROLOGY- PROGRESS NOTE    67 year old Male with history of MM on chemotherapy presents with fevers. Nephrology consulted for elevated Scr.      REVIEW OF SYSTEMS:  Gen: no fevers  Cards: no chest pain  Resp: no dyspnea  GI: no nausea or vomiting or diarrhea  Vascular: + LE edema    No Known Allergies      Hospital Medications: Medications reviewed        VITALS:  T(F): 98.9 (23 @ 13:12), Max: 100 (23 @ 18:20)  HR: 100 (23 @ 13:12)  BP: 114/62 (23 @ 13:12)  RR: 18 (23 @ 13:12)  SpO2: 92% (23 @ 13:12)  Wt(kg): --     @ 07:01  -   @ 07:00  --------------------------------------------------------  IN: 1680 mL / OUT: 6450 mL / NET: -4770 mL     @ 07:01  -   @ 18:06  --------------------------------------------------------  IN: 0 mL / OUT: 700 mL / NET: -700 mL      Height (cm): 185.4 ( 10:43)  Weight (kg): 77.1 ( 10:43)  BMI (kg/m2): 22.4 ( @ 10:43)  BSA (m2): 2.01 ( 10:43)      PHYSICAL EXAM:    Gen: NAD, calm  Cards: RRR, +S1/S2, no M/G/R  Resp: CTA B/L  GI: soft, NT/ND, NABS  : + lloyd  Vascular: trace LE edema B/L      LABS:      136  |  95<L>  |  35<H>  ----------------------------<  136<H>  3.5   |  31  |  1.41<H>    Ca    8.2<L>      2023 06:44      Creatinine Trend: 1.41 <--, 1.68 <--, 1.97 <--, 2.17 <--, 2.53 <--, 2.05 <--, 2.26 <--, 2.17 <--                        8.6    2.42  )-----------( 17       ( 2023 06:44 )             24.8     Urine Studies:  Urinalysis Basic - ( 2023 14:40 )    Color: Light Orange / Appearance: Turbid / S.015 / pH:   Gluc:  / Ketone: Negative  / Bili: Negative / Urobili: Negative   Blood:  / Protein: 100 mg/dl / Nitrite: Negative   Leuk Esterase: Small / RBC: 41 /hpf /  /HPF   Sq Epi:  / Non Sq Epi: 13 /hpf / Bacteria: Negative      Sodium, Random Urine: 53 mmol/L ( @ 14:40)  Creatinine, Random Urine: 74 mg/dL ( @ 14:40)       [facility] NEPHROLOGY- PROGRESS NOTE    67 year old Male with history of MM on chemotherapy presents with fevers. Nephrology consulted for elevated Scr.      REVIEW OF SYSTEMS:  Gen: no fevers  Cards: no chest pain  Resp: no dyspnea  GI: no nausea or vomiting or diarrhea  Vascular: + LE edema    No Known Allergies      Hospital Medications: Medications reviewed        VITALS:  T(F): 98.9 (23 @ 13:12), Max: 100 (23 @ 18:20)  HR: 100 (23 @ 13:12)  BP: 114/62 (23 @ 13:12)  RR: 18 (23 @ 13:12)  SpO2: 92% (23 @ 13:12)  Wt(kg): --     @ 07:01  -   @ 07:00  --------------------------------------------------------  IN: 1680 mL / OUT: 6450 mL / NET: -4770 mL     @ 07:01  -   @ 18:06  --------------------------------------------------------  IN: 0 mL / OUT: 700 mL / NET: -700 mL      Height (cm): 185.4 ( 10:43)  Weight (kg): 77.1 ( 10:43)  BMI (kg/m2): 22.4 ( @ 10:43)  BSA (m2): 2.01 ( 10:43)      PHYSICAL EXAM:    Gen: NAD, calm  Cards: RRR, +S1/S2, no M/G/R  Resp: CTA B/L  GI: soft, NT/ND, NABS  : + lloyd  Vascular: trace LE edema B/L      LABS:      136  |  95<L>  |  35<H>  ----------------------------<  136<H>  3.5   |  31  |  1.41<H>    Ca    8.2<L>      2023 06:44      Creatinine Trend: 1.41 <--, 1.68 <--, 1.97 <--, 2.17 <--, 2.53 <--, 2.05 <--, 2.26 <--, 2.17 <--                        8.6    2.42  )-----------( 17       ( 2023 06:44 )             24.8     Urine Studies:  Urinalysis Basic - ( 2023 14:40 )    Color: Light Orange / Appearance: Turbid / S.015 / pH:   Gluc:  / Ketone: Negative  / Bili: Negative / Urobili: Negative   Blood:  / Protein: 100 mg/dl / Nitrite: Negative   Leuk Esterase: Small / RBC: 41 /hpf /  /HPF   Sq Epi:  / Non Sq Epi: 13 /hpf / Bacteria: Negative      Sodium, Random Urine: 53 mmol/L ( @ 14:40)  Creatinine, Random Urine: 74 mg/dL ( @ 14:40)       [facility] NEPHROLOGY- PROGRESS NOTE    67 year old Male with history of MM on chemotherapy presents with fevers. Nephrology consulted for elevated Scr.      REVIEW OF SYSTEMS:  Gen: no fevers  Cards: no chest pain  Resp: no dyspnea  GI: no nausea or vomiting or diarrhea  Vascular: + LE edema    No Known Allergies      Hospital Medications: Medications reviewed        VITALS:  T(F): 98.9 (23 @ 13:12), Max: 100 (23 @ 18:20)  HR: 100 (23 @ 13:12)  BP: 114/62 (23 @ 13:12)  RR: 18 (23 @ 13:12)  SpO2: 92% (23 @ 13:12)  Wt(kg): --     @ 07:01  -   @ 07:00  --------------------------------------------------------  IN: 1680 mL / OUT: 6450 mL / NET: -4770 mL     @ 07:01  -   @ 18:06  --------------------------------------------------------  IN: 0 mL / OUT: 700 mL / NET: -700 mL      Height (cm): 185.4 ( 10:43)  Weight (kg): 77.1 ( 10:43)  BMI (kg/m2): 22.4 ( @ 10:43)  BSA (m2): 2.01 ( 10:43)      PHYSICAL EXAM:    Gen: NAD, calm  Cards: RRR, +S1/S2, no M/G/R  Resp: CTA B/L  GI: soft, NT/ND, NABS  : + lloyd  Vascular: trace LE edema B/L      LABS:      136  |  95<L>  |  35<H>  ----------------------------<  136<H>  3.5   |  31  |  1.41<H>    Ca    8.2<L>      2023 06:44      Creatinine Trend: 1.41 <--, 1.68 <--, 1.97 <--, 2.17 <--, 2.53 <--, 2.05 <--, 2.26 <--, 2.17 <--                        8.6    2.42  )-----------( 17       ( 2023 06:44 )             24.8     Urine Studies:  Urinalysis Basic - ( 2023 14:40 )    Color: Light Orange / Appearance: Turbid / S.015 / pH:   Gluc:  / Ketone: Negative  / Bili: Negative / Urobili: Negative   Blood:  / Protein: 100 mg/dl / Nitrite: Negative   Leuk Esterase: Small / RBC: 41 /hpf /  /HPF   Sq Epi:  / Non Sq Epi: 13 /hpf / Bacteria: Negative      Sodium, Random Urine: 53 mmol/L ( @ 14:40)  Creatinine, Random Urine: 74 mg/dL ( @ 14:40)

## 2023-04-07 LAB
ACANTHOCYTES BLD QL SMEAR: SLIGHT — SIGNIFICANT CHANGE UP
ANION GAP SERPL CALC-SCNC: 12 MMOL/L — SIGNIFICANT CHANGE UP (ref 5–17)
ANISOCYTOSIS BLD QL: SIGNIFICANT CHANGE UP
BASE EXCESS BLDV CALC-SCNC: 12.9 MMOL/L — HIGH (ref -2–3)
BASOPHILS # BLD AUTO: 0 K/UL — SIGNIFICANT CHANGE UP (ref 0–0.2)
BASOPHILS NFR BLD AUTO: 0 % — SIGNIFICANT CHANGE UP (ref 0–2)
BUN SERPL-MCNC: 33 MG/DL — HIGH (ref 7–23)
BURR CELLS BLD QL SMEAR: PRESENT — SIGNIFICANT CHANGE UP
CALCIUM SERPL-MCNC: 8.1 MG/DL — LOW (ref 8.4–10.5)
CHLORIDE SERPL-SCNC: 92 MMOL/L — LOW (ref 96–108)
CMV DNA CSF QL NAA+PROBE: 2200 — SIGNIFICANT CHANGE UP
CMV DNA SPEC NAA+PROBE-LOG#: 3.34 LOG10IU/ML — HIGH
CO2 BLDV-SCNC: 36 MMOL/L — HIGH (ref 22–26)
CO2 SERPL-SCNC: 32 MMOL/L — HIGH (ref 22–31)
CREAT SERPL-MCNC: 1.37 MG/DL — HIGH (ref 0.5–1.3)
DACRYOCYTES BLD QL SMEAR: SLIGHT — SIGNIFICANT CHANGE UP
EGFR: 56 ML/MIN/1.73M2 — LOW
ELLIPTOCYTES BLD QL SMEAR: SLIGHT — SIGNIFICANT CHANGE UP
EOSINOPHIL # BLD AUTO: 0 K/UL — SIGNIFICANT CHANGE UP (ref 0–0.5)
EOSINOPHIL NFR BLD AUTO: 0 % — SIGNIFICANT CHANGE UP (ref 0–6)
GAS PNL BLDV: SIGNIFICANT CHANGE UP
GLUCOSE SERPL-MCNC: 130 MG/DL — HIGH (ref 70–99)
HCO3 BLDV-SCNC: 35 MMOL/L — HIGH (ref 22–29)
HCT VFR BLD CALC: 24.8 % — LOW (ref 39–50)
HCT VFR BLD CALC: 28.6 % — LOW (ref 39–50)
HGB BLD-MCNC: 8.6 G/DL — LOW (ref 13–17)
HGB BLD-MCNC: 9.4 G/DL — LOW (ref 13–17)
HYPOCHROMIA BLD QL: SLIGHT — SIGNIFICANT CHANGE UP
LYMPHOCYTES # BLD AUTO: 0.21 K/UL — LOW (ref 1–3.3)
LYMPHOCYTES # BLD AUTO: 11.4 % — LOW (ref 13–44)
MACROCYTES BLD QL: SIGNIFICANT CHANGE UP
MANUAL SMEAR VERIFICATION: SIGNIFICANT CHANGE UP
MCHC RBC-ENTMCNC: 30.3 PG — SIGNIFICANT CHANGE UP (ref 27–34)
MCHC RBC-ENTMCNC: 31.3 PG — SIGNIFICANT CHANGE UP (ref 27–34)
MCHC RBC-ENTMCNC: 32.9 GM/DL — SIGNIFICANT CHANGE UP (ref 32–36)
MCHC RBC-ENTMCNC: 34.7 GM/DL — SIGNIFICANT CHANGE UP (ref 32–36)
MCV RBC AUTO: 90.2 FL — SIGNIFICANT CHANGE UP (ref 80–100)
MCV RBC AUTO: 92.3 FL — SIGNIFICANT CHANGE UP (ref 80–100)
MICROCYTES BLD QL: SLIGHT — SIGNIFICANT CHANGE UP
MONOCYTES # BLD AUTO: 0.02 K/UL — SIGNIFICANT CHANGE UP (ref 0–0.9)
MONOCYTES NFR BLD AUTO: 0.9 % — LOW (ref 2–14)
MYELOCYTES NFR BLD: 2.6 % — HIGH (ref 0–0)
NEUTROPHILS # BLD AUTO: 1.57 K/UL — LOW (ref 1.8–7.4)
NEUTROPHILS NFR BLD AUTO: 85.1 % — HIGH (ref 43–77)
NRBC # BLD: 2 /100 WBCS — HIGH (ref 0–0)
NRBC # BLD: 4 /100 — HIGH (ref 0–0)
PCO2 BLDV: 36 MMHG — LOW (ref 42–55)
PH BLDV: 7.6 — HIGH (ref 7.32–7.43)
PLAT MORPH BLD: NORMAL — SIGNIFICANT CHANGE UP
PLATELET # BLD AUTO: 19 K/UL — CRITICAL LOW (ref 150–400)
PLATELET # BLD AUTO: 20 K/UL — CRITICAL LOW (ref 150–400)
PO2 BLDV: 190 MMHG — HIGH (ref 25–45)
POIKILOCYTOSIS BLD QL AUTO: SIGNIFICANT CHANGE UP
POLYCHROMASIA BLD QL SMEAR: SLIGHT — SIGNIFICANT CHANGE UP
POTASSIUM SERPL-MCNC: 3.2 MMOL/L — LOW (ref 3.5–5.3)
POTASSIUM SERPL-SCNC: 3.2 MMOL/L — LOW (ref 3.5–5.3)
RBC # BLD: 2.75 M/UL — LOW (ref 4.2–5.8)
RBC # BLD: 3.1 M/UL — LOW (ref 4.2–5.8)
RBC # FLD: 21.1 % — HIGH (ref 10.3–14.5)
RBC # FLD: 21.5 % — HIGH (ref 10.3–14.5)
RBC BLD AUTO: ABNORMAL
SAO2 % BLDV: 98 % — HIGH (ref 67–88)
SCHISTOCYTES BLD QL AUTO: SIGNIFICANT CHANGE UP
SODIUM SERPL-SCNC: 136 MMOL/L — SIGNIFICANT CHANGE UP (ref 135–145)
TARGETS BLD QL SMEAR: SLIGHT — SIGNIFICANT CHANGE UP
WBC # BLD: 1.75 K/UL — LOW (ref 3.8–10.5)
WBC # BLD: 1.85 K/UL — LOW (ref 3.8–10.5)
WBC # FLD AUTO: 1.75 K/UL — LOW (ref 3.8–10.5)
WBC # FLD AUTO: 1.85 K/UL — LOW (ref 3.8–10.5)

## 2023-04-07 PROCEDURE — 99233 SBSQ HOSP IP/OBS HIGH 50: CPT

## 2023-04-07 PROCEDURE — 71260 CT THORAX DX C+: CPT | Mod: 26

## 2023-04-07 PROCEDURE — 74177 CT ABD & PELVIS W/CONTRAST: CPT | Mod: 26

## 2023-04-07 RX ORDER — MAGNESIUM SULFATE 500 MG/ML
2 VIAL (ML) INJECTION ONCE
Refills: 0 | Status: COMPLETED | OUTPATIENT
Start: 2023-04-07 | End: 2023-04-07

## 2023-04-07 RX ORDER — SODIUM CHLORIDE 9 MG/ML
1000 INJECTION INTRAMUSCULAR; INTRAVENOUS; SUBCUTANEOUS
Refills: 0 | Status: DISCONTINUED | OUTPATIENT
Start: 2023-04-07 | End: 2023-04-10

## 2023-04-07 RX ORDER — POTASSIUM CHLORIDE 20 MEQ
20 PACKET (EA) ORAL ONCE
Refills: 0 | Status: COMPLETED | OUTPATIENT
Start: 2023-04-07 | End: 2023-04-07

## 2023-04-07 RX ORDER — SODIUM CHLORIDE 9 MG/ML
1000 INJECTION INTRAMUSCULAR; INTRAVENOUS; SUBCUTANEOUS
Refills: 0 | Status: DISCONTINUED | OUTPATIENT
Start: 2023-04-07 | End: 2023-04-07

## 2023-04-07 RX ADMIN — MIDODRINE HYDROCHLORIDE 10 MILLIGRAM(S): 2.5 TABLET ORAL at 05:20

## 2023-04-07 RX ADMIN — Medication 650 MILLIGRAM(S): at 21:34

## 2023-04-07 RX ADMIN — Medication 20 MILLIEQUIVALENT(S): at 08:28

## 2023-04-07 RX ADMIN — ENTECAVIR 0.5 MILLIGRAM(S): 0.5 TABLET ORAL at 11:33

## 2023-04-07 RX ADMIN — ATORVASTATIN CALCIUM 40 MILLIGRAM(S): 80 TABLET, FILM COATED ORAL at 21:41

## 2023-04-07 RX ADMIN — SODIUM CHLORIDE 75 MILLILITER(S): 9 INJECTION INTRAMUSCULAR; INTRAVENOUS; SUBCUTANEOUS at 16:07

## 2023-04-07 RX ADMIN — VALGANCICLOVIR 450 MILLIGRAM(S): 450 TABLET, FILM COATED ORAL at 17:33

## 2023-04-07 RX ADMIN — VALGANCICLOVIR 450 MILLIGRAM(S): 450 TABLET, FILM COATED ORAL at 05:20

## 2023-04-07 RX ADMIN — Medication 5 MILLIGRAM(S): at 05:20

## 2023-04-07 RX ADMIN — Medication 650 MILLIGRAM(S): at 20:44

## 2023-04-07 RX ADMIN — MIDODRINE HYDROCHLORIDE 10 MILLIGRAM(S): 2.5 TABLET ORAL at 11:33

## 2023-04-07 RX ADMIN — Medication 20 MILLIEQUIVALENT(S): at 14:37

## 2023-04-07 RX ADMIN — Medication 50 MILLIGRAM(S): at 05:20

## 2023-04-07 RX ADMIN — Medication 25 GRAM(S): at 08:47

## 2023-04-07 RX ADMIN — Medication 1 ENEMA: at 14:37

## 2023-04-07 NOTE — PROGRESS NOTE ADULT - ASSESSMENT
67y Male with history of MM on chemotherapy presents with fevers. Nephrology consulted for elevated Scr.    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 Scr improving. Plan to restart IVF given plans for CT with IV contrast given recurrent fevers. UA active likely due to lloyd with granular casts suggestive of ATN. FeNa indeterminate. Lloyd discontinued and patient currently undergoing TOV. TMA work up negative. Avoid nephrotoxins. Monitor electrolytes.    2) Hypotension: BP low normal. Continue with midodrine 10 mg PO TID. Monitor BP.    3) Metabolic alkalosis: In setting of recent diuretic use. Plan to start NS as above. Check urine chloride and repeat blood gas in AM. Potassium repleted. Monitor pH.    4) MM: As per Heme/Onc.          [facility] NEPHROLOGY  Leoncio Leonard M.D.  NOA HerreraO.  Precious Chen M.D.  Nidia Stallings, MSN, ANP-C    Telephone: [phone]  Facsimile: [phone]    [address] [place] [address]   67y Male with history of MM on chemotherapy presents with fevers. Nephrology consulted for elevated Scr.    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 Scr improving. Plan to restart IVF given plans for CT with IV contrast given recurrent fevers. UA active likely due to lloyd with granular casts suggestive of ATN. FeNa indeterminate. Lloyd discontinued and patient currently undergoing TOV. TMA work up negative. Avoid nephrotoxins. Monitor electrolytes.    2) Hypotension: BP low normal. Continue with midodrine 10 mg PO TID. Monitor BP.    3) Metabolic alkalosis: In setting of recent diuretic use. Plan to start NS as above. Check urine chloride and repeat blood gas in AM. Potassium repleted. Monitor pH.    4) MM: As per Heme/Onc.          [facility] NEPHROLOGY  Leoncio Leonard M.D.  NOA HerreraO.  Precious Chen M.D.  Nidia Stallings, MSN, ANP-C    Telephone: [phone]  Facsimile: [phone]    [address] [place] [address]   67y Male with history of MM on chemotherapy presents with fevers. Nephrology consulted for elevated Scr.    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 Scr improving. Plan to restart IVF given plans for CT with IV contrast given recurrent fevers. UA active likely due to lloyd with granular casts suggestive of ATN. FeNa indeterminate. Lloyd discontinued and patient currently undergoing TOV. TMA work up negative. Avoid nephrotoxins. Monitor electrolytes.    2) Hypotension: BP low normal. Continue with midodrine 10 mg PO TID. Monitor BP.    3) Metabolic alkalosis: In setting of recent diuretic use. Plan to start NS as above. Check urine chloride and repeat blood gas in AM. Potassium repleted. Monitor pH.    4) MM: As per Heme/Onc.          [facility] NEPHROLOGY  Leoncio Leonard M.D.  NOA HerreraO.  Precious Chen M.D.  Nidia Stallings, MSN, ANP-C    Telephone: [phone]  Facsimile: [phone]    [address] [place] [address]   67y Male with history of MM on chemotherapy presents with fevers. Nephrology consulted for elevated Scr.    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 Scr improving. Plan to restart IVF given plans for CT with IV contrast given recurrent fevers. UA active likely due to lloyd with granular casts suggestive of ATN. FeNa indeterminate. Lloyd discontinued and patient currently undergoing TOV. TMA work up negative. Avoid nephrotoxins. Monitor electrolytes.    2) Hypotension: BP low normal. Continue with midodrine 10 mg PO TID. Monitor BP.    3) Metabolic alkalosis: In setting of recent diuretic use. Plan to start NS as above. Check urine chloride and repeat blood gas in AM. Potassium repleted. Monitor pH.    4) MM: As per Heme/Onc.      No renal objection to proceed with CT with IV contrast given resolving RK. Patient educated on risks of recurrent RK from contrast nephropathy and educated that risk is low given resolving RK. Will start IVF for ppx (as above). Can continue IVF until AM given contraction alkalosis on blood gas.    [facility] NEPHROLOGY  Leoncio Leonard M.D.  Tay Brooke D.O.  Precious Chen M.D.  Nidia Stallings, MSN, ANP-C    Telephone: [phone]  Facsimile: [phone]    [address] [place] [address]   67y Male with history of MM on chemotherapy presents with fevers. Nephrology consulted for elevated Scr.    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 Scr improving. Plan to restart IVF given plans for CT with IV contrast given recurrent fevers. UA active likely due to lloyd with granular casts suggestive of ATN. FeNa indeterminate. Lloyd discontinued and patient currently undergoing TOV. TMA work up negative. Avoid nephrotoxins. Monitor electrolytes.    2) Hypotension: BP low normal. Continue with midodrine 10 mg PO TID. Monitor BP.    3) Metabolic alkalosis: In setting of recent diuretic use. Plan to start NS as above. Check urine chloride and repeat blood gas in AM. Potassium repleted. Monitor pH.    4) MM: As per Heme/Onc.      No renal objection to proceed with CT with IV contrast given resolving RK. Patient educated on risks of recurrent RK from contrast nephropathy and educated that risk is low given resolving RK. Will start IVF for ppx (as above). Can continue IVF until AM given contraction alkalosis on blood gas.    [facility] NEPHROLOGY  Leoncio Leonard M.D.  Tay Brooke D.O.  Precious Chen M.D.  Nidia Stallings, MSN, ANP-C    Telephone: [phone]  Facsimile: [phone]    [address] [place] [address]   67y Male with history of MM on chemotherapy presents with fevers. Nephrology consulted for elevated Scr.    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 Scr improving. Plan to restart IVF given plans for CT with IV contrast given recurrent fevers. UA active likely due to lloyd with granular casts suggestive of ATN. FeNa indeterminate. Lloyd discontinued and patient currently undergoing TOV. TMA work up negative. Avoid nephrotoxins. Monitor electrolytes.    2) Hypotension: BP low normal. Continue with midodrine 10 mg PO TID. Monitor BP.    3) Metabolic alkalosis: In setting of recent diuretic use. Plan to start NS as above. Check urine chloride and repeat blood gas in AM. Potassium repleted. Monitor pH.    4) MM: As per Heme/Onc.      No renal objection to proceed with CT with IV contrast given resolving RK. Patient educated on risks of recurrent RK from contrast nephropathy and educated that risk is low given resolving RK. Will start IVF for ppx (as above). Can continue IVF until AM given contraction alkalosis on blood gas.    [facility] NEPHROLOGY  Leoncio Leonard M.D.  Tay Brooke D.O.  Precious Chen M.D.  Nidia Stallings, MSN, ANP-C    Telephone: [phone]  Facsimile: [phone]    [address] [place] [address]

## 2023-04-07 NOTE — PROGRESS NOTE ADULT - SUBJECTIVE AND OBJECTIVE BOX
Chief Complaint:  Patient is a 67y old  Male who presents with a chief complaint of fever (07 Apr 2023 08:25)      Date of service 04-07-23 @ 12:18      Interval Events:   Patient seen and examined.   Feels like he is going to have a BM but has not.  Patient requested enema prior to discharge.     Hospital Medications:  acetaminophen     Tablet .. 650 milliGRAM(s) Oral every 6 hours PRN  atorvastatin 40 milliGRAM(s) Oral at bedtime  entecavir 0.5 milliGRAM(s) Oral daily  loperamide 2 milliGRAM(s) Oral three times a day PRN  midodrine. 10 milliGRAM(s) Oral three times a day  potassium chloride    Tablet ER 20 milliEquivalent(s) Oral once  predniSONE   Tablet 50 milliGRAM(s) Oral daily  saline laxative (FLEET) Rectal Enema 1 Enema Rectal once  valGANciclovir 450 milliGRAM(s) Oral every 12 hours        Review of Systems:  General:  No wt loss, fevers, chills, night sweats, fatigue,   Eyes:  Good vision, no reported pain  ENT:  No sore throat, pain, runny nose, dysphagia  CV:  No pain, palpitations, hypo/hypertension  Resp:  No dyspnea, cough, tachypnea, wheezing  GI:  See HPI  :  No pain, bleeding, incontinence, nocturia  Muscle:  No pain, weakness  Neuro:  No weakness, tingling, memory problems  Psych:  No fatigue, insomnia, mood problems, depression  Endocrine:  No polyuria, polydipsia, cold/heat intolerance  Heme:  No petechiae, ecchymosis, easy bruisability  Integumentary:  No rash, edema    PHYSICAL EXAM:   Vital Signs:  Vital Signs Last 24 Hrs  T(C): 36.9 (07 Apr 2023 11:24), Max: 38.7 (07 Apr 2023 05:00)  T(F): 98.4 (07 Apr 2023 11:24), Max: 101.6 (07 Apr 2023 05:00)  HR: 109 (07 Apr 2023 11:24) (83 - 109)  BP: 101/60 (07 Apr 2023 11:24) (101/60 - 141/73)  BP(mean): --  RR: 18 (07 Apr 2023 11:24) (18 - 18)  SpO2: 93% (07 Apr 2023 11:24) (92% - 94%)    Parameters below as of 07 Apr 2023 11:24  Patient On (Oxygen Delivery Method): room air      Daily     Daily       PHYSICAL EXAM:     GENERAL:  Appears stated age, well-groomed, well-nourished, no distress  HEENT:  NC/AT,  conjunctivae anicteric, clear and pink,   NECK: supple, trachea midline  CHEST:  Full & symmetric excursion, no increased effort, breath sounds clear  HEART:  Regular rhythm, no JVD  ABDOMEN:  Soft, non-tender, non-distended, normoactive bowel sounds,  no masses , no hepatosplenomegaly  EXTREMITIES:  no cyanosis,clubbing or edema  SKIN:  No rash, erythema, or, ecchymoses, no jaundice  NEURO:  Alert, non-focal, no asterixis  PSYCH: Appropriate affect, oriented to place and time  RECTAL: Deferred      LABS Personally reviewed by me:                        8.6    1.75  )-----------( 20       ( 07 Apr 2023 06:29 )             24.8     Mean Cell Volume: 90.2 fl (04-07-23 @ 06:29)    04-07    136  |  92<L>  |  33<H>  ----------------------------<  130<H>  3.2<L>   |  32<H>  |  1.37<H>    Ca    8.1<L>      07 Apr 2023 06:28  Mg     1.1     04-07        PT/INR - ( 06 Apr 2023 06:44 )   PT: 15.6 sec;   INR: 1.35 ratio         PTT - ( 06 Apr 2023 06:44 )  PTT:28.4 sec                            8.6    1.75  )-----------( 20       ( 07 Apr 2023 06:29 )             24.8                         8.6    2.42  )-----------( 17       ( 06 Apr 2023 06:44 )             24.8                         9.5    3.39  )-----------( 33       ( 05 Apr 2023 16:11 )             27.4                         8.8    2.91  )-----------( 15       ( 05 Apr 2023 06:23 )             24.8       Imaging personally reviewed by me:

## 2023-04-07 NOTE — CHART NOTE - NSCHARTNOTEFT_GEN_A_CORE
Called by ID recommending pan scan with IV contrast r/o source of infection- ordered.   D/W Dr. Brooke, nephrology, recommending NS prior to and after CT.   D/w Dr. Pizarro (covering for Dr. Addison)- aware and in agreement with CT chest/abdomen/pelvis with IV contrast.   D/w Radiology.   Patient aware of risks and benefits as discussed with primary team and nephrology for contrast and RK. Agreeable to have CT scan as deemed medically necessary.   Will continue to monitor Cr levels.

## 2023-04-07 NOTE — PROGRESS NOTE ADULT - ASSESSMENT
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 Found to be febrile on admission, ID consulted for further management.    Tmax 102.4 (3/23)  WBC 3.5K today  Urinalysis unremarkable  RVP neg  CXR unremarkable      Impression:  #Fever, again  #Immunocompromised Status, H/O Splenectomy  #pancytopenia   #CMV viremia         PLAN:  - all infectious work up in terms of bacterial infection negative,   - given new fever overnight, repeat blood cx,  - check U/A   - repeat CT chest/abd/pelvis to look for source   - trend cbc for pancytopenia, could be valcyte induced or ? hematological, s/p BM bx pending result   - cryptococcal ag, QuantiFeronTb, fungitell negative  - c/w po valcyte  - repeat CMV PCR in lab     Plan discussed with Medicine BOOGIE Ralph  Please contact through MS Teams   If no response or past 5 pm/weekend call [phone].                    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 Found to be febrile on admission, ID consulted for further management.    Tmax 102.4 (3/23)  WBC 3.5K today  Urinalysis unremarkable  RVP neg  CXR unremarkable      Impression:  #Fever, again  #Immunocompromised Status, H/O Splenectomy  #pancytopenia   #CMV viremia         PLAN:  - all infectious work up in terms of bacterial infection negative,   - given new fever overnight, repeat blood cx,  - check U/A   - repeat CT chest/abd/pelvis to look for source   - trend cbc for pancytopenia, could be valcyte induced or ? hematological, s/p BM bx pending result   - cryptococcal ag, QuantiFeronTb, fungitell negative  - c/w po valcyte  - repeat CMV PCR in lab     Plan discussed with Medicine BOOGIE Ralph  Please contact through MS Teams   If no response or past 5 pm/weekend call [phone].                    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 Found to be febrile on admission, ID consulted for further management.    Tmax 102.4 (3/23)  WBC 3.5K today  Urinalysis unremarkable  RVP neg  CXR unremarkable      Impression:  #Fever, again  #Immunocompromised Status, H/O Splenectomy  #pancytopenia   #CMV viremia         PLAN:  - all infectious work up in terms of bacterial infection negative,   - given new fever overnight, repeat blood cx,  - check U/A   - repeat CT chest/abd/pelvis to look for source   - trend cbc for pancytopenia, could be valcyte induced or ? hematological, s/p BM bx pending result   - cryptococcal ag, QuantiFeronTb, fungitell negative  - c/w po valcyte  - repeat CMV PCR in lab     Plan discussed with Medicine BOOGIE Ralph  Please contact through MS Teams   If no response or past 5 pm/weekend call [phone].

## 2023-04-07 NOTE — PROGRESS NOTE ADULT - NSPROGADDITIONALINFOA_GEN_ALL_CORE
d/w pt and the wife.  d/w NP.   d/w the wife.     --- Coverage for Dr. Addison ---  - Dr. WAN Pizarro ([facility])  - [phone] d/w pt and the wife.  d/w NP.   d/w the wife.     --- Coverage for Dr. Addison ---  - Dr. WAN Pizarro ([facility])  - [phone] d/w pt and the wife.  d/w NP.   d/w the wife.     --- Coverage for Dr. Addison ---  - Dr. WAN Pizarro ([facility])  - [phone]

## 2023-04-07 NOTE — PROGRESS NOTE ADULT - ASSESSMENT
1. fever  - V/Q scan with low probability of PE   - GI PCR- negative   - CMV PCR elevated, cmv IGG + and CMV IGM -  - cryptococcal ag, QuantiFeronTb, fungitell negative  - per ID, given immunosuppressed state and CMV viremia, ordered repeat CMV PCR and initiated valcyte.     2. anemia, FOBT positive in the setting of thrombocytopenia. Hgb remains at recent baseline. No overt bleeding symptoms.   - no plans for endoscopic evaluation at this time  - monitor H&H, transfuse PRN  - daily PPI     3. Chronic diarrhea, likely related to chemo +/- abx. 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   - GI PCR neg, C-diff neg on 03.03  - diarrhea now resolved, last BM x 3 days ago and reports feeling "backed up".   Standing Imodium discontinued and switched to prn.     4. DVT   - on anticoagulation    5. Multiple myeloma, pancytopenia   - per heme / onc   - s/p bone marrow biopsy 4/6 with IR    6. Hiccups--resolved   - reglan discontinued     7. Had diarrhea, now constipated   - miralax 17g PO x 1 given  - enema x 1 ordered    DC planning underway           Attending supervision statement: I have personally seen and examined the patient. I fully participated in the care of this patient. I have made amendments to the documentation where necessary, and agree with the history, physical exam, and plan as outlined by the ACP.    [facility]   Gastroenterology and Hepatology  Office: [phone]   1. fever  - V/Q scan with low probability of PE   - GI PCR- negative   - CMV PCR elevated, cmv IGG + and CMV IGM -  - cryptococcal ag, QuantiFeronTb, fungitell negative  - per ID, given immunosuppressed state and CMV viremia, ordered repeat CMV PCR and initiated valcyte.     2. anemia, FOBT positive in the setting of thrombocytopenia. Hgb remains at recent baseline. No overt bleeding symptoms.   - no plans for endoscopic evaluation at this time  - monitor H&H, transfuse PRN  - daily PPI     3. Chronic diarrhea, likely related to chemo +/- abx. 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   - GI PCR neg, C-diff neg on 03.03  - diarrhea now resolved, last BM x 3 days ago and reports feeling "backed up".   Standing Imodium discontinued and switched to prn.     4. DVT   - on anticoagulation    5. Multiple myeloma, pancytopenia   - per heme / onc   - s/p bone marrow biopsy 4/6 with IR    6. Hiccups--resolved   - reglan discontinued     7. Had diarrhea, now constipated   - miralax 17g PO x 1 given  - enema x 1 ordered    DC planning underway           Attending supervision statement: I have personally seen and examined the patient. I fully participated in the care of this patient. I have made amendments to the documentation where necessary, and agree with the history, physical exam, and plan as outlined by the ACP.    [facility]   Gastroenterology and Hepatology  Office: [phone]   1. fever  - V/Q scan with low probability of PE   - GI PCR- negative   - CMV PCR elevated, cmv IGG + and CMV IGM -  - cryptococcal ag, QuantiFeronTb, fungitell negative  - per ID, given immunosuppressed state and CMV viremia, ordered repeat CMV PCR and initiated valcyte.     2. anemia, FOBT positive in the setting of thrombocytopenia. Hgb remains at recent baseline. No overt bleeding symptoms.   - no plans for endoscopic evaluation at this time  - monitor H&H, transfuse PRN  - daily PPI     3. Chronic diarrhea, likely related to chemo +/- abx. 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   - GI PCR neg, C-diff neg on 03.03  - diarrhea now resolved, last BM x 3 days ago and reports feeling "backed up".   Standing Imodium discontinued and switched to prn.     4. DVT   - on anticoagulation    5. Multiple myeloma, pancytopenia   - per heme / onc   - s/p bone marrow biopsy 4/6 with IR    6. Hiccups--resolved   - reglan discontinued     7. Had diarrhea, now constipated   - miralax 17g PO x 1 given  - enema x 1 ordered    DC planning underway           Attending supervision statement: I have personally seen and examined the patient. I fully participated in the care of this patient. I have made amendments to the documentation where necessary, and agree with the history, physical exam, and plan as outlined by the ACP.    [facility]   Gastroenterology and Hepatology  Office: [phone]

## 2023-04-07 NOTE — PROGRESS NOTE ADULT - ASSESSMENT
PETER DE LA PAZ is a 67y Male who presents with a chief complaint of fever    Multiple Myeloma  ·	Patient follows with Dr. Jean Claude Hubbard, [facility].  ·	Patient was on lenalidomide maintenance until February 2023 when he had progression of disease.  ·	He was then switched to daratumumab + CyBorD; last dose March 23rd, 2023.  ·	No systemic therapy while inpatient or during rehabilitation.  ·	Continue acyclovir 400 mg BID.    Pancytopenia  ·	Pancytopenia has been ongoing since patient was started on latest chemotherapy regimen.  ·	Monitor CBC and transfuse to maintain HGB > 7; PLT > 10.  ·	Possible slight iron deficiency, but otherwise no evidence of nutritional deficits or hemolysis.  ·	Continue to hold anticoagulation.  ·	No plans for gastrointestinal interventions at this time.   ·	Leukopenia mostly lymphocytopenia; neutrophil count normal.  ·	IR bone marrow biopsy today. S/p platelet transfusion to attempt to reach goal platelet > 30k.  ·	Myeloma labs w/ normal free light chain ratio, KIRA w/ weak IgG kappa band, IgG 285, IgA 57, IgM 29    Fever  ·	febrile again in am, unclear etiology  ·	Unlikely infectious source per infectious disease; possibly malignancy.  ·	infectious w/up unremarkable  ·	off IV abx, and  ganciclovir  ·	if febrile will recommend re-scanning patient   ·	4/7- plan for CT c/a/p to r/o source of infection     DVT  ·	below knee DVT   ·	hold ac for now  ·	will need repeat duplex in 6 weeks     will follow,    Steph Holly NP  Hematology/ Oncology  [facility]  [phone] (office)  [phone] (alt office)  Evenings and weekends please call MD on call or office   PETER DE LA PAZ is a 67y Male who presents with a chief complaint of fever    Multiple Myeloma  ·	Patient follows with Dr. Jean Claude Hubbard, [facility].  ·	Patient was on lenalidomide maintenance until February 2023 when he had progression of disease.  ·	He was then switched to daratumumab + CyBorD; last dose March 23rd, 2023.  ·	No systemic therapy while inpatient or during rehabilitation.  ·	Continue acyclovir 400 mg BID.    Pancytopenia  ·	Pancytopenia has been ongoing since patient was started on latest chemotherapy regimen.  ·	Monitor CBC and transfuse to maintain HGB > 7; PLT > 10.  ·	Possible slight iron deficiency, but otherwise no evidence of nutritional deficits or hemolysis.  ·	Continue to hold anticoagulation.  ·	No plans for gastrointestinal interventions at this time.   ·	Leukopenia mostly lymphocytopenia; neutrophil count normal.  ·	IR bone marrow biopsy today. S/p platelet transfusion to attempt to reach goal platelet > 30k.  ·	Myeloma labs w/ normal free light chain ratio, KIRA w/ weak IgG kappa band, IgG 285, IgA 57, IgM 29    Fever  ·	febrile again in am, unclear etiology  ·	Unlikely infectious source per infectious disease; possibly malignancy.  ·	infectious w/up unremarkable  ·	off IV abx, and  ganciclovir  ·	if febrile will recommend re-scanning patient   ·	4/7- plan for CT c/a/p to r/o source of infection     DVT  ·	below knee DVT   ·	hold ac for now  ·	will need repeat duplex in 6 weeks     will follow,    Steph Holly NP  Hematology/ Oncology  [facility]  [phone] (office)  [phone] (alt office)  Evenings and weekends please call MD on call or office   PETER DE LA PAZ is a 67y Male who presents with a chief complaint of fever    Multiple Myeloma  ·	Patient follows with Dr. Jean Claude Hubbard, [facility].  ·	Patient was on lenalidomide maintenance until February 2023 when he had progression of disease.  ·	He was then switched to daratumumab + CyBorD; last dose March 23rd, 2023.  ·	No systemic therapy while inpatient or during rehabilitation.  ·	Continue acyclovir 400 mg BID.    Pancytopenia  ·	Pancytopenia has been ongoing since patient was started on latest chemotherapy regimen.  ·	Monitor CBC and transfuse to maintain HGB > 7; PLT > 10.  ·	Possible slight iron deficiency, but otherwise no evidence of nutritional deficits or hemolysis.  ·	Continue to hold anticoagulation.  ·	No plans for gastrointestinal interventions at this time.   ·	Leukopenia mostly lymphocytopenia; neutrophil count normal.  ·	IR bone marrow biopsy today. S/p platelet transfusion to attempt to reach goal platelet > 30k.  ·	Myeloma labs w/ normal free light chain ratio, KIRA w/ weak IgG kappa band, IgG 285, IgA 57, IgM 29    Fever  ·	febrile again in am, unclear etiology  ·	Unlikely infectious source per infectious disease; possibly malignancy.  ·	infectious w/up unremarkable  ·	off IV abx, and  ganciclovir  ·	if febrile will recommend re-scanning patient   ·	4/7- plan for CT c/a/p to r/o source of infection     DVT  ·	below knee DVT   ·	hold ac for now  ·	will need repeat duplex in 6 weeks     will follow,    Steph Holly NP  Hematology/ Oncology  [facility]  [phone] (office)  [phone] (alt office)  Evenings and weekends please call MD on call or office

## 2023-04-07 NOTE — PROGRESS NOTE ADULT - ASSESSMENT
History of bioAVR   echo unremarkable     Anemia  Monitor hemoglobin, transfuse as needed.  plan as per Heme    DVT   a/c on hold  low plts  fu with vascular surgery / heme     Hypotension   due to sepsis   better now   on midodrine     NSVT 4 beats  likely in setting of low K   replete K

## 2023-04-07 NOTE — PROGRESS NOTE ADULT - ASSESSMENT
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    Fever of unknown origin.   - CXR without consolidations or effusions; U/A unremarkable; GI PCR Neg   - BCx2 and UCx  --> Neg final   - Recurrent fever. Repeat BCx2 sent 03/27 --> Neg final; ABX as per ID   - LE VA duplex --> + For DVT    - Was on empiric treatment with Vanc/cefepime --> ID consulted; S/P Zosyn  - Hold home penicillin while on ABX   - V/Q scan -- Low probability   - ID to follow up, infectious work up as per ID  --> CMV PCR -- + C/w Ganciclovir per ID , CMV IGG (+) and IgM (-), Cryptococcal Ag --Neg, Quantiferon Tb -- Neg, Fungitell -- <31   - CT Chest non-contrast, noted, no acute pathology  - Recurrent fever 03/29 --> F/u BCx2 -- Neg; F/u 03/31 BCx -- NGTD   - Shock, RRT, resume antibiotics IV fluid, MICU eval , ID follow up  --> Prednisone 50 PO Qd   - S/P Zosyn; Defer ABX as per ID   - short course steroid per hematology   - IR eval for BM BX as per Heme/Onc --> done on 04/06 with IR, f/u path  - Febrile again 4/7/23: 101F, then later 102F  - ID ordered pan CT.    Anemia, Thrombocytopenia, Pancytopenia  - Drop in Hgb, Occult +  - Hold Hep gtt ; S/P 1 unit of PRBCs 03/28  - Maintain active T+S. Transfuse for Hgb < 7.0, and platelets < 10.K  - GI eval appreciated; F/u recs --> No plans for scope at this time   - Was on Xarelto, held due to drop in Platelets as per Heme/Onc. Monitor platelet count   - S/P 1 unit PRBCs and 1 unit Platelets 04/04; W/ lasix  - Planned for 1 unit of platelets 04/05; F/u post-transfusion CBC   - HIT ab: neg  - ASA on hold    HypoNa  - Cont to monitor and trend  - Appreciate renal eval.   - Serial labs     Multiple myeloma.   - pancytopenia largely at baseline although Hg 6.7 on arrival; s/p 1U PRBC with good response   - Was on acyclovir, now on Ganciclovir as per ID   - C/w home entecavir   - S/P dexamethasone, D/C'd by hematology , follow up outpatient after discharge  --> No on Prednisone 50 Qd   - Pt reports his Revlimid was held  - Heme/Onc consulted; F/u recs   - Resume home aspirin. --> Now on hold   - s/p IR BM Bx  04/06     Chronic diarrhea, now with Constipation   - Standing Imodium switched to PRN  - Monitor for BM     DVT   - Duplex + For R soleal vein DVT  --> Will consider CT A chest once creatinine permits as patient received contrast; Monitor for LOPEZ   - s/p Hep gtt; Monitor PTT; Monitor H/H closely --> Now on hold in view of drop in Hgb and severe thrombocytopenia  - VQ scan to R/O PE -- Low probability   - Vascular eval appreciated; AC as tolerated, on Xarelto 10, monitor H/H, plts too low to start.   - Serial Duplex to assess for propagation  -- Without propagation   - 04/03 Duplex without propagation   - Repeat Duplex 04/10 -- ordered   - given severe thrombocytopenia, holding Xarelto for now. Risk of bleeding greater than benefit.     RK  - S/P Contrast on 03/23   - Check bladder scan to R/O retention  - C/w IVF for hydration  - Monitor Cr closely; Avoid nephrotoxic agents   - Cr down-trending. s/p IVF   - Renal eval appreciated     Hypertension, now hypotensive   - C/w home metoprolol.  - C/w Midodrine 10 TID. Hold for SBP > 140     Hiccups  - Started on Reglan, Monitor     Hyperlipidemia.   - C/w home atorvastatin.    Abnormal CT  - Outpatient follow up for CT findings    Prophylactic measure.   dvt ppx: DVT PPX   diet: regular  ambulate: with assistance    fall precautions  aspiration precautions.      Discussed with Patient, and family in detail at the bedside.

## 2023-04-07 NOTE — PROGRESS NOTE ADULT - SUBJECTIVE AND OBJECTIVE BOX
Subjective: Patient seen and examined. No new events except as noted.     SUBJECTIVE/ROS:  No chest pain, dyspnea, palpitation, or dizziness.       MEDICATIONS:  MEDICATIONS  (STANDING):  atorvastatin 40 milliGRAM(s) Oral at bedtime  entecavir 0.5 milliGRAM(s) Oral daily  metoclopramide Injectable 5 milliGRAM(s) IV Push three times a day  midodrine. 10 milliGRAM(s) Oral three times a day  potassium chloride    Tablet ER 20 milliEquivalent(s) Oral once  predniSONE   Tablet 50 milliGRAM(s) Oral daily  valGANciclovir 450 milliGRAM(s) Oral every 12 hours      PHYSICAL EXAM:  T(C): 38.7 (04-07-23 @ 05:00), Max: 38.7 (04-07-23 @ 05:00)  HR: 95 (04-07-23 @ 05:00) (70 - 100)  BP: 106/66 (04-07-23 @ 05:00) (102/75 - 141/73)  RR: 18 (04-07-23 @ 05:00) (16 - 19)  SpO2: 92% (04-07-23 @ 05:00) (92% - 96%)  Wt(kg): --  I&O's Summary    06 Apr 2023 07:01  -  07 Apr 2023 07:00  --------------------------------------------------------  IN: 0 mL / OUT: 2200 mL / NET: -2200 mL      Height (cm): 185.4 (04-06 @ 10:43)  Weight (kg): 77.1 (04-06 @ 10:43)  BMI (kg/m2): 22.4 (04-06 @ 10:43)  BSA (m2): 2.01 (04-06 @ 10:43)      JVP: Normal  Neck: supple  Lung: clear   CV: S1 S2 , Murmur:  Abd: soft  Ext: No edema  neuro: Awake / alert  Psych: flat affect  Skin: normal``    LABS/DATA:    CARDIAC MARKERS:                                8.6    1.75  )-----------( 20       ( 07 Apr 2023 06:29 )             24.8     04-07    136  |  92<L>  |  33<H>  ----------------------------<  130<H>  3.2<L>   |  32<H>  |  1.37<H>    Ca    8.1<L>      07 Apr 2023 06:28  Mg     1.1     04-07      proBNP:   Lipid Profile:   HgA1c:   TSH:     TELE:  EKG:

## 2023-04-07 NOTE — PROGRESS NOTE ADULT - SUBJECTIVE AND OBJECTIVE BOX
Problem: At Risk for Injury Due to Fall  Goal: # Patient does not fall  9/3/2022 0612 by Sofiya Carranza RN  Outcome: Outcome Not Met, Plan Adjusted  9/3/2022 0609 by Sofiya Carranza RN  Outcome: Outcome Not Met, Plan Adjusted   Patient was found on floor in front of bedroom door at approximately 0535 this morning. Prior to her fall CHITRA Martinez was in the room with patient. She was changing her brief and he was changing the linen on her bed. Pt was independent in room and was able to ambulate with no issues. Pt was assisted back into bed by myself and Juan. Fall precautions are now in place. Bed alarm is on.    Patient is a 67y old  Male who presents with a chief complaint of fever (07 Apr 2023 14:04)    Patient seen and examined this morning at bedside. Patient noted resting comfortably in chair, verbalized wishing to be discharged. No complaints offered.    MEDICATIONS  (STANDING):  atorvastatin 40 milliGRAM(s) Oral at bedtime  entecavir 0.5 milliGRAM(s) Oral daily  midodrine. 10 milliGRAM(s) Oral three times a day  predniSONE   Tablet 50 milliGRAM(s) Oral daily  sodium chloride 0.9%. 1000 milliLiter(s) (75 mL/Hr) IV Continuous <Continuous>  valGANciclovir 450 milliGRAM(s) Oral every 12 hours    MEDICATIONS  (PRN):  acetaminophen     Tablet .. 650 milliGRAM(s) Oral every 6 hours PRN Temp greater or equal to 38C (100.4F), Mild Pain (1 - 3)  loperamide 2 milliGRAM(s) Oral three times a day PRN Diarrhea      Vital Signs Last 24 Hrs  T(C): 36.9 (07 Apr 2023 11:24), Max: 38.7 (07 Apr 2023 05:00)  T(F): 98.4 (07 Apr 2023 11:24), Max: 101.6 (07 Apr 2023 05:00)  HR: 109 (07 Apr 2023 11:24) (83 - 109)  BP: 101/60 (07 Apr 2023 11:24) (101/60 - 141/73)  BP(mean): --  RR: 18 (07 Apr 2023 11:24) (18 - 18)  SpO2: 93% (07 Apr 2023 11:24) (92% - 94%)    Parameters below as of 07 Apr 2023 11:24  Patient On (Oxygen Delivery Method): room air        PE  NAD  Awake, alert  Anicteric, MMM  RRR  CTAB  Abd soft, NT, ND  No c/c/e  No rash grossly  FROM                          9.4    1.85  )-----------( 19       ( 07 Apr 2023 13:43 )             28.6       04-07    136  |  92<L>  |  33<H>  ----------------------------<  130<H>  3.2<L>   |  32<H>  |  1.37<H>    Ca    8.1<L>      07 Apr 2023 06:28  Mg     1.1     04-07

## 2023-04-07 NOTE — PROGRESS NOTE ADULT - NS ATTEND AMEND GEN_ALL_CORE FT
As above.    68 y/o male with multiple myeloma, now with recurrent fever of unclear etiology as well as worsening cytopenia, especially thrombocytopenia. Follow-up bone marrow biopsy results from earlier this week. Follow-up infectious disease recommendations. If not believed to be due to infection, consider discharge and outpatient follow-up. Pending repeat CT imaging to rule out any underlying cause of infections today given fever. As above.    66 y/o male with multiple myeloma, now with recurrent fever of unclear etiology as well as worsening cytopenia, especially thrombocytopenia. Follow-up bone marrow biopsy results from earlier this week. Follow-up infectious disease recommendations. If not believed to be due to infection, consider discharge and outpatient follow-up. Pending repeat CT imaging to rule out any underlying cause of infections today given fever.

## 2023-04-07 NOTE — PROGRESS NOTE ADULT - SUBJECTIVE AND OBJECTIVE BOX
67yPatient is a 67y old  Male who presents with a chief complaint of fever (07 Apr 2023 15:46)      Interval history:  Febrile, no new symptoms. s/p removal of lloyd, pending trial of void.       Allergies:   No Known Allergies      Antimicrobials:  entecavir 0.5 milliGRAM(s) Oral daily  valGANciclovir 450 milliGRAM(s) Oral every 12 hours      REVIEW OF SYSTEMS:  No chest pain   No SOB  No abdominal pain  No rash.       Vital Signs Last 24 Hrs  T(C): 39.2 (04-07-23 @ 20:10), Max: 39.2 (04-07-23 @ 20:10)  T(F): 102.5 (04-07-23 @ 20:10), Max: 102.5 (04-07-23 @ 20:10)  HR: 94 (04-07-23 @ 20:10) (94 - 109)  BP: 134/73 (04-07-23 @ 20:10) (101/60 - 134/73)  BP(mean): --  RR: 18 (04-07-23 @ 20:10) (18 - 18)  SpO2: 93% (04-07-23 @ 20:10) (92% - 94%)      PHYSICAL EXAM:  Patient in no acute distress. AAOX3.   no icterus   breathing comfortably on RA  trace edema.  IV sites not inflamed.                             9.4    1.85  )-----------( 19       ( 07 Apr 2023 13:43 )             28.6   04-07    136  |  92<L>  |  33<H>  ----------------------------<  130<H>  3.2<L>   |  32<H>  |  1.37<H>    Ca    8.1<L>      07 Apr 2023 06:28  Mg     1.1     04-07

## 2023-04-07 NOTE — PROGRESS NOTE ADULT - SUBJECTIVE AND OBJECTIVE BOX
SUBJECTIVE/ OVERNIGHT EVENTS:  --- Coverage for Dr. Addison ---  feeling well clinically  however febrile 101F, then later 102F  ID ordered pan CT.  no cp, no sob, no n/v/d. no abdominal pain.  no headache, no dizziness.   the wife at bedside. all questions answered        --------------------------------------------------------------------------------------------  LABS:                        9.4    1.85  )-----------( 19       ( 07 Apr 2023 13:43 )             28.6     04-07    136  |  92<L>  |  33<H>  ----------------------------<  130<H>  3.2<L>   |  32<H>  |  1.37<H>    Ca    8.1<L>      07 Apr 2023 06:28  Mg     1.1     04-07      PT/INR - ( 06 Apr 2023 06:44 )   PT: 15.6 sec;   INR: 1.35 ratio         PTT - ( 06 Apr 2023 06:44 )  PTT:28.4 sec  CAPILLARY BLOOD GLUCOSE                RADIOLOGY & ADDITIONAL TESTS:    Imaging Personally Reviewed:  [x] YES  [ ] NO    Consultant(s) Notes Reviewed:  [x] YES  [ ] NO    MEDICATIONS  (STANDING):  atorvastatin 40 milliGRAM(s) Oral at bedtime  entecavir 0.5 milliGRAM(s) Oral daily  midodrine. 10 milliGRAM(s) Oral three times a day  predniSONE   Tablet 50 milliGRAM(s) Oral daily  sodium chloride 0.9%. 1000 milliLiter(s) (75 mL/Hr) IV Continuous <Continuous>  valGANciclovir 450 milliGRAM(s) Oral every 12 hours    MEDICATIONS  (PRN):  acetaminophen     Tablet .. 650 milliGRAM(s) Oral every 6 hours PRN Temp greater or equal to 38C (100.4F), Mild Pain (1 - 3)  aluminum hydroxide/magnesium hydroxide/simethicone Suspension 30 milliLiter(s) Oral every 4 hours PRN Dyspepsia  loperamide 2 milliGRAM(s) Oral three times a day PRN Diarrhea      Care Discussed with Consultants/Other Providers [x] YES  [ ] NO    Vital Signs Last 24 Hrs  T(C): 39.2 (07 Apr 2023 20:10), Max: 39.2 (07 Apr 2023 20:10)  T(F): 102.5 (07 Apr 2023 20:10), Max: 102.5 (07 Apr 2023 20:10)  HR: 94 (07 Apr 2023 20:10) (94 - 109)  BP: 134/73 (07 Apr 2023 20:10) (101/60 - 134/73)  BP(mean): --  RR: 18 (07 Apr 2023 20:10) (18 - 18)  SpO2: 93% (07 Apr 2023 20:10) (92% - 94%)    Parameters below as of 07 Apr 2023 20:10  Patient On (Oxygen Delivery Method): room air      I&O's Summary    06 Apr 2023 07:01  -  07 Apr 2023 07:00  --------------------------------------------------------  IN: 0 mL / OUT: 2200 mL / NET: -2200 mL    07 Apr 2023 07:01  -  07 Apr 2023 22:49  --------------------------------------------------------  IN: 720 mL / OUT: 600 mL / NET: 120 mL          PHYSICAL EXAM:  GENERAL: NAD, thin-elderly, comfortable  HEAD:  Atraumatic, Normocephalic  EYES: EOMI, PERRLA, conjunctiva and sclera clear  NECK: Supple, No JVD  CHEST/LUNG: mild decrease breath sounds bilaterally; No wheeze   HEART: Regular rate and rhythm; No murmurs, rubs, or gallops  ABDOMEN: Soft, Nontender, Nondistended; Bowel sounds present  Neuro: AAOx3, no focal weakness   EXTREMITIES:  2+ Peripheral Pulses, No clubbing, cyanosis, +edema  SKIN: No rashes or lesions

## 2023-04-07 NOTE — PROGRESS NOTE ADULT - SUBJECTIVE AND OBJECTIVE BOX
[facility] NEPHROLOGY- PROGRESS NOTE    67 year old Male with history of MM on chemotherapy presents with fevers. Nephrology consulted for elevated Scr.      REVIEW OF SYSTEMS:  Gen: + fevers  Cards: no chest pain  Resp: no dyspnea  GI: no nausea or vomiting or diarrhea  Vascular: no LE edema    No Known Allergies      Hospital Medications: Medications reviewed        VITALS:  T(F): 98.4 (23 @ 11:24), Max: 101.6 (23 @ 05:00)  HR: 109 (23 @ 11:24)  BP: 101/60 (23 @ 11:24)  RR: 18 (23 @ 11:24)  SpO2: 93% (23 @ 11:24)  Wt(kg): --     @ 07:01  -   @ 07:00  --------------------------------------------------------  IN: 0 mL / OUT: 2200 mL / NET: -2200 mL     @ 07:01  -   @ 14:05  --------------------------------------------------------  IN: 0 mL / OUT: 600 mL / NET: -600 mL      PHYSICAL EXAM:    Gen: NAD, calm  Cards: RRR, +S1/S2, no M/G/R  Resp: CTA B/L  GI: soft, NT/ND, NABS  : no lloyd  Vascular: trace LE edema B/L      LABS:      136  |  92<L>  |  33<H>  ----------------------------<  130<H>  3.2<L>   |  32<H>  |  1.37<H>    Ca    8.1<L>      2023 06:28  Mg     1.1           Creatinine Trend: 1.37 <--, 1.41 <--, 1.68 <--, 1.97 <--, 2.17 <--, 2.53 <--, 2.05 <--, 2.26 <--                        9.4    1.85  )-----------( 19       ( 2023 13:43 )             28.6     Urine Studies:  Urinalysis Basic - ( 2023 14:40 )    Color: Light Orange / Appearance: Turbid / S.015 / pH:   Gluc:  / Ketone: Negative  / Bili: Negative / Urobili: Negative   Blood:  / Protein: 100 mg/dl / Nitrite: Negative   Leuk Esterase: Small / RBC: 41 /hpf /  /HPF   Sq Epi:  / Non Sq Epi: 13 /hpf / Bacteria: Negative      Sodium, Random Urine: 53 mmol/L ( @ 14:40)  Creatinine, Random Urine: 74 mg/dL ( @ 14:40)       [facility] NEPHROLOGY- PROGRESS NOTE    67 year old Male with history of MM on chemotherapy presents with fevers. Nephrology consulted for elevated Scr.      REVIEW OF SYSTEMS:  Gen: + fevers  Cards: no chest pain  Resp: no dyspnea  GI: no nausea or vomiting or diarrhea  Vascular: no LE edema    No Known Allergies      Hospital Medications: Medications reviewed        VITALS:  T(F): 98.4 (23 @ 11:24), Max: 101.6 (23 @ 05:00)  HR: 109 (23 @ 11:24)  BP: 101/60 (23 @ 11:24)  RR: 18 (23 @ 11:24)  SpO2: 93% (23 @ 11:24)  Wt(kg): --     @ 07:01  -   @ 07:00  --------------------------------------------------------  IN: 0 mL / OUT: 2200 mL / NET: -2200 mL     @ 07:01  -   @ 14:05  --------------------------------------------------------  IN: 0 mL / OUT: 600 mL / NET: -600 mL      PHYSICAL EXAM:    Gen: NAD, calm  Cards: RRR, +S1/S2, no M/G/R  Resp: CTA B/L  GI: soft, NT/ND, NABS  : no lloyd  Vascular: trace LE edema B/L      LABS:      136  |  92<L>  |  33<H>  ----------------------------<  130<H>  3.2<L>   |  32<H>  |  1.37<H>    Ca    8.1<L>      2023 06:28  Mg     1.1           Creatinine Trend: 1.37 <--, 1.41 <--, 1.68 <--, 1.97 <--, 2.17 <--, 2.53 <--, 2.05 <--, 2.26 <--                        9.4    1.85  )-----------( 19       ( 2023 13:43 )             28.6     Urine Studies:  Urinalysis Basic - ( 2023 14:40 )    Color: Light Orange / Appearance: Turbid / S.015 / pH:   Gluc:  / Ketone: Negative  / Bili: Negative / Urobili: Negative   Blood:  / Protein: 100 mg/dl / Nitrite: Negative   Leuk Esterase: Small / RBC: 41 /hpf /  /HPF   Sq Epi:  / Non Sq Epi: 13 /hpf / Bacteria: Negative      Sodium, Random Urine: 53 mmol/L ( @ 14:40)  Creatinine, Random Urine: 74 mg/dL ( @ 14:40)       [facility] NEPHROLOGY- PROGRESS NOTE    67 year old Male with history of MM on chemotherapy presents with fevers. Nephrology consulted for elevated Scr.      REVIEW OF SYSTEMS:  Gen: + fevers  Cards: no chest pain  Resp: no dyspnea  GI: no nausea or vomiting or diarrhea  Vascular: no LE edema    No Known Allergies      Hospital Medications: Medications reviewed        VITALS:  T(F): 98.4 (23 @ 11:24), Max: 101.6 (23 @ 05:00)  HR: 109 (23 @ 11:24)  BP: 101/60 (23 @ 11:24)  RR: 18 (23 @ 11:24)  SpO2: 93% (23 @ 11:24)  Wt(kg): --     @ 07:01  -   @ 07:00  --------------------------------------------------------  IN: 0 mL / OUT: 2200 mL / NET: -2200 mL     @ 07:01  -   @ 14:05  --------------------------------------------------------  IN: 0 mL / OUT: 600 mL / NET: -600 mL      PHYSICAL EXAM:    Gen: NAD, calm  Cards: RRR, +S1/S2, no M/G/R  Resp: CTA B/L  GI: soft, NT/ND, NABS  : no lloyd  Vascular: trace LE edema B/L      LABS:      136  |  92<L>  |  33<H>  ----------------------------<  130<H>  3.2<L>   |  32<H>  |  1.37<H>    Ca    8.1<L>      2023 06:28  Mg     1.1           Creatinine Trend: 1.37 <--, 1.41 <--, 1.68 <--, 1.97 <--, 2.17 <--, 2.53 <--, 2.05 <--, 2.26 <--                        9.4    1.85  )-----------( 19       ( 2023 13:43 )             28.6     Urine Studies:  Urinalysis Basic - ( 2023 14:40 )    Color: Light Orange / Appearance: Turbid / S.015 / pH:   Gluc:  / Ketone: Negative  / Bili: Negative / Urobili: Negative   Blood:  / Protein: 100 mg/dl / Nitrite: Negative   Leuk Esterase: Small / RBC: 41 /hpf /  /HPF   Sq Epi:  / Non Sq Epi: 13 /hpf / Bacteria: Negative      Sodium, Random Urine: 53 mmol/L ( @ 14:40)  Creatinine, Random Urine: 74 mg/dL ( @ 14:40)

## 2023-04-08 LAB
APPEARANCE UR: CLEAR — SIGNIFICANT CHANGE UP
BACTERIA # UR AUTO: NEGATIVE — SIGNIFICANT CHANGE UP
BASOPHILS # BLD AUTO: 0.01 K/UL — SIGNIFICANT CHANGE UP (ref 0–0.2)
BASOPHILS NFR BLD AUTO: 0.7 % — SIGNIFICANT CHANGE UP (ref 0–2)
BILIRUB UR-MCNC: NEGATIVE — SIGNIFICANT CHANGE UP
CHLORIDE UR-SCNC: 37 MMOL/L — SIGNIFICANT CHANGE UP
COLOR SPEC: YELLOW — SIGNIFICANT CHANGE UP
COMMENT - URINE: SIGNIFICANT CHANGE UP
DIFF PNL FLD: ABNORMAL
EOSINOPHIL # BLD AUTO: 0 K/UL — SIGNIFICANT CHANGE UP (ref 0–0.5)
EOSINOPHIL NFR BLD AUTO: 0 % — SIGNIFICANT CHANGE UP (ref 0–6)
EPI CELLS # UR: 2 /HPF — SIGNIFICANT CHANGE UP
GLUCOSE UR QL: ABNORMAL
HCT VFR BLD CALC: 25.1 % — LOW (ref 39–50)
HGB BLD-MCNC: 8.4 G/DL — LOW (ref 13–17)
HYALINE CASTS # UR AUTO: 1 /LPF — SIGNIFICANT CHANGE UP (ref 0–2)
IMM GRANULOCYTES NFR BLD AUTO: 4.2 % — HIGH (ref 0–0.9)
KETONES UR-MCNC: NEGATIVE — SIGNIFICANT CHANGE UP
LEUKOCYTE ESTERASE UR-ACNC: NEGATIVE — SIGNIFICANT CHANGE UP
LYMPHOCYTES # BLD AUTO: 0.1 K/UL — LOW (ref 1–3.3)
LYMPHOCYTES # BLD AUTO: 6.9 % — LOW (ref 13–44)
MAGNESIUM SERPL-MCNC: 1.5 MG/DL — LOW (ref 1.6–2.6)
MCHC RBC-ENTMCNC: 30.9 PG — SIGNIFICANT CHANGE UP (ref 27–34)
MCHC RBC-ENTMCNC: 33.5 GM/DL — SIGNIFICANT CHANGE UP (ref 32–36)
MCV RBC AUTO: 92.3 FL — SIGNIFICANT CHANGE UP (ref 80–100)
MONOCYTES # BLD AUTO: 0.07 K/UL — SIGNIFICANT CHANGE UP (ref 0–0.9)
MONOCYTES NFR BLD AUTO: 4.9 % — SIGNIFICANT CHANGE UP (ref 2–14)
NEUTROPHILS # BLD AUTO: 1.2 K/UL — LOW (ref 1.8–7.4)
NEUTROPHILS NFR BLD AUTO: 83.3 % — HIGH (ref 43–77)
NITRITE UR-MCNC: NEGATIVE — SIGNIFICANT CHANGE UP
NRBC # BLD: 1 /100 WBCS — HIGH (ref 0–0)
PH UR: 6 — SIGNIFICANT CHANGE UP (ref 5–8)
PLATELET # BLD AUTO: 14 K/UL — CRITICAL LOW (ref 150–400)
PROT UR-MCNC: ABNORMAL
RBC # BLD: 2.72 M/UL — LOW (ref 4.2–5.8)
RBC # FLD: 21.2 % — HIGH (ref 10.3–14.5)
RBC CASTS # UR COMP ASSIST: 3 /HPF — SIGNIFICANT CHANGE UP (ref 0–4)
SP GR SPEC: 1.03 — HIGH (ref 1.01–1.02)
UROBILINOGEN FLD QL: ABNORMAL
WBC # BLD: 1.44 K/UL — LOW (ref 3.8–10.5)
WBC # FLD AUTO: 1.44 K/UL — LOW (ref 3.8–10.5)
WBC UR QL: 3 /HPF — SIGNIFICANT CHANGE UP (ref 0–5)

## 2023-04-08 PROCEDURE — 99232 SBSQ HOSP IP/OBS MODERATE 35: CPT

## 2023-04-08 RX ORDER — MAGNESIUM SULFATE 500 MG/ML
2 VIAL (ML) INJECTION ONCE
Refills: 0 | Status: COMPLETED | OUTPATIENT
Start: 2023-04-08 | End: 2023-04-08

## 2023-04-08 RX ADMIN — VALGANCICLOVIR 450 MILLIGRAM(S): 450 TABLET, FILM COATED ORAL at 06:10

## 2023-04-08 RX ADMIN — ATORVASTATIN CALCIUM 40 MILLIGRAM(S): 80 TABLET, FILM COATED ORAL at 21:57

## 2023-04-08 RX ADMIN — MIDODRINE HYDROCHLORIDE 10 MILLIGRAM(S): 2.5 TABLET ORAL at 11:44

## 2023-04-08 RX ADMIN — VALGANCICLOVIR 450 MILLIGRAM(S): 450 TABLET, FILM COATED ORAL at 17:16

## 2023-04-08 RX ADMIN — MIDODRINE HYDROCHLORIDE 10 MILLIGRAM(S): 2.5 TABLET ORAL at 06:09

## 2023-04-08 RX ADMIN — Medication 50 MILLIGRAM(S): at 06:09

## 2023-04-08 RX ADMIN — ENTECAVIR 0.5 MILLIGRAM(S): 0.5 TABLET ORAL at 11:43

## 2023-04-08 RX ADMIN — Medication 650 MILLIGRAM(S): at 11:44

## 2023-04-08 RX ADMIN — MIDODRINE HYDROCHLORIDE 10 MILLIGRAM(S): 2.5 TABLET ORAL at 17:16

## 2023-04-08 RX ADMIN — Medication 25 GRAM(S): at 11:44

## 2023-04-08 RX ADMIN — Medication 650 MILLIGRAM(S): at 12:30

## 2023-04-08 NOTE — PROGRESS NOTE ADULT - SUBJECTIVE AND OBJECTIVE BOX
Chief Complaint:  Patient is a 67y old  Male who presents with a chief complaint of fever .       Date of service 04-08      Interval Events:    pt seen and examined, no complaints, ROS - .           acetaminophen     Tablet .. 650 milliGRAM(s) Oral every 6 hours PRN  aluminum hydroxide/magnesium hydroxide/simethicone Suspension 30 milliLiter(s) Oral every 4 hours PRN  atorvastatin 40 milliGRAM(s) Oral at bedtime  entecavir 0.5 milliGRAM(s) Oral daily  loperamide 2 milliGRAM(s) Oral three times a day PRN  midodrine. 10 milliGRAM(s) Oral three times a day  predniSONE   Tablet 50 milliGRAM(s) Oral daily  sodium chloride 0.9%. 1000 milliLiter(s) IV Continuous <Continuous>  valGANciclovir 450 milliGRAM(s) Oral every 12 hours                            8.4    1.44  )-----------( 14       ( 08 Apr 2023 06:10 )             25.1       Hemoglobin: 8.4 g/dL (04-08 @ 06:10)  Hemoglobin: 9.4 g/dL (04-07 @ 13:43)  Hemoglobin: 8.6 g/dL (04-07 @ 06:29)  Hemoglobin: 8.6 g/dL (04-06 @ 06:44)  Hemoglobin: 9.5 g/dL (04-05 @ 16:11)      04-07    136  |  92<L>  |  33<H>  ----------------------------<  130<H>  3.2<L>   |  32<H>  |  1.37<H>    Ca    8.1<L>      07 Apr 2023 06:28  Mg     1.5     04-08      Creatinine Trend: 1.37<--, 1.41<--, 1.68<--, 1.97<--, 2.17<--, 2.53<--    COAGS:           T(C): 37.3 (04-08-23 @ 04:48), Max: 39.2 (04-07-23 @ 20:10)  HR: 95 (04-08-23 @ 04:48) (94 - 109)  BP: 123/71 (04-08-23 @ 04:48) (101/60 - 134/73)  RR: 18 (04-08-23 @ 04:48) (18 - 18)  SpO2: 95% (04-08-23 @ 04:48) (93% - 95%)  Wt(kg): --    I&O's Summary    07 Apr 2023 07:01  -  08 Apr 2023 07:00  --------------------------------------------------------  IN: 720 mL / OUT: 1700 mL / NET: -980 mL        Review of Systems:  General:  No wt loss, fevers, chills, night sweats, fatigue,   Eyes:  Good vision, no reported pain  ENT:  No sore throat, pain, runny nose, dysphagia  CV:  No pain, palpitations, hypo/hypertension  Resp:  No dyspnea, cough, tachypnea, wheezing  GI:  See HPI  :  No pain, bleeding, incontinence, nocturia  Muscle:  No pain, weakness  Neuro:  No weakness, tingling, memory problems  Psych:  No fatigue, insomnia, mood problems, depression  Endocrine:  No polyuria, polydipsia, cold/heat intolerance  Heme:  No petechiae, ecchymosis, easy bruisability  Integumentary:  No rash, edema      PHYSICAL EXAM:     GENERAL:  Appears stated age, well-groomed, well-nourished, no distress  HEENT:  NC/AT,  conjunctivae anicteric, clear and pink,   NECK: supple, trachea midline  CHEST:  Full & symmetric excursion, no increased effort, breath sounds clear  HEART:  Regular rhythm, no JVD  ABDOMEN:  Soft, non-tender, non-distended, normoactive bowel sounds,  no masses , no hepatosplenomegaly  EXTREMITIES:  no cyanosis,clubbing or edema  SKIN:  No rash, erythema, or, ecchymoses, no jaundice  NEURO:  Alert, non-focal, no asterixis  PSYCH: Appropriate affect, oriented to place and time  RECTAL: Deferred

## 2023-04-08 NOTE — PROGRESS NOTE ADULT - SUBJECTIVE AND OBJECTIVE BOX
Follow Up:      Interval History:    REVIEW OF SYSTEMS  [  ] ROS unobtainable because:    [  ] All other systems negative except as noted below    Constitutional:  [ ] fever [ ] chills  [ ] weight loss  [ ] weakness  Skin:  [ ] rash [ ] phlebitis	  Eyes: [ ] icterus [ ] pain  [ ] discharge	  ENMT: [ ] sore throat  [ ] thrush [ ] ulcers [ ] exudates  Respiratory: [ ] dyspnea [ ] hemoptysis [ ] cough [ ] sputum	  Cardiovascular:  [ ] chest pain [ ] palpitations [ ] edema	  Gastrointestinal:  [ ] nausea [ ] vomiting [ ] diarrhea [ ] constipation [ ] pain	  Genitourinary:  [ ] dysuria [ ] frequency [ ] hematuria [ ] discharge [ ] flank pain  [ ] incontinence  Musculoskeletal:  [ ] myalgias [ ] arthralgias [ ] arthritis  [ ] back pain  Neurological:  [ ] headache [ ] seizures  [ ] confusion/altered mental status    Allergies  No Known Allergies        ANTIMICROBIALS:  entecavir 0.5 daily  valGANciclovir 450 every 12 hours      OTHER MEDS:  MEDICATIONS  (STANDING):  acetaminophen     Tablet .. 650 every 6 hours PRN  aluminum hydroxide/magnesium hydroxide/simethicone Suspension 30 every 4 hours PRN  atorvastatin 40 at bedtime  loperamide 2 three times a day PRN  midodrine. 10 three times a day  predniSONE   Tablet 50 daily      Vital Signs Last 24 Hrs  T(C): 38.1 (2023 11:04), Max: 39.2 (2023 20:10)  T(F): 100.5 (2023 11:04), Max: 102.5 (2023 20:10)  HR: 112 (2023 11:34) (93 - 112)  BP: 118/74 (2023 11:34) (109/57 - 134/73)  BP(mean): --  RR: 18 (2023 11:34) (18 - 18)  SpO2: 95% (2023 11:34) (93% - 95%)    Parameters below as of 2023 11:34  Patient On (Oxygen Delivery Method): room air        PHYSICAL EXAMINATION:  General: Alert and Awake, NAD  HEENT: PERRL, EOMI  Neck: Supple  Cardiac: RRR, No M/R/G  Resp: CTAB, No Wh/Rh/Ra  Abdomen: NBS, NT/ND, No HSM, No rigidity or guarding  MSK: No LE edema. No Calf tenderness  : No lloyd  Skin: No rashes or lesions. Skin is warm and dry to the touch.   Neuro: Alert and Awake. CN 2-12 Grossly intact. Moves all four extremities spontaneously.  Psych: Calm, Pleasant, Cooperative                          8.4    1.44  )-----------( 14       ( 2023 06:10 )             25.1       04    136  |  92<L>  |  33<H>  ----------------------------<  130<H>  3.2<L>   |  32<H>  |  1.37<H>    Ca    8.1<L>      2023 06:28  Mg     1.5             Urinalysis Basic - ( 2023 08:42 )    Color: Yellow / Appearance: Clear / S.031 / pH: x  Gluc: x / Ketone: Negative  / Bili: Negative / Urobili: 6 mg/dL   Blood: x / Protein: 30 mg/dL / Nitrite: Negative   Leuk Esterase: Negative / RBC: 3 /hpf / WBC 3 /HPF   Sq Epi: x / Non Sq Epi: x / Bacteria: Negative        MICROBIOLOGY:  v  .Blood Blood-Peripheral  23   No Growth Final  --  --      .Blood Blood-Peripheral  23   No Growth Final  --  --      .Blood Blood-Peripheral  23   No Growth Final  --  --      .Blood Blood  23   No Growth Final  --  --      Clean Catch Clean Catch (Midstream)  23   <10,000 CFU/mL Normal Urogenital Lali  --  --      .Blood Blood-Peripheral  23   No Growth Final  --  --      .Blood Blood-Peripheral  23   No Growth Final  --  --        CMV IgG Antibody: 3.10 U/mL (23 @ 09:27)    CMVPCR Log: 3.34 Kcc72DG/mL ( @ 06:29)        RADIOLOGY:    <The imaging below has been reviewed and visualized by me independently. Findings as detailed in report below> Follow Up:      Interval History:    REVIEW OF SYSTEMS  [  ] ROS unobtainable because:    [  ] All other systems negative except as noted below    Constitutional:  [ ] fever [ ] chills  [ ] weight loss  [ ] weakness  Skin:  [ ] rash [ ] phlebitis	  Eyes: [ ] icterus [ ] pain  [ ] discharge	  ENMT: [ ] sore throat  [ ] thrush [ ] ulcers [ ] exudates  Respiratory: [ ] dyspnea [ ] hemoptysis [ ] cough [ ] sputum	  Cardiovascular:  [ ] chest pain [ ] palpitations [ ] edema	  Gastrointestinal:  [ ] nausea [ ] vomiting [ ] diarrhea [ ] constipation [ ] pain	  Genitourinary:  [ ] dysuria [ ] frequency [ ] hematuria [ ] discharge [ ] flank pain  [ ] incontinence  Musculoskeletal:  [ ] myalgias [ ] arthralgias [ ] arthritis  [ ] back pain  Neurological:  [ ] headache [ ] seizures  [ ] confusion/altered mental status    Allergies  No Known Allergies        ANTIMICROBIALS:  entecavir 0.5 daily  valGANciclovir 450 every 12 hours      OTHER MEDS:  MEDICATIONS  (STANDING):  acetaminophen     Tablet .. 650 every 6 hours PRN  aluminum hydroxide/magnesium hydroxide/simethicone Suspension 30 every 4 hours PRN  atorvastatin 40 at bedtime  loperamide 2 three times a day PRN  midodrine. 10 three times a day  predniSONE   Tablet 50 daily      Vital Signs Last 24 Hrs  T(C): 38.1 (2023 11:04), Max: 39.2 (2023 20:10)  T(F): 100.5 (2023 11:04), Max: 102.5 (2023 20:10)  HR: 112 (2023 11:34) (93 - 112)  BP: 118/74 (2023 11:34) (109/57 - 134/73)  BP(mean): --  RR: 18 (2023 11:34) (18 - 18)  SpO2: 95% (2023 11:34) (93% - 95%)    Parameters below as of 2023 11:34  Patient On (Oxygen Delivery Method): room air        PHYSICAL EXAMINATION:  General: Alert and Awake, NAD  HEENT: PERRL, EOMI  Neck: Supple  Cardiac: RRR, No M/R/G  Resp: CTAB, No Wh/Rh/Ra  Abdomen: NBS, NT/ND, No HSM, No rigidity or guarding  MSK: No LE edema. No Calf tenderness  : No lloyd  Skin: No rashes or lesions. Skin is warm and dry to the touch.   Neuro: Alert and Awake. CN 2-12 Grossly intact. Moves all four extremities spontaneously.  Psych: Calm, Pleasant, Cooperative                          8.4    1.44  )-----------( 14       ( 2023 06:10 )             25.1       04    136  |  92<L>  |  33<H>  ----------------------------<  130<H>  3.2<L>   |  32<H>  |  1.37<H>    Ca    8.1<L>      2023 06:28  Mg     1.5             Urinalysis Basic - ( 2023 08:42 )    Color: Yellow / Appearance: Clear / S.031 / pH: x  Gluc: x / Ketone: Negative  / Bili: Negative / Urobili: 6 mg/dL   Blood: x / Protein: 30 mg/dL / Nitrite: Negative   Leuk Esterase: Negative / RBC: 3 /hpf / WBC 3 /HPF   Sq Epi: x / Non Sq Epi: x / Bacteria: Negative        MICROBIOLOGY:  v  .Blood Blood-Peripheral  23   No Growth Final  --  --      .Blood Blood-Peripheral  23   No Growth Final  --  --      .Blood Blood-Peripheral  23   No Growth Final  --  --      .Blood Blood  23   No Growth Final  --  --      Clean Catch Clean Catch (Midstream)  23   <10,000 CFU/mL Normal Urogenital Lali  --  --      .Blood Blood-Peripheral  23   No Growth Final  --  --      .Blood Blood-Peripheral  23   No Growth Final  --  --        CMV IgG Antibody: 3.10 U/mL (23 @ 09:27)    CMVPCR Log: 3.34 Vxl17WS/mL ( @ 06:29)        RADIOLOGY:    <The imaging below has been reviewed and visualized by me independently. Findings as detailed in report below> Follow Up:      Interval History:    REVIEW OF SYSTEMS  [  ] ROS unobtainable because:    [  ] All other systems negative except as noted below    Constitutional:  [ ] fever [ ] chills  [ ] weight loss  [ ] weakness  Skin:  [ ] rash [ ] phlebitis	  Eyes: [ ] icterus [ ] pain  [ ] discharge	  ENMT: [ ] sore throat  [ ] thrush [ ] ulcers [ ] exudates  Respiratory: [ ] dyspnea [ ] hemoptysis [ ] cough [ ] sputum	  Cardiovascular:  [ ] chest pain [ ] palpitations [ ] edema	  Gastrointestinal:  [ ] nausea [ ] vomiting [ ] diarrhea [ ] constipation [ ] pain	  Genitourinary:  [ ] dysuria [ ] frequency [ ] hematuria [ ] discharge [ ] flank pain  [ ] incontinence  Musculoskeletal:  [ ] myalgias [ ] arthralgias [ ] arthritis  [ ] back pain  Neurological:  [ ] headache [ ] seizures  [ ] confusion/altered mental status    Allergies  No Known Allergies        ANTIMICROBIALS:  entecavir 0.5 daily  valGANciclovir 450 every 12 hours      OTHER MEDS:  MEDICATIONS  (STANDING):  acetaminophen     Tablet .. 650 every 6 hours PRN  aluminum hydroxide/magnesium hydroxide/simethicone Suspension 30 every 4 hours PRN  atorvastatin 40 at bedtime  loperamide 2 three times a day PRN  midodrine. 10 three times a day  predniSONE   Tablet 50 daily      Vital Signs Last 24 Hrs  T(C): 38.1 (2023 11:04), Max: 39.2 (2023 20:10)  T(F): 100.5 (2023 11:04), Max: 102.5 (2023 20:10)  HR: 112 (2023 11:34) (93 - 112)  BP: 118/74 (2023 11:34) (109/57 - 134/73)  BP(mean): --  RR: 18 (2023 11:34) (18 - 18)  SpO2: 95% (2023 11:34) (93% - 95%)    Parameters below as of 2023 11:34  Patient On (Oxygen Delivery Method): room air        PHYSICAL EXAMINATION:  General: Alert and Awake, NAD  HEENT: PERRL, EOMI  Neck: Supple  Cardiac: RRR, No M/R/G  Resp: CTAB, No Wh/Rh/Ra  Abdomen: NBS, NT/ND, No HSM, No rigidity or guarding  MSK: No LE edema. No Calf tenderness  : No lloyd  Skin: No rashes or lesions. Skin is warm and dry to the touch.   Neuro: Alert and Awake. CN 2-12 Grossly intact. Moves all four extremities spontaneously.  Psych: Calm, Pleasant, Cooperative                          8.4    1.44  )-----------( 14       ( 2023 06:10 )             25.1       04    136  |  92<L>  |  33<H>  ----------------------------<  130<H>  3.2<L>   |  32<H>  |  1.37<H>    Ca    8.1<L>      2023 06:28  Mg     1.5             Urinalysis Basic - ( 2023 08:42 )    Color: Yellow / Appearance: Clear / S.031 / pH: x  Gluc: x / Ketone: Negative  / Bili: Negative / Urobili: 6 mg/dL   Blood: x / Protein: 30 mg/dL / Nitrite: Negative   Leuk Esterase: Negative / RBC: 3 /hpf / WBC 3 /HPF   Sq Epi: x / Non Sq Epi: x / Bacteria: Negative        MICROBIOLOGY:  v  .Blood Blood-Peripheral  23   No Growth Final  --  --      .Blood Blood-Peripheral  23   No Growth Final  --  --      .Blood Blood-Peripheral  23   No Growth Final  --  --      .Blood Blood  23   No Growth Final  --  --      Clean Catch Clean Catch (Midstream)  23   <10,000 CFU/mL Normal Urogenital Lali  --  --      .Blood Blood-Peripheral  23   No Growth Final  --  --      .Blood Blood-Peripheral  23   No Growth Final  --  --        CMV IgG Antibody: 3.10 U/mL (23 @ 09:27)    CMVPCR Log: 3.34 Hhu18GV/mL ( @ 06:29)        RADIOLOGY:    <The imaging below has been reviewed and visualized by me independently. Findings as detailed in report below> Follow Up:  Fever    Interval History: continued fevers. retaining urine intermittently. denies any other focal symptoms.     REVIEW OF SYSTEMS  [  ] ROS unobtainable because:    [ x ] All other systems negative except as noted below    Constitutional:  [ ] fever [ ] chills  [ ] weight loss  [ ] weakness  Skin:  [ ] rash [ ] phlebitis	  Eyes: [ ] icterus [ ] pain  [ ] discharge	  ENMT: [ ] sore throat  [ ] thrush [ ] ulcers [ ] exudates  Respiratory: [ ] dyspnea [ ] hemoptysis [ ] cough [ ] sputum	  Cardiovascular:  [ ] chest pain [ ] palpitations [ ] edema	  Gastrointestinal:  [ ] nausea [ ] vomiting [ ] diarrhea [ ] constipation [ ] pain	  Genitourinary:  [ ] dysuria [ ] frequency [ ] hematuria [ ] discharge [ ] flank pain  [ ] incontinence +urine retention  Musculoskeletal:  [ ] myalgias [ ] arthralgias [ ] arthritis  [ ] back pain  Neurological:  [ ] headache [ ] seizures  [ ] confusion/altered mental status    Allergies  No Known Allergies        ANTIMICROBIALS:  entecavir 0.5 daily  valGANciclovir 450 every 12 hours      OTHER MEDS:  MEDICATIONS  (STANDING):  acetaminophen     Tablet .. 650 every 6 hours PRN  aluminum hydroxide/magnesium hydroxide/simethicone Suspension 30 every 4 hours PRN  atorvastatin 40 at bedtime  loperamide 2 three times a day PRN  midodrine. 10 three times a day  predniSONE   Tablet 50 daily      Vital Signs Last 24 Hrs  T(C): 38.1 (2023 11:04), Max: 39.2 (2023 20:10)  T(F): 100.5 (2023 11:04), Max: 102.5 (2023 20:10)  HR: 112 (2023 11:34) (93 - 112)  BP: 118/74 (2023 11:34) (109/57 - 134/73)  BP(mean): --  RR: 18 (2023 11:34) (18 - 18)  SpO2: 95% (2023 11:34) (93% - 95%)    Parameters below as of 2023 11:34  Patient On (Oxygen Delivery Method): room air        PHYSICAL EXAMINATION:  General: Alert and Awake, NAD  Cardiac: RRR, No M/R/G  Resp: CTAB, No Wh/Rh/Ra  Abdomen: NBS, NT/ND, No HSM, No rigidity or guarding  MSK: No LE edema. No Calf tenderness  : No lloyd  Skin: No rashes or lesions. Skin is warm and dry to the touch.   Neuro: Alert and Awake. CN 2-12 Grossly intact. Moves all four extremities spontaneously.  Psych: Calm, Pleasant, Cooperative                          8.4    1.44  )-----------( 14       ( 2023 06:10 )             25.1           136  |  92<L>  |  33<H>  ----------------------------<  130<H>  3.2<L>   |  32<H>  |  1.37<H>    Ca    8.1<L>      2023 06:28  Mg     1.5             Urinalysis Basic - ( 2023 08:42 )    Color: Yellow / Appearance: Clear / S.031 / pH: x  Gluc: x / Ketone: Negative  / Bili: Negative / Urobili: 6 mg/dL   Blood: x / Protein: 30 mg/dL / Nitrite: Negative   Leuk Esterase: Negative / RBC: 3 /hpf / WBC 3 /HPF   Sq Epi: x / Non Sq Epi: x / Bacteria: Negative        MICROBIOLOGY:  v  .Blood Blood-Peripheral  23   No Growth Final  --  --      .Blood Blood-Peripheral  23   No Growth Final  --  --      .Blood Blood-Peripheral  23   No Growth Final  --  --      .Blood Blood  23   No Growth Final  --  --      Clean Catch Clean Catch (Midstream)  23   <10,000 CFU/mL Normal Urogenital Lali  --  --      .Blood Blood-Peripheral  23   No Growth Final  --  --      .Blood Blood-Peripheral  23   No Growth Final  --  --        CMV IgG Antibody: 3.10 U/mL (23 @ 09:27)    CMVPCR Log: 3.34 Kku96HL/mL ( @ 06:29)        RADIOLOGY:    <The imaging below has been reviewed and visualized by me independently. Findings as detailed in report below>    < from: CT Abdomen and Pelvis w/ IV Cont (23 @ 21:10) >  IMPRESSION:  Bladder wall thickening with perivesicular stranding. Findings may be   seen in the setting of cystitis. Correlate with urinalysis.    Mild bilateral hydroureteronephrosis to the level of the bladder.    < end of copied text >   Follow Up:  Fever    Interval History: continued fevers. retaining urine intermittently. denies any other focal symptoms.     REVIEW OF SYSTEMS  [  ] ROS unobtainable because:    [ x ] All other systems negative except as noted below    Constitutional:  [ ] fever [ ] chills  [ ] weight loss  [ ] weakness  Skin:  [ ] rash [ ] phlebitis	  Eyes: [ ] icterus [ ] pain  [ ] discharge	  ENMT: [ ] sore throat  [ ] thrush [ ] ulcers [ ] exudates  Respiratory: [ ] dyspnea [ ] hemoptysis [ ] cough [ ] sputum	  Cardiovascular:  [ ] chest pain [ ] palpitations [ ] edema	  Gastrointestinal:  [ ] nausea [ ] vomiting [ ] diarrhea [ ] constipation [ ] pain	  Genitourinary:  [ ] dysuria [ ] frequency [ ] hematuria [ ] discharge [ ] flank pain  [ ] incontinence +urine retention  Musculoskeletal:  [ ] myalgias [ ] arthralgias [ ] arthritis  [ ] back pain  Neurological:  [ ] headache [ ] seizures  [ ] confusion/altered mental status    Allergies  No Known Allergies        ANTIMICROBIALS:  entecavir 0.5 daily  valGANciclovir 450 every 12 hours      OTHER MEDS:  MEDICATIONS  (STANDING):  acetaminophen     Tablet .. 650 every 6 hours PRN  aluminum hydroxide/magnesium hydroxide/simethicone Suspension 30 every 4 hours PRN  atorvastatin 40 at bedtime  loperamide 2 three times a day PRN  midodrine. 10 three times a day  predniSONE   Tablet 50 daily      Vital Signs Last 24 Hrs  T(C): 38.1 (2023 11:04), Max: 39.2 (2023 20:10)  T(F): 100.5 (2023 11:04), Max: 102.5 (2023 20:10)  HR: 112 (2023 11:34) (93 - 112)  BP: 118/74 (2023 11:34) (109/57 - 134/73)  BP(mean): --  RR: 18 (2023 11:34) (18 - 18)  SpO2: 95% (2023 11:34) (93% - 95%)    Parameters below as of 2023 11:34  Patient On (Oxygen Delivery Method): room air        PHYSICAL EXAMINATION:  General: Alert and Awake, NAD  Cardiac: RRR, No M/R/G  Resp: CTAB, No Wh/Rh/Ra  Abdomen: NBS, NT/ND, No HSM, No rigidity or guarding  MSK: No LE edema. No Calf tenderness  : No lloyd  Skin: No rashes or lesions. Skin is warm and dry to the touch.   Neuro: Alert and Awake. CN 2-12 Grossly intact. Moves all four extremities spontaneously.  Psych: Calm, Pleasant, Cooperative                          8.4    1.44  )-----------( 14       ( 2023 06:10 )             25.1           136  |  92<L>  |  33<H>  ----------------------------<  130<H>  3.2<L>   |  32<H>  |  1.37<H>    Ca    8.1<L>      2023 06:28  Mg     1.5             Urinalysis Basic - ( 2023 08:42 )    Color: Yellow / Appearance: Clear / S.031 / pH: x  Gluc: x / Ketone: Negative  / Bili: Negative / Urobili: 6 mg/dL   Blood: x / Protein: 30 mg/dL / Nitrite: Negative   Leuk Esterase: Negative / RBC: 3 /hpf / WBC 3 /HPF   Sq Epi: x / Non Sq Epi: x / Bacteria: Negative        MICROBIOLOGY:  v  .Blood Blood-Peripheral  23   No Growth Final  --  --      .Blood Blood-Peripheral  23   No Growth Final  --  --      .Blood Blood-Peripheral  23   No Growth Final  --  --      .Blood Blood  23   No Growth Final  --  --      Clean Catch Clean Catch (Midstream)  23   <10,000 CFU/mL Normal Urogenital Lali  --  --      .Blood Blood-Peripheral  23   No Growth Final  --  --      .Blood Blood-Peripheral  23   No Growth Final  --  --        CMV IgG Antibody: 3.10 U/mL (23 @ 09:27)    CMVPCR Log: 3.34 Csd67UT/mL ( @ 06:29)        RADIOLOGY:    <The imaging below has been reviewed and visualized by me independently. Findings as detailed in report below>    < from: CT Abdomen and Pelvis w/ IV Cont (23 @ 21:10) >  IMPRESSION:  Bladder wall thickening with perivesicular stranding. Findings may be   seen in the setting of cystitis. Correlate with urinalysis.    Mild bilateral hydroureteronephrosis to the level of the bladder.    < end of copied text >   Follow Up:  Fever    Interval History: continued fevers. retaining urine intermittently. denies any other focal symptoms.     REVIEW OF SYSTEMS  [  ] ROS unobtainable because:    [ x ] All other systems negative except as noted below    Constitutional:  [ ] fever [ ] chills  [ ] weight loss  [ ] weakness  Skin:  [ ] rash [ ] phlebitis	  Eyes: [ ] icterus [ ] pain  [ ] discharge	  ENMT: [ ] sore throat  [ ] thrush [ ] ulcers [ ] exudates  Respiratory: [ ] dyspnea [ ] hemoptysis [ ] cough [ ] sputum	  Cardiovascular:  [ ] chest pain [ ] palpitations [ ] edema	  Gastrointestinal:  [ ] nausea [ ] vomiting [ ] diarrhea [ ] constipation [ ] pain	  Genitourinary:  [ ] dysuria [ ] frequency [ ] hematuria [ ] discharge [ ] flank pain  [ ] incontinence +urine retention  Musculoskeletal:  [ ] myalgias [ ] arthralgias [ ] arthritis  [ ] back pain  Neurological:  [ ] headache [ ] seizures  [ ] confusion/altered mental status    Allergies  No Known Allergies        ANTIMICROBIALS:  entecavir 0.5 daily  valGANciclovir 450 every 12 hours      OTHER MEDS:  MEDICATIONS  (STANDING):  acetaminophen     Tablet .. 650 every 6 hours PRN  aluminum hydroxide/magnesium hydroxide/simethicone Suspension 30 every 4 hours PRN  atorvastatin 40 at bedtime  loperamide 2 three times a day PRN  midodrine. 10 three times a day  predniSONE   Tablet 50 daily      Vital Signs Last 24 Hrs  T(C): 38.1 (2023 11:04), Max: 39.2 (2023 20:10)  T(F): 100.5 (2023 11:04), Max: 102.5 (2023 20:10)  HR: 112 (2023 11:34) (93 - 112)  BP: 118/74 (2023 11:34) (109/57 - 134/73)  BP(mean): --  RR: 18 (2023 11:34) (18 - 18)  SpO2: 95% (2023 11:34) (93% - 95%)    Parameters below as of 2023 11:34  Patient On (Oxygen Delivery Method): room air        PHYSICAL EXAMINATION:  General: Alert and Awake, NAD  Cardiac: RRR, No M/R/G  Resp: CTAB, No Wh/Rh/Ra  Abdomen: NBS, NT/ND, No HSM, No rigidity or guarding  MSK: No LE edema. No Calf tenderness  : No lloyd  Skin: No rashes or lesions. Skin is warm and dry to the touch.   Neuro: Alert and Awake. CN 2-12 Grossly intact. Moves all four extremities spontaneously.  Psych: Calm, Pleasant, Cooperative                          8.4    1.44  )-----------( 14       ( 2023 06:10 )             25.1           136  |  92<L>  |  33<H>  ----------------------------<  130<H>  3.2<L>   |  32<H>  |  1.37<H>    Ca    8.1<L>      2023 06:28  Mg     1.5             Urinalysis Basic - ( 2023 08:42 )    Color: Yellow / Appearance: Clear / S.031 / pH: x  Gluc: x / Ketone: Negative  / Bili: Negative / Urobili: 6 mg/dL   Blood: x / Protein: 30 mg/dL / Nitrite: Negative   Leuk Esterase: Negative / RBC: 3 /hpf / WBC 3 /HPF   Sq Epi: x / Non Sq Epi: x / Bacteria: Negative        MICROBIOLOGY:  v  .Blood Blood-Peripheral  23   No Growth Final  --  --      .Blood Blood-Peripheral  23   No Growth Final  --  --      .Blood Blood-Peripheral  23   No Growth Final  --  --      .Blood Blood  23   No Growth Final  --  --      Clean Catch Clean Catch (Midstream)  23   <10,000 CFU/mL Normal Urogenital Lali  --  --      .Blood Blood-Peripheral  23   No Growth Final  --  --      .Blood Blood-Peripheral  23   No Growth Final  --  --        CMV IgG Antibody: 3.10 U/mL (23 @ 09:27)    CMVPCR Log: 3.34 Qjz41BF/mL ( @ 06:29)        RADIOLOGY:    <The imaging below has been reviewed and visualized by me independently. Findings as detailed in report below>    < from: CT Abdomen and Pelvis w/ IV Cont (23 @ 21:10) >  IMPRESSION:  Bladder wall thickening with perivesicular stranding. Findings may be   seen in the setting of cystitis. Correlate with urinalysis.    Mild bilateral hydroureteronephrosis to the level of the bladder.    < end of copied text >

## 2023-04-08 NOTE — PROGRESS NOTE ADULT - SUBJECTIVE AND OBJECTIVE BOX
Subjective: Patient seen and examined. No new events except as noted.     SUBJECTIVE/ROS:  nad      MEDICATIONS:  MEDICATIONS  (STANDING):  atorvastatin 40 milliGRAM(s) Oral at bedtime  entecavir 0.5 milliGRAM(s) Oral daily  midodrine. 10 milliGRAM(s) Oral three times a day  predniSONE   Tablet 50 milliGRAM(s) Oral daily  sodium chloride 0.9%. 1000 milliLiter(s) (75 mL/Hr) IV Continuous <Continuous>  valGANciclovir 450 milliGRAM(s) Oral every 12 hours      PHYSICAL EXAM:  T(C): 36.7 (04-08-23 @ 13:10), Max: 39.2 (04-07-23 @ 20:10)  HR: 112 (04-08-23 @ 11:34) (93 - 112)  BP: 118/74 (04-08-23 @ 11:34) (109/57 - 134/73)  RR: 18 (04-08-23 @ 11:34) (18 - 18)  SpO2: 95% (04-08-23 @ 11:34) (93% - 95%)  Wt(kg): --  I&O's Summary    07 Apr 2023 07:01  -  08 Apr 2023 07:00  --------------------------------------------------------  IN: 720 mL / OUT: 1700 mL / NET: -980 mL            JVP: Normal  Neck: supple  Lung: clear   CV: S1 S2 , Murmur:  Abd: soft  Ext: No edema  neuro: Awake / alert  Psych: flat affect  Skin: normal``    LABS/DATA:    CARDIAC MARKERS:                                8.4    1.44  )-----------( 14       ( 08 Apr 2023 06:10 )             25.1     04-07    136  |  92<L>  |  33<H>  ----------------------------<  130<H>  3.2<L>   |  32<H>  |  1.37<H>    Ca    8.1<L>      07 Apr 2023 06:28  Mg     1.5     04-08      proBNP:   Lipid Profile:   HgA1c:   TSH:     TELE:  EKG:

## 2023-04-08 NOTE — PROGRESS NOTE ADULT - ASSESSMENT
67y Male with history of MM on chemotherapy presents with fevers. Nephrology consulted for elevated Scr.    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 Scr improving. S/p IVF due to CT.  F/u renal fxn after CT with IVF. UA active likely due to lloyd with granular casts suggestive of ATN. FeNa indeterminate. Lloyd discontinued and patient failed TOV- lloyd replaced. TMA work up negative. Avoid nephrotoxins. Monitor electrolytes.      2) Hypotension: BP low normal. Continue with midodrine 10 mg PO TID. Monitor BP.    3) Metabolic alkalosis: In setting of recent diuretic use. Plan to start NS as above. Check urine chloride and repeat blood gas in AM. Potassium repleted. Monitor pH.    4) MM: As per Heme/Onc.      No renal objection to proceed with CT with IV contrast given resolving RK. Patient educated on risks of recurrent RK from contrast nephropathy and educated that risk is low given resolving RK. Will start IVF for ppx (as above). Can continue IVF until AM given contraction alkalosis on blood gas.    [facility] NEPHROLOGY  Leoncio Leonard M.D.  Tay Brooke D.O.  Precious Chen M.D.  Nidia Stallings, MSN, ANP-C    Telephone: [phone]  Facsimile: [phone]    [address] [place] [address]   67y Male with history of MM on chemotherapy presents with fevers. Nephrology consulted for elevated Scr.    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 Scr improving. S/p IVF due to CT.  F/u renal fxn after CT with IVF. UA active likely due to lloyd with granular casts suggestive of ATN. FeNa indeterminate. Lloyd discontinued and patient failed TOV- lloyd replaced. TMA work up negative. Avoid nephrotoxins. Monitor electrolytes.      2) Hypotension: BP low normal. Continue with midodrine 10 mg PO TID. Monitor BP.    3) Metabolic alkalosis: In setting of recent diuretic use. Plan to start NS as above. Check urine chloride and repeat blood gas in AM. Potassium repleted. Monitor pH.    4) MM: As per Heme/Onc.      No renal objection to proceed with CT with IV contrast given resolving RK. Patient educated on risks of recurrent RK from contrast nephropathy and educated that risk is low given resolving RK. Will start IVF for ppx (as above). Can continue IVF until AM given contraction alkalosis on blood gas.    [facility] NEPHROLOGY  Leoncio Leonard M.D.  Tay Brooke D.O.  Precious Chen M.D.  Nidia Stallings, MSN, ANP-C    Telephone: [phone]  Facsimile: [phone]    [address] [place] [address]   67y Male with history of MM on chemotherapy presents with fevers. Nephrology consulted for elevated Scr.    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 Scr improving. S/p IVF due to CT.  F/u renal fxn after CT with IVF. UA active likely due to lloyd with granular casts suggestive of ATN. FeNa indeterminate. Lloyd discontinued and patient failed TOV- lloyd replaced. TMA work up negative. Avoid nephrotoxins. Monitor electrolytes.      2) Hypotension: BP low normal. Continue with midodrine 10 mg PO TID. Monitor BP.    3) Metabolic alkalosis: In setting of recent diuretic use. Plan to start NS as above. Check urine chloride and repeat blood gas in AM. Potassium repleted. Monitor pH.    4) MM: As per Heme/Onc.      No renal objection to proceed with CT with IV contrast given resolving RK. Patient educated on risks of recurrent RK from contrast nephropathy and educated that risk is low given resolving RK. Will start IVF for ppx (as above). Can continue IVF until AM given contraction alkalosis on blood gas.    [facility] NEPHROLOGY  Leoncio Leonard M.D.  Tay Brooke D.O.  Precious Chen M.D.  Nidia Stallings, MSN, ANP-C    Telephone: [phone]  Facsimile: [phone]    [address] [place] [address]

## 2023-04-08 NOTE — PROGRESS NOTE ADULT - ASSESSMENT
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 Found to be febrile on admission, ID consulted for further management.    Tmax 102.4 (3/23) 102.5 (4/7)  WBC 3.5K today  Urinalysis unremarkable  RVP neg  CXR unremarkable      Impression:  #Fever, again  #Immunocompromised Status, H/O Splenectomy  #pancytopenia   #CMV viremia       PLAN:  - all infectious work up in terms of bacterial infection negative,   - UA (4/7) No Pyuria  - CT A/P with bladder wall thickening and mild hydro  - Recommend urine culture  - If clinical status changes or urinary symptoms develop (besides retention) would start Cefepime  - trend cbc for pancytopenia, could be valcyte induced or ? hematological, s/p BM bx pending result   - cryptococcal ag, QuantiFeronTb, fungitell negative  - c/w po valcyte  - Continue to check CMV PCR weekly, next on 4/14    Corey Arango M.D.  [facility] Division of Infectious Disease  8AM-5PM Monday - Friday: Available on [employer] Teams  After Hours and Holidays (or if no response on Microsoft Teams): Please contact the Infectious Diseases Office at [phone]  66 y/o M PMHx MM (s/p autoSCT, s/p relapse now on chemo last dose 3/3), bioprosthetic AVR (2019), splenectomy, and partial gastrectomy/pancreatectomy, presents with fever/chills. Found to be febrile on admission, ID consulted for further management.    Tmax 102.4 (3/23) 102.5 (4/7)  WBC 3.5K today  Urinalysis unremarkable  RVP neg  CXR unremarkable      Impression:  #Fever, again  #Immunocompromised Status, H/O Splenectomy  #pancytopenia   #CMV viremia       PLAN:  - all infectious work up in terms of bacterial infection negative,   - UA (4/7) No Pyuria  - CT A/P with bladder wall thickening and mild hydro  - Recommend urine culture  - If clinical status changes or urinary symptoms develop (besides retention) would start Cefepime  - trend cbc for pancytopenia, could be valcyte induced or ? hematological, s/p BM bx pending result   - cryptococcal ag, QuantiFeronTb, fungitell negative  - c/w po valcyte  - Continue to check CMV PCR weekly, next on 4/14    Corey Arango M.D.  [facility] Division of Infectious Disease  8AM-5PM Monday - Friday: Available on Microsoft Teams  After Hours and Holidays (or if no response on Microsoft Teams): Please contact the Infectious Diseases Office at [phone]  66 y/o M PMHx MM (s/p autoSCT, s/p relapse now on chemo last dose 3/3), bioprosthetic AVR (2019), splenectomy, and partial gastrectomy/pancreatectomy, presents with fever/chills. Found to be febrile on admission, ID consulted for further management.    Tmax 102.4 (3/23) 102.5 (4/7)  WBC 3.5K today  Urinalysis unremarkable  RVP neg  CXR unremarkable      Impression:  #Fever, again  #Immunocompromised Status, H/O Splenectomy  #pancytopenia   #CMV viremia       PLAN:  - all infectious work up in terms of bacterial infection negative,   - UA (4/7) No Pyuria  - CT A/P with bladder wall thickening and mild hydro  - Recommend urine culture  - If clinical status changes or urinary symptoms develop (besides retention) would start Cefepime  - trend cbc for pancytopenia, could be valcyte induced or ? hematological, s/p BM bx pending result   - cryptococcal ag, QuantiFeronTb, fungitell negative  - c/w po valcyte  - Continue to check CMV PCR weekly, next on 4/14    Corey Arango M.D.  [facility] Division of Infectious Disease  8AM-5PM Monday - Friday: Available on Microsoft Teams  After Hours and Holidays (or if no response on Microsoft Teams): Please contact the Infectious Diseases Office at [phone]

## 2023-04-08 NOTE — PROGRESS NOTE ADULT - SUBJECTIVE AND OBJECTIVE BOX
Pt is seen and examined  pt is awake and lying in bed   Fevers persist  + Sweats  Had CT C/A/P w bladder wall thickening  BM Bx done      PAST MEDICAL & SURGICAL HISTORY:  Multiple myeloma      Hyperlipidemia      H/O aortic aneurysm      H/O splenectomy      S/P partial gastrectomy      History of pancreatic surgery          ROS:  Negative except for:    MEDICATIONS  (STANDING):  atorvastatin 40 milliGRAM(s) Oral at bedtime  entecavir 0.5 milliGRAM(s) Oral daily  midodrine. 10 milliGRAM(s) Oral three times a day  predniSONE   Tablet 50 milliGRAM(s) Oral daily  sodium chloride 0.9%. 1000 milliLiter(s) (75 mL/Hr) IV Continuous <Continuous>  valGANciclovir 450 milliGRAM(s) Oral every 12 hours    MEDICATIONS  (PRN):  acetaminophen     Tablet .. 650 milliGRAM(s) Oral every 6 hours PRN Temp greater or equal to 38C (100.4F), Mild Pain (1 - 3)  aluminum hydroxide/magnesium hydroxide/simethicone Suspension 30 milliLiter(s) Oral every 4 hours PRN Dyspepsia  loperamide 2 milliGRAM(s) Oral three times a day PRN Diarrhea      Allergies    No Known Allergies    Intolerances        Vital Signs Last 24 Hrs  T(C): 38.1 (08 Apr 2023 11:04), Max: 39.2 (07 Apr 2023 20:10)  T(F): 100.5 (08 Apr 2023 11:04), Max: 102.5 (07 Apr 2023 20:10)  HR: 112 (08 Apr 2023 11:34) (93 - 112)  BP: 118/74 (08 Apr 2023 11:34) (109/57 - 134/73)  BP(mean): --  RR: 18 (08 Apr 2023 11:34) (18 - 18)  SpO2: 95% (08 Apr 2023 11:34) (93% - 95%)    Parameters below as of 08 Apr 2023 11:34  Patient On (Oxygen Delivery Method): room air        PHYSICAL EXAM  NC/AT In NAD  Chest Clear Bilat  CVS S1,S2+ RRR  Abd Soft + BS NT/ND  Ext N0 edema      LABS:                          8.4    1.44  )-----------( 14       ( 08 Apr 2023 06:10 )             25.1     Serial CBC's  04-08 @ 06:10  Hct-25.1 / Hgb-8.4 / Plat-14 / RBC-2.72 / WBC-1.44          Serial CBC's  04-07 @ 13:43  Hct-28.6 / Hgb-9.4 / Plat-19 / RBC-3.10 / WBC-1.85            04-07    136  |  92<L>  |  33<H>  ----------------------------<  130<H>  3.2<L>   |  32<H>  |  1.37<H>    Ca    8.1<L>      07 Apr 2023 06:28  Mg     1.5     04-08            WBC Count: 1.44 K/uL (04-08 @ 06:10)  Hemoglobin: 8.4 g/dL (04-08 @ 06:10)            RADIOLOGY & ADDITIONAL STUDIES:

## 2023-04-08 NOTE — PROVIDER CONTACT NOTE (OTHER) - ASSESSMENT
Pt AAOx4. VSS except pt with low grade fever 100.5; pt denies CP, SOB. Pt AAOx4. VSS except pt with low grade fever 100.5; pt denies CP, SOB. P

## 2023-04-08 NOTE — PROGRESS NOTE ADULT - SUBJECTIVE AND OBJECTIVE BOX
SUBJECTIVE/ OVERNIGHT EVENTS:  --- Coverage for Dr. Addison ---  retaining urine  post void residual 550ml on bladder scan  s/p straight cath  low grade fever past 24 hr, (previously 101F and 102 yesterday)  no cp, no sob, no n/v/d. no abdominal pain.  no headache, no dizziness.   the wife and sister at bedside.         --------------------------------------------------------------------------------------------  LABS:                        8.4    1.44  )-----------( 14       ( 2023 06:10 )             25.1     04-07    136  |  92<L>  |  33<H>  ----------------------------<  130<H>  3.2<L>   |  32<H>  |  1.37<H>    Ca    8.1<L>      2023 06:28  Mg     1.5     04-08        CAPILLARY BLOOD GLUCOSE            Urinalysis Basic - ( 2023 08:42 )    Color: Yellow / Appearance: Clear / S.031 / pH: x  Gluc: x / Ketone: Negative  / Bili: Negative / Urobili: 6 mg/dL   Blood: x / Protein: 30 mg/dL / Nitrite: Negative   Leuk Esterase: Negative / RBC: 3 /hpf / WBC 3 /HPF   Sq Epi: x / Non Sq Epi: x / Bacteria: Negative        RADIOLOGY & ADDITIONAL TESTS:    Imaging Personally Reviewed:  [x] YES  [ ] NO    Consultant(s) Notes Reviewed:  [x] YES  [ ] NO    MEDICATIONS  (STANDING):  atorvastatin 40 milliGRAM(s) Oral at bedtime  entecavir 0.5 milliGRAM(s) Oral daily  midodrine. 10 milliGRAM(s) Oral three times a day  predniSONE   Tablet 50 milliGRAM(s) Oral daily  sodium chloride 0.9%. 1000 milliLiter(s) (75 mL/Hr) IV Continuous <Continuous>  valGANciclovir 450 milliGRAM(s) Oral every 12 hours    MEDICATIONS  (PRN):  acetaminophen     Tablet .. 650 milliGRAM(s) Oral every 6 hours PRN Temp greater or equal to 38C (100.4F), Mild Pain (1 - 3)  aluminum hydroxide/magnesium hydroxide/simethicone Suspension 30 milliLiter(s) Oral every 4 hours PRN Dyspepsia  loperamide 2 milliGRAM(s) Oral three times a day PRN Diarrhea      Care Discussed with Consultants/Other Providers [x] YES  [ ] NO    Vital Signs Last 24 Hrs  T(C): 36.7 (2023 13:10), Max: 39.2 (2023 20:10)  T(F): 98.1 (2023 13:10), Max: 102.5 (2023 20:10)  HR: 112 (2023 11:34) (93 - 112)  BP: 118/74 (2023 11:34) (109/57 - 134/73)  BP(mean): --  RR: 18 (2023 11:34) (18 - 18)  SpO2: 95% (2023 11:34) (93% - 95%)    Parameters below as of 2023 11:34  Patient On (Oxygen Delivery Method): room air      I&O's Summary    2023 07:01  -  2023 07:00  --------------------------------------------------------  IN: 720 mL / OUT: 1700 mL / NET: -980 mL        PHYSICAL EXAM:  GENERAL: NAD, thin-elderly, comfortable  HEAD:  Atraumatic, Normocephalic  EYES: EOMI, PERRLA, conjunctiva and sclera clear  NECK: Supple, No JVD  CHEST/LUNG: mild decrease breath sounds bilaterally; No wheeze   HEART: Regular rate and rhythm; No murmurs, rubs, or gallops  ABDOMEN: Soft, Nontender, Nondistended; Bowel sounds present  Neuro: AAOx3, no focal weakness   EXTREMITIES:  2+ Peripheral Pulses, No clubbing, cyanosis, +edema  SKIN: No rashes or lesions

## 2023-04-08 NOTE — PROGRESS NOTE ADULT - NSPROGADDITIONALINFOA_GEN_ALL_CORE
d/w pt and NP.   d/w the wife and sister at bedside.     --- Coverage for Dr. Addison ---  - Dr. WAN Pizarro ([facility])  - [phone] d/w pt and NP.   d/w the wife and sister at bedside.     --- Coverage for Dr. Addison ---  - Dr. WAN Pizarro ([facility])  - [phone] d/w pt and NP.   d/w the wife and sister at bedside.     --- Coverage for Dr. Addison ---  - Dr. WAN Pizarro ([facility])  - [phone]

## 2023-04-08 NOTE — PROGRESS NOTE ADULT - ASSESSMENT
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    Fever of unknown origin.   - CXR without consolidations or effusions; U/A unremarkable; GI PCR Neg   - BCx2 and UCx  --> Neg final   - Recurrent fever. Repeat BCx2 sent 03/27 --> Neg final; ABX as per ID   - LE VA duplex --> + For DVT    - Was on empiric treatment with Vanc/cefepime --> ID consulted; S/P Zosyn  - Hold home penicillin while on ABX   - V/Q scan -- Low probability   - ID to follow up, infectious work up as per ID  --> CMV PCR -- + C/w Ganciclovir per ID , CMV IGG (+) and IgM (-), Cryptococcal Ag --Neg, Quantiferon Tb -- Neg, Fungitell -- <31   - CT Chest non-contrast, noted, no acute pathology  - Recurrent fever 03/29 --> F/u BCx2 -- Neg; F/u 03/31 BCx -- NGTD   - Shock, RRT, resume antibiotics IV fluid, MICU eval , ID follow up  --> Prednisone 50 PO Qd   - S/P Zosyn; Defer ABX as per ID   - short course steroid per hematology   - IR eval for BM BX as per Heme/Onc --> done on 04/06 with IR, f/u path  - Febrile again 4/7/23: 101F, then later 102F  - ID ordered pan CT, noted questionable cystitis. UA is neg.  - ID follow up in regards to the need for abx.     Anemia, Thrombocytopenia, Pancytopenia  - Drop in Hgb, Occult +  - Hold Hep gtt ; S/P 1 unit of PRBCs 03/28  - Maintain active T+S. Transfuse for Hgb < 7.0, and platelets < 10.K  - GI eval appreciated; F/u recs --> No plans for scope at this time   - Was on Xarelto, held due to drop in Platelets as per Heme/Onc. Monitor platelet count   - S/P 1 unit PRBCs and 1 unit Platelets 04/04; W/ lasix  - s/p 1 unit of platelets 04/05; post-transfusion CBC stable  - HIT ab: neg  - ASA on hold    HypoNa  - Cont to monitor and trend  - Appreciate renal eval.   - Serial labs     Multiple myeloma.   - pancytopenia largely at baseline although Hg 6.7 on arrival; s/p 1U PRBC with good response   - Was on acyclovir, now on Ganciclovir as per ID   - C/w home entecavir   - S/P dexamethasone, D/C'd by hematology, follow up outpatient after discharge  --> Now on Prednisone 50 Qd   - Pt reports his Revlimid was held  - Heme/Onc consulted; F/u recs   - Resume home aspirin. --> Now on hold   - s/p IR BM Bx  04/06     Chronic diarrhea, now with Constipation   - Standing Imodium switched to PRN  - Monitor for BM     DVT   - Duplex + For R soleal vein DVT  --> Will consider CT A chest once creatinine permits as patient received contrast; Monitor for LOPEZ   - s/p Hep gtt; Monitor PTT; Monitor H/H closely --> Now on hold in view of drop in Hgb and severe thrombocytopenia  - VQ scan to R/O PE -- Low probability   - Vascular eval appreciated; AC as tolerated, on Xarelto 10, monitor H/H, plts too low to start.   - Serial Duplex to assess for propagation  -- Without propagation   - 04/03 Duplex without propagation   - Repeat Duplex 04/10 -- ordered   - given severe thrombocytopenia, holding Xarelto for now. Risk of bleeding greater than benefit.     RK  - S/P Contrast on 03/23   - Monitor Cr closely; Avoid nephrotoxic agents   - Cr down-trending. s/p IVF   - Renal eval appreciated   - urinary retention s/p lloyd.   - Removed lloyd 4/7/23, TOV in progress. post void residual 550ml on bladder scan  - s/p straight cath. may need to replace lloyd if unable to urinarte.        Hypertension, now hypotensive   - C/w home metoprolol.  - C/w Midodrine 10 TID. Hold for SBP > 140     Hiccups  - Started on Reglan, Monitor     Hyperlipidemia.   - C/w home atorvastatin.    Abnormal CT  - Outpatient follow up for CT findings    Prophylactic measure.   dvt ppx: DVT PPX   diet: regular  ambulate: with assistance    fall precautions  aspiration precautions.      Discussed with Patient, and family in detail at the bedside.      Dispo: - Febrile again 4/7/23: 101F, then later 102F, ID ordered pan CT, noted questionable cystitis. UA is neg. ID follow up in regards to the need for abx.  Removed lloyd 4/7/23, TOV in progress. post void residual 550ml on bladder scan s/p straight cath, may need lloyd again.    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    Fever of unknown origin.   - CXR without consolidations or effusions; U/A unremarkable; GI PCR Neg   - BCx2 and UCx  --> Neg final   - Recurrent fever. Repeat BCx2 sent 03/27 --> Neg final; ABX as per ID   - LE VA duplex --> + For DVT    - Was on empiric treatment with Vanc/cefepime --> ID consulted; S/P Zosyn  - Hold home penicillin while on ABX   - V/Q scan -- Low probability   - ID to follow up, infectious work up as per ID  --> CMV PCR -- + C/w Ganciclovir per ID , CMV IGG (+) and IgM (-), Cryptococcal Ag --Neg, Quantiferon Tb -- Neg, Fungitell -- <31   - CT Chest non-contrast, noted, no acute pathology  - Recurrent fever 03/29 --> F/u BCx2 -- Neg; F/u 03/31 BCx -- NGTD   - Shock, RRT, resume antibiotics IV fluid, MICU eval , ID follow up  --> Prednisone 50 PO Qd   - S/P Zosyn; Defer ABX as per ID   - short course steroid per hematology   - IR eval for BM BX as per Heme/Onc --> done on 04/06 with IR, f/u path  - Febrile again 4/7/23: 101F, then later 102F  - ID ordered pan CT, noted questionable cystitis. UA is neg.  - ID follow up in regards to the need for abx.     Anemia, Thrombocytopenia, Pancytopenia  - Drop in Hgb, Occult +  - Hold Hep gtt ; S/P 1 unit of PRBCs 03/28  - Maintain active T+S. Transfuse for Hgb < 7.0, and platelets < 10.K  - GI eval appreciated; F/u recs --> No plans for scope at this time   - Was on Xarelto, held due to drop in Platelets as per Heme/Onc. Monitor platelet count   - S/P 1 unit PRBCs and 1 unit Platelets 04/04; W/ lasix  - s/p 1 unit of platelets 04/05; post-transfusion CBC stable  - HIT ab: neg  - ASA on hold    HypoNa  - Cont to monitor and trend  - Appreciate renal eval.   - Serial labs     Multiple myeloma.   - pancytopenia largely at baseline although Hg 6.7 on arrival; s/p 1U PRBC with good response   - Was on acyclovir, now on Ganciclovir as per ID   - C/w home entecavir   - S/P dexamethasone, D/C'd by hematology, follow up outpatient after discharge  --> Now on Prednisone 50 Qd   - Pt reports his Revlimid was held  - Heme/Onc consulted; F/u recs   - Resume home aspirin. --> Now on hold   - s/p IR BM Bx  04/06     Chronic diarrhea, now with Constipation   - Standing Imodium switched to PRN  - Monitor for BM     DVT   - Duplex + For R soleal vein DVT  --> Will consider CT A chest once creatinine permits as patient received contrast; Monitor for LOPEZ   - s/p Hep gtt; Monitor PTT; Monitor H/H closely --> Now on hold in view of drop in Hgb and severe thrombocytopenia  - VQ scan to R/O PE -- Low probability   - Vascular eval appreciated; AC as tolerated, on Xarelto 10, monitor H/H, plts too low to start.   - Serial Duplex to assess for propagation  -- Without propagation   - 04/03 Duplex without propagation   - Repeat Duplex 04/10 -- ordered   - given severe thrombocytopenia, holding Xarelto for now. Risk of bleeding greater than benefit.     RK  - S/P Contrast on 03/23   - Monitor Cr closely; Avoid nephrotoxic agents   - Cr down-trending. s/p IVF   - Renal eval appreciated   - urinary retention s/p lolyd.   - Removed lloyd 4/7/23, TOV in progress. post void residual 550ml on bladder scan  - s/p straight cath. may need to replace lloyd if unable to urinarte.        Hypertension, now hypotensive   - C/w home metoprolol.  - C/w Midodrine 10 TID. Hold for SBP > 140     Hiccups  - Started on Reglan, Monitor     Hyperlipidemia.   - C/w home atorvastatin.    Abnormal CT  - Outpatient follow up for CT findings    Prophylactic measure.   dvt ppx: DVT PPX   diet: regular  ambulate: with assistance    fall precautions  aspiration precautions.      Discussed with Patient, and family in detail at the bedside.      Dispo: - Febrile again 4/7/23: 101F, then later 102F, ID ordered pan CT, noted questionable cystitis. UA is neg. ID follow up in regards to the need for abx.  Removed lloyd 4/7/23, TOV in progress. post void residual 550ml on bladder scan s/p straight cath, may need lloyd again.

## 2023-04-08 NOTE — PROGRESS NOTE ADULT - SUBJECTIVE AND OBJECTIVE BOX
[facility] NEPHROLOGY- PROGRESS NOTE    67 year old Male with history of MM on chemotherapy presents with fevers. Nephrology consulted for elevated Scr.    Pt failed TOV and and lloyd catheter was replaced    REVIEW OF SYSTEMS:  Gen: +mild fevers  Cards: no chest pain  Resp: no dyspnea  GI: no nausea or vomiting or diarrhea  Vascular: no LE edema    No Known Allergies      Hospital Medications: Medications reviewed        VITALS:  T(F): 98.8 (04-08-23 @ 17:14), Max: 102.5 (04-07-23 @ 20:10)  HR: 98 (04-08-23 @ 17:14)  BP: 100/62 (04-08-23 @ 17:14)  RR: 18 (04-08-23 @ 17:14)  SpO2: 96% (04-08-23 @ 17:14)  Wt(kg): --    04-07 @ 07:01  -  04-08 @ 07:00  --------------------------------------------------------  IN: 720 mL / OUT: 1700 mL / NET: -980 mL    04-08 @ 07:01  -  04-08 @ 18:40  --------------------------------------------------------  IN: 480 mL / OUT: 800 mL / NET: -320 mL        PHYSICAL EXAM:  Gen: NAD, calm  Cards: RRR, +S1/S2, no M/G/R  Resp: CTA B/L  GI: soft, NT/ND, NABS  : + lloyd with yellow urine  Vascular: trace LE edema B/L      LABS:    Mg     1.5     04-08      < from: CT Abdomen and Pelvis w/ IV Cont (04.07.23 @ 21:10) >    ACC: [id number] EXAM:  CT ABDOMEN AND PELVIS IC   ORDERED BY: AUDI ELLISON     ACC: [id number] EXAM:  CT CHEST IC   ORDERED BY: AUDI ELLISON     PROCEDURE DATE:  04/07/2023          INTERPRETATION:  CLINICAL INFORMATION: Fever    COMPARISON: CTchest dated 3/20/2023. CT abdomen pelvis dated 3/23/2023.    CONTRAST/COMPLICATIONS:  IV Contrast: Omnipaque 350  90 cc administered   10 cc discarded  Oral Contrast: NONE  Complications: None reported at time of study completion    PROCEDURE:  CT ofthe Chest, Abdomen and Pelvis was performed.  Sagittal and coronal reformats were performed.    FINDINGS:  CHEST:  LUNGS AND LARGE AIRWAYS: Patent central airways. 6 mm right apical nodule   unchanged from prior. Scattered few calcified granulomata measuring under   6 mm. Mild posterior lower lobe compressive atelectasis. Mild   emphysematous change.  PLEURA: Small bilateral pleural effusions.  VESSELS: Within normal limits.  HEART: Heart size is normal. No pericardial effusion. Aortic valvular   replacement.  MEDIASTINUM AND MABEL: No lymphadenopathy.  CHEST WALL AND LOWER NECK: Sternotomy wires.    ABDOMEN AND PELVIS:  LIVER: Mild periportal edema, otherwise within normal limits.  BILE DUCTS: Normal caliber.  GALLBLADDER: Cholelithiasis.  SPLEEN: Splenectomy.  PANCREAS: Distal pancreatectomy; the remainder the pancreas is within   normal limits.  ADRENALS: Within normal limits.  KIDNEYS/URETERS: Mild bilateral hydroureteronephrosis extending level of   the bladder. 1 cm right midpole cyst. . Nonobstructing punctate stone in   the left collecting system.    BLADDER: Bladder distention with mild wall thickening and bilateral   diverticula. Scattered foci of air; correlate for recent instrumentation.  REPRODUCTIVE ORGANS: Prostate is within normal limits.    BOWEL: No bowel obstruction. Appendix is normal.  PERITONEUM: No ascites. Again seen or areas of fat necrosis in left upper   quadrant.  VESSELS: Atherosclerotic changes.  RETROPERITONEUM/LYMPH NODES: Stable prominent perihepatic and portacaval   nodes; reference node measures 2.8 x 1.8 cm (series 3-166).  ABDOMINAL WALL: Mild fatty stranding and intramuscular edema in the right   hip/thigh. Small fat-containing bilateral inguinal hernias.  BONES: Right femoral intramedullary kevin.    IMPRESSION:  Bladder wall thickening with perivesicular stranding. Findings may be   seen in the setting of cystitis. Correlate with urinalysis.    Mild bilateral hydroureteronephrosis to the level of the bladder.        --- End of Report ---           AMAURY BERNABE MD; Resident Radiologist  This document has been electronically signed.  ELISABETH PIMENTEL MD; Attending Radiologist  This document has been electronically signed. Apr 8 2023 10:28AM    < end of copied text >   [facility] NEPHROLOGY- PROGRESS NOTE    67 year old Male with history of MM on chemotherapy presents with fevers. Nephrology consulted for elevated Scr.    Pt failed TOV and and lloyd catheter was replaced    REVIEW OF SYSTEMS:  Gen: +mild fevers  Cards: no chest pain  Resp: no dyspnea  GI: no nausea or vomiting or diarrhea  Vascular: no LE edema    No Known Allergies      Hospital Medications: Medications reviewed        VITALS:  T(F): 98.8 (04-08-23 @ 17:14), Max: 102.5 (04-07-23 @ 20:10)  HR: 98 (04-08-23 @ 17:14)  BP: 100/62 (04-08-23 @ 17:14)  RR: 18 (04-08-23 @ 17:14)  SpO2: 96% (04-08-23 @ 17:14)  Wt(kg): --    04-07 @ 07:01  -  04-08 @ 07:00  --------------------------------------------------------  IN: 720 mL / OUT: 1700 mL / NET: -980 mL    04-08 @ 07:01  -  04-08 @ 18:40  --------------------------------------------------------  IN: 480 mL / OUT: 800 mL / NET: -320 mL        PHYSICAL EXAM:  Gen: NAD, calm  Cards: RRR, +S1/S2, no M/G/R  Resp: CTA B/L  GI: soft, NT/ND, NABS  : + lloyd with yellow urine  Vascular: trace LE edema B/L      LABS:    Mg     1.5     04-08      < from: CT Abdomen and Pelvis w/ IV Cont (04.07.23 @ 21:10) >    ACC: [id number] EXAM:  CT ABDOMEN AND PELVIS IC   ORDERED BY: AUDI ELLISON     ACC: [id number] EXAM:  CT CHEST IC   ORDERED BY: AUDI ELLISON     PROCEDURE DATE:  04/07/2023          INTERPRETATION:  CLINICAL INFORMATION: Fever    COMPARISON: CTchest dated 3/20/2023. CT abdomen pelvis dated 3/23/2023.    CONTRAST/COMPLICATIONS:  IV Contrast: Omnipaque 350  90 cc administered   10 cc discarded  Oral Contrast: NONE  Complications: None reported at time of study completion    PROCEDURE:  CT ofthe Chest, Abdomen and Pelvis was performed.  Sagittal and coronal reformats were performed.    FINDINGS:  CHEST:  LUNGS AND LARGE AIRWAYS: Patent central airways. 6 mm right apical nodule   unchanged from prior. Scattered few calcified granulomata measuring under   6 mm. Mild posterior lower lobe compressive atelectasis. Mild   emphysematous change.  PLEURA: Small bilateral pleural effusions.  VESSELS: Within normal limits.  HEART: Heart size is normal. No pericardial effusion. Aortic valvular   replacement.  MEDIASTINUM AND MABEL: No lymphadenopathy.  CHEST WALL AND LOWER NECK: Sternotomy wires.    ABDOMEN AND PELVIS:  LIVER: Mild periportal edema, otherwise within normal limits.  BILE DUCTS: Normal caliber.  GALLBLADDER: Cholelithiasis.  SPLEEN: Splenectomy.  PANCREAS: Distal pancreatectomy; the remainder the pancreas is within   normal limits.  ADRENALS: Within normal limits.  KIDNEYS/URETERS: Mild bilateral hydroureteronephrosis extending level of   the bladder. 1 cm right midpole cyst. . Nonobstructing punctate stone in   the left collecting system.    BLADDER: Bladder distention with mild wall thickening and bilateral   diverticula. Scattered foci of air; correlate for recent instrumentation.  REPRODUCTIVE ORGANS: Prostate is within normal limits.    BOWEL: No bowel obstruction. Appendix is normal.  PERITONEUM: No ascites. Again seen or areas of fat necrosis in left upper   quadrant.  VESSELS: Atherosclerotic changes.  RETROPERITONEUM/LYMPH NODES: Stable prominent perihepatic and portacaval   nodes; reference node measures 2.8 x 1.8 cm (series 3-166).  ABDOMINAL WALL: Mild fatty stranding and intramuscular edema in the right   hip/thigh. Small fat-containing bilateral inguinal hernias.  BONES: Right femoral intramedullary kevin.    IMPRESSION:  Bladder wall thickening with perivesicular stranding. Findings may be   seen in the setting of cystitis. Correlate with urinalysis.    Mild bilateral hydroureteronephrosis to the level of the bladder.        --- End of Report ---           AMAURY BERNABE MD; Resident Radiologist  This document has been electronically signed.  ELISABETH PIMENTEL MD; Attending Radiologist  This document has been electronically signed. Apr 8 2023 10:28AM    < end of copied text >   [facility] NEPHROLOGY- PROGRESS NOTE    67 year old Male with history of MM on chemotherapy presents with fevers. Nephrology consulted for elevated Scr.    Pt failed TOV and and lloyd catheter was replaced    REVIEW OF SYSTEMS:  Gen: +mild fevers  Cards: no chest pain  Resp: no dyspnea  GI: no nausea or vomiting or diarrhea  Vascular: no LE edema    No Known Allergies      Hospital Medications: Medications reviewed        VITALS:  T(F): 98.8 (04-08-23 @ 17:14), Max: 102.5 (04-07-23 @ 20:10)  HR: 98 (04-08-23 @ 17:14)  BP: 100/62 (04-08-23 @ 17:14)  RR: 18 (04-08-23 @ 17:14)  SpO2: 96% (04-08-23 @ 17:14)  Wt(kg): --    04-07 @ 07:01  -  04-08 @ 07:00  --------------------------------------------------------  IN: 720 mL / OUT: 1700 mL / NET: -980 mL    04-08 @ 07:01  -  04-08 @ 18:40  --------------------------------------------------------  IN: 480 mL / OUT: 800 mL / NET: -320 mL        PHYSICAL EXAM:  Gen: NAD, calm  Cards: RRR, +S1/S2, no M/G/R  Resp: CTA B/L  GI: soft, NT/ND, NABS  : + lloyd with yellow urine  Vascular: trace LE edema B/L      LABS:    Mg     1.5     04-08      < from: CT Abdomen and Pelvis w/ IV Cont (04.07.23 @ 21:10) >    ACC: [id number] EXAM:  CT ABDOMEN AND PELVIS IC   ORDERED BY: AUDI ELLISON     ACC: [id number] EXAM:  CT CHEST IC   ORDERED BY: AUDI ELLISON     PROCEDURE DATE:  04/07/2023          INTERPRETATION:  CLINICAL INFORMATION: Fever    COMPARISON: CTchest dated 3/20/2023. CT abdomen pelvis dated 3/23/2023.    CONTRAST/COMPLICATIONS:  IV Contrast: Omnipaque 350  90 cc administered   10 cc discarded  Oral Contrast: NONE  Complications: None reported at time of study completion    PROCEDURE:  CT ofthe Chest, Abdomen and Pelvis was performed.  Sagittal and coronal reformats were performed.    FINDINGS:  CHEST:  LUNGS AND LARGE AIRWAYS: Patent central airways. 6 mm right apical nodule   unchanged from prior. Scattered few calcified granulomata measuring under   6 mm. Mild posterior lower lobe compressive atelectasis. Mild   emphysematous change.  PLEURA: Small bilateral pleural effusions.  VESSELS: Within normal limits.  HEART: Heart size is normal. No pericardial effusion. Aortic valvular   replacement.  MEDIASTINUM AND MABEL: No lymphadenopathy.  CHEST WALL AND LOWER NECK: Sternotomy wires.    ABDOMEN AND PELVIS:  LIVER: Mild periportal edema, otherwise within normal limits.  BILE DUCTS: Normal caliber.  GALLBLADDER: Cholelithiasis.  SPLEEN: Splenectomy.  PANCREAS: Distal pancreatectomy; the remainder the pancreas is within   normal limits.  ADRENALS: Within normal limits.  KIDNEYS/URETERS: Mild bilateral hydroureteronephrosis extending level of   the bladder. 1 cm right midpole cyst. . Nonobstructing punctate stone in   the left collecting system.    BLADDER: Bladder distention with mild wall thickening and bilateral   diverticula. Scattered foci of air; correlate for recent instrumentation.  REPRODUCTIVE ORGANS: Prostate is within normal limits.    BOWEL: No bowel obstruction. Appendix is normal.  PERITONEUM: No ascites. Again seen or areas of fat necrosis in left upper   quadrant.  VESSELS: Atherosclerotic changes.  RETROPERITONEUM/LYMPH NODES: Stable prominent perihepatic and portacaval   nodes; reference node measures 2.8 x 1.8 cm (series 3-166).  ABDOMINAL WALL: Mild fatty stranding and intramuscular edema in the right   hip/thigh. Small fat-containing bilateral inguinal hernias.  BONES: Right femoral intramedullary kevin.    IMPRESSION:  Bladder wall thickening with perivesicular stranding. Findings may be   seen in the setting of cystitis. Correlate with urinalysis.    Mild bilateral hydroureteronephrosis to the level of the bladder.        --- End of Report ---           AMAURY BERNABE MD; Resident Radiologist  This document has been electronically signed.  ELISABETH PIMENTEL MD; Attending Radiologist  This document has been electronically signed. Apr 8 2023 10:28AM    < end of copied text >

## 2023-04-08 NOTE — PROGRESS NOTE ADULT - ASSESSMENT
1. 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 Jean Claude Hubbard, [facility].  Patient was on lenalidomide maintenance until February 2023 when he had progression of disease.  He was then switched to daratumumab + CyBorD; last dose March 23rd, 2023.  No systemic therapy while inpatient or during rehabilitation.  Continue acyclovir 400 mg BID.    Pancytopenia  Pancytopenia has been ongoing since patient was started on latest chemotherapy regimen.  Monitor CBC and transfuse to maintain HGB > 7; PLT > 10.  Possible slight iron deficiency, but otherwise no evidence of nutritional deficits or hemolysis.  Continue to hold anticoagulation.  No plans for gastrointestinal interventions at this time.   Leukopenia mostly lymphocytopenia; neutrophil count normal.  IR bone marrow biopsy today. S/p platelet transfusion to attempt to reach goal platelet > 30k.  Myeloma labs w/ normal free light chain ratio, KIRA w/ weak IgG kappa band, IgG 285, IgA 57, IgM 29    Fever  febrile again in am, unclear etiology  Unlikely infectious source per infectious disease; possibly malignancy.  infectious w/up unremarkable  off IV abx, and  ganciclovir  if febrile will recommend re-scanning patient   4/7- plan for CT c/a/p to r/o source of infection     DVT  below knee DVT   hold ac for now  will need repeat duplex in 6 weeks  1. 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 Jean Claude Hubbard, [facility].  Patient was on lenalidomide maintenance until February 2023 when he had progression of disease.  He was then switched to daratumumab + CyBorD; last dose March 23rd, 2023.  No systemic therapy while inpatient or during rehabilitation.  Continue acyclovir 400 mg BID.    Pancytopenia  Pancytopenia has been ongoing since patient was started on latest chemotherapy regimen.  Monitor CBC and transfuse to maintain HGB > 7; PLT > 10.  Possible slight iron deficiency, but otherwise no evidence of nutritional deficits or hemolysis.  Continue to hold anticoagulation.  No plans for gastrointestinal interventions at this time.   Leukopenia mostly lymphocytopenia; neutrophil count normal.  IR bone marrow biopsy today. S/p platelet transfusion to attempt to reach goal platelet > 30k.  Myeloma labs w/ normal free light chain ratio, KIRA w/ weak IgG kappa band, IgG 285, IgA 57, IgM 29    Fever  febrile again in am, unclear etiology  Unlikely infectious source per infectious disease; possibly malignancy.  infectious w/up unremarkable  off IV abx, and  ganciclovir  if febrile will recommend re-scanning patient   4/7- plan for CT c/a/p to r/o source of infection     DVT  below knee DVT   hold ac for now  will need repeat duplex in 6 weeks  1. 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 Jean Claude Hubbard, [facility].  Patient was on lenalidomide maintenance until February 2023 when he had progression of disease.  He was then switched to daratumumab + CyBorD; last dose March 23rd, 2023.  No systemic therapy while inpatient or during rehabilitation.  Continue acyclovir 400 mg BID.    Pancytopenia  Pancytopenia has been ongoing since patient was started on latest chemotherapy regimen.  Monitor CBC and transfuse to maintain HGB > 7; PLT > 10.  Possible slight iron deficiency, but otherwise no evidence of nutritional deficits or hemolysis.  Continue to hold anticoagulation.  No plans for gastrointestinal interventions at this time.   Leukopenia mostly lymphocytopenia; neutrophil count normal.  IR bone marrow biopsy today. S/p platelet transfusion to attempt to reach goal platelet > 30k.  Myeloma labs w/ normal free light chain ratio, KIRA w/ weak IgG kappa band, IgG 285, IgA 57, IgM 29    Fever  febrile again in am, unclear etiology  Unlikely infectious source per infectious disease; possibly malignancy.  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 Hubbard   -- s/p Auto SCT in 2018  -- was on lenalidomide maintenance until February 2023 when he had progression of disease. Then switched to daratumumab + CyBorD; last dose March 23rd, 2023.  -- Continue acyclovir 400 mg BID.    2. Pancytopenia    -- unclear etiology  -- s/p BM Bx on 4/6  --  transfuse to maintain HGB > 7; PLT > 10.  -- Leukopenia mostly lymphocytopenia; neutrophil count normal.  -- Myeloma labs w/ normal free light chain ratio, KIRA w/ weak IgG kappa band, IgG 285, IgA 57, IgM 29    3. Fever Unknown Origin    -- ID following  -- no obv infectious source  -- Off of IV Abx and Ganciclovir  -- ID w/u so far neg   -- CT c/a/p noted only for bladder wall thickening     4. 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   -- s/p Auto SCT in 2018  -- was on lenalidomide maintenance until February 2023 when he had progression of disease. Then switched to daratumumab + CyBorD; last dose March 23rd, 2023.  -- Continue acyclovir 400 mg BID.    2. Pancytopenia    -- unclear etiology  -- s/p BM Bx on 4/6  --  transfuse to maintain HGB > 7; PLT > 10.  -- Leukopenia mostly lymphocytopenia; neutrophil count normal.  -- Myeloma labs w/ normal free light chain ratio, KIRA w/ weak IgG kappa band, IgG 285, IgA 57, IgM 29    3. Fever Unknown Origin    -- ID following  -- no obv infectious source  -- Off of IV Abx and Ganciclovir  -- ID w/u so far neg   -- CT c/a/p noted only for bladder wall thickening     4. 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 Hubbard   -- s/p Auto SCT in 2018  -- was on lenalidomide maintenance until February 2023 when he had progression of disease. Then switched to daratumumab + CyBorD; last dose March 23rd, 2023.  -- Continue acyclovir 400 mg BID.    2. Pancytopenia    -- unclear etiology  -- s/p BM Bx on 4/6  --  transfuse to maintain HGB > 7; PLT > 10.  -- Leukopenia mostly lymphocytopenia; neutrophil count normal.  -- Myeloma labs w/ normal free light chain ratio, KIRA w/ weak IgG kappa band, IgG 285, IgA 57, IgM 29    3. Fever Unknown Origin    -- ID following  -- no obv infectious source  -- Off of IV Abx and Ganciclovir  -- ID w/u so far neg   -- CT c/a/p noted only for bladder wall thickening     4. R Soleal Vein DVT    -- Hold AC in light of low platelets and bleeding risk  -- BTK DVT  -- need to Rpt LE Doppler Next week to eval for propogation    Tevin Garcia MD

## 2023-04-08 NOTE — PROGRESS NOTE ADULT - ASSESSMENT
1. fever  - V/Q scan with low probability of PE   - GI PCR- negative   - CMV PCR elevated, cmv IGG + and CMV IGM -  - cryptococcal ag, QuantiFeronTb, fungitell negative  - per ID, given immunosuppressed state and CMV viremia, ordered repeat CMV PCR and initiated valcyte.     2. anemia, FOBT positive in the setting of thrombocytopenia. Hgb remains at recent baseline. No overt bleeding symptoms.   - no plans for endoscopic evaluation at this time  - monitor H&H, transfuse PRN  - daily PPI     3. Chronic diarrhea, likely related to chemo +/- abx. 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   - GI PCR neg, C-diff neg on 03.03  - diarrhea now resolved, last BM x 3 days ago and reports feeling "backed up".   Standing Imodium discontinued and switched to prn.     4. DVT   - on anticoagulation    5. Multiple myeloma, pancytopenia   - per heme / onc   - s/p bone marrow biopsy 4/6 with IR    6. Hiccups--resolved   - reglan discontinued     7. Had diarrhea, now constipated   - miralax 17g PO x 1 given  - enema x 1 ordered    DC planning underway

## 2023-04-08 NOTE — PROVIDER CONTACT NOTE (OTHER) - SITUATION
Pt converted from SR to aflutter HR up to 140-150 Pt converted from SR to aflutter HR up to 140-150. Pt due for Mag 2g now as per order.

## 2023-04-09 LAB
ANION GAP SERPL CALC-SCNC: 8 MMOL/L — SIGNIFICANT CHANGE UP (ref 5–17)
BASE EXCESS BLDV CALC-SCNC: 10.5 MMOL/L — HIGH (ref -2–3)
BUN SERPL-MCNC: 27 MG/DL — HIGH (ref 7–23)
CALCIUM SERPL-MCNC: 8.2 MG/DL — LOW (ref 8.4–10.5)
CHLORIDE SERPL-SCNC: 94 MMOL/L — LOW (ref 96–108)
CO2 BLDV-SCNC: 36 MMOL/L — HIGH (ref 22–26)
CO2 SERPL-SCNC: 31 MMOL/L — SIGNIFICANT CHANGE UP (ref 22–31)
CREAT SERPL-MCNC: 1.18 MG/DL — SIGNIFICANT CHANGE UP (ref 0.5–1.3)
CULTURE RESULTS: NO GROWTH — SIGNIFICANT CHANGE UP
EGFR: 67 ML/MIN/1.73M2 — SIGNIFICANT CHANGE UP
GAS PNL BLDV: SIGNIFICANT CHANGE UP
GLUCOSE SERPL-MCNC: 119 MG/DL — HIGH (ref 70–99)
HCO3 BLDV-SCNC: 34 MMOL/L — HIGH (ref 22–29)
HCT VFR BLD CALC: 23.6 % — LOW (ref 39–50)
HGB BLD-MCNC: 7.9 G/DL — LOW (ref 13–17)
MAGNESIUM SERPL-MCNC: 1.6 MG/DL — SIGNIFICANT CHANGE UP (ref 1.6–2.6)
MCHC RBC-ENTMCNC: 30.4 PG — SIGNIFICANT CHANGE UP (ref 27–34)
MCHC RBC-ENTMCNC: 33.5 GM/DL — SIGNIFICANT CHANGE UP (ref 32–36)
MCV RBC AUTO: 90.8 FL — SIGNIFICANT CHANGE UP (ref 80–100)
NRBC # BLD: 2 /100 WBCS — HIGH (ref 0–0)
PCO2 BLDV: 41 MMHG — LOW (ref 42–55)
PH BLDV: 7.53 — HIGH (ref 7.32–7.43)
PHOSPHATE SERPL-MCNC: 1.6 MG/DL — LOW (ref 2.5–4.5)
PLATELET # BLD AUTO: 10 K/UL — CRITICAL LOW (ref 150–400)
PO2 BLDV: 81 MMHG — HIGH (ref 25–45)
POTASSIUM SERPL-MCNC: 3.7 MMOL/L — SIGNIFICANT CHANGE UP (ref 3.5–5.3)
POTASSIUM SERPL-SCNC: 3.7 MMOL/L — SIGNIFICANT CHANGE UP (ref 3.5–5.3)
RBC # BLD: 2.6 M/UL — LOW (ref 4.2–5.8)
RBC # FLD: 21.2 % — HIGH (ref 10.3–14.5)
SAO2 % BLDV: 96.5 % — HIGH (ref 67–88)
SODIUM SERPL-SCNC: 133 MMOL/L — LOW (ref 135–145)
SPECIMEN SOURCE: SIGNIFICANT CHANGE UP
WBC # BLD: 1.02 K/UL — LOW (ref 3.8–10.5)
WBC # FLD AUTO: 1.02 K/UL — LOW (ref 3.8–10.5)

## 2023-04-09 RX ORDER — DIGOXIN 250 MCG
500 TABLET ORAL EVERY 6 HOURS
Refills: 0 | Status: COMPLETED | OUTPATIENT
Start: 2023-04-09 | End: 2023-04-10

## 2023-04-09 RX ORDER — FILGRASTIM 480MCG/1.6
480 VIAL (ML) INJECTION ONCE
Refills: 0 | Status: DISCONTINUED | OUTPATIENT
Start: 2023-04-09 | End: 2023-04-10

## 2023-04-09 RX ORDER — SODIUM CHLORIDE 9 MG/ML
250 INJECTION INTRAMUSCULAR; INTRAVENOUS; SUBCUTANEOUS ONCE
Refills: 0 | Status: COMPLETED | OUTPATIENT
Start: 2023-04-09 | End: 2023-04-09

## 2023-04-09 RX ORDER — SODIUM CHLORIDE 9 MG/ML
1000 INJECTION INTRAMUSCULAR; INTRAVENOUS; SUBCUTANEOUS
Refills: 0 | Status: DISCONTINUED | OUTPATIENT
Start: 2023-04-09 | End: 2023-04-10

## 2023-04-09 RX ADMIN — ATORVASTATIN CALCIUM 40 MILLIGRAM(S): 80 TABLET, FILM COATED ORAL at 22:23

## 2023-04-09 RX ADMIN — MIDODRINE HYDROCHLORIDE 10 MILLIGRAM(S): 2.5 TABLET ORAL at 05:45

## 2023-04-09 RX ADMIN — Medication 500 MICROGRAM(S): at 17:55

## 2023-04-09 RX ADMIN — Medication 650 MILLIGRAM(S): at 10:21

## 2023-04-09 RX ADMIN — SODIUM CHLORIDE 1000 MILLILITER(S): 9 INJECTION INTRAMUSCULAR; INTRAVENOUS; SUBCUTANEOUS at 13:59

## 2023-04-09 RX ADMIN — MIDODRINE HYDROCHLORIDE 10 MILLIGRAM(S): 2.5 TABLET ORAL at 11:54

## 2023-04-09 RX ADMIN — VALGANCICLOVIR 450 MILLIGRAM(S): 450 TABLET, FILM COATED ORAL at 18:06

## 2023-04-09 RX ADMIN — MIDODRINE HYDROCHLORIDE 10 MILLIGRAM(S): 2.5 TABLET ORAL at 18:06

## 2023-04-09 RX ADMIN — Medication 50 MILLIGRAM(S): at 05:45

## 2023-04-09 RX ADMIN — ENTECAVIR 0.5 MILLIGRAM(S): 0.5 TABLET ORAL at 11:54

## 2023-04-09 RX ADMIN — VALGANCICLOVIR 450 MILLIGRAM(S): 450 TABLET, FILM COATED ORAL at 05:45

## 2023-04-09 RX ADMIN — SODIUM CHLORIDE 50 MILLILITER(S): 9 INJECTION INTRAMUSCULAR; INTRAVENOUS; SUBCUTANEOUS at 14:26

## 2023-04-09 NOTE — PROGRESS NOTE ADULT - SUBJECTIVE AND OBJECTIVE BOX
[facility] NEPHROLOGY- PROGRESS NOTE    67 year old Male with history of MM on chemotherapy presents with fevers. Nephrology consulted for elevated Scr.    : Pt failed TOV and and lloyd catheter was replaced    Pt getting multiple units Plts    REVIEW OF SYSTEMS:  Gen: +mild fevers  Cards: no chest pain  Resp: no dyspnea  GI: no nausea or vomiting or diarrhea  Vascular: no LE edema    No Known Allergies      Hospital Medications: Medications reviewed        VITALS:  T(F): 98.8 (23 @ 17:14), Max: 102.5 (23 @ 20:10)  HR: 98 (23 @ 17:14)  BP: 100/62 (23 @ 17:14)  RR: 18 (23 @ 17:14)  SpO2: 96% (23 @ 17:14)  Wt(kg): --     @ 07:01  -   @ 07:00  --------------------------------------------------------  IN: 720 mL / OUT: 1700 mL / NET: -980 mL     @ 07:01  -   @ 18:40  --------------------------------------------------------  IN: 480 mL / OUT: 800 mL / NET: -320 mL        PHYSICAL EXAM:  Gen: NAD, calm  Cards: RRR, +S1/S2, no M/G/R  Resp: CTA B/L  GI: soft, NT/ND, NABS  : + lloyd with yellow urine  Vascular: trace LE edema B/L        LABS:    133 <--, 136 <--, 136 <--, 135 <--, 133 <--, 131 <--  133<L>  |  94<L>  |  27<H>  ----------------------------<  119<H>  3.7   |  31  |  1.18    Ca    8.2<L>      2023 05:55  Phos  1.6     04-  Mg     1.6     04-      Creatinine Trend: 1.18 <--, 1.37 <--, 1.41 <--, 1.68 <--, 1.97 <--, 2.17 <--                        7.9    1.02  )-----------( 10       ( 2023 05:56 )             23.6     Urine Studies:  Urinalysis Basic - ( 2023 08:42 )    Color: Yellow / Appearance: Clear / S.031 / pH:   Gluc:  / Ketone: Negative  / Bili: Negative / Urobili: 6 mg/dL   Blood:  / Protein: 30 mg/dL / Nitrite: Negative   Leuk Esterase: Negative / RBC: 3 /hpf / WBC 3 /HPF   Sq Epi:  / Non Sq Epi:  / Bacteria: Negative      Chloride, Random Urine: 37 mmol/L ( @ 08:42)     [facility] NEPHROLOGY- PROGRESS NOTE    67 year old Male with history of MM on chemotherapy presents with fevers. Nephrology consulted for elevated Scr.    : Pt failed TOV and and lloyd catheter was replaced    Pt getting multiple units Plts    REVIEW OF SYSTEMS:  Gen: +mild fevers  Cards: no chest pain  Resp: no dyspnea  GI: no nausea or vomiting or diarrhea  Vascular: no LE edema    No Known Allergies      Hospital Medications: Medications reviewed        VITALS:  T(F): 98.8 (23 @ 17:14), Max: 102.5 (23 @ 20:10)  HR: 98 (23 @ 17:14)  BP: 100/62 (23 @ 17:14)  RR: 18 (23 @ 17:14)  SpO2: 96% (23 @ 17:14)  Wt(kg): --     @ 07:01  -   @ 07:00  --------------------------------------------------------  IN: 720 mL / OUT: 1700 mL / NET: -980 mL     @ 07:01  -   @ 18:40  --------------------------------------------------------  IN: 480 mL / OUT: 800 mL / NET: -320 mL        PHYSICAL EXAM:  Gen: NAD, calm  Cards: RRR, +S1/S2, no M/G/R  Resp: CTA B/L  GI: soft, NT/ND, NABS  : + lloyd with yellow urine  Vascular: trace LE edema B/L        LABS:    133 <--, 136 <--, 136 <--, 135 <--, 133 <--, 131 <--  133<L>  |  94<L>  |  27<H>  ----------------------------<  119<H>  3.7   |  31  |  1.18    Ca    8.2<L>      2023 05:55  Phos  1.6     04-  Mg     1.6     04-      Creatinine Trend: 1.18 <--, 1.37 <--, 1.41 <--, 1.68 <--, 1.97 <--, 2.17 <--                        7.9    1.02  )-----------( 10       ( 2023 05:56 )             23.6     Urine Studies:  Urinalysis Basic - ( 2023 08:42 )    Color: Yellow / Appearance: Clear / S.031 / pH:   Gluc:  / Ketone: Negative  / Bili: Negative / Urobili: 6 mg/dL   Blood:  / Protein: 30 mg/dL / Nitrite: Negative   Leuk Esterase: Negative / RBC: 3 /hpf / WBC 3 /HPF   Sq Epi:  / Non Sq Epi:  / Bacteria: Negative      Chloride, Random Urine: 37 mmol/L ( @ 08:42)     [facility] NEPHROLOGY- PROGRESS NOTE    67 year old Male with history of MM on chemotherapy presents with fevers. Nephrology consulted for elevated Scr.    : Pt failed TOV and and lloyd catheter was replaced    Pt getting multiple units Plts    REVIEW OF SYSTEMS:  Gen: +mild fevers  Cards: no chest pain  Resp: no dyspnea  GI: no nausea or vomiting or diarrhea  Vascular: no LE edema    No Known Allergies      Hospital Medications: Medications reviewed        VITALS:  T(F): 98.8 (23 @ 17:14), Max: 102.5 (23 @ 20:10)  HR: 98 (23 @ 17:14)  BP: 100/62 (23 @ 17:14)  RR: 18 (23 @ 17:14)  SpO2: 96% (23 @ 17:14)  Wt(kg): --     @ 07:01  -   @ 07:00  --------------------------------------------------------  IN: 720 mL / OUT: 1700 mL / NET: -980 mL     @ 07:01  -   @ 18:40  --------------------------------------------------------  IN: 480 mL / OUT: 800 mL / NET: -320 mL        PHYSICAL EXAM:  Gen: NAD, calm  Cards: RRR, +S1/S2, no M/G/R  Resp: CTA B/L  GI: soft, NT/ND, NABS  : + lloyd with yellow urine  Vascular: trace LE edema B/L        LABS:    133 <--, 136 <--, 136 <--, 135 <--, 133 <--, 131 <--  133<L>  |  94<L>  |  27<H>  ----------------------------<  119<H>  3.7   |  31  |  1.18    Ca    8.2<L>      2023 05:55  Phos  1.6     04-  Mg     1.6     04-      Creatinine Trend: 1.18 <--, 1.37 <--, 1.41 <--, 1.68 <--, 1.97 <--, 2.17 <--                        7.9    1.02  )-----------( 10       ( 2023 05:56 )             23.6     Urine Studies:  Urinalysis Basic - ( 2023 08:42 )    Color: Yellow / Appearance: Clear / S.031 / pH:   Gluc:  / Ketone: Negative  / Bili: Negative / Urobili: 6 mg/dL   Blood:  / Protein: 30 mg/dL / Nitrite: Negative   Leuk Esterase: Negative / RBC: 3 /hpf / WBC 3 /HPF   Sq Epi:  / Non Sq Epi:  / Bacteria: Negative      Chloride, Random Urine: 37 mmol/L ( @ 08:42)

## 2023-04-09 NOTE — PROGRESS NOTE ADULT - SUBJECTIVE AND OBJECTIVE BOX
Pt is seen and examined  pt is awake and lying in bed  Temperature to 102  this AM but overall fever curve improved  WBC trending down  No sweats. No cough  No diarrhea    PAST MEDICAL & SURGICAL HISTORY:  Multiple myeloma      Hyperlipidemia      H/O aortic aneurysm      H/O splenectomy      S/P partial gastrectomy      History of pancreatic surgery          ROS:  Negative except for:    MEDICATIONS  (STANDING):  atorvastatin 40 milliGRAM(s) Oral at bedtime  entecavir 0.5 milliGRAM(s) Oral daily  midodrine. 10 milliGRAM(s) Oral three times a day  predniSONE   Tablet 50 milliGRAM(s) Oral daily  sodium chloride 0.9%. 1000 milliLiter(s) (75 mL/Hr) IV Continuous <Continuous>  valGANciclovir 450 milliGRAM(s) Oral every 12 hours    MEDICATIONS  (PRN):  acetaminophen     Tablet .. 650 milliGRAM(s) Oral every 6 hours PRN Temp greater or equal to 38C (100.4F), Mild Pain (1 - 3)  aluminum hydroxide/magnesium hydroxide/simethicone Suspension 30 milliLiter(s) Oral every 4 hours PRN Dyspepsia  loperamide 2 milliGRAM(s) Oral three times a day PRN Diarrhea      Allergies    No Known Allergies    Intolerances        Vital Signs Last 24 Hrs  T(C): 39.3 (09 Apr 2023 10:14), Max: 39.3 (09 Apr 2023 10:14)  T(F): 102.8 (09 Apr 2023 10:14), Max: 102.8 (09 Apr 2023 10:14)  HR: 139 (09 Apr 2023 10:14) (93 - 151)  BP: 97/63 (09 Apr 2023 10:03) (97/63 - 124/60)  BP(mean): --  RR: 18 (09 Apr 2023 10:03) (18 - 18)  SpO2: 94% (09 Apr 2023 10:03) (94% - 96%)    Parameters below as of 09 Apr 2023 10:03  Patient On (Oxygen Delivery Method): room air        PHYSICAL EXAM      NC/AT  In NAD  Looks non toxic  Chest Clear Bilat  CVS S1,S2+ RRR  Abd Soft,NT/ND, +BS  Ext No edema  + Rodriguez w clear yellow urine    LABS:                          7.9    1.02  )-----------( 10       ( 09 Apr 2023 05:56 )             23.6     Serial CBC's  04-09 @ 05:56  Hct-23.6 / Hgb-7.9 / Plat-10 / RBC-2.60 / WBC-1.02          Serial CBC's  04-08 @ 06:10  Hct-25.1 / Hgb-8.4 / Plat-14 / RBC-2.72 / WBC-1.44            04-09    133<L>  |  94<L>  |  27<H>  ----------------------------<  119<H>  3.7   |  31  |  1.18    Ca    8.2<L>      09 Apr 2023 05:55  Phos  1.6     04-09  Mg     1.6     04-09            WBC Count: 1.02 K/uL (04-09 @ 05:56)  Hemoglobin: 7.9 g/dL (04-09 @ 05:56)            RADIOLOGY & ADDITIONAL STUDIES:

## 2023-04-09 NOTE — PROGRESS NOTE ADULT - ASSESSMENT
67y Male with history of MM on chemotherapy presents with fevers. Nephrology consulted for elevated Scr.    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 Scr improving. S/p IVF due to CT.  F/u renal fxn after CT with IVF. UA active likely due to lloyd with granular casts suggestive of ATN. FeNa indeterminate. Lloyd discontinued and patient failed TOV- lloyd replaced. TMA work up negative. Avoid nephrotoxins. Monitor electrolytes.    2) Hypotension: BP low normal. Continue with midodrine 10 mg PO TID. Monitor BP.    3) Metabolic alkalosis: In setting of recent diuretic use. Plan to start NS as above. Check urine chloride and repeat blood gas in AM. Potassium repleted. Monitor pH.    4) MM: As per Heme/Onc.      No renal objection to proceed with CT with IV contrast given resolving RK. Patient educated on risks of recurrent RK from contrast nephropathy and educated that risk is low given resolving RK. Will start IVF for ppx (as above). Can continue IVF until AM given contraction alkalosis on blood gas.    [facility] NEPHROLOGY  Leoncio Leonard M.D.  Tay Brooke D.O.  Precious Chen M.D.  Nidia Stallings, MSN, ANP-C    Telephone: [phone]  Facsimile: [phone]    [address] [place] [address]   67y Male with history of MM on chemotherapy presents with fevers. Nephrology consulted for elevated Scr.    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 Scr improving. S/p IVF due to CT.  F/u renal fxn after CT with IVF. UA active likely due to lloyd with granular casts suggestive of ATN. FeNa indeterminate. Lloyd discontinued and patient failed TOV- lloyd replaced. TMA work up negative. Avoid nephrotoxins. Monitor electrolytes.    2) Hypotension: BP low normal. Continue with midodrine 10 mg PO TID. Monitor BP.    3) Metabolic alkalosis: In setting of recent diuretic use. Plan to start NS as above. Check urine chloride and repeat blood gas in AM. Potassium repleted. Monitor pH.    4) MM: As per Heme/Onc.      No renal objection to proceed with CT with IV contrast given resolving RK. Patient educated on risks of recurrent RK from contrast nephropathy and educated that risk is low given resolving RK. Will start IVF for ppx (as above). Can continue IVF until AM given contraction alkalosis on blood gas.    [facility] NEPHROLOGY  Leoncio Leonard M.D.  Tay Brooke D.O.  Precious Chen M.D.  Nidia Stallings, MSN, ANP-C    Telephone: [phone]  Facsimile: [phone]    [address] [place] [address]   67y Male with history of MM on chemotherapy presents with fevers. Nephrology consulted for elevated Scr.    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 Scr improving. S/p IVF due to CT.  F/u renal fxn after CT with IVF. UA active likely due to lloyd with granular casts suggestive of ATN. FeNa indeterminate. Lloyd discontinued and patient failed TOV- lloyd replaced. TMA work up negative. Avoid nephrotoxins. Monitor electrolytes.    2) Hypotension: BP low normal. Continue with midodrine 10 mg PO TID. Monitor BP.    3) Metabolic alkalosis: In setting of recent diuretic use. Plan to start NS as above. Check urine chloride and repeat blood gas in AM. Potassium repleted. Monitor pH.    4) MM: As per Heme/Onc.      No renal objection to proceed with CT with IV contrast given resolving RK. Patient educated on risks of recurrent RK from contrast nephropathy and educated that risk is low given resolving RK. Will start IVF for ppx (as above). Can continue IVF until AM given contraction alkalosis on blood gas.    [facility] NEPHROLOGY  Leoncio Leonard M.D.  Tay Brooke D.O.  Precious Chen M.D.  Nidia Satllings, MSN, ANP-C    Telephone: [phone]  Facsimile: [phone]    [address] [place] [address]

## 2023-04-09 NOTE — PROGRESS NOTE ADULT - ASSESSMENT
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    Fever of unknown origin.   - CXR without consolidations or effusions; U/A unremarkable; GI PCR Neg   - BCx2 and UCx  --> Neg final   - Recurrent fever. Repeat BCx2 sent 03/27 --> Neg final; ABX as per ID   - LE VA duplex --> + For DVT    - Was on empiric treatment with Vanc/cefepime --> ID consulted; S/P Zosyn  - Hold home penicillin while on ABX   - V/Q scan -- Low probability   - ID to follow up, infectious work up as per ID  --> CMV PCR -- + C/w Ganciclovir per ID , CMV IGG (+) and IgM (-), Cryptococcal Ag --Neg, Quantiferon Tb -- Neg, Fungitell -- <31   - CT Chest non-contrast, noted, no acute pathology  - Recurrent fever 03/29 --> F/u BCx2 -- Neg; F/u 03/31 BCx -- NGTD   - Shock, RRT, resume antibiotics IV fluid, MICU eval , ID follow up  --> Prednisone 50 PO Qd   - S/P Zosyn; Defer ABX as per ID   - short course steroid per hematology   - IR eval for BM BX as per Heme/Onc --> done on 04/06 with IR, f/u path  - Febrile again 4/7/23: 101F, then later 102F  - ID ordered pan CT, noted questionable cystitis. UA is neg.  - ID follow up in regards to the need for abx.     Anemia, Thrombocytopenia, Pancytopenia  - Drop in Hgb, Occult +  - Hold Hep gtt ; S/P 1 unit of PRBCs 03/28  - Maintain active T+S. Transfuse for Hgb < 7.0, and platelets < 10.K  - GI eval appreciated; F/u recs --> No plans for scope at this time   - Was on Xarelto, held due to drop in Platelets as per Heme/Onc. Monitor platelet count   - S/P 1 unit PRBCs and 1 unit Platelets 04/04; W/ lasix  - s/p 1 unit of platelets 04/05; post-transfusion CBC stable  - HIT ab: neg  - ASA on hold    HypoNa  - Cont to monitor and trend  - Appreciate renal eval.   - Serial labs     Multiple myeloma.   - pancytopenia largely at baseline although Hg 6.7 on arrival; s/p 1U PRBC with good response   - Was on acyclovir, now on Ganciclovir as per ID   - C/w home entecavir   - S/P dexamethasone, D/C'd by hematology, follow up outpatient after discharge  --> Now on Prednisone 50 Qd   - Pt reports his Revlimid was held  - Heme/Onc consulted; F/u recs   - Resume home aspirin. --> Now on hold   - s/p IR BM Bx  04/06     Chronic diarrhea, now with Constipation   - Standing Imodium switched to PRN  - Monitor for BM     DVT   - Duplex + For R soleal vein DVT  --> Will consider CT A chest once creatinine permits as patient received contrast; Monitor for LOPEZ   - s/p Hep gtt; Monitor PTT; Monitor H/H closely --> Now on hold in view of drop in Hgb and severe thrombocytopenia  - VQ scan to R/O PE -- Low probability   - Vascular eval appreciated; AC as tolerated, on Xarelto 10, monitor H/H, plts too low to start.   - Serial Duplex to assess for propagation  -- Without propagation   - 04/03 Duplex without propagation   - Repeat Duplex 04/10 -- ordered   - given severe thrombocytopenia, holding Xarelto for now. Risk of bleeding greater than benefit.     RK  - S/P Contrast on 03/23   - Monitor Cr closely; Avoid nephrotoxic agents   - Cr down-trending. s/p IVF   - Renal eval appreciated   - urinary retention s/p lloyd.   - Removed lloyd 4/7/23, TOV in progress. post void residual 550ml on bladder scan  - s/p straight cath. may need to replace lloyd if unable to urinarte.        Hypertension, now hypotensive   - C/w home metoprolol.  - C/w Midodrine 10 TID. Hold for SBP > 140     Hiccups  - Started on Reglan, Monitor     Hyperlipidemia.   - C/w home atorvastatin.    Abnormal CT  - Outpatient follow up for CT findings    Prophylactic measure.   dvt ppx: DVT PPX   diet: regular  ambulate: with assistance    fall precautions  aspiration precautions.      Discussed with Patient, and family in detail at the bedside.      Dispo: - Febrile again 4/7/23: 101F, then later 102F, ID ordered pan CT, noted questionable cystitis. UA is neg. ID follow up in regards to the need for abx.  Removed lloyd 4/7/23, TOV in progress. post void residual 550ml on bladder scan s/p straight cath, may need lloyd again.

## 2023-04-09 NOTE — PROGRESS NOTE ADULT - SUBJECTIVE AND OBJECTIVE BOX
Name of Patient : PETER DE LA PAZ  MRN: [id number]  Date of visit: 23       Subjective: Patient seen and examined. No new events except as noted.     REVIEW OF SYSTEMS:    CONSTITUTIONAL: No weakness, fevers or chills  EYES/ENT: No visual changes;  No vertigo or throat pain   NECK: No pain or stiffness  RESPIRATORY: No cough, wheezing, hemoptysis; No shortness of breath  CARDIOVASCULAR: No chest pain or palpitations  GASTROINTESTINAL: No abdominal or epigastric pain. No nausea, vomiting, or hematemesis; No diarrhea or constipation. No melena or hematochezia.  GENITOURINARY: No dysuria, frequency or hematuria  NEUROLOGICAL: No numbness or weakness  SKIN: No itching, burning, rashes, or lesions   All other review of systems is negative unless indicated above.    MEDICATIONS:  MEDICATIONS  (STANDING):  atorvastatin 40 milliGRAM(s) Oral at bedtime  digoxin  Injectable 500 MICROGram(s) IV Push every 6 hours  entecavir 0.5 milliGRAM(s) Oral daily  filgrastim-sndz (ZARXIO) Injectable 480 MICROGram(s) SubCutaneous once  midodrine. 10 milliGRAM(s) Oral three times a day  predniSONE   Tablet 50 milliGRAM(s) Oral daily  sodium chloride 0.9%. 1000 milliLiter(s) (75 mL/Hr) IV Continuous <Continuous>  sodium chloride 0.9%. 1000 milliLiter(s) (50 mL/Hr) IV Continuous <Continuous>  valGANciclovir 450 milliGRAM(s) Oral every 12 hours      PHYSICAL EXAM:  T(C): 37.3 (23 @ 20:30), Max: 39.3 (23 @ 10:14)  HR: 103 (23 @ 20:30) (84 - 151)  BP: 113/65 (23 @ 20:30) (97/63 - 114/56)  RR: 16 (23 @ 20:30) (15 - 18)  SpO2: 94% (23 @ 20:30) (94% - 96%)  Wt(kg): --  I&O's Summary    2023 07:01  -  2023 07:00  --------------------------------------------------------  IN: 480 mL / OUT: 2300 mL / NET: -1820 mL    2023 07:01  -  2023 23:10  --------------------------------------------------------  IN: 480 mL / OUT: 700 mL / NET: -220 mL          Appearance: Normal	  HEENT:  PERRLA   Lymphatic: No lymphadenopathy   Cardiovascular: Normal S1 S2, no JVD  Respiratory: normal effort , clear  Gastrointestinal:  Soft, Non-tender  Skin: No rashes,  warm to touch  Psychiatry:  Mood & affect appropriate  Musculuskeletal: No edema    recent labs, Imaging and EKGs personally reviewed     23 @ 07:01  -  23 @ 07:00  --------------------------------------------------------  IN: 480 mL / OUT: 2300 mL / NET: -1820 mL    23 @ 07:01  -  23 @ 23:10  --------------------------------------------------------  IN: 480 mL / OUT: 700 mL / NET: -220 mL                            7.9    1.02  )-----------( 10       ( 2023 05:56 )             23.6                   133<L>  |  94<L>  |  27<H>  ----------------------------<  119<H>  3.7   |  31  |  1.18    Ca    8.2<L>      2023 05:55  Phos  1.6       Mg     1.6                              Urinalysis Basic - ( 2023 08:42 )    Color: Yellow / Appearance: Clear / S.031 / pH: x  Gluc: x / Ketone: Negative  / Bili: Negative / Urobili: 6 mg/dL   Blood: x / Protein: 30 mg/dL / Nitrite: Negative   Leuk Esterase: Negative / RBC: 3 /hpf / WBC 3 /HPF   Sq Epi: x / Non Sq Epi: x / Bacteria: Negative             Name of Patient : PETER DE LA PAZ  MRN: [id number]  Date of visit: 23       Subjective: Patient seen and examined. No new events except as noted.     REVIEW OF SYSTEMS:    CONSTITUTIONAL: No weakness, fevers or chills  EYES/ENT: No visual changes;  No vertigo or throat pain   NECK: No pain or stiffness  RESPIRATORY: No cough, wheezing, hemoptysis; No shortness of breath  CARDIOVASCULAR: No chest pain or palpitations  GASTROINTESTINAL: No abdominal or epigastric pain. No nausea, vomiting, or hematemesis; No diarrhea or constipation. No melena or hematochezia.  GENITOURINARY: No dysuria, frequency or hematuria  NEUROLOGICAL: No numbness or weakness  SKIN: No itching, burning, rashes, or lesions   All other review of systems is negative unless indicated above.    MEDICATIONS:  MEDICATIONS  (STANDING):  atorvastatin 40 milliGRAM(s) Oral at bedtime  digoxin  Injectable 500 MICROGram(s) IV Push every 6 hours  entecavir 0.5 milliGRAM(s) Oral daily  filgrastim-sndz (ZARXIO) Injectable 480 MICROGram(s) SubCutaneous once  midodrine. 10 milliGRAM(s) Oral three times a day  predniSONE   Tablet 50 milliGRAM(s) Oral daily  sodium chloride 0.9%. 1000 milliLiter(s) (75 mL/Hr) IV Continuous <Continuous>  sodium chloride 0.9%. 1000 milliLiter(s) (50 mL/Hr) IV Continuous <Continuous>  valGANciclovir 450 milliGRAM(s) Oral every 12 hours      PHYSICAL EXAM:  T(C): 37.3 (23 @ 20:30), Max: 39.3 (23 @ 10:14)  HR: 103 (23 @ 20:30) (84 - 151)  BP: 113/65 (23 @ 20:30) (97/63 - 114/56)  RR: 16 (23 @ 20:30) (15 - 18)  SpO2: 94% (23 @ 20:30) (94% - 96%)  Wt(kg): --  I&O's Summary    2023 07:01  -  2023 07:00  --------------------------------------------------------  IN: 480 mL / OUT: 2300 mL / NET: -1820 mL    2023 07:01  -  2023 23:10  --------------------------------------------------------  IN: 480 mL / OUT: 700 mL / NET: -220 mL          Appearance: Normal	  HEENT:  PERRLA   Lymphatic: No lymphadenopathy   Cardiovascular: Normal S1 S2, no JVD  Respiratory: normal effort , clear  Gastrointestinal:  Soft, Non-tender  Skin: No rashes,  warm to touch  Psychiatry:  Mood & affect appropriate  Musculuskeletal: No edema    recent labs, Imaging and EKGs personally reviewed     23 @ 07:01  -  23 @ 07:00  --------------------------------------------------------  IN: 480 mL / OUT: 2300 mL / NET: -1820 mL    23 @ 07:01  -  23 @ 23:10  --------------------------------------------------------  IN: 480 mL / OUT: 700 mL / NET: -220 mL                            7.9    1.02  )-----------( 10       ( 2023 05:56 )             23.6                   133<L>  |  94<L>  |  27<H>  ----------------------------<  119<H>  3.7   |  31  |  1.18    Ca    8.2<L>      2023 05:55  Phos  1.6       Mg     1.6                              Urinalysis Basic - ( 2023 08:42 )    Color: Yellow / Appearance: Clear / S.031 / pH: x  Gluc: x / Ketone: Negative  / Bili: Negative / Urobili: 6 mg/dL   Blood: x / Protein: 30 mg/dL / Nitrite: Negative   Leuk Esterase: Negative / RBC: 3 /hpf / WBC 3 /HPF   Sq Epi: x / Non Sq Epi: x / Bacteria: Negative             Name of Patient : PETER DE LA PAZ  MRN: [id number]  Date of visit: 23       Subjective: Patient seen and examined. No new events except as noted.     REVIEW OF SYSTEMS:    CONSTITUTIONAL: No weakness, fevers or chills  EYES/ENT: No visual changes;  No vertigo or throat pain   NECK: No pain or stiffness  RESPIRATORY: No cough, wheezing, hemoptysis; No shortness of breath  CARDIOVASCULAR: No chest pain or palpitations  GASTROINTESTINAL: No abdominal or epigastric pain. No nausea, vomiting, or hematemesis; No diarrhea or constipation. No melena or hematochezia.  GENITOURINARY: No dysuria, frequency or hematuria  NEUROLOGICAL: No numbness or weakness  SKIN: No itching, burning, rashes, or lesions   All other review of systems is negative unless indicated above.    MEDICATIONS:  MEDICATIONS  (STANDING):  atorvastatin 40 milliGRAM(s) Oral at bedtime  digoxin  Injectable 500 MICROGram(s) IV Push every 6 hours  entecavir 0.5 milliGRAM(s) Oral daily  filgrastim-sndz (ZARXIO) Injectable 480 MICROGram(s) SubCutaneous once  midodrine. 10 milliGRAM(s) Oral three times a day  predniSONE   Tablet 50 milliGRAM(s) Oral daily  sodium chloride 0.9%. 1000 milliLiter(s) (75 mL/Hr) IV Continuous <Continuous>  sodium chloride 0.9%. 1000 milliLiter(s) (50 mL/Hr) IV Continuous <Continuous>  valGANciclovir 450 milliGRAM(s) Oral every 12 hours      PHYSICAL EXAM:  T(C): 37.3 (23 @ 20:30), Max: 39.3 (23 @ 10:14)  HR: 103 (23 @ 20:30) (84 - 151)  BP: 113/65 (23 @ 20:30) (97/63 - 114/56)  RR: 16 (23 @ 20:30) (15 - 18)  SpO2: 94% (23 @ 20:30) (94% - 96%)  Wt(kg): --  I&O's Summary    2023 07:01  -  2023 07:00  --------------------------------------------------------  IN: 480 mL / OUT: 2300 mL / NET: -1820 mL    2023 07:01  -  2023 23:10  --------------------------------------------------------  IN: 480 mL / OUT: 700 mL / NET: -220 mL          Appearance: Normal	  HEENT:  PERRLA   Lymphatic: No lymphadenopathy   Cardiovascular: Normal S1 S2, no JVD  Respiratory: normal effort , clear  Gastrointestinal:  Soft, Non-tender  Skin: No rashes,  warm to touch  Psychiatry:  Mood & affect appropriate  Musculuskeletal: No edema    recent labs, Imaging and EKGs personally reviewed     23 @ 07:01  -  23 @ 07:00  --------------------------------------------------------  IN: 480 mL / OUT: 2300 mL / NET: -1820 mL    23 @ 07:01  -  23 @ 23:10  --------------------------------------------------------  IN: 480 mL / OUT: 700 mL / NET: -220 mL                            7.9    1.02  )-----------( 10       ( 2023 05:56 )             23.6                   133<L>  |  94<L>  |  27<H>  ----------------------------<  119<H>  3.7   |  31  |  1.18    Ca    8.2<L>      2023 05:55  Phos  1.6       Mg     1.6                              Urinalysis Basic - ( 2023 08:42 )    Color: Yellow / Appearance: Clear / S.031 / pH: x  Gluc: x / Ketone: Negative  / Bili: Negative / Urobili: 6 mg/dL   Blood: x / Protein: 30 mg/dL / Nitrite: Negative   Leuk Esterase: Negative / RBC: 3 /hpf / WBC 3 /HPF   Sq Epi: x / Non Sq Epi: x / Bacteria: Negative

## 2023-04-09 NOTE — PROGRESS NOTE ADULT - SUBJECTIVE AND OBJECTIVE BOX
Subjective: Patient seen and examined. No new events except as noted.     SUBJECTIVE/ROS:        MEDICATIONS:  MEDICATIONS  (STANDING):  atorvastatin 40 milliGRAM(s) Oral at bedtime  entecavir 0.5 milliGRAM(s) Oral daily  filgrastim-sndz (ZARXIO) Injectable 480 MICROGram(s) SubCutaneous once  midodrine. 10 milliGRAM(s) Oral three times a day  predniSONE   Tablet 50 milliGRAM(s) Oral daily  sodium chloride 0.9%. 1000 milliLiter(s) (75 mL/Hr) IV Continuous <Continuous>  valGANciclovir 450 milliGRAM(s) Oral every 12 hours      PHYSICAL EXAM:  T(C): 38.4 (04-09-23 @ 12:14), Max: 39.3 (04-09-23 @ 10:14)  HR: 139 (04-09-23 @ 10:14) (96 - 151)  BP: 97/63 (04-09-23 @ 10:03) (97/63 - 124/60)  RR: 18 (04-09-23 @ 10:03) (18 - 18)  SpO2: 94% (04-09-23 @ 10:03) (94% - 96%)  Wt(kg): --  I&O's Summary    08 Apr 2023 07:01  -  09 Apr 2023 07:00  --------------------------------------------------------  IN: 480 mL / OUT: 2300 mL / NET: -1820 mL            JVP: Normal  Neck: supple  Lung: clear   CV: S1 S2 , Murmur:  Abd: soft  Ext: No edema  neuro: Awake  Psych: flat affect  Skin: normal``    LABS/DATA:    CARDIAC MARKERS:                                7.9    1.02  )-----------( 10       ( 09 Apr 2023 05:56 )             23.6     04-09    133<L>  |  94<L>  |  27<H>  ----------------------------<  119<H>  3.7   |  31  |  1.18    Ca    8.2<L>      09 Apr 2023 05:55  Phos  1.6     04-09  Mg     1.6     04-09      proBNP:   Lipid Profile:   HgA1c:   TSH:     TELE:  EKG:

## 2023-04-09 NOTE — PROGRESS NOTE ADULT - ASSESSMENT
New onset afib, aflutter  tachycardia  cant use a/c due to very low plts  BP on low side  will start digoxin load   consider EP eval if no improvement     History of bioAVR   echo unremarkable     Anemia  Monitor hemoglobin, transfuse as needed.  plan as per Heme    DVT   a/c on hold  low plts  fu with vascular surgery / heme     Hypotension   due to sepsis   better now   on midodrine

## 2023-04-09 NOTE — PROGRESS NOTE ADULT - ASSESSMENT
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 Hubbard   -- s/p Auto SCT in 2018  -- was on lenalidomide maintenance until February 2023 when he had progression of disease. Then switched to daratumumab + CyBorD; last dose March 23rd, 2023.  -- Continue acyclovir 400 mg BID.    2. Pancytopenia    -- unclear etiology. Primary BM d/o vs Valgancyclovir?  -- s/p BM Bx on 4/6  --  transfuse to maintain HGB > 7; PLT > 10.  -- WBC trending down, ANC yesterday 1200. Likely lower today as total WBC sown to 1020. I  will give 1 dose GCSF  -- Myeloma labs w/ normal free light chain ratio, KIRA w/ weak IgG kappa band, IgG 285, IgA 57, IgM 29    3. Fever Unknown Origin    -- ID following  -- no obv infectious source  -- On Valganciclovir and  Entecavir  -- ID w/u so far neg   -- CT c/a/p noted only for bladder wall thickening but UA not c/w cystitis    4. 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   -- s/p Auto SCT in 2018  -- was on lenalidomide maintenance until February 2023 when he had progression of disease. Then switched to daratumumab + CyBorD; last dose March 23rd, 2023.  -- Continue acyclovir 400 mg BID.    2. Pancytopenia    -- unclear etiology. Primary BM d/o vs Valgancyclovir?  -- s/p BM Bx on 4/6  --  transfuse to maintain HGB > 7; PLT > 10.  -- WBC trending down, ANC yesterday 1200. Likely lower today as total WBC sown to 1020. I  will give 1 dose GCSF  -- Myeloma labs w/ normal free light chain ratio, KIRA w/ weak IgG kappa band, IgG 285, IgA 57, IgM 29    3. Fever Unknown Origin    -- ID following  -- no obv infectious source  -- On Valganciclovir and  Entecavir  -- ID w/u so far neg   -- CT c/a/p noted only for bladder wall thickening but UA not c/w cystitis    4. 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 Hubbard   -- s/p Auto SCT in 2018  -- was on lenalidomide maintenance until February 2023 when he had progression of disease. Then switched to daratumumab + CyBorD; last dose March 23rd, 2023.  -- Continue acyclovir 400 mg BID.    2. Pancytopenia    -- unclear etiology. Primary BM d/o vs Valgancyclovir?  -- s/p BM Bx on 4/6  --  transfuse to maintain HGB > 7; PLT > 10.  -- WBC trending down, ANC yesterday 1200. Likely lower today as total WBC sown to 1020. I  will give 1 dose GCSF  -- Myeloma labs w/ normal free light chain ratio, KIRA w/ weak IgG kappa band, IgG 285, IgA 57, IgM 29    3. Fever Unknown Origin    -- ID following  -- no obv infectious source  -- On Valganciclovir and  Entecavir  -- ID w/u so far neg   -- CT c/a/p noted only for bladder wall thickening but UA not c/w cystitis    4. R Soleal Vein DVT    -- Hold AC in light of low platelets and bleeding risk  -- BTK DVT  -- need to Rpt LE Doppler Next week to eval for propogation    Tevin Garcia MD

## 2023-04-10 LAB
ANION GAP SERPL CALC-SCNC: 11 MMOL/L — SIGNIFICANT CHANGE UP (ref 5–17)
ANION GAP SERPL CALC-SCNC: 7 MMOL/L — SIGNIFICANT CHANGE UP (ref 5–17)
BLD GP AB SCN SERPL QL: POSITIVE — SIGNIFICANT CHANGE UP
BUN SERPL-MCNC: 28 MG/DL — HIGH (ref 7–23)
BUN SERPL-MCNC: 36 MG/DL — HIGH (ref 7–23)
CALCIUM SERPL-MCNC: 7.2 MG/DL — LOW (ref 8.4–10.5)
CALCIUM SERPL-MCNC: 8 MG/DL — LOW (ref 8.4–10.5)
CHLORIDE SERPL-SCNC: 86 MMOL/L — LOW (ref 96–108)
CHLORIDE SERPL-SCNC: 94 MMOL/L — LOW (ref 96–108)
CO2 SERPL-SCNC: 28 MMOL/L — SIGNIFICANT CHANGE UP (ref 22–31)
CORTICOSTEROID BINDING GLOBULIN RESULT: 1.7 MG/DL — SIGNIFICANT CHANGE UP
CORTIS F/TOTAL MFR SERPL: 35 % — SIGNIFICANT CHANGE UP
CORTIS SERPL-MCNC: 17 UG/DL — SIGNIFICANT CHANGE UP
CORTISOL, FREE RESULT: 6 UG/DL — HIGH
CREAT SERPL-MCNC: 1.15 MG/DL — SIGNIFICANT CHANGE UP (ref 0.5–1.3)
CREAT SERPL-MCNC: 1.54 MG/DL — HIGH (ref 0.5–1.3)
EGFR: 49 ML/MIN/1.73M2 — LOW
EGFR: 69 ML/MIN/1.73M2 — SIGNIFICANT CHANGE UP
GLUCOSE SERPL-MCNC: 102 MG/DL — HIGH (ref 70–99)
GLUCOSE SERPL-MCNC: 313 MG/DL — HIGH (ref 70–99)
HCT VFR BLD CALC: 20.4 % — CRITICAL LOW (ref 39–50)
HCT VFR BLD CALC: 24.4 % — LOW (ref 39–50)
HGB BLD-MCNC: 6.8 G/DL — CRITICAL LOW (ref 13–17)
HGB BLD-MCNC: 8 G/DL — LOW (ref 13–17)
MCHC RBC-ENTMCNC: 30.5 PG — SIGNIFICANT CHANGE UP (ref 27–34)
MCHC RBC-ENTMCNC: 30.8 PG — SIGNIFICANT CHANGE UP (ref 27–34)
MCHC RBC-ENTMCNC: 32.8 GM/DL — SIGNIFICANT CHANGE UP (ref 32–36)
MCHC RBC-ENTMCNC: 33.3 GM/DL — SIGNIFICANT CHANGE UP (ref 32–36)
MCV RBC AUTO: 91.5 FL — SIGNIFICANT CHANGE UP (ref 80–100)
MCV RBC AUTO: 93.8 FL — SIGNIFICANT CHANGE UP (ref 80–100)
NRBC # BLD: 0 /100 WBCS — SIGNIFICANT CHANGE UP (ref 0–0)
NRBC # BLD: 2 /100 WBCS — HIGH (ref 0–0)
OSMOLALITY UR: 568 MOS/KG — SIGNIFICANT CHANGE UP (ref 300–900)
PLATELET # BLD AUTO: 11 K/UL — CRITICAL LOW (ref 150–400)
PLATELET # BLD AUTO: 12 K/UL — CRITICAL LOW (ref 150–400)
POTASSIUM SERPL-MCNC: 3.6 MMOL/L — SIGNIFICANT CHANGE UP (ref 3.5–5.3)
POTASSIUM SERPL-MCNC: 3.9 MMOL/L — SIGNIFICANT CHANGE UP (ref 3.5–5.3)
POTASSIUM SERPL-SCNC: 3.6 MMOL/L — SIGNIFICANT CHANGE UP (ref 3.5–5.3)
POTASSIUM SERPL-SCNC: 3.9 MMOL/L — SIGNIFICANT CHANGE UP (ref 3.5–5.3)
RAPID RVP RESULT: SIGNIFICANT CHANGE UP
RBC # BLD: 2.23 M/UL — LOW (ref 4.2–5.8)
RBC # BLD: 2.6 M/UL — LOW (ref 4.2–5.8)
RBC # FLD: 21 % — HIGH (ref 10.3–14.5)
RBC # FLD: 21.1 % — HIGH (ref 10.3–14.5)
RH IG SCN BLD-IMP: POSITIVE — SIGNIFICANT CHANGE UP
SARS-COV-2 RNA SPEC QL NAA+PROBE: SIGNIFICANT CHANGE UP
SODIUM SERPL-SCNC: 121 MMOL/L — LOW (ref 135–145)
SODIUM SERPL-SCNC: 133 MMOL/L — LOW (ref 135–145)
SODIUM UR-SCNC: 58 MMOL/L — SIGNIFICANT CHANGE UP
WBC # BLD: 0.82 K/UL — CRITICAL LOW (ref 3.8–10.5)
WBC # BLD: 0.97 K/UL — CRITICAL LOW (ref 3.8–10.5)
WBC # FLD AUTO: 0.82 K/UL — CRITICAL LOW (ref 3.8–10.5)
WBC # FLD AUTO: 0.97 K/UL — CRITICAL LOW (ref 3.8–10.5)

## 2023-04-10 PROCEDURE — 99233 SBSQ HOSP IP/OBS HIGH 50: CPT

## 2023-04-10 PROCEDURE — 93970 EXTREMITY STUDY: CPT | Mod: 26

## 2023-04-10 PROCEDURE — 70450 CT HEAD/BRAIN W/O DYE: CPT | Mod: 26

## 2023-04-10 RX ORDER — FILGRASTIM 480MCG/1.6
480 VIAL (ML) INJECTION DAILY
Refills: 0 | Status: COMPLETED | OUTPATIENT
Start: 2023-04-10 | End: 2023-04-12

## 2023-04-10 RX ORDER — TAMSULOSIN HYDROCHLORIDE 0.4 MG/1
0.4 CAPSULE ORAL AT BEDTIME
Refills: 0 | Status: DISCONTINUED | OUTPATIENT
Start: 2023-04-10 | End: 2023-04-14

## 2023-04-10 RX ORDER — CHLORHEXIDINE GLUCONATE 213 G/1000ML
1 SOLUTION TOPICAL DAILY
Refills: 0 | Status: DISCONTINUED | OUTPATIENT
Start: 2023-04-10 | End: 2023-04-18

## 2023-04-10 RX ORDER — CEFEPIME 1 G/1
1000 INJECTION, POWDER, FOR SOLUTION INTRAMUSCULAR; INTRAVENOUS EVERY 8 HOURS
Refills: 0 | Status: DISCONTINUED | OUTPATIENT
Start: 2023-04-10 | End: 2023-04-12

## 2023-04-10 RX ORDER — POLYETHYLENE GLYCOL 3350 17 G/17G
17 POWDER, FOR SOLUTION ORAL DAILY
Refills: 0 | Status: DISCONTINUED | OUTPATIENT
Start: 2023-04-10 | End: 2023-04-12

## 2023-04-10 RX ORDER — CEFEPIME 1 G/1
2000 INJECTION, POWDER, FOR SOLUTION INTRAMUSCULAR; INTRAVENOUS ONCE
Refills: 0 | Status: COMPLETED | OUTPATIENT
Start: 2023-04-10 | End: 2023-04-10

## 2023-04-10 RX ORDER — DIGOXIN 250 MCG
125 TABLET ORAL DAILY
Refills: 0 | Status: DISCONTINUED | OUTPATIENT
Start: 2023-04-11 | End: 2023-04-18

## 2023-04-10 RX ORDER — SODIUM CHLORIDE 9 MG/ML
1000 INJECTION INTRAMUSCULAR; INTRAVENOUS; SUBCUTANEOUS
Refills: 0 | Status: DISCONTINUED | OUTPATIENT
Start: 2023-04-10 | End: 2023-04-11

## 2023-04-10 RX ORDER — ACETAMINOPHEN 500 MG
1000 TABLET ORAL ONCE
Refills: 0 | Status: COMPLETED | OUTPATIENT
Start: 2023-04-10 | End: 2023-04-10

## 2023-04-10 RX ADMIN — Medication 500 MICROGRAM(S): at 05:02

## 2023-04-10 RX ADMIN — CEFEPIME 100 MILLIGRAM(S): 1 INJECTION, POWDER, FOR SOLUTION INTRAMUSCULAR; INTRAVENOUS at 22:42

## 2023-04-10 RX ADMIN — Medication 650 MILLIGRAM(S): at 22:43

## 2023-04-10 RX ADMIN — ENTECAVIR 0.5 MILLIGRAM(S): 0.5 TABLET ORAL at 12:39

## 2023-04-10 RX ADMIN — Medication 1000 MILLIGRAM(S): at 06:50

## 2023-04-10 RX ADMIN — Medication 400 MILLIGRAM(S): at 04:49

## 2023-04-10 RX ADMIN — VALGANCICLOVIR 450 MILLIGRAM(S): 450 TABLET, FILM COATED ORAL at 05:01

## 2023-04-10 RX ADMIN — MIDODRINE HYDROCHLORIDE 10 MILLIGRAM(S): 2.5 TABLET ORAL at 05:01

## 2023-04-10 RX ADMIN — MIDODRINE HYDROCHLORIDE 10 MILLIGRAM(S): 2.5 TABLET ORAL at 12:38

## 2023-04-10 RX ADMIN — CEFEPIME 100 MILLIGRAM(S): 1 INJECTION, POWDER, FOR SOLUTION INTRAMUSCULAR; INTRAVENOUS at 05:03

## 2023-04-10 RX ADMIN — VALGANCICLOVIR 450 MILLIGRAM(S): 450 TABLET, FILM COATED ORAL at 18:07

## 2023-04-10 RX ADMIN — SODIUM CHLORIDE 100 MILLILITER(S): 9 INJECTION INTRAMUSCULAR; INTRAVENOUS; SUBCUTANEOUS at 14:24

## 2023-04-10 RX ADMIN — Medication 500 MICROGRAM(S): at 00:19

## 2023-04-10 RX ADMIN — ATORVASTATIN CALCIUM 40 MILLIGRAM(S): 80 TABLET, FILM COATED ORAL at 22:41

## 2023-04-10 RX ADMIN — Medication 50 MILLIGRAM(S): at 05:02

## 2023-04-10 RX ADMIN — TAMSULOSIN HYDROCHLORIDE 0.4 MILLIGRAM(S): 0.4 CAPSULE ORAL at 22:41

## 2023-04-10 RX ADMIN — CEFEPIME 100 MILLIGRAM(S): 1 INJECTION, POWDER, FOR SOLUTION INTRAMUSCULAR; INTRAVENOUS at 13:12

## 2023-04-10 RX ADMIN — Medication 650 MILLIGRAM(S): at 23:57

## 2023-04-10 RX ADMIN — CHLORHEXIDINE GLUCONATE 1 APPLICATION(S): 213 SOLUTION TOPICAL at 12:39

## 2023-04-10 RX ADMIN — MIDODRINE HYDROCHLORIDE 10 MILLIGRAM(S): 2.5 TABLET ORAL at 18:08

## 2023-04-10 RX ADMIN — Medication 480 MICROGRAM(S): at 18:08

## 2023-04-10 NOTE — PROGRESS NOTE ADULT - SUBJECTIVE AND OBJECTIVE BOX
[facility] NEPHROLOGY- PROGRESS NOTE    67 year old Male with history of MM on chemotherapy presents with fevers. Nephrology consulted for elevated Scr.      REVIEW OF SYSTEMS:  Gen: + fevers, + blurry vision  Cards: no chest pain  Resp: no dyspnea  GI: no nausea or vomiting or diarrhea  Vascular: no LE edema    No Known Allergies      Hospital Medications: Medications reviewed        VITALS:  T(F): 97.8 (04-10-23 @ 06:41), Max: 101.3 (04-10-23 @ 04:39)  HR: 87 (04-10-23 @ 06:41)  BP: 104/60 (04-10-23 @ 04:39)  RR: 18 (04-10-23 @ 06:41)  SpO2: 93% (04-10-23 @ 06:41)  Wt(kg): --     @ 07:01  -  04-10 @ 07:00  --------------------------------------------------------  IN: 480 mL / OUT: 700 mL / NET: -220 mL    04-10 @ 07:01  -  04-10 @ 12:10  --------------------------------------------------------  IN: 0 mL / OUT: 1200 mL / NET: -1200 mL        PHYSICAL EXAM:    Gen: NAD, calm  Cards: Irregularly irregular, +S1/S2, no M/G/R  Resp: CTA B/L  GI: soft, NT/ND, NABS  : + lloyd with dark urine  Vascular: no LE edema B/L      LABS:  04-10    121<L>  |  86<L>  |  28<H>  ----------------------------<  102<H>  3.6   |  28  |  1.15    Ca    7.2<L>      10 Apr 2023 06:39  Phos  1.6       Mg     1.6           Creatinine Trend: 1.15 <--, 1.18 <--, 1.37 <--, 1.41 <--, 1.68 <--, 1.97 <--                        6.8    0.82  )-----------( 12       ( 10 Apr 2023 06:39 )             20.4     Urine Studies:  Urinalysis Basic - ( 2023 08:42 )    Color: Yellow / Appearance: Clear / S.031 / pH:   Gluc:  / Ketone: Negative  / Bili: Negative / Urobili: 6 mg/dL   Blood:  / Protein: 30 mg/dL / Nitrite: Negative   Leuk Esterase: Negative / RBC: 3 /hpf / WBC 3 /HPF   Sq Epi:  / Non Sq Epi:  / Bacteria: Negative      Chloride, Random Urine: 37 mmol/L ( @ 08:42)          < from: CT Chest w/ IV Cont (23 @ 21:10) >  IMPRESSION:  Bladder wall thickening with perivesicular stranding. Findings may be   seen in the setting of cystitis. Correlate with urinalysis.    Mild bilateral hydroureteronephrosis to the level of the bladder.        --- End of Report ---    < end of copied text >   [facility] NEPHROLOGY- PROGRESS NOTE    67 year old Male with history of MM on chemotherapy presents with fevers. Nephrology consulted for elevated Scr.      REVIEW OF SYSTEMS:  Gen: + fevers, + blurry vision  Cards: no chest pain  Resp: no dyspnea  GI: no nausea or vomiting or diarrhea  Vascular: no LE edema    No Known Allergies      Hospital Medications: Medications reviewed        VITALS:  T(F): 97.8 (04-10-23 @ 06:41), Max: 101.3 (04-10-23 @ 04:39)  HR: 87 (04-10-23 @ 06:41)  BP: 104/60 (04-10-23 @ 04:39)  RR: 18 (04-10-23 @ 06:41)  SpO2: 93% (04-10-23 @ 06:41)  Wt(kg): --     @ 07:01  -  04-10 @ 07:00  --------------------------------------------------------  IN: 480 mL / OUT: 700 mL / NET: -220 mL    04-10 @ 07:01  -  04-10 @ 12:10  --------------------------------------------------------  IN: 0 mL / OUT: 1200 mL / NET: -1200 mL        PHYSICAL EXAM:    Gen: NAD, calm  Cards: Irregularly irregular, +S1/S2, no M/G/R  Resp: CTA B/L  GI: soft, NT/ND, NABS  : + lloyd with dark urine  Vascular: no LE edema B/L      LABS:  04-10    121<L>  |  86<L>  |  28<H>  ----------------------------<  102<H>  3.6   |  28  |  1.15    Ca    7.2<L>      10 Apr 2023 06:39  Phos  1.6       Mg     1.6           Creatinine Trend: 1.15 <--, 1.18 <--, 1.37 <--, 1.41 <--, 1.68 <--, 1.97 <--                        6.8    0.82  )-----------( 12       ( 10 Apr 2023 06:39 )             20.4     Urine Studies:  Urinalysis Basic - ( 2023 08:42 )    Color: Yellow / Appearance: Clear / S.031 / pH:   Gluc:  / Ketone: Negative  / Bili: Negative / Urobili: 6 mg/dL   Blood:  / Protein: 30 mg/dL / Nitrite: Negative   Leuk Esterase: Negative / RBC: 3 /hpf / WBC 3 /HPF   Sq Epi:  / Non Sq Epi:  / Bacteria: Negative      Chloride, Random Urine: 37 mmol/L ( @ 08:42)          < from: CT Chest w/ IV Cont (23 @ 21:10) >  IMPRESSION:  Bladder wall thickening with perivesicular stranding. Findings may be   seen in the setting of cystitis. Correlate with urinalysis.    Mild bilateral hydroureteronephrosis to the level of the bladder.        --- End of Report ---    < end of copied text >

## 2023-04-10 NOTE — PROGRESS NOTE ADULT - ASSESSMENT
New onset afib, aflutter  tachycardia  cant use a/c due to very low plts and profound anemia   HR much better with digoxin  start 0.125mg daily as of tomorrow then check level on thursday     History of bioAVR   echo unremarkable     Anemia  Monitor hemoglobin, transfuse as needed.  plan as per Heme    DVT   a/c on hold  low plts  fu with vascular surgery / heme     Hypotension   due to sepsis   better now   on midodrine

## 2023-04-10 NOTE — PROGRESS NOTE ADULT - SUBJECTIVE AND OBJECTIVE BOX
Name of Patient : PETER DE LA PAZ  MRN: [id number]  Date of visit: 04-10-23 @ 13:12      Subjective: Patient seen and examined. No new events except as noted.   Patient seen earlier this AM. Wife and sister at the bedside.   AM labs likely error repeat labs ordered stat.   Patient febrile overnight, s/p cefepime. Repeat cultures in lab.  Patient reports pain "behind my eyes." Denies visual changes or lightheadedness.   Checking CT head, Optho eval     REVIEW OF SYSTEMS:    CONSTITUTIONAL: Generalized weakness   EYES/ENT: + Pain behind eyes; Denies visual changes; No vertigo or throat pain   NECK: No pain or stiffness  RESPIRATORY: No cough, wheezing, hemoptysis; No shortness of breath  CARDIOVASCULAR: No chest pain or palpitations  GASTROINTESTINAL: No abdominal or epigastric pain. No nausea, vomiting, or hematemesis; No diarrhea or constipation. No melena or hematochezia.  GENITOURINARY: No dysuria, frequency or hematuria  NEUROLOGICAL: No numbness or weakness  SKIN: No itching, burning, rashes, or lesions   All other review of systems is negative unless indicated above.    MEDICATIONS:  MEDICATIONS  (STANDING):  atorvastatin 40 milliGRAM(s) Oral at bedtime  cefepime   IVPB 1000 milliGRAM(s) IV Intermittent every 8 hours  chlorhexidine 2% Cloths 1 Application(s) Topical daily  entecavir 0.5 milliGRAM(s) Oral daily  filgrastim-sndz (ZARXIO) Injectable 480 MICROGram(s) SubCutaneous once  midodrine. 10 milliGRAM(s) Oral three times a day  predniSONE   Tablet 50 milliGRAM(s) Oral daily  saline laxative (FLEET) Rectal Enema 1 Enema Rectal once  tamsulosin 0.4 milliGRAM(s) Oral at bedtime  valGANciclovir 450 milliGRAM(s) Oral every 12 hours      PHYSICAL EXAM:  T(C): 36.9 (04-10-23 @ 11:43), Max: 38.5 (04-10-23 @ 04:39)  HR: 98 (04-10-23 @ 11:43) (84 - 106)  BP: 98/56 (04-10-23 @ 11:43) (98/56 - 114/56)  RR: 18 (04-10-23 @ 11:43) (15 - 18)  SpO2: 92% (04-10-23 @ 11:43) (92% - 96%)  Wt(kg): --  I&O's Summary    09 Apr 2023 07:01  -  10 Apr 2023 07:00  --------------------------------------------------------  IN: 480 mL / OUT: 700 mL / NET: -220 mL    10 Apr 2023 07:01  -  10 Apr 2023 13:12  --------------------------------------------------------  IN: 0 mL / OUT: 1200 mL / NET: -1200 mL          Appearance: Normal; Lying down in bed 	  HEENT:  Eyes are open   Lymphatic: No lymphadenopathy   Cardiovascular: Normal S1 S2, no JVD  Respiratory: normal effort , clear  Gastrointestinal:  Soft, Non-tender  Skin: No rashes,  warm to touch  Psychiatry:  Mood & affect appropriate  Musculoskeletal: No edema      04-09-23 @ 07:01  -  04-10-23 @ 07:00  --------------------------------------------------------  IN: 480 mL / OUT: 700 mL / NET: -220 mL    04-10-23 @ 07:01  -  04-10-23 @ 13:12  --------------------------------------------------------  IN: 0 mL / OUT: 1200 mL / NET: -1200 mL                                  8.0    0.97  )-----------( 11       ( 10 Apr 2023 11:54 )             24.4               04-10    133<L>  |  94<L>  |  36<H>  ----------------------------<  313<H>  3.9   |  28  |  1.54<H>    Ca    8.0<L>      10 Apr 2023 11:54  Phos  1.6     04-09  Mg     1.6     04-09                           Culture - Urine (04.08.23 @ 13:16)   Specimen Source: Clean Catch Clean Catch (Midstream)  Culture Results:   No growth    Culture - Blood (04.07.23 @ 14:29)   Specimen Source: .Blood Blood-Peripheral  Culture Results:   No growth to date.    Culture - Blood (04.07.23 @ 13:43)   Specimen Source: .Blood Blood-Peripheral  Culture Results:   No growth to date.    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    < end of copied text >   Name of Patient : PETER DE LA PAZ  MRN: [id number]  Date of visit: 04-10-23 @ 13:12      Subjective: Patient seen and examined. No new events except as noted.   Patient seen earlier this AM. Wife and sister at the bedside.   AM labs likely error repeat labs ordered stat.   Patient febrile overnight, s/p cefepime. Repeat cultures in lab.  Patient reports pain "behind my eyes." Denies visual changes or lightheadedness.   Checking CT head, Optho eval     REVIEW OF SYSTEMS:    CONSTITUTIONAL: Generalized weakness   EYES/ENT: + Pain behind eyes; Denies visual changes; No vertigo or throat pain   NECK: No pain or stiffness  RESPIRATORY: No cough, wheezing, hemoptysis; No shortness of breath  CARDIOVASCULAR: No chest pain or palpitations  GASTROINTESTINAL: No abdominal or epigastric pain. No nausea, vomiting, or hematemesis; No diarrhea or constipation. No melena or hematochezia.  GENITOURINARY: No dysuria, frequency or hematuria  NEUROLOGICAL: No numbness or weakness  SKIN: No itching, burning, rashes, or lesions   All other review of systems is negative unless indicated above.    MEDICATIONS:  MEDICATIONS  (STANDING):  atorvastatin 40 milliGRAM(s) Oral at bedtime  cefepime   IVPB 1000 milliGRAM(s) IV Intermittent every 8 hours  chlorhexidine 2% Cloths 1 Application(s) Topical daily  entecavir 0.5 milliGRAM(s) Oral daily  filgrastim-sndz (ZARXIO) Injectable 480 MICROGram(s) SubCutaneous once  midodrine. 10 milliGRAM(s) Oral three times a day  predniSONE   Tablet 50 milliGRAM(s) Oral daily  saline laxative (FLEET) Rectal Enema 1 Enema Rectal once  tamsulosin 0.4 milliGRAM(s) Oral at bedtime  valGANciclovir 450 milliGRAM(s) Oral every 12 hours      PHYSICAL EXAM:  T(C): 36.9 (04-10-23 @ 11:43), Max: 38.5 (04-10-23 @ 04:39)  HR: 98 (04-10-23 @ 11:43) (84 - 106)  BP: 98/56 (04-10-23 @ 11:43) (98/56 - 114/56)  RR: 18 (04-10-23 @ 11:43) (15 - 18)  SpO2: 92% (04-10-23 @ 11:43) (92% - 96%)  Wt(kg): --  I&O's Summary    09 Apr 2023 07:01  -  10 Apr 2023 07:00  --------------------------------------------------------  IN: 480 mL / OUT: 700 mL / NET: -220 mL    10 Apr 2023 07:01  -  10 Apr 2023 13:12  --------------------------------------------------------  IN: 0 mL / OUT: 1200 mL / NET: -1200 mL          Appearance: Normal; Lying down in bed 	  HEENT:  Eyes are open   Lymphatic: No lymphadenopathy   Cardiovascular: Normal S1 S2, no JVD  Respiratory: normal effort , clear  Gastrointestinal:  Soft, Non-tender  Skin: No rashes,  warm to touch  Psychiatry:  Mood & affect appropriate  Musculoskeletal: No edema      04-09-23 @ 07:01  -  04-10-23 @ 07:00  --------------------------------------------------------  IN: 480 mL / OUT: 700 mL / NET: -220 mL    04-10-23 @ 07:01  -  04-10-23 @ 13:12  --------------------------------------------------------  IN: 0 mL / OUT: 1200 mL / NET: -1200 mL                                  8.0    0.97  )-----------( 11       ( 10 Apr 2023 11:54 )             24.4               04-10    133<L>  |  94<L>  |  36<H>  ----------------------------<  313<H>  3.9   |  28  |  1.54<H>    Ca    8.0<L>      10 Apr 2023 11:54  Phos  1.6     04-09  Mg     1.6     04-09                           Culture - Urine (04.08.23 @ 13:16)   Specimen Source: Clean Catch Clean Catch (Midstream)  Culture Results:   No growth    Culture - Blood (04.07.23 @ 14:29)   Specimen Source: .Blood Blood-Peripheral  Culture Results:   No growth to date.    Culture - Blood (04.07.23 @ 13:43)   Specimen Source: .Blood Blood-Peripheral  Culture Results:   No growth to date.    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    < end of copied text >   Name of Patient : PETER DE LA PAZ  MRN: [id number]  Date of visit: 04-10-23 @ 13:12      Subjective: Patient seen and examined. No new events except as noted.   Patient seen earlier this AM. Wife and sister at the bedside.   AM labs likely error repeat labs ordered stat.   Patient febrile overnight, s/p cefepime. Repeat cultures in lab.  Patient reports pain "behind my eyes." Denies visual changes or lightheadedness.   Checking CT head, Optho eval     REVIEW OF SYSTEMS:    CONSTITUTIONAL: Generalized weakness   EYES/ENT: + Pain behind eyes; Denies visual changes; No vertigo or throat pain   NECK: No pain or stiffness  RESPIRATORY: No cough, wheezing, hemoptysis; No shortness of breath  CARDIOVASCULAR: No chest pain or palpitations  GASTROINTESTINAL: No abdominal or epigastric pain. No nausea, vomiting, or hematemesis; No diarrhea or constipation. No melena or hematochezia.  GENITOURINARY: No dysuria, frequency or hematuria  NEUROLOGICAL: No numbness or weakness  SKIN: No itching, burning, rashes, or lesions   All other review of systems is negative unless indicated above.    MEDICATIONS:  MEDICATIONS  (STANDING):  atorvastatin 40 milliGRAM(s) Oral at bedtime  cefepime   IVPB 1000 milliGRAM(s) IV Intermittent every 8 hours  chlorhexidine 2% Cloths 1 Application(s) Topical daily  entecavir 0.5 milliGRAM(s) Oral daily  filgrastim-sndz (ZARXIO) Injectable 480 MICROGram(s) SubCutaneous once  midodrine. 10 milliGRAM(s) Oral three times a day  predniSONE   Tablet 50 milliGRAM(s) Oral daily  saline laxative (FLEET) Rectal Enema 1 Enema Rectal once  tamsulosin 0.4 milliGRAM(s) Oral at bedtime  valGANciclovir 450 milliGRAM(s) Oral every 12 hours      PHYSICAL EXAM:  T(C): 36.9 (04-10-23 @ 11:43), Max: 38.5 (04-10-23 @ 04:39)  HR: 98 (04-10-23 @ 11:43) (84 - 106)  BP: 98/56 (04-10-23 @ 11:43) (98/56 - 114/56)  RR: 18 (04-10-23 @ 11:43) (15 - 18)  SpO2: 92% (04-10-23 @ 11:43) (92% - 96%)  Wt(kg): --  I&O's Summary    09 Apr 2023 07:01  -  10 Apr 2023 07:00  --------------------------------------------------------  IN: 480 mL / OUT: 700 mL / NET: -220 mL    10 Apr 2023 07:01  -  10 Apr 2023 13:12  --------------------------------------------------------  IN: 0 mL / OUT: 1200 mL / NET: -1200 mL          Appearance: Normal; Lying down in bed 	  HEENT:  Eyes are open   Lymphatic: No lymphadenopathy   Cardiovascular: Normal S1 S2, no JVD  Respiratory: normal effort , clear  Gastrointestinal:  Soft, Non-tender  Skin: No rashes,  warm to touch  Psychiatry:  Mood & affect appropriate  Musculoskeletal: No edema      04-09-23 @ 07:01  -  04-10-23 @ 07:00  --------------------------------------------------------  IN: 480 mL / OUT: 700 mL / NET: -220 mL    04-10-23 @ 07:01  -  04-10-23 @ 13:12  --------------------------------------------------------  IN: 0 mL / OUT: 1200 mL / NET: -1200 mL                                  8.0    0.97  )-----------( 11       ( 10 Apr 2023 11:54 )             24.4               04-10    133<L>  |  94<L>  |  36<H>  ----------------------------<  313<H>  3.9   |  28  |  1.54<H>    Ca    8.0<L>      10 Apr 2023 11:54  Phos  1.6     04-09  Mg     1.6     04-09                           Culture - Urine (04.08.23 @ 13:16)   Specimen Source: Clean Catch Clean Catch (Midstream)  Culture Results:   No growth    Culture - Blood (04.07.23 @ 14:29)   Specimen Source: .Blood Blood-Peripheral  Culture Results:   No growth to date.    Culture - Blood (04.07.23 @ 13:43)   Specimen Source: .Blood Blood-Peripheral  Culture Results:   No growth to date.    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    < end of copied text >   Name of Patient : PETER DE LA PAZ  MRN: [id number]  Date of visit: 04-10-23 @ 13:12      Subjective: Patient seen and examined. No new events except as noted.   Patient seen earlier this AM. Wife and sister at the bedside.   AM labs likely error repeat labs ordered stat.   Patient febrile overnight, s/p cefepime. Repeat cultures in lab.  Patient reports pain "behind my eyes." Denies visual changes or lightheadedness.   Checking CT head and orbits, Optho eval     REVIEW OF SYSTEMS:    CONSTITUTIONAL: Generalized weakness   EYES/ENT: + Pain behind eyes; Denies visual changes; No vertigo or throat pain   NECK: No pain or stiffness  RESPIRATORY: No cough, wheezing, hemoptysis; No shortness of breath  CARDIOVASCULAR: No chest pain or palpitations  GASTROINTESTINAL: No abdominal or epigastric pain. No nausea, vomiting, or hematemesis; No diarrhea or constipation. No melena or hematochezia.  GENITOURINARY: No dysuria, frequency or hematuria  NEUROLOGICAL: No numbness or weakness  SKIN: No itching, burning, rashes, or lesions   All other review of systems is negative unless indicated above.    MEDICATIONS:  MEDICATIONS  (STANDING):  atorvastatin 40 milliGRAM(s) Oral at bedtime  cefepime   IVPB 1000 milliGRAM(s) IV Intermittent every 8 hours  chlorhexidine 2% Cloths 1 Application(s) Topical daily  entecavir 0.5 milliGRAM(s) Oral daily  filgrastim-sndz (ZARXIO) Injectable 480 MICROGram(s) SubCutaneous once  midodrine. 10 milliGRAM(s) Oral three times a day  predniSONE   Tablet 50 milliGRAM(s) Oral daily  saline laxative (FLEET) Rectal Enema 1 Enema Rectal once  tamsulosin 0.4 milliGRAM(s) Oral at bedtime  valGANciclovir 450 milliGRAM(s) Oral every 12 hours      PHYSICAL EXAM:  T(C): 36.9 (04-10-23 @ 11:43), Max: 38.5 (04-10-23 @ 04:39)  HR: 98 (04-10-23 @ 11:43) (84 - 106)  BP: 98/56 (04-10-23 @ 11:43) (98/56 - 114/56)  RR: 18 (04-10-23 @ 11:43) (15 - 18)  SpO2: 92% (04-10-23 @ 11:43) (92% - 96%)  Wt(kg): --  I&O's Summary    09 Apr 2023 07:01  -  10 Apr 2023 07:00  --------------------------------------------------------  IN: 480 mL / OUT: 700 mL / NET: -220 mL    10 Apr 2023 07:01  -  10 Apr 2023 13:12  --------------------------------------------------------  IN: 0 mL / OUT: 1200 mL / NET: -1200 mL          Appearance: Normal; Lying down in bed 	  HEENT:  Eyes are open   Lymphatic: No lymphadenopathy   Cardiovascular: Normal S1 S2, no JVD  Respiratory: normal effort , clear  Gastrointestinal:  Soft, Non-tender  Skin: No rashes,  warm to touch  Psychiatry:  Mood & affect appropriate  Musculoskeletal: No edema      04-09-23 @ 07:01  -  04-10-23 @ 07:00  --------------------------------------------------------  IN: 480 mL / OUT: 700 mL / NET: -220 mL    04-10-23 @ 07:01  -  04-10-23 @ 13:12  --------------------------------------------------------  IN: 0 mL / OUT: 1200 mL / NET: -1200 mL                                  8.0    0.97  )-----------( 11       ( 10 Apr 2023 11:54 )             24.4               04-10    133<L>  |  94<L>  |  36<H>  ----------------------------<  313<H>  3.9   |  28  |  1.54<H>    Ca    8.0<L>      10 Apr 2023 11:54  Phos  1.6     04-09  Mg     1.6     04-09                           Culture - Urine (04.08.23 @ 13:16)   Specimen Source: Clean Catch Clean Catch (Midstream)  Culture Results:   No growth    Culture - Blood (04.07.23 @ 14:29)   Specimen Source: .Blood Blood-Peripheral  Culture Results:   No growth to date.    Culture - Blood (04.07.23 @ 13:43)   Specimen Source: .Blood Blood-Peripheral  Culture Results:   No growth to date.    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    < end of copied text >   Name of Patient : PETER DE LA PAZ  MRN: [id number]  Date of visit: 04-10-23 @ 13:12      Subjective: Patient seen and examined. No new events except as noted.   Patient seen earlier this AM. Wife and sister at the bedside.   AM labs likely error repeat labs ordered stat.   Patient febrile overnight, s/p cefepime. Repeat cultures in lab.  Patient reports pain "behind my eyes." Denies visual changes or lightheadedness.   Checking CT head and orbits, Optho eval     REVIEW OF SYSTEMS:    CONSTITUTIONAL: Generalized weakness   EYES/ENT: + Pain behind eyes; Denies visual changes; No vertigo or throat pain   NECK: No pain or stiffness  RESPIRATORY: No cough, wheezing, hemoptysis; No shortness of breath  CARDIOVASCULAR: No chest pain or palpitations  GASTROINTESTINAL: No abdominal or epigastric pain. No nausea, vomiting, or hematemesis; No diarrhea or constipation. No melena or hematochezia.  GENITOURINARY: No dysuria, frequency or hematuria  NEUROLOGICAL: No numbness or weakness  SKIN: No itching, burning, rashes, or lesions   All other review of systems is negative unless indicated above.    MEDICATIONS:  MEDICATIONS  (STANDING):  atorvastatin 40 milliGRAM(s) Oral at bedtime  cefepime   IVPB 1000 milliGRAM(s) IV Intermittent every 8 hours  chlorhexidine 2% Cloths 1 Application(s) Topical daily  entecavir 0.5 milliGRAM(s) Oral daily  filgrastim-sndz (ZARXIO) Injectable 480 MICROGram(s) SubCutaneous once  midodrine. 10 milliGRAM(s) Oral three times a day  predniSONE   Tablet 50 milliGRAM(s) Oral daily  saline laxative (FLEET) Rectal Enema 1 Enema Rectal once  tamsulosin 0.4 milliGRAM(s) Oral at bedtime  valGANciclovir 450 milliGRAM(s) Oral every 12 hours      PHYSICAL EXAM:  T(C): 36.9 (04-10-23 @ 11:43), Max: 38.5 (04-10-23 @ 04:39)  HR: 98 (04-10-23 @ 11:43) (84 - 106)  BP: 98/56 (04-10-23 @ 11:43) (98/56 - 114/56)  RR: 18 (04-10-23 @ 11:43) (15 - 18)  SpO2: 92% (04-10-23 @ 11:43) (92% - 96%)  Wt(kg): --  I&O's Summary    09 Apr 2023 07:01  -  10 Apr 2023 07:00  --------------------------------------------------------  IN: 480 mL / OUT: 700 mL / NET: -220 mL    10 Apr 2023 07:01  -  10 Apr 2023 13:12  --------------------------------------------------------  IN: 0 mL / OUT: 1200 mL / NET: -1200 mL          Appearance: Normal; Lying down in bed 	  HEENT:  Eyes are open   Lymphatic: No lymphadenopathy   Cardiovascular: Normal S1 S2, no JVD  Respiratory: normal effort , clear  Gastrointestinal:  Soft, Non-tender  Skin: No rashes,  warm to touch  Psychiatry:  Mood & affect appropriate  Musculoskeletal: No edema      04-09-23 @ 07:01  -  04-10-23 @ 07:00  --------------------------------------------------------  IN: 480 mL / OUT: 700 mL / NET: -220 mL    04-10-23 @ 07:01  -  04-10-23 @ 13:12  --------------------------------------------------------  IN: 0 mL / OUT: 1200 mL / NET: -1200 mL                                  8.0    0.97  )-----------( 11       ( 10 Apr 2023 11:54 )             24.4               04-10    133<L>  |  94<L>  |  36<H>  ----------------------------<  313<H>  3.9   |  28  |  1.54<H>    Ca    8.0<L>      10 Apr 2023 11:54  Phos  1.6     04-09  Mg     1.6     04-09                           Culture - Urine (04.08.23 @ 13:16)   Specimen Source: Clean Catch Clean Catch (Midstream)  Culture Results:   No growth    Culture - Blood (04.07.23 @ 14:29)   Specimen Source: .Blood Blood-Peripheral  Culture Results:   No growth to date.    Culture - Blood (04.07.23 @ 13:43)   Specimen Source: .Blood Blood-Peripheral  Culture Results:   No growth to date.    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    < end of copied text >   Name of Patient : PETER DE LA PAZ  MRN: [id number]  Date of visit: 04-10-23 @ 13:12      Subjective: Patient seen and examined. No new events except as noted.   Patient seen earlier this AM. Wife and sister at the bedside.   AM labs likely error repeat labs ordered stat.   Patient febrile overnight, s/p cefepime. Repeat cultures in lab.  Patient reports pain "behind my eyes." Denies visual changes or lightheadedness.   Checking CT head and orbits, Optho eval     REVIEW OF SYSTEMS:    CONSTITUTIONAL: Generalized weakness   EYES/ENT: + Pain behind eyes; Denies visual changes; No vertigo or throat pain   NECK: No pain or stiffness  RESPIRATORY: No cough, wheezing, hemoptysis; No shortness of breath  CARDIOVASCULAR: No chest pain or palpitations  GASTROINTESTINAL: No abdominal or epigastric pain. No nausea, vomiting, or hematemesis; No diarrhea or constipation. No melena or hematochezia.  GENITOURINARY: No dysuria, frequency or hematuria  NEUROLOGICAL: No numbness or weakness  SKIN: No itching, burning, rashes, or lesions   All other review of systems is negative unless indicated above.    MEDICATIONS:  MEDICATIONS  (STANDING):  atorvastatin 40 milliGRAM(s) Oral at bedtime  cefepime   IVPB 1000 milliGRAM(s) IV Intermittent every 8 hours  chlorhexidine 2% Cloths 1 Application(s) Topical daily  entecavir 0.5 milliGRAM(s) Oral daily  filgrastim-sndz (ZARXIO) Injectable 480 MICROGram(s) SubCutaneous once  midodrine. 10 milliGRAM(s) Oral three times a day  predniSONE   Tablet 50 milliGRAM(s) Oral daily  saline laxative (FLEET) Rectal Enema 1 Enema Rectal once  tamsulosin 0.4 milliGRAM(s) Oral at bedtime  valGANciclovir 450 milliGRAM(s) Oral every 12 hours      PHYSICAL EXAM:  T(C): 36.9 (04-10-23 @ 11:43), Max: 38.5 (04-10-23 @ 04:39)  HR: 98 (04-10-23 @ 11:43) (84 - 106)  BP: 98/56 (04-10-23 @ 11:43) (98/56 - 114/56)  RR: 18 (04-10-23 @ 11:43) (15 - 18)  SpO2: 92% (04-10-23 @ 11:43) (92% - 96%)  Wt(kg): --  I&O's Summary    09 Apr 2023 07:01  -  10 Apr 2023 07:00  --------------------------------------------------------  IN: 480 mL / OUT: 700 mL / NET: -220 mL    10 Apr 2023 07:01  -  10 Apr 2023 13:12  --------------------------------------------------------  IN: 0 mL / OUT: 1200 mL / NET: -1200 mL          Appearance: Normal; Lying down in bed 	  HEENT:  Eyes are open   Lymphatic: No lymphadenopathy   Cardiovascular: Normal S1 S2, no JVD  Respiratory: normal effort , clear  Gastrointestinal:  Soft, Non-tender  Skin: No rashes,  warm to touch  Psychiatry:  Mood & affect appropriate  Musculoskeletal: No edema      04-09-23 @ 07:01  -  04-10-23 @ 07:00  --------------------------------------------------------  IN: 480 mL / OUT: 700 mL / NET: -220 mL    04-10-23 @ 07:01  -  04-10-23 @ 13:12  --------------------------------------------------------  IN: 0 mL / OUT: 1200 mL / NET: -1200 mL                                  8.0    0.97  )-----------( 11       ( 10 Apr 2023 11:54 )             24.4               04-10    133<L>  |  94<L>  |  36<H>  ----------------------------<  313<H>  3.9   |  28  |  1.54<H>    Ca    8.0<L>      10 Apr 2023 11:54  Phos  1.6     04-09  Mg     1.6     04-09                           Culture - Urine (04.08.23 @ 13:16)   Specimen Source: Clean Catch Clean Catch (Midstream)  Culture Results:   No growth    Culture - Blood (04.07.23 @ 14:29)   Specimen Source: .Blood Blood-Peripheral  Culture Results:   No growth to date.    Culture - Blood (04.07.23 @ 13:43)   Specimen Source: .Blood Blood-Peripheral  Culture Results:   No growth to date.    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    < end of copied text >

## 2023-04-10 NOTE — PROGRESS NOTE ADULT - SUBJECTIVE AND OBJECTIVE BOX
Subjective: Patient seen and examined. No new events except as noted.     SUBJECTIVE/ROS:  feels ok       MEDICATIONS:  MEDICATIONS  (STANDING):  atorvastatin 40 milliGRAM(s) Oral at bedtime  entecavir 0.5 milliGRAM(s) Oral daily  filgrastim-sndz (ZARXIO) Injectable 480 MICROGram(s) SubCutaneous once  midodrine. 10 milliGRAM(s) Oral three times a day  predniSONE   Tablet 50 milliGRAM(s) Oral daily  sodium chloride 0.9%. 1000 milliLiter(s) (50 mL/Hr) IV Continuous <Continuous>  sodium chloride 0.9%. 1000 milliLiter(s) (75 mL/Hr) IV Continuous <Continuous>  valGANciclovir 450 milliGRAM(s) Oral every 12 hours      PHYSICAL EXAM:  T(C): 36.6 (04-10-23 @ 06:41), Max: 39.3 (04-09-23 @ 10:14)  HR: 87 (04-10-23 @ 06:41) (84 - 151)  BP: 104/60 (04-10-23 @ 04:39) (97/63 - 114/56)  RR: 18 (04-10-23 @ 06:41) (15 - 18)  SpO2: 93% (04-10-23 @ 06:41) (92% - 96%)  Wt(kg): --  I&O's Summary    09 Apr 2023 07:01  -  10 Apr 2023 07:00  --------------------------------------------------------  IN: 480 mL / OUT: 700 mL / NET: -220 mL            JVP: Normal  Neck: supple  Lung: clear   CV: S1 S2 , Murmur:  Abd: soft  Ext: No edema  neuro: Awake / alert  Psych: flat affect  Skin: normal``    LABS/DATA:    CARDIAC MARKERS:                                6.8    0.82  )-----------( 12       ( 10 Apr 2023 06:39 )             20.4     04-10    121<L>  |  86<L>  |  28<H>  ----------------------------<  102<H>  3.6   |  28  |  1.15    Ca    7.2<L>      10 Apr 2023 06:39  Phos  1.6     04-09  Mg     1.6     04-09      proBNP:   Lipid Profile:   HgA1c:   TSH:     TELE:  EKG:

## 2023-04-10 NOTE — PROGRESS NOTE ADULT - ASSESSMENT
67y Male with history of MM on chemotherapy presents with fevers. Nephrology consulted for elevated Scr.    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 RK resolved. UA active likely due to lloyd with granular casts suggestive of ATN. FeNa indeterminate. CT with bilateral hydronephrosis likely due to urinary retention for which lloyd reinserted. Start flomax 0.4 mg QHS and would not perform further trial of voids on current admission. Will repeat imaging later this week to document resolution of hydronephrosis. TMA work up negative. Avoid nephrotoxins. Monitor electrolytes.    2) Hypotension: BP low normal. Continue with midodrine 10 mg PO TID. Monitor BP.    3) Metabolic alkalosis: Improving with chloride solution. Urine chloride NOT low as expected however drawn on IVF. Repeat blood gas in AM. Monitor pH.    4) Hyponatremia: Lab error? given acute drop within 24 hours versus due to SIADH in setting of NS administration. D/C NS. Follow up repeat BMP and check urine sodium and urine osm. Further management pending results. Monitor serum Na.    5) MM: As per Heme/Onc.      [facility] NEPHROLOGY  Leoncio Leonard M.D.  Tay Brooke D.O.  Precious Chen M.D.  Nidia Stallings, MSN, ANP-C    Telephone: [phone]  Facsimile: [phone]    [address] [place] [address]   67y Male with history of MM on chemotherapy presents with fevers. Nephrology consulted for elevated Scr.    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 RK resolved. UA active likely due to lloyd with granular casts suggestive of ATN. FeNa indeterminate. CT with bilateral hydronephrosis likely due to urinary retention for which lloyd reinserted. Start flomax 0.4 mg QHS and would not perform further trial of voids on current admission. Will repeat imaging later this week to document resolution of hydronephrosis. TMA work up negative. Avoid nephrotoxins. Monitor electrolytes.    2) Hypotension: BP low normal. Continue with midodrine 10 mg PO TID. Monitor BP.    3) Metabolic alkalosis: Improving with chloride solution. Urine chloride NOT low as expected however drawn on IVF. Repeat blood gas in AM. Monitor pH.    4) Hyponatremia: Lab error? given acute drop within 24 hours versus due to SIADH in setting of NS administration. D/C NS. Follow up repeat BMP and check urine sodium and urine osm. Further management pending results. Monitor serum Na.    5) MM: As per Heme/Onc.      [facility] NEPHROLOGY  Leoncio Leonard M.D.  Tay Brooke D.O.  Precious Chen M.D.  Nidia Stallinsg, MSN, ANP-C    Telephone: [phone]  Facsimile: [phone]    [address] [place] [address]   67y Male with history of MM on chemotherapy presents with fevers. Nephrology consulted for elevated Scr.    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 RK resolved. UA active likely due to lloyd with granular casts suggestive of ATN. FeNa indeterminate. CT with bilateral hydronephrosis likely due to urinary retention for which lloyd reinserted. Start flomax 0.4 mg QHS and would not perform further trial of voids on current admission. Will repeat imaging later this week to document resolution of hydronephrosis. TMA work up negative. Avoid nephrotoxins. Monitor electrolytes.    2) Hypotension: BP low normal. Continue with midodrine 10 mg PO TID. Monitor BP.    3) Metabolic alkalosis: Improving with chloride solution. Urine chloride NOT low as expected however drawn on IVF. Repeat blood gas in AM. Monitor pH.    4) Hyponatremia: Lab error? given acute drop within 24 hours versus due to SIADH in setting of NS administration. D/C NS. Follow up repeat BMP and check urine sodium and urine osm. Further management pending results. Monitor serum Na.    5) MM: As per Heme/Onc.      [facility] NEPHROLOGY  Leoncio Leonard M.D.  Tay rBooke D.O.  Precious Chen M.D.  Nidia Stallings, MSN, ANP-C    Telephone: [phone]  Facsimile: [phone]    [address] [place] [address]

## 2023-04-10 NOTE — PROGRESS NOTE ADULT - ASSESSMENT
67 m with  MM (s/p autoSCT, s/p relapse now on chemo last dose 3/3), bioprosthetic AVR (2019), splenectomy, and partial gastrectomy/pancreatectomy, presented 3/23 with fever/chills.   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  was leukopenic initially but now neutropenic as well with pancytopenia with continuous fever could all be hematologic   ?eye pain or vision change, head and orbit CT negative    * f/u with ophthalmology  * check RVP and herpes-6  * f/u the repeat blood cx  * c/w cefepime  * monitor CBC/diff    The above assessment and plan was discussed with the primary team    Pallavi Nair MD  contact on teams  After 5pm and on weekends call [phone] 67 m with  MM (s/p autoSCT, s/p relapse now on chemo last dose 3/3), bioprosthetic AVR (2019), splenectomy, and partial gastrectomy/pancreatectomy, presented 3/23 with fever/chills.   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  was leukopenic initially but now neutropenic as well with pancytopenia with continuous fever could all be hematologic   ?eye pain or vision change, head and orbit CT negative    * f/u with ophthalmology  * check RVP and herpes-6  * f/u the repeat blood cx  * c/w cefepime  * monitor CBC/diff    The above assessment and plan was discussed with the primary team    Pallavi Nair MD  contact on teams  After 5pm and on weekends call [phone] 67 m with  MM (s/p autoSCT, s/p relapse now on chemo last dose 3/3), bioprosthetic AVR (2019), splenectomy, and partial gastrectomy/pancreatectomy, presented 3/23 with fever/chills.   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

## 2023-04-10 NOTE — PROGRESS NOTE ADULT - ASSESSMENT
1. 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 Hubbard   -- s/p Auto SCT in 2018  -- was on lenalidomide maintenance until February 2023 when he had progression of disease. Then switched to daratumumab + CyBorD; last dose March 23rd, 2023.  -- Continue acyclovir 400 mg BID.    2. Pancytopenia    -- unclear etiology. Primary BM d/o vs Valgancyclovir?  -- s/p BM Bx on 4/6  --  transfuse to maintain HGB > 7; PLT > 10.  -- check CBC with differential and give Zarxio 480 for ANC <1500  -- Myeloma labs w/ normal free light chain ratio, KIRA w/ weak IgG kappa band, IgG 285, IgA 57, IgM 29    3. Fever Unknown Origin    -- ID following  -- no obv infectious source  -- On Valganciclovir and  Entecavir  -- ID w/u so far neg   -- CT c/a/p noted only for bladder wall thickening but UA not c/w cystitis    4. R Soleal Vein DVT    -- Hold AC in light of low platelets and bleeding risk  -- BTK DVT  -- need to Rpt LE Doppler Next week to eval for propogation    will follow,    Steph Holly NP  Hematology/ Oncology  [facility]  [phone] (office)  [phone] (alt office)  Evenings and weekends please call MD on call or office   1. 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 Hubbard   -- s/p Auto SCT in 2018  -- was on lenalidomide maintenance until February 2023 when he had progression of disease. Then switched to daratumumab + CyBorD; last dose March 23rd, 2023.  -- Continue acyclovir 400 mg BID.    2. Pancytopenia    -- unclear etiology. Primary BM d/o vs Valgancyclovir?  -- s/p BM Bx on 4/6  --  transfuse to maintain HGB > 7; PLT > 10.  -- check CBC with differential and give Zarxio 480 for ANC <1500  -- Myeloma labs w/ normal free light chain ratio, KIRA w/ weak IgG kappa band, IgG 285, IgA 57, IgM 29    3. Fever Unknown Origin    -- ID following  -- no obv infectious source  -- On Valganciclovir and  Entecavir  -- ID w/u so far neg   -- CT c/a/p noted only for bladder wall thickening but UA not c/w cystitis    4. R Soleal Vein DVT    -- Hold AC in light of low platelets and bleeding risk  -- BTK DVT  -- need to Rpt LE Doppler Next week to eval for propogation    will follow,    Steph Holly NP  Hematology/ Oncology  [facility]  [phone] (office)  [phone] (alt office)  Evenings and weekends please call MD on call or office   1. 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 [place]   -- s/p Auto SCT in 2018  -- was on lenalidomide maintenance until February 2023 when he had progression of disease. Then switched to daratumumab + CyBorD; last dose March 23rd, 2023.  -- Continue acyclovir 400 mg BID.    2. Pancytopenia    -- unclear etiology. Primary BM d/o vs Valgancyclovir?  -- s/p BM Bx on 4/6  --  transfuse to maintain HGB > 7; PLT > 10.  -- check CBC with differential and give Zarxio 480 for ANC <1500  -- Myeloma labs w/ normal free light chain ratio, KIRA w/ weak IgG kappa band, IgG 285, IgA 57, IgM 29    3. Fever Unknown Origin    -- ID following  -- no obv infectious source  -- On Valganciclovir and  Entecavir  -- ID w/u so far neg   -- CT c/a/p noted only for bladder wall thickening but UA not c/w cystitis    4. R Soleal Vein DVT    -- Hold AC in light of low platelets and bleeding risk  -- BTK DVT  -- need to Rpt LE Doppler Next week to eval for propogation    will follow,    Steph Holly NP  Hematology/ Oncology  [facility]  [phone] (office)  [phone] (alt office)  Evenings and weekends please call MD on call or office

## 2023-04-10 NOTE — PROVIDER CONTACT NOTE (CRITICAL VALUE NOTIFICATION) - ACTION/TREATMENT ORDERED:
ACP aware. [facility] ACP aware. [facility] ACP aware. [facility]

## 2023-04-10 NOTE — PROGRESS NOTE ADULT - NS ATTEND AMEND GEN_ALL_CORE FT
As above.    68 y/o male with multiple myeloma, with worsening cytopenia as well as intermittent fever. Remains CMV positive and is on anti-viral therapy. Will start filgrastim given neutropenia. In the interim pending CT imaging report as well as bone marrow biopsy results. As above.    66 y/o male with multiple myeloma, with worsening cytopenia as well as intermittent fever. Remains CMV positive and is on anti-viral therapy. Will start filgrastim given neutropenia. In the interim pending CT imaging report as well as bone marrow biopsy results.

## 2023-04-10 NOTE — PROGRESS NOTE ADULT - ASSESSMENT
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    Fever of unknown origin.   - CXR without consolidations or effusions; U/A unremarkable; GI PCR Neg   - BCx2 and UCx  --> Neg final   - Recurrent fever. Repeat BCx2 sent 03/27 --> Neg final; ABX as per ID   - LE VA duplex --> + For DVT    - Was on empiric treatment with Vanc/cefepime --> ID consulted; S/P Zosyn  - Hold home penicillin while on ABX   - V/Q scan -- Low probability   - ID to follow up, infectious work up as per ID  --> CMV PCR -- + C/w Ganciclovir per ID , CMV IGG (+) and IgM (-), Cryptococcal Ag --Neg, Quantiferon Tb -- Neg, Fungitell -- <31   - CT Chest non-contrast, noted, no acute pathology  - Recurrent fever 03/29 --> F/u BCx2 -- Neg; F/u 03/31 BCx -- NGTD   - Shock, RRT, resume antibiotics IV fluid, MICU eval , ID follow up  --> Prednisone 50 PO Qd   - S/P Zosyn; Defer ABX as per ID   - short course steroid per hematology   - IR eval for BM BX as per Heme/Onc --> done on 04/06 with IR, f/u path  - Febrile again 4/7/23: 101F, then later 102F  - ID ordered pan CT, noted questionable cystitis. UA is neg.  - Febrile 04/10 AM. Repeat Cx in lab; Cefepime resumed pending ID recs; F/u cultures     Anemia, Thrombocytopenia, Pancytopenia  - Drop in Hgb, Occult +  - Hold Hep gtt ; S/P 1 unit of PRBCs 03/28  - Maintain active T+S. Transfuse for Hgb < 7.0, and platelets < 10.K  - GI eval appreciated; F/u recs --> No plans for scope at this time   - Was on Xarelto, held due to drop in Platelets as per Heme/Onc. Monitor platelet count   - S/P 1 unit PRBCs and 1 unit Platelets 04/04; W/ lasix  - s/p 1 unit of platelets 04/05; post-transfusion CBC stable  - HIT ab: neg  - ASA on hold    HypoNa  - Cont to monitor and trend  - Appreciate renal eval.   - Serial labs   - AM labs likely error, repeat Na level WNL     Multiple myeloma.   - pancytopenia largely at baseline although Hg 6.7 on arrival; s/p 1U PRBC with good response   - Was on acyclovir, now on Ganciclovir as per ID   - C/w home entecavir   - S/P dexamethasone, D/C'd by hematology, follow up outpatient after discharge  --> Now on Prednisone 50 Qd   - Pt reports his Revlimid was held  - Heme/Onc consulted; F/u recs   - Resume home aspirin. --> Now on hold   - s/p IR BM Bx  04/06     Chronic diarrhea, now with Constipation   - Standing Imodium switched to PRN  - Monitor for BM     DVT   - Duplex + For R soleal vein DVT  --> Will consider CT A chest once creatinine permits as patient received contrast; Monitor for LOPEZ   - s/p Hep gtt; Monitor PTT; Monitor H/H closely --> Now on hold in view of drop in Hgb and severe thrombocytopenia  - VQ scan to R/O PE -- Low probability   - Vascular eval appreciated; AC as tolerated, on Xarelto 10, monitor H/H, plts too low to start.   - Serial Duplex to assess for propagation  -- Without propagation   - 04/03 Duplex without propagation   - Repeat Duplex 04/10 -- without propogation   - given severe thrombocytopenia, holding Xarelto for now. Risk of bleeding greater than benefit.     RK  - S/P Contrast on 03/23   - Monitor Cr closely; Avoid nephrotoxic agents   - Cr down-trending. s/p IVF   - Renal eval appreciated   - urinary retention s/p lloyd.   - Removed lloyd 4/7/23, TOV in progress. post void residual 550ml on bladder scan  - s/p straight cath. may need to replace lloyd if unable to urinarte.   - CT with hydronephrosis; Cr up-trending     Hypertension, now hypotensive   - C/w home metoprolol.  - C/w Midodrine 10 TID. Hold for SBP > 140     Hiccups  - Started on Reglan, Monitor     Hyperlipidemia.   - C/w home atorvastatin.    Abnormal CT  - Outpatient follow up for CT findings    Prophylactic measure.   dvt ppx: DVT PPX   diet: regular  ambulate: with assistance    fall precautions  aspiration precautions.      Discussed with Patient, and family in detail at the bedside.      Dispo: - Febrile again 4/7/23: 101F, then later 102F, ID ordered pan CT, noted questionable cystitis. UA is neg. ID follow up in regards to the need for abx.  Removed lloyd 4/7/23, TOV in progress. post void residual 550ml on bladder scan s/p straight cath, may need lloyd again.    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    Fever of unknown origin.   - CXR without consolidations or effusions; U/A unremarkable; GI PCR Neg   - BCx2 and UCx  --> Neg final   - Recurrent fever. Repeat BCx2 sent 03/27 --> Neg final; ABX as per ID   - LE VA duplex --> + For DVT    - Was on empiric treatment with Vanc/cefepime --> ID consulted; S/P Zosyn  - Hold home penicillin while on ABX   - V/Q scan -- Low probability   - ID to follow up, infectious work up as per ID  --> CMV PCR -- + C/w Ganciclovir per ID , CMV IGG (+) and IgM (-), Cryptococcal Ag --Neg, Quantiferon Tb -- Neg, Fungitell -- <31   - CT Chest non-contrast, noted, no acute pathology  - Recurrent fever 03/29 --> F/u BCx2 -- Neg; F/u 03/31 BCx -- NGTD   - Shock, RRT, resume antibiotics IV fluid, MICU eval , ID follow up  --> Prednisone 50 PO Qd   - S/P Zosyn; Defer ABX as per ID   - short course steroid per hematology   - IR eval for BM BX as per Heme/Onc --> done on 04/06 with IR, f/u path  - Febrile again 4/7/23: 101F, then later 102F  - ID ordered pan CT, noted questionable cystitis. UA is neg.  - Febrile 04/10 AM. Repeat Cx in lab; Cefepime resumed pending ID recs; F/u cultures     Anemia, Thrombocytopenia, Pancytopenia  - Drop in Hgb, Occult +  - Hold Hep gtt ; S/P 1 unit of PRBCs 03/28  - Maintain active T+S. Transfuse for Hgb < 7.0, and platelets < 10.K  - GI eval appreciated; F/u recs --> No plans for scope at this time   - Was on Xarelto, held due to drop in Platelets as per Heme/Onc. Monitor platelet count   - S/P 1 unit PRBCs and 1 unit Platelets 04/04; W/ lasix  - s/p 1 unit of platelets 04/05; post-transfusion CBC stable  - HIT ab: neg  - ASA on hold    HypoNa  - Cont to monitor and trend  - Appreciate renal eval.   - Serial labs   - AM labs likely error, repeat Na level WNL     Multiple myeloma.   - pancytopenia largely at baseline although Hg 6.7 on arrival; s/p 1U PRBC with good response   - Was on acyclovir, now on Ganciclovir as per ID   - C/w home entecavir   - S/P dexamethasone, D/C'd by hematology, follow up outpatient after discharge  --> Now on Prednisone 50 Qd   - Pt reports his Revlimid was held  - Heme/Onc consulted; F/u recs   - Resume home aspirin. --> Now on hold   - s/p IR BM Bx  04/06     Chronic diarrhea, now with Constipation   - Standing Imodium switched to PRN  - Monitor for BM     DVT   - Duplex + For R soleal vein DVT  --> Will consider CT A chest once creatinine permits as patient received contrast; Monitor for LOPEZ   - s/p Hep gtt; Monitor PTT; Monitor H/H closely --> Now on hold in view of drop in Hgb and severe thrombocytopenia  - VQ scan to R/O PE -- Low probability   - Vascular eval appreciated; AC as tolerated, on Xarelto 10, monitor H/H, plts too low to start.   - Serial Duplex to assess for propagation  -- Without propagation   - 04/03 Duplex without propagation   - Repeat Duplex 04/10 -- without propogation   - given severe thrombocytopenia, holding Xarelto for now. Risk of bleeding greater than benefit.     KR  - S/P Contrast on 03/23   - Monitor Cr closely; Avoid nephrotoxic agents   - Cr down-trending. s/p IVF   - Renal eval appreciated   - urinary retention s/p lloyd.   - Removed lloyd 4/7/23, TOV in progress. post void residual 550ml on bladder scan  - s/p straight cath. may need to replace lloyd if unable to urinarte.   - CT with hydronephrosis; Cr up-trending     Hypertension, now hypotensive   - C/w home metoprolol.  - C/w Midodrine 10 TID. Hold for SBP > 140     Hiccups  - Started on Reglan, Monitor     Hyperlipidemia.   - C/w home atorvastatin.    Abnormal CT  - Outpatient follow up for CT findings    Prophylactic measure.   dvt ppx: DVT PPX   diet: regular  ambulate: with assistance    fall precautions  aspiration precautions.      Discussed with Patient, and family in detail at the bedside.      Dispo: - Febrile again 4/7/23: 101F, then later 102F, ID ordered pan CT, noted questionable cystitis. UA is neg. ID follow up in regards to the need for abx.  Removed lloyd 4/7/23, TOV in progress. post void residual 550ml on bladder scan s/p straight cath, may need lloyd again.    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    Fever of unknown origin.   - CXR without consolidations or effusions; U/A unremarkable; GI PCR Neg   - BCx2 and UCx  --> Neg final   - Recurrent fever. Repeat BCx2 sent 03/27 --> Neg final; ABX as per ID   - LE VA duplex --> + For DVT    - Was on empiric treatment with Vanc/cefepime --> ID consulted; S/P Zosyn  - Hold home penicillin while on ABX   - V/Q scan -- Low probability   - ID to follow up, infectious work up as per ID  --> CMV PCR -- + C/w Ganciclovir per ID , CMV IGG (+) and IgM (-), Cryptococcal Ag --Neg, Quantiferon Tb -- Neg, Fungitell -- <31   - CT Chest non-contrast, noted, no acute pathology  - Recurrent fever 03/29 --> F/u BCx2 -- Neg; F/u 03/31 BCx -- NGTD   - Shock, RRT, resume antibiotics IV fluid, MICU eval , ID follow up  --> Prednisone 50 PO Qd   - S/P Zosyn; Defer ABX as per ID   - short course steroid per hematology   - IR eval for BM BX as per Heme/Onc --> done on 04/06 with IR, f/u path  - Febrile again 4/7/23: 101F, then later 102F  - ID ordered pan CT, noted questionable cystitis. UA is neg.  - Febrile 04/10 AM. Repeat Cx in lab; Cefepime resumed pending ID recs; F/u cultures     Pain behind eyes   - No gross deficits on exam   - Pending CT Head and Orbits  - Optho eval consulted   - Monitor patient closely    Anemia, Thrombocytopenia, Pancytopenia  - Drop in Hgb, Occult +  - Hold Hep gtt ; S/P 1 unit of PRBCs 03/28  - Maintain active T+S. Transfuse for Hgb < 7.0, and platelets < 10.K  - GI eval appreciated; F/u recs --> No plans for scope at this time   - Was on Xarelto, held due to drop in Platelets as per Heme/Onc. Monitor platelet count   - S/P 1 unit PRBCs and 1 unit Platelets 04/04; W/ lasix  - s/p 1 unit of platelets 04/05; post-transfusion CBC stable  - HIT ab: neg  - ASA on hold    HypoNa  - Cont to monitor and trend  - Appreciate renal eval.   - Serial labs   - AM labs likely error, repeat Na level without overcorrection     Multiple myeloma.   - pancytopenia largely at baseline although Hg 6.7 on arrival; s/p 1U PRBC with good response   - Was on acyclovir, now on Ganciclovir as per ID   - C/w home entecavir   - S/P dexamethasone, D/C'd by hematology, follow up outpatient after discharge  --> Now on Prednisone 50 Qd   - Pt reports his Revlimid was held  - Heme/Onc consulted; F/u recs   - Resume home aspirin. --> Now on hold   - s/p IR BM Bx  04/06     Chronic diarrhea, now with Constipation   - Standing Imodium switched to PRN  - Monitor for BM     DVT   - Duplex + For R soleal vein DVT  --> Will consider CT A chest once creatinine permits as patient received contrast; Monitor for LOPEZ   - s/p Hep gtt; Monitor PTT; Monitor H/H closely --> Now on hold in view of drop in Hgb and severe thrombocytopenia  - VQ scan to R/O PE -- Low probability   - Vascular eval appreciated; AC as tolerated, on Xarelto 10, monitor H/H, plts too low to start.   - Serial Duplex to assess for propagation  -- Without propagation   - 04/03 Duplex without propagation   - Repeat Duplex 04/10 -- without propogation   - given severe thrombocytopenia, holding Xarelto for now. Risk of bleeding greater than benefit.     RK  - S/P Contrast on 03/23   - Monitor Cr closely; Avoid nephrotoxic agents   - Cr down-trending. s/p IVF   - Renal eval appreciated   - urinary retention s/p lloyd.   - Removed lloyd 4/7/23, TOV in progress. post void residual 550ml on bladder scan  - s/p straight cath. may need to replace lloyd if unable to urinarte.   - CT with hydronephrosis; Cr up-trending, discussed with renal -- will start IVF  CC X 24 hours and re-assess Cr in AM, Lloyd replaced. Flomax started     Hypertension, now hypotensive   - C/w home metoprolol.  - C/w Midodrine 10 TID. Hold for SBP > 140     Hiccups  - Improved on Reglan, Monitor     Hyperlipidemia.   - C/w home atorvastatin.    Abnormal CT  - Outpatient follow up for CT findings    Prophylactic measure.   dvt ppx: DVT PPX   diet: regular  ambulate: with assistance    fall precautions  aspiration precautions.      Discussed with Patient, and family in detail at the bedside.  Discussed with Renal and ACPs.      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    Fever of unknown origin.   - CXR without consolidations or effusions; U/A unremarkable; GI PCR Neg   - BCx2 and UCx  --> Neg final   - Recurrent fever. Repeat BCx2 sent 03/27 --> Neg final; ABX as per ID   - LE VA duplex --> + For DVT    - Was on empiric treatment with Vanc/cefepime --> ID consulted; S/P Zosyn  - Hold home penicillin while on ABX   - V/Q scan -- Low probability   - ID to follow up, infectious work up as per ID  --> CMV PCR -- + C/w Ganciclovir per ID , CMV IGG (+) and IgM (-), Cryptococcal Ag --Neg, Quantiferon Tb -- Neg, Fungitell -- <31   - CT Chest non-contrast, noted, no acute pathology  - Recurrent fever 03/29 --> F/u BCx2 -- Neg; F/u 03/31 BCx -- NGTD   - Shock, RRT, resume antibiotics IV fluid, MICU eval , ID follow up  --> Prednisone 50 PO Qd   - S/P Zosyn; Defer ABX as per ID   - short course steroid per hematology   - IR eval for BM BX as per Heme/Onc --> done on 04/06 with IR, f/u path  - Febrile again 4/7/23: 101F, then later 102F  - ID ordered pan CT, noted questionable cystitis. UA is neg.  - Febrile 04/10 AM. Repeat Cx in lab; Cefepime resumed pending ID recs; F/u cultures     Pain behind eyes   - No gross deficits on exam   - Pending CT Head and Orbits  - Optho eval consulted   - Monitor patient closely    Anemia, Thrombocytopenia, Pancytopenia  - Drop in Hgb, Occult +  - Hold Hep gtt ; S/P 1 unit of PRBCs 03/28  - Maintain active T+S. Transfuse for Hgb < 7.0, and platelets < 10.K  - GI eval appreciated; F/u recs --> No plans for scope at this time   - Was on Xarelto, held due to drop in Platelets as per Heme/Onc. Monitor platelet count   - S/P 1 unit PRBCs and 1 unit Platelets 04/04; W/ lasix  - s/p 1 unit of platelets 04/05; post-transfusion CBC stable  - HIT ab: neg  - ASA on hold    HypoNa  - Cont to monitor and trend  - Appreciate renal eval.   - Serial labs   - AM labs likely error, repeat Na level without overcorrection     Multiple myeloma.   - pancytopenia largely at baseline although Hg 6.7 on arrival; s/p 1U PRBC with good response   - Was on acyclovir, now on Ganciclovir as per ID   - C/w home entecavir   - S/P dexamethasone, D/C'd by hematology, follow up outpatient after discharge  --> Now on Prednisone 50 Qd   - Pt reports his Revlimid was held  - Heme/Onc consulted; F/u recs   - Resume home aspirin. --> Now on hold   - s/p IR BM Bx  04/06     Chronic diarrhea, now with Constipation   - Standing Imodium switched to PRN  - Monitor for BM     DVT   - Duplex + For R soleal vein DVT  --> Will consider CT A chest once creatinine permits as patient received contrast; Monitor for LOPEZ   - s/p Hep gtt; Monitor PTT; Monitor H/H closely --> Now on hold in view of drop in Hgb and severe thrombocytopenia  - VQ scan to R/O PE -- Low probability   - Vascular eval appreciated; AC as tolerated, on Xarelto 10, monitor H/H, plts too low to start.   - Serial Duplex to assess for propagation  -- Without propagation   - 04/03 Duplex without propagation   - Repeat Duplex 04/10 -- without propogation   - given severe thrombocytopenia, holding Xarelto for now. Risk of bleeding greater than benefit.     RK  - S/P Contrast on 03/23   - Monitor Cr closely; Avoid nephrotoxic agents   - Cr down-trending. s/p IVF   - Renal eval appreciated   - urinary retention s/p lloyd.   - Removed lloyd 4/7/23, TOV in progress. post void residual 550ml on bladder scan  - s/p straight cath. may need to replace lloyd if unable to urinarte.   - CT with hydronephrosis; Cr up-trending, discussed with renal -- will start IVF  CC X 24 hours and re-assess Cr in AM, Lloyd replaced. Flomax started     Hypertension, now hypotensive   - C/w home metoprolol.  - C/w Midodrine 10 TID. Hold for SBP > 140     Hiccups  - Improved on Reglan, Monitor     Hyperlipidemia.   - C/w home atorvastatin.    Abnormal CT  - Outpatient follow up for CT findings    Prophylactic measure.   dvt ppx: DVT PPX   diet: regular  ambulate: with assistance    fall precautions  aspiration precautions.      Discussed with Patient, and family in detail at the bedside.  Discussed with Renal and ACPs.      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    Fever of unknown origin.   - CXR without consolidations or effusions; U/A unremarkable; GI PCR Neg   - BCx2 and UCx  --> Neg final   - Recurrent fever. Repeat BCx2 sent 03/27 --> Neg final; ABX as per ID   - LE VA duplex --> + For DVT    - Was on empiric treatment with Vanc/cefepime --> ID consulted; S/P Zosyn  - Hold home penicillin while on ABX   - V/Q scan -- Low probability   - ID to follow up, infectious work up as per ID  --> CMV PCR -- + C/w Ganciclovir per ID , CMV IGG (+) and IgM (-), Cryptococcal Ag --Neg, Quantiferon Tb -- Neg, Fungitell -- <31   - CT Chest non-contrast, noted, no acute pathology  - Recurrent fever 03/29 --> F/u BCx2 -- Neg; F/u 03/31 BCx -- NGTD   - Shock, RRT, resume antibiotics IV fluid, MICU eval , ID follow up  --> Prednisone 50 PO Qd   - S/P Zosyn; Defer ABX as per ID   - short course steroid per hematology   - IR eval for BM BX as per Heme/Onc --> done on 04/06 with IR, f/u path  - Febrile again 4/7/23: 101F, then later 102F  - ID ordered pan CT, noted questionable cystitis. UA is neg.  - Febrile 04/10 AM. Repeat Cx in lab; Cefepime resumed pending ID recs; F/u cultures     Pain behind eyes   - No gross deficits on exam   - Pending CT Head and Orbits -- pending  - Optho eval consulted   - Monitor patient closely    Anemia, Thrombocytopenia, Pancytopenia  - Drop in Hgb, Occult +  - Hold Hep gtt ; S/P 1 unit of PRBCs 03/28  - Maintain active T+S. Transfuse for Hgb < 7.0, and platelets < 10.K  - GI eval appreciated; F/u recs --> No plans for scope at this time   - Was on Xarelto, held due to drop in Platelets as per Heme/Onc. Monitor platelet count   - S/P 1 unit PRBCs and 1 unit Platelets 04/04; W/ lasix  - s/p 1 unit of platelets 04/05; post-transfusion CBC stable  - HIT ab: neg  - ASA on hold    HypoNa  - Cont to monitor and trend  - Appreciate renal eval.   - Serial labs   - AM labs likely error, repeat Na level without overcorrection     Multiple myeloma.   - pancytopenia largely at baseline although Hg 6.7 on arrival; s/p 1U PRBC with good response   - Was on acyclovir, now on Ganciclovir as per ID   - C/w home entecavir   - S/P dexamethasone, D/C'd by hematology, follow up outpatient after discharge  --> Now on Prednisone 50 Qd   - Pt reports his Revlimid was held  - Heme/Onc consulted; F/u recs   - Resume home aspirin. --> Now on hold   - s/p IR BM Bx  04/06     Chronic diarrhea, now with Constipation   - Standing Imodium switched to PRN  - Monitor for BM     DVT   - Duplex + For R soleal vein DVT  --> Will consider CT A chest once creatinine permits as patient received contrast; Monitor for LOPEZ   - s/p Hep gtt; Monitor PTT; Monitor H/H closely --> Now on hold in view of drop in Hgb and severe thrombocytopenia  - VQ scan to R/O PE -- Low probability   - Vascular eval appreciated; AC as tolerated, on Xarelto 10, monitor H/H, plts too low to start.   - Serial Duplex to assess for propagation  -- Without propagation   - 04/03 Duplex without propagation   - Repeat Duplex 04/10 -- without propogation   - given severe thrombocytopenia, holding Xarelto for now. Risk of bleeding greater than benefit.     RK  - S/P Contrast on 03/23   - Monitor Cr closely; Avoid nephrotoxic agents   - Cr down-trending. s/p IVF   - Renal eval appreciated   - urinary retention s/p lloyd.   - Removed lloyd 4/7/23, TOV in progress. post void residual 550ml on bladder scan  - s/p straight cath. may need to replace lloyd if unable to urinarte.   - CT with hydronephrosis; Cr up-trending, discussed with renal -- will start IVF  CC X 24 hours and re-assess Cr in AM, Lloyd replaced. Flomax started     Hypertension, now hypotensive   - C/w home metoprolol.  - C/w Midodrine 10 TID. Hold for SBP > 140     Hiccups  - Improved on Reglan, Monitor     Hyperlipidemia.   - C/w home atorvastatin.    Abnormal CT  - Outpatient follow up for CT findings    Prophylactic measure.   dvt ppx: DVT PPX   diet: regular  ambulate: with assistance    fall precautions  aspiration precautions.      Discussed with Patient, and family in detail at the bedside.  Discussed with Renal and ACPs.      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    Fever of unknown origin.   - CXR without consolidations or effusions; U/A unremarkable; GI PCR Neg   - BCx2 and UCx  --> Neg final   - Recurrent fever. Repeat BCx2 sent 03/27 --> Neg final; ABX as per ID   - LE VA duplex --> + For DVT    - Was on empiric treatment with Vanc/cefepime --> ID consulted; S/P Zosyn  - Hold home penicillin while on ABX   - V/Q scan -- Low probability   - ID to follow up, infectious work up as per ID  --> CMV PCR -- + C/w Ganciclovir per ID , CMV IGG (+) and IgM (-), Cryptococcal Ag --Neg, Quantiferon Tb -- Neg, Fungitell -- <31   - CT Chest non-contrast, noted, no acute pathology  - Recurrent fever 03/29 --> F/u BCx2 -- Neg; F/u 03/31 BCx -- NGTD   - Shock, RRT, resume antibiotics IV fluid, MICU eval , ID follow up  --> Prednisone 50 PO Qd   - S/P Zosyn; Defer ABX as per ID   - short course steroid per hematology   - IR eval for BM BX as per Heme/Onc --> done on 04/06 with IR, f/u path  - Febrile again 4/7/23: 101F, then later 102F  - ID ordered pan CT, noted questionable cystitis. UA is neg.  - Febrile 04/10 AM. Repeat Cx in lab; Cefepime resumed pending ID recs; F/u cultures     Pain behind eyes   - No gross deficits on exam   - Pending CT Head and Orbits -- pending  - Optho eval consulted   - Monitor patient closely    Anemia, Thrombocytopenia, Pancytopenia  - Drop in Hgb, Occult +  - Hold Hep gtt ; S/P 1 unit of PRBCs 03/28  - Maintain active T+S. Transfuse for Hgb < 7.0, and platelets < 10.K  - GI eval appreciated; F/u recs --> No plans for scope at this time   - Was on Xarelto, held due to drop in Platelets as per Heme/Onc. Monitor platelet count   - S/P 1 unit PRBCs and 1 unit Platelets 04/04; W/ lasix  - s/p 1 unit of platelets 04/05; post-transfusion CBC stable  - HIT ab: neg  - ASA on hold    HypoNa  - Cont to monitor and trend  - Appreciate renal eval.   - Serial labs   - AM labs likely error, repeat Na level without overcorrection     Multiple myeloma.   - pancytopenia largely at baseline although Hg 6.7 on arrival; s/p 1U PRBC with good response   - Was on acyclovir, now on Ganciclovir as per ID   - C/w home entecavir   - S/P dexamethasone, D/C'd by hematology, follow up outpatient after discharge  --> Now on Prednisone 50 Qd   - Pt reports his Revlimid was held  - Heme/Onc consulted; F/u recs   - Resume home aspirin. --> Now on hold   - s/p IR BM Bx  04/06     Chronic diarrhea, now with Constipation   - Standing Imodium switched to PRN  - Monitor for BM     DVT   - Duplex + For R soleal vein DVT  --> Will consider CT A chest once creatinine permits as patient received contrast; Monitor for LOPEZ   - s/p Hep gtt; Monitor PTT; Monitor H/H closely --> Now on hold in view of drop in Hgb and severe thrombocytopenia  - VQ scan to R/O PE -- Low probability   - Vascular eval appreciated; AC as tolerated, on Xarelto 10, monitor H/H, plts too low to start.   - Serial Duplex to assess for propagation  -- Without propagation   - 04/03 Duplex without propagation   - Repeat Duplex 04/10 -- without propogation   - given severe thrombocytopenia, holding Xarelto for now. Risk of bleeding greater than benefit.     RK  - S/P Contrast on 03/23   - Monitor Cr closely; Avoid nephrotoxic agents   - Cr down-trending. s/p IVF   - Renal eval appreciated   - urinary retention s/p lloyd.   - Removed lloyd 4/7/23, TOV in progress. post void residual 550ml on bladder scan  - s/p straight cath. may need to replace lloyd if unable to urinarte.   - CT with hydronephrosis; Cr up-trending, discussed with renal -- will start IVF  CC X 24 hours and re-assess Cr in AM, Lloyd replaced. Flomax started     Hypertension, now hypotensive   - C/w home metoprolol.  - C/w Midodrine 10 TID. Hold for SBP > 140     Hiccups  - Improved on Reglan, Monitor     Hyperlipidemia.   - C/w home atorvastatin.    Abnormal CT  - Outpatient follow up for CT findings    Prophylactic measure.   dvt ppx: DVT PPX   diet: regular  ambulate: with assistance    fall precautions  aspiration precautions.      Discussed with Patient, and family in detail at the bedside.  Discussed with Renal and ACPs.      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    Fever of unknown origin.   - CXR without consolidations or effusions; U/A unremarkable; GI PCR Neg   - BCx2 and UCx  --> Neg final   - Recurrent fever. Repeat BCx2 sent 03/27 --> Neg final; ABX as per ID   - LE VA duplex --> + For DVT    - Was on empiric treatment with Vanc/cefepime --> ID consulted; S/P Zosyn  - Hold home penicillin while on ABX   - V/Q scan -- Low probability   - ID to follow up, infectious work up as per ID  --> CMV PCR -- + C/w Ganciclovir per ID , CMV IGG (+) and IgM (-), Cryptococcal Ag --Neg, Quantiferon Tb -- Neg, Fungitell -- <31   - CT Chest non-contrast, noted, no acute pathology  - Recurrent fever 03/29 --> F/u BCx2 -- Neg; F/u 03/31 BCx -- NGTD   - Shock, RRT, resume antibiotics IV fluid, MICU eval , ID follow up  --> Prednisone 50 PO Qd   - S/P Zosyn; Defer ABX as per ID   - short course steroid per hematology   - IR eval for BM BX as per Heme/Onc --> done on 04/06 with IR, f/u path  - Febrile again 4/7/23: 101F, then later 102F  - ID ordered pan CT, noted questionable cystitis. UA is neg.  - Febrile 04/10 AM. Repeat Cx in lab; Cefepime resumed pending ID recs; F/u cultures     Pain behind eyes   - No gross deficits on exam   - Pending CT Head and Orbits -- pending  - Optho eval consulted   - Monitor patient closely    Anemia, Thrombocytopenia, Pancytopenia  - Drop in Hgb, Occult +  - Hold Hep gtt ; S/P 1 unit of PRBCs 03/28  - Maintain active T+S. Transfuse for Hgb < 7.0, and platelets < 10.K  - GI eval appreciated; F/u recs --> No plans for scope at this time   - Was on Xarelto, held due to drop in Platelets as per Heme/Onc. Monitor platelet count   - S/P 1 unit PRBCs and 1 unit Platelets 04/04; W/ lasix  - s/p 1 unit of platelets 04/05; post-transfusion CBC stable  - HIT ab: neg  - ASA on hold    Afib  - Unable to be on AC 2/2 low platelets  - On Digoxin   - Monitor on tele   - Cardio follow up     HypoNa  - Cont to monitor and trend  - Appreciate renal eval.   - Serial labs   - AM labs likely error, repeat Na level without overcorrection     Multiple myeloma.   - pancytopenia largely at baseline although Hg 6.7 on arrival; s/p 1U PRBC with good response   - Was on acyclovir, now on Ganciclovir as per ID   - C/w home entecavir   - S/P dexamethasone, D/C'd by hematology, follow up outpatient after discharge  --> Now on Prednisone 50 Qd   - Pt reports his Revlimid was held  - Heme/Onc consulted; F/u recs   - Resume home aspirin. --> Now on hold   - s/p IR BM Bx  04/06     Chronic diarrhea, now with Constipation   - Standing Imodium switched to PRN  - Monitor for BM     DVT   - Duplex + For R soleal vein DVT  --> Will consider CT A chest once creatinine permits as patient received contrast; Monitor for LOPEZ   - s/p Hep gtt; Monitor PTT; Monitor H/H closely --> Now on hold in view of drop in Hgb and severe thrombocytopenia  - VQ scan to R/O PE -- Low probability   - Vascular eval appreciated; AC as tolerated, on Xarelto 10, monitor H/H, plts too low to start.   - Serial Duplex to assess for propagation  -- Without propagation   - 04/03 Duplex without propagation   - Repeat Duplex 04/10 -- without propogation   - given severe thrombocytopenia, holding Xarelto for now. Risk of bleeding greater than benefit.     RK  - S/P Contrast on 03/23   - Monitor Cr closely; Avoid nephrotoxic agents   - Cr down-trending. s/p IVF   - Renal eval appreciated   - urinary retention s/p lloyd.   - Removed lloyd 4/7/23, TOV in progress. post void residual 550ml on bladder scan  - s/p straight cath. may need to replace lloyd if unable to urinarte.   - CT with hydronephrosis; Cr up-trending, discussed with renal -- will start IVF  CC X 24 hours and re-assess Cr in AM, Lloyd replaced. Flomax started     Hypertension, now hypotensive   - C/w home metoprolol.  - C/w Midodrine 10 TID. Hold for SBP > 140     Hiccups  - Improved on Reglan, Monitor     Hyperlipidemia.   - C/w home atorvastatin.    Abnormal CT  - Outpatient follow up for CT findings    Prophylactic measure.   dvt ppx: DVT PPX   diet: regular  ambulate: with assistance    fall precautions  aspiration precautions.      Discussed with Patient, and family in detail at the bedside.  Discussed with Renal and ACPs.      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    Fever of unknown origin.   - CXR without consolidations or effusions; U/A unremarkable; GI PCR Neg   - BCx2 and UCx  --> Neg final   - Recurrent fever. Repeat BCx2 sent 03/27 --> Neg final; ABX as per ID   - LE VA duplex --> + For DVT    - Was on empiric treatment with Vanc/cefepime --> ID consulted; S/P Zosyn  - Hold home penicillin while on ABX   - V/Q scan -- Low probability   - ID to follow up, infectious work up as per ID  --> CMV PCR -- + C/w Ganciclovir per ID , CMV IGG (+) and IgM (-), Cryptococcal Ag --Neg, Quantiferon Tb -- Neg, Fungitell -- <31   - CT Chest non-contrast, noted, no acute pathology  - Recurrent fever 03/29 --> F/u BCx2 -- Neg; F/u 03/31 BCx -- NGTD   - Shock, RRT, resume antibiotics IV fluid, MICU eval , ID follow up  --> Prednisone 50 PO Qd   - S/P Zosyn; Defer ABX as per ID   - short course steroid per hematology   - IR eval for BM BX as per Heme/Onc --> done on 04/06 with IR, f/u path  - Febrile again 4/7/23: 101F, then later 102F  - ID ordered pan CT, noted questionable cystitis. UA is neg.  - Febrile 04/10 AM. Repeat Cx in lab; Cefepime resumed pending ID recs; F/u cultures     Pain behind eyes   - No gross deficits on exam   - Pending CT Head and Orbits -- pending  - Optho eval consulted   - Monitor patient closely    Anemia, Thrombocytopenia, Pancytopenia  - Drop in Hgb, Occult +  - Hold Hep gtt ; S/P 1 unit of PRBCs 03/28  - Maintain active T+S. Transfuse for Hgb < 7.0, and platelets < 10.K  - GI eval appreciated; F/u recs --> No plans for scope at this time   - Was on Xarelto, held due to drop in Platelets as per Heme/Onc. Monitor platelet count   - S/P 1 unit PRBCs and 1 unit Platelets 04/04; W/ lasix  - s/p 1 unit of platelets 04/05; post-transfusion CBC stable  - HIT ab: neg  - ASA on hold    Afib  - Unable to be on AC 2/2 low platelets  - On Digoxin   - Monitor on tele   - Cardio follow up     HypoNa  - Cont to monitor and trend  - Appreciate renal eval.   - Serial labs   - AM labs likely error, repeat Na level without overcorrection     Multiple myeloma.   - pancytopenia largely at baseline although Hg 6.7 on arrival; s/p 1U PRBC with good response   - Was on acyclovir, now on Ganciclovir as per ID   - C/w home entecavir   - S/P dexamethasone, D/C'd by hematology, follow up outpatient after discharge  --> Now on Prednisone 50 Qd   - Pt reports his Revlimid was held  - Heme/Onc consulted; F/u recs   - Resume home aspirin. --> Now on hold   - s/p IR BM Bx  04/06     Chronic diarrhea, now with Constipation   - Standing Imodium switched to PRN  - Monitor for BM     DVT   - Duplex + For R soleal vein DVT  --> Will consider CT A chest once creatinine permits as patient received contrast; Monitor for LOPEZ   - s/p Hep gtt; Monitor PTT; Monitor H/H closely --> Now on hold in view of drop in Hgb and severe thrombocytopenia  - VQ scan to R/O PE -- Low probability   - Vascular eval appreciated; AC as tolerated, on Xarelto 10, monitor H/H, plts too low to start.   - Serial Duplex to assess for propagation  -- Without propagation   - 04/03 Duplex without propagation   - Repeat Duplex 04/10 -- without propogation   - given severe thrombocytopenia, holding Xarelto for now. Risk of bleeding greater than benefit.     RK  - S/P Contrast on 03/23   - Monitor Cr closely; Avoid nephrotoxic agents   - Cr down-trending. s/p IVF   - Renal eval appreciated   - urinary retention s/p lloyd.   - Removed lloyd 4/7/23, TOV in progress. post void residual 550ml on bladder scan  - s/p straight cath. may need to replace lloyd if unable to urinarte.   - CT with hydronephrosis; Cr up-trending, discussed with renal -- will start IVF  CC X 24 hours and re-assess Cr in AM, Lloyd replaced. Flomax started     Hypertension, now hypotensive   - C/w home metoprolol.  - C/w Midodrine 10 TID. Hold for SBP > 140     Hiccups  - Improved on Reglan, Monitor     Hyperlipidemia.   - C/w home atorvastatin.    Abnormal CT  - Outpatient follow up for CT findings    Prophylactic measure.   dvt ppx: DVT PPX   diet: regular  ambulate: with assistance    fall precautions  aspiration precautions.      Discussed with Patient, and family in detail at the bedside.  Discussed with Renal and ACPs.

## 2023-04-10 NOTE — PROGRESS NOTE ADULT - SUBJECTIVE AND OBJECTIVE BOX
Patient is a 67y old  Male who presents with a chief complaint of fever (10 Apr 2023 13:12)    Patient seen and examined this morning at bedside. Patient offers no new complaints, verbalized wishing to go home. Family at bedside  MEDICATIONS  (STANDING):  atorvastatin 40 milliGRAM(s) Oral at bedtime  cefepime   IVPB 1000 milliGRAM(s) IV Intermittent every 8 hours  chlorhexidine 2% Cloths 1 Application(s) Topical daily  entecavir 0.5 milliGRAM(s) Oral daily  filgrastim-sndz (ZARXIO) Injectable 480 MICROGram(s) SubCutaneous once  midodrine. 10 milliGRAM(s) Oral three times a day  predniSONE   Tablet 50 milliGRAM(s) Oral daily  saline laxative (FLEET) Rectal Enema 1 Enema Rectal once  sodium chloride 0.9%. 1000 milliLiter(s) (100 mL/Hr) IV Continuous <Continuous>  tamsulosin 0.4 milliGRAM(s) Oral at bedtime  valGANciclovir 450 milliGRAM(s) Oral every 12 hours    MEDICATIONS  (PRN):  acetaminophen     Tablet .. 650 milliGRAM(s) Oral every 6 hours PRN Temp greater or equal to 38C (100.4F), Mild Pain (1 - 3)  aluminum hydroxide/magnesium hydroxide/simethicone Suspension 30 milliLiter(s) Oral every 4 hours PRN Dyspepsia  loperamide 2 milliGRAM(s) Oral three times a day PRN Diarrhea  polyethylene glycol 3350 17 Gram(s) Oral daily PRN Constipation      Vital Signs Last 24 Hrs  T(C): 36.9 (10 Apr 2023 11:43), Max: 38.5 (10 Apr 2023 04:39)  T(F): 98.4 (10 Apr 2023 11:43), Max: 101.3 (10 Apr 2023 04:39)  HR: 98 (10 Apr 2023 11:43) (84 - 106)  BP: 98/56 (10 Apr 2023 11:43) (98/56 - 114/56)  BP(mean): --  RR: 18 (10 Apr 2023 11:43) (15 - 18)  SpO2: 92% (10 Apr 2023 11:43) (92% - 96%)    Parameters below as of 10 Apr 2023 11:43  Patient On (Oxygen Delivery Method): room air        PE  NAD  Awake, alert  Anicteric, MMM  RRR  CTAB  Abd soft, NT, ND  +lloyd  No c/c/e                            8.0    0.97  )-----------( 11       ( 10 Apr 2023 11:54 )             24.4       04-10    133<L>  |  94<L>  |  36<H>  ----------------------------<  313<H>  3.9   |  28  |  1.54<H>    Ca    8.0<L>      10 Apr 2023 11:54  Phos  1.6     04-09  Mg     1.6     04-09

## 2023-04-10 NOTE — PROGRESS NOTE ADULT - SUBJECTIVE AND OBJECTIVE BOX
Chief Complaint:  Patient is a 67y old  Male who presents with a chief complaint of fever (10 Apr 2023 12:09)      Date of service 04-10-23 @ 13:12      Interval Events:   Patient seen and examined.   States he is having difficulty focusing his vision.  No abdominal pain or nausea/vomiting.  Last BM was Friday s/p enema.       Hospital Medications:  acetaminophen     Tablet .. 650 milliGRAM(s) Oral every 6 hours PRN  aluminum hydroxide/magnesium hydroxide/simethicone Suspension 30 milliLiter(s) Oral every 4 hours PRN  atorvastatin 40 milliGRAM(s) Oral at bedtime  cefepime   IVPB 1000 milliGRAM(s) IV Intermittent every 8 hours  chlorhexidine 2% Cloths 1 Application(s) Topical daily  entecavir 0.5 milliGRAM(s) Oral daily  filgrastim-sndz (ZARXIO) Injectable 480 MICROGram(s) SubCutaneous once  loperamide 2 milliGRAM(s) Oral three times a day PRN  midodrine. 10 milliGRAM(s) Oral three times a day  predniSONE   Tablet 50 milliGRAM(s) Oral daily  saline laxative (FLEET) Rectal Enema 1 Enema Rectal once  tamsulosin 0.4 milliGRAM(s) Oral at bedtime  valGANciclovir 450 milliGRAM(s) Oral every 12 hours        Review of Systems:  General:  No wt loss, fevers, chills, night sweats, fatigue,   Eyes:  Good vision, no reported pain  ENT:  No sore throat, pain, runny nose, dysphagia  CV:  No pain, palpitations, hypo/hypertension  Resp:  No dyspnea, cough, tachypnea, wheezing  GI:  See HPI  :  No pain, bleeding, incontinence, nocturia  Muscle:  No pain, weakness  Neuro:  No weakness, tingling, memory problems  Psych:  No fatigue, insomnia, mood problems, depression  Endocrine:  No polyuria, polydipsia, cold/heat intolerance  Heme:  No petechiae, ecchymosis, easy bruisability  Integumentary:  No rash, edema    PHYSICAL EXAM:   Vital Signs:  Vital Signs Last 24 Hrs  T(C): 36.9 (10 Apr 2023 11:43), Max: 38.5 (10 Apr 2023 04:39)  T(F): 98.4 (10 Apr 2023 11:43), Max: 101.3 (10 Apr 2023 04:39)  HR: 98 (10 Apr 2023 11:43) (84 - 106)  BP: 98/56 (10 Apr 2023 11:43) (98/56 - 114/56)  BP(mean): --  RR: 18 (10 Apr 2023 11:43) (15 - 18)  SpO2: 92% (10 Apr 2023 11:43) (92% - 96%)    Parameters below as of 10 Apr 2023 11:43  Patient On (Oxygen Delivery Method): room air      Daily     Daily       PHYSICAL EXAM:     GENERAL:  Appears stated age, well-groomed, well-nourished, no distress  HEENT:  NC/AT,  conjunctivae anicteric, clear and pink,   NECK: supple, trachea midline  CHEST:  Full & symmetric excursion, no increased effort, breath sounds clear  HEART:  Regular rhythm, no JVD  ABDOMEN:  Soft, non-tender, non-distended, normoactive bowel sounds,  no masses , no hepatosplenomegaly  EXTREMITIES:  no cyanosis,clubbing or edema  SKIN:  No rash, erythema, or, ecchymoses, no jaundice  NEURO:  Alert, non-focal, no asterixis  PSYCH: Appropriate affect, oriented to place and time  RECTAL: Deferred      LABS Personally reviewed by me:                        8.0    0.97  )-----------( 11       ( 10 Apr 2023 11:54 )             24.4     Mean Cell Volume: 93.8 fl (04-10-23 @ 11:54)    04-10    133<L>  |  94<L>  |  36<H>  ----------------------------<  313<H>  3.9   |  28  |  1.54<H>    Ca    8.0<L>      10 Apr 2023 11:54  Phos  1.6     04-09  Mg     1.6     04-09                                    8.0    0.97  )-----------( 11       ( 10 Apr 2023 11:54 )             24.4                         6.8    0.82  )-----------( 12       ( 10 Apr 2023 06:39 )             20.4                         7.9    1.02  )-----------( 10       ( 09 Apr 2023 05:56 )             23.6                         8.4    1.44  )-----------( 14       ( 08 Apr 2023 06:10 )             25.1                         9.4    1.85  )-----------( 19       ( 07 Apr 2023 13:43 )             28.6       Imaging personally reviewed by me:

## 2023-04-10 NOTE — CHART NOTE - NSCHARTNOTEFT_GEN_A_CORE
Notified by RN, pt febrile to 101.3. Pt seen and examined at the bedside in no acute distress. Pt had some spikes in temperature yesterday and today, and is +ve for CMV Viremia, on valganciclovir. No other acute complaints at the moment. Pt had blood cultures done on 4/7, prelim negative.      Vital Signs Last 24 Hrs  T(C): 38.5 (10 Apr 2023 04:39), Max: 39.3 (09 Apr 2023 10:14)  T(F): 101.3 (10 Apr 2023 04:39), Max: 102.8 (09 Apr 2023 10:14)  HR: 102 (10 Apr 2023 04:39) (84 - 151)  BP: 104/60 (10 Apr 2023 04:39) (97/63 - 114/56)  BP(mean): --  RR: 16 (10 Apr 2023 04:39) (15 - 18)  SpO2: 92% (10 Apr 2023 04:39) (92% - 96%)    Parameters below as of 10 Apr 2023 04:39  Patient On (Oxygen Delivery Method): room air        Physical Exam:  General: WN/WD NAD  Neurology: A&Ox3, nonfocal, VICENTE x 4  Head:  Normocephalic, atraumatic  Respiratory: CTA B/L  CV: RRR, S1S2, no murmur  Abdominal: Soft, NT, ND no palpable mass  MSK: No edema, + peripheral pulses, FROM all 4 extremity  Labs:                          7.9    1.02  )-----------( 10       ( 09 Apr 2023 05:56 )             23.6     04-09    133<L>  |  94<L>  |  27<H>  ----------------------------<  119<H>  3.7   |  31  |  1.18    Ca    8.2<L>      09 Apr 2023 05:55  Phos  1.6     04-09  Mg     1.6     04-09        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, presents with fever, patient reports that upon waking this morning patient had chills and body aches, took his temperature which was 102, patient endorsing some mild periumbilical abdominal discomfort, but otherwise feels well, denies chest pain, shortness of breath, cough, nasal congestion, vomiting, diarrhea, dysuria, no sick contacts or recent travel, patient states that he went to his hematologist to get a blood transfusion today and was instructed to come to the ER, in the ED, pt was febrile, tachycardic, otherwise VSS, labs notable for pancytopenia (Hg 6.7), elevated Cr (improved from prior baseline), BNP 3K, pt was given 1U PRBC, Ofirmev, 1L LR, admitted to general medicine for further management, of note pt had additional fever approx 15 minutes after finishing transfusion (24 Mar 2023 05:32)    Assessment & Plan:  Patient is a 67y old  Male who presents with a chief complaint of fever, Pt had CMV viremia, seen for another episode of fever.    >IV tylenol  >Stat 1x cefepime 2g  >Blood Cultures stat  >Will f/u with Day team and ID in the morning    Summer Levine PA-C

## 2023-04-10 NOTE — PROGRESS NOTE ADULT - SUBJECTIVE AND OBJECTIVE BOX
Follow Up:  fever    Interval History/ROS: pt still febrile, now neutropenic, feels weak but no vomiting, cough, abd pain         Allergies  No Known Allergies        ANTIMICROBIALS:  cefepime   IVPB 1000 every 8 hours  entecavir 0.5 daily  valGANciclovir 450 every 12 hours      OTHER MEDS:  acetaminophen     Tablet .. 650 milliGRAM(s) Oral every 6 hours PRN  aluminum hydroxide/magnesium hydroxide/simethicone Suspension 30 milliLiter(s) Oral every 4 hours PRN  atorvastatin 40 milliGRAM(s) Oral at bedtime  chlorhexidine 2% Cloths 1 Application(s) Topical daily  filgrastim-sndz (ZARXIO) Injectable 480 MICROGram(s) SubCutaneous daily  loperamide 2 milliGRAM(s) Oral three times a day PRN  midodrine. 10 milliGRAM(s) Oral three times a day  polyethylene glycol 3350 17 Gram(s) Oral daily PRN  predniSONE   Tablet 50 milliGRAM(s) Oral daily  saline laxative (FLEET) Rectal Enema 1 Enema Rectal once  sodium chloride 0.9%. 1000 milliLiter(s) IV Continuous <Continuous>  tamsulosin 0.4 milliGRAM(s) Oral at bedtime      Vital Signs Last 24 Hrs  T(C): 36.8 (10 Apr 2023 15:48), Max: 38.5 (10 Apr 2023 04:39)  T(F): 98.2 (10 Apr 2023 15:48), Max: 101.3 (10 Apr 2023 04:39)  HR: 90 (10 Apr 2023 15:48) (87 - 105)  BP: 112/62 (10 Apr 2023 15:48) (98/56 - 113/65)  BP(mean): --  RR: 18 (10 Apr 2023 15:48) (16 - 18)  SpO2: 96% (10 Apr 2023 15:48) (92% - 96%)    Parameters below as of 10 Apr 2023 15:48  Patient On (Oxygen Delivery Method): room air        Physical Exam:  General:    NAD,  non toxic  Respiratory:    clear b/l,    no wheezing  abd:     soft,   BS +,   no tenderness  :   no CVAT,  no suprapubic tenderness,   +  lloyd  Musculoskeletal:   no joint swelling  vascular: no phlebitis  Skin:    no rash                          8.0    0.97  )-----------( 11       ( 10 Apr 2023 11:54 )             24.4       04-10    133<L>  |  94<L>  |  36<H>  ----------------------------<  313<H>  3.9   |  28  |  1.54<H>    Ca    8.0<L>      10 Apr 2023 11:54  Phos  1.6     04-09  Mg     1.6     04-09            MICROBIOLOGY:  v  Clean Catch Clean Catch (Midstream)  04-08-23   No growth  --  --      .Blood Blood-Peripheral  04-07-23   No growth to date.  --  --      .Blood Blood-Peripheral  04-07-23   No growth to date.  --  --      .Blood Blood-Peripheral  03-31-23   No Growth Final  --  --      .Blood Blood-Peripheral  03-29-23   No Growth Final  --  --      .Blood Blood-Peripheral  03-29-23   No Growth Final  --  --      .Blood Blood  03-27-23   No Growth Final  --  --      Clean Catch Clean Catch (Midstream)  03-23-23   <10,000 CFU/mL Normal Urogenital Lali  --  --      .Blood Blood-Peripheral  03-23-23   No Growth Final  --  --      .Blood Blood-Peripheral  03-23-23   No Growth Final  --  --        CMV IgG Antibody: 3.10 U/mL (03-27-23 @ 09:27)    CMVPCR Log: 3.34 [id number]/mL (04-07 @ 06:29)        RADIOLOGY:  Images independently visualized and reviewed personally, findings as below  < from: CT Orbit No Cont (04.10.23 @ 15:57) >    IMPRESSION:    Brain CT: No acute hemorrhage, mass effect or extra-axial collections. No   change compared with prior study dated 4/1/2023.    Orbit CT: No evidence for intraorbital abnormality. Chronic left lamina   papyracea fracture.      < end of copied text >  < from: [facility] Duplex Lower Ext Vein Scan, Bilat (04.10.23 @ 12:34) >  IMPRESSION:    Persistent right soleal vein DVT without proximal propagation.      < end of copied text >  < from: CT Abdomen and Pelvis w/ IV Cont (04.07.23 @ 21:10) >  IMPRESSION:  Bladder wall thickening with perivesicular stranding. Findings may be   seen in the setting of cystitis. Correlate with urinalysis.    Mild bilateral hydroureteronephrosis to the level of the bladder.    < end of copied text >     Follow Up:  fever    Interval History/ROS: pt still febrile, now neutropenic, feels weak but no vomiting, cough, abd pain         Allergies  No Known Allergies        ANTIMICROBIALS:  cefepime   IVPB 1000 every 8 hours  entecavir 0.5 daily  valGANciclovir 450 every 12 hours      OTHER MEDS:  acetaminophen     Tablet .. 650 milliGRAM(s) Oral every 6 hours PRN  aluminum hydroxide/magnesium hydroxide/simethicone Suspension 30 milliLiter(s) Oral every 4 hours PRN  atorvastatin 40 milliGRAM(s) Oral at bedtime  chlorhexidine 2% Cloths 1 Application(s) Topical daily  filgrastim-sndz (ZARXIO) Injectable 480 MICROGram(s) SubCutaneous daily  loperamide 2 milliGRAM(s) Oral three times a day PRN  midodrine. 10 milliGRAM(s) Oral three times a day  polyethylene glycol 3350 17 Gram(s) Oral daily PRN  predniSONE   Tablet 50 milliGRAM(s) Oral daily  saline laxative (FLEET) Rectal Enema 1 Enema Rectal once  sodium chloride 0.9%. 1000 milliLiter(s) IV Continuous <Continuous>  tamsulosin 0.4 milliGRAM(s) Oral at bedtime      Vital Signs Last 24 Hrs  T(C): 36.8 (10 Apr 2023 15:48), Max: 38.5 (10 Apr 2023 04:39)  T(F): 98.2 (10 Apr 2023 15:48), Max: 101.3 (10 Apr 2023 04:39)  HR: 90 (10 Apr 2023 15:48) (87 - 105)  BP: 112/62 (10 Apr 2023 15:48) (98/56 - 113/65)  BP(mean): --  RR: 18 (10 Apr 2023 15:48) (16 - 18)  SpO2: 96% (10 Apr 2023 15:48) (92% - 96%)    Parameters below as of 10 Apr 2023 15:48  Patient On (Oxygen Delivery Method): room air        Physical Exam:  General:    NAD,  non toxic  Respiratory:    clear b/l,    no wheezing  abd:     soft,   BS +,   no tenderness  :   no CVAT,  no suprapubic tenderness,   +  llody  Musculoskeletal:   no joint swelling  vascular: no phlebitis  Skin:    no rash                          8.0    0.97  )-----------( 11       ( 10 Apr 2023 11:54 )             24.4       04-10    133<L>  |  94<L>  |  36<H>  ----------------------------<  313<H>  3.9   |  28  |  1.54<H>    Ca    8.0<L>      10 Apr 2023 11:54  Phos  1.6     04-09  Mg     1.6     04-09            MICROBIOLOGY:  v  Clean Catch Clean Catch (Midstream)  04-08-23   No growth  --  --      .Blood Blood-Peripheral  04-07-23   No growth to date.  --  --      .Blood Blood-Peripheral  04-07-23   No growth to date.  --  --      .Blood Blood-Peripheral  03-31-23   No Growth Final  --  --      .Blood Blood-Peripheral  03-29-23   No Growth Final  --  --      .Blood Blood-Peripheral  03-29-23   No Growth Final  --  --      .Blood Blood  03-27-23   No Growth Final  --  --      Clean Catch Clean Catch (Midstream)  03-23-23   <10,000 CFU/mL Normal Urogenital Lali  --  --      .Blood Blood-Peripheral  03-23-23   No Growth Final  --  --      .Blood Blood-Peripheral  03-23-23   No Growth Final  --  --        CMV IgG Antibody: 3.10 U/mL (03-27-23 @ 09:27)    CMVPCR Log: 3.34 [id number]/mL (04-07 @ 06:29)        RADIOLOGY:  Images independently visualized and reviewed personally, findings as below  < from: CT Orbit No Cont (04.10.23 @ 15:57) >    IMPRESSION:    Brain CT: No acute hemorrhage, mass effect or extra-axial collections. No   change compared with prior study dated 4/1/2023.    Orbit CT: No evidence for intraorbital abnormality. Chronic left lamina   papyracea fracture.      < end of copied text >  < from: [facility] Duplex Lower Ext Vein Scan, Bilat (04.10.23 @ 12:34) >  IMPRESSION:    Persistent right soleal vein DVT without proximal propagation.      < end of copied text >  < from: CT Abdomen and Pelvis w/ IV Cont (04.07.23 @ 21:10) >  IMPRESSION:  Bladder wall thickening with perivesicular stranding. Findings may be   seen in the setting of cystitis. Correlate with urinalysis.    Mild bilateral hydroureteronephrosis to the level of the bladder.    < end of copied text >     Follow Up:  fever    Interval History/ROS: pt still febrile, now neutropenic, feels weak but no vomiting, cough, abd pain         Allergies  No Known Allergies        ANTIMICROBIALS:  cefepime   IVPB 1000 every 8 hours  entecavir 0.5 daily  valGANciclovir 450 every 12 hours      OTHER MEDS:  acetaminophen     Tablet .. 650 milliGRAM(s) Oral every 6 hours PRN  aluminum hydroxide/magnesium hydroxide/simethicone Suspension 30 milliLiter(s) Oral every 4 hours PRN  atorvastatin 40 milliGRAM(s) Oral at bedtime  chlorhexidine 2% Cloths 1 Application(s) Topical daily  filgrastim-sndz (ZARXIO) Injectable 480 MICROGram(s) SubCutaneous daily  loperamide 2 milliGRAM(s) Oral three times a day PRN  midodrine. 10 milliGRAM(s) Oral three times a day  polyethylene glycol 3350 17 Gram(s) Oral daily PRN  predniSONE   Tablet 50 milliGRAM(s) Oral daily  saline laxative (FLEET) Rectal Enema 1 Enema Rectal once  sodium chloride 0.9%. 1000 milliLiter(s) IV Continuous <Continuous>  tamsulosin 0.4 milliGRAM(s) Oral at bedtime      Vital Signs Last 24 Hrs  T(C): 36.8 (10 Apr 2023 15:48), Max: 38.5 (10 Apr 2023 04:39)  T(F): 98.2 (10 Apr 2023 15:48), Max: 101.3 (10 Apr 2023 04:39)  HR: 90 (10 Apr 2023 15:48) (87 - 105)  BP: 112/62 (10 Apr 2023 15:48) (98/56 - 113/65)  BP(mean): --  RR: 18 (10 Apr 2023 15:48) (16 - 18)  SpO2: 96% (10 Apr 2023 15:48) (92% - 96%)    Parameters below as of 10 Apr 2023 15:48  Patient On (Oxygen Delivery Method): room air        Physical Exam:  General:    NAD,  non toxic  Respiratory:    clear b/l,    no wheezing  abd:     soft,   BS +,   no tenderness  :   no CVAT,  no suprapubic tenderness,   +  lloyd  Musculoskeletal:   no joint swelling  vascular: no phlebitis  Skin:    no rash                          8.0    0.97  )-----------( 11       ( 10 Apr 2023 11:54 )             24.4       04-10    133<L>  |  94<L>  |  36<H>  ----------------------------<  313<H>  3.9   |  28  |  1.54<H>    Ca    8.0<L>      10 Apr 2023 11:54  Phos  1.6     04-09  Mg     1.6     04-09            MICROBIOLOGY:  v  Clean Catch Clean Catch (Midstream)  04-08-23   No growth  --  --      .Blood Blood-Peripheral  04-07-23   No growth to date.  --  --      .Blood Blood-Peripheral  04-07-23   No growth to date.  --  --      .Blood Blood-Peripheral  03-31-23   No Growth Final  --  --      .Blood Blood-Peripheral  03-29-23   No Growth Final  --  --      .Blood Blood-Peripheral  03-29-23   No Growth Final  --  --      .Blood Blood  03-27-23   No Growth Final  --  --      Clean Catch Clean Catch (Midstream)  03-23-23   <10,000 CFU/mL Normal Urogenital Lali  --  --      .Blood Blood-Peripheral  03-23-23   No Growth Final  --  --      .Blood Blood-Peripheral  03-23-23   No Growth Final  --  --        CMV IgG Antibody: 3.10 U/mL (03-27-23 @ 09:27)    CMVPCR Log: 3.34 [id number]/mL (04-07 @ 06:29)        RADIOLOGY:  Images independently visualized and reviewed personally, findings as below  < from: CT Orbit No Cont (04.10.23 @ 15:57) >    IMPRESSION:    Brain CT: No acute hemorrhage, mass effect or extra-axial collections. No   change compared with prior study dated 4/1/2023.    Orbit CT: No evidence for intraorbital abnormality. Chronic left lamina   papyracea fracture.      < end of copied text >  < from: [facility] Duplex Lower Ext Vein Scan, Bilat (04.10.23 @ 12:34) >  IMPRESSION:    Persistent right soleal vein DVT without proximal propagation.      < end of copied text >  < from: CT Abdomen and Pelvis w/ IV Cont (04.07.23 @ 21:10) >  IMPRESSION:  Bladder wall thickening with perivesicular stranding. Findings may be   seen in the setting of cystitis. Correlate with urinalysis.    Mild bilateral hydroureteronephrosis to the level of the bladder.    < end of copied text >

## 2023-04-10 NOTE — PROGRESS NOTE ADULT - ASSESSMENT
1. fever  - V/Q scan with low probability of PE   - GI PCR- negative   - CMV PCR elevated, cmv IGG + and CMV IGM -  - cryptococcal ag, QuantiFeronTb, fungitell negative  - per ID, given immunosuppressed state and CMV viremia, ordered repeat CMV PCR and initiated valcyte.     2. anemia, FOBT positive in the setting of thrombocytopenia. Hgb remains at recent baseline. No overt bleeding symptoms.   - no plans for endoscopic evaluation at this time  - monitor H&H, transfuse PRN  - daily PPI     3. Chronic diarrhea, likely related to chemo +/- abx. 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   - GI PCR neg, C-diff neg on 03.03  - diarrhea now resolved, last BM x 3 days ago and reports feeling "backed up".   Standing Imodium discontinued and switched to prn. (see below)    4. DVT   - on anticoagulation    5. Multiple myeloma, pancytopenia   - per heme / onc   - s/p bone marrow biopsy 4/6 with IR    6. Hiccups--resolved   - reglan discontinued     7. Had diarrhea, now constipated   - miralax 17g PO x 1, enema x 1 on Friday  - enema x 1 and miralax prn ordered     8. Difficulty focusing vision  - pending neuro and opthalmology eval         Attending supervision statement: I have personally seen and examined the patient. I fully participated in the care of this patient. I have made amendments to the documentation where necessary, and agree with the history, physical exam, and plan as outlined by the ACP.

## 2023-04-11 LAB
ALBUMIN SERPL ELPH-MCNC: 2.5 G/DL — LOW (ref 3.3–5)
ALP SERPL-CCNC: 207 U/L — HIGH (ref 40–120)
ALT FLD-CCNC: 67 U/L — HIGH (ref 10–45)
AMMONIA BLD-MCNC: 18 UMOL/L — SIGNIFICANT CHANGE UP (ref 11–55)
ANION GAP SERPL CALC-SCNC: 11 MMOL/L — SIGNIFICANT CHANGE UP (ref 5–17)
AST SERPL-CCNC: 30 U/L — SIGNIFICANT CHANGE UP (ref 10–40)
BASE EXCESS BLDV CALC-SCNC: 2.9 MMOL/L — SIGNIFICANT CHANGE UP (ref -2–3)
BASOPHILS # BLD AUTO: 0 K/UL — SIGNIFICANT CHANGE UP (ref 0–0.2)
BASOPHILS NFR BLD AUTO: 0 % — SIGNIFICANT CHANGE UP (ref 0–2)
BILIRUB SERPL-MCNC: 1.8 MG/DL — HIGH (ref 0.2–1.2)
BUN SERPL-MCNC: 37 MG/DL — HIGH (ref 7–23)
CALCIUM SERPL-MCNC: 8.3 MG/DL — LOW (ref 8.4–10.5)
CHLORIDE SERPL-SCNC: 97 MMOL/L — SIGNIFICANT CHANGE UP (ref 96–108)
CO2 BLDV-SCNC: 29 MMOL/L — HIGH (ref 22–26)
CO2 SERPL-SCNC: 26 MMOL/L — SIGNIFICANT CHANGE UP (ref 22–31)
CREAT SERPL-MCNC: 1.52 MG/DL — HIGH (ref 0.5–1.3)
EGFR: 50 ML/MIN/1.73M2 — LOW
EOSINOPHIL # BLD AUTO: 0 K/UL — SIGNIFICANT CHANGE UP (ref 0–0.5)
EOSINOPHIL NFR BLD AUTO: 0 % — SIGNIFICANT CHANGE UP (ref 0–6)
GLUCOSE SERPL-MCNC: 240 MG/DL — HIGH (ref 70–99)
HCO3 BLDV-SCNC: 28 MMOL/L — SIGNIFICANT CHANGE UP (ref 22–29)
HCT VFR BLD CALC: 20.2 % — CRITICAL LOW (ref 39–50)
HCT VFR BLD CALC: 24 % — LOW (ref 39–50)
HGB BLD-MCNC: 6.7 G/DL — CRITICAL LOW (ref 13–17)
HGB BLD-MCNC: 7.7 G/DL — LOW (ref 13–17)
IMM GRANULOCYTES NFR BLD AUTO: 13.4 % — HIGH (ref 0–0.9)
LACTATE SERPL-SCNC: 3.6 MMOL/L — HIGH (ref 0.5–2)
LYMPHOCYTES # BLD AUTO: 0.09 K/UL — LOW (ref 1–3.3)
LYMPHOCYTES # BLD AUTO: 0.11 K/UL — LOW (ref 1–3.3)
LYMPHOCYTES # BLD AUTO: 7.6 % — LOW (ref 13–44)
LYMPHOCYTES # BLD AUTO: 9 % — LOW (ref 13–44)
MCHC RBC-ENTMCNC: 30.2 PG — SIGNIFICANT CHANGE UP (ref 27–34)
MCHC RBC-ENTMCNC: 30.3 PG — SIGNIFICANT CHANGE UP (ref 27–34)
MCHC RBC-ENTMCNC: 32.1 GM/DL — SIGNIFICANT CHANGE UP (ref 32–36)
MCHC RBC-ENTMCNC: 33.2 GM/DL — SIGNIFICANT CHANGE UP (ref 32–36)
MCV RBC AUTO: 91.4 FL — SIGNIFICANT CHANGE UP (ref 80–100)
MCV RBC AUTO: 94.1 FL — SIGNIFICANT CHANGE UP (ref 80–100)
MONOCYTES # BLD AUTO: 0.05 K/UL — SIGNIFICANT CHANGE UP (ref 0–0.9)
MONOCYTES # BLD AUTO: 0.06 K/UL — SIGNIFICANT CHANGE UP (ref 0–0.9)
MONOCYTES NFR BLD AUTO: 4.2 % — SIGNIFICANT CHANGE UP (ref 2–14)
MONOCYTES NFR BLD AUTO: 4.5 % — SIGNIFICANT CHANGE UP (ref 2–14)
NEUTROPHILS # BLD AUTO: 0.89 K/UL — LOW (ref 1.8–7.4)
NEUTROPHILS # BLD AUTO: 1.04 K/UL — LOW (ref 1.8–7.4)
NEUTROPHILS NFR BLD AUTO: 74.8 % — SIGNIFICANT CHANGE UP (ref 43–77)
NRBC # BLD: 0 /100 WBCS — SIGNIFICANT CHANGE UP (ref 0–0)
PCO2 BLDV: 43 MMHG — SIGNIFICANT CHANGE UP (ref 42–55)
PH BLDV: 7.42 — SIGNIFICANT CHANGE UP (ref 7.32–7.43)
PLATELET # BLD AUTO: 10 K/UL — CRITICAL LOW (ref 150–400)
PLATELET # BLD AUTO: 7 K/UL — CRITICAL LOW (ref 150–400)
PO2 BLDV: 20 MMHG — LOW (ref 25–45)
POTASSIUM SERPL-MCNC: 4.3 MMOL/L — SIGNIFICANT CHANGE UP (ref 3.5–5.3)
POTASSIUM SERPL-SCNC: 4.3 MMOL/L — SIGNIFICANT CHANGE UP (ref 3.5–5.3)
PROT SERPL-MCNC: 4.5 G/DL — LOW (ref 6–8.3)
RBC # BLD: 2.21 M/UL — LOW (ref 4.2–5.8)
RBC # BLD: 2.55 M/UL — LOW (ref 4.2–5.8)
RBC # FLD: 21.2 % — HIGH (ref 10.3–14.5)
RBC # FLD: 21.4 % — HIGH (ref 10.3–14.5)
SAO2 % BLDV: 26.7 % — LOW (ref 67–88)
SODIUM SERPL-SCNC: 134 MMOL/L — LOW (ref 135–145)
WBC # BLD: 1.19 K/UL — LOW (ref 3.8–10.5)
WBC # BLD: 1.25 K/UL — LOW (ref 3.8–10.5)
WBC # FLD AUTO: 1.19 K/UL — LOW (ref 3.8–10.5)
WBC # FLD AUTO: 1.25 K/UL — LOW (ref 3.8–10.5)

## 2023-04-11 PROCEDURE — 99232 SBSQ HOSP IP/OBS MODERATE 35: CPT

## 2023-04-11 RX ORDER — SODIUM CHLORIDE 9 MG/ML
1000 INJECTION INTRAMUSCULAR; INTRAVENOUS; SUBCUTANEOUS
Refills: 0 | Status: DISCONTINUED | OUTPATIENT
Start: 2023-04-11 | End: 2023-04-12

## 2023-04-11 RX ORDER — MARIBAVIR 200 MG/1
400 TABLET, COATED ORAL
Refills: 0 | Status: DISCONTINUED | OUTPATIENT
Start: 2023-04-11 | End: 2023-04-14

## 2023-04-11 RX ORDER — ACETAMINOPHEN 500 MG
325 TABLET ORAL ONCE
Refills: 0 | Status: COMPLETED | OUTPATIENT
Start: 2023-04-11 | End: 2023-04-11

## 2023-04-11 RX ORDER — PANTOPRAZOLE SODIUM 20 MG/1
40 TABLET, DELAYED RELEASE ORAL
Refills: 0 | Status: DISCONTINUED | OUTPATIENT
Start: 2023-04-11 | End: 2023-04-18

## 2023-04-11 RX ADMIN — Medication 125 MICROGRAM(S): at 06:02

## 2023-04-11 RX ADMIN — CEFEPIME 100 MILLIGRAM(S): 1 INJECTION, POWDER, FOR SOLUTION INTRAMUSCULAR; INTRAVENOUS at 13:28

## 2023-04-11 RX ADMIN — MARIBAVIR 400 MILLIGRAM(S): 200 TABLET, COATED ORAL at 17:45

## 2023-04-11 RX ADMIN — TAMSULOSIN HYDROCHLORIDE 0.4 MILLIGRAM(S): 0.4 CAPSULE ORAL at 21:17

## 2023-04-11 RX ADMIN — PANTOPRAZOLE SODIUM 40 MILLIGRAM(S): 20 TABLET, DELAYED RELEASE ORAL at 21:24

## 2023-04-11 RX ADMIN — ATORVASTATIN CALCIUM 40 MILLIGRAM(S): 80 TABLET, FILM COATED ORAL at 21:17

## 2023-04-11 RX ADMIN — CHLORHEXIDINE GLUCONATE 1 APPLICATION(S): 213 SOLUTION TOPICAL at 08:00

## 2023-04-11 RX ADMIN — MIDODRINE HYDROCHLORIDE 10 MILLIGRAM(S): 2.5 TABLET ORAL at 12:46

## 2023-04-11 RX ADMIN — Medication 325 MILLIGRAM(S): at 23:11

## 2023-04-11 RX ADMIN — SODIUM CHLORIDE 100 MILLILITER(S): 9 INJECTION INTRAMUSCULAR; INTRAVENOUS; SUBCUTANEOUS at 06:01

## 2023-04-11 RX ADMIN — CEFEPIME 100 MILLIGRAM(S): 1 INJECTION, POWDER, FOR SOLUTION INTRAMUSCULAR; INTRAVENOUS at 06:01

## 2023-04-11 RX ADMIN — Medication 480 MICROGRAM(S): at 13:16

## 2023-04-11 RX ADMIN — CEFEPIME 100 MILLIGRAM(S): 1 INJECTION, POWDER, FOR SOLUTION INTRAMUSCULAR; INTRAVENOUS at 21:16

## 2023-04-11 RX ADMIN — ENTECAVIR 0.5 MILLIGRAM(S): 0.5 TABLET ORAL at 12:46

## 2023-04-11 RX ADMIN — Medication 650 MILLIGRAM(S): at 21:17

## 2023-04-11 RX ADMIN — Medication 1 DROP(S): at 21:26

## 2023-04-11 RX ADMIN — MIDODRINE HYDROCHLORIDE 10 MILLIGRAM(S): 2.5 TABLET ORAL at 17:46

## 2023-04-11 RX ADMIN — SODIUM CHLORIDE 60 MILLILITER(S): 9 INJECTION INTRAMUSCULAR; INTRAVENOUS; SUBCUTANEOUS at 16:00

## 2023-04-11 RX ADMIN — SODIUM CHLORIDE 60 MILLILITER(S): 9 INJECTION INTRAMUSCULAR; INTRAVENOUS; SUBCUTANEOUS at 15:21

## 2023-04-11 RX ADMIN — Medication 650 MILLIGRAM(S): at 12:57

## 2023-04-11 RX ADMIN — VALGANCICLOVIR 450 MILLIGRAM(S): 450 TABLET, FILM COATED ORAL at 06:01

## 2023-04-11 RX ADMIN — Medication 50 MILLIGRAM(S): at 06:01

## 2023-04-11 RX ADMIN — Medication 1 ENEMA: at 21:20

## 2023-04-11 RX ADMIN — MIDODRINE HYDROCHLORIDE 10 MILLIGRAM(S): 2.5 TABLET ORAL at 06:01

## 2023-04-11 NOTE — PROGRESS NOTE ADULT - SUBJECTIVE AND OBJECTIVE BOX
Name of Patient : PETER DE LA PAZ  MRN: [id number]  Date of visit: 04-11-23 @ 13:55      Subjective: Patient seen and examined. No new events except as noted.   Patient seen early this AM. Lying down in bed. Wife at the bedside. Patient febrile this AM with Tmax of 101.3. S/P CT Head, orbits this AM.   Patient with increased generalized weakness and more lethargic today. Discussed with attending, checking repeat CT Head, lactate and ammonia levels.   No BM. States he has a "discomfort behind" his eyes. Denies blurry vision or visual changes.      REVIEW OF SYSTEMS:    CONSTITUTIONAL: Febrile this AM; Increased generalized weakness and lethargy   EYES/ENT: Reports pain behind eyes. Denies any blurry vision, No visual changes;  No vertigo or throat pain   NECK: No pain or stiffness  RESPIRATORY: No cough, wheezing, hemoptysis; No shortness of breath  CARDIOVASCULAR: No chest pain or palpitations  GASTROINTESTINAL: No abdominal or epigastric pain. No nausea, vomiting, or hematemesis; No diarrhea or constipation. No melena or hematochezia.  GENITOURINARY: No dysuria, frequency or hematuria  NEUROLOGICAL: No numbness or weakness  SKIN: No itching, burning, rashes, or lesions   All other review of systems is negative unless indicated above.    MEDICATIONS:  MEDICATIONS  (STANDING):  atorvastatin 40 milliGRAM(s) Oral at bedtime  cefepime   IVPB 1000 milliGRAM(s) IV Intermittent every 8 hours  chlorhexidine 2% Cloths 1 Application(s) Topical daily  digoxin     Tablet 125 MICROGram(s) Oral daily  entecavir 0.5 milliGRAM(s) Oral daily  filgrastim-sndz (ZARXIO) Injectable 480 MICROGram(s) SubCutaneous daily  maribavir Tablet 400 milliGRAM(s) Oral two times a day  midodrine. 10 milliGRAM(s) Oral three times a day  predniSONE   Tablet 50 milliGRAM(s) Oral daily  saline laxative (FLEET) Rectal Enema 1 Enema Rectal once  sodium chloride 0.9%. 1000 milliLiter(s) (100 mL/Hr) IV Continuous <Continuous>  tamsulosin 0.4 milliGRAM(s) Oral at bedtime      PHYSICAL EXAM:  T(C): 36.7 (04-11-23 @ 13:26), Max: 38.5 (04-10-23 @ 20:45)  HR: 70 (04-11-23 @ 13:26) (67 - 93)  BP: 107/54 (04-11-23 @ 13:26) (104/49 - 112/62)  RR: 18 (04-11-23 @ 13:26) (16 - 18)  SpO2: 93% (04-11-23 @ 13:26) (92% - 96%)  Wt(kg): --  I&O's Summary    10 Apr 2023 07:01  -  11 Apr 2023 07:00  --------------------------------------------------------  IN: 120 mL / OUT: 2200 mL / NET: -2080 mL          Appearance: Awake, generalized weakness, lying down in bed 	  HEENT:  Eyes are open    Lymphatic: No lymphadenopathy   Cardiovascular: Normal S1 S2, no JVD  Respiratory: normal effort , clear  Gastrointestinal:  Soft, Non-tender  Skin: No rashes,  warm to touch  Psychiatry:  Lethargic   Musculoskeletal: No edema            04-10-23 @ 07:01  -  04-11-23 @ 07:00  --------------------------------------------------------  IN: 120 mL / OUT: 2200 mL / NET: -2080 mL                              7.7    1.25  )-----------( 7        ( 11 Apr 2023 11:09 )             24.0               04-11    134<L>  |  97  |  37<H>  ----------------------------<  240<H>  4.3   |  26  |  1.52<H>    Ca    8.3<L>      11 Apr 2023 11:09    TPro  4.5<L>  /  Alb  2.5<L>  /  TBili  1.8<H>  /  DBili  x   /  AST  30  /  ALT  67<H>  /  AlkPhos  207<H>  04-11                             Culture - Blood (04.10.23 @ 07:21)   Specimen Source: .Blood Blood-Peripheral  Culture Results: No growth to date.    Culture - Blood (04.10.23 @ 07:21)   Specimen Source: .Blood Blood-Peripheral  Culture Results: No growth to date.    Culture - Urine (04.08.23 @ 13:16)   Specimen Source: Clean Catch Clean Catch (Midstream)  Culture Results: No growth    < from: CT Orbit No Cont (04.10.23 @ 15:57) >    IMPRESSION:    Brain CT: No acute hemorrhage, mass effect or extra-axial collections. No   change compared with prior study dated 4/1/2023.    Orbit CT: No evidence for intraorbital abnormality. Chronic left lamina   papyracea fracture.    < end of copied text >   Name of Patient : PETER DE LA PAZ  MRN: [id number]  Date of visit: 04-11-23 @ 13:55      Subjective: Patient seen and examined. No new events except as noted.   Patient seen early this AM. Lying down in bed. Wife at the bedside. Patient febrile this AM with Tmax of 101.3. S/P CT Head, orbits this AM.   Patient with increased generalized weakness and more lethargic today. Discussed with attending, checking repeat CT Head, lactate and ammonia levels.   No BM. States he has a "discomfort behind" his eyes. Denies blurry vision or visual changes.      REVIEW OF SYSTEMS:    CONSTITUTIONAL: Febrile this AM; Increased generalized weakness and lethargy   EYES/ENT: Reports pain behind eyes. Denies any blurry vision, No visual changes;  No vertigo or throat pain   NECK: No pain or stiffness  RESPIRATORY: No cough, wheezing, hemoptysis; No shortness of breath  CARDIOVASCULAR: No chest pain or palpitations  GASTROINTESTINAL: No abdominal or epigastric pain. No nausea, vomiting, or hematemesis; No diarrhea or constipation. No melena or hematochezia.  GENITOURINARY: No dysuria, frequency or hematuria  NEUROLOGICAL: No numbness or weakness  SKIN: No itching, burning, rashes, or lesions   All other review of systems is negative unless indicated above.    MEDICATIONS:  MEDICATIONS  (STANDING):  atorvastatin 40 milliGRAM(s) Oral at bedtime  cefepime   IVPB 1000 milliGRAM(s) IV Intermittent every 8 hours  chlorhexidine 2% Cloths 1 Application(s) Topical daily  digoxin     Tablet 125 MICROGram(s) Oral daily  entecavir 0.5 milliGRAM(s) Oral daily  filgrastim-sndz (ZARXIO) Injectable 480 MICROGram(s) SubCutaneous daily  maribavir Tablet 400 milliGRAM(s) Oral two times a day  midodrine. 10 milliGRAM(s) Oral three times a day  predniSONE   Tablet 50 milliGRAM(s) Oral daily  saline laxative (FLEET) Rectal Enema 1 Enema Rectal once  sodium chloride 0.9%. 1000 milliLiter(s) (100 mL/Hr) IV Continuous <Continuous>  tamsulosin 0.4 milliGRAM(s) Oral at bedtime      PHYSICAL EXAM:  T(C): 36.7 (04-11-23 @ 13:26), Max: 38.5 (04-10-23 @ 20:45)  HR: 70 (04-11-23 @ 13:26) (67 - 93)  BP: 107/54 (04-11-23 @ 13:26) (104/49 - 112/62)  RR: 18 (04-11-23 @ 13:26) (16 - 18)  SpO2: 93% (04-11-23 @ 13:26) (92% - 96%)  Wt(kg): --  I&O's Summary    10 Apr 2023 07:01  -  11 Apr 2023 07:00  --------------------------------------------------------  IN: 120 mL / OUT: 2200 mL / NET: -2080 mL          Appearance: Awake, generalized weakness, lying down in bed 	  HEENT:  Eyes are open    Lymphatic: No lymphadenopathy   Cardiovascular: Normal S1 S2, no JVD  Respiratory: normal effort , clear  Gastrointestinal:  Soft, Non-tender  Skin: No rashes,  warm to touch  Psychiatry:  Lethargic   Musculoskeletal: No edema            04-10-23 @ 07:01  -  04-11-23 @ 07:00  --------------------------------------------------------  IN: 120 mL / OUT: 2200 mL / NET: -2080 mL                              7.7    1.25  )-----------( 7        ( 11 Apr 2023 11:09 )             24.0               04-11    134<L>  |  97  |  37<H>  ----------------------------<  240<H>  4.3   |  26  |  1.52<H>    Ca    8.3<L>      11 Apr 2023 11:09    TPro  4.5<L>  /  Alb  2.5<L>  /  TBili  1.8<H>  /  DBili  x   /  AST  30  /  ALT  67<H>  /  AlkPhos  207<H>  04-11                             Culture - Blood (04.10.23 @ 07:21)   Specimen Source: .Blood Blood-Peripheral  Culture Results: No growth to date.    Culture - Blood (04.10.23 @ 07:21)   Specimen Source: .Blood Blood-Peripheral  Culture Results: No growth to date.    Culture - Urine (04.08.23 @ 13:16)   Specimen Source: Clean Catch Clean Catch (Midstream)  Culture Results: No growth    < from: CT Orbit No Cont (04.10.23 @ 15:57) >    IMPRESSION:    Brain CT: No acute hemorrhage, mass effect or extra-axial collections. No   change compared with prior study dated 4/1/2023.    Orbit CT: No evidence for intraorbital abnormality. Chronic left lamina   papyracea fracture.    < end of copied text >   Name of Patient : PETER DE LA PAZ  MRN: [id number]  Date of visit: 04-11-23 @ 13:55      Subjective: Patient seen and examined. No new events except as noted.   Patient seen early this AM. Lying down in bed. Wife at the bedside. Patient febrile this AM with Tmax of 101.3. S/P CT Head, orbits this AM.   Patient with increased generalized weakness and more lethargic today. Discussed with attending, checking repeat CT Head, lactate and ammonia levels.   No BM. States he has a "discomfort behind" his eyes. Denies blurry vision or visual changes.      REVIEW OF SYSTEMS:    CONSTITUTIONAL: Febrile this AM; Increased generalized weakness and lethargy   EYES/ENT: Reports pain behind eyes. Denies any blurry vision, No visual changes;  No vertigo or throat pain   NECK: No pain or stiffness  RESPIRATORY: No cough, wheezing, hemoptysis; No shortness of breath  CARDIOVASCULAR: No chest pain or palpitations  GASTROINTESTINAL: No abdominal or epigastric pain. No nausea, vomiting, or hematemesis; No diarrhea or constipation. No melena or hematochezia.  GENITOURINARY: No dysuria, frequency or hematuria  NEUROLOGICAL: No numbness or weakness  SKIN: No itching, burning, rashes, or lesions   All other review of systems is negative unless indicated above.    MEDICATIONS:  MEDICATIONS  (STANDING):  atorvastatin 40 milliGRAM(s) Oral at bedtime  cefepime   IVPB 1000 milliGRAM(s) IV Intermittent every 8 hours  chlorhexidine 2% Cloths 1 Application(s) Topical daily  digoxin     Tablet 125 MICROGram(s) Oral daily  entecavir 0.5 milliGRAM(s) Oral daily  filgrastim-sndz (ZARXIO) Injectable 480 MICROGram(s) SubCutaneous daily  maribavir Tablet 400 milliGRAM(s) Oral two times a day  midodrine. 10 milliGRAM(s) Oral three times a day  predniSONE   Tablet 50 milliGRAM(s) Oral daily  saline laxative (FLEET) Rectal Enema 1 Enema Rectal once  sodium chloride 0.9%. 1000 milliLiter(s) (100 mL/Hr) IV Continuous <Continuous>  tamsulosin 0.4 milliGRAM(s) Oral at bedtime      PHYSICAL EXAM:  T(C): 36.7 (04-11-23 @ 13:26), Max: 38.5 (04-10-23 @ 20:45)  HR: 70 (04-11-23 @ 13:26) (67 - 93)  BP: 107/54 (04-11-23 @ 13:26) (104/49 - 112/62)  RR: 18 (04-11-23 @ 13:26) (16 - 18)  SpO2: 93% (04-11-23 @ 13:26) (92% - 96%)  Wt(kg): --  I&O's Summary    10 Apr 2023 07:01  -  11 Apr 2023 07:00  --------------------------------------------------------  IN: 120 mL / OUT: 2200 mL / NET: -2080 mL          Appearance: Awake, generalized weakness, lying down in bed 	  HEENT:  Eyes are open    Lymphatic: No lymphadenopathy   Cardiovascular: Normal S1 S2, no JVD  Respiratory: normal effort , clear  Gastrointestinal:  Soft, Non-tender  Skin: No rashes,  warm to touch  Psychiatry:  Lethargic   Musculoskeletal: No edema            04-10-23 @ 07:01  -  04-11-23 @ 07:00  --------------------------------------------------------  IN: 120 mL / OUT: 2200 mL / NET: -2080 mL                              7.7    1.25  )-----------( 7        ( 11 Apr 2023 11:09 )             24.0               04-11    134<L>  |  97  |  37<H>  ----------------------------<  240<H>  4.3   |  26  |  1.52<H>    Ca    8.3<L>      11 Apr 2023 11:09    TPro  4.5<L>  /  Alb  2.5<L>  /  TBili  1.8<H>  /  DBili  x   /  AST  30  /  ALT  67<H>  /  AlkPhos  207<H>  04-11                             Culture - Blood (04.10.23 @ 07:21)   Specimen Source: .Blood Blood-Peripheral  Culture Results: No growth to date.    Culture - Blood (04.10.23 @ 07:21)   Specimen Source: .Blood Blood-Peripheral  Culture Results: No growth to date.    Culture - Urine (04.08.23 @ 13:16)   Specimen Source: Clean Catch Clean Catch (Midstream)  Culture Results: No growth    < from: CT Orbit No Cont (04.10.23 @ 15:57) >    IMPRESSION:    Brain CT: No acute hemorrhage, mass effect or extra-axial collections. No   change compared with prior study dated 4/1/2023.    Orbit CT: No evidence for intraorbital abnormality. Chronic left lamina   papyracea fracture.    < end of copied text >

## 2023-04-11 NOTE — PROGRESS NOTE ADULT - ASSESSMENT
1. fever  - V/Q scan with low probability of PE   - GI PCR- negative   - CMV PCR elevated, cmv IGG + and CMV IGM -  - cryptococcal ag, QuantiFeronTb, fungitell negative  - per ID, given immunosuppressed state and CMV viremia, ordered repeat CMV PCR and initiated valcyte.     2. anemia, FOBT positive in the setting of thrombocytopenia. Hgb remains at recent baseline. No overt bleeding symptoms.   - no plans for endoscopic evaluation at this time  - monitor H&H, transfuse PRN  - daily PPI     3. Chronic diarrhea, likely related to chemo +/- abx. 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   - GI PCR neg, C-diff neg on 03.03  - diarrhea now resolved, last BM x 3 days ago and reports feeling "backed up".   Standing Imodium discontinued and switched to prn. (see below)    4. DVT   - on anticoagulation    5. Multiple myeloma, pancytopenia   - per heme / onc   - s/p bone marrow biopsy 4/6 with IR    6. Hiccups--resolved   - reglan discontinued     7. Had diarrhea, now constipated   - miralax 17g PO x 1, enema x 1 on Friday  - enema x 1 and miralax prn ordered     8. Difficulty focusing vision  - pending neuro and opthalmology eval     9. New onset confusion  - CT head 4/10 with no obvious changes  - discussed w/ medicine MARI Garcia.   Planned for repeat head CT and stat ammonia and lactate levels        Attending supervision statement: I have personally seen and examined the patient. I fully participated in the care of this patient. I have made amendments to the documentation where necessary, and agree with the history, physical exam, and plan as outlined by the ACP.        1. hx of fever, CMV viremia   - per ID, on Valcyte     2. anemia, FOBT positive in the setting of thrombocytopenia. Hgb remains at recent baseline. No overt bleeding symptoms.   - no plans for endoscopic evaluation at this time  - monitor H&H, transfuse PRN  - daily PPI     3. Chronic diarrhea, likely related to chemo +/- abx. 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   - GI PCR neg, C-diff neg on 03.03  - diarrhea resolved, pt now feels constipated. Last BM x 3 days ago > Imodium d/c'd, miralax 17g PO x 1, enema x 1 on Friday.  - enema x 1 and Miralax prn ordered     4. DVT   - on anticoagulation    5. Multiple myeloma, pancytopenia   - per heme / onc   - s/p bone marrow biopsy 4/6 with IR    6. Hiccups--resolved   - reglan discontinued     7. Vision changes, decreased mentation   - pending neuro and opthalmology eval   - CT head 4/10 with no obvious changes  - Planned for repeat head CT, ammonia and lactate levels         Attending supervision statement: I have personally seen and examined the patient. I fully participated in the care of this patient. I have made amendments to the documentation where necessary, and agree with the history, physical exam, and plan as outlined by the ACP.

## 2023-04-11 NOTE — CONSULT NOTE ADULT - ASSESSMENT
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 Ophthalmology consulted for "pain behind eyes" and feeling of bulging eyes.    # pain behind eyes  - visual acuity 20/30 both eyes. Normal anterior and posterior ocular exam.    - no clear ocular etiology   - CT orbits negative  - can trial preservative free artificial tears, one drop four times a day, to both eyes     Ophthalmology signing off. Please reconsult prn.   Pt seen and discussed with Dr. Garcia, attending.     Outpatient follow-up: Patient should follow-up with his/her ophthalmologist or with [facility] Department of Ophthalmology upon discharge at the address below     [address] [place] [address]. 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 Ophthalmology consulted for "pain behind eyes" and feeling of bulging eyes.    # pain behind eyes  - visual acuity 20/30 both eyes. Normal anterior and posterior ocular exam.    - no clear ocular etiology   - CT orbits negative  - can trial preservative free artificial tears, one drop four times a day, to both eyes     Ophthalmology signing off. Please reconsult prn.   Pt seen and discussed with Dr. Garcia, attending.     Outpatient follow-up: Patient should follow-up with his/her ophthalmologist or with [facility] Department of Ophthalmology upon discharge at the address below     [address] [place] [address]. 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 Ophthalmology consulted for "pain behind eyes" and feeling of bulging eyes.    # pain behind eyes  - visual acuity 20/30 both eyes. Normal anterior and posterior ocular exam.    - no clear ocular etiology   - CT orbits negative  - can trial preservative free artificial tears, one drop four times a day, to both eyes     Ophthalmology signing off. Please reconsult prn.   Pt seen and discussed with Dr. Garcia, attending.     Outpatient follow-up: Patient should follow-up with his/her ophthalmologist or with [facility] Department of Ophthalmology upon discharge at the address below     [address] [place] [address]. [address]  [place] [address]  [phone]

## 2023-04-11 NOTE — PROGRESS NOTE ADULT - SUBJECTIVE AND OBJECTIVE BOX
[facility] [place] NEPHROLOGY- PROGRESS NOTE    67 year old Male with history of MM on chemotherapy presents with fevers. Nephrology consulted for elevated Scr.      REVIEW OF SYSTEMS:  Gen: + fevers  Cards: no chest pain  Resp: no dyspnea  GI: no nausea or vomiting or diarrhea  Vascular: no LE edema    No Known Allergies      Hospital Medications: Medications reviewed        VITALS:  T(F): 101.1 (23 @ 14:00), Max: 101.3 (04-10-23 @ 20:45)  HR: 70 (23 @ 13:26)  BP: 107/54 (23 @ 13:26)  RR: 18 (23 @ 13:26)  SpO2: 93% (23 @ 13:26)  Wt(kg): --    04-10 @ 07:01  -   @ 07:00  --------------------------------------------------------  IN: 120 mL / OUT: 2200 mL / NET: -2080 mL     @ 07:01  -   @ 16:58  --------------------------------------------------------  IN: 240 mL / OUT: 700 mL / NET: -460 mL        PHYSICAL EXAM:    Gen: NAD, calm  Cards: Irregularly irregular, +S1/S2, no M/G/R  Resp: CTA B/L  GI: soft, NT/ND, NABS  : + lloyd with yellow urine with debris  Vascular: no LE edema B/L      LABS:      134<L>  |  97  |  37<H>  ----------------------------<  240<H>  4.3   |  26  |  1.52<H>    Ca    8.3<L>      2023 11:09    TPro  4.5<L>  /  Alb  2.5<L>  /  TBili  1.8<H>  /  DBili      /  AST  30  /  ALT  67<H>  /  AlkPhos  207<H>      Creatinine Trend: 1.52 <--, 1.54 <--, 1.15 <--, 1.18 <--, 1.37 <--, 1.41 <--, 1.68 <--                        6.7    1.19  )-----------( 10       ( 2023 14:37 )             20.2     Urine Studies:  Urinalysis Basic - ( 2023 08:42 )    Color: Yellow / Appearance: Clear / S.031 / pH:   Gluc:  / Ketone: Negative  / Bili: Negative / Urobili: 6 mg/dL   Blood:  / Protein: 30 mg/dL / Nitrite: Negative   Leuk Esterase: Negative / RBC: 3 /hpf / WBC 3 /HPF   Sq Epi:  / Non Sq Epi:  / Bacteria: Negative      Osmolality, Random Urine: 568 mos/kg (04-10 @ 14:07)  Sodium, Random Urine: 58 mmol/L (04-10 @ 14:07)  Chloride, Random Urine: 37 mmol/L ( @ 08:42)       [facility] NEPHROLOGY- PROGRESS NOTE    67 year old Male with history of MM on chemotherapy presents with fevers. Nephrology consulted for elevated Scr.      REVIEW OF SYSTEMS:  Gen: + fevers  Cards: no chest pain  Resp: no dyspnea  GI: no nausea or vomiting or diarrhea  Vascular: no LE edema    No Known Allergies      Hospital Medications: Medications reviewed        VITALS:  T(F): 101.1 (23 @ 14:00), Max: 101.3 (04-10-23 @ 20:45)  HR: 70 (23 @ 13:26)  BP: 107/54 (23 @ 13:26)  RR: 18 (23 @ 13:26)  SpO2: 93% (23 @ 13:26)  Wt(kg): --    04-10 @ 07:01  -   @ 07:00  --------------------------------------------------------  IN: 120 mL / OUT: 2200 mL / NET: -2080 mL     @ 07:01  -   @ 16:58  --------------------------------------------------------  IN: 240 mL / OUT: 700 mL / NET: -460 mL        PHYSICAL EXAM:    Gen: NAD, calm  Cards: Irregularly irregular, +S1/S2, no M/G/R  Resp: CTA B/L  GI: soft, NT/ND, NABS  : + lloyd with yellow urine with debris  Vascular: no LE edema B/L      LABS:      134<L>  |  97  |  37<H>  ----------------------------<  240<H>  4.3   |  26  |  1.52<H>    Ca    8.3<L>      2023 11:09    TPro  4.5<L>  /  Alb  2.5<L>  /  TBili  1.8<H>  /  DBili      /  AST  30  /  ALT  67<H>  /  AlkPhos  207<H>      Creatinine Trend: 1.52 <--, 1.54 <--, 1.15 <--, 1.18 <--, 1.37 <--, 1.41 <--, 1.68 <--                        6.7    1.19  )-----------( 10       ( 2023 14:37 )             20.2     Urine Studies:  Urinalysis Basic - ( 2023 08:42 )    Color: Yellow / Appearance: Clear / S.031 / pH:   Gluc:  / Ketone: Negative  / Bili: Negative / Urobili: 6 mg/dL   Blood:  / Protein: 30 mg/dL / Nitrite: Negative   Leuk Esterase: Negative / RBC: 3 /hpf / WBC 3 /HPF   Sq Epi:  / Non Sq Epi:  / Bacteria: Negative      Osmolality, Random Urine: 568 mos/kg (04-10 @ 14:07)  Sodium, Random Urine: 58 mmol/L (04-10 @ 14:07)  Chloride, Random Urine: 37 mmol/L ( @ 08:42)       [facility] NEPHROLOGY- PROGRESS NOTE    67 year old Male with history of MM on chemotherapy presents with fevers. Nephrology consulted for elevated Scr.      REVIEW OF SYSTEMS:  Gen: + fevers  Cards: no chest pain  Resp: no dyspnea  GI: no nausea or vomiting or diarrhea  Vascular: no LE edema    No Known Allergies      Hospital Medications: Medications reviewed        VITALS:  T(F): 101.1 (23 @ 14:00), Max: 101.3 (04-10-23 @ 20:45)  HR: 70 (23 @ 13:26)  BP: 107/54 (23 @ 13:26)  RR: 18 (23 @ 13:26)  SpO2: 93% (23 @ 13:26)  Wt(kg): --    04-10 @ 07:01  -   @ 07:00  --------------------------------------------------------  IN: 120 mL / OUT: 2200 mL / NET: -2080 mL     @ 07:01  -   @ 16:58  --------------------------------------------------------  IN: 240 mL / OUT: 700 mL / NET: -460 mL        PHYSICAL EXAM:    Gen: NAD, calm  Cards: Irregularly irregular, +S1/S2, no M/G/R  Resp: CTA B/L  GI: soft, NT/ND, NABS  : + lloyd with yellow urine with debris  Vascular: no LE edema B/L      LABS:      134<L>  |  97  |  37<H>  ----------------------------<  240<H>  4.3   |  26  |  1.52<H>    Ca    8.3<L>      2023 11:09    TPro  4.5<L>  /  Alb  2.5<L>  /  TBili  1.8<H>  /  DBili      /  AST  30  /  ALT  67<H>  /  AlkPhos  207<H>      Creatinine Trend: 1.52 <--, 1.54 <--, 1.15 <--, 1.18 <--, 1.37 <--, 1.41 <--, 1.68 <--                        6.7    1.19  )-----------( 10       ( 2023 14:37 )             20.2     Urine Studies:  Urinalysis Basic - ( 2023 08:42 )    Color: Yellow / Appearance: Clear / S.031 / pH:   Gluc:  / Ketone: Negative  / Bili: Negative / Urobili: 6 mg/dL   Blood:  / Protein: 30 mg/dL / Nitrite: Negative   Leuk Esterase: Negative / RBC: 3 /hpf / WBC 3 /HPF   Sq Epi:  / Non Sq Epi:  / Bacteria: Negative      Osmolality, Random Urine: 568 mos/kg (04-10 @ 14:07)  Sodium, Random Urine: 58 mmol/L (04-10 @ 14:07)  Chloride, Random Urine: 37 mmol/L ( @ 08:42)

## 2023-04-11 NOTE — PROGRESS NOTE ADULT - SUBJECTIVE AND OBJECTIVE BOX
Patient is a 67y old  Male who presents with a chief complaint of fever (11 Apr 2023 11:43)    Patient seen and examined at bedside this morning. Patient frustrated with extended hospital stay, but is otherwise "ok." Noted resting comfortably in bed, family at bedside.    MEDICATIONS  (STANDING):  atorvastatin 40 milliGRAM(s) Oral at bedtime  cefepime   IVPB 1000 milliGRAM(s) IV Intermittent every 8 hours  chlorhexidine 2% Cloths 1 Application(s) Topical daily  digoxin     Tablet 125 MICROGram(s) Oral daily  entecavir 0.5 milliGRAM(s) Oral daily  filgrastim-sndz (ZARXIO) Injectable 480 MICROGram(s) SubCutaneous daily  maribavir Tablet 400 milliGRAM(s) Oral two times a day  midodrine. 10 milliGRAM(s) Oral three times a day  predniSONE   Tablet 50 milliGRAM(s) Oral daily  saline laxative (FLEET) Rectal Enema 1 Enema Rectal once  saline laxative (FLEET) Rectal Enema 1 Enema Rectal once  sodium chloride 0.9%. 1000 milliLiter(s) (100 mL/Hr) IV Continuous <Continuous>  tamsulosin 0.4 milliGRAM(s) Oral at bedtime    MEDICATIONS  (PRN):  acetaminophen     Tablet .. 650 milliGRAM(s) Oral every 6 hours PRN Temp greater or equal to 38C (100.4F), Mild Pain (1 - 3)  aluminum hydroxide/magnesium hydroxide/simethicone Suspension 30 milliLiter(s) Oral every 4 hours PRN Dyspepsia  loperamide 2 milliGRAM(s) Oral three times a day PRN Diarrhea  polyethylene glycol 3350 17 Gram(s) Oral daily PRN Constipation      Vital Signs Last 24 Hrs  T(C): 37.7 (11 Apr 2023 09:30), Max: 38.5 (10 Apr 2023 20:45)  T(F): 99.9 (11 Apr 2023 09:30), Max: 101.3 (10 Apr 2023 20:45)  HR: 93 (11 Apr 2023 09:30) (67 - 93)  BP: 106/53 (11 Apr 2023 09:30) (106/53 - 112/62)  BP(mean): --  RR: 16 (11 Apr 2023 09:30) (16 - 18)  SpO2: 92% (11 Apr 2023 09:30) (92% - 96%)    Parameters below as of 11 Apr 2023 09:30  Patient On (Oxygen Delivery Method): room air        PE  NAD  Awake, alert  Anicteric, MMM  RRR  CTAB  Abd soft, NT, ND  No c/c/e  No rash grossly  FROM                          7.7    1.25  )-----------( 7        ( 11 Apr 2023 11:09 )             24.0       04-11    134<L>  |  97  |  37<H>  ----------------------------<  240<H>  4.3   |  26  |  1.52<H>    Ca    8.3<L>      11 Apr 2023 11:09    TPro  4.5<L>  /  Alb  2.5<L>  /  TBili  1.8<H>  /  DBili  x   /  AST  30  /  ALT  67<H>  /  AlkPhos  207<H>  04-11

## 2023-04-11 NOTE — PROGRESS NOTE ADULT - ASSESSMENT
67y Male with history of MM on chemotherapy presents with fevers. Nephrology consulted for elevated Scr.    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 RK resolved with recurrent RK due to LOPEZ (CT with IV contrast on 4/7). Scr relatively stable. Continue with IVF given recurrent fevers. UA active likely due to lloyd with granular casts suggestive of ATN. FeNa indeterminate. CT with bilateral hydronephrosis likely due to urinary retention for which lloyd reinserted. Continue with flomax 0.4 mg QHS and repeat renal US in 1-2 days. TMA work up negative. Avoid nephrotoxins. Monitor electrolytes.    2) Hypotension: BP low normal. Continue with midodrine 10 mg PO TID. Monitor BP.    3) Metabolic alkalosis: Improving with chloride solution. Monitor pH.    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 No intervention needed at this time. Monitor serum Na.    5) MM: As per Heme/Onc.      [facility] NEPHROLOGY  Leoncio Leonard M.D.  Tay Brooke D.O.  Precious Chen M.D.  Nidia Stallings, NURIS, ANP-C    Telephone: [phone]  Facsimile: [phone]    [address] [place] [address]   67y Male with history of MM on chemotherapy presents with fevers. Nephrology consulted for elevated Scr.    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 RK resolved with recurrent RK due to LOPEZ (CT with IV contrast on 4/7). Scr relatively stable. Continue with IVF given recurrent fevers. UA active likely due to lloyd with granular casts suggestive of ATN. FeNa indeterminate. CT with bilateral hydronephrosis likely due to urinary retention for which lloyd reinserted. Continue with flomax 0.4 mg QHS and repeat renal US in 1-2 days. TMA work up negative. Avoid nephrotoxins. Monitor electrolytes.    2) Hypotension: BP low normal. Continue with midodrine 10 mg PO TID. Monitor BP.    3) Metabolic alkalosis: Improving with chloride solution. Monitor pH.    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 No intervention needed at this time. Monitor serum Na.    5) MM: As per Heme/Onc.      [facility] NEPHROLOGY  Leoncio Leonard M.D.  Tay Brooke D.O.  Precious Chen M.D.  Nidia Stallings, NURIS, ANP-C    Telephone: [phone]  Facsimile: [phone]    [address] [place] [address]   67y Male with history of MM on chemotherapy presents with fevers. Nephrology consulted for elevated Scr.    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 RK resolved with recurrent RK due to LOPEZ (CT with IV contrast on 4/7). Scr relatively stable. Continue with IVF given recurrent fevers. UA active likely due to lloyd with granular casts suggestive of ATN. FeNa indeterminate. CT with bilateral hydronephrosis likely due to urinary retention for which lloyd reinserted. Continue with flomax 0.4 mg QHS and repeat renal US in 1-2 days. TMA work up negative. Avoid nephrotoxins. Monitor electrolytes.    2) Hypotension: BP low normal. Continue with midodrine 10 mg PO TID. Monitor BP.    3) Metabolic alkalosis: Improving with chloride solution. Monitor pH.    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 No intervention needed at this time. Monitor serum Na.    5) MM: As per Heme/Onc.      [facility] NEPHROLOGY  Leoncio Leonard M.D.  Tay Brooke D.O.  Precious Chen M.D.  Nidia Stallings, NURIS, ANP-C    Telephone: [phone]  Facsimile: [phone]    [address] [place] [address]

## 2023-04-11 NOTE — CONSULT NOTE ADULT - SUBJECTIVE AND OBJECTIVE BOX
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, presents with fever, patient reports that upon waking this morning patient had chills and body aches, took his temperature which was 102, patient endorsing some mild periumbilical abdominal discomfort, but otherwise feels well, denies chest pain, shortness of breath, cough, nasal congestion, vomiting, diarrhea, dysuria, no sick contacts or recent travel, patient states that he went to his hematologist to get a blood transfusion today and was instructed to come to the ER, in the ED, pt was febrile, tachycardic, otherwise VSS, labs notable for pancytopenia (Hg 6.7), elevated Cr (improved from prior baseline), BNP 3K, pt was given 1U PRBC, Ofirmev, 1L LR, admitted to general medicine for further management, of note pt had additional fever approx 15 minutes after finishing transfusion (24 Mar 2023 05:32)    Interval History: Ophthalmology consulted for "pain behind eyes" and "bulging eyes" of 1 day duration. Pt reports symptom improvement today. Otherwise denies blurry vision or eye pain. CT orbits yesterday negative.     PAST MEDICAL & SURGICAL HISTORY:  Multiple myeloma      Hyperlipidemia      H/O aortic aneurysm      H/O splenectomy      S/P partial gastrectomy      History of pancreatic surgery        Past Ocular History: denies surg/laser  Ophthalmic Medications: none  FAMILY HISTORY:  FHx: lung cancer (Sibling)    FHx: ovarian cancer (Sibling)     no glc/amd  Social History: no etoh/tobacco    MEDICATIONS  (STANDING):  atorvastatin 40 milliGRAM(s) Oral at bedtime  cefepime   IVPB 1000 milliGRAM(s) IV Intermittent every 8 hours  chlorhexidine 2% Cloths 1 Application(s) Topical daily  digoxin     Tablet 125 MICROGram(s) Oral daily  entecavir 0.5 milliGRAM(s) Oral daily  filgrastim-sndz (ZARXIO) Injectable 480 MICROGram(s) SubCutaneous daily  maribavir Tablet 400 milliGRAM(s) Oral two times a day  midodrine. 10 milliGRAM(s) Oral three times a day  pantoprazole    Tablet 40 milliGRAM(s) Oral before breakfast  predniSONE   Tablet 50 milliGRAM(s) Oral daily  saline laxative (FLEET) Rectal Enema 1 Enema Rectal once  sodium chloride 0.9%. 1000 milliLiter(s) (60 mL/Hr) IV Continuous <Continuous>  tamsulosin 0.4 milliGRAM(s) Oral at bedtime    MEDICATIONS  (PRN):  acetaminophen     Tablet .. 650 milliGRAM(s) Oral every 6 hours PRN Temp greater or equal to 38C (100.4F), Mild Pain (1 - 3)  aluminum hydroxide/magnesium hydroxide/simethicone Suspension 30 milliLiter(s) Oral every 4 hours PRN Dyspepsia  loperamide 2 milliGRAM(s) Oral three times a day PRN Diarrhea  polyethylene glycol 3350 17 Gram(s) Oral daily PRN Constipation    Allergies & Intolerances:   No Known Allergies    Review of Systems:  Constitutional: No fever, chills  Eyes: No blurry vision, flashes, floaters, FBS, erythema, discharge, double vision, OU  Neuro: No tremors  Cardiovascular: No chest pain, palpitations  Respiratory: No SOB, no cough  GI: No nausea, vomiting, abdominal pain  : No dysuria  Skin: no rash  Psych: no depression  Endocrine: no polyuria, polydipsia  Heme/lymph: no swelling    VITALS: T(C): 37.3 (04-11-23 @ 16:54)  T(F): 99.2 (04-11-23 @ 16:54), Max: 101.3 (04-10-23 @ 20:45)  HR: 78 (04-11-23 @ 16:54) (67 - 93)  BP: 109/54 (04-11-23 @ 16:54) (91/52 - 109/54)  RR:  (16 - 18)  SpO2:  (92% - 93%)  Wt(kg): --  General: AAO x 3, appropriate mood and affect    Ophthalmology Exam:  Visual acuity (sc): 20/30 OD, 20/30 OS  Pupils: PERRL OU, no APD  Ttono: 6 OU  Extraocular movements (EOMs): Full OU, no pain, no diplopia  Confrontational Visual Field (CVF): Full OU  Color Plates: 12/12 OU    Pen Light Exam (PLE)  External: Flat OU  Lids/Lashes/Lacrimal Ducts: Flat OU    Sclera/Conjunctiva: W+Q OU  Cornea: Cl OU  Anterior Chamber: D+F OU    Iris: Flat OU  Lens: NS OU    Fundus Exam: dilated with 1% tropicamide and 2.5% phenylephrine  Approval obtained from primary team for dilation  Patient aware that pupils can remained dilated for at least 4-6 hours  Exam performed with 20D lens    Vitreous: wnl OU  Disc, cup/disc: sharp and pink, 0.2 OU  Macula: wnl OU  Vessels: wnl OU  Periphery: wnl OU    Labs/Imaging:  < from: CT Orbit No Cont (04.10.23 @ 15:57) >  IMPRESSION:    Brain CT: No acute hemorrhage, mass effect or extra-axial collections. No   change compared with prior study dated 4/1/2023.    Orbit CT: No evidence for intraorbital abnormality. Chronic left lamina   papyracea fracture.    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, presents with fever, patient reports that upon waking this morning patient had chills and body aches, took his temperature which was 102, patient endorsing some mild periumbilical abdominal discomfort, but otherwise feels well, denies chest pain, shortness of breath, cough, nasal congestion, vomiting, diarrhea, dysuria, no sick contacts or recent travel, patient states that he went to his hematologist to get a blood transfusion today and was instructed to come to the ER, in the ED, pt was febrile, tachycardic, otherwise VSS, labs notable for pancytopenia (Hg 6.7), elevated Cr (improved from prior baseline), BNP 3K, pt was given 1U PRBC, Ofirmev, 1L LR, admitted to general medicine for further management, of note pt had additional fever approx 15 minutes after finishing transfusion (24 Mar 2023 05:32)    Interval History: Ophthalmology consulted for "pain behind eyes" and "bulging eyes" of 1 day duration. Pt reports symptom improvement today. Otherwise denies blurry vision or eye pain. CT orbits yesterday negative.     PAST MEDICAL & SURGICAL HISTORY:  Multiple myeloma      Hyperlipidemia      H/O aortic aneurysm      H/O splenectomy      S/P partial gastrectomy      History of pancreatic surgery        Past Ocular History: denies surg/laser  Ophthalmic Medications: none  FAMILY HISTORY:  FHx: lung cancer (Sibling)    FHx: ovarian cancer (Sibling)     no glc/amd  Social History: no etoh/tobacco    MEDICATIONS  (STANDING):  atorvastatin 40 milliGRAM(s) Oral at bedtime  cefepime   IVPB 1000 milliGRAM(s) IV Intermittent every 8 hours  chlorhexidine 2% Cloths 1 Application(s) Topical daily  digoxin     Tablet 125 MICROGram(s) Oral daily  entecavir 0.5 milliGRAM(s) Oral daily  filgrastim-sndz (ZARXIO) Injectable 480 MICROGram(s) SubCutaneous daily  maribavir Tablet 400 milliGRAM(s) Oral two times a day  midodrine. 10 milliGRAM(s) Oral three times a day  pantoprazole    Tablet 40 milliGRAM(s) Oral before breakfast  predniSONE   Tablet 50 milliGRAM(s) Oral daily  saline laxative (FLEET) Rectal Enema 1 Enema Rectal once  sodium chloride 0.9%. 1000 milliLiter(s) (60 mL/Hr) IV Continuous <Continuous>  tamsulosin 0.4 milliGRAM(s) Oral at bedtime    MEDICATIONS  (PRN):  acetaminophen     Tablet .. 650 milliGRAM(s) Oral every 6 hours PRN Temp greater or equal to 38C (100.4F), Mild Pain (1 - 3)  aluminum hydroxide/magnesium hydroxide/simethicone Suspension 30 milliLiter(s) Oral every 4 hours PRN Dyspepsia  loperamide 2 milliGRAM(s) Oral three times a day PRN Diarrhea  polyethylene glycol 3350 17 Gram(s) Oral daily PRN Constipation    Allergies & Intolerances:   No Known Allergies    Review of Systems:  Constitutional: No fever, chills  Eyes: No blurry vision, flashes, floaters, FBS, erythema, discharge, double vision, OU  Neuro: No tremors  Cardiovascular: No chest pain, palpitations  Respiratory: No SOB, no cough  GI: No nausea, vomiting, abdominal pain  : No dysuria  Skin: no rash  Psych: no depression  Endocrine: no polyuria, polydipsia  Heme/lymph: no swelling    VITALS: T(C): 37.3 (04-11-23 @ 16:54)  T(F): 99.2 (04-11-23 @ 16:54), Max: 101.3 (04-10-23 @ 20:45)  HR: 78 (04-11-23 @ 16:54) (67 - 93)  BP: 109/54 (04-11-23 @ 16:54) (91/52 - 109/54)  RR:  (16 - 18)  SpO2:  (92% - 93%)  Wt(kg): --  General: AAO x 3, appropriate mood and affect    Ophthalmology Exam:  Visual acuity (sc): 20/30 OD, 20/30 OS  Pupils: PERRL OU, no APD  Ttono: 6 OU  Extraocular movements (EOMs): Full OU, no pain, no diplopia  Confrontational Visual Field (CVF): Full OU  Color Plates: 12/12 OU    Pen Light Exam (PLE)  External: Flat OU  Lids/Lashes/Lacrimal Ducts: Flat OU    Sclera/Conjunctiva: W+Q OU  Cornea: Cl OU  Anterior Chamber: D+F OU    Iris: Flat OU  Lens: NS OU    Fundus Exam: dilated with 1% tropicamide and 2.5% phenylephrine  Approval obtained from primary team for dilation  Patient aware that pupils can remained dilated for at least 4-6 hours  Exam performed with 20D lens    Vitreous: wnl OU  Disc, cup/disc: sharp and pink, 0.2 OU  Macula: wnl OU  Vessels: wnl OU  Periphery: wnl OU    Labs/Imaging:  < from: CT Orbit No Cont (04.10.23 @ 15:57) >  IMPRESSION:    Brain CT: No acute hemorrhage, mass effect or extra-axial collections. No   change compared with prior study dated 4/1/2023.    Orbit CT: No evidence for intraorbital abnormality. Chronic left lamina   papyracea fracture.    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, presents with fever, patient reports that upon waking this morning patient had chills and body aches, took his temperature which was 102, patient endorsing some mild periumbilical abdominal discomfort, but otherwise feels well, denies chest pain, shortness of breath, cough, nasal congestion, vomiting, diarrhea, dysuria, no sick contacts or recent travel, patient states that he went to his hematologist to get a blood transfusion today and was instructed to come to the ER, in the ED, pt was febrile, tachycardic, otherwise VSS, labs notable for pancytopenia (Hg 6.7), elevated Cr (improved from prior baseline), BNP 3K, pt was given 1U PRBC, Ofirmev, 1L LR, admitted to general medicine for further management, of note pt had additional fever approx 15 minutes after finishing transfusion (24 Mar 2023 05:32)    Interval History: Ophthalmology consulted for "pain behind eyes" and "bulging eyes" of 1 day duration. Pt reports symptom improvement today. Otherwise denies blurry vision or eye pain. CT orbits yesterday negative.     PAST MEDICAL & SURGICAL HISTORY:  Multiple myeloma      Hyperlipidemia      H/O aortic aneurysm      H/O splenectomy      S/P partial gastrectomy      History of pancreatic surgery        Past Ocular History: denies surg/laser  Ophthalmic Medications: none  FAMILY HISTORY:  FHx: lung cancer (Sibling)    FHx: ovarian cancer (Sibling)     no glc/amd  Social History: no etoh/tobacco    MEDICATIONS  (STANDING):  atorvastatin 40 milliGRAM(s) Oral at bedtime  cefepime   IVPB 1000 milliGRAM(s) IV Intermittent every 8 hours  chlorhexidine 2% Cloths 1 Application(s) Topical daily  digoxin     Tablet 125 MICROGram(s) Oral daily  entecavir 0.5 milliGRAM(s) Oral daily  filgrastim-sndz (ZARXIO) Injectable 480 MICROGram(s) SubCutaneous daily  maribavir Tablet 400 milliGRAM(s) Oral two times a day  midodrine. 10 milliGRAM(s) Oral three times a day  pantoprazole    Tablet 40 milliGRAM(s) Oral before breakfast  predniSONE   Tablet 50 milliGRAM(s) Oral daily  saline laxative (FLEET) Rectal Enema 1 Enema Rectal once  sodium chloride 0.9%. 1000 milliLiter(s) (60 mL/Hr) IV Continuous <Continuous>  tamsulosin 0.4 milliGRAM(s) Oral at bedtime    MEDICATIONS  (PRN):  acetaminophen     Tablet .. 650 milliGRAM(s) Oral every 6 hours PRN Temp greater or equal to 38C (100.4F), Mild Pain (1 - 3)  aluminum hydroxide/magnesium hydroxide/simethicone Suspension 30 milliLiter(s) Oral every 4 hours PRN Dyspepsia  loperamide 2 milliGRAM(s) Oral three times a day PRN Diarrhea  polyethylene glycol 3350 17 Gram(s) Oral daily PRN Constipation    Allergies & Intolerances:   No Known Allergies    Review of Systems:  Constitutional: No fever, chills  Eyes: No blurry vision, flashes, floaters, FBS, erythema, discharge, double vision, OU  Neuro: No tremors  Cardiovascular: No chest pain, palpitations  Respiratory: No SOB, no cough  GI: No nausea, vomiting, abdominal pain  : No dysuria  Skin: no rash  Psych: no depression  Endocrine: no polyuria, polydipsia  Heme/lymph: no swelling    VITALS: T(C): 37.3 (04-11-23 @ 16:54)  T(F): 99.2 (04-11-23 @ 16:54), Max: 101.3 (04-10-23 @ 20:45)  HR: 78 (04-11-23 @ 16:54) (67 - 93)  BP: 109/54 (04-11-23 @ 16:54) (91/52 - 109/54)  RR:  (16 - 18)  SpO2:  (92% - 93%)  Wt(kg): --  General: AAO x 3, appropriate mood and affect    Ophthalmology Exam:  Visual acuity (sc): 20/30 OD, 20/30 OS  Pupils: PERRL OU, no APD  Ttono: 6 OU  Extraocular movements (EOMs): Full OU, no pain, no diplopia  Confrontational Visual Field (CVF): Full OU  Color Plates: 12/12 OU    Pen Light Exam (PLE)  External: Flat OU  Lids/Lashes/Lacrimal Ducts: Flat OU    Sclera/Conjunctiva: W+Q OU  Cornea: Cl OU  Anterior Chamber: D+F OU    Iris: Flat OU  Lens: NS OU    Fundus Exam: dilated with 1% tropicamide and 2.5% phenylephrine  Approval obtained from primary team for dilation  Patient aware that pupils can remained dilated for at least 4-6 hours  Exam performed with 20D lens    Vitreous: wnl OU  Disc, cup/disc: sharp and pink, 0.2 OU  Macula: wnl OU  Vessels: wnl OU  Periphery: wnl OU    Labs/Imaging:  < from: CT Orbit No Cont (04.10.23 @ 15:57) >  IMPRESSION:    Brain CT: No acute hemorrhage, mass effect or extra-axial collections. No   change compared with prior study dated 4/1/2023.    Orbit CT: No evidence for intraorbital abnormality. Chronic left lamina   papyracea fracture.    < end of copied text >

## 2023-04-11 NOTE — PROGRESS NOTE ADULT - NS ATTEND AMEND GEN_ALL_CORE FT
As above.    66 y/o male with multiple myeloma admitted with fever. Hospital course complicated by worsening pancytopenia. Fever continues to persist despite CMV treatment. No other infectious causes discovered thus far. Await bone marrow biopsy results. Will order hemolysis labs. Continue high dose steroids.

## 2023-04-11 NOTE — PROGRESS NOTE ADULT - SUBJECTIVE AND OBJECTIVE BOX
Chief Complaint:  Patient is a 67y old  Male who presents with a chief complaint of fever (11 Apr 2023 09:33)      Date of service 04-11-23 @ 11:43      Interval Events:   Patient seen and examined.   Seen sitting in chair. New onset confusion  and lethargy today.  No BM. Did not receive enema yesterday.    Hospital Medications:  acetaminophen     Tablet .. 650 milliGRAM(s) Oral every 6 hours PRN  aluminum hydroxide/magnesium hydroxide/simethicone Suspension 30 milliLiter(s) Oral every 4 hours PRN  atorvastatin 40 milliGRAM(s) Oral at bedtime  cefepime   IVPB 1000 milliGRAM(s) IV Intermittent every 8 hours  chlorhexidine 2% Cloths 1 Application(s) Topical daily  digoxin     Tablet 125 MICROGram(s) Oral daily  entecavir 0.5 milliGRAM(s) Oral daily  filgrastim-sndz (ZARXIO) Injectable 480 MICROGram(s) SubCutaneous daily  loperamide 2 milliGRAM(s) Oral three times a day PRN  midodrine. 10 milliGRAM(s) Oral three times a day  polyethylene glycol 3350 17 Gram(s) Oral daily PRN  predniSONE   Tablet 50 milliGRAM(s) Oral daily  saline laxative (FLEET) Rectal Enema 1 Enema Rectal once  saline laxative (FLEET) Rectal Enema 1 Enema Rectal once  sodium chloride 0.9%. 1000 milliLiter(s) IV Continuous <Continuous>  tamsulosin 0.4 milliGRAM(s) Oral at bedtime  valGANciclovir 450 milliGRAM(s) Oral every 12 hours        Review of Systems:  General:  No wt loss, fevers, chills, night sweats, fatigue,   Eyes:  Good vision, no reported pain  ENT:  No sore throat, pain, runny nose, dysphagia  CV:  No pain, palpitations, hypo/hypertension  Resp:  No dyspnea, cough, tachypnea, wheezing  GI:  See HPI  :  No pain, bleeding, incontinence, nocturia  Muscle:  No pain, weakness  Neuro:  No weakness, tingling, memory problems  Psych:  No fatigue, insomnia, mood problems, depression  Endocrine:  No polyuria, polydipsia, cold/heat intolerance  Heme:  No petechiae, ecchymosis, easy bruisability  Integumentary:  No rash, edema    PHYSICAL EXAM:   Vital Signs:  Vital Signs Last 24 Hrs  T(C): 37.7 (11 Apr 2023 09:30), Max: 38.5 (10 Apr 2023 20:45)  T(F): 99.9 (11 Apr 2023 09:30), Max: 101.3 (10 Apr 2023 20:45)  HR: 93 (11 Apr 2023 09:30) (67 - 93)  BP: 106/53 (11 Apr 2023 09:30) (106/53 - 112/62)  BP(mean): --  RR: 16 (11 Apr 2023 09:30) (16 - 18)  SpO2: 92% (11 Apr 2023 09:30) (92% - 96%)    Parameters below as of 11 Apr 2023 09:30  Patient On (Oxygen Delivery Method): room air      Daily     Daily       PHYSICAL EXAM:     GENERAL:  Appears stated age, well-groomed, well-nourished, no distress  HEENT:  NC/AT,  conjunctivae anicteric, clear and pink,   NECK: supple, trachea midline  CHEST:  Full & symmetric excursion, no increased effort, breath sounds clear  HEART:  Regular rhythm, no JVD  ABDOMEN:  Soft, non-tender, non-distended, normoactive bowel sounds,  no masses , no hepatosplenomegaly  EXTREMITIES:  no cyanosis,clubbing or edema  SKIN:  No rash, erythema, or, ecchymoses, no jaundice  NEURO:  non-focal, no asterixis  PSYCH: Appropriate affect, confused   RECTAL: Deferred      LABS Personally reviewed by me:                        7.7    1.25  )-----------( 7        ( 11 Apr 2023 11:09 )             24.0     Mean Cell Volume: 94.1 fl (04-11-23 @ 11:09)    04-10    133<L>  |  94<L>  |  36<H>  ----------------------------<  313<H>  3.9   |  28  |  1.54<H>    Ca    8.0<L>      10 Apr 2023 11:54                                    7.7    1.25  )-----------( 7        ( 11 Apr 2023 11:09 )             24.0                         8.0    0.97  )-----------( 11       ( 10 Apr 2023 11:54 )             24.4                         6.8    0.82  )-----------( 12       ( 10 Apr 2023 06:39 )             20.4                         7.9    1.02  )-----------( 10       ( 09 Apr 2023 05:56 )             23.6       Imaging personally reviewed by me:             Chief Complaint:  Patient is a 67y old  Male who presents with a chief complaint of fever (11 Apr 2023 09:33)      Date of service 04-11-23 @ 11:43      Interval Events:   More lethargic. Decreased mentation.   No BM. Did not receive enema yesterday.    Hospital Medications:  acetaminophen     Tablet .. 650 milliGRAM(s) Oral every 6 hours PRN  aluminum hydroxide/magnesium hydroxide/simethicone Suspension 30 milliLiter(s) Oral every 4 hours PRN  atorvastatin 40 milliGRAM(s) Oral at bedtime  cefepime   IVPB 1000 milliGRAM(s) IV Intermittent every 8 hours  chlorhexidine 2% Cloths 1 Application(s) Topical daily  digoxin     Tablet 125 MICROGram(s) Oral daily  entecavir 0.5 milliGRAM(s) Oral daily  filgrastim-sndz (ZARXIO) Injectable 480 MICROGram(s) SubCutaneous daily  loperamide 2 milliGRAM(s) Oral three times a day PRN  midodrine. 10 milliGRAM(s) Oral three times a day  polyethylene glycol 3350 17 Gram(s) Oral daily PRN  predniSONE   Tablet 50 milliGRAM(s) Oral daily  saline laxative (FLEET) Rectal Enema 1 Enema Rectal once  saline laxative (FLEET) Rectal Enema 1 Enema Rectal once  sodium chloride 0.9%. 1000 milliLiter(s) IV Continuous <Continuous>  tamsulosin 0.4 milliGRAM(s) Oral at bedtime  valGANciclovir 450 milliGRAM(s) Oral every 12 hours        Review of Systems:  General:  No wt loss, fevers, chills, night sweats, fatigue,   Eyes:  Good vision, no reported pain  ENT:  No sore throat, pain, runny nose, dysphagia  CV:  No pain, palpitations, hypo/hypertension  Resp:  No dyspnea, cough, tachypnea, wheezing  GI:  See HPI  :  No pain, bleeding, incontinence, nocturia  Muscle:  No pain, weakness  Neuro:  No weakness, tingling, memory problems  Psych:  No fatigue, insomnia, mood problems, depression  Endocrine:  No polyuria, polydipsia, cold/heat intolerance  Heme:  No petechiae, ecchymosis, easy bruisability  Integumentary:  No rash, edema    PHYSICAL EXAM:   Vital Signs:  Vital Signs Last 24 Hrs  T(C): 37.7 (11 Apr 2023 09:30), Max: 38.5 (10 Apr 2023 20:45)  T(F): 99.9 (11 Apr 2023 09:30), Max: 101.3 (10 Apr 2023 20:45)  HR: 93 (11 Apr 2023 09:30) (67 - 93)  BP: 106/53 (11 Apr 2023 09:30) (106/53 - 112/62)  BP(mean): --  RR: 16 (11 Apr 2023 09:30) (16 - 18)  SpO2: 92% (11 Apr 2023 09:30) (92% - 96%)    Parameters below as of 11 Apr 2023 09:30  Patient On (Oxygen Delivery Method): room air      Daily     Daily       PHYSICAL EXAM:     GENERAL:  Appears stated age, well-groomed, well-nourished, no distress  HEENT:  NC/AT,  conjunctivae anicteric, clear and pink,   NECK: supple, trachea midline  CHEST:  Full & symmetric excursion, no increased effort, breath sounds clear  HEART:  Regular rhythm, no JVD  ABDOMEN:  Soft, non-tender, non-distended, normoactive bowel sounds,  no masses , no hepatosplenomegaly  EXTREMITIES:  no cyanosis,clubbing or edema  SKIN:  No rash, erythema, or, ecchymoses, no jaundice  NEURO:  non-focal, no asterixis  PSYCH: Appropriate affect, confused   RECTAL: Deferred      LABS Personally reviewed by me:                        7.7    1.25  )-----------( 7        ( 11 Apr 2023 11:09 )             24.0     Mean Cell Volume: 94.1 fl (04-11-23 @ 11:09)    04-10    133<L>  |  94<L>  |  36<H>  ----------------------------<  313<H>  3.9   |  28  |  1.54<H>    Ca    8.0<L>      10 Apr 2023 11:54                                    7.7    1.25  )-----------( 7        ( 11 Apr 2023 11:09 )             24.0                         8.0    0.97  )-----------( 11       ( 10 Apr 2023 11:54 )             24.4                         6.8    0.82  )-----------( 12       ( 10 Apr 2023 06:39 )             20.4                         7.9    1.02  )-----------( 10       ( 09 Apr 2023 05:56 )             23.6       Imaging personally reviewed by me:

## 2023-04-11 NOTE — PROGRESS NOTE ADULT - ASSESSMENT
1. 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 Hubbard   -- s/p Auto SCT in 2018  -- was on lenalidomide maintenance until February 2023 when he had progression of disease. Then switched to daratumumab + CyBorD; last dose March 23rd, 2023.  -- Continue acyclovir 400 mg BID.    2. Pancytopenia    -- unclear etiology. Primary BM d/o vs Valgancyclovir?  -- s/p BM Bx on 4/6  -- transfuse to maintain HGB > 7; PLT > 10.  -- check CBC with differential and give Zarxio 480 for ANC <1500, will give again today 4/11, possibly dc tomorrow  -- Myeloma labs w/ normal free light chain ratio, KIRA w/ weak IgG kappa band, IgG 285, IgA 57, IgM 29  -- platelets at 7K today, will get 1 U    3. Fever Unknown Origin    -- ID following  -- no obv infectious source  -- On Valganciclovir and  Entecavir  -- ID w/u so far neg   --on Zarxio to help w/ count recovery  -- CT c/a/p noted only for bladder wall thickening but UA not c/w cystitis    4. R Soleal Vein DVT    -- Hold AC in light of low platelets and bleeding risk  -- BTK DVT  -- need to Rpt LE Doppler Next week to eval for propogation    will follow,    Steph Holly NP  Hematology/ Oncology  [facility]  [phone] (office)  [phone] (alt office)  Evenings and weekends please call MD on call or office   1. 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 [place] w Dr. Hubbard   -- s/p Auto SCT in 2018  -- was on lenalidomide maintenance until February 2023 when he had progression of disease. Then switched to daratumumab + CyBorD; last dose March 23rd, 2023.  -- Continue acyclovir 400 mg BID.    2. Pancytopenia    -- unclear etiology. Primary BM d/o vs Valgancyclovir?  -- s/p BM Bx on 4/6  -- transfuse to maintain HGB > 7; PLT > 10.  -- check CBC with differential and give Zarxio 480 for ANC <1500, will give again today 4/11, possibly dc tomorrow  -- Myeloma labs w/ normal free light chain ratio, KIRA w/ weak IgG kappa band, IgG 285, IgA 57, IgM 29  -- platelets at 7K today, will get 1 U    3. Fever Unknown Origin    -- ID following  -- no obv infectious source  -- On Valganciclovir and  Entecavir  -- ID w/u so far neg   --on Zarxio to help w/ count recovery  -- CT c/a/p noted only for bladder wall thickening but UA not c/w cystitis    4. R Soleal Vein DVT    -- Hold AC in light of low platelets and bleeding risk  -- BTK DVT  -- need to Rpt LE Doppler Next week to eval for propogation    will follow,    Steph Holly NP  Hematology/ Oncology  [facility]  [phone] (office)  [phone] (alt office)  Evenings and weekends please call MD on call or office   1. 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   -- s/p Auto SCT in 2018  -- was on lenalidomide maintenance until February 2023 when he had progression of disease. Then switched to daratumumab + CyBorD; last dose March 23rd, 2023.  -- Continue acyclovir 400 mg BID.    2. Pancytopenia    -- unclear etiology. Primary BM d/o vs Valgancyclovir?  -- s/p BM Bx on 4/6  -- transfuse to maintain HGB > 7; PLT > 10.  -- check CBC with differential and give Zarxio 480 for ANC <1500, will give again today 4/11, possibly dc tomorrow  -- Myeloma labs w/ normal free light chain ratio, KIRA w/ weak IgG kappa band, IgG 285, IgA 57, IgM 29  -- platelets at 7K today, will get 1 U    3. Fever Unknown Origin    -- ID following  -- no obv infectious source  -- On Valganciclovir and  Entecavir  -- ID w/u so far neg   --on Zarxio to help w/ count recovery  -- CT c/a/p noted only for bladder wall thickening but UA not c/w cystitis    4. R Soleal Vein DVT    -- Hold AC in light of low platelets and bleeding risk  -- BTK DVT  -- need to Rpt LE Doppler Next week to eval for propogation    will follow,    Steph Holly NP  Hematology/ Oncology  [facility]  [phone] (office)  [phone] (alt office)  Evenings and weekends please call MD on call or office

## 2023-04-11 NOTE — PROGRESS NOTE ADULT - SUBJECTIVE AND OBJECTIVE BOX
Subjective: Patient seen and examined. No new events except as noted.     SUBJECTIVE/ROS:  nad      MEDICATIONS:  MEDICATIONS  (STANDING):  atorvastatin 40 milliGRAM(s) Oral at bedtime  cefepime   IVPB 1000 milliGRAM(s) IV Intermittent every 8 hours  chlorhexidine 2% Cloths 1 Application(s) Topical daily  digoxin     Tablet 125 MICROGram(s) Oral daily  entecavir 0.5 milliGRAM(s) Oral daily  filgrastim-sndz (ZARXIO) Injectable 480 MICROGram(s) SubCutaneous daily  midodrine. 10 milliGRAM(s) Oral three times a day  predniSONE   Tablet 50 milliGRAM(s) Oral daily  saline laxative (FLEET) Rectal Enema 1 Enema Rectal once  sodium chloride 0.9%. 1000 milliLiter(s) (100 mL/Hr) IV Continuous <Continuous>  tamsulosin 0.4 milliGRAM(s) Oral at bedtime  valGANciclovir 450 milliGRAM(s) Oral every 12 hours      PHYSICAL EXAM:  T(C): 37.2 (04-11-23 @ 04:50), Max: 38.5 (04-10-23 @ 20:45)  HR: 89 (04-11-23 @ 04:50) (67 - 98)  BP: 106/60 (04-11-23 @ 04:50) (98/56 - 112/62)  RR: 18 (04-11-23 @ 04:50) (18 - 18)  SpO2: 92% (04-11-23 @ 04:50) (92% - 96%)  Wt(kg): --  I&O's Summary    10 Apr 2023 07:01  -  11 Apr 2023 07:00  --------------------------------------------------------  IN: 120 mL / OUT: 2200 mL / NET: -2080 mL            JVP: Normal  Neck: supple  Lung: clear   CV: S1 S2 , Murmur:  Abd: soft  Ext: No edema  neuro: Awake / alert  Psych: flat affect  Skin: normal``    LABS/DATA:    CARDIAC MARKERS:                                8.0    0.97  )-----------( 11       ( 10 Apr 2023 11:54 )             24.4     04-10    133<L>  |  94<L>  |  36<H>  ----------------------------<  313<H>  3.9   |  28  |  1.54<H>    Ca    8.0<L>      10 Apr 2023 11:54      proBNP:   Lipid Profile:   HgA1c:   TSH:     TELE:  EKG:

## 2023-04-11 NOTE — PROGRESS NOTE ADULT - SUBJECTIVE AND OBJECTIVE BOX
67yPatient is a 67y old  Male who presents with a chief complaint of fever (11 Apr 2023 16:56)      Interval history:  Febrile, denies any new complains.       Allergies:   No Known Allergies      Antimicrobials:  cefepime   IVPB 1000 milliGRAM(s) IV Intermittent every 8 hours  entecavir 0.5 milliGRAM(s) Oral daily  maribavir Tablet 400 milliGRAM(s) Oral two times a day      REVIEW OF SYSTEMS:  No chest pain   No SOB  No N/V/D, no abdominal pain  No dysuria   No rash.         Vital Signs Last 24 Hrs  T(C): 37.3 (04-11-23 @ 16:54), Max: 38.5 (04-10-23 @ 20:45)  T(F): 99.2 (04-11-23 @ 16:54), Max: 101.3 (04-10-23 @ 20:45)  HR: 78 (04-11-23 @ 16:54) (67 - 93)  BP: 109/54 (04-11-23 @ 16:54) (91/52 - 109/54)  BP(mean): --  RR: 16 (04-11-23 @ 16:54) (16 - 18)  SpO2: 93% (04-11-23 @ 16:54) (92% - 93%)      PHYSICAL EXAM:  Patient in no acute distress. AAOX3. eating lunch   no icterus   breathing comfortably on RA  trace edema.  IV sites not inflamed.   + prema                           6.7    1.19  )-----------( 10       ( 11 Apr 2023 14:37 )             20.2   04-11    134<L>  |  97  |  37<H>  ----------------------------<  240<H>  4.3   |  26  |  1.52<H>    Ca    8.3<L>      11 Apr 2023 11:09    TPro  4.5<L>  /  Alb  2.5<L>  /  TBili  1.8<H>  /  DBili  x   /  AST  30  /  ALT  67<H>  /  AlkPhos  207<H>  04-11      LIVER FUNCTIONS - ( 11 Apr 2023 11:09 )  Alb: 2.5 g/dL / Pro: 4.5 g/dL / ALK PHOS: 207 U/L / ALT: 67 U/L / AST: 30 U/L / GGT: x               Culture - Blood (collected 10 Apr 2023 07:21)  Source: .Blood Blood-Peripheral  Preliminary Report (11 Apr 2023 10:01):    No growth to date.    Culture - Blood (collected 10 Apr 2023 07:21)  Source: .Blood Blood-Peripheral  Preliminary Report (11 Apr 2023 10:01):    No growth to date.        Radiology:    < from: CT Head No Cont (04.10.23 @ 15:56) >  IMPRESSION:    Brain CT: No acute hemorrhage, mass effect or extra-axial collections. No   change compared with prior study dated 4/1/2023.    Orbit CT: No evidence for intraorbital abnormality. Chronic left lamina   papyracea fracture.

## 2023-04-11 NOTE — PROGRESS NOTE ADULT - ASSESSMENT
New onset afib, aflutter  tachycardia  cant use a/c due to very low plts and profound anemia   HR much better with digoxin  cont 0.125mg daily then check level on thursday     History of bioAVR   echo unremarkable     Anemia  Monitor hemoglobin, transfuse as needed.  plan as per Heme    DVT   a/c on hold  low plts  fu with vascular surgery / heme     Hypotension   due to sepsis   better now   on midodrine

## 2023-04-11 NOTE — PROGRESS NOTE ADULT - ASSESSMENT
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 Found to be febrile on admission, ID consulted for further management.    Tmax 102.4 (3/23) 102.5 (4/7)  WBC 3.5K today  Urinalysis unremarkable  RVP neg  CXR unremarkable      Impression:  #Fever, persistent   #Immunocompromised Status, H/O Splenectomy  #pancytopenia   #CMV viremia       PLAN:  - all infectious work up in terms of bacterial infection negative,   - UA (4/7) No Pyuria, urine cx negative   - blood cx NTD   - CT A/P with bladder wall thickening and mild hydro  - On cefepime empirically   - trend cbc for pancytopenia, could be valcyte induced or ? hematological, s/p BM bx pending result. ? HLH   - started on filgrastim. Switched valcyte to maribavir given worsening pancytopenia.   - cryptococcal ag, QuantiFeronTb, fungitell negative  - CMV PCR downtrended,  will need repeat 2 weeks post initiation of therapy.       Plan discussed with Medicine and Heme Attending    Dejuan Ralph  Please contact through MS Teams   If no response or past 5 pm/weekend call [phone].  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 Found to be febrile on admission, ID consulted for further management.    Tmax 102.4 (3/23) 102.5 (4/7)  WBC 3.5K today  Urinalysis unremarkable  RVP neg  CXR unremarkable      Impression:  #Fever, persistent   #Immunocompromised Status, H/O Splenectomy  #pancytopenia   #CMV viremia       PLAN:  - all infectious work up in terms of bacterial infection negative,   - UA (4/7) No Pyuria, urine cx negative   - blood cx NTD   - CT A/P with bladder wall thickening and mild hydro  - On cefepime empirically   - trend cbc for pancytopenia, could be valcyte induced or ? hematological, s/p BM bx pending result. ? HLH   - started on filgrastim. Switched valcyte to maribavir given worsening pancytopenia.   - cryptococcal ag, QuantiFeronTb, fungitell negative  - CMV PCR downtrended,  will need repeat 2 weeks post initiation of therapy.       Plan discussed with Medicine and Heme Attending    Dejuan Ralph  Please contact through MS Teams   If no response or past 5 pm/weekend call [phone].  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 Found to be febrile on admission, ID consulted for further management.    Tmax 102.4 (3/23) 102.5 (4/7)  WBC 3.5K today  Urinalysis unremarkable  RVP neg  CXR unremarkable      Impression:  #Fever, persistent   #Immunocompromised Status, H/O Splenectomy  #pancytopenia   #CMV viremia       PLAN:  - all infectious work up in terms of bacterial infection negative,   - UA (4/7) No Pyuria, urine cx negative   - blood cx NTD   - CT A/P with bladder wall thickening and mild hydro  - On cefepime empirically   - trend cbc for pancytopenia, could be valcyte induced or ? hematological, s/p BM bx pending result. ? HLH   - started on filgrastim. Switched valcyte to maribavir given worsening pancytopenia.   - cryptococcal ag, QuantiFeronTb, fungitell negative  - CMV PCR downtrended,  will need repeat 2 weeks post initiation of therapy.       Plan discussed with Medicine and Heme Attending    Dejuan Ralph  Please contact through MS Teams   If no response or past 5 pm/weekend call [phone].

## 2023-04-12 LAB
ANION GAP SERPL CALC-SCNC: 10 MMOL/L — SIGNIFICANT CHANGE UP (ref 5–17)
BUN SERPL-MCNC: 39 MG/DL — HIGH (ref 7–23)
CALCIUM SERPL-MCNC: 8 MG/DL — LOW (ref 8.4–10.5)
CHLORIDE SERPL-SCNC: 100 MMOL/L — SIGNIFICANT CHANGE UP (ref 96–108)
CO2 SERPL-SCNC: 23 MMOL/L — SIGNIFICANT CHANGE UP (ref 22–31)
CREAT SERPL-MCNC: 1.44 MG/DL — HIGH (ref 0.5–1.3)
CULTURE RESULTS: SIGNIFICANT CHANGE UP
EGFR: 53 ML/MIN/1.73M2 — LOW
FERRITIN SERPL-MCNC: HIGH NG/ML (ref 30–400)
FIBRINOGEN PPP-MCNC: 425 MG/DL — SIGNIFICANT CHANGE UP (ref 200–445)
FIBRINOGEN PPP-MCNC: 437 MG/DL — SIGNIFICANT CHANGE UP (ref 200–445)
GLUCOSE SERPL-MCNC: 164 MG/DL — HIGH (ref 70–99)
HAPTOGLOB SERPL-MCNC: 188 MG/DL — SIGNIFICANT CHANGE UP (ref 34–200)
HCT VFR BLD CALC: 22.4 % — LOW (ref 39–50)
HEMATOPATHOLOGY REPORT: SIGNIFICANT CHANGE UP
HGB BLD-MCNC: 7.7 G/DL — LOW (ref 13–17)
IGA FLD-MCNC: 62 MG/DL — LOW (ref 84–499)
IGG FLD-MCNC: 330 MG/DL — LOW (ref 610–1660)
IGM SERPL-MCNC: 25 MG/DL — LOW (ref 35–242)
KAPPA LC SER QL IFE: 1.07 MG/DL — SIGNIFICANT CHANGE UP (ref 0.33–1.94)
KAPPA/LAMBDA FREE LIGHT CHAIN RATIO, SERUM: 0.84 RATIO — SIGNIFICANT CHANGE UP (ref 0.26–1.65)
LACTATE SERPL-SCNC: 1.4 MMOL/L — SIGNIFICANT CHANGE UP (ref 0.5–2)
LAMBDA LC SER QL IFE: 1.28 MG/DL — SIGNIFICANT CHANGE UP (ref 0.57–2.63)
LDH SERPL L TO P-CCNC: 278 U/L — HIGH (ref 50–242)
MANUAL SMEAR VERIFICATION: SIGNIFICANT CHANGE UP
MCHC RBC-ENTMCNC: 30.6 PG — SIGNIFICANT CHANGE UP (ref 27–34)
MCHC RBC-ENTMCNC: 34.4 GM/DL — SIGNIFICANT CHANGE UP (ref 32–36)
MCV RBC AUTO: 88.9 FL — SIGNIFICANT CHANGE UP (ref 80–100)
METAMYELOCYTES # FLD: 15.8 % — HIGH (ref 0–0)
MYELOCYTES NFR BLD: 5.2 % — HIGH (ref 0–0)
NEUTS BAND # BLD: 31.6 % — HIGH (ref 0–8)
NRBC # BLD: 0 /100 WBCS — SIGNIFICANT CHANGE UP (ref 0–0)
PLAT MORPH BLD: NORMAL — SIGNIFICANT CHANGE UP
PLATELET # BLD AUTO: 14 K/UL — CRITICAL LOW (ref 150–400)
POTASSIUM SERPL-MCNC: 3.9 MMOL/L — SIGNIFICANT CHANGE UP (ref 3.5–5.3)
POTASSIUM SERPL-SCNC: 3.9 MMOL/L — SIGNIFICANT CHANGE UP (ref 3.5–5.3)
RBC # BLD: 2.52 M/UL — LOW (ref 4.2–5.8)
RBC # FLD: 20.5 % — HIGH (ref 10.3–14.5)
RBC BLD AUTO: SIGNIFICANT CHANGE UP
SODIUM SERPL-SCNC: 133 MMOL/L — LOW (ref 135–145)
SPECIMEN SOURCE: SIGNIFICANT CHANGE UP
TRIGL SERPL-MCNC: 163 MG/DL — HIGH
TRIGL SERPL-MCNC: 177 MG/DL — HIGH
WBC # BLD: 0.87 K/UL — CRITICAL LOW (ref 3.8–10.5)
WBC # FLD AUTO: 0.87 K/UL — CRITICAL LOW (ref 3.8–10.5)

## 2023-04-12 PROCEDURE — 76770 US EXAM ABDO BACK WALL COMP: CPT | Mod: 26

## 2023-04-12 PROCEDURE — 99232 SBSQ HOSP IP/OBS MODERATE 35: CPT

## 2023-04-12 PROCEDURE — 95816 EEG AWAKE AND DROWSY: CPT | Mod: 26

## 2023-04-12 RX ORDER — IMMUNE GLOBULIN (HUMAN) 10 G/100ML
30 INJECTION INTRAVENOUS; SUBCUTANEOUS ONCE
Refills: 0 | Status: COMPLETED | OUTPATIENT
Start: 2023-04-12 | End: 2023-04-12

## 2023-04-12 RX ORDER — SODIUM CHLORIDE 9 MG/ML
1000 INJECTION INTRAMUSCULAR; INTRAVENOUS; SUBCUTANEOUS
Refills: 0 | Status: DISCONTINUED | OUTPATIENT
Start: 2023-04-12 | End: 2023-04-13

## 2023-04-12 RX ORDER — ACETAMINOPHEN 500 MG
1000 TABLET ORAL ONCE
Refills: 0 | Status: COMPLETED | OUTPATIENT
Start: 2023-04-12 | End: 2023-04-12

## 2023-04-12 RX ORDER — DIPHENHYDRAMINE HCL 50 MG
25 CAPSULE ORAL ONCE
Refills: 0 | Status: COMPLETED | OUTPATIENT
Start: 2023-04-12 | End: 2023-04-12

## 2023-04-12 RX ORDER — ACETAMINOPHEN 500 MG
650 TABLET ORAL ONCE
Refills: 0 | Status: DISCONTINUED | OUTPATIENT
Start: 2023-04-12 | End: 2023-04-18

## 2023-04-12 RX ORDER — POLYETHYLENE GLYCOL 3350 17 G/17G
17 POWDER, FOR SOLUTION ORAL DAILY
Refills: 0 | Status: DISCONTINUED | OUTPATIENT
Start: 2023-04-12 | End: 2023-04-18

## 2023-04-12 RX ADMIN — MIDODRINE HYDROCHLORIDE 10 MILLIGRAM(S): 2.5 TABLET ORAL at 11:59

## 2023-04-12 RX ADMIN — Medication 1 DROP(S): at 11:59

## 2023-04-12 RX ADMIN — CEFEPIME 100 MILLIGRAM(S): 1 INJECTION, POWDER, FOR SOLUTION INTRAMUSCULAR; INTRAVENOUS at 22:25

## 2023-04-12 RX ADMIN — CEFEPIME 100 MILLIGRAM(S): 1 INJECTION, POWDER, FOR SOLUTION INTRAMUSCULAR; INTRAVENOUS at 13:45

## 2023-04-12 RX ADMIN — PANTOPRAZOLE SODIUM 40 MILLIGRAM(S): 20 TABLET, DELAYED RELEASE ORAL at 05:21

## 2023-04-12 RX ADMIN — Medication 1000 MILLIGRAM(S): at 15:49

## 2023-04-12 RX ADMIN — Medication 1 DROP(S): at 17:50

## 2023-04-12 RX ADMIN — CHLORHEXIDINE GLUCONATE 1 APPLICATION(S): 213 SOLUTION TOPICAL at 12:02

## 2023-04-12 RX ADMIN — TAMSULOSIN HYDROCHLORIDE 0.4 MILLIGRAM(S): 0.4 CAPSULE ORAL at 22:24

## 2023-04-12 RX ADMIN — POLYETHYLENE GLYCOL 3350 17 GRAM(S): 17 POWDER, FOR SOLUTION ORAL at 13:43

## 2023-04-12 RX ADMIN — SODIUM CHLORIDE 50 MILLILITER(S): 9 INJECTION INTRAMUSCULAR; INTRAVENOUS; SUBCUTANEOUS at 14:59

## 2023-04-12 RX ADMIN — Medication 400 MILLIGRAM(S): at 15:12

## 2023-04-12 RX ADMIN — Medication 50 MILLIGRAM(S): at 05:13

## 2023-04-12 RX ADMIN — MIDODRINE HYDROCHLORIDE 10 MILLIGRAM(S): 2.5 TABLET ORAL at 05:13

## 2023-04-12 RX ADMIN — CEFEPIME 100 MILLIGRAM(S): 1 INJECTION, POWDER, FOR SOLUTION INTRAMUSCULAR; INTRAVENOUS at 05:13

## 2023-04-12 RX ADMIN — Medication 125 MICROGRAM(S): at 05:13

## 2023-04-12 RX ADMIN — MARIBAVIR 400 MILLIGRAM(S): 200 TABLET, COATED ORAL at 05:15

## 2023-04-12 RX ADMIN — Medication 1 DROP(S): at 05:13

## 2023-04-12 RX ADMIN — MIDODRINE HYDROCHLORIDE 10 MILLIGRAM(S): 2.5 TABLET ORAL at 17:50

## 2023-04-12 RX ADMIN — MARIBAVIR 400 MILLIGRAM(S): 200 TABLET, COATED ORAL at 17:49

## 2023-04-12 RX ADMIN — Medication 25 MILLIGRAM(S): at 19:04

## 2023-04-12 RX ADMIN — IMMUNE GLOBULIN (HUMAN) 100 GRAM(S): 10 INJECTION INTRAVENOUS; SUBCUTANEOUS at 19:29

## 2023-04-12 RX ADMIN — Medication 480 MICROGRAM(S): at 12:00

## 2023-04-12 NOTE — PROGRESS NOTE ADULT - ASSESSMENT
1. hx of fever, CMV viremia   - per ID, on Valcyte     2. anemia, FOBT positive in the setting of thrombocytopenia. Hgb remains at recent baseline. No overt bleeding symptoms.   - no plans for endoscopic evaluation at this time  - monitor H&H, transfuse PRN  - daily PPI     3. Chronic diarrhea, likely related to chemo +/- abx. 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   - GI PCR neg, C-diff neg on 03.03  - diarrhea resolved, pt now feels constipated. Last BM x 3 days ago > Imodium d/c'd, miralax 17g PO x 1, enema x 1 on Friday.  - enema x 1 and Miralax prn ordered-- had BM on 4/11 s/p enema     4. DVT   - on anticoagulation    5. Multiple myeloma, pancytopenia   - per heme / onc   - s/p bone marrow biopsy 4/6 with IR    6. Hiccups--resolved   - reglan discontinued     7. Vision changes, decreased mentation   - pending neuro and opthalmology eval   - CT head 4/10 with no obvious changes  - ammonia wnl, lactate downtrending  - monitor LFTs, ammonia level, ?hepatic encephalopathy         Attending supervision statement: I have personally seen and examined the patient. I fully participated in the care of this patient. I have made amendments to the documentation where necessary, and agree with the history, physical exam, and plan as outlined by the ACP.        1. hx of fever, CMV viremia   - per ID, on Valcyte     2. anemia, FOBT positive in the setting of thrombocytopenia. Hgb remains at recent baseline. No overt bleeding symptoms.   - no plans for endoscopic evaluation at this time  - monitor H&H, transfuse PRN  - daily PPI     3. Chronic diarrhea, likely related to chemo +/- abx. 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   - GI PCR neg, C-diff neg on 03.03  - diarrhea resolved, pt now feels constipated. Last BM x 3 days ago > Imodium d/c'd, miralax 17g PO x 1, enema x 1 on Friday.  - enema x 1 and Miralax prn ordered-- had BM on 4/11 s/p enema     4. DVT   - on anticoagulation    5. Multiple myeloma, pancytopenia   - per heme / onc   - s/p bone marrow biopsy 4/6 with IR    6. Hiccups--resolved   - reglan discontinued     7. Vision changes, decreased mentation-- now with 103.9 fever overnight   - appreciate neuro and opthalmology eval   - BC x 2 pending   - CT head 4/10 with no obvious changes  - ammonia wnl, lactate downtrending  - monitor LFTs, ammonia level, ?hepatic encephalopathy             Attending supervision statement: I have personally seen and examined the patient. I fully participated in the care of this patient. I have made amendments to the documentation where necessary, and agree with the history, physical exam, and plan as outlined by the ACP.        1. hx of fever, CMV viremia   - per ID, on Valcyte     2. anemia, FOBT positive in the setting of thrombocytopenia. Hgb remains at recent baseline. No overt bleeding symptoms.   - no plans for endoscopic evaluation at this time  - monitor H&H, transfuse PRN  - daily PPI     3. Chronic diarrhea, likely related to chemo +/- abx. 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   - GI PCR neg, C-diff neg on 03.03  - diarrhea resolved, pt now feels constipated. Last BM x 3 days ago > Imodium d/c'd, miralax 17g PO x 1, enema x 1 on Friday.  - enema x 1 and Miralax prn ordered-- had BM on 4/11 s/p enema     4. DVT   - on anticoagulation    5. Multiple myeloma, pancytopenia   - per heme / onc   - s/p bone marrow biopsy 4/6 with IR    6. Hiccups--resolved   - reglan discontinued     7. Vision changes, decreased mentation-- now with 103.9 fever overnight   - appreciate neuro and opthalmology eval   - BC x 2 pending   - CT head 4/10 with no obvious changes  - ammonia wnl, lactate downtrending  - monitor LFTs, ammonia level  - unlikely hepatic encephalopathy as patient does not have portal HTN on CT A/P   - change in mental status could be from anti viral medication  Also recc DC of lipitor as can cause confusion             Attending supervision statement: I have personally seen and examined the patient. I fully participated in the care of this patient. I have made amendments to the documentation where necessary, and agree with the history, physical exam, and plan as outlined by the ACP.

## 2023-04-12 NOTE — PROGRESS NOTE ADULT - ASSESSMENT
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 Found to be febrile on admission, ID consulted for further management.        Impression:  #Fever, persistent   #Immunocompromised Status, H/O Splenectomy  #pancytopenia   #CMV viremia       PLAN:  - all infectious work up in terms of bacterial infection negative,   - UA (4/7) No Pyuria, urine cx negative   - blood cx NTD   - CT A/P with bladder wall thickening and mild hydro  - On cefepime empirically, decreased dose to 1 gm q12h   - trend cbc for pancytopenia, could be valcyte induced or ? hematological, s/p BM bx pending result. ? HLH   - ferritin elevated, discussed with heme attending   - c/w maribavir given worsening pancytopenia.   - cryptococcal ag, QuantiFeronTb, fungitell negative  - CMV PCR downtrended,  will need repeat 2 weeks post initiation of therapy.       Plan discussed with Medicine and Heme Attending    Dejuan Ralph  Please contact through MS Teams   If no response or past 5 pm/weekend call [phone].  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 Found to be febrile on admission, ID consulted for further management.        Impression:  #Fever, persistent   #Immunocompromised Status, H/O Splenectomy  #pancytopenia   #CMV viremia       PLAN:  - all infectious work up in terms of bacterial infection negative,   - UA (4/7) No Pyuria, urine cx negative   - blood cx NTD   - CT A/P with bladder wall thickening and mild hydro  - On cefepime empirically, decreased dose to 1 gm q12h   - trend cbc for pancytopenia, could be valcyte induced or ? hematological, s/p BM bx pending result. ? HLH   - ferritin elevated, discussed with heme attending   - c/w maribavir given worsening pancytopenia.   - cryptococcal ag, QuantiFeronTb, fungitell negative  - CMV PCR downtrended,  will need repeat 2 weeks post initiation of therapy.       Plan discussed with Medicine and Heme Attending    Dejuan Ralph  Please contact through MS Teams   If no response or past 5 pm/weekend call [phone].  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 Found to be febrile on admission, ID consulted for further management.        Impression:  #Fever, persistent   #Immunocompromised Status, H/O Splenectomy  #pancytopenia   #CMV viremia       PLAN:  - all infectious work up in terms of bacterial infection negative,   - UA (4/7) No Pyuria, urine cx negative   - blood cx NTD   - CT A/P with bladder wall thickening and mild hydro  - On cefepime empirically, decreased dose to 1 gm q12h   - trend cbc for pancytopenia, could be valcyte induced or ? hematological, s/p BM bx pending result. ? HLH   - ferritin elevated, discussed with heme attending   - c/w maribavir given worsening pancytopenia.   - cryptococcal ag, QuantiFeronTb, fungitell negative  - CMV PCR downtrended,  will need repeat 2 weeks post initiation of therapy.       Plan discussed with Medicine and Heme Attending    Dejuan Ralph  Please contact through MS Teams   If no response or past 5 pm/weekend call [phone].

## 2023-04-12 NOTE — PROGRESS NOTE ADULT - ASSESSMENT
67y Male with history of MM on chemotherapy presents with fevers. Nephrology consulted for elevated Scr.    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 RK resolved with recurrent RK due to LOPEZ (CT with IV contrast on 4/7). Scr improving. Continue with IVF given recurrent fevers and plans for IVIG today. UA active likely due to lloyd with granular casts suggestive of ATN. FeNa indeterminate. CT with bilateral hydronephrosis likely due to urinary retention for which lloyd reinserted. Continue with flomax 0.4 mg QHS and repeat renal US. TMA work up negative. Avoid nephrotoxins. Monitor electrolytes.    2) Hypotension: BP low normal. Continue with midodrine 10 mg PO TID. Monitor BP.    3) Metabolic alkalosis: Resolved with chloride solution. Monitor pH.    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 Serum Na acceptable. Monitor serum Na.    5) MM: As per Heme/Onc.      [facility] NEPHROLOGY  Leoncio Leonard M.D.  Tay Brooke D.O.  Precious Chen M.D.  Nidia Stallings, MSN, ANP-C    Telephone: [phone]  Facsimile: [phone]    [address] [place] [address]   67y Male with history of MM on chemotherapy presents with fevers. Nephrology consulted for elevated Scr.    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 RK resolved with recurrent RK due to LOPEZ (CT with IV contrast on 4/7). Scr improving. Continue with IVF given recurrent fevers and plans for IVIG today. UA active likely due to lloyd with granular casts suggestive of ATN. FeNa indeterminate. CT with bilateral hydronephrosis likely due to urinary retention for which lloyd reinserted. Continue with flomax 0.4 mg QHS and repeat renal US. TMA work up negative. Avoid nephrotoxins. Monitor electrolytes.    2) Hypotension: BP low normal. Continue with midodrine 10 mg PO TID. Monitor BP.    3) Metabolic alkalosis: Resolved with chloride solution. Monitor pH.    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 Serum Na acceptable. Monitor serum Na.    5) MM: As per Heme/Onc.      [facility] NEPHROLOGY  Leoncio Leonard M.D.  Tay Brooke D.O.  Precious Chen M.D.  Nidia Stallings, MSN, ANP-C    Telephone: [phone]  Facsimile: [phone]    [address] [place] [address]   67y Male with history of MM on chemotherapy presents with fevers. Nephrology consulted for elevated Scr.    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 RK resolved with recurrent RK due to LOPEZ (CT with IV contrast on 4/7). Scr improving. Continue with IVF given recurrent fevers and plans for IVIG today. UA active likely due to lloyd with granular casts suggestive of ATN. FeNa indeterminate. CT with bilateral hydronephrosis likely due to urinary retention for which lloyd reinserted. Continue with flomax 0.4 mg QHS and repeat renal US. TMA work up negative. Avoid nephrotoxins. Monitor electrolytes.    2) Hypotension: BP low normal. Continue with midodrine 10 mg PO TID. Monitor BP.    3) Metabolic alkalosis: Resolved with chloride solution. Monitor pH.    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 Serum Na acceptable. Monitor serum Na.    5) MM: As per Heme/Onc.      [facility] NEPHROLOGY  Leoncio Leonard M.D.  Tay Brooke D.O.  Precious Chen M.D.  Nidia Stallings, MSN, ANP-C    Telephone: [phone]  Facsimile: [phone]    [address] [place] [address]

## 2023-04-12 NOTE — PROVIDER CONTACT NOTE (CRITICAL VALUE NOTIFICATION) - BACKGROUND
Pt admitted for fevers has been having anemia & thrombocytopenia. PMH multiple myeloma, aortic aneurysm, HLD.

## 2023-04-12 NOTE — PROGRESS NOTE ADULT - ASSESSMENT
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    Fever of unknown origin.   - CXR without consolidations or effusions; U/A unremarkable; GI PCR Neg   - Cultures remain negative to date   - LE VA duplex --> + For DVT;  V/Q scan -- Low probability   - Was on empiric treatment with Vanc/cefepime --> ID consulted; S/P Zosyn  - Hold home penicillin while on ABX   - ID to follow up, infectious work up as per ID  --> CMV PCR -- + C/w Ganciclovir per ID , CMV IGG (+) and IgM (-), Cryptococcal Ag --Neg, Quantiferon Tb -- Neg, Fungitell -- <31   - CT Chest non-contrast, noted, no acute pathology  - Shock, RRT, resume antibiotics IV fluid, MICU eval , ID follow up  --> Prednisone 50 PO Qd   - S/P Zosyn; Defer ABX as per ID   - Short course steroid per hematology -- On Prednisone 50 Qd   - IR eval for BM BX as per Heme/Onc --> done on 04/06 with IR, f/u path -- Pending   - ID ordered pan CT, noted questionable cystitis. UA is neg.  - Febrile 04/10 and 04/11 AM. 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   - Monitor patient closely      Anemia, Thrombocytopenia, Pancytopenia  - Drop in Hgb, Occult +  - Hold Hep gtt ; S/P 1 unit of PRBCs 03/28  - Maintain active T+S. Transfuse for Hgb < 7.0, and platelets < 10.K  - GI eval appreciated; F/u recs --> No plans for scope at this time   - Was on Xarelto, held due to drop in Platelets as per Heme/Onc. Monitor platelet count   - S/P 1 unit PRBCs and 1 unit Platelets 04/04, 04/05   - HIT ab: neg  - ASA on hold  - Plt of 7K, plan for 1 unit of Plt 04/11  - Hgb of 6.7 , 1 unit PRBC 04/11    Afib  - Unable to be on AC 2/2 low platelets  - On Digoxin   - Monitor on tele   - Cardio follow up     HypoNa  - Cont to monitor and trend  - Appreciate renal eval.   - Serial labs     Multiple myeloma.   - pancytopenia largely at baseline although Hg 6.7 on arrival; s/p 1U PRBC with good response   - Was on acyclovir, / Ganciclovir as per ID   - C/w home entecavir   - S/P dexamethasone, D/C'd by hematology, follow up outpatient after discharge  --> Now on Prednisone 50 Qd   - Pt reports his Revlimid was held  - Heme/Onc consulted; F/u recs   - Resume home aspirin. --> Now on hold   - s/p IR BM Bx  04/06; F/u results    - Planned for gammaglobulin as per Heme/Onc     Chronic diarrhea, now with Constipation   - Standing Imodium switched to PRN  - Monitor for BM   - no BM, discussed with GI, planned for enema    DVT   - Duplex + For R soleal vein DVT  --> Will consider CT A chest once creatinine permits as patient received contrast; Monitor for LOPEZ   - s/p Hep gtt; Monitor PTT; Monitor H/H closely --> Now on hold in view of drop in Hgb and severe thrombocytopenia  - VQ scan to R/O PE -- Low probability   - Vascular eval appreciated; AC as tolerated, on Xarelto 10, monitor H/H, plts too low to start.   - Serial Duplex to assess for propagation  -- Without propagation   - 04/03 Duplex without propagation   - Repeat Duplex 04/10 -- without propogation   - given severe thrombocytopenia, holding Xarelto for now. Risk of bleeding greater than benefit.     RK  - S/P Contrast on 03/23   - Monitor Cr closely; Avoid nephrotoxic agents   - Cr down-trending. s/p IVF   - Renal eval appreciated   - urinary retention s/p lloyd.   - Removed lloyd 4/7/23, TOV in progress. post void residual 550ml on bladder scan  - s/p straight cath. may need to replace lloyd if unable to urinarte.   - CT with hydronephrosis; Cr up-trending, discussed with renal - Hydration as tolerated,  Flomax started; F/u renal recs      Hypertension, now hypotensive   - C/w home metoprolol.  - C/w Midodrine 10 TID. Hold for SBP > 140     Hiccups  - Improved on Reglan, Monitor     Hyperlipidemia.   - C/w home atorvastatin --> on hold as per GI .    Abnormal CT  - Outpatient follow up for CT findings    Prophylactic measure.   dvt ppx: DVT PPX   diet: regular  ambulate: with assistance    fall precautions  aspiration precautions.      Discussed with Patient, and family in detail at the bedside. Discussed with Attending, and ACP.      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    Fever of unknown origin.   - CXR without consolidations or effusions; U/A unremarkable; GI PCR Neg   - Cultures remain negative to date   - LE VA duplex --> + For DVT;  V/Q scan -- Low probability   - Was on empiric treatment with Vanc/cefepime --> ID consulted; S/P Zosyn  - Hold home penicillin while on ABX   - ID to follow up, infectious work up as per ID  --> CMV PCR -- + C/w Ganciclovir per ID , CMV IGG (+) and IgM (-), Cryptococcal Ag --Neg, Quantiferon Tb -- Neg, Fungitell -- <31   - CT Chest non-contrast, noted, no acute pathology  - Shock, RRT, resume antibiotics IV fluid, MICU eval , ID follow up  --> Prednisone 50 PO Qd   - S/P Zosyn; Defer ABX as per ID   - Short course steroid per hematology -- On Prednisone 50 Qd   - IR eval for BM BX as per Heme/Onc --> done on 04/06 with IR, f/u path -- Pending   - ID ordered pan CT, noted questionable cystitis. UA is neg.  - Febrile 04/10 and 04/11 AM. 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   - Monitor patient closely      Anemia, Thrombocytopenia, Pancytopenia  - Drop in Hgb, Occult +  - Hold Hep gtt ; S/P 1 unit of PRBCs 03/28  - Maintain active T+S. Transfuse for Hgb < 7.0, and platelets < 10.K  - GI eval appreciated; F/u recs --> No plans for scope at this time   - Was on Xarelto, held due to drop in Platelets as per Heme/Onc. Monitor platelet count   - S/P 1 unit PRBCs and 1 unit Platelets 04/04, 04/05   - HIT ab: neg  - ASA on hold  - Plt of 7K, plan for 1 unit of Plt 04/11  - Hgb of 6.7 , 1 unit PRBC 04/11    Afib  - Unable to be on AC 2/2 low platelets  - On Digoxin   - Monitor on tele   - Cardio follow up     HypoNa  - Cont to monitor and trend  - Appreciate renal eval.   - Serial labs     Multiple myeloma.   - pancytopenia largely at baseline although Hg 6.7 on arrival; s/p 1U PRBC with good response   - Was on acyclovir, / Ganciclovir as per ID   - C/w home entecavir   - S/P dexamethasone, D/C'd by hematology, follow up outpatient after discharge  --> Now on Prednisone 50 Qd   - Pt reports his Revlimid was held  - Heme/Onc consulted; F/u recs   - Resume home aspirin. --> Now on hold   - s/p IR BM Bx  04/06; F/u results    - Planned for gammaglobulin as per Heme/Onc     Chronic diarrhea, now with Constipation   - Standing Imodium switched to PRN  - Monitor for BM   - no BM, discussed with GI, planned for enema    DVT   - Duplex + For R soleal vein DVT  --> Will consider CT A chest once creatinine permits as patient received contrast; Monitor for LOPEZ   - s/p Hep gtt; Monitor PTT; Monitor H/H closely --> Now on hold in view of drop in Hgb and severe thrombocytopenia  - VQ scan to R/O PE -- Low probability   - Vascular eval appreciated; AC as tolerated, on Xarelto 10, monitor H/H, plts too low to start.   - Serial Duplex to assess for propagation  -- Without propagation   - 04/03 Duplex without propagation   - Repeat Duplex 04/10 -- without propogation   - given severe thrombocytopenia, holding Xarelto for now. Risk of bleeding greater than benefit.     RK  - S/P Contrast on 03/23   - Monitor Cr closely; Avoid nephrotoxic agents   - Cr down-trending. s/p IVF   - Renal eval appreciated   - urinary retention s/p lloyd.   - Removed lloyd 4/7/23, TOV in progress. post void residual 550ml on bladder scan  - s/p straight cath. may need to replace lloyd if unable to urinarte.   - CT with hydronephrosis; Cr up-trending, discussed with renal - Hydration as tolerated,  Flomax started; F/u renal recs      Hypertension, now hypotensive   - C/w home metoprolol.  - C/w Midodrine 10 TID. Hold for SBP > 140     Hiccups  - Improved on Reglan, Monitor     Hyperlipidemia.   - C/w home atorvastatin --> on hold as per GI .    Abnormal CT  - Outpatient follow up for CT findings    Prophylactic measure.   dvt ppx: DVT PPX   diet: regular  ambulate: with assistance    fall precautions  aspiration precautions.      Discussed with Patient, and family in detail at the bedside. Discussed with Attending, and ACP.      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    Fever of unknown origin.   - CXR without consolidations or effusions; U/A unremarkable; GI PCR Neg   - Cultures remain negative to date   - LE VA duplex --> + For DVT;  V/Q scan -- Low probability   - Was on empiric treatment with Vanc/cefepime --> ID consulted; S/P Zosyn  - Hold home penicillin while on ABX   - ID to follow up, infectious work up as per ID  --> CMV PCR -- + C/w Ganciclovir per ID , CMV IGG (+) and IgM (-), Cryptococcal Ag --Neg, Quantiferon Tb -- Neg, Fungitell -- <31   - CT Chest non-contrast, noted, no acute pathology  - Shock, RRT, resume antibiotics IV fluid, MICU eval , ID follow up  --> Prednisone 50 PO Qd   - S/P Zosyn; Defer ABX as per ID   - Short course steroid per hematology -- On Prednisone 50 Qd   - IR eval for BM BX as per Heme/Onc --> done on 04/06 with IR, f/u path -- Pending   - ID ordered pan CT, noted questionable cystitis. UA is neg.  - Febrile 04/10 and 04/11 AM. 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   - Monitor patient closely      Anemia, Thrombocytopenia, Pancytopenia  - Drop in Hgb, Occult +  - Hold Hep gtt ; S/P 1 unit of PRBCs 03/28  - Maintain active T+S. Transfuse for Hgb < 7.0, and platelets < 10.K  - GI eval appreciated; F/u recs --> No plans for scope at this time   - Was on Xarelto, held due to drop in Platelets as per Heme/Onc. Monitor platelet count   - S/P 1 unit PRBCs and 1 unit Platelets 04/04, 04/05   - HIT ab: neg  - ASA on hold  - Plt of 7K, plan for 1 unit of Plt 04/11  - Hgb of 6.7 , 1 unit PRBC 04/11    Afib  - Unable to be on AC 2/2 low platelets  - On Digoxin   - Monitor on tele   - Cardio follow up     HypoNa  - Cont to monitor and trend  - Appreciate renal eval.   - Serial labs     Multiple myeloma.   - pancytopenia largely at baseline although Hg 6.7 on arrival; s/p 1U PRBC with good response   - Was on acyclovir, / Ganciclovir as per ID   - C/w home entecavir   - S/P dexamethasone, D/C'd by hematology, follow up outpatient after discharge  --> Now on Prednisone 50 Qd   - Pt reports his Revlimid was held  - Heme/Onc consulted; F/u recs   - Resume home aspirin. --> Now on hold   - s/p IR BM Bx  04/06; F/u results    - Planned for IVIG as per Heme/Onc   - IR eval for Bone Lesion Biopsy per Heme/Onc     Chronic diarrhea, now with Constipation   - Standing Imodium switched to PRN  - Monitor for BM   - no BM, discussed with GI, planned for enema    DVT   - Duplex + For R soleal vein DVT  --> Will consider CT A chest once creatinine permits as patient received contrast; Monitor for LOPEZ   - s/p Hep gtt; Monitor PTT; Monitor H/H closely --> Now on hold in view of drop in Hgb and severe thrombocytopenia  - VQ scan to R/O PE -- Low probability   - Vascular eval appreciated; AC as tolerated, on Xarelto 10, monitor H/H, plts too low to start.   - Serial Duplex to assess for propagation  -- Without propagation   - 04/03 Duplex without propagation   - Repeat Duplex 04/10 -- without propogation   - given severe thrombocytopenia, holding Xarelto for now. Risk of bleeding greater than benefit.     RK  - S/P Contrast on 03/23   - Monitor Cr closely; Avoid nephrotoxic agents   - Cr down-trending. s/p IVF   - Renal eval appreciated   - urinary retention s/p lloyd.   - Removed lloyd 4/7/23, TOV in progress. post void residual 550ml on bladder scan  - s/p straight cath. may need to replace lloyd if unable to urinarte.   - CT with hydronephrosis; Cr up-trending, discussed with renal - Hydration as tolerated,  Flomax started; F/u renal recs      Hypertension, now hypotensive   - C/w home metoprolol.  - C/w Midodrine 10 TID. Hold for SBP > 140     Hiccups  - Improved on Reglan, Monitor     Hyperlipidemia.   - C/w home atorvastatin --> on hold as per GI .    Abnormal CT  - Outpatient follow up for CT findings    Prophylactic measure.   dvt ppx: DVT PPX   diet: regular  ambulate: with assistance    fall precautions  aspiration precautions.      Discussed with Patient, and family in detail at the bedside. Discussed with Attending, and ACP.

## 2023-04-12 NOTE — EEG REPORT - NS EEG TEXT BOX
REPORT OF ROUTINE EEG WITH VIDEO    [facility]: [address], [address] [place] [address], Phone: [phone]  [facility]: [address] [place] [address], [place] [address], Phone: [phone]  Office: [address] [place] [address], [facility] [address], [place] [address], Phone: [phone]    Patient Name: Prateek Funez    Age: 67 year  : 1955    EEG #: [id number]  Study Date: 2023   Start Time: 5:04:52 PM     Study Duration: 21.3  		    Technical Information:					  On Instrument: [id number]  Placement and Labeling of Electrodes:  The EEG was performed utilizing 20 channels referential EEG connections (coronal over temporal over parasagittal montage) using all standard 10-20 electrode placements with EKG.  Recording was at a sampling rate of 256 samples per second per channel.  Time synchronized digital video recording was done simultaneously with EEG recording.  A low light infrared camera was used for low light recording.  Christopher and seizure detection algorithms were utilized.  CSA Technical Component:  Quantitative EEG analysis using a separate Compressed Spectral Array (CSA) software package was conducted in real-time and run at bedside after set up by the technician, digitally displaying the power of electrographic frequencies included in the 1-30Hz band using a graded color map.  This data was reviewed and interpreted independently, and is reported in a separate section below.    History:  RI performed at bedside   COR : PT awake and alert   No HV stim due to covid protocols  Photic stim performed  66 y/o Male   PMH multiple myeloma, hyperlipidemia, H/O aortic, aneurysm  P/W fever  R/O seizure    Medication  PROTONIX   PROAMATINE    Study Interpretation:    FINDINGS:  The background was continuous, spontaneously variable and reactive.  During wakefulness, the posteriorly dominant rhythm consisted of symmetric, well modulated 8 Hz activity, with an amplitude to 30 uV, that attenuated to eye opening.  Low amplitude central beta was noted in wakefulness.    Background Slowing:  Generalized slowing: none was present.  Focal slowing: none was present.    Sleep Background:  -Drowsiness was characterized by fragmentation, attenuation, and slowing of the background activity.    -Stage II sleep transients were not recorded.    Non-epileptiform activity:  None.    Epileptiform Activity:   No epileptiform discharges were present.    Events:  No clinical events were recorded.  No seizures were recorded.    Activation Procedures:   -Hyperventilation was not performed.    -Photic stimulation was performed and did not elicit any abnormalities.      Artifacts:  Intermittent myogenic and movement artifacts were noted.    ECG:  The heart rate on single channel ECG was predominantly between 80-90 BPM.    EEG Classification / Summary:    Normal EEG in the awake, drowsy and asleep states.    Clinical Impression:    No epileptiform pattern or seizure recorded.    ________________________________________    José Miguel Tesfaye MD, PhD  Director, Epilepsy Division, [facility]  ------------------------------------  EEG Reading Room: [phone]  On Call Service After Hours: [phone]      REPORT OF ROUTINE EEG WITH VIDEO    [facility]: [address], [address] [place] [address], Phone: [phone]  [facility]: [address] [place] [address], [place] [address], Phone: [phone]  Office: [address] [place] [address], [facility] [address], [place] [address], Phone: [phone]    Patient Name: Prateek Funez    Age: 67 year  : 1955    EEG #: [id number]  Study Date: 2023   Start Time: 5:04:52 PM     Study Duration: 21.3  		    Technical Information:					  On Instrument: [id number]  Placement and Labeling of Electrodes:  The EEG was performed utilizing 20 channels referential EEG connections (coronal over temporal over parasagittal montage) using all standard 10-20 electrode placements with EKG.  Recording was at a sampling rate of 256 samples per second per channel.  Time synchronized digital video recording was done simultaneously with EEG recording.  A low light infrared camera was used for low light recording.  Christopher and seizure detection algorithms were utilized.  CSA Technical Component:  Quantitative EEG analysis using a separate Compressed Spectral Array (CSA) software package was conducted in real-time and run at bedside after set up by the technician, digitally displaying the power of electrographic frequencies included in the 1-30Hz band using a graded color map.  This data was reviewed and interpreted independently, and is reported in a separate section below.    History:  RI performed at bedside   COR : PT awake and alert   No HV stim due to covid protocols  Photic stim performed  66 y/o Male   PMH multiple myeloma, hyperlipidemia, H/O aortic, aneurysm  P/W fever  R/O seizure    Medication  PROTONIX   PROAMATINE    Study Interpretation:    FINDINGS:  The background was continuous, spontaneously variable and reactive.  During wakefulness, the posteriorly dominant rhythm consisted of symmetric, well modulated 8 Hz activity, with an amplitude to 30 uV, that attenuated to eye opening.  Low amplitude central beta was noted in wakefulness.    Background Slowing:  Generalized slowing: none was present.  Focal slowing: none was present.    Sleep Background:  -Drowsiness was characterized by fragmentation, attenuation, and slowing of the background activity.    -Stage II sleep transients were not recorded.    Non-epileptiform activity:  None.    Epileptiform Activity:   No epileptiform discharges were present.    Events:  No clinical events were recorded.  No seizures were recorded.    Activation Procedures:   -Hyperventilation was not performed.    -Photic stimulation was performed and did not elicit any abnormalities.      Artifacts:  Intermittent myogenic and movement artifacts were noted.    ECG:  The heart rate on single channel ECG was predominantly between 80-90 BPM.    EEG Classification / Summary:    Normal EEG in the awake, drowsy and asleep states.    Clinical Impression:    No epileptiform pattern or seizure recorded.    ________________________________________    José Miguel Tesfaye MD, PhD  Director, Epilepsy Division, [facility]  ------------------------------------  EEG Reading Room: [phone]  On Call Service After Hours: [phone]      REPORT OF ROUTINE EEG WITH VIDEO    [facility]: [address], [address] [place] [address], Phone: [phone]  [facility]: [address] [place] [address], [place] [address], Phone: [phone]  Office: [address] [place] [address], [facility] [address], [place] [address], Phone: [phone]    Patient Name: Prateek Funez    Age: 67 year  : 1955    EEG #: [id number]  Study Date: 2023   Start Time: 5:04:52 PM     Study Duration: 21.3  		    Technical Information:					  On Instrument: [id number]  Placement and Labeling of Electrodes:  The EEG was performed utilizing 20 channels referential EEG connections (coronal over temporal over parasagittal montage) using all standard 10-20 electrode placements with EKG.  Recording was at a sampling rate of 256 samples per second per channel.  Time synchronized digital video recording was done simultaneously with EEG recording.  A low light infrared camera was used for low light recording.  Christopher and seizure detection algorithms were utilized.  CSA Technical Component:  Quantitative EEG analysis using a separate Compressed Spectral Array (CSA) software package was conducted in real-time and run at bedside after set up by the technician, digitally displaying the power of electrographic frequencies included in the 1-30Hz band using a graded color map.  This data was reviewed and interpreted independently, and is reported in a separate section below.    History:  RI performed at bedside   COR : PT awake and alert   No HV stim due to covid protocols  Photic stim performed  68 y/o Male   PMH multiple myeloma, hyperlipidemia, H/O aortic, aneurysm  P/W fever  R/O seizure    Medication  PROTONIX   PROAMATINE    Study Interpretation:    FINDINGS:  The background was continuous, spontaneously variable and reactive.  During wakefulness, the posteriorly dominant rhythm consisted of symmetric, well modulated 8 Hz activity, with an amplitude to 30 uV, that attenuated to eye opening.  Low amplitude central beta was noted in wakefulness.    Background Slowing:  Generalized slowing: none was present.  Focal slowing: none was present.    Sleep Background:  -Drowsiness was characterized by fragmentation, attenuation, and slowing of the background activity.    -Stage II sleep transients were not recorded.    Non-epileptiform activity:  None.    Epileptiform Activity:   No epileptiform discharges were present.    Events:  No clinical events were recorded.  No seizures were recorded.    Activation Procedures:   -Hyperventilation was not performed.    -Photic stimulation was performed and did not elicit any abnormalities.      Artifacts:  Intermittent myogenic and movement artifacts were noted.    ECG:  The heart rate on single channel ECG was predominantly between 80-90 BPM.    EEG Classification / Summary:    Normal EEG in the awake, drowsy and asleep states.    Clinical Impression:    No epileptiform pattern or seizure recorded.    ________________________________________    José Miguel Tesfaye MD, PhD  Director, Epilepsy Division, [facility]  ------------------------------------  EEG Reading Room: [phone]  On Call Service After Hours: [phone]

## 2023-04-12 NOTE — PROGRESS NOTE ADULT - SUBJECTIVE AND OBJECTIVE BOX
67yPatient is a 67y old  Male who presents with a chief complaint of fever (12 Apr 2023 14:40)      Interval history:  Febrile, twitching and jerks of lt side and eye.       Allergies:   No Known Allergies      Antimicrobials:  cefepime   IVPB 1000 milliGRAM(s) IV Intermittent every 8 hours  maribavir Tablet 400 milliGRAM(s) Oral two times a day      REVIEW OF SYSTEMS:  No chest pain   No SOB  No abdominal pain  No rash.       Vital Signs Last 24 Hrs  T(C): 37.2 (04-12-23 @ 21:38), Max: 39.4 (04-12-23 @ 15:03)  T(F): 98.9 (04-12-23 @ 21:38), Max: 103 (04-12-23 @ 15:03)  HR: 104 (04-12-23 @ 21:38) (87 - 104)  BP: 123/59 (04-12-23 @ 21:38) (113/50 - 123/59)  BP(mean): --  RR: 18 (04-12-23 @ 21:38) (18 - 18)  SpO2: 95% (04-12-23 @ 21:38) (92% - 95%)      PHYSICAL EXAM:  Pt in no acute distress, awake, confused.   breathing comfortably   non distended abdomen  no edema LE   no phlebitis   Jerking movement of lt arm and eye                           7.7    0.87  )-----------( 14       ( 12 Apr 2023 06:21 )             22.4   04-12    133<L>  |  100  |  39<H>  ----------------------------<  164<H>  3.9   |  23  |  1.44<H>    Ca    8.0<L>      12 Apr 2023 06:21    TPro  4.5<L>  /  Alb  2.5<L>  /  TBili  1.8<H>  /  DBili  x   /  AST  30  /  ALT  67<H>  /  AlkPhos  207<H>  04-11      LIVER FUNCTIONS - ( 11 Apr 2023 11:09 )  Alb: 2.5 g/dL / Pro: 4.5 g/dL / ALK PHOS: 207 U/L / ALT: 67 U/L / AST: 30 U/L / GGT: x               Culture - Blood (collected 10 Apr 2023 07:21)  Source: .Blood Blood-Peripheral  Preliminary Report (11 Apr 2023 10:01):    No growth to date.    Culture - Blood (collected 10 Apr 2023 07:21)  Source: .Blood Blood-Peripheral  Preliminary Report (11 Apr 2023 10:01):    No growth to date.

## 2023-04-12 NOTE — PROGRESS NOTE ADULT - SUBJECTIVE AND OBJECTIVE BOX
Patient is a 67y old  Male who presents with a chief complaint of fever (12 Apr 2023 13:18)    Patient seen and examined at bedside this morning. Patient resting comfortably, family at bedside. Patient frustrated/anxious about extended hospital stay     MEDICATIONS  (STANDING):  acetaminophen     Tablet .. 650 milliGRAM(s) Oral once  artificial tears (preservative free) Ophthalmic Solution 1 Drop(s) Both EYES four times a day  cefepime   IVPB 1000 milliGRAM(s) IV Intermittent every 8 hours  chlorhexidine 2% Cloths 1 Application(s) Topical daily  digoxin     Tablet 125 MICROGram(s) Oral daily  diphenhydrAMINE Injectable 25 milliGRAM(s) IV Push once  immune   globulin 10% (GAMMAGARD) IVPB 30 Gram(s) IV Intermittent once  maribavir Tablet 400 milliGRAM(s) Oral two times a day  midodrine. 10 milliGRAM(s) Oral three times a day  pantoprazole    Tablet 40 milliGRAM(s) Oral before breakfast  polyethylene glycol 3350 17 Gram(s) Oral daily  predniSONE   Tablet 50 milliGRAM(s) Oral daily  sodium chloride 0.9%. 1000 milliLiter(s) (60 mL/Hr) IV Continuous <Continuous>  tamsulosin 0.4 milliGRAM(s) Oral at bedtime    MEDICATIONS  (PRN):  acetaminophen     Tablet .. 650 milliGRAM(s) Oral every 6 hours PRN Temp greater or equal to 38C (100.4F), Mild Pain (1 - 3)  aluminum hydroxide/magnesium hydroxide/simethicone Suspension 30 milliLiter(s) Oral every 4 hours PRN Dyspepsia  loperamide 2 milliGRAM(s) Oral three times a day PRN Diarrhea        Vital Signs Last 24 Hrs  T(C): 37 (12 Apr 2023 12:30), Max: 39.9 (11 Apr 2023 22:50)  T(F): 98.6 (12 Apr 2023 12:30), Max: 103.9 (11 Apr 2023 22:50)  HR: 89 (12 Apr 2023 12:30) (67 - 98)  BP: 114/52 (12 Apr 2023 12:30) (91/52 - 132/58)  BP(mean): --  RR: 18 (12 Apr 2023 12:30) (16 - 18)  SpO2: 92% (12 Apr 2023 12:30) (92% - 95%)    Parameters below as of 12 Apr 2023 12:30  Patient On (Oxygen Delivery Method): room air        PE  Awake, alert  Anicteric  RRR  CTAB  Abd soft, NT, ND  No c/c/e  No rash grossly  FROM                          7.7    0.87  )-----------( 14       ( 12 Apr 2023 06:21 )             22.4       04-12    133<L>  |  100  |  39<H>  ----------------------------<  164<H>  3.9   |  23  |  1.44<H>    Ca    8.0<L>      12 Apr 2023 06:21    TPro  4.5<L>  /  Alb  2.5<L>  /  TBili  1.8<H>  /  DBili  x   /  AST  30  /  ALT  67<H>  /  AlkPhos  207<H>  04-11

## 2023-04-12 NOTE — PROGRESS NOTE ADULT - NS ATTEND AMEND GEN_ALL_CORE FT
66 y/o male with multiple myeloma admitted with fever.  Hospital course complicated by worsening pancytopenia. Fever continues to persist despite CMV treatment and steroids.  Infectious work up negative except for CMV which is better.     Bone marrow biopsy prelim discussed with pathology- no evidence of myeloma, minimal hematopoiesis but significant histiocytic proliferation.  Minimal hemophagocytosis, AFB, GMS pending, CMV negative.  CD1A negative (langerhan's), no eosinophils.    ?etiology- underlying infectious cause or histiocytic disorder. But oddly also with CD30 and MUM1, could this be a lymphoproliferative process?    Concerned that his ferritin has increased significantly, but without the HLH on the marrow and no other criteria (only fever and cytopenia, normal fibrinogen/triglycerides, splenectomy).      Would recommend PET/CT scan and then IR for bone and lymph node biopsy.

## 2023-04-12 NOTE — CONSULT NOTE ADULT - SUBJECTIVE AND OBJECTIVE BOX
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, presents with fever, patient reports that upon waking this morning patient had chills and body aches, took his temperature which was 102, patient endorsing some mild periumbilical abdominal discomfort, but otherwise feels well, denies chest pain, shortness of breath, cough, nasal congestion, vomiting, diarrhea, dysuria, no sick contacts or recent travel, patient states that he went to his hematologist to get a blood transfusion today and was instructed to come to the ER, in the ED, pt was febrile, tachycardic, otherwise VSS, labs notable for pancytopenia (Hg 6.7), elevated Cr (improved from prior baseline), BNP 3K, pt was given 1U PRBC, Ofirmev, 1L LR, admitted to general medicine for further management, of note pt had additional fever approx 15 minutes after finishing transfusion (24 Mar 2023 05:32)       PAST MEDICAL & SURGICAL HISTORY:  Multiple myeloma      Hyperlipidemia      H/O aortic aneurysm      H/O splenectomy      S/P partial gastrectomy      History of pancreatic surgery          Allergies: Allergies    No Known Allergies    Intolerances        Social History: Denies toxic habits including tobacco, ETOH or illicit drugs.    Family History: FAMILY HISTORY:  FHx: lung cancer (Sibling)    FHx: ovarian cancer (Sibling)    . No family history of strokes    Medications: MEDICATIONS  (STANDING):  artificial tears (preservative free) Ophthalmic Solution 1 Drop(s) Both EYES four times a day  atorvastatin 40 milliGRAM(s) Oral at bedtime  cefepime   IVPB 1000 milliGRAM(s) IV Intermittent every 8 hours  chlorhexidine 2% Cloths 1 Application(s) Topical daily  digoxin     Tablet 125 MICROGram(s) Oral daily  entecavir 0.5 milliGRAM(s) Oral daily  filgrastim-sndz (ZARXIO) Injectable 480 MICROGram(s) SubCutaneous daily  maribavir Tablet 400 milliGRAM(s) Oral two times a day  midodrine. 10 milliGRAM(s) Oral three times a day  pantoprazole    Tablet 40 milliGRAM(s) Oral before breakfast  predniSONE   Tablet 50 milliGRAM(s) Oral daily  sodium chloride 0.9%. 1000 milliLiter(s) (60 mL/Hr) IV Continuous <Continuous>  tamsulosin 0.4 milliGRAM(s) Oral at bedtime    MEDICATIONS  (PRN):  acetaminophen     Tablet .. 650 milliGRAM(s) Oral every 6 hours PRN Temp greater or equal to 38C (100.4F), Mild Pain (1 - 3)  aluminum hydroxide/magnesium hydroxide/simethicone Suspension 30 milliLiter(s) Oral every 4 hours PRN Dyspepsia  loperamide 2 milliGRAM(s) Oral three times a day PRN Diarrhea  polyethylene glycol 3350 17 Gram(s) Oral daily PRN Constipation      Review of Systems:  CONSTITUTIONAL:  No weight loss, fever, chills, weakness or fatigue.  HEENT:  Eyes:  No visual loss, blurred vision, double vision or yellow sclera. Ears, Nose, Throat:  No hearing loss, sneezing, congestion, runny nose or sore throat.  SKIN:  No rash or itching.  CARDIOVASCULAR:  No chest pain, chest pressure or chest discomfort. No palpitations or edema.  RESPIRATORY:  No shortness of breath, cough or sputum.  GASTROINTESTINAL:  No anorexia, nausea, vomiting or diarrhea. No abdominal pain or blood.  GENITOURINARY:  No burning on urination or incontinence   NEUROLOGICAL:  No headache, dizziness, syncope, paralysis, ataxia, numbness or tingling in the extremities. No change in bowel or bladder control. no limb weakness. no vision changes.   MUSCULOSKELETAL:  No muscle, back pain, joint pain or stiffness.  HEMATOLOGIC:  No anemia, bleeding or bruising.  LYMPHATICS:  No enlarged nodes. No history of splenectomy.  PSYCHIATRIC:  No history of depression or anxiety.  ENDOCRINOLOGIC:  No reports of sweating, cold or heat intolerance. No polyuria or polydipsia.      Vitals:  Vital Signs Last 24 Hrs  T(C): 38.3 (12 Apr 2023 05:20), Max: 39.9 (11 Apr 2023 22:50)  T(F): 101 (12 Apr 2023 05:20), Max: 103.9 (11 Apr 2023 22:50)  HR: 98 (12 Apr 2023 04:54) (67 - 98)  BP: 118/54 (12 Apr 2023 04:54) (91/52 - 132/58)  BP(mean): --  RR: 18 (12 Apr 2023 04:54) (16 - 18)  SpO2: 95% (12 Apr 2023 04:54) (92% - 95%)    Parameters below as of 12 Apr 2023 04:54  Patient On (Oxygen Delivery Method): room air        General Exam:   General Appearance: Appropriately dressed and in no acute distress       Head: Normocephalic, atraumatic and no dysmorphic features  Ear, Nose, and Throat: Moist mucous membranes  CVS: S1S2+  Resp: No SOB, no wheeze or rhonchi  GI: soft NT/ND  Extremities: No edema or cyanosis  Skin: No bruises or rashes     Neurological Exam:  Mental Status: Awake, alert and oriented x 2.  Able to follow simple and complex verbal commands. Able to name and repeat. fluent speech. No obvious aphasia or dysarthria noted.   Cranial Nerves: PERRL, EOMI, VFFC, sensation V1-V3 intact,  no obvious facial asymmetry, equal elevation of palate, scm/trap 5/5, tongue is midline on protrusion. no obvious papilledema on fundoscopic exam. hearing is grossly intact.   Motor: VICENTE 4/5 throuhgout but uppers>lowers   Sensation: Intact to light touch and pinprick throughout. no right/left confusion. no extinction to tactile on DSS.    Reflexes: 1+ throughout at biceps, brachioradialis, triceps, patellars and ankles bilaterally and equal. No clonus. R toe and L toe were both downgoing.  Coordination: No dysmetria on FNF  Gait: deferred .     Data/Labs/Imaging which I personally reviewed.     Labs:     CBC Full  -  ( 12 Apr 2023 06:21 )  WBC Count : 0.87 K/uL  RBC Count : 2.52 M/uL  Hemoglobin : 7.7 g/dL  Hematocrit : 22.4 %  Platelet Count - Automated : 14 K/uL  Mean Cell Volume : 88.9 fl  Mean Cell Hemoglobin : 30.6 pg  Mean Cell Hemoglobin Concentration : 34.4 gm/dL  Auto Neutrophil # : x  Auto Lymphocyte # : x  Auto Monocyte # : x  Auto Eosinophil # : x  Auto Basophil # : x  Auto Neutrophil % : x  Auto Lymphocyte % : x  Auto Monocyte % : x  Auto Eosinophil % : x  Auto Basophil % : x    04-12    133<L>  |  100  |  39<H>  ----------------------------<  164<H>  3.9   |  23  |  1.44<H>    Ca    8.0<L>      12 Apr 2023 06:21    TPro  4.5<L>  /  Alb  2.5<L>  /  TBili  1.8<H>  /  DBili  x   /  AST  30  /  ALT  67<H>  /  AlkPhos  207<H>  04-11    LIVER FUNCTIONS - ( 11 Apr 2023 11:09 )  Alb: 2.5 g/dL / Pro: 4.5 g/dL / ALK PHOS: 207 U/L / ALT: 67 U/L / AST: 30 U/L / GGT: x             < from: CT Head No Cont (04.01.23 @ 09:19) >    ACC: [id number] EXAM:  CT BRAIN   ORDERED BY: EVONNE CARDONA     PROCEDURE DATE:  04/01/2023          INTERPRETATION:  CLINICAL INDICATIONS:  dizzy and low platelets    COMPARISON: None.    TECHNIQUE: Noncontrast CT of the head. Multiplanar reformations are   submitted.    FINDINGS:  There is periventricular and subcortical white matter hypodensity without   mass effect, nonspecific, likely representing mild chronic microvascular   ischemic changes. There is no compelling evidence for an acute   transcortical infarction. There is no evidence of mass, mass effect,   midline shift or extra-axial fluid collection. The lateral ventricles and   cortical sulci are age-appropriate in size and configuration. Chronic   left orbit medial wall fracture deformity. The orbits, mastoid air cells   and visualized paranasal sinuses are unremarkable. The calvarium is   intact. Consider follow-up CT or MRI as clinically warranted.    IMPRESSION:  Mild chronic microvascular changes without evidence of an   acute transcortical infarction or hemorrhage.    --- End of Report ---            IMAN MUÑOZ MD; Attending Radiologist  This document has been electronically signed. 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, presents with fever, patient reports that upon waking this morning patient had chills and body aches, took his temperature which was 102, patient endorsing some mild periumbilical abdominal discomfort, but otherwise feels well, denies chest pain, shortness of breath, cough, nasal congestion, vomiting, diarrhea, dysuria, no sick contacts or recent travel, patient states that he went to his hematologist to get a blood transfusion today and was instructed to come to the ER, in the ED, pt was febrile, tachycardic, otherwise VSS, labs notable for pancytopenia (Hg 6.7), elevated Cr (improved from prior baseline), BNP 3K, pt was given 1U PRBC, Ofirmev, 1L LR, admitted to general medicine for further management, of note pt had additional fever approx 15 minutes after finishing transfusion (24 Mar 2023 05:32)       PAST MEDICAL & SURGICAL HISTORY:  Multiple myeloma      Hyperlipidemia      H/O aortic aneurysm      H/O splenectomy      S/P partial gastrectomy      History of pancreatic surgery          Allergies: Allergies    No Known Allergies    Intolerances        Social History: Denies toxic habits including tobacco, ETOH or illicit drugs.    Family History: FAMILY HISTORY:  FHx: lung cancer (Sibling)    FHx: ovarian cancer (Sibling)    . No family history of strokes    Medications: MEDICATIONS  (STANDING):  artificial tears (preservative free) Ophthalmic Solution 1 Drop(s) Both EYES four times a day  atorvastatin 40 milliGRAM(s) Oral at bedtime  cefepime   IVPB 1000 milliGRAM(s) IV Intermittent every 8 hours  chlorhexidine 2% Cloths 1 Application(s) Topical daily  digoxin     Tablet 125 MICROGram(s) Oral daily  entecavir 0.5 milliGRAM(s) Oral daily  filgrastim-sndz (ZARXIO) Injectable 480 MICROGram(s) SubCutaneous daily  maribavir Tablet 400 milliGRAM(s) Oral two times a day  midodrine. 10 milliGRAM(s) Oral three times a day  pantoprazole    Tablet 40 milliGRAM(s) Oral before breakfast  predniSONE   Tablet 50 milliGRAM(s) Oral daily  sodium chloride 0.9%. 1000 milliLiter(s) (60 mL/Hr) IV Continuous <Continuous>  tamsulosin 0.4 milliGRAM(s) Oral at bedtime    MEDICATIONS  (PRN):  acetaminophen     Tablet .. 650 milliGRAM(s) Oral every 6 hours PRN Temp greater or equal to 38C (100.4F), Mild Pain (1 - 3)  aluminum hydroxide/magnesium hydroxide/simethicone Suspension 30 milliLiter(s) Oral every 4 hours PRN Dyspepsia  loperamide 2 milliGRAM(s) Oral three times a day PRN Diarrhea  polyethylene glycol 3350 17 Gram(s) Oral daily PRN Constipation      Review of Systems:  CONSTITUTIONAL:  No weight loss, fever, chills, weakness or fatigue.  HEENT:  Eyes:  No visual loss, blurred vision, double vision or yellow sclera. Ears, Nose, Throat:  No hearing loss, sneezing, congestion, runny nose or sore throat.  SKIN:  No rash or itching.  CARDIOVASCULAR:  No chest pain, chest pressure or chest discomfort. No palpitations or edema.  RESPIRATORY:  No shortness of breath, cough or sputum.  GASTROINTESTINAL:  No anorexia, nausea, vomiting or diarrhea. No abdominal pain or blood.  GENITOURINARY:  No burning on urination or incontinence   NEUROLOGICAL:  No headache, dizziness, syncope, paralysis, ataxia, numbness or tingling in the extremities. No change in bowel or bladder control. no limb weakness. no vision changes.   MUSCULOSKELETAL:  No muscle, back pain, joint pain or stiffness.  HEMATOLOGIC:  No anemia, bleeding or bruising.  LYMPHATICS:  No enlarged nodes. No history of splenectomy.  PSYCHIATRIC:  No history of depression or anxiety.  ENDOCRINOLOGIC:  No reports of sweating, cold or heat intolerance. No polyuria or polydipsia.      Vitals:  Vital Signs Last 24 Hrs  T(C): 38.3 (12 Apr 2023 05:20), Max: 39.9 (11 Apr 2023 22:50)  T(F): 101 (12 Apr 2023 05:20), Max: 103.9 (11 Apr 2023 22:50)  HR: 98 (12 Apr 2023 04:54) (67 - 98)  BP: 118/54 (12 Apr 2023 04:54) (91/52 - 132/58)  BP(mean): --  RR: 18 (12 Apr 2023 04:54) (16 - 18)  SpO2: 95% (12 Apr 2023 04:54) (92% - 95%)    Parameters below as of 12 Apr 2023 04:54  Patient On (Oxygen Delivery Method): room air        General Exam:   General Appearance: Appropriately dressed and in no acute distress       Head: Normocephalic, atraumatic and no dysmorphic features  Ear, Nose, and Throat: Moist mucous membranes  CVS: S1S2+  Resp: No SOB, no wheeze or rhonchi  GI: soft NT/ND  Extremities: No edema or cyanosis  Skin: No bruises or rashes     Neurological Exam:  Mental Status: Awake, alert and oriented x 2.  Able to follow simple and complex verbal commands. Able to name and repeat. fluent speech. No obvious aphasia or dysarthria noted.   Cranial Nerves: PERRL, EOMI, VFFC, sensation V1-V3 intact,  no obvious facial asymmetry, equal elevation of palate, scm/trap 5/5, tongue is midline on protrusion. no obvious papilledema on fundoscopic exam. hearing is grossly intact.   Motor: VICENTE 4/5 throuhgout but uppers>lowers   Sensation: Intact to light touch and pinprick throughout. no right/left confusion. no extinction to tactile on DSS.    Reflexes: 1+ throughout at biceps, brachioradialis, triceps, patellars and ankles bilaterally and equal. No clonus. R toe and L toe were both downgoing.  Coordination: No dysmetria on FNF  Gait: deferred .     Data/Labs/Imaging which I personally reviewed.     Labs:     CBC Full  -  ( 12 Apr 2023 06:21 )  WBC Count : 0.87 K/uL  RBC Count : 2.52 M/uL  Hemoglobin : 7.7 g/dL  Hematocrit : 22.4 %  Platelet Count - Automated : 14 K/uL  Mean Cell Volume : 88.9 fl  Mean Cell Hemoglobin : 30.6 pg  Mean Cell Hemoglobin Concentration : 34.4 gm/dL  Auto Neutrophil # : x  Auto Lymphocyte # : x  Auto Monocyte # : x  Auto Eosinophil # : x  Auto Basophil # : x  Auto Neutrophil % : x  Auto Lymphocyte % : x  Auto Monocyte % : x  Auto Eosinophil % : x  Auto Basophil % : x    04-12    133<L>  |  100  |  39<H>  ----------------------------<  164<H>  3.9   |  23  |  1.44<H>    Ca    8.0<L>      12 Apr 2023 06:21    TPro  4.5<L>  /  Alb  2.5<L>  /  TBili  1.8<H>  /  DBili  x   /  AST  30  /  ALT  67<H>  /  AlkPhos  207<H>  04-11    LIVER FUNCTIONS - ( 11 Apr 2023 11:09 )  Alb: 2.5 g/dL / Pro: 4.5 g/dL / ALK PHOS: 207 U/L / ALT: 67 U/L / AST: 30 U/L / GGT: x             < from: CT Head No Cont (04.01.23 @ 09:19) >    ACC: [id number] EXAM:  CT BRAIN   ORDERED BY: EVONNE CARDONA     PROCEDURE DATE:  04/01/2023          INTERPRETATION:  CLINICAL INDICATIONS:  dizzy and low platelets    COMPARISON: None.    TECHNIQUE: Noncontrast CT of the head. Multiplanar reformations are   submitted.    FINDINGS:  There is periventricular and subcortical white matter hypodensity without   mass effect, nonspecific, likely representing mild chronic microvascular   ischemic changes. There is no compelling evidence for an acute   transcortical infarction. There is no evidence of mass, mass effect,   midline shift or extra-axial fluid collection. The lateral ventricles and   cortical sulci are age-appropriate in size and configuration. Chronic   left orbit medial wall fracture deformity. The orbits, mastoid air cells   and visualized paranasal sinuses are unremarkable. The calvarium is   intact. Consider follow-up CT or MRI as clinically warranted.    IMPRESSION:  Mild chronic microvascular changes without evidence of an   acute transcortical infarction or hemorrhage.    --- End of Report ---            IMAN MUÑOZ MD; Attending Radiologist  This document has been electronically signed. 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, presents with fever, patient reports that upon waking this morning patient had chills and body aches, took his temperature which was 102, patient endorsing some mild periumbilical abdominal discomfort, but otherwise feels well, denies chest pain, shortness of breath, cough, nasal congestion, vomiting, diarrhea, dysuria, no sick contacts or recent travel, patient states that he went to his hematologist to get a blood transfusion today and was instructed to come to the ER, in the ED, pt was febrile, tachycardic, otherwise VSS, labs notable for pancytopenia (Hg 6.7), elevated Cr (improved from prior baseline), BNP 3K, pt was given 1U PRBC, Ofirmev, 1L LR, admitted to general medicine for further management, of note pt had additional fever approx 15 minutes after finishing transfusion (24 Mar 2023 05:32)       PAST MEDICAL & SURGICAL HISTORY:  Multiple myeloma      Hyperlipidemia      H/O aortic aneurysm      H/O splenectomy      S/P partial gastrectomy      History of pancreatic surgery          Allergies: Allergies    No Known Allergies    Intolerances        Social History: Denies toxic habits including tobacco, ETOH or illicit drugs.    Family History: FAMILY HISTORY:  FHx: lung cancer (Sibling)    FHx: ovarian cancer (Sibling)    . No family history of strokes    Medications: MEDICATIONS  (STANDING):  artificial tears (preservative free) Ophthalmic Solution 1 Drop(s) Both EYES four times a day  atorvastatin 40 milliGRAM(s) Oral at bedtime  cefepime   IVPB 1000 milliGRAM(s) IV Intermittent every 8 hours  chlorhexidine 2% Cloths 1 Application(s) Topical daily  digoxin     Tablet 125 MICROGram(s) Oral daily  entecavir 0.5 milliGRAM(s) Oral daily  filgrastim-sndz (ZARXIO) Injectable 480 MICROGram(s) SubCutaneous daily  maribavir Tablet 400 milliGRAM(s) Oral two times a day  midodrine. 10 milliGRAM(s) Oral three times a day  pantoprazole    Tablet 40 milliGRAM(s) Oral before breakfast  predniSONE   Tablet 50 milliGRAM(s) Oral daily  sodium chloride 0.9%. 1000 milliLiter(s) (60 mL/Hr) IV Continuous <Continuous>  tamsulosin 0.4 milliGRAM(s) Oral at bedtime    MEDICATIONS  (PRN):  acetaminophen     Tablet .. 650 milliGRAM(s) Oral every 6 hours PRN Temp greater or equal to 38C (100.4F), Mild Pain (1 - 3)  aluminum hydroxide/magnesium hydroxide/simethicone Suspension 30 milliLiter(s) Oral every 4 hours PRN Dyspepsia  loperamide 2 milliGRAM(s) Oral three times a day PRN Diarrhea  polyethylene glycol 3350 17 Gram(s) Oral daily PRN Constipation      Review of Systems:  CONSTITUTIONAL:  No weight loss, fever, chills, weakness or fatigue.  HEENT:  Eyes:  No visual loss, blurred vision, double vision or yellow sclera. Ears, Nose, Throat:  No hearing loss, sneezing, congestion, runny nose or sore throat.  SKIN:  No rash or itching.  CARDIOVASCULAR:  No chest pain, chest pressure or chest discomfort. No palpitations or edema.  RESPIRATORY:  No shortness of breath, cough or sputum.  GASTROINTESTINAL:  No anorexia, nausea, vomiting or diarrhea. No abdominal pain or blood.  GENITOURINARY:  No burning on urination or incontinence   NEUROLOGICAL:  No headache, dizziness, syncope, paralysis, ataxia, numbness or tingling in the extremities. No change in bowel or bladder control. no limb weakness. no vision changes.   MUSCULOSKELETAL:  No muscle, back pain, joint pain or stiffness.  HEMATOLOGIC:  No anemia, bleeding or bruising.  LYMPHATICS:  No enlarged nodes. No history of splenectomy.  PSYCHIATRIC:  No history of depression or anxiety.  ENDOCRINOLOGIC:  No reports of sweating, cold or heat intolerance. No polyuria or polydipsia.      Vitals:  Vital Signs Last 24 Hrs  T(C): 38.3 (12 Apr 2023 05:20), Max: 39.9 (11 Apr 2023 22:50)  T(F): 101 (12 Apr 2023 05:20), Max: 103.9 (11 Apr 2023 22:50)  HR: 98 (12 Apr 2023 04:54) (67 - 98)  BP: 118/54 (12 Apr 2023 04:54) (91/52 - 132/58)  BP(mean): --  RR: 18 (12 Apr 2023 04:54) (16 - 18)  SpO2: 95% (12 Apr 2023 04:54) (92% - 95%)    Parameters below as of 12 Apr 2023 04:54  Patient On (Oxygen Delivery Method): room air        General Exam:   General Appearance: Appropriately dressed and in no acute distress       Head: Normocephalic, atraumatic and no dysmorphic features  Ear, Nose, and Throat: Moist mucous membranes  CVS: S1S2+  Resp: No SOB, no wheeze or rhonchi  GI: soft NT/ND  Extremities: No edema or cyanosis  Skin: No bruises or rashes     Neurological Exam:  Mental Status: Awake, alert and oriented x 2.  Able to follow simple and complex verbal commands. Able to name and repeat. fluent speech. No obvious aphasia or dysarthria noted.   Cranial Nerves: PERRL, EOMI, VFFC, sensation V1-V3 intact,  no obvious facial asymmetry, equal elevation of palate, scm/trap 5/5, tongue is midline on protrusion. no obvious papilledema on fundoscopic exam. hearing is grossly intact.   Motor: VICENTE 4/5 throuhgout but uppers>lowers   Sensation: Intact to light touch and pinprick throughout. no right/left confusion. no extinction to tactile on DSS.    Reflexes: 1+ throughout at biceps, brachioradialis, triceps, patellars and ankles bilaterally and equal. No clonus. R toe and L toe were both downgoing.  Coordination: No dysmetria on FNF  Gait: deferred .     Data/Labs/Imaging which I personally reviewed.     Labs:     CBC Full  -  ( 12 Apr 2023 06:21 )  WBC Count : 0.87 K/uL  RBC Count : 2.52 M/uL  Hemoglobin : 7.7 g/dL  Hematocrit : 22.4 %  Platelet Count - Automated : 14 K/uL  Mean Cell Volume : 88.9 fl  Mean Cell Hemoglobin : 30.6 pg  Mean Cell Hemoglobin Concentration : 34.4 gm/dL  Auto Neutrophil # : x  Auto Lymphocyte # : x  Auto Monocyte # : x  Auto Eosinophil # : x  Auto Basophil # : x  Auto Neutrophil % : x  Auto Lymphocyte % : x  Auto Monocyte % : x  Auto Eosinophil % : x  Auto Basophil % : x    04-12    133<L>  |  100  |  39<H>  ----------------------------<  164<H>  3.9   |  23  |  1.44<H>    Ca    8.0<L>      12 Apr 2023 06:21    TPro  4.5<L>  /  Alb  2.5<L>  /  TBili  1.8<H>  /  DBili  x   /  AST  30  /  ALT  67<H>  /  AlkPhos  207<H>  04-11    LIVER FUNCTIONS - ( 11 Apr 2023 11:09 )  Alb: 2.5 g/dL / Pro: 4.5 g/dL / ALK PHOS: 207 U/L / ALT: 67 U/L / AST: 30 U/L / GGT: x             < from: CT Head No Cont (04.01.23 @ 09:19) >    ACC: [id number] EXAM:  CT BRAIN   ORDERED BY: EVONNE CARDONA     PROCEDURE DATE:  04/01/2023          INTERPRETATION:  CLINICAL INDICATIONS:  dizzy and low platelets    COMPARISON: None.    TECHNIQUE: Noncontrast CT of the head. Multiplanar reformations are   submitted.    FINDINGS:  There is periventricular and subcortical white matter hypodensity without   mass effect, nonspecific, likely representing mild chronic microvascular   ischemic changes. There is no compelling evidence for an acute   transcortical infarction. There is no evidence of mass, mass effect,   midline shift or extra-axial fluid collection. The lateral ventricles and   cortical sulci are age-appropriate in size and configuration. Chronic   left orbit medial wall fracture deformity. The orbits, mastoid air cells   and visualized paranasal sinuses are unremarkable. The calvarium is   intact. Consider follow-up CT or MRI as clinically warranted.    IMPRESSION:  Mild chronic microvascular changes without evidence of an   acute transcortical infarction or hemorrhage.    --- End of Report ---            IMAN MUÑOZ MD; Attending Radiologist  This document has been electronically signed. Apr 1 2023  9:25AM    < end of copied text >

## 2023-04-12 NOTE — PROGRESS NOTE ADULT - SUBJECTIVE AND OBJECTIVE BOX
Subjective: Patient seen and examined. No new events except as noted.     SUBJECTIVE/ROS:  nad      MEDICATIONS:  MEDICATIONS  (STANDING):  artificial tears (preservative free) Ophthalmic Solution 1 Drop(s) Both EYES four times a day  atorvastatin 40 milliGRAM(s) Oral at bedtime  cefepime   IVPB 1000 milliGRAM(s) IV Intermittent every 8 hours  chlorhexidine 2% Cloths 1 Application(s) Topical daily  digoxin     Tablet 125 MICROGram(s) Oral daily  entecavir 0.5 milliGRAM(s) Oral daily  filgrastim-sndz (ZARXIO) Injectable 480 MICROGram(s) SubCutaneous daily  maribavir Tablet 400 milliGRAM(s) Oral two times a day  midodrine. 10 milliGRAM(s) Oral three times a day  pantoprazole    Tablet 40 milliGRAM(s) Oral before breakfast  predniSONE   Tablet 50 milliGRAM(s) Oral daily  sodium chloride 0.9%. 1000 milliLiter(s) (60 mL/Hr) IV Continuous <Continuous>  tamsulosin 0.4 milliGRAM(s) Oral at bedtime      PHYSICAL EXAM:  T(C): 38.3 (04-12-23 @ 05:20), Max: 39.9 (04-11-23 @ 22:50)  HR: 98 (04-12-23 @ 04:54) (67 - 98)  BP: 118/54 (04-12-23 @ 04:54) (91/52 - 132/58)  RR: 18 (04-12-23 @ 04:54) (16 - 18)  SpO2: 95% (04-12-23 @ 04:54) (92% - 95%)  Wt(kg): --  I&O's Summary    11 Apr 2023 07:01  -  12 Apr 2023 07:00  --------------------------------------------------------  IN: 240 mL / OUT: 1500 mL / NET: -1260 mL            JVP: Normal  Neck: supple  Lung: clear   CV: S1 S2 , Murmur:  Abd: soft  Ext: No edema  neuro: Awake / alert  Psych: flat affect  Skin: normal``    LABS/DATA:    CARDIAC MARKERS:                                7.7    0.87  )-----------( 14       ( 12 Apr 2023 06:21 )             22.4     04-12    133<L>  |  100  |  39<H>  ----------------------------<  164<H>  3.9   |  23  |  1.44<H>    Ca    8.0<L>      12 Apr 2023 06:21    TPro  4.5<L>  /  Alb  2.5<L>  /  TBili  1.8<H>  /  DBili  x   /  AST  30  /  ALT  67<H>  /  AlkPhos  207<H>  04-11    proBNP:   Lipid Profile:   HgA1c:   TSH:     TELE:  EKG:

## 2023-04-12 NOTE — PROGRESS NOTE ADULT - ASSESSMENT
New onset afib, aflutter  tachycardia  cant use a/c due to very low plts and profound anemia   HR much better with digoxin  cont 0.125mg daily then check level tomorrow     History of bioAVR   echo unremarkable     Anemia  Monitor hemoglobin, transfuse as needed.  plan as per Heme    DVT   a/c on hold  low plts  fu with vascular surgery / heme     Hypotension   due to sepsis   better now   on midodrine

## 2023-04-12 NOTE — CONSULT NOTE ADULT - ASSESSMENT
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 now with HA and c/o pain behind eyes   +DVT    + thrombocytopenia    4/1 CTH : mild chronic MVD. no acute findings   4/10 CTH and C oribts: no acute findings.     Impression:   - consider MRI brain and orbits if able with and w/o   - now on cefepime for infection  - optho recs appreicated.   - on entecavir, mariavir  - midodrine 10mg TID for hypotension  - immunosupression with steroids. on prednisone   - DOAC for AF and DVT  held in setting of thromobcytopenia  - transfusions PRN per team, platelets and PRBC  - telemetry  - PT/OT   - check FS, glucose control <180  - GI/DVT ppx  - Counseling on diet, exercise, and medication adherence was done  - Counseling on smoking cessation and alcohol consumption offered when appropriate.  - Pain assessed and judicious use of narcotics when appropriate was discussed.    - Stroke education given when appropriate.  - Importance of fall prevention discussed.   - Differential diagnosis and plan of care discussed with patient and/or family and primary team  - Thank you for allowing me to participate in the care of this patient. Call with questions.   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 now with HA and c/o pain behind eyes   +DVT    + thrombocytopenia    4/1 CTH : mild chronic MVD. no acute findings   4/10 CTH and C oribts: no acute findings.     Impression:   - consider MRI brain and orbits if able with and w/o   - now on cefepime for infection  - optho recs appreicated.   - on entecavir, mariavir  - midodrine 10mg TID for hypotension  - immunosupression with steroids. on prednisone   - DOAC for AF and DVT  held in setting of thromobcytopenia  - transfusions PRN per team, platelets and PRBC  - telemetry  - PT/OT   - check FS, glucose control <180  - GI/DVT ppx  - Counseling on diet, exercise, and medication adherence was done  - Counseling on smoking cessation and alcohol consumption offered when appropriate.  - Pain assessed and judicious use of narcotics when appropriate was discussed.    - Stroke education given when appropriate.  - Importance of fall prevention discussed.   - Differential diagnosis and plan of care discussed with patient and/or family and primary team  - Thank you for allowing me to participate in the care of this patient. Call with questions.   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 now with HA and c/o pain behind eyes   +DVT    + thrombocytopenia    4/1 CTH : mild chronic MVD. no acute findings   4/10 CTH and C oribts: no acute findings.     Impression:   - consider MRI brain and orbits if able with and w/o   - now on cefepime for infection  - optho recs appreicated.   - on entecavir, mariavir  - midodrine 10mg TID for hypotension  - immunosupression with steroids. on prednisone   - DOAC for AF and DVT  held in setting of thromobcytopenia  - transfusions PRN per team, platelets and PRBC  - telemetry  - PT/OT   - check FS, glucose control <180  - GI/DVT ppx  - Counseling on diet, exercise, and medication adherence was done  - Counseling on smoking cessation and alcohol consumption offered when appropriate.  - Pain assessed and judicious use of narcotics when appropriate was discussed.    - Stroke education given when appropriate.  - Importance of fall prevention discussed.   - Differential diagnosis and plan of care discussed with patient and/or family and primary team  - Thank you for allowing me to participate in the care of this patient. Call with questions.   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 now with HA and c/o pain behind eyes   +DVT    + thrombocytopenia    4/1 CTH : mild chronic MVD. no acute findings   4/10 CTH and C oribts: no acute findings.     Impression:   - possible myoclonic jerks, will check rEEG ; likely metabolic   - consider MRI brain and orbits if able with and w/o   - now on cefepime for infection  - optho recs appreicated.   - on entecavir, mariavir  - midodrine 10mg TID for hypotension  - immunosupression with steroids. on prednisone   - DOAC for AF and DVT  held in setting of thromobcytopenia  - transfusions PRN per team, platelets and PRBC  - telemetry  - PT/OT   - check FS, glucose control <180  - GI/DVT ppx  - Counseling on diet, exercise, and medication adherence was done  - Counseling on smoking cessation and alcohol consumption offered when appropriate.  - Pain assessed and judicious use of narcotics when appropriate was discussed.    - Stroke education given when appropriate.  - Importance of fall prevention discussed.   - Differential diagnosis and plan of care discussed with patient and/or family and primary team  - Thank you for allowing me to participate in the care of this patient. Call with questions.   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 now with HA and c/o pain behind eyes   +DVT    + thrombocytopenia    4/1 CTH : mild chronic MVD. no acute findings   4/10 CTH and C oribts: no acute findings.     Impression:   - possible myoclonic jerks, will check rEEG ; likely metabolic   - consider MRI brain and orbits if able with and w/o   - now on cefepime for infection  - optho recs appreicated.   - on entecavir, mariavir  - midodrine 10mg TID for hypotension  - immunosupression with steroids. on prednisone   - DOAC for AF and DVT  held in setting of thromobcytopenia  - transfusions PRN per team, platelets and PRBC  - telemetry  - PT/OT   - check FS, glucose control <180  - GI/DVT ppx  - Counseling on diet, exercise, and medication adherence was done  - Counseling on smoking cessation and alcohol consumption offered when appropriate.  - Pain assessed and judicious use of narcotics when appropriate was discussed.    - Stroke education given when appropriate.  - Importance of fall prevention discussed.   - Differential diagnosis and plan of care discussed with patient and/or family and primary team  - Thank you for allowing me to participate in the care of this patient. Call with questions.   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 now with HA and c/o pain behind eyes   +DVT    + thrombocytopenia    4/1 CTH : mild chronic MVD. no acute findings   4/10 CTH and C oribts: no acute findings.     Impression:   - possible myoclonic jerks, will check rEEG ; likely metabolic   - consider MRI brain and orbits if able with and w/o   - now on cefepime for infection  - optho recs appreicated.   - on entecavir, mariavir  - midodrine 10mg TID for hypotension  - immunosupression with steroids. on prednisone   - DOAC for AF and DVT  held in setting of thromobcytopenia  - transfusions PRN per team, platelets and PRBC  - telemetry  - PT/OT   - check FS, glucose control <180  - GI/DVT ppx  - Counseling on diet, exercise, and medication adherence was done  - Counseling on smoking cessation and alcohol consumption offered when appropriate.  - Pain assessed and judicious use of narcotics when appropriate was discussed.    - Stroke education given when appropriate.  - Importance of fall prevention discussed.   - Differential diagnosis and plan of care discussed with patient and/or family and primary team  - Thank you for allowing me to participate in the care of this patient. Call with questions.   Matt Harvey MD  Vascular Neurology  Office: [phone]

## 2023-04-12 NOTE — PROGRESS NOTE ADULT - SUBJECTIVE AND OBJECTIVE BOX
Chief Complaint:  Patient is a 67y old  Male who presents with a chief complaint of fever (12 Apr 2023 08:41)      Date of service 04-12-23 @ 11:35      Interval Events:   Patient seen and examined.   Now with tremors in face.   BM yesterday s/p enema.     Hospital Medications:  acetaminophen     Tablet .. 650 milliGRAM(s) Oral every 6 hours PRN  aluminum hydroxide/magnesium hydroxide/simethicone Suspension 30 milliLiter(s) Oral every 4 hours PRN  artificial tears (preservative free) Ophthalmic Solution 1 Drop(s) Both EYES four times a day  atorvastatin 40 milliGRAM(s) Oral at bedtime  cefepime   IVPB 1000 milliGRAM(s) IV Intermittent every 8 hours  chlorhexidine 2% Cloths 1 Application(s) Topical daily  digoxin     Tablet 125 MICROGram(s) Oral daily  filgrastim-sndz (ZARXIO) Injectable 480 MICROGram(s) SubCutaneous daily  loperamide 2 milliGRAM(s) Oral three times a day PRN  maribavir Tablet 400 milliGRAM(s) Oral two times a day  midodrine. 10 milliGRAM(s) Oral three times a day  pantoprazole    Tablet 40 milliGRAM(s) Oral before breakfast  polyethylene glycol 3350 17 Gram(s) Oral daily PRN  predniSONE   Tablet 50 milliGRAM(s) Oral daily  sodium chloride 0.9%. 1000 milliLiter(s) IV Continuous <Continuous>  tamsulosin 0.4 milliGRAM(s) Oral at bedtime        Review of Systems:  General:  No wt loss, fevers, chills, night sweats, fatigue,   Eyes:  Good vision, no reported pain  ENT:  No sore throat, pain, runny nose, dysphagia  CV:  No pain, palpitations, hypo/hypertension  Resp:  No dyspnea, cough, tachypnea, wheezing  GI:  See HPI  :  No pain, bleeding, incontinence, nocturia  Muscle:  No pain, weakness  Neuro:  No weakness, tingling, memory problems  Psych:  No fatigue, insomnia, mood problems, depression  Endocrine:  No polyuria, polydipsia, cold/heat intolerance  Heme:  No petechiae, ecchymosis, easy bruisability  Integumentary:  No rash, edema    PHYSICAL EXAM:   Vital Signs:  Vital Signs Last 24 Hrs  T(C): 38.3 (12 Apr 2023 05:20), Max: 39.9 (11 Apr 2023 22:50)  T(F): 101 (12 Apr 2023 05:20), Max: 103.9 (11 Apr 2023 22:50)  HR: 98 (12 Apr 2023 04:54) (67 - 98)  BP: 118/54 (12 Apr 2023 04:54) (91/52 - 132/58)  BP(mean): --  RR: 18 (12 Apr 2023 04:54) (16 - 18)  SpO2: 95% (12 Apr 2023 04:54) (92% - 95%)    Parameters below as of 12 Apr 2023 04:54  Patient On (Oxygen Delivery Method): room air      Daily     Daily       PHYSICAL EXAM:     GENERAL:  Appears stated age, well-groomed, well-nourished, no distress  HEENT:  NC/AT,  conjunctivae anicteric, clear and pink,   NECK: supple, trachea midline  CHEST:  Full & symmetric excursion, no increased effort, breath sounds clear  HEART:  Regular rhythm, no JVD  ABDOMEN:  Soft, non-tender, non-distended, normoactive bowel sounds,  no masses , no hepatosplenomegaly  EXTREMITIES:  no cyanosis,clubbing or edema  SKIN:  No rash, erythema, or, ecchymoses, no jaundice  NEURO:  Alert, non-focal, no asterixis  PSYCH: Appropriate affect, oriented to place and time  RECTAL: Deferred      LABS Personally reviewed by me:                        7.7    0.87  )-----------( 14       ( 12 Apr 2023 06:21 )             22.4     Mean Cell Volume: 88.9 fl (04-12-23 @ 06:21)    04-12    133<L>  |  100  |  39<H>  ----------------------------<  164<H>  3.9   |  23  |  1.44<H>    Ca    8.0<L>      12 Apr 2023 06:21    TPro  4.5<L>  /  Alb  2.5<L>  /  TBili  1.8<H>  /  DBili  x   /  AST  30  /  ALT  67<H>  /  AlkPhos  207<H>  04-11    LIVER FUNCTIONS - ( 11 Apr 2023 11:09 )  Alb: 2.5 g/dL / Pro: 4.5 g/dL / ALK PHOS: 207 U/L / ALT: 67 U/L / AST: 30 U/L / GGT: x               Amylase Serum--      Lipase serum--       [id number]                          7.7    0.87  )-----------( 14       ( 12 Apr 2023 06:21 )             22.4                         6.7    1.19  )-----------( 10       ( 11 Apr 2023 14:37 )             20.2                         7.7    1.25  )-----------( 7        ( 11 Apr 2023 11:09 )             24.0                         8.0    0.97  )-----------( 11       ( 10 Apr 2023 11:54 )             24.4                         6.8    0.82  )-----------( 12       ( 10 Apr 2023 06:39 )             20.4       Imaging personally reviewed by me:             Chief Complaint:  Patient is a 67y old  Male who presents with a chief complaint of fever (12 Apr 2023 08:41)      Date of service 04-12-23 @ 11:35      Interval Events:   Patient seen and examined.   Now with tremors in face.   BM yesterday s/p enema.     Hospital Medications:  acetaminophen     Tablet .. 650 milliGRAM(s) Oral every 6 hours PRN  aluminum hydroxide/magnesium hydroxide/simethicone Suspension 30 milliLiter(s) Oral every 4 hours PRN  artificial tears (preservative free) Ophthalmic Solution 1 Drop(s) Both EYES four times a day  atorvastatin 40 milliGRAM(s) Oral at bedtime  cefepime   IVPB 1000 milliGRAM(s) IV Intermittent every 8 hours  chlorhexidine 2% Cloths 1 Application(s) Topical daily  digoxin     Tablet 125 MICROGram(s) Oral daily  filgrastim-sndz (ZARXIO) Injectable 480 MICROGram(s) SubCutaneous daily  loperamide 2 milliGRAM(s) Oral three times a day PRN  maribavir Tablet 400 milliGRAM(s) Oral two times a day  midodrine. 10 milliGRAM(s) Oral three times a day  pantoprazole    Tablet 40 milliGRAM(s) Oral before breakfast  polyethylene glycol 3350 17 Gram(s) Oral daily PRN  predniSONE   Tablet 50 milliGRAM(s) Oral daily  sodium chloride 0.9%. 1000 milliLiter(s) IV Continuous <Continuous>  tamsulosin 0.4 milliGRAM(s) Oral at bedtime        Review of Systems:  General:  No wt loss, fevers, chills, night sweats, fatigue,   Eyes:  Good vision, no reported pain  ENT:  No sore throat, pain, runny nose, dysphagia  CV:  No pain, palpitations, hypo/hypertension  Resp:  No dyspnea, cough, tachypnea, wheezing  GI:  See HPI  :  No pain, bleeding, incontinence, nocturia  Muscle:  No pain, weakness  Neuro:  No weakness, tingling, memory problems  Psych:  No fatigue, insomnia, mood problems, depression  Endocrine:  No polyuria, polydipsia, cold/heat intolerance  Heme:  No petechiae, ecchymosis, easy bruisability  Integumentary:  No rash, edema    PHYSICAL EXAM:   Vital Signs:  Vital Signs Last 24 Hrs  T(C): 38.3 (12 Apr 2023 05:20), Max: 39.9 (11 Apr 2023 22:50)  T(F): 101 (12 Apr 2023 05:20), Max: 103.9 (11 Apr 2023 22:50)  HR: 98 (12 Apr 2023 04:54) (67 - 98)  BP: 118/54 (12 Apr 2023 04:54) (91/52 - 132/58)  BP(mean): --  RR: 18 (12 Apr 2023 04:54) (16 - 18)  SpO2: 95% (12 Apr 2023 04:54) (92% - 95%)    Parameters below as of 12 Apr 2023 04:54  Patient On (Oxygen Delivery Method): room air      Daily     Daily       PHYSICAL EXAM:     GENERAL:  Appears stated age, well-groomed, well-nourished, no distress  HEENT:  NC/AT,  conjunctivae anicteric, clear and pink,   NECK: supple, trachea midline  CHEST:  Full & symmetric excursion, no increased effort, breath sounds clear  HEART:  Regular rhythm, no JVD  ABDOMEN:  Soft, non-tender, non-distended, normoactive bowel sounds,  no masses , no hepatosplenomegaly  EXTREMITIES:  no cyanosis,clubbing or edema  SKIN:  No rash, erythema, or, ecchymoses, no jaundice  NEURO:  Alert, non-focal, no asterixis  PSYCH: Appropriate affect, oriented to place and time  RECTAL: Deferred      LABS Personally reviewed by me:                        7.7    0.87  )-----------( 14       ( 12 Apr 2023 06:21 )             22.4     Mean Cell Volume: 88.9 fl (04-12-23 @ 06:21)    04-12    133<L>  |  100  |  39<H>  ----------------------------<  164<H>  3.9   |  23  |  1.44<H>    Ca    8.0<L>      12 Apr 2023 06:21    TPro  4.5<L>  /  Alb  2.5<L>  /  TBili  1.8<H>  /  DBili  x   /  AST  30  /  ALT  67<H>  /  AlkPhos  207<H>  04-11    LIVER FUNCTIONS - ( 11 Apr 2023 11:09 )  Alb: 2.5 g/dL / Pro: 4.5 g/dL / ALK PHOS: 207 U/L / ALT: 67 U/L / AST: 30 U/L / GGT: x               Amylase Serum--      Lipase serum--       [id number]                          7.7    0.87  )-----------( 14       ( 12 Apr 2023 06:21 )             22.4                         6.7    1.19  )-----------( 10       ( 11 Apr 2023 14:37 )             20.2                         7.7    1.25  )-----------( 7        ( 11 Apr 2023 11:09 )             24.0                         8.0    0.97  )-----------( 11       ( 10 Apr 2023 11:54 )             24.4                         6.8    0.82  )-----------( 12       ( 10 Apr 2023 06:39 )             20.4       Imaging personally reviewed by me:             Chief Complaint:  Patient is a 67y old  Male who presents with a chief complaint of fever (12 Apr 2023 08:41)      Date of service 04-12-23 @ 11:35      Interval Events:   Patient seen and examined.   Now with tremors in face.   BM yesterday s/p enema.     Hospital Medications:  acetaminophen     Tablet .. 650 milliGRAM(s) Oral every 6 hours PRN  aluminum hydroxide/magnesium hydroxide/simethicone Suspension 30 milliLiter(s) Oral every 4 hours PRN  artificial tears (preservative free) Ophthalmic Solution 1 Drop(s) Both EYES four times a day  atorvastatin 40 milliGRAM(s) Oral at bedtime  cefepime   IVPB 1000 milliGRAM(s) IV Intermittent every 8 hours  chlorhexidine 2% Cloths 1 Application(s) Topical daily  digoxin     Tablet 125 MICROGram(s) Oral daily  filgrastim-sndz (ZARXIO) Injectable 480 MICROGram(s) SubCutaneous daily  loperamide 2 milliGRAM(s) Oral three times a day PRN  maribavir Tablet 400 milliGRAM(s) Oral two times a day  midodrine. 10 milliGRAM(s) Oral three times a day  pantoprazole    Tablet 40 milliGRAM(s) Oral before breakfast  polyethylene glycol 3350 17 Gram(s) Oral daily PRN  predniSONE   Tablet 50 milliGRAM(s) Oral daily  sodium chloride 0.9%. 1000 milliLiter(s) IV Continuous <Continuous>  tamsulosin 0.4 milliGRAM(s) Oral at bedtime        Review of Systems:  General:  No wt loss, fevers, chills, night sweats, fatigue,   Eyes:  Good vision, no reported pain  ENT:  No sore throat, pain, runny nose, dysphagia  CV:  No pain, palpitations, hypo/hypertension  Resp:  No dyspnea, cough, tachypnea, wheezing  GI:  See HPI  :  No pain, bleeding, incontinence, nocturia  Muscle:  No pain, weakness  Neuro:  No weakness, tingling, memory problems  Psych:  No fatigue, insomnia, mood problems, depression  Endocrine:  No polyuria, polydipsia, cold/heat intolerance  Heme:  No petechiae, ecchymosis, easy bruisability  Integumentary:  No rash, edema    PHYSICAL EXAM:   Vital Signs:  Vital Signs Last 24 Hrs  T(C): 38.3 (12 Apr 2023 05:20), Max: 39.9 (11 Apr 2023 22:50)  T(F): 101 (12 Apr 2023 05:20), Max: 103.9 (11 Apr 2023 22:50)  HR: 98 (12 Apr 2023 04:54) (67 - 98)  BP: 118/54 (12 Apr 2023 04:54) (91/52 - 132/58)  BP(mean): --  RR: 18 (12 Apr 2023 04:54) (16 - 18)  SpO2: 95% (12 Apr 2023 04:54) (92% - 95%)    Parameters below as of 12 Apr 2023 04:54  Patient On (Oxygen Delivery Method): room air      Daily     Daily       PHYSICAL EXAM:     GENERAL:  Appears stated age, well-groomed, well-nourished, no distress  HEENT:  NC/AT,  conjunctivae anicteric, clear and pink,   NECK: supple, trachea midline  CHEST:  Full & symmetric excursion, no increased effort, breath sounds clear  HEART:  Regular rhythm, no JVD  ABDOMEN:  Soft, non-tender, non-distended, normoactive bowel sounds,  no masses , no hepatosplenomegaly  EXTREMITIES:  no cyanosis,clubbing or edema  SKIN:  No rash, erythema, or, ecchymoses, no jaundice  NEURO:  Alert, non-focal, no asterixis  PSYCH: Appropriate affect, oriented to place and time  RECTAL: Deferred      LABS Personally reviewed by me:                        7.7    0.87  )-----------( 14       ( 12 Apr 2023 06:21 )             22.4     Mean Cell Volume: 88.9 fl (04-12-23 @ 06:21)    04-12    133<L>  |  100  |  39<H>  ----------------------------<  164<H>  3.9   |  23  |  1.44<H>    Ca    8.0<L>      12 Apr 2023 06:21    TPro  4.5<L>  /  Alb  2.5<L>  /  TBili  1.8<H>  /  DBili  x   /  AST  30  /  ALT  67<H>  /  AlkPhos  207<H>  04-11    LIVER FUNCTIONS - ( 11 Apr 2023 11:09 )  Alb: 2.5 g/dL / Pro: 4.5 g/dL / ALK PHOS: 207 U/L / ALT: 67 U/L / AST: 30 U/L / GGT: x               Amylase Serum--      Lipase serum--       [id number]                          7.7    0.87  )-----------( 14       ( 12 Apr 2023 06:21 )             22.4                         6.7    1.19  )-----------( 10       ( 11 Apr 2023 14:37 )             20.2                         7.7    1.25  )-----------( 7        ( 11 Apr 2023 11:09 )             24.0                         8.0    0.97  )-----------( 11       ( 10 Apr 2023 11:54 )             24.4                         6.8    0.82  )-----------( 12       ( 10 Apr 2023 06:39 )             20.4       Imaging personally reviewed by me:             Chief Complaint:  Patient is a 67y old  Male who presents with a chief complaint of fever (12 Apr 2023 08:41)      Date of service 04-12-23 @ 11:35      Interval Events:   Patient seen and examined.   Now with tremors in face. Febrile 103.9 overnight   BM yesterday s/p enema.     Hospital Medications:  acetaminophen     Tablet .. 650 milliGRAM(s) Oral every 6 hours PRN  aluminum hydroxide/magnesium hydroxide/simethicone Suspension 30 milliLiter(s) Oral every 4 hours PRN  artificial tears (preservative free) Ophthalmic Solution 1 Drop(s) Both EYES four times a day  atorvastatin 40 milliGRAM(s) Oral at bedtime  cefepime   IVPB 1000 milliGRAM(s) IV Intermittent every 8 hours  chlorhexidine 2% Cloths 1 Application(s) Topical daily  digoxin     Tablet 125 MICROGram(s) Oral daily  filgrastim-sndz (ZARXIO) Injectable 480 MICROGram(s) SubCutaneous daily  loperamide 2 milliGRAM(s) Oral three times a day PRN  maribavir Tablet 400 milliGRAM(s) Oral two times a day  midodrine. 10 milliGRAM(s) Oral three times a day  pantoprazole    Tablet 40 milliGRAM(s) Oral before breakfast  polyethylene glycol 3350 17 Gram(s) Oral daily PRN  predniSONE   Tablet 50 milliGRAM(s) Oral daily  sodium chloride 0.9%. 1000 milliLiter(s) IV Continuous <Continuous>  tamsulosin 0.4 milliGRAM(s) Oral at bedtime        Review of Systems:  General:  No wt loss, fevers, chills, night sweats, fatigue,   Eyes:  Good vision, no reported pain  ENT:  No sore throat, pain, runny nose, dysphagia  CV:  No pain, palpitations, hypo/hypertension  Resp:  No dyspnea, cough, tachypnea, wheezing  GI:  See HPI  :  No pain, bleeding, incontinence, nocturia  Muscle:  No pain, weakness  Neuro:  No weakness, tingling, memory problems  Psych:  No fatigue, insomnia, mood problems, depression  Endocrine:  No polyuria, polydipsia, cold/heat intolerance  Heme:  No petechiae, ecchymosis, easy bruisability  Integumentary:  No rash, edema    PHYSICAL EXAM:   Vital Signs:  Vital Signs Last 24 Hrs  T(C): 38.3 (12 Apr 2023 05:20), Max: 39.9 (11 Apr 2023 22:50)  T(F): 101 (12 Apr 2023 05:20), Max: 103.9 (11 Apr 2023 22:50)  HR: 98 (12 Apr 2023 04:54) (67 - 98)  BP: 118/54 (12 Apr 2023 04:54) (91/52 - 132/58)  BP(mean): --  RR: 18 (12 Apr 2023 04:54) (16 - 18)  SpO2: 95% (12 Apr 2023 04:54) (92% - 95%)    Parameters below as of 12 Apr 2023 04:54  Patient On (Oxygen Delivery Method): room air      Daily     Daily       PHYSICAL EXAM:     GENERAL:  Appears stated age, well-groomed, well-nourished, no distress  HEENT:  NC/AT,  conjunctivae anicteric, clear and pink,   NECK: supple, trachea midline  CHEST:  Full & symmetric excursion, no increased effort, breath sounds clear  HEART:  Regular rhythm, no JVD  ABDOMEN:  Soft, non-tender, non-distended, normoactive bowel sounds,  no masses , no hepatosplenomegaly  EXTREMITIES:  no cyanosis,clubbing or edema  SKIN:  No rash, erythema, or, ecchymoses, no jaundice  NEURO:  Alert, non-focal, no asterixis  PSYCH: Appropriate affect, oriented to place and time  RECTAL: Deferred      LABS Personally reviewed by me:                        7.7    0.87  )-----------( 14       ( 12 Apr 2023 06:21 )             22.4     Mean Cell Volume: 88.9 fl (04-12-23 @ 06:21)    04-12    133<L>  |  100  |  39<H>  ----------------------------<  164<H>  3.9   |  23  |  1.44<H>    Ca    8.0<L>      12 Apr 2023 06:21    TPro  4.5<L>  /  Alb  2.5<L>  /  TBili  1.8<H>  /  DBili  x   /  AST  30  /  ALT  67<H>  /  AlkPhos  207<H>  04-11    LIVER FUNCTIONS - ( 11 Apr 2023 11:09 )  Alb: 2.5 g/dL / Pro: 4.5 g/dL / ALK PHOS: 207 U/L / ALT: 67 U/L / AST: 30 U/L / GGT: x               Amylase Serum--      Lipase serum--       [id number]                          7.7    0.87  )-----------( 14       ( 12 Apr 2023 06:21 )             22.4                         6.7    1.19  )-----------( 10       ( 11 Apr 2023 14:37 )             20.2                         7.7    1.25  )-----------( 7        ( 11 Apr 2023 11:09 )             24.0                         8.0    0.97  )-----------( 11       ( 10 Apr 2023 11:54 )             24.4                         6.8    0.82  )-----------( 12       ( 10 Apr 2023 06:39 )             20.4       Imaging personally reviewed by me:             Chief Complaint:  Patient is a 67y old  Male who presents with a chief complaint of fever (12 Apr 2023 08:41)      Date of service 04-12-23 @ 11:35      Interval Events:   Patient seen and examined.   Now with tremors in face. Febrile 103.9 overnight   BM yesterday s/p enema.     Hospital Medications:  acetaminophen     Tablet .. 650 milliGRAM(s) Oral every 6 hours PRN  aluminum hydroxide/magnesium hydroxide/simethicone Suspension 30 milliLiter(s) Oral every 4 hours PRN  artificial tears (preservative free) Ophthalmic Solution 1 Drop(s) Both EYES four times a day  atorvastatin 40 milliGRAM(s) Oral at bedtime  cefepime   IVPB 1000 milliGRAM(s) IV Intermittent every 8 hours  chlorhexidine 2% Cloths 1 Application(s) Topical daily  digoxin     Tablet 125 MICROGram(s) Oral daily  filgrastim-sndz (ZARXIO) Injectable 480 MICROGram(s) SubCutaneous daily  loperamide 2 milliGRAM(s) Oral three times a day PRN  maribavir Tablet 400 milliGRAM(s) Oral two times a day  midodrine. 10 milliGRAM(s) Oral three times a day  pantoprazole    Tablet 40 milliGRAM(s) Oral before breakfast  polyethylene glycol 3350 17 Gram(s) Oral daily PRN  predniSONE   Tablet 50 milliGRAM(s) Oral daily  sodium chloride 0.9%. 1000 milliLiter(s) IV Continuous <Continuous>  tamsulosin 0.4 milliGRAM(s) Oral at bedtime        Review of Systems:  General:  No wt loss, fevers, chills, night sweats, fatigue,   Eyes:  Good vision, no reported pain  ENT:  No sore throat, pain, runny nose, dysphagia  CV:  No pain, palpitations, hypo/hypertension  Resp:  No dyspnea, cough, tachypnea, wheezing  GI:  See HPI  :  No pain, bleeding, incontinence, nocturia  Muscle:  No pain, weakness  Neuro:  No weakness, tingling, memory problems  Psych:  No fatigue, insomnia, mood problems, depression  Endocrine:  No polyuria, polydipsia, cold/heat intolerance  Heme:  No petechiae, ecchymosis, easy bruisability  Integumentary:  No rash, edema    PHYSICAL EXAM:   Vital Signs:  Vital Signs Last 24 Hrs  T(C): 38.3 (12 Apr 2023 05:20), Max: 39.9 (11 Apr 2023 22:50)  T(F): 101 (12 Apr 2023 05:20), Max: 103.9 (11 Apr 2023 22:50)  HR: 98 (12 Apr 2023 04:54) (67 - 98)  BP: 118/54 (12 Apr 2023 04:54) (91/52 - 132/58)  BP(mean): --  RR: 18 (12 Apr 2023 04:54) (16 - 18)  SpO2: 95% (12 Apr 2023 04:54) (92% - 95%)    Parameters below as of 12 Apr 2023 04:54  Patient On (Oxygen Delivery Method): room air      Daily     Daily       PHYSICAL EXAM:     GENERAL:  Appears stated age, well-groomed, well-nourished, no distress  HEENT:  NC/AT,  conjunctivae anicteric, clear and pink,   NECK: supple, trachea midline  CHEST:  Full & symmetric excursion, no increased effort, breath sounds clear  HEART:  Regular rhythm, no JVD  ABDOMEN:  Soft, non-tender, non-distended, normoactive bowel sounds,  no masses , no hepatosplenomegaly  EXTREMITIES:  no cyanosis,clubbing or edema  SKIN:  No rash, erythema, or, ecchymoses, no jaundice  NEURO:  Alert, non-focal, no asterixis  PSYCH: Appropriate affect, oriented to place and time  RECTAL: Deferred      LABS Personally reviewed by me:                        7.7    0.87  )-----------( 14       ( 12 Apr 2023 06:21 )             22.4     Mean Cell Volume: 88.9 fl (04-12-23 @ 06:21)    04-12    133<L>  |  100  |  39<H>  ----------------------------<  164<H>  3.9   |  23  |  1.44<H>    Ca    8.0<L>      12 Apr 2023 06:21    TPro  4.5<L>  /  Alb  2.5<L>  /  TBili  1.8<H>  /  DBili  x   /  AST  30  /  ALT  67<H>  /  AlkPhos  207<H>  04-11    LIVER FUNCTIONS - ( 11 Apr 2023 11:09 )  Alb: 2.5 g/dL / Pro: 4.5 g/dL / ALK PHOS: 207 U/L / ALT: 67 U/L / AST: 30 U/L / GGT: x               Amylase Serum--      Lipase serum--       [id number]                          7.7    0.87  )-----------( 14       ( 12 Apr 2023 06:21 )             22.4                         6.7    1.19  )-----------( 10       ( 11 Apr 2023 14:37 )             20.2                         7.7    1.25  )-----------( 7        ( 11 Apr 2023 11:09 )             24.0                         8.0    0.97  )-----------( 11       ( 10 Apr 2023 11:54 )             24.4                         6.8    0.82  )-----------( 12       ( 10 Apr 2023 06:39 )             20.4       Imaging personally reviewed by me:             Chief Complaint:  Patient is a 67y old  Male who presents with a chief complaint of fever (12 Apr 2023 08:41)      Date of service 04-12-23 @ 11:35      Interval Events:   Patient seen and examined.   Now with tremors in face. Febrile 103.9 overnight   BM yesterday s/p enema.     Hospital Medications:  acetaminophen     Tablet .. 650 milliGRAM(s) Oral every 6 hours PRN  aluminum hydroxide/magnesium hydroxide/simethicone Suspension 30 milliLiter(s) Oral every 4 hours PRN  artificial tears (preservative free) Ophthalmic Solution 1 Drop(s) Both EYES four times a day  atorvastatin 40 milliGRAM(s) Oral at bedtime  cefepime   IVPB 1000 milliGRAM(s) IV Intermittent every 8 hours  chlorhexidine 2% Cloths 1 Application(s) Topical daily  digoxin     Tablet 125 MICROGram(s) Oral daily  filgrastim-sndz (ZARXIO) Injectable 480 MICROGram(s) SubCutaneous daily  loperamide 2 milliGRAM(s) Oral three times a day PRN  maribavir Tablet 400 milliGRAM(s) Oral two times a day  midodrine. 10 milliGRAM(s) Oral three times a day  pantoprazole    Tablet 40 milliGRAM(s) Oral before breakfast  polyethylene glycol 3350 17 Gram(s) Oral daily PRN  predniSONE   Tablet 50 milliGRAM(s) Oral daily  sodium chloride 0.9%. 1000 milliLiter(s) IV Continuous <Continuous>  tamsulosin 0.4 milliGRAM(s) Oral at bedtime        Review of Systems:  General:  No wt loss, fevers, chills, night sweats, fatigue,   Eyes:  Good vision, no reported pain  ENT:  No sore throat, pain, runny nose, dysphagia  CV:  No pain, palpitations, hypo/hypertension  Resp:  No dyspnea, cough, tachypnea, wheezing  GI:  See HPI  :  No pain, bleeding, incontinence, nocturia  Muscle:  No pain, weakness  Neuro:  No weakness, tingling, memory problems  Psych:  No fatigue, insomnia, mood problems, depression  Endocrine:  No polyuria, polydipsia, cold/heat intolerance  Heme:  No petechiae, ecchymosis, easy bruisability  Integumentary:  No rash, edema    PHYSICAL EXAM:   Vital Signs:  Vital Signs Last 24 Hrs  T(C): 38.3 (12 Apr 2023 05:20), Max: 39.9 (11 Apr 2023 22:50)  T(F): 101 (12 Apr 2023 05:20), Max: 103.9 (11 Apr 2023 22:50)  HR: 98 (12 Apr 2023 04:54) (67 - 98)  BP: 118/54 (12 Apr 2023 04:54) (91/52 - 132/58)  BP(mean): --  RR: 18 (12 Apr 2023 04:54) (16 - 18)  SpO2: 95% (12 Apr 2023 04:54) (92% - 95%)    Parameters below as of 12 Apr 2023 04:54  Patient On (Oxygen Delivery Method): room air      Daily     Daily       PHYSICAL EXAM:     GENERAL:  Appears stated age, well-groomed, well-nourished, no distress  HEENT:  NC/AT,  conjunctivae anicteric, clear and pink,   NECK: supple, trachea midline  CHEST:  Full & symmetric excursion, no increased effort, breath sounds clear  HEART:  Regular rhythm, no JVD  ABDOMEN:  Soft, non-tender, non-distended, normoactive bowel sounds,  no masses , no hepatosplenomegaly  EXTREMITIES:  no cyanosis,clubbing or edema  SKIN:  No rash, erythema, or, ecchymoses, no jaundice  NEURO:  Alert, non-focal, no asterixis  PSYCH: Appropriate affect, oriented to place and time  RECTAL: Deferred      LABS Personally reviewed by me:                        7.7    0.87  )-----------( 14       ( 12 Apr 2023 06:21 )             22.4     Mean Cell Volume: 88.9 fl (04-12-23 @ 06:21)    04-12    133<L>  |  100  |  39<H>  ----------------------------<  164<H>  3.9   |  23  |  1.44<H>    Ca    8.0<L>      12 Apr 2023 06:21    TPro  4.5<L>  /  Alb  2.5<L>  /  TBili  1.8<H>  /  DBili  x   /  AST  30  /  ALT  67<H>  /  AlkPhos  207<H>  04-11    LIVER FUNCTIONS - ( 11 Apr 2023 11:09 )  Alb: 2.5 g/dL / Pro: 4.5 g/dL / ALK PHOS: 207 U/L / ALT: 67 U/L / AST: 30 U/L / GGT: x               Amylase Serum--      Lipase serum--       [id number]                          7.7    0.87  )-----------( 14       ( 12 Apr 2023 06:21 )             22.4                         6.7    1.19  )-----------( 10       ( 11 Apr 2023 14:37 )             20.2                         7.7    1.25  )-----------( 7        ( 11 Apr 2023 11:09 )             24.0                         8.0    0.97  )-----------( 11       ( 10 Apr 2023 11:54 )             24.4                         6.8    0.82  )-----------( 12       ( 10 Apr 2023 06:39 )             20.4       Imaging personally reviewed by me:

## 2023-04-12 NOTE — PROGRESS NOTE ADULT - SUBJECTIVE AND OBJECTIVE BOX
Name of Patient : PETER DE LA PAZ  MRN: [id number]  Date of visit: 04-12-23 @ 13:19      Subjective: Patient seen and examined. No new events except as noted.   Patient seen earlier this AM. Wife and sister at the bedside. Patient reports BM S/P enema yesterday.   Febrile with Tmax of 103.9 overnight. STAT blood cultures ordered this AM.  Entecavir DC'd as per heme/onc.   Patient lethargic. Twitch to face. Neuro eval consulted.     REVIEW OF SYSTEMS:    CONSTITUTIONAL: Lethargic, weakness, febrile overnight w/ Tmax of 103.9   EYES/ENT: No visual changes;  No vertigo or throat pain   NECK: No pain or stiffness  RESPIRATORY: No cough, wheezing, hemoptysis; No shortness of breath  CARDIOVASCULAR: No chest pain or palpitations  GASTROINTESTINAL: No abdominal or epigastric pain. No nausea, vomiting, or hematemesis; No diarrhea or constipation. No melena or hematochezia.  GENITOURINARY: No dysuria, frequency or hematuria  NEUROLOGICAL: No numbness or weakness  SKIN: No itching, burning, rashes, or lesions   All other review of systems is negative unless indicated above.    MEDICATIONS:  MEDICATIONS  (STANDING):  artificial tears (preservative free) Ophthalmic Solution 1 Drop(s) Both EYES four times a day  cefepime   IVPB 1000 milliGRAM(s) IV Intermittent every 8 hours  chlorhexidine 2% Cloths 1 Application(s) Topical daily  digoxin     Tablet 125 MICROGram(s) Oral daily  maribavir Tablet 400 milliGRAM(s) Oral two times a day  midodrine. 10 milliGRAM(s) Oral three times a day  pantoprazole    Tablet 40 milliGRAM(s) Oral before breakfast  polyethylene glycol 3350 17 Gram(s) Oral daily  predniSONE   Tablet 50 milliGRAM(s) Oral daily  sodium chloride 0.9%. 1000 milliLiter(s) (60 mL/Hr) IV Continuous <Continuous>  tamsulosin 0.4 milliGRAM(s) Oral at bedtime      PHYSICAL EXAM:  T(C): 37 (04-12-23 @ 12:30), Max: 39.9 (04-11-23 @ 22:50)  HR: 89 (04-12-23 @ 12:30) (67 - 98)  BP: 114/52 (04-12-23 @ 12:30) (91/52 - 132/58)  RR: 18 (04-12-23 @ 12:30) (16 - 18)  SpO2: 92% (04-12-23 @ 12:30) (92% - 95%)  Wt(kg): --  I&O's Summary    11 Apr 2023 07:01  -  12 Apr 2023 07:00  --------------------------------------------------------  IN: 240 mL / OUT: 1500 mL / NET: -1260 mL          Appearance: Awake, weak appearing male, lethargic   HEENT: Eyes are open; Twitching    Lymphatic: No lymphadenopathy   Cardiovascular: Normal   Respiratory: normal effort , clear  Gastrointestinal:  Soft, Non-tender  Skin: No rashes,  warm to touch  Psychiatry:  Weak, lethargic   Musculoskeletal: No edema            04-11-23 @ 07:01  -  04-12-23 @ 07:00  --------------------------------------------------------  IN: 240 mL / OUT: 1500 mL / NET: -1260 mL                                7.7    0.87  )-----------( 14       ( 12 Apr 2023 06:21 )             22.4               04-12    133<L>  |  100  |  39<H>  ----------------------------<  164<H>  3.9   |  23  |  1.44<H>    Ca    8.0<L>      12 Apr 2023 06:21    TPro  4.5<L>  /  Alb  2.5<L>  /  TBili  1.8<H>  /  DBili  x   /  AST  30  /  ALT  67<H>  /  AlkPhos  207<H>  04-11                         Culture - Blood (04.10.23 @ 07:21)   Specimen Source: .Blood Blood-Peripheral  Culture Results: No growth to date.    Culture - Blood (04.10.23 @ 07:21)   Specimen Source: .Blood Blood-Peripheral  Culture Results: No growth to date.    Culture - Urine (04.08.23 @ 13:16)   Specimen Source: Clean Catch Clean Catch (Midstream)  Culture Results: No growth    Culture - Blood (04.07.23 @ 14:29)   Specimen Source: .Blood Blood-Peripheral  Culture Results: No growth to date.    Culture - Blood (04.07.23 @ 13:43)   Specimen Source: .Blood Blood-Peripheral  Culture Results: No growth to date.       Name of Patient : PETER DE LA PAZ  MRN: [id number]  Date of visit: 04-12-23 @ 13:19      Subjective: Patient seen and examined. No new events except as noted.   Patient seen earlier this AM. Wife and sister at the bedside. Patient reports BM S/P enema yesterday.   Febrile with Tmax of 103.9 overnight. STAT blood cultures ordered this AM.  Entecavir DC'd as per heme/onc.   Patient lethargic. Twitch to face. Neuro eval consulted.     REVIEW OF SYSTEMS:    CONSTITUTIONAL: Lethargic, weakness, febrile overnight w/ Tmax of 103.9   EYES/ENT: No visual changes;  No vertigo or throat pain   NECK: No pain or stiffness  RESPIRATORY: No cough, wheezing, hemoptysis; No shortness of breath  CARDIOVASCULAR: No chest pain or palpitations  GASTROINTESTINAL: No abdominal or epigastric pain. No nausea, vomiting, or hematemesis; No diarrhea or constipation. No melena or hematochezia.  GENITOURINARY: No dysuria, frequency or hematuria  NEUROLOGICAL: No numbness or weakness  SKIN: No itching, burning, rashes, or lesions   All other review of systems is negative unless indicated above.    MEDICATIONS:  MEDICATIONS  (STANDING):  artificial tears (preservative free) Ophthalmic Solution 1 Drop(s) Both EYES four times a day  cefepime   IVPB 1000 milliGRAM(s) IV Intermittent every 8 hours  chlorhexidine 2% Cloths 1 Application(s) Topical daily  digoxin     Tablet 125 MICROGram(s) Oral daily  maribavir Tablet 400 milliGRAM(s) Oral two times a day  midodrine. 10 milliGRAM(s) Oral three times a day  pantoprazole    Tablet 40 milliGRAM(s) Oral before breakfast  polyethylene glycol 3350 17 Gram(s) Oral daily  predniSONE   Tablet 50 milliGRAM(s) Oral daily  sodium chloride 0.9%. 1000 milliLiter(s) (60 mL/Hr) IV Continuous <Continuous>  tamsulosin 0.4 milliGRAM(s) Oral at bedtime      PHYSICAL EXAM:  T(C): 37 (04-12-23 @ 12:30), Max: 39.9 (04-11-23 @ 22:50)  HR: 89 (04-12-23 @ 12:30) (67 - 98)  BP: 114/52 (04-12-23 @ 12:30) (91/52 - 132/58)  RR: 18 (04-12-23 @ 12:30) (16 - 18)  SpO2: 92% (04-12-23 @ 12:30) (92% - 95%)  Wt(kg): --  I&O's Summary    11 Apr 2023 07:01  -  12 Apr 2023 07:00  --------------------------------------------------------  IN: 240 mL / OUT: 1500 mL / NET: -1260 mL          Appearance: Awake, weak appearing male, lethargic   HEENT: Eyes are open; Twitching    Lymphatic: No lymphadenopathy   Cardiovascular: Normal   Respiratory: normal effort , clear  Gastrointestinal:  Soft, Non-tender  Skin: No rashes,  warm to touch  Psychiatry:  Weak, lethargic   Musculoskeletal: No edema            04-11-23 @ 07:01  -  04-12-23 @ 07:00  --------------------------------------------------------  IN: 240 mL / OUT: 1500 mL / NET: -1260 mL                                7.7    0.87  )-----------( 14       ( 12 Apr 2023 06:21 )             22.4               04-12    133<L>  |  100  |  39<H>  ----------------------------<  164<H>  3.9   |  23  |  1.44<H>    Ca    8.0<L>      12 Apr 2023 06:21    TPro  4.5<L>  /  Alb  2.5<L>  /  TBili  1.8<H>  /  DBili  x   /  AST  30  /  ALT  67<H>  /  AlkPhos  207<H>  04-11                         Culture - Blood (04.10.23 @ 07:21)   Specimen Source: .Blood Blood-Peripheral  Culture Results: No growth to date.    Culture - Blood (04.10.23 @ 07:21)   Specimen Source: .Blood Blood-Peripheral  Culture Results: No growth to date.    Culture - Urine (04.08.23 @ 13:16)   Specimen Source: Clean Catch Clean Catch (Midstream)  Culture Results: No growth    Culture - Blood (04.07.23 @ 14:29)   Specimen Source: .Blood Blood-Peripheral  Culture Results: No growth to date.    Culture - Blood (04.07.23 @ 13:43)   Specimen Source: .Blood Blood-Peripheral  Culture Results: No growth to date.       Name of Patient : PETER DE LA PAZ  MRN: [id number]  Date of visit: 04-12-23 @ 13:19      Subjective: Patient seen and examined. No new events except as noted.   Patient seen earlier this AM. Wife and sister at the bedside. Patient reports BM S/P enema yesterday.   Febrile with Tmax of 103.9 overnight. STAT blood cultures ordered this AM.  Entecavir DC'd as per heme/onc.   Patient lethargic. Twitch to face. Neuro eval consulted.     REVIEW OF SYSTEMS:    CONSTITUTIONAL: Lethargic, weakness, febrile overnight w/ Tmax of 103.9   EYES/ENT: No visual changes;  No vertigo or throat pain   NECK: No pain or stiffness  RESPIRATORY: No cough, wheezing, hemoptysis; No shortness of breath  CARDIOVASCULAR: No chest pain or palpitations  GASTROINTESTINAL: No abdominal or epigastric pain. No nausea, vomiting, or hematemesis; No diarrhea or constipation. No melena or hematochezia.  GENITOURINARY: No dysuria, frequency or hematuria  NEUROLOGICAL: No numbness or weakness  SKIN: No itching, burning, rashes, or lesions   All other review of systems is negative unless indicated above.    MEDICATIONS:  MEDICATIONS  (STANDING):  artificial tears (preservative free) Ophthalmic Solution 1 Drop(s) Both EYES four times a day  cefepime   IVPB 1000 milliGRAM(s) IV Intermittent every 8 hours  chlorhexidine 2% Cloths 1 Application(s) Topical daily  digoxin     Tablet 125 MICROGram(s) Oral daily  maribavir Tablet 400 milliGRAM(s) Oral two times a day  midodrine. 10 milliGRAM(s) Oral three times a day  pantoprazole    Tablet 40 milliGRAM(s) Oral before breakfast  polyethylene glycol 3350 17 Gram(s) Oral daily  predniSONE   Tablet 50 milliGRAM(s) Oral daily  sodium chloride 0.9%. 1000 milliLiter(s) (60 mL/Hr) IV Continuous <Continuous>  tamsulosin 0.4 milliGRAM(s) Oral at bedtime      PHYSICAL EXAM:  T(C): 37 (04-12-23 @ 12:30), Max: 39.9 (04-11-23 @ 22:50)  HR: 89 (04-12-23 @ 12:30) (67 - 98)  BP: 114/52 (04-12-23 @ 12:30) (91/52 - 132/58)  RR: 18 (04-12-23 @ 12:30) (16 - 18)  SpO2: 92% (04-12-23 @ 12:30) (92% - 95%)  Wt(kg): --  I&O's Summary    11 Apr 2023 07:01  -  12 Apr 2023 07:00  --------------------------------------------------------  IN: 240 mL / OUT: 1500 mL / NET: -1260 mL          Appearance: Awake, weak appearing male, lethargic   HEENT: Eyes are open; Twitching    Lymphatic: No lymphadenopathy   Cardiovascular: Normal   Respiratory: normal effort , clear  Gastrointestinal:  Soft, Non-tender  Skin: No rashes,  warm to touch  Psychiatry:  Weak, lethargic   Musculoskeletal: No edema            04-11-23 @ 07:01  -  04-12-23 @ 07:00  --------------------------------------------------------  IN: 240 mL / OUT: 1500 mL / NET: -1260 mL                                7.7    0.87  )-----------( 14       ( 12 Apr 2023 06:21 )             22.4               04-12    133<L>  |  100  |  39<H>  ----------------------------<  164<H>  3.9   |  23  |  1.44<H>    Ca    8.0<L>      12 Apr 2023 06:21    TPro  4.5<L>  /  Alb  2.5<L>  /  TBili  1.8<H>  /  DBili  x   /  AST  30  /  ALT  67<H>  /  AlkPhos  207<H>  04-11                         Culture - Blood (04.10.23 @ 07:21)   Specimen Source: .Blood Blood-Peripheral  Culture Results: No growth to date.    Culture - Blood (04.10.23 @ 07:21)   Specimen Source: .Blood Blood-Peripheral  Culture Results: No growth to date.    Culture - Urine (04.08.23 @ 13:16)   Specimen Source: Clean Catch Clean Catch (Midstream)  Culture Results: No growth    Culture - Blood (04.07.23 @ 14:29)   Specimen Source: .Blood Blood-Peripheral  Culture Results: No growth to date.    Culture - Blood (04.07.23 @ 13:43)   Specimen Source: .Blood Blood-Peripheral  Culture Results: No growth to date.       Name of Patient : PETER DE LA PAZ  MRN: [id number]  Date of visit: 04-12-23 @ 13:19      Subjective: Patient seen and examined. No new events except as noted.   Patient seen earlier this AM. Wife and sister at the bedside. Patient reports BM S/P enema yesterday.   Febrile with Tmax of 103.9 overnight. STAT blood cultures ordered this AM.  Entecavir DC'd as per heme/onc.   Patient lethargic. Twitch to face. Neuro eval consulted.     REVIEW OF SYSTEMS:    CONSTITUTIONAL: Lethargic, weakness, febrile overnight w/ Tmax of 103.9   EYES/ENT: No visual changes;  No vertigo or throat pain   NECK: No pain or stiffness  RESPIRATORY: No cough, wheezing, hemoptysis; No shortness of breath  CARDIOVASCULAR: No chest pain or palpitations  GASTROINTESTINAL: No abdominal or epigastric pain. No nausea, vomiting, or hematemesis; No diarrhea or constipation. No melena or hematochezia.  GENITOURINARY: No dysuria, frequency or hematuria  NEUROLOGICAL: Facial Twitch   SKIN: No itching, burning, rashes, or lesions   All other review of systems is negative unless indicated above.    MEDICATIONS:  MEDICATIONS  (STANDING):  artificial tears (preservative free) Ophthalmic Solution 1 Drop(s) Both EYES four times a day  cefepime   IVPB 1000 milliGRAM(s) IV Intermittent every 8 hours  chlorhexidine 2% Cloths 1 Application(s) Topical daily  digoxin     Tablet 125 MICROGram(s) Oral daily  maribavir Tablet 400 milliGRAM(s) Oral two times a day  midodrine. 10 milliGRAM(s) Oral three times a day  pantoprazole    Tablet 40 milliGRAM(s) Oral before breakfast  polyethylene glycol 3350 17 Gram(s) Oral daily  predniSONE   Tablet 50 milliGRAM(s) Oral daily  sodium chloride 0.9%. 1000 milliLiter(s) (60 mL/Hr) IV Continuous <Continuous>  tamsulosin 0.4 milliGRAM(s) Oral at bedtime      PHYSICAL EXAM:  T(C): 37 (04-12-23 @ 12:30), Max: 39.9 (04-11-23 @ 22:50)  HR: 89 (04-12-23 @ 12:30) (67 - 98)  BP: 114/52 (04-12-23 @ 12:30) (91/52 - 132/58)  RR: 18 (04-12-23 @ 12:30) (16 - 18)  SpO2: 92% (04-12-23 @ 12:30) (92% - 95%)  Wt(kg): --  I&O's Summary    11 Apr 2023 07:01  -  12 Apr 2023 07:00  --------------------------------------------------------  IN: 240 mL / OUT: 1500 mL / NET: -1260 mL          Appearance: Awake, weak appearing male, lethargic   HEENT: Eyes are open; Twitching    Lymphatic: No lymphadenopathy   Cardiovascular: Normal   Respiratory: normal effort , clear  Gastrointestinal:  Soft, Non-tender  Skin: No rashes,  warm to touch  Psychiatry:  Weak, lethargic   Musculoskeletal: No edema            04-11-23 @ 07:01  -  04-12-23 @ 07:00  --------------------------------------------------------  IN: 240 mL / OUT: 1500 mL / NET: -1260 mL                                7.7    0.87  )-----------( 14       ( 12 Apr 2023 06:21 )             22.4               04-12    133<L>  |  100  |  39<H>  ----------------------------<  164<H>  3.9   |  23  |  1.44<H>    Ca    8.0<L>      12 Apr 2023 06:21    TPro  4.5<L>  /  Alb  2.5<L>  /  TBili  1.8<H>  /  DBili  x   /  AST  30  /  ALT  67<H>  /  AlkPhos  207<H>  04-11                         Culture - Blood (04.10.23 @ 07:21)   Specimen Source: .Blood Blood-Peripheral  Culture Results: No growth to date.    Culture - Blood (04.10.23 @ 07:21)   Specimen Source: .Blood Blood-Peripheral  Culture Results: No growth to date.    Culture - Urine (04.08.23 @ 13:16)   Specimen Source: Clean Catch Clean Catch (Midstream)  Culture Results: No growth    Culture - Blood (04.07.23 @ 14:29)   Specimen Source: .Blood Blood-Peripheral  Culture Results: No growth to date.    Culture - Blood (04.07.23 @ 13:43)   Specimen Source: .Blood Blood-Peripheral  Culture Results: No growth to date.       Name of Patient : PETER DE LA PAZ  MRN: [id number]  Date of visit: 04-12-23 @ 13:19      Subjective: Patient seen and examined. No new events except as noted.   Patient seen earlier this AM. Wife and sister at the bedside. Patient reports BM S/P enema yesterday.   Febrile with Tmax of 103.9 overnight. STAT blood cultures ordered this AM.  Entecavir DC'd as per heme/onc.   Patient lethargic. Twitch to face. Neuro eval consulted.     REVIEW OF SYSTEMS:    CONSTITUTIONAL: Lethargic, weakness, febrile overnight w/ Tmax of 103.9   EYES/ENT: No visual changes;  No vertigo or throat pain   NECK: No pain or stiffness  RESPIRATORY: No cough, wheezing, hemoptysis; No shortness of breath  CARDIOVASCULAR: No chest pain or palpitations  GASTROINTESTINAL: No abdominal or epigastric pain. No nausea, vomiting, or hematemesis; No diarrhea or constipation. No melena or hematochezia.  GENITOURINARY: No dysuria, frequency or hematuria  NEUROLOGICAL: Facial Twitch   SKIN: No itching, burning, rashes, or lesions   All other review of systems is negative unless indicated above.    MEDICATIONS:  MEDICATIONS  (STANDING):  artificial tears (preservative free) Ophthalmic Solution 1 Drop(s) Both EYES four times a day  cefepime   IVPB 1000 milliGRAM(s) IV Intermittent every 8 hours  chlorhexidine 2% Cloths 1 Application(s) Topical daily  digoxin     Tablet 125 MICROGram(s) Oral daily  maribavir Tablet 400 milliGRAM(s) Oral two times a day  midodrine. 10 milliGRAM(s) Oral three times a day  pantoprazole    Tablet 40 milliGRAM(s) Oral before breakfast  polyethylene glycol 3350 17 Gram(s) Oral daily  predniSONE   Tablet 50 milliGRAM(s) Oral daily  sodium chloride 0.9%. 1000 milliLiter(s) (60 mL/Hr) IV Continuous <Continuous>  tamsulosin 0.4 milliGRAM(s) Oral at bedtime      PHYSICAL EXAM:  T(C): 37 (04-12-23 @ 12:30), Max: 39.9 (04-11-23 @ 22:50)  HR: 89 (04-12-23 @ 12:30) (67 - 98)  BP: 114/52 (04-12-23 @ 12:30) (91/52 - 132/58)  RR: 18 (04-12-23 @ 12:30) (16 - 18)  SpO2: 92% (04-12-23 @ 12:30) (92% - 95%)  Wt(kg): --  I&O's Summary    11 Apr 2023 07:01  -  12 Apr 2023 07:00  --------------------------------------------------------  IN: 240 mL / OUT: 1500 mL / NET: -1260 mL          Appearance: Awake, weak appearing male, lethargic   HEENT: Eyes are open; Twitching    Lymphatic: No lymphadenopathy   Cardiovascular: Normal   Respiratory: normal effort , clear  Gastrointestinal:  Soft, Non-tender  Skin: No rashes,  warm to touch  Psychiatry:  Weak, lethargic   Musculoskeletal: No edema            04-11-23 @ 07:01  -  04-12-23 @ 07:00  --------------------------------------------------------  IN: 240 mL / OUT: 1500 mL / NET: -1260 mL                                7.7    0.87  )-----------( 14       ( 12 Apr 2023 06:21 )             22.4               04-12    133<L>  |  100  |  39<H>  ----------------------------<  164<H>  3.9   |  23  |  1.44<H>    Ca    8.0<L>      12 Apr 2023 06:21    TPro  4.5<L>  /  Alb  2.5<L>  /  TBili  1.8<H>  /  DBili  x   /  AST  30  /  ALT  67<H>  /  AlkPhos  207<H>  04-11                         Culture - Blood (04.10.23 @ 07:21)   Specimen Source: .Blood Blood-Peripheral  Culture Results: No growth to date.    Culture - Blood (04.10.23 @ 07:21)   Specimen Source: .Blood Blood-Peripheral  Culture Results: No growth to date.    Culture - Urine (04.08.23 @ 13:16)   Specimen Source: Clean Catch Clean Catch (Midstream)  Culture Results: No growth    Culture - Blood (04.07.23 @ 14:29)   Specimen Source: .Blood Blood-Peripheral  Culture Results: No growth to date.    Culture - Blood (04.07.23 @ 13:43)   Specimen Source: .Blood Blood-Peripheral  Culture Results: No growth to date.       Name of Patient : PETER DE LA PAZ  MRN: [id number]  Date of visit: 04-12-23 @ 13:19      Subjective: Patient seen and examined. No new events except as noted.   Patient seen earlier this AM. Wife and sister at the bedside. Patient reports BM S/P enema yesterday.   Febrile with Tmax of 103.9 overnight. STAT blood cultures ordered this AM.  Entecavir DC'd as per heme/onc.   Patient lethargic. Twitch to face. Neuro eval consulted.     REVIEW OF SYSTEMS:    CONSTITUTIONAL: Lethargic, weakness, febrile overnight w/ Tmax of 103.9   EYES/ENT: No visual changes;  No vertigo or throat pain   NECK: No pain or stiffness  RESPIRATORY: No cough, wheezing, hemoptysis; No shortness of breath  CARDIOVASCULAR: No chest pain or palpitations  GASTROINTESTINAL: No abdominal or epigastric pain. No nausea, vomiting, or hematemesis; No diarrhea or constipation. No melena or hematochezia.  GENITOURINARY: No dysuria, frequency or hematuria  NEUROLOGICAL: Facial Twitch   SKIN: No itching, burning, rashes, or lesions   All other review of systems is negative unless indicated above.    MEDICATIONS:  MEDICATIONS  (STANDING):  artificial tears (preservative free) Ophthalmic Solution 1 Drop(s) Both EYES four times a day  cefepime   IVPB 1000 milliGRAM(s) IV Intermittent every 8 hours  chlorhexidine 2% Cloths 1 Application(s) Topical daily  digoxin     Tablet 125 MICROGram(s) Oral daily  maribavir Tablet 400 milliGRAM(s) Oral two times a day  midodrine. 10 milliGRAM(s) Oral three times a day  pantoprazole    Tablet 40 milliGRAM(s) Oral before breakfast  polyethylene glycol 3350 17 Gram(s) Oral daily  predniSONE   Tablet 50 milliGRAM(s) Oral daily  sodium chloride 0.9%. 1000 milliLiter(s) (60 mL/Hr) IV Continuous <Continuous>  tamsulosin 0.4 milliGRAM(s) Oral at bedtime      PHYSICAL EXAM:  T(C): 37 (04-12-23 @ 12:30), Max: 39.9 (04-11-23 @ 22:50)  HR: 89 (04-12-23 @ 12:30) (67 - 98)  BP: 114/52 (04-12-23 @ 12:30) (91/52 - 132/58)  RR: 18 (04-12-23 @ 12:30) (16 - 18)  SpO2: 92% (04-12-23 @ 12:30) (92% - 95%)  Wt(kg): --  I&O's Summary    11 Apr 2023 07:01  -  12 Apr 2023 07:00  --------------------------------------------------------  IN: 240 mL / OUT: 1500 mL / NET: -1260 mL          Appearance: Awake, weak appearing male, lethargic   HEENT: Eyes are open; Twitching    Lymphatic: No lymphadenopathy   Cardiovascular: Normal   Respiratory: normal effort , clear  Gastrointestinal:  Soft, Non-tender  Skin: No rashes,  warm to touch  Psychiatry:  Weak, lethargic   Musculoskeletal: No edema            04-11-23 @ 07:01  -  04-12-23 @ 07:00  --------------------------------------------------------  IN: 240 mL / OUT: 1500 mL / NET: -1260 mL                                7.7    0.87  )-----------( 14       ( 12 Apr 2023 06:21 )             22.4               04-12    133<L>  |  100  |  39<H>  ----------------------------<  164<H>  3.9   |  23  |  1.44<H>    Ca    8.0<L>      12 Apr 2023 06:21    TPro  4.5<L>  /  Alb  2.5<L>  /  TBili  1.8<H>  /  DBili  x   /  AST  30  /  ALT  67<H>  /  AlkPhos  207<H>  04-11                         Culture - Blood (04.10.23 @ 07:21)   Specimen Source: .Blood Blood-Peripheral  Culture Results: No growth to date.    Culture - Blood (04.10.23 @ 07:21)   Specimen Source: .Blood Blood-Peripheral  Culture Results: No growth to date.    Culture - Urine (04.08.23 @ 13:16)   Specimen Source: Clean Catch Clean Catch (Midstream)  Culture Results: No growth    Culture - Blood (04.07.23 @ 14:29)   Specimen Source: .Blood Blood-Peripheral  Culture Results: No growth to date.    Culture - Blood (04.07.23 @ 13:43)   Specimen Source: .Blood Blood-Peripheral  Culture Results: No growth to date.

## 2023-04-12 NOTE — PROGRESS NOTE ADULT - SUBJECTIVE AND OBJECTIVE BOX
[facility] NEPHROLOGY- PROGRESS NOTE    67 year old Male with history of MM on chemotherapy presents with fevers. Nephrology consulted for elevated Scr.      REVIEW OF SYSTEMS:  Gen: + fevers  Cards: no chest pain  Resp: no dyspnea  GI: no nausea or vomiting or diarrhea  Vascular: no LE edema    No Known Allergies      Hospital Medications: Medications reviewed        VITALS:  T(F): 98.6 (23 @ 12:30), Max: 103.9 (23 @ 22:50)  HR: 89 (23 @ 12:30)  BP: 114/52 (23 @ 12:30)  RR: 18 (23 @ 12:30)  SpO2: 92% (23 @ 12:30)  Wt(kg): --     @ 07:01  -   @ 07:00  --------------------------------------------------------  IN: 240 mL / OUT: 1500 mL / NET: -1260 mL     @ 07:01  -   @ 14:41  --------------------------------------------------------  IN: 240 mL / OUT: 750 mL / NET: -510 mL        PHYSICAL EXAM:    Gen: NAD, calm  Cards: Irregularly irregular, +S1/S2, no M/G/R  Resp: CTA B/L  GI: soft, NT/ND, NABS  : + lloyd with yellow urine with debris  Vascular: no LE edema B/L        LABS:      133<L>  |  100  |  39<H>  ----------------------------<  164<H>  3.9   |  23  |  1.44<H>    Ca    8.0<L>      2023 06:21    TPro  4.5<L>  /  Alb  2.5<L>  /  TBili  1.8<H>  /  DBili      /  AST  30  /  ALT  67<H>  /  AlkPhos  207<H>      Creatinine Trend: 1.44 <--, 1.52 <--, 1.54 <--, 1.15 <--, 1.18 <--, 1.37 <--, 1.41 <--                        7.7    0.87  )-----------( 14       ( 2023 06:21 )             22.4     Urine Studies:  Urinalysis Basic - ( 2023 08:42 )    Color: Yellow / Appearance: Clear / S.031 / pH:   Gluc:  / Ketone: Negative  / Bili: Negative / Urobili: 6 mg/dL   Blood:  / Protein: 30 mg/dL / Nitrite: Negative   Leuk Esterase: Negative / RBC: 3 /hpf / WBC 3 /HPF   Sq Epi:  / Non Sq Epi:  / Bacteria: Negative      Osmolality, Random Urine: 568 mos/kg (04-10 @ 14:07)  Sodium, Random Urine: 58 mmol/L (04-10 @ 14:07)  Chloride, Random Urine: 37 mmol/L ( @ 08:42)       [facility] NEPHROLOGY- PROGRESS NOTE    67 year old Male with history of MM on chemotherapy presents with fevers. Nephrology consulted for elevated Scr.      REVIEW OF SYSTEMS:  Gen: + fevers  Cards: no chest pain  Resp: no dyspnea  GI: no nausea or vomiting or diarrhea  Vascular: no LE edema    No Known Allergies      Hospital Medications: Medications reviewed        VITALS:  T(F): 98.6 (23 @ 12:30), Max: 103.9 (23 @ 22:50)  HR: 89 (23 @ 12:30)  BP: 114/52 (23 @ 12:30)  RR: 18 (23 @ 12:30)  SpO2: 92% (23 @ 12:30)  Wt(kg): --     @ 07:01  -   @ 07:00  --------------------------------------------------------  IN: 240 mL / OUT: 1500 mL / NET: -1260 mL     @ 07:01  -   @ 14:41  --------------------------------------------------------  IN: 240 mL / OUT: 750 mL / NET: -510 mL        PHYSICAL EXAM:    Gen: NAD, calm  Cards: Irregularly irregular, +S1/S2, no M/G/R  Resp: CTA B/L  GI: soft, NT/ND, NABS  : + lloyd with yellow urine with debris  Vascular: no LE edema B/L        LABS:      133<L>  |  100  |  39<H>  ----------------------------<  164<H>  3.9   |  23  |  1.44<H>    Ca    8.0<L>      2023 06:21    TPro  4.5<L>  /  Alb  2.5<L>  /  TBili  1.8<H>  /  DBili      /  AST  30  /  ALT  67<H>  /  AlkPhos  207<H>      Creatinine Trend: 1.44 <--, 1.52 <--, 1.54 <--, 1.15 <--, 1.18 <--, 1.37 <--, 1.41 <--                        7.7    0.87  )-----------( 14       ( 2023 06:21 )             22.4     Urine Studies:  Urinalysis Basic - ( 2023 08:42 )    Color: Yellow / Appearance: Clear / S.031 / pH:   Gluc:  / Ketone: Negative  / Bili: Negative / Urobili: 6 mg/dL   Blood:  / Protein: 30 mg/dL / Nitrite: Negative   Leuk Esterase: Negative / RBC: 3 /hpf / WBC 3 /HPF   Sq Epi:  / Non Sq Epi:  / Bacteria: Negative      Osmolality, Random Urine: 568 mos/kg (04-10 @ 14:07)  Sodium, Random Urine: 58 mmol/L (04-10 @ 14:07)  Chloride, Random Urine: 37 mmol/L ( @ 08:42)       [facility] NEPHROLOGY- PROGRESS NOTE    67 year old Male with history of MM on chemotherapy presents with fevers. Nephrology consulted for elevated Scr.      REVIEW OF SYSTEMS:  Gen: + fevers  Cards: no chest pain  Resp: no dyspnea  GI: no nausea or vomiting or diarrhea  Vascular: no LE edema    No Known Allergies      Hospital Medications: Medications reviewed        VITALS:  T(F): 98.6 (23 @ 12:30), Max: 103.9 (23 @ 22:50)  HR: 89 (23 @ 12:30)  BP: 114/52 (23 @ 12:30)  RR: 18 (23 @ 12:30)  SpO2: 92% (23 @ 12:30)  Wt(kg): --     @ 07:01  -   @ 07:00  --------------------------------------------------------  IN: 240 mL / OUT: 1500 mL / NET: -1260 mL     @ 07:01  -   @ 14:41  --------------------------------------------------------  IN: 240 mL / OUT: 750 mL / NET: -510 mL        PHYSICAL EXAM:    Gen: NAD, calm  Cards: Irregularly irregular, +S1/S2, no M/G/R  Resp: CTA B/L  GI: soft, NT/ND, NABS  : + lloyd with yellow urine with debris  Vascular: no LE edema B/L        LABS:      133<L>  |  100  |  39<H>  ----------------------------<  164<H>  3.9   |  23  |  1.44<H>    Ca    8.0<L>      2023 06:21    TPro  4.5<L>  /  Alb  2.5<L>  /  TBili  1.8<H>  /  DBili      /  AST  30  /  ALT  67<H>  /  AlkPhos  207<H>      Creatinine Trend: 1.44 <--, 1.52 <--, 1.54 <--, 1.15 <--, 1.18 <--, 1.37 <--, 1.41 <--                        7.7    0.87  )-----------( 14       ( 2023 06:21 )             22.4     Urine Studies:  Urinalysis Basic - ( 2023 08:42 )    Color: Yellow / Appearance: Clear / S.031 / pH:   Gluc:  / Ketone: Negative  / Bili: Negative / Urobili: 6 mg/dL   Blood:  / Protein: 30 mg/dL / Nitrite: Negative   Leuk Esterase: Negative / RBC: 3 /hpf / WBC 3 /HPF   Sq Epi:  / Non Sq Epi:  / Bacteria: Negative      Osmolality, Random Urine: 568 mos/kg (04-10 @ 14:07)  Sodium, Random Urine: 58 mmol/L (04-10 @ 14:07)  Chloride, Random Urine: 37 mmol/L ( @ 08:42)

## 2023-04-12 NOTE — CHART NOTE - NSCHARTNOTEFT_GEN_A_CORE
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 Senior residents and I had discussion with Dr. Riley of heme/onc regarding need for bone lesion biopsy. Patient had bone marrow biopsy on 4/6 showing histiocytic process (and not indicative of multiple myeloma) and patient continues to have persistent fever and pancytopenia. As such, heme/onc is requesting a bone lesion biopsy. Upon review of imaging, there is no specific bone lesion or lymph node that appears to be amenable to biopsy. Dr. Riley of heme/onc stated that there were some possible lesions noted on PET scan from [facility]. Team and heme/onc will likely get PET scan here. IR can be re-consulted when imaging and other results are complete. 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 Senior residents and I had discussion with Dr. Riley of heme/onc regarding need for bone lesion biopsy. Patient had bone marrow biopsy on 4/6 showing histiocytic process (and not indicative of multiple myeloma) and patient continues to have persistent fever and pancytopenia. As such, heme/onc is requesting a bone lesion biopsy. Upon review of imaging, there is no specific bone lesion or lymph node that appears to be amenable to biopsy. Dr. Riley of heme/onc stated that there were some possible lesions noted on PET scan from [facility]. Team and heme/onc will likely get PET scan here. IR can be re-consulted when imaging and other results are complete. 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 Senior residents and I had discussion with Dr. Riley of heme/onc regarding need for bone lesion biopsy. Patient had bone marrow biopsy on 4/6 showing histiocytic process (and not indicative of multiple myeloma) and patient continues to have persistent fever and pancytopenia. As such, heme/onc is requesting a bone lesion biopsy. Upon review of imaging, there is no specific bone lesion or lymph node that appears to be amenable to biopsy. Dr. Riley of heme/onc stated that there were some possible lesions noted on PET scan from [facility]. Team and heme/onc will likely get PET scan here. IR can be re-consulted when imaging and other results are complete. 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 Senior residents and I had discussion with Dr. Riley of heme/onc regarding need for bone lesion biopsy. Patient had bone marrow biopsy on 4/6 showing histiocytic process (and not indicative of multiple myeloma) and patient continues to have persistent fever and pancytopenia. As such, heme/onc is requesting a bone lesion biopsy. Upon review of imaging, there is no specific bone lesion or lymph node that appears to be amenable to biopsy. Dr. Riley of heme/onc stated that there were some possible lesions for biopsy noted on PET scan from [facility]. Dr. Riley said they will likely obtain PET scan here. IR can be re-consulted when imaging and other results are complete. 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 Senior residents and I had discussion with Dr. Riley of heme/onc regarding need for bone lesion biopsy. Patient had bone marrow biopsy on 4/6 showing histiocytic process (and not indicative of multiple myeloma) and patient continues to have persistent fever and pancytopenia. As such, heme/onc is requesting a bone lesion biopsy. Upon review of imaging, there is no specific bone lesion or lymph node that appears to be amenable to biopsy. Dr. Riley of heme/onc stated that there were some possible lesions for biopsy noted on PET scan from [facility]. Dr. Riley said they will likely obtain PET scan here. IR can be re-consulted when imaging and other results are complete. 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 Senior residents and I had discussion with Dr. Riley of heme/onc regarding need for bone lesion biopsy. Patient had bone marrow biopsy on 4/6 showing histiocytic process (and not indicative of multiple myeloma) and patient continues to have persistent fever and pancytopenia. As such, heme/onc is requesting a bone lesion biopsy. Upon review of imaging, there is no specific bone lesion or lymph node that appears to be amenable to biopsy. Dr. Riley of heme/onc stated that there were some possible lesions for biopsy noted on PET scan from [facility]. Dr. Riley said they will likely obtain PET scan here. IR can be re-consulted when imaging and other results are complete.

## 2023-04-12 NOTE — PROGRESS NOTE ADULT - ASSESSMENT
1. 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   -- s/p Auto SCT in 2018  -- was on lenalidomide maintenance until February 2023 when he had progression of disease. Then switched to daratumumab + CyBorD; last dose March 23rd, 2023.  -- Continue acyclovir 400 mg BID.    2. Pancytopenia    -- unclear etiology. Primary BM d/o vs Valgancyclovir (now dc'd)  -- s/p BM Bx on 4/6    -- transfuse to maintain HGB > 7; PLT > 10.  -- check CBC with differential and give Zarxio 480 for ANC <1500, will give again today 4/12  -- IgG low, will give IVIG  today; purpose of said medication d/w team and family  -- will check soluble IL2, and CD25 (lab sent/collected by requisition only)  -- pathology recommending a bone biopsy   -- Myeloma labs w/ normal free light chain ratio, KIRA w/ weak IgG kappa band, IgG 285, IgA 57, IgM 29  -- platelets at 7K today, will get 1 U    3. Fever Unknown Origin    -- ID following  -- no obv infectious source  -- Entecavir dc'd, now on maribavir   -- ID w/u so far neg   --on Zarxio to help w/ count recovery  -- CT c/a/p noted only for bladder wall thickening but UA not c/w cystitis    4. R Soleal Vein DVT    -- Hold AC in light of low platelets and bleeding risk  -- BTK DVT  -- need to Rpt LE Doppler Next week to eval for propogation    will follow,    Steph Holly NP  Hematology/ Oncology  [facility]  [phone] (office)  [phone] (alt office)  Evenings and weekends please call MD on call or office   1. 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   -- s/p Auto SCT in 2018  -- was on lenalidomide maintenance until February 2023 when he had progression of disease. Then switched to daratumumab + CyBorD; last dose March 23rd, 2023.  -- Continue acyclovir 400 mg BID.    2. Pancytopenia    -- unclear etiology. Primary BM d/o vs Valgancyclovir (now dc'd)  -- s/p BM Bx on 4/6    -- transfuse to maintain HGB > 7; PLT > 10.  -- check CBC with differential and give Zarxio 480 for ANC <1500, will give again today 4/12  -- IgG low, will give IVIG  today; purpose of said medication d/w team and family  -- will check soluble IL2, and CD25 (lab sent/collected by requisition only)  -- pathology recommending a bone biopsy   -- Myeloma labs w/ normal free light chain ratio, KIRA w/ weak IgG kappa band, IgG 285, IgA 57, IgM 29  -- platelets at 7K today, will get 1 U    3. Fever Unknown Origin    -- ID following  -- no obv infectious source  -- Entecavir dc'd, now on maribavir   -- ID w/u so far neg   --on Zarxio to help w/ count recovery  -- CT c/a/p noted only for bladder wall thickening but UA not c/w cystitis    4. R Soleal Vein DVT    -- Hold AC in light of low platelets and bleeding risk  -- BTK DVT  -- need to Rpt LE Doppler Next week to eval for propogation    will follow,    Steph Holly NP  Hematology/ Oncology  [facility]  [phone] (office)  [phone] (alt office)  Evenings and weekends please call MD on call or office   1. 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   -- s/p Auto SCT in 2018  -- was on lenalidomide maintenance until February 2023 when he had progression of disease. Then switched to daratumumab + CyBorD; last dose March 23rd, 2023.  -- Continue acyclovir 400 mg BID.    2. Pancytopenia    -- unclear etiology. Primary BM d/o vs Valgancyclovir (now dc'd)  -- s/p BM Bx on 4/6    -- transfuse to maintain HGB > 7; PLT > 10.  -- check CBC with differential and give Zarxio 480 for ANC <1500, will give again today 4/12  -- IgG low, will give IVIG  today; purpose of said medication d/w team and family  -- will check soluble IL2, and CD25 (lab sent/collected by requisition only)  -- pathology recommending a bone biopsy   -- Myeloma labs w/ normal free light chain ratio, KIRA w/ weak IgG kappa band, IgG 285, IgA 57, IgM 29  -- platelets at 7K today, will get 1 U    3. Fever Unknown Origin    -- ID following  -- no obv infectious source  -- Entecavir dc'd, now on maribavir   -- ID w/u so far neg   --on Zarxio to help w/ count recovery  -- CT c/a/p noted only for bladder wall thickening but UA not c/w cystitis    4. R Soleal Vein DVT    -- Hold AC in light of low platelets and bleeding risk  -- BTK DVT  -- need to Rpt LE Doppler Next week to eval for propogation    will follow,    Steph Holly NP  Hematology/ Oncology  [facility]  [phone] (office)  [phone] (alt office)  Evenings and weekends please call MD on call or office

## 2023-04-13 LAB
ANION GAP SERPL CALC-SCNC: 9 MMOL/L — SIGNIFICANT CHANGE UP (ref 5–17)
AUTO DIFF PNL BLD: NEGATIVE — SIGNIFICANT CHANGE UP
BUN SERPL-MCNC: 47 MG/DL — HIGH (ref 7–23)
C-ANCA SER-ACNC: NEGATIVE — SIGNIFICANT CHANGE UP
CALCIUM SERPL-MCNC: 7.8 MG/DL — LOW (ref 8.4–10.5)
CHLORIDE SERPL-SCNC: 99 MMOL/L — SIGNIFICANT CHANGE UP (ref 96–108)
CO2 SERPL-SCNC: 23 MMOL/L — SIGNIFICANT CHANGE UP (ref 22–31)
CREAT SERPL-MCNC: 1.79 MG/DL — HIGH (ref 0.5–1.3)
DIGOXIN SERPL-MCNC: 1.9 NG/ML — SIGNIFICANT CHANGE UP (ref 0.8–2)
EGFR: 41 ML/MIN/1.73M2 — LOW
GLUCOSE SERPL-MCNC: 111 MG/DL — HIGH (ref 70–99)
HCT VFR BLD CALC: 19.1 % — CRITICAL LOW (ref 39–50)
HERPES-6 (HSV-6) PCR: SIGNIFICANT CHANGE UP COPIES/ML
HGB BLD-MCNC: 6.7 G/DL — CRITICAL LOW (ref 13–17)
MCHC RBC-ENTMCNC: 30.6 PG — SIGNIFICANT CHANGE UP (ref 27–34)
MCHC RBC-ENTMCNC: 35.1 GM/DL — SIGNIFICANT CHANGE UP (ref 32–36)
MCV RBC AUTO: 87.2 FL — SIGNIFICANT CHANGE UP (ref 80–100)
NRBC # BLD: 2 /100 WBCS — HIGH (ref 0–0)
P-ANCA SER-ACNC: NEGATIVE — SIGNIFICANT CHANGE UP
PLATELET # BLD AUTO: 20 K/UL — CRITICAL LOW (ref 150–400)
POTASSIUM SERPL-MCNC: 4.1 MMOL/L — SIGNIFICANT CHANGE UP (ref 3.5–5.3)
POTASSIUM SERPL-SCNC: 4.1 MMOL/L — SIGNIFICANT CHANGE UP (ref 3.5–5.3)
RBC # BLD: 2.19 M/UL — LOW (ref 4.2–5.8)
RBC # FLD: 21.2 % — HIGH (ref 10.3–14.5)
SODIUM SERPL-SCNC: 131 MMOL/L — LOW (ref 135–145)
WBC # BLD: 1.17 K/UL — LOW (ref 3.8–10.5)
WBC # FLD AUTO: 1.17 K/UL — LOW (ref 3.8–10.5)

## 2023-04-13 RX ORDER — SODIUM CHLORIDE 9 MG/ML
1000 INJECTION INTRAMUSCULAR; INTRAVENOUS; SUBCUTANEOUS
Refills: 0 | Status: DISCONTINUED | OUTPATIENT
Start: 2023-04-13 | End: 2023-04-14

## 2023-04-13 RX ORDER — FILGRASTIM 480MCG/1.6
480 VIAL (ML) INJECTION DAILY
Refills: 0 | Status: COMPLETED | OUTPATIENT
Start: 2023-04-13 | End: 2023-04-17

## 2023-04-13 RX ORDER — ACETAMINOPHEN 500 MG
1000 TABLET ORAL ONCE
Refills: 0 | Status: COMPLETED | OUTPATIENT
Start: 2023-04-13 | End: 2023-04-13

## 2023-04-13 RX ORDER — FUROSEMIDE 40 MG
40 TABLET ORAL ONCE
Refills: 0 | Status: COMPLETED | OUTPATIENT
Start: 2023-04-13 | End: 2023-04-13

## 2023-04-13 RX ADMIN — Medication 650 MILLIGRAM(S): at 17:20

## 2023-04-13 RX ADMIN — TAMSULOSIN HYDROCHLORIDE 0.4 MILLIGRAM(S): 0.4 CAPSULE ORAL at 21:39

## 2023-04-13 RX ADMIN — Medication 1 DROP(S): at 17:11

## 2023-04-13 RX ADMIN — Medication 50 MILLIGRAM(S): at 06:01

## 2023-04-13 RX ADMIN — Medication 40 MILLIGRAM(S): at 17:10

## 2023-04-13 RX ADMIN — CHLORHEXIDINE GLUCONATE 1 APPLICATION(S): 213 SOLUTION TOPICAL at 11:01

## 2023-04-13 RX ADMIN — MIDODRINE HYDROCHLORIDE 10 MILLIGRAM(S): 2.5 TABLET ORAL at 06:03

## 2023-04-13 RX ADMIN — Medication 30 MILLILITER(S): at 18:47

## 2023-04-13 RX ADMIN — Medication 480 MICROGRAM(S): at 15:02

## 2023-04-13 RX ADMIN — MIDODRINE HYDROCHLORIDE 10 MILLIGRAM(S): 2.5 TABLET ORAL at 17:11

## 2023-04-13 RX ADMIN — SODIUM CHLORIDE 100 MILLILITER(S): 9 INJECTION INTRAMUSCULAR; INTRAVENOUS; SUBCUTANEOUS at 17:19

## 2023-04-13 RX ADMIN — MIDODRINE HYDROCHLORIDE 10 MILLIGRAM(S): 2.5 TABLET ORAL at 11:00

## 2023-04-13 RX ADMIN — Medication 400 MILLIGRAM(S): at 06:39

## 2023-04-13 RX ADMIN — MARIBAVIR 400 MILLIGRAM(S): 200 TABLET, COATED ORAL at 17:11

## 2023-04-13 RX ADMIN — Medication 1000 MILLIGRAM(S): at 07:30

## 2023-04-13 RX ADMIN — Medication 1 DROP(S): at 06:05

## 2023-04-13 RX ADMIN — Medication 1 DROP(S): at 11:01

## 2023-04-13 RX ADMIN — MARIBAVIR 400 MILLIGRAM(S): 200 TABLET, COATED ORAL at 06:04

## 2023-04-13 RX ADMIN — PANTOPRAZOLE SODIUM 40 MILLIGRAM(S): 20 TABLET, DELAYED RELEASE ORAL at 06:05

## 2023-04-13 RX ADMIN — POLYETHYLENE GLYCOL 3350 17 GRAM(S): 17 POWDER, FOR SOLUTION ORAL at 11:01

## 2023-04-13 RX ADMIN — Medication 125 MICROGRAM(S): at 06:03

## 2023-04-13 NOTE — PROGRESS NOTE ADULT - SUBJECTIVE AND OBJECTIVE BOX
Chief Complaint:  Patient is a 67y old  Male who presents with a chief complaint of fever (13 Apr 2023 13:04)      Date of service 04-13-23 @ 13:13      Interval Events:   Patient seen and examined.   Twitches much improved.   No abdominal pain. Had BM.   More alert and responsive today.       Hospital Medications:  acetaminophen     Tablet .. 650 milliGRAM(s) Oral every 6 hours PRN  acetaminophen     Tablet .. 650 milliGRAM(s) Oral once  aluminum hydroxide/magnesium hydroxide/simethicone Suspension 30 milliLiter(s) Oral every 4 hours PRN  artificial tears (preservative free) Ophthalmic Solution 1 Drop(s) Both EYES four times a day  chlorhexidine 2% Cloths 1 Application(s) Topical daily  digoxin     Tablet 125 MICROGram(s) Oral daily  filgrastim-sndz (ZARXIO) Injectable 480 MICROGram(s) SubCutaneous daily  loperamide 2 milliGRAM(s) Oral three times a day PRN  maribavir Tablet 400 milliGRAM(s) Oral two times a day  midodrine. 10 milliGRAM(s) Oral three times a day  pantoprazole    Tablet 40 milliGRAM(s) Oral before breakfast  polyethylene glycol 3350 17 Gram(s) Oral daily  predniSONE   Tablet 50 milliGRAM(s) Oral daily  sodium chloride 0.9%. 1000 milliLiter(s) IV Continuous <Continuous>  tamsulosin 0.4 milliGRAM(s) Oral at bedtime        Review of Systems:  General:  No wt loss, fevers, chills, night sweats, fatigue,   Eyes:  Good vision, no reported pain  ENT:  No sore throat, pain, runny nose, dysphagia  CV:  No pain, palpitations, hypo/hypertension  Resp:  No dyspnea, cough, tachypnea, wheezing  GI:  See HPI  :  No pain, bleeding, incontinence, nocturia  Muscle:  No pain, weakness  Neuro:  No weakness, tingling, memory problems  Psych:  No fatigue, insomnia, mood problems, depression  Endocrine:  No polyuria, polydipsia, cold/heat intolerance  Heme:  No petechiae, ecchymosis, easy bruisability  Integumentary:  No rash, edema    PHYSICAL EXAM:   Vital Signs:  Vital Signs Last 24 Hrs  T(C): 36.8 (13 Apr 2023 11:51), Max: 39.5 (13 Apr 2023 05:32)  T(F): 98.2 (13 Apr 2023 11:51), Max: 103.1 (13 Apr 2023 05:32)  HR: 79 (13 Apr 2023 11:51) (79 - 104)  BP: 102/44 (13 Apr 2023 11:51) (102/44 - 128/56)  BP(mean): --  RR: 18 (13 Apr 2023 11:51) (18 - 18)  SpO2: 93% (13 Apr 2023 11:51) (92% - 95%)    Parameters below as of 13 Apr 2023 11:51  Patient On (Oxygen Delivery Method): room air      Daily     Daily       PHYSICAL EXAM:     GENERAL:  Appears stated age, well-groomed, well-nourished, no distress  HEENT:  NC/AT,  conjunctivae anicteric, clear and pink,   NECK: supple, trachea midline  CHEST:  Full & symmetric excursion, no increased effort, breath sounds clear  HEART:  Regular rhythm, no JVD  ABDOMEN:  Soft, non-tender, non-distended, normoactive bowel sounds,  no masses , no hepatosplenomegaly  EXTREMITIES:  no cyanosis,clubbing or edema  SKIN:  No rash, erythema, or, ecchymoses, no jaundice  NEURO:  Alert, non-focal, no asterixis  PSYCH: Appropriate affect, oriented to place and time  RECTAL: Deferred      LABS Personally reviewed by me:                        6.7    1.17  )-----------( 20       ( 13 Apr 2023 07:18 )             19.1     Mean Cell Volume: 87.2 fl (04-13-23 @ 07:18)    04-13    131<L>  |  99  |  47<H>  ----------------------------<  111<H>  4.1   |  23  |  1.79<H>    Ca    7.8<L>      13 Apr 2023 07:13    TPro  4.4<L>  /  Alb  1.9<L>  /  TBili  2.3<H>  /  DBili  1.5<H>  /  AST  100<H>  /  ALT  109<H>  /  AlkPhos  264<H>  04-13    LIVER FUNCTIONS - ( 13 Apr 2023 07:13 )  Alb: 1.9 g/dL / Pro: 4.4 g/dL / ALK PHOS: 264 U/L / ALT: 109 U/L / AST: 100 U/L / GGT: x                                       6.7    1.17  )-----------( 20       ( 13 Apr 2023 07:18 )             19.1                         7.7    0.87  )-----------( 14       ( 12 Apr 2023 06:21 )             22.4                         6.7    1.19  )-----------( 10       ( 11 Apr 2023 14:37 )             20.2                         7.7    1.25  )-----------( 7        ( 11 Apr 2023 11:09 )             24.0       Imaging personally reviewed by me:

## 2023-04-13 NOTE — PROGRESS NOTE ADULT - NS ATTEND AMEND GEN_ALL_CORE FT
68 y/o male with multiple myeloma admitted with fever.  Hospital course complicated by worsening pancytopenia. Fever continues to persist despite CMV treatment and steroids.  Infectious work up negative except for CMV which is better.     Bone marrow biopsy prelim discussed with pathology- no evidence of myeloma, minimal hematopoiesis but significant histiocytic proliferation, few CD30 positive cells, few bright MUM-1 positive cells with irregular or spindle-shaped nuclei, and few NATALIA positive cells.   Minimal hemophagocytosis, AFB, GMS negative, CMV negative.  CD1A negative (langerhan's), no eosinophils.    ?etiology- underlying infectious cause or histiocytic disorder. But oddly also with CD30 and MUM1, could this be a lymphoproliferative process?    Concerned that his ferritin has increased significantly, but without the HLH on the marrow and no other criteria (only fever and cytopenia, normal fibrinogen/triglycerides, splenectomy).  Soluble IL2/CD25 sent.      Would recommend PET/CT scan and then IR for bone and lymph node biopsy.  Unable to get PET/CT as patient not stable enough for transfer for imaging.  Reached out for potential transfer to [facility]. 68 y/o male with multiple myeloma admitted with fever.  Hospital course complicated by worsening pancytopenia. Fever continues to persist despite CMV treatment and steroids.  Infectious work up negative except for CMV which is better.     Bone marrow biopsy prelim discussed with pathology- no evidence of myeloma, minimal hematopoiesis but significant histiocytic proliferation, few CD30 positive cells, few bright MUM-1 positive cells with irregular or spindle-shaped nuclei, and few NATALIA positive cells.   Minimal hemophagocytosis, AFB, GMS negative, CMV negative.  CD1A negative (langerhan's), no eosinophils.    ?etiology- underlying infectious cause or histiocytic disorder. But oddly also with CD30 and MUM1, could this be a lymphoproliferative process?    Concerned that his ferritin has increased significantly, but without the HLH on the marrow and no other criteria (only fever and cytopenia, normal fibrinogen/triglycerides, splenectomy).  Soluble IL2/CD25 sent.      Would recommend PET/CT scan and then IR for bone and lymph node biopsy.  Unable to get PET/CT as patient not stable enough for transfer for imaging.  Reached out for potential transfer to [facility] [place]. 66 y/o male with multiple myeloma admitted with fever.  Hospital course complicated by worsening pancytopenia. Fever continues to persist despite CMV treatment and steroids.  Infectious work up negative except for CMV which is better.     Bone marrow biopsy prelim discussed with pathology- no evidence of myeloma, minimal hematopoiesis but significant histiocytic proliferation, few CD30 positive cells, few bright MUM-1 positive cells with irregular or spindle-shaped nuclei, and few NATALIA positive cells.   Minimal hemophagocytosis, AFB, GMS negative, CMV negative.  CD1A negative (langerhan's), no eosinophils.    ?etiology- underlying infectious cause or histiocytic disorder. But oddly also with CD30 and MUM1, could this be a lymphoproliferative process?    Concerned that his ferritin has increased significantly, but without the HLH on the marrow and no other criteria (only fever and cytopenia, normal fibrinogen/triglycerides, splenectomy).  Soluble IL2/CD25 sent.      Would recommend PET/CT scan and then IR for bone and lymph node biopsy.  Unable to get PET/CT as patient not stable enough for transfer for imaging.  Reached out for potential transfer to [facility] [place].

## 2023-04-13 NOTE — PROGRESS NOTE ADULT - SUBJECTIVE AND OBJECTIVE BOX
Patient is a 67y old  Male who presents with a chief complaint of fever (13 Apr 2023 08:51)    Pt seen and examined at bedside. With fevers.     MEDICATIONS  (STANDING):  acetaminophen     Tablet .. 650 milliGRAM(s) Oral once  artificial tears (preservative free) Ophthalmic Solution 1 Drop(s) Both EYES four times a day  chlorhexidine 2% Cloths 1 Application(s) Topical daily  digoxin     Tablet 125 MICROGram(s) Oral daily  filgrastim-sndz (ZARXIO) Injectable 480 MICROGram(s) SubCutaneous daily  maribavir Tablet 400 milliGRAM(s) Oral two times a day  midodrine. 10 milliGRAM(s) Oral three times a day  pantoprazole    Tablet 40 milliGRAM(s) Oral before breakfast  polyethylene glycol 3350 17 Gram(s) Oral daily  predniSONE   Tablet 50 milliGRAM(s) Oral daily  sodium chloride 0.9%. 1000 milliLiter(s) (50 mL/Hr) IV Continuous <Continuous>  tamsulosin 0.4 milliGRAM(s) Oral at bedtime    MEDICATIONS  (PRN):  acetaminophen     Tablet .. 650 milliGRAM(s) Oral every 6 hours PRN Temp greater or equal to 38C (100.4F), Mild Pain (1 - 3)  aluminum hydroxide/magnesium hydroxide/simethicone Suspension 30 milliLiter(s) Oral every 4 hours PRN Dyspepsia  loperamide 2 milliGRAM(s) Oral three times a day PRN Diarrhea    Vital Signs Last 24 Hrs  T(C): 36.8 (13 Apr 2023 11:51), Max: 39.5 (13 Apr 2023 05:32)  T(F): 98.2 (13 Apr 2023 11:51), Max: 103.1 (13 Apr 2023 05:32)  HR: 79 (13 Apr 2023 11:51) (79 - 104)  BP: 102/44 (13 Apr 2023 11:51) (102/44 - 128/56)  BP(mean): --  RR: 18 (13 Apr 2023 11:51) (18 - 18)  SpO2: 93% (13 Apr 2023 11:51) (92% - 95%)    Parameters below as of 13 Apr 2023 11:51  Patient On (Oxygen Delivery Method): room air        PE  NAD  Awake, alert  Anicteric, MMM  No c/c/e  No rash grossly  FROM                          6.7    1.17  )-----------( 20       ( 13 Apr 2023 07:18 )             19.1       04-13    131<L>  |  99  |  47<H>  ----------------------------<  111<H>  4.1   |  23  |  1.79<H>    Ca    7.8<L>      13 Apr 2023 07:13    TPro  4.4<L>  /  Alb  1.9<L>  /  TBili  2.3<H>  /  DBili  1.5<H>  /  AST  100<H>  /  ALT  109<H>  /  AlkPhos  264<H>  04-13

## 2023-04-13 NOTE — PROGRESS NOTE ADULT - ASSESSMENT
1. 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 Hubbard   -- s/p Auto SCT in 2018  -- was on lenalidomide maintenance until February 2023 when he had progression of disease. Then switched to daratumumab + CyBorD; last dose March 23rd, 2023.  -- Continue acyclovir 400 mg BID.  -- Attempting to arrange inpatient PET/CT. Also, arranging possible transfer to [facility] [place].    2. Pancytopenia  -- unclear etiology. Primary BM d/o vs Valgancyclovir (now dc'd)  -- s/p BM Bx on 4/6. Preliminary report discussed w/ pathology, no evidence of myeloma, minimal hematopoiesis but significant histiocytic proliferation.  Minimal hemophagocytosis, AFB, GMS pending, CMV negative.  CD1A negative (langerhan's), no eosinophils  -- transfuse to maintain HGB > 7; PLT > 10.  -- Continue Zarxio 480 for ANC <1500  -- IgG low, s/p IVIG 4-12  -- Pending soluble IL2, and CD25 (lab sent/collected by requisition only)  -- Recommend bone and lymph node biopsy following PET/CT  -- Myeloma labs w/ normal free light chain ratio, KIRA w/ weak IgG kappa band, IgG 285, IgA 57, IgM 29  -- platelets at 20K today    3. Fever Unknown Origin  -- ID following  -- no obvious infectious source  -- Entecavir dc'd, now on maribavir   -- ID w/u so far neg   --on Zarxio to help w/ count recovery  -- CT c/a/p noted only for bladder wall thickening but UA not c/w cystitis    4. R Soleal Vein DVT  -- Hold AC in light of low platelets and bleeding risk  -- BTK DVT  -- need to Rpt LE Doppler 4/17 to eval for propagation    will follow,    Quirino Holloway PA-C  Hematology/Oncology  [facility]  [phone] (office) 1. 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   -- s/p Auto SCT in 2018  -- was on lenalidomide maintenance until February 2023 when he had progression of disease. Then switched to daratumumab + CyBorD; last dose March 23rd, 2023.  -- Continue acyclovir 400 mg BID.  -- Attempting to arrange inpatient PET/CT. Also, arranging [place] [facility]    2. Pancytopenia  -- unclear etiology. Primary BM d/o vs Valgancyclovir (now dc'd)  -- s/p BM Bx on 4/6. Preliminary report discussed w/ pathology, no evidence of myeloma, minimal hematopoiesis but significant histiocytic proliferation.  Minimal hemophagocytosis, AFB, GMS pending, CMV negative.  CD1A negative (langerhan's), no eosinophils  -- transfuse to maintain HGB > 7; PLT > 10.  -- Continue Zarxio 480 for ANC <1500  -- IgG low, s/p IVIG 4-12  -- Pending soluble IL2, and CD25 (lab sent/collected by requisition only)  -- Recommend bone and lymph node biopsy following PET/CT  -- Myeloma labs w/ normal free light chain ratio, KIRA w/ weak IgG kappa band, IgG 285, IgA 57, IgM 29  -- platelets at 20K today    3. Fever Unknown Origin  -- ID following  -- no obvious infectious source  -- Entecavir dc'd, now on maribavir   -- ID w/u so far neg   --on Zarxio to help w/ count recovery  -- CT c/a/p noted only for bladder wall thickening but UA not c/w cystitis    4. R Soleal Vein DVT  -- Hold AC in light of low platelets and bleeding risk  -- BTK DVT  -- need to Rpt LE Doppler 4/17 to eval for propagation    will follow,    Quirino Holloway PA-C  Hematology/Oncology  [facility]  [phone] (office) 1. 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 Hubbard   -- s/p Auto SCT in 2018  -- was on lenalidomide maintenance until February 2023 when he had progression of disease. Then switched to daratumumab + CyBorD; last dose March 23rd, 2023.  -- Continue acyclovir 400 mg BID.  -- Attempting to arrange inpatient PET/CT. Also, arranging possible transfer to [facility] [place].    2. Pancytopenia  -- unclear etiology. Primary BM d/o vs Valgancyclovir (now dc'd)  -- s/p BM Bx on 4/6. Preliminary report discussed w/ pathology, no evidence of myeloma, minimal hematopoiesis but significant histiocytic proliferation.  Minimal hemophagocytosis, AFB, GMS pending, CMV negative.  CD1A negative (langerhan's), no eosinophils  -- transfuse to maintain HGB > 7; PLT > 10.  -- Continue Zarxio 480 for ANC <1500  -- IgG low, s/p IVIG 4-12  -- Pending soluble IL2, and CD25 (lab sent/collected by requisition only)  -- Recommend bone and lymph node biopsy following PET/CT  -- Myeloma labs w/ normal free light chain ratio, KIRA w/ weak IgG kappa band, IgG 285, IgA 57, IgM 29  -- platelets at 20K today    3. Fever Unknown Origin  -- ID following  -- no obvious infectious source  -- Entecavir dc'd, now on maribavir   -- ID w/u so far neg   --on Zarxio to help w/ count recovery  -- CT c/a/p noted only for bladder wall thickening but UA not c/w cystitis    4. R Soleal Vein DVT  -- Hold AC in light of low platelets and bleeding risk  -- BTK DVT  -- need to Rpt LE Doppler 4/17 to eval for propagation    will follow,    Quirino Holloway PA-C  Hematology/Oncology  [facility]  [phone] (office)

## 2023-04-13 NOTE — PROGRESS NOTE ADULT - SUBJECTIVE AND OBJECTIVE BOX
Name of Patient : PETER DE LA PAZ  MRN: [id number]  Date of visit: 04-13-23 @ 13:22      Subjective: Patient seen and examined. No new events except as noted.   Patient seen earlier this AM. Sister at the bedside. Lying down in bed. Facial twitches improving.   Recurrent fever overnight. Hgb of 6.7 this AM. Planned for 1 unit of PRBCs today   Patient reports he had a BM yesterday.     REVIEW OF SYSTEMS:    CONSTITUTIONAL: Generalized weakness; Febrile overnight    EYES/ENT: + Twitching improving   NECK: No pain or stiffness  RESPIRATORY: No cough, wheezing, hemoptysis; No shortness of breath  CARDIOVASCULAR: No chest pain or palpitations  GASTROINTESTINAL: No abdominal or epigastric pain. No nausea, vomiting, or hematemesis; No diarrhea or constipation. No melena or hematochezia.  GENITOURINARY: No dysuria, frequency or hematuria  NEUROLOGICAL: No numbness or weakness  SKIN: No itching, burning, rashes, or lesions   All other review of systems is negative unless indicated above.    MEDICATIONS:  MEDICATIONS  (STANDING):  acetaminophen     Tablet .. 650 milliGRAM(s) Oral once  artificial tears (preservative free) Ophthalmic Solution 1 Drop(s) Both EYES four times a day  chlorhexidine 2% Cloths 1 Application(s) Topical daily  digoxin     Tablet 125 MICROGram(s) Oral daily  filgrastim-sndz (ZARXIO) Injectable 480 MICROGram(s) SubCutaneous daily  maribavir Tablet 400 milliGRAM(s) Oral two times a day  midodrine. 10 milliGRAM(s) Oral three times a day  pantoprazole    Tablet 40 milliGRAM(s) Oral before breakfast  polyethylene glycol 3350 17 Gram(s) Oral daily  predniSONE   Tablet 50 milliGRAM(s) Oral daily  sodium chloride 0.9%. 1000 milliLiter(s) (50 mL/Hr) IV Continuous <Continuous>  tamsulosin 0.4 milliGRAM(s) Oral at bedtime      PHYSICAL EXAM:  T(C): 36.8 (04-13-23 @ 11:51), Max: 39.5 (04-13-23 @ 05:32)  HR: 79 (04-13-23 @ 11:51) (79 - 104)  BP: 102/44 (04-13-23 @ 11:51) (102/44 - 128/56)  RR: 18 (04-13-23 @ 11:51) (18 - 18)  SpO2: 93% (04-13-23 @ 11:51) (92% - 95%)  Wt(kg): --  I&O's Summary    12 Apr 2023 07:01  -  13 Apr 2023 07:00  --------------------------------------------------------  IN: 240 mL / OUT: 1500 mL / NET: -1260 mL          Appearance: Awake, weak appearing male, lying down in bed 	  HEENT: Eyes are open; less facial twitching   Lymphatic: No lymphadenopathy   Cardiovascular: Normal    Respiratory: normal effort , clear  Gastrointestinal:  Soft, Non-tender  Skin: No rashes,  warm to touch  Psychiatry:  Mood & affect appropriate  Musculoskeletal: No edema        04-12-23 @ 07:01  -  04-13-23 @ 07:00  --------------------------------------------------------  IN: 240 mL / OUT: 1500 mL / NET: -1260 mL                                6.7    1.17  )-----------( 20       ( 13 Apr 2023 07:18 )             19.1               04-13    131<L>  |  99  |  47<H>  ----------------------------<  111<H>  4.1   |  23  |  1.79<H>    Ca    7.8<L>      13 Apr 2023 07:13    TPro  4.4<L>  /  Alb  1.9<L>  /  TBili  2.3<H>  /  DBili  1.5<H>  /  AST  100<H>  /  ALT  109<H>  /  AlkPhos  264<H>  04-13                           Culture - Blood (04.10.23 @ 07:21)   Specimen Source: .Blood Blood-Peripheral  Culture Results:   No growth to date.    Culture - Blood (04.10.23 @ 07:21)   Specimen Source: .Blood Blood-Peripheral  Culture Results:   No growth to date.     Name of Patient : PETER DE LA PAZ  MRN: [id number]  Date of visit: 04-13-23 @ 13:22      Subjective: Patient seen and examined. No new events except as noted.   Patient seen earlier this AM. Sister at the bedside. Lying down in bed. Facial twitches improving.   Recurrent fever overnight. Hgb of 6.7 this AM. Planned for 1 unit of PRBCs today   Patient reports he had a BM yesterday.     REVIEW OF SYSTEMS:    CONSTITUTIONAL: Generalized weakness; Febrile overnight    EYES/ENT: + Twitching improving   NECK: No pain or stiffness  RESPIRATORY: No cough, wheezing, hemoptysis; No shortness of breath  CARDIOVASCULAR: No chest pain or palpitations  GASTROINTESTINAL: No abdominal or epigastric pain. No nausea, vomiting, or hematemesis; No diarrhea or constipation. No melena or hematochezia.  GENITOURINARY: No dysuria, frequency or hematuria  NEUROLOGICAL: No numbness or weakness  SKIN: No itching, burning, rashes, or lesions   All other review of systems is negative unless indicated above.    MEDICATIONS:  MEDICATIONS  (STANDING):  acetaminophen     Tablet .. 650 milliGRAM(s) Oral once  artificial tears (preservative free) Ophthalmic Solution 1 Drop(s) Both EYES four times a day  chlorhexidine 2% Cloths 1 Application(s) Topical daily  digoxin     Tablet 125 MICROGram(s) Oral daily  filgrastim-sndz (ZARXIO) Injectable 480 MICROGram(s) SubCutaneous daily  maribavir Tablet 400 milliGRAM(s) Oral two times a day  midodrine. 10 milliGRAM(s) Oral three times a day  pantoprazole    Tablet 40 milliGRAM(s) Oral before breakfast  polyethylene glycol 3350 17 Gram(s) Oral daily  predniSONE   Tablet 50 milliGRAM(s) Oral daily  sodium chloride 0.9%. 1000 milliLiter(s) (50 mL/Hr) IV Continuous <Continuous>  tamsulosin 0.4 milliGRAM(s) Oral at bedtime      PHYSICAL EXAM:  T(C): 36.8 (04-13-23 @ 11:51), Max: 39.5 (04-13-23 @ 05:32)  HR: 79 (04-13-23 @ 11:51) (79 - 104)  BP: 102/44 (04-13-23 @ 11:51) (102/44 - 128/56)  RR: 18 (04-13-23 @ 11:51) (18 - 18)  SpO2: 93% (04-13-23 @ 11:51) (92% - 95%)  Wt(kg): --  I&O's Summary    12 Apr 2023 07:01  -  13 Apr 2023 07:00  --------------------------------------------------------  IN: 240 mL / OUT: 1500 mL / NET: -1260 mL          Appearance: Awake, weak appearing male, lying down in bed 	  HEENT: Eyes are open; less facial twitching   Lymphatic: No lymphadenopathy   Cardiovascular: Normal    Respiratory: normal effort , clear  Gastrointestinal:  Soft, Non-tender  Skin: No rashes,  warm to touch  Psychiatry:  Mood & affect appropriate  Musculoskeletal: No edema        04-12-23 @ 07:01  -  04-13-23 @ 07:00  --------------------------------------------------------  IN: 240 mL / OUT: 1500 mL / NET: -1260 mL                                6.7    1.17  )-----------( 20       ( 13 Apr 2023 07:18 )             19.1               04-13    131<L>  |  99  |  47<H>  ----------------------------<  111<H>  4.1   |  23  |  1.79<H>    Ca    7.8<L>      13 Apr 2023 07:13    TPro  4.4<L>  /  Alb  1.9<L>  /  TBili  2.3<H>  /  DBili  1.5<H>  /  AST  100<H>  /  ALT  109<H>  /  AlkPhos  264<H>  04-13                           Culture - Blood (04.10.23 @ 07:21)   Specimen Source: .Blood Blood-Peripheral  Culture Results:   No growth to date.    Culture - Blood (04.10.23 @ 07:21)   Specimen Source: .Blood Blood-Peripheral  Culture Results:   No growth to date.     Name of Patient : PETER DE LA PAZ  MRN: [id number]  Date of visit: 04-13-23 @ 13:22      Subjective: Patient seen and examined. No new events except as noted.   Patient seen earlier this AM. Sister at the bedside. Lying down in bed. Facial twitches improving.   Recurrent fever overnight. Hgb of 6.7 this AM. Planned for 1 unit of PRBCs today   Patient reports he had a BM yesterday.     REVIEW OF SYSTEMS:    CONSTITUTIONAL: Generalized weakness; Febrile overnight    EYES/ENT: + Twitching improving   NECK: No pain or stiffness  RESPIRATORY: No cough, wheezing, hemoptysis; No shortness of breath  CARDIOVASCULAR: No chest pain or palpitations  GASTROINTESTINAL: No abdominal or epigastric pain. No nausea, vomiting, or hematemesis; No diarrhea or constipation. No melena or hematochezia.  GENITOURINARY: No dysuria, frequency or hematuria  NEUROLOGICAL: No numbness or weakness  SKIN: No itching, burning, rashes, or lesions   All other review of systems is negative unless indicated above.    MEDICATIONS:  MEDICATIONS  (STANDING):  acetaminophen     Tablet .. 650 milliGRAM(s) Oral once  artificial tears (preservative free) Ophthalmic Solution 1 Drop(s) Both EYES four times a day  chlorhexidine 2% Cloths 1 Application(s) Topical daily  digoxin     Tablet 125 MICROGram(s) Oral daily  filgrastim-sndz (ZARXIO) Injectable 480 MICROGram(s) SubCutaneous daily  maribavir Tablet 400 milliGRAM(s) Oral two times a day  midodrine. 10 milliGRAM(s) Oral three times a day  pantoprazole    Tablet 40 milliGRAM(s) Oral before breakfast  polyethylene glycol 3350 17 Gram(s) Oral daily  predniSONE   Tablet 50 milliGRAM(s) Oral daily  sodium chloride 0.9%. 1000 milliLiter(s) (50 mL/Hr) IV Continuous <Continuous>  tamsulosin 0.4 milliGRAM(s) Oral at bedtime      PHYSICAL EXAM:  T(C): 36.8 (04-13-23 @ 11:51), Max: 39.5 (04-13-23 @ 05:32)  HR: 79 (04-13-23 @ 11:51) (79 - 104)  BP: 102/44 (04-13-23 @ 11:51) (102/44 - 128/56)  RR: 18 (04-13-23 @ 11:51) (18 - 18)  SpO2: 93% (04-13-23 @ 11:51) (92% - 95%)  Wt(kg): --  I&O's Summary    12 Apr 2023 07:01  -  13 Apr 2023 07:00  --------------------------------------------------------  IN: 240 mL / OUT: 1500 mL / NET: -1260 mL          Appearance: Awake, weak appearing male, lying down in bed 	  HEENT: Eyes are open; less facial twitching   Lymphatic: No lymphadenopathy   Cardiovascular: Normal    Respiratory: normal effort , clear  Gastrointestinal:  Soft, Non-tender  Skin: No rashes,  warm to touch  Psychiatry:  Mood & affect appropriate  Musculoskeletal: No edema        04-12-23 @ 07:01  -  04-13-23 @ 07:00  --------------------------------------------------------  IN: 240 mL / OUT: 1500 mL / NET: -1260 mL                                6.7    1.17  )-----------( 20       ( 13 Apr 2023 07:18 )             19.1               04-13    131<L>  |  99  |  47<H>  ----------------------------<  111<H>  4.1   |  23  |  1.79<H>    Ca    7.8<L>      13 Apr 2023 07:13    TPro  4.4<L>  /  Alb  1.9<L>  /  TBili  2.3<H>  /  DBili  1.5<H>  /  AST  100<H>  /  ALT  109<H>  /  AlkPhos  264<H>  04-13                           Culture - Blood (04.10.23 @ 07:21)   Specimen Source: .Blood Blood-Peripheral  Culture Results:   No growth to date.    Culture - Blood (04.10.23 @ 07:21)   Specimen Source: .Blood Blood-Peripheral  Culture Results:   No growth to date.

## 2023-04-13 NOTE — PROGRESS NOTE ADULT - SUBJECTIVE AND OBJECTIVE BOX
Subjective: Patient seen and examined. No new events except as noted.     SUBJECTIVE/ROS:  nad      MEDICATIONS:  MEDICATIONS  (STANDING):  acetaminophen     Tablet .. 650 milliGRAM(s) Oral once  artificial tears (preservative free) Ophthalmic Solution 1 Drop(s) Both EYES four times a day  chlorhexidine 2% Cloths 1 Application(s) Topical daily  digoxin     Tablet 125 MICROGram(s) Oral daily  filgrastim-sndz (ZARXIO) Injectable 480 MICROGram(s) SubCutaneous daily  maribavir Tablet 400 milliGRAM(s) Oral two times a day  midodrine. 10 milliGRAM(s) Oral three times a day  pantoprazole    Tablet 40 milliGRAM(s) Oral before breakfast  polyethylene glycol 3350 17 Gram(s) Oral daily  predniSONE   Tablet 50 milliGRAM(s) Oral daily  sodium chloride 0.9%. 1000 milliLiter(s) (50 mL/Hr) IV Continuous <Continuous>  tamsulosin 0.4 milliGRAM(s) Oral at bedtime      PHYSICAL EXAM:  T(C): 39.5 (04-13-23 @ 05:32), Max: 39.5 (04-13-23 @ 05:32)  HR: 86 (04-13-23 @ 05:32) (86 - 104)  BP: 128/56 (04-13-23 @ 05:32) (113/50 - 128/56)  RR: 18 (04-13-23 @ 05:32) (18 - 18)  SpO2: 92% (04-13-23 @ 05:32) (92% - 95%)  Wt(kg): --  I&O's Summary    12 Apr 2023 07:01  -  13 Apr 2023 07:00  --------------------------------------------------------  IN: 240 mL / OUT: 1500 mL / NET: -1260 mL            JVP: Normal  Neck: supple  Lung: clear   CV: S1 S2 , Murmur:  Abd: soft  Ext: No edema  neuro: Awake   Psych: flat affect  Skin: normal``    LABS/DATA:    CARDIAC MARKERS:                                6.7    1.17  )-----------( 20       ( 13 Apr 2023 07:18 )             19.1     04-13    131<L>  |  99  |  47<H>  ----------------------------<  111<H>  4.1   |  23  |  1.79<H>    Ca    7.8<L>      13 Apr 2023 07:13    TPro  4.4<L>  /  Alb  1.9<L>  /  TBili  2.3<H>  /  DBili  1.5<H>  /  AST  100<H>  /  ALT  109<H>  /  AlkPhos  264<H>  04-13    proBNP:   Lipid Profile:   HgA1c:   TSH:     TELE:  EKG:

## 2023-04-13 NOTE — PROGRESS NOTE ADULT - SUBJECTIVE AND OBJECTIVE BOX
[facility] NEPHROLOGY- PROGRESS NOTE    67 year old Male with history of MM on chemotherapy presents with fevers. Nephrology consulted for elevated Scr.      REVIEW OF SYSTEMS:  Gen: + fevers  Cards: no chest pain  Resp: no dyspnea  GI: no nausea or vomiting or diarrhea  Vascular: + LE edema    No Known Allergies      Hospital Medications: Medications reviewed        VITALS:  T(F): 98.7 (23 @ 15:00), Max: 103.1 (23 @ 05:32)  HR: 75 (23 @ 15:00)  BP: 118/52 (23 @ 15:00)  RR: 18 (23 @ 15:00)  SpO2: 93% (23 @ 11:51)  Wt(kg): --     @ 07:01  -   @ 07:00  --------------------------------------------------------  IN: 240 mL / OUT: 1500 mL / NET: -1260 mL     @ 07:01  -   @ 16:20  --------------------------------------------------------  IN: 0 mL / OUT: 450 mL / NET: -450 mL        PHYSICAL EXAM:    Gen: NAD, calm  Cards: Irregularly irregular, +S1/S2, no M/G/R  Resp: CTA B/L  GI: soft, NT/ND, NABS  : + lloyd with yellow urine with debris  Vascular: trace LE edema B/L      LABS:      131<L>  |  99  |  47<H>  ----------------------------<  111<H>  4.1   |  23  |  1.79<H>    Ca    7.8<L>      2023 07:13    TPro  4.4<L>  /  Alb  1.9<L>  /  TBili  2.3<H>  /  DBili  1.5<H>  /  AST  100<H>  /  ALT  109<H>  /  AlkPhos  264<H>      Creatinine Trend: 1.79 <--, 1.44 <--, 1.52 <--, 1.54 <--, 1.15 <--, 1.18 <--, 1.37 <--                        6.7    1.17  )-----------( 20       ( 2023 07:18 )             19.1     Urine Studies:  Urinalysis Basic - ( 2023 08:42 )    Color: Yellow / Appearance: Clear / S.031 / pH:   Gluc:  / Ketone: Negative  / Bili: Negative / Urobili: 6 mg/dL   Blood:  / Protein: 30 mg/dL / Nitrite: Negative   Leuk Esterase: Negative / RBC: 3 /hpf / WBC 3 /HPF   Sq Epi:  / Non Sq Epi:  / Bacteria: Negative      Osmolality, Random Urine: 568 mos/kg (04-10 @ 14:07)  Sodium, Random Urine: 58 mmol/L (04-10 @ 14:07)  Chloride, Random Urine: 37 mmol/L ( @ 08:42)          < from: US Kidney and Bladder (23 @ 18:42) >  IMPRESSION:  No hydronephrosis.    < end of copied text >   [facility] NEPHROLOGY- PROGRESS NOTE    67 year old Male with history of MM on chemotherapy presents with fevers. Nephrology consulted for elevated Scr.      REVIEW OF SYSTEMS:  Gen: + fevers  Cards: no chest pain  Resp: no dyspnea  GI: no nausea or vomiting or diarrhea  Vascular: + LE edema    No Known Allergies      Hospital Medications: Medications reviewed        VITALS:  T(F): 98.7 (23 @ 15:00), Max: 103.1 (23 @ 05:32)  HR: 75 (23 @ 15:00)  BP: 118/52 (23 @ 15:00)  RR: 18 (23 @ 15:00)  SpO2: 93% (23 @ 11:51)  Wt(kg): --     @ 07:01  -   @ 07:00  --------------------------------------------------------  IN: 240 mL / OUT: 1500 mL / NET: -1260 mL     @ 07:01  -   @ 16:20  --------------------------------------------------------  IN: 0 mL / OUT: 450 mL / NET: -450 mL        PHYSICAL EXAM:    Gen: NAD, calm  Cards: Irregularly irregular, +S1/S2, no M/G/R  Resp: CTA B/L  GI: soft, NT/ND, NABS  : + lloyd with yellow urine with debris  Vascular: trace LE edema B/L      LABS:      131<L>  |  99  |  47<H>  ----------------------------<  111<H>  4.1   |  23  |  1.79<H>    Ca    7.8<L>      2023 07:13    TPro  4.4<L>  /  Alb  1.9<L>  /  TBili  2.3<H>  /  DBili  1.5<H>  /  AST  100<H>  /  ALT  109<H>  /  AlkPhos  264<H>      Creatinine Trend: 1.79 <--, 1.44 <--, 1.52 <--, 1.54 <--, 1.15 <--, 1.18 <--, 1.37 <--                        6.7    1.17  )-----------( 20       ( 2023 07:18 )             19.1     Urine Studies:  Urinalysis Basic - ( 2023 08:42 )    Color: Yellow / Appearance: Clear / S.031 / pH:   Gluc:  / Ketone: Negative  / Bili: Negative / Urobili: 6 mg/dL   Blood:  / Protein: 30 mg/dL / Nitrite: Negative   Leuk Esterase: Negative / RBC: 3 /hpf / WBC 3 /HPF   Sq Epi:  / Non Sq Epi:  / Bacteria: Negative      Osmolality, Random Urine: 568 mos/kg (04-10 @ 14:07)  Sodium, Random Urine: 58 mmol/L (04-10 @ 14:07)  Chloride, Random Urine: 37 mmol/L ( @ 08:42)          < from: US Kidney and Bladder (23 @ 18:42) >  IMPRESSION:  No hydronephrosis.    < end of copied text >   [facility] NEPHROLOGY- PROGRESS NOTE    67 year old Male with history of MM on chemotherapy presents with fevers. Nephrology consulted for elevated Scr.      REVIEW OF SYSTEMS:  Gen: + fevers  Cards: no chest pain  Resp: no dyspnea  GI: no nausea or vomiting or diarrhea  Vascular: + LE edema    No Known Allergies      Hospital Medications: Medications reviewed        VITALS:  T(F): 98.7 (23 @ 15:00), Max: 103.1 (23 @ 05:32)  HR: 75 (23 @ 15:00)  BP: 118/52 (23 @ 15:00)  RR: 18 (23 @ 15:00)  SpO2: 93% (23 @ 11:51)  Wt(kg): --     @ 07:01  -   @ 07:00  --------------------------------------------------------  IN: 240 mL / OUT: 1500 mL / NET: -1260 mL     @ 07:01  -   @ 16:20  --------------------------------------------------------  IN: 0 mL / OUT: 450 mL / NET: -450 mL        PHYSICAL EXAM:    Gen: NAD, calm  Cards: Irregularly irregular, +S1/S2, no M/G/R  Resp: CTA B/L  GI: soft, NT/ND, NABS  : + lloyd with yellow urine with debris  Vascular: trace LE edema B/L      LABS:      131<L>  |  99  |  47<H>  ----------------------------<  111<H>  4.1   |  23  |  1.79<H>    Ca    7.8<L>      2023 07:13    TPro  4.4<L>  /  Alb  1.9<L>  /  TBili  2.3<H>  /  DBili  1.5<H>  /  AST  100<H>  /  ALT  109<H>  /  AlkPhos  264<H>      Creatinine Trend: 1.79 <--, 1.44 <--, 1.52 <--, 1.54 <--, 1.15 <--, 1.18 <--, 1.37 <--                        6.7    1.17  )-----------( 20       ( 2023 07:18 )             19.1     Urine Studies:  Urinalysis Basic - ( 2023 08:42 )    Color: Yellow / Appearance: Clear / S.031 / pH:   Gluc:  / Ketone: Negative  / Bili: Negative / Urobili: 6 mg/dL   Blood:  / Protein: 30 mg/dL / Nitrite: Negative   Leuk Esterase: Negative / RBC: 3 /hpf / WBC 3 /HPF   Sq Epi:  / Non Sq Epi:  / Bacteria: Negative      Osmolality, Random Urine: 568 mos/kg (04-10 @ 14:07)  Sodium, Random Urine: 58 mmol/L (04-10 @ 14:07)  Chloride, Random Urine: 37 mmol/L ( @ 08:42)          < from: US Kidney and Bladder (23 @ 18:42) >  IMPRESSION:  No hydronephrosis.    < end of copied text >

## 2023-04-13 NOTE — PROGRESS NOTE ADULT - ASSESSMENT
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    Fever of unknown origin.   - CXR without consolidations or effusions; U/A unremarkable; GI PCR Neg   - Cultures remain negative to date   - LE VA duplex --> + For DVT;  V/Q scan -- Low probability   - Was on empiric treatment with Vanc/cefepime --> ID consulted; S/P Zosyn  - Hold home penicillin while on ABX   - ID to follow up, infectious work up as per ID  --> CMV PCR -- + C/w Ganciclovir per ID , CMV IGG (+) and IgM (-), Cryptococcal Ag --Neg, Quantiferon Tb -- Neg, Fungitell -- <31   - CT Chest non-contrast, noted, no acute pathology  - Shock, RRT, resume antibiotics IV fluid, MICU eval , ID follow up  --> Prednisone 50 PO Qd   - S/P Zosyn; Defer ABX as per ID   - Short course steroid per hematology -- On Prednisone 50 Qd   - IR eval for BM BX as per Heme/Onc --> done on 04/06 with IR, f/u path -- Pending   - ID ordered pan CT, noted questionable cystitis. UA is neg.  - Febrile 04/10 and 04/11 AM. 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 Transfuse for Hgb < 7.0, and platelets < 10.K  - GI eval appreciated; F/u recs --> No plans for scope at this time   - Was on Xarelto, held due to drop in Platelets as per Heme/Onc. Monitor platelet count   - S/P 1 unit PRBCs and 1 unit Platelets 04/04, 04/05   - HIT ab: neg  - ASA on hold  - Plt of 7K, plan for 1 unit of Plt 04/11  - Hgb of 6.7 , 1 unit PRBC 04/13  - Heme/Onc obtaining further work up     Afib  - Unable to be on AC 2/2 low platelets  - On Digoxin   - Monitor on tele   - Cardio follow up     Multiple myeloma.   - pancytopenia largely at baseline although Hg 6.7 on arrival; s/p 1U PRBC with good response   - Was on acyclovir, / Ganciclovir as per ID   - C/w home entecavir   - S/P dexamethasone, D/C'd by hematology, follow up outpatient after discharge  --> Now on Prednisone 50 Qd   - Pt reports his Revlimid was held  - Heme/Onc consulted; F/u recs   - Resume home aspirin. --> Now on hold   - s/p IR BM Bx  04/06; Atypical histiocytic infiltrate with few EBV positive cells; F/u Heme/Onc recs   - S/P  IVIG 04/13 as per Heme/Onc   - IR eval for Bone Lesion Biopsy per Heme/Onc --> Arrangements for PET scan attempting to be made.     Chronic diarrhea, now with Constipation   - Standing Imodium switched to PRN  - Monitor for BM - reports having BM     DVT   - Duplex + For R soleal vein DVT  --> Will consider CT A chest once creatinine permits as patient received contrast; Monitor for LOPEZ   - s/p Hep gtt; Monitor PTT; Monitor H/H closely --> Now on hold in view of drop in Hgb and severe thrombocytopenia  - VQ scan to R/O PE -- Low probability   - Vascular eval appreciated; AC as tolerated, on Xarelto 10, monitor H/H, plts too low to start.   - Serial Duplex to assess for propagation  -- Without propagation   - 04/03 Duplex without propagation   - Repeat Duplex 04/10 -- without propogation   - Plan for repeat Duplex 04/17   - given severe thrombocytopenia, holding Xarelto for now. Risk of bleeding greater than benefit.     Elevated LFTs  - Cont to monitor and trend  - Lipitor DC'd. Entecavir DC'd  - Monitor levels     RK  - S/P Contrast on 03/23   - Monitor Cr closely; Avoid nephrotoxic agents   - Cr down-trending. s/p IVF   - Renal eval appreciated   - urinary retention s/p lloyd.   - Removed lloyd 4/7/23, TOV in progress. post void residual 550ml on bladder scan  - s/p straight cath. may need to replace lloyd if unable to urinarte.   - CT with hydronephrosis; Cr up-trending, discussed with renal - Hydration as tolerated,  Flomax started; F/u renal recs    - Renal US neg for hydronephrosis     Hypertension, now hypotensive   - C/w home metoprolol.  - C/w Midodrine 10 TID. Hold for SBP > 140     HypoNa  - Cont to monitor and trend  - Appreciate renal eval.   - Serial labs     Hiccups  - Improved on Reglan, Monitor     Hyperlipidemia.   - C/w home atorvastatin --> on hold as per GI .    Abnormal CT  - Outpatient follow up for CT findings    Prophylactic measure.   dvt ppx: DVT PPX   diet: regular  ambulate: with assistance    fall precautions  aspiration precautions.      Discussed with Patient, and family in detail at the bedside. Discussed with Attending, and ACP. Discussed with Heme/Onc team.       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    Fever of unknown origin.   - CXR without consolidations or effusions; U/A unremarkable; GI PCR Neg   - Cultures remain negative to date   - LE VA duplex --> + For DVT;  V/Q scan -- Low probability   - Was on empiric treatment with Vanc/cefepime --> ID consulted; S/P Zosyn  - Hold home penicillin while on ABX   - ID to follow up, infectious work up as per ID  --> CMV PCR -- + C/w Ganciclovir per ID , CMV IGG (+) and IgM (-), Cryptococcal Ag --Neg, Quantiferon Tb -- Neg, Fungitell -- <31   - CT Chest non-contrast, noted, no acute pathology  - Shock, RRT, resume antibiotics IV fluid, MICU eval , ID follow up  --> Prednisone 50 PO Qd   - S/P Zosyn; Defer ABX as per ID   - Short course steroid per hematology -- On Prednisone 50 Qd   - IR eval for BM BX as per Heme/Onc --> done on 04/06 with IR, f/u path -- Pending   - ID ordered pan CT, noted questionable cystitis. UA is neg.  - Febrile 04/10 and 04/11 AM. 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 Transfuse for Hgb < 7.0, and platelets < 10.K  - GI eval appreciated; F/u recs --> No plans for scope at this time   - Was on Xarelto, held due to drop in Platelets as per Heme/Onc. Monitor platelet count   - S/P 1 unit PRBCs and 1 unit Platelets 04/04, 04/05   - HIT ab: neg  - ASA on hold  - Plt of 7K, plan for 1 unit of Plt 04/11  - Hgb of 6.7 , 1 unit PRBC 04/13  - Heme/Onc obtaining further work up     Afib  - Unable to be on AC 2/2 low platelets  - On Digoxin   - Monitor on tele   - Cardio follow up     Multiple myeloma.   - pancytopenia largely at baseline although Hg 6.7 on arrival; s/p 1U PRBC with good response   - Was on acyclovir, / Ganciclovir as per ID   - C/w home entecavir   - S/P dexamethasone, D/C'd by hematology, follow up outpatient after discharge  --> Now on Prednisone 50 Qd   - Pt reports his Revlimid was held  - Heme/Onc consulted; F/u recs   - Resume home aspirin. --> Now on hold   - s/p IR BM Bx  04/06; Atypical histiocytic infiltrate with few EBV positive cells; F/u Heme/Onc recs   - S/P  IVIG 04/13 as per Heme/Onc   - IR eval for Bone Lesion Biopsy per Heme/Onc --> Arrangements for PET scan attempting to be made.     Chronic diarrhea, now with Constipation   - Standing Imodium switched to PRN  - Monitor for BM - reports having BM     DVT   - Duplex + For R soleal vein DVT  --> Will consider CT A chest once creatinine permits as patient received contrast; Monitor for LOPEZ   - s/p Hep gtt; Monitor PTT; Monitor H/H closely --> Now on hold in view of drop in Hgb and severe thrombocytopenia  - VQ scan to R/O PE -- Low probability   - Vascular eval appreciated; AC as tolerated, on Xarelto 10, monitor H/H, plts too low to start.   - Serial Duplex to assess for propagation  -- Without propagation   - 04/03 Duplex without propagation   - Repeat Duplex 04/10 -- without propogation   - Plan for repeat Duplex 04/17   - given severe thrombocytopenia, holding Xarelto for now. Risk of bleeding greater than benefit.     Elevated LFTs  - Cont to monitor and trend  - Lipitor DC'd. Entecavir DC'd  - Monitor levels     RK  - S/P Contrast on 03/23   - Monitor Cr closely; Avoid nephrotoxic agents   - Cr down-trending. s/p IVF   - Renal eval appreciated   - urinary retention s/p lloyd.   - Removed lloyd 4/7/23, TOV in progress. post void residual 550ml on bladder scan  - s/p straight cath. may need to replace lloyd if unable to urinarte.   - CT with hydronephrosis; Cr up-trending, discussed with renal - Hydration as tolerated,  Flomax started; F/u renal recs    - Renal US neg for hydronephrosis     Hypertension, now hypotensive   - C/w home metoprolol.  - C/w Midodrine 10 TID. Hold for SBP > 140     HypoNa  - Cont to monitor and trend  - Appreciate renal eval.   - Serial labs     Hiccups  - Improved on Reglan, Monitor     Hyperlipidemia.   - C/w home atorvastatin --> on hold as per GI .    Abnormal CT  - Outpatient follow up for CT findings    Prophylactic measure.   dvt ppx: DVT PPX   diet: regular  ambulate: with assistance    fall precautions  aspiration precautions.      Discussed with Patient, and family in detail at the bedside. Discussed with Attending, and ACP. Discussed with Heme/Onc team.

## 2023-04-13 NOTE — PROGRESS NOTE ADULT - ASSESSMENT
1. hx of fever, CMV viremia   - per ID, on antiviral    2. anemia, FOBT positive in the setting of thrombocytopenia. Hgb remains at recent baseline. No overt bleeding symptoms.   - no plans for endoscopic evaluation at this time  - monitor H&H, transfuse PRN  - daily PPI     3. Chronic diarrhea, likely related to chemo +/- abx. 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   - GI PCR neg, C-diff neg on 03.03  - diarrhea resolved, pt now feels constipated. Last BM x 3 days ago > Imodium d/c'd, miralax 17g PO x 1, enema x 1 on Friday.  - enema x 1 and Miralax prn ordered-- had BM on 4/11 s/p enema     4. DVT   - on anticoagulation    5. Multiple myeloma, pancytopenia   - per heme / onc   - s/p bone marrow biopsy 4/6: preliminary report, no evidence of myeloma, minimal hematopoiesis but significant histiocytic proliferation. Minimal hemophagocytosis, AFB, GMS pending, CMV negative.  CD1A negative (langerhan's), no eosinophils  - planning for PET scan     6. Hiccups--resolved   - reglan discontinued     7. Vision changes, decreased mentation-- now with 103.9 fever overnight   - appreciate neuro and opthalmology eval   - BC x 2 NGTD  - CT head 4/10 with no obvious changes  - ammonia wnl, lactate downtrending  - monitor LFTs, ammonia level  - unlikely hepatic encephalopathy as patient does not have portal HTN on CT A/P   - change in mental status could be from anti viral medication  Lipitor discontinued- can also cause confusion.     8. Transaminitis  - likely drug induced 2/2 cefepime or antivirals  - continue to trend             Attending supervision statement: I have personally seen and examined the patient. I fully participated in the care of this patient. I have made amendments to the documentation where necessary, and agree with the history, physical exam, and plan as outlined by the ACP.

## 2023-04-13 NOTE — PROGRESS NOTE ADULT - ASSESSMENT
New onset afib, aflutter  HR stable   off a/c high bleed risk   cont 0.125mg daily   check Dig level     History of bioAVR   echo unremarkable     Anemia  Monitor hemoglobin, transfuse as needed.  plan as per Heme    DVT   a/c on hold  low plts  fu with vascular surgery / heme     Hypotension   due to sepsis   better now   on midodrine

## 2023-04-13 NOTE — PROGRESS NOTE ADULT - ASSESSMENT
67y Male with history of MM on chemotherapy presents with fevers. Nephrology consulted for elevated Scr.    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 RK resolved with recurrent RK due to LOPEZ, infection and anemia. Restart NS @ 100 ml/hour given ongoing fevers. Will give concurrent IV lasix given third spacing and plan for blood products today. UA active likely due to lloyd with granular casts suggestive of ATN. FeNa indeterminate. Repeat renal US with resolution of bilateral hydronephrosis s/p lloyd placement. TMA work up negative. Avoid nephrotoxins. Monitor electrolytes.    2) Hypotension: BP low normal. Continue with midodrine 10 mg PO TID. Monitor BP.    3) Metabolic alkalosis: Resolved with chloride solution. Monitor pH.    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 IVF with lasix as above. Monitor serum Na.    5) MM: As per Heme/Onc.      [facility] NEPHROLOGY  Leoncio Leonard M.D.  Tay Brooke D.O.  Precious Chen M.D.  Nidia Stallings, MSN, ANP-C    Telephone: [phone]  Facsimile: [phone]    [address] [place] [address]   67y Male with history of MM on chemotherapy presents with fevers. Nephrology consulted for elevated Scr.    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 RK resolved with recurrent RK due to LOPEZ, infection and anemia. Restart NS @ 100 ml/hour given ongoing fevers. Will give concurrent IV lasix given third spacing and plan for blood products today. UA active likely due to lloyd with granular casts suggestive of ATN. FeNa indeterminate. Repeat renal US with resolution of bilateral hydronephrosis s/p lloyd placement. TMA work up negative. Avoid nephrotoxins. Monitor electrolytes.    2) Hypotension: BP low normal. Continue with midodrine 10 mg PO TID. Monitor BP.    3) Metabolic alkalosis: Resolved with chloride solution. Monitor pH.    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 IVF with lasix as above. Monitor serum Na.    5) MM: As per Heme/Onc.      [facility] NEPHROLOGY  Leoncio Leonard M.D.  Tay Brooke D.O.  Precious Chen M.D.  Nidia Stallings, MSN, ANP-C    Telephone: [phone]  Facsimile: [phone]    [address] [place] [address]   67y Male with history of MM on chemotherapy presents with fevers. Nephrology consulted for elevated Scr.    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 RK resolved with recurrent RK due to LOPEZ, infection and anemia. Restart NS @ 100 ml/hour given ongoing fevers. Will give concurrent IV lasix given third spacing and plan for blood products today. UA active likely due to lloyd with granular casts suggestive of ATN. FeNa indeterminate. Repeat renal US with resolution of bilateral hydronephrosis s/p lloyd placement. TMA work up negative. Avoid nephrotoxins. Monitor electrolytes.    2) Hypotension: BP low normal. Continue with midodrine 10 mg PO TID. Monitor BP.    3) Metabolic alkalosis: Resolved with chloride solution. Monitor pH.    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 IVF with lasix as above. Monitor serum Na.    5) MM: As per Heme/Onc.      [facility] NEPHROLOGY  Leoncio Leonard M.D.  Tay Brooke D.O.  Precious Chen M.D.  Nidia Stallings, MSN, ANP-C    Telephone: [phone]  Facsimile: [phone]    [address] [place] [address]

## 2023-04-14 ENCOUNTER — TRANSCRIPTION ENCOUNTER (OUTPATIENT)
Age: 68
End: 2023-04-14

## 2023-04-14 LAB
ANION GAP SERPL CALC-SCNC: 12 MMOL/L — SIGNIFICANT CHANGE UP (ref 5–17)
BASOPHILS # BLD AUTO: 0 K/UL — SIGNIFICANT CHANGE UP (ref 0–0.2)
BASOPHILS NFR BLD AUTO: 0 % — SIGNIFICANT CHANGE UP (ref 0–2)
BUN SERPL-MCNC: 62 MG/DL — HIGH (ref 7–23)
CALCIUM SERPL-MCNC: 7.9 MG/DL — LOW (ref 8.4–10.5)
CHLORIDE SERPL-SCNC: 98 MMOL/L — SIGNIFICANT CHANGE UP (ref 96–108)
CMV DNA CSF QL NAA+PROBE: 1020 — SIGNIFICANT CHANGE UP
CMV DNA SPEC NAA+PROBE-LOG#: 3.01 LOG10IU/ML — HIGH
CO2 SERPL-SCNC: 22 MMOL/L — SIGNIFICANT CHANGE UP (ref 22–31)
CREAT SERPL-MCNC: 2.42 MG/DL — HIGH (ref 0.5–1.3)
CRP SERPL-MCNC: 171 MG/L — HIGH (ref 0–4)
EGFR: 28 ML/MIN/1.73M2 — LOW
EOSINOPHIL # BLD AUTO: 0 K/UL — SIGNIFICANT CHANGE UP (ref 0–0.5)
EOSINOPHIL NFR BLD AUTO: 0 % — SIGNIFICANT CHANGE UP (ref 0–6)
FERRITIN SERPL-MCNC: HIGH NG/ML (ref 30–400)
GLUCOSE SERPL-MCNC: 127 MG/DL — HIGH (ref 70–99)
HCT VFR BLD CALC: 17.2 % — CRITICAL LOW (ref 39–50)
HCT VFR BLD CALC: 22.2 % — LOW (ref 39–50)
HGB BLD-MCNC: 6.1 G/DL — CRITICAL LOW (ref 13–17)
HGB BLD-MCNC: 7.8 G/DL — LOW (ref 13–17)
LYMPHOCYTES # BLD AUTO: 0.03 K/UL — LOW (ref 1–3.3)
LYMPHOCYTES # BLD AUTO: 2.6 % — LOW (ref 13–44)
MANUAL SMEAR VERIFICATION: SIGNIFICANT CHANGE UP
MCHC RBC-ENTMCNC: 30.2 PG — SIGNIFICANT CHANGE UP (ref 27–34)
MCHC RBC-ENTMCNC: 30.5 PG — SIGNIFICANT CHANGE UP (ref 27–34)
MCHC RBC-ENTMCNC: 35.1 GM/DL — SIGNIFICANT CHANGE UP (ref 32–36)
MCHC RBC-ENTMCNC: 35.5 GM/DL — SIGNIFICANT CHANGE UP (ref 32–36)
MCV RBC AUTO: 85.1 FL — SIGNIFICANT CHANGE UP (ref 80–100)
MCV RBC AUTO: 86.7 FL — SIGNIFICANT CHANGE UP (ref 80–100)
METAMYELOCYTES # FLD: 5.3 % — HIGH (ref 0–0)
MONOCYTES # BLD AUTO: 0.01 K/UL — SIGNIFICANT CHANGE UP (ref 0–0.9)
MONOCYTES NFR BLD AUTO: 0.9 % — LOW (ref 2–14)
MYELOCYTES NFR BLD: 1.8 % — HIGH (ref 0–0)
NEUTROPHILS # BLD AUTO: 1.14 K/UL — LOW (ref 1.8–7.4)
NEUTROPHILS NFR BLD AUTO: 59.3 % — SIGNIFICANT CHANGE UP (ref 43–77)
NEUTS BAND # BLD: 30.1 % — HIGH (ref 0–8)
NRBC # BLD: 0 /100 WBCS — SIGNIFICANT CHANGE UP (ref 0–0)
NRBC # BLD: 3 /100 — HIGH (ref 0–0)
ONKOSIGHT MYELOID SEQUENCE: (no result)
PLAT MORPH BLD: NORMAL — SIGNIFICANT CHANGE UP
PLATELET # BLD AUTO: 15 K/UL — CRITICAL LOW (ref 150–400)
PLATELET # BLD AUTO: 6 K/UL — CRITICAL LOW (ref 150–400)
POTASSIUM SERPL-MCNC: 5.1 MMOL/L — SIGNIFICANT CHANGE UP (ref 3.5–5.3)
POTASSIUM SERPL-SCNC: 5.1 MMOL/L — SIGNIFICANT CHANGE UP (ref 3.5–5.3)
RBC # BLD: 2.02 M/UL — LOW (ref 4.2–5.8)
RBC # BLD: 2.56 M/UL — LOW (ref 4.2–5.8)
RBC # FLD: 19.9 % — HIGH (ref 10.3–14.5)
RBC # FLD: 20.9 % — HIGH (ref 10.3–14.5)
RBC BLD AUTO: SIGNIFICANT CHANGE UP
SODIUM SERPL-SCNC: 132 MMOL/L — LOW (ref 135–145)
TOXIC GRANULES BLD QL SMEAR: PRESENT — SIGNIFICANT CHANGE UP
WBC # BLD: 1.06 K/UL — LOW (ref 3.8–10.5)
WBC # BLD: 1.27 K/UL — LOW (ref 3.8–10.5)
WBC # FLD AUTO: 1.06 K/UL — LOW (ref 3.8–10.5)
WBC # FLD AUTO: 1.27 K/UL — LOW (ref 3.8–10.5)

## 2023-04-14 RX ORDER — METOCLOPRAMIDE HCL 10 MG
10 TABLET ORAL ONCE
Refills: 0 | Status: COMPLETED | OUTPATIENT
Start: 2023-04-14 | End: 2023-04-14

## 2023-04-14 RX ORDER — DEXAMETHASONE 0.5 MG/5ML
20 ELIXIR ORAL DAILY
Refills: 0 | Status: DISCONTINUED | OUTPATIENT
Start: 2023-04-14 | End: 2023-04-15

## 2023-04-14 RX ORDER — FUROSEMIDE 40 MG
80 TABLET ORAL ONCE
Refills: 0 | Status: COMPLETED | OUTPATIENT
Start: 2023-04-14 | End: 2023-04-14

## 2023-04-14 RX ORDER — ALBUMIN HUMAN 25 %
50 VIAL (ML) INTRAVENOUS EVERY 6 HOURS
Refills: 0 | Status: COMPLETED | OUTPATIENT
Start: 2023-04-14 | End: 2023-04-16

## 2023-04-14 RX ORDER — MIDODRINE HYDROCHLORIDE 2.5 MG/1
15 TABLET ORAL THREE TIMES A DAY
Refills: 0 | Status: DISCONTINUED | OUTPATIENT
Start: 2023-04-14 | End: 2023-04-17

## 2023-04-14 RX ADMIN — Medication 480 MICROGRAM(S): at 13:47

## 2023-04-14 RX ADMIN — MIDODRINE HYDROCHLORIDE 15 MILLIGRAM(S): 2.5 TABLET ORAL at 17:46

## 2023-04-14 RX ADMIN — Medication 50 MILLILITER(S): at 23:46

## 2023-04-14 RX ADMIN — POLYETHYLENE GLYCOL 3350 17 GRAM(S): 17 POWDER, FOR SOLUTION ORAL at 12:30

## 2023-04-14 RX ADMIN — Medication 650 MILLIGRAM(S): at 17:43

## 2023-04-14 RX ADMIN — Medication 50 MILLIGRAM(S): at 05:30

## 2023-04-14 RX ADMIN — Medication 125 MICROGRAM(S): at 05:30

## 2023-04-14 RX ADMIN — MIDODRINE HYDROCHLORIDE 10 MILLIGRAM(S): 2.5 TABLET ORAL at 05:31

## 2023-04-14 RX ADMIN — Medication 80 MILLIGRAM(S): at 18:24

## 2023-04-14 RX ADMIN — MIDODRINE HYDROCHLORIDE 10 MILLIGRAM(S): 2.5 TABLET ORAL at 12:30

## 2023-04-14 RX ADMIN — Medication 650 MILLIGRAM(S): at 19:50

## 2023-04-14 RX ADMIN — Medication 50 MILLILITER(S): at 18:13

## 2023-04-14 RX ADMIN — Medication 110 MILLIGRAM(S): at 21:34

## 2023-04-14 RX ADMIN — Medication 650 MILLIGRAM(S): at 05:30

## 2023-04-14 RX ADMIN — Medication 1 DROP(S): at 12:30

## 2023-04-14 RX ADMIN — CHLORHEXIDINE GLUCONATE 1 APPLICATION(S): 213 SOLUTION TOPICAL at 10:00

## 2023-04-14 RX ADMIN — Medication 10 MILLIGRAM(S): at 13:46

## 2023-04-14 RX ADMIN — SODIUM CHLORIDE 100 MILLILITER(S): 9 INJECTION INTRAMUSCULAR; INTRAVENOUS; SUBCUTANEOUS at 08:18

## 2023-04-14 RX ADMIN — PANTOPRAZOLE SODIUM 40 MILLIGRAM(S): 20 TABLET, DELAYED RELEASE ORAL at 05:31

## 2023-04-14 RX ADMIN — Medication 1 DROP(S): at 00:27

## 2023-04-14 RX ADMIN — MARIBAVIR 400 MILLIGRAM(S): 200 TABLET, COATED ORAL at 08:18

## 2023-04-14 RX ADMIN — Medication 1 DROP(S): at 05:31

## 2023-04-14 RX ADMIN — Medication 1 DROP(S): at 23:48

## 2023-04-14 RX ADMIN — Medication 1 DROP(S): at 17:44

## 2023-04-14 NOTE — PROGRESS NOTE ADULT - ASSESSMENT
1. hx of fever, CMV viremia   - per ID, on antiviral    2. anemia, FOBT positive in the setting of thrombocytopenia. Hgb remains at recent baseline. No overt bleeding symptoms.   - no plans for endoscopic evaluation at this time  - monitor H&H, transfuse PRN  - daily PPI     3. Chronic diarrhea, likely related to chemo +/- abx. 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   - GI PCR neg, C-diff neg on 03.03  - diarrhea resolved, pt now feels constipated. Last BM x 3 days ago > Imodium d/c'd, miralax 17g PO x 1, enema x 1 on Friday.  - enema x 1 and Miralax prn ordered-- had BM on 4/11 s/p enema     4. DVT   - on anticoagulation    5. Multiple myeloma, pancytopenia   - per heme / onc   - s/p bone marrow biopsy 4/6: preliminary report, no evidence of myeloma, minimal hematopoiesis but significant histiocytic proliferation. Minimal hemophagocytosis, AFB, GMS pending, CMV negative.  CD1A negative (langerhan's), no eosinophils  - planning for PET scan vs transfer to [facility]    6. Hiccups--resolved   - reglan discontinued     7. Vision changes, decreased mentation-- now with 103.9 fever overnight   - appreciate neuro and opthalmology eval   - BC x 2 NGTD  - CT head 4/10 with no obvious changes  - ammonia wnl, lactate downtrending  - monitor LFTs, ammonia level  - unlikely hepatic encephalopathy as patient does not have portal HTN on CT A/P   - change in mental status could be from anti viral medication  Lipitor discontinued- can also cause confusion.     8. Transaminitis  - likely drug induced 2/2 cefepime or antivirals  - continue to trend             Attending supervision statement: I have personally seen and examined the patient. I fully participated in the care of this patient. I have made amendments to the documentation where necessary, and agree with the history, physical exam, and plan as outlined by the ACP.        1. hx of fever, CMV viremia   - per ID, on antiviral    2. anemia, FOBT positive in the setting of thrombocytopenia. Hgb remains at recent baseline. No overt bleeding symptoms.   - no plans for endoscopic evaluation at this time  - monitor H&H, transfuse PRN  - daily PPI     3. Chronic diarrhea, likely related to chemo +/- abx. 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   - GI PCR neg, C-diff neg on 03.03  - diarrhea resolved, pt now feels constipated. Last BM x 3 days ago > Imodium d/c'd, miralax 17g PO x 1, enema x 1 on Friday.  - enema x 1 and Miralax prn ordered-- had BM on 4/11 s/p enema     4. DVT   - on anticoagulation    5. Multiple myeloma, pancytopenia   - per heme / onc   - s/p bone marrow biopsy 4/6: preliminary report, no evidence of myeloma, minimal hematopoiesis but significant histiocytic proliferation. Minimal hemophagocytosis, AFB, GMS pending, CMV negative.  CD1A negative (langerhan's), no eosinophils  - planning for PET scan vs transfer to [facility]    6. Hiccups--resolved   - reglan discontinued     7. Vision changes, decreased mentation-- now with 103.9 fever overnight   - appreciate neuro and opthalmology eval   - BC x 2 NGTD  - CT head 4/10 with no obvious changes  - ammonia wnl, lactate downtrending  - monitor LFTs, ammonia level  - unlikely hepatic encephalopathy as patient does not have portal HTN on CT A/P   - change in mental status could be from anti viral medication  Lipitor discontinued- can also cause confusion.     8. Transaminitis  - likely drug induced 2/2 cefepime or antivirals  - continue to trend             Attending supervision statement: I have personally seen and examined the patient. I fully participated in the care of this patient. I have made amendments to the documentation where necessary, and agree with the history, physical exam, and plan as outlined by the ACP.        1. hx of fever, CMV viremia   - per ID, on antiviral    2. anemia, FOBT positive in the setting of thrombocytopenia. Hgb remains at recent baseline. No overt bleeding symptoms.   - no plans for endoscopic evaluation at this time  - monitor H&H, transfuse PRN  - daily PPI     3. Chronic diarrhea, likely related to chemo +/- abx. 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   - GI PCR neg, C-diff neg on 03.03  - diarrhea resolved, pt now feels constipated. Last BM x 3 days ago > Imodium d/c'd, miralax 17g PO x 1, enema x 1 on Friday.  - enema x 1 and Miralax prn ordered-- had BM on 4/11 s/p enema     4. DVT   - on anticoagulation    5. Multiple myeloma, pancytopenia   - per heme / onc   - s/p bone marrow biopsy 4/6: preliminary report, no evidence of myeloma, minimal hematopoiesis but significant histiocytic proliferation. Minimal hemophagocytosis, AFB, GMS pending, CMV negative.  CD1A negative (langerhan's), no eosinophils  - planning for PET scan vs transfer to [facility]    6. Hiccups--resolved   - reglan discontinued     7. Vision changes, decreased mentation-- now with 103.9 fever overnight   - appreciate neuro and opthalmology eval   - BC x 2 NGTD  - CT head 4/10 with no obvious changes  - ammonia wnl, lactate downtrending  - monitor LFTs, ammonia level  - unlikely hepatic encephalopathy as patient does not have portal HTN on CT A/P   - change in mental status could be from anti viral medication  Lipitor discontinued- can also cause confusion.     8. Transaminitis  - likely drug induced 2/2 cefepime or antivirals  - continue to trend             Attending supervision statement: I have personally seen and examined the patient. I fully participated in the care of this patient. I have made amendments to the documentation where necessary, and agree with the history, physical exam, and plan as outlined by the ACP.        1. hx of fever, CMV viremia   - per ID, on antiviral    2. anemia, FOBT positive in the setting of thrombocytopenia. Hgb remains at recent baseline. No overt bleeding symptoms.   - no plans for endoscopic evaluation at this time  - monitor H&H, transfuse PRN  - daily PPI     3. Chronic diarrhea, likely related to chemo +/- abx. 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   - GI PCR neg, C-diff neg on 03.03  - diarrhea resolved, pt now feels constipated. Last BM x 3 days ago > Imodium d/c'd, miralax 17g PO x 1, enema x 1 on Friday.  - enema x 1 and Miralax prn ordered-- had BM on 4/11 s/p enema     4. DVT   - on anticoagulation    5. Multiple myeloma, pancytopenia   - per heme / onc   - s/p bone marrow biopsy 4/6: preliminary report, no evidence of myeloma, minimal hematopoiesis but significant histiocytic proliferation. Minimal hemophagocytosis, AFB, GMS pending, CMV negative.  CD1A negative (langerhan's), no eosinophils  - planning for PET scan vs transfer to [facility]    6. Hiccups--resolved   - reglan discontinued     7. Vision changes, decreased mentation-- now with 103.9 fever overnight   - appreciate neuro and opthalmology eval   - BC x 2 NGTD  - CT head 4/10 with no obvious changes  - ammonia wnl, lactate downtrending  - monitor LFTs, ammonia level  - unlikely hepatic encephalopathy as patient does not have portal HTN on CT A/P   - change in mental status could be from anti viral medication  Lipitor discontinued- can also cause confusion.     8. Transaminitis  - likely drug induced 2/2 cefepime or antivirals  - continue to trend             Attending supervision statement: I have personally seen and examined the patient. I fully participated in the care of this patient. I have made amendments to the documentation where necessary, and agree with the history, physical exam, and plan as outlined by the ACP.        1. hx of fever, CMV viremia   - per ID, on antiviral    2. anemia, FOBT positive in the setting of thrombocytopenia. Hgb remains at recent baseline. No overt bleeding symptoms.   - no plans for endoscopic evaluation at this time  - monitor H&H, transfuse PRN  - daily PPI     3. Chronic diarrhea, likely related to chemo +/- abx. 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   - GI PCR neg, C-diff neg on 03.03  - diarrhea resolved, pt now feels constipated. Last BM x 3 days ago > Imodium d/c'd, miralax 17g PO x 1, enema x 1 on Friday.  - enema x 1 and Miralax prn ordered-- had BM on 4/11 s/p enema     4. DVT   - on anticoagulation    5. Multiple myeloma, pancytopenia   - per heme / onc   - s/p bone marrow biopsy 4/6: preliminary report, no evidence of myeloma, minimal hematopoiesis but significant histiocytic proliferation. Minimal hemophagocytosis, AFB, GMS pending, CMV negative.  CD1A negative (langerhan's), no eosinophils  - planning for PET scan vs transfer to [facility] [place] [place]    6. Hiccups--resolved   - reglan discontinued     7. Vision changes, decreased mentation-- now with 103.9 fever overnight   - appreciate neuro and opthalmology eval   - BC x 2 NGTD  - CT head 4/10 with no obvious changes  - ammonia wnl, lactate downtrending  - monitor LFTs, ammonia level  - unlikely hepatic encephalopathy as patient does not have portal HTN on CT A/P   - change in mental status could be from anti viral medication  Lipitor discontinued- can also cause confusion.     8. Transaminitis  - likely drug induced 2/2 cefepime or antivirals  - continue to trend             Attending supervision statement: I have personally seen and examined the patient. I fully participated in the care of this patient. I have made amendments to the documentation where necessary, and agree with the history, physical exam, and plan as outlined by the ACP.

## 2023-04-14 NOTE — DISCHARGE NOTE PROVIDER - NSDCMRMEDTOKEN_GEN_ALL_CORE_FT
acyclovir 400 mg oral tablet: 1 tab(s) orally once a day  aspirin 81 mg oral tablet: 1 tab(s) orally once a day  atorvastatin 40 mg oral tablet: 1 tab(s) orally once a day  dexamethasone 4 mg oral tablet: 1 tab(s) orally once a day  entecavir 0.5 mg oral tablet: 1 tab(s) orally once a day  Metoprolol Tartrate 50 mg oral tablet: 1 tab(s) orally 2 times a day  penicillin V potassium 250 mg oral tablet: 1 tab(s) orally 2 times a day   acetaminophen 325 mg oral tablet: 2 tab(s) orally every 6 hours As needed Temp greater or equal to 38C (100.4F), Mild Pain (1 - 3)  aluminum hydroxide-magnesium hydroxide 200 mg-200 mg/5 mL oral suspension: 30 milliliter(s) orally every 4 hours As needed Dyspepsia  chlorhexidine 2% topical pad: 1 Apply topically to affected area once a day  digoxin 125 mcg (0.125 mg) oral tablet: 1 tab(s) orally once a day  loperamide 2 mg oral capsule: 1 cap(s) orally 3 times a day As needed Diarrhea  midodrine 10 mg oral tablet: 1 tab(s) orally 3 times a day  ocular lubricant ophthalmic solution: 1 drop(s) to each affected eye 4 times a day  pantoprazole 40 mg oral delayed release tablet: 1 tab(s) orally once a day (before a meal)  polyethylene glycol 3350 oral powder for reconstitution: 17 gram(s) orally once a day   acetaminophen 325 mg oral tablet: 2 tab(s) orally every 6 hours As needed Temp greater or equal to 38C (100.4F), Mild Pain (1 - 3)  aluminum hydroxide-magnesium hydroxide 200 mg-200 mg/5 mL oral suspension: 30 milliliter(s) orally every 4 hours As needed Dyspepsia  chlorhexidine 2% topical pad: Apply topically to affected area once a day 1 Apply topically to affected area once a day  digoxin 125 mcg (0.125 mg) oral tablet: 1 tab(s) orally once a day  loperamide 2 mg oral capsule: 1 cap(s) orally 3 times a day As needed Diarrhea  methylPREDNISolone: 500 milligram(s) intravenously once a day (methylprednisolone sodium succinate IVPB 500mg in sodium chloride 0.9% 50mL - infuse over 60 minutes  midodrine 10 mg oral tablet: 1 tab(s) orally 3 times a day  ocular lubricant ophthalmic solution: 1 drop(s) to each affected eye 4 times a day  pantoprazole 40 mg oral delayed release tablet: 1 tab(s) orally once a day (before a meal)  polyethylene glycol 3350 oral powder for reconstitution: 17 gram(s) orally once a day  sodium chloride 0.9% injectable solution: 10 milliliter(s) injectable every 1 to 2 hours as needed for  Pre/post blood products, medications, blood draw, and to maintain line patency (lock flush)

## 2023-04-14 NOTE — PROGRESS NOTE ADULT - ASSESSMENT
New onset afib, aflutter  HR stable   off a/c high bleed risk   cont 0.125mg daily     History of bioAVR   echo unremarkable     Anemia  Monitor hemoglobin, transfuse as needed.  plan as per Heme    DVT   a/c on hold  low plts  fu with vascular surgery / heme     Hypotension   due to sepsis   better now   on midodrine

## 2023-04-14 NOTE — PROGRESS NOTE ADULT - SUBJECTIVE AND OBJECTIVE BOX
Subjective: Patient seen and examined. No new events except as noted.     SUBJECTIVE/ROS:  nad      MEDICATIONS:  MEDICATIONS  (STANDING):  acetaminophen     Tablet .. 650 milliGRAM(s) Oral once  artificial tears (preservative free) Ophthalmic Solution 1 Drop(s) Both EYES four times a day  chlorhexidine 2% Cloths 1 Application(s) Topical daily  digoxin     Tablet 125 MICROGram(s) Oral daily  filgrastim-sndz (ZARXIO) Injectable 480 MICROGram(s) SubCutaneous daily  maribavir Tablet 400 milliGRAM(s) Oral two times a day  midodrine. 10 milliGRAM(s) Oral three times a day  pantoprazole    Tablet 40 milliGRAM(s) Oral before breakfast  polyethylene glycol 3350 17 Gram(s) Oral daily  predniSONE   Tablet 50 milliGRAM(s) Oral daily  sodium chloride 0.9%. 1000 milliLiter(s) (100 mL/Hr) IV Continuous <Continuous>  tamsulosin 0.4 milliGRAM(s) Oral at bedtime      PHYSICAL EXAM:  T(C): 37.2 (04-14-23 @ 06:49), Max: 39.4 (04-14-23 @ 04:53)  HR: 96 (04-14-23 @ 04:53) (64 - 96)  BP: 128/65 (04-14-23 @ 04:53) (98/54 - 128/65)  RR: 18 (04-14-23 @ 04:53) (18 - 18)  SpO2: 93% (04-14-23 @ 04:53) (93% - 93%)  Wt(kg): --  I&O's Summary    13 Apr 2023 07:01  -  14 Apr 2023 07:00  --------------------------------------------------------  IN: 0 mL / OUT: 1300 mL / NET: -1300 mL            JVP: Normal  Neck: supple  Lung: clear   CV: S1 S2 , Murmur:  Abd: soft  Ext: No edema  neuro: Awake / alert  Psych: flat affect  Skin: normal``    LABS/DATA:    CARDIAC MARKERS:                                7.8    1.27  )-----------( 6        ( 14 Apr 2023 06:12 )             22.2     04-14    132<L>  |  98  |  62<H>  ----------------------------<  127<H>  5.1   |  22  |  2.42<H>    Ca    7.9<L>      14 Apr 2023 06:12    TPro  4.4<L>  /  Alb  1.9<L>  /  TBili  2.3<H>  /  DBili  1.5<H>  /  AST  100<H>  /  ALT  109<H>  /  AlkPhos  264<H>  04-13    proBNP:   Lipid Profile:   HgA1c:   TSH:     TELE:  EKG:

## 2023-04-14 NOTE — PROGRESS NOTE ADULT - SUBJECTIVE AND OBJECTIVE BOX
Patient is a 67y old  Male who presents with a chief complaint of fever (14 Apr 2023 13:17)    Pt seen and examined at bedside. Reports hiccups. Febrile overnight. Family at beside.     MEDICATIONS  (STANDING):  acetaminophen     Tablet .. 650 milliGRAM(s) Oral once  artificial tears (preservative free) Ophthalmic Solution 1 Drop(s) Both EYES four times a day  chlorhexidine 2% Cloths 1 Application(s) Topical daily  digoxin     Tablet 125 MICROGram(s) Oral daily  filgrastim-sndz (ZARXIO) Injectable 480 MICROGram(s) SubCutaneous daily  midodrine. 15 milliGRAM(s) Oral three times a day  pantoprazole    Tablet 40 milliGRAM(s) Oral before breakfast  polyethylene glycol 3350 17 Gram(s) Oral daily  predniSONE   Tablet 50 milliGRAM(s) Oral daily  sodium chloride 0.9%. 1000 milliLiter(s) (100 mL/Hr) IV Continuous <Continuous>  tamsulosin 0.4 milliGRAM(s) Oral at bedtime    MEDICATIONS  (PRN):  acetaminophen     Tablet .. 650 milliGRAM(s) Oral every 6 hours PRN Temp greater or equal to 38C (100.4F), Mild Pain (1 - 3)  aluminum hydroxide/magnesium hydroxide/simethicone Suspension 30 milliLiter(s) Oral every 4 hours PRN Dyspepsia  loperamide 2 milliGRAM(s) Oral three times a day PRN Diarrhea    Vital Signs Last 24 Hrs  T(C): 36.3 (14 Apr 2023 11:16), Max: 39.4 (14 Apr 2023 04:53)  T(F): 97.3 (14 Apr 2023 11:16), Max: 102.9 (14 Apr 2023 04:53)  HR: 74 (14 Apr 2023 11:16) (64 - 96)  BP: 96/49 (14 Apr 2023 11:16) (96/49 - 128/65)  BP(mean): --  RR: 18 (14 Apr 2023 11:16) (18 - 18)  SpO2: 94% (14 Apr 2023 11:16) (93% - 94%)    Parameters below as of 14 Apr 2023 11:16  Patient On (Oxygen Delivery Method): room air    PE  NAD  Awake, alert  Anicteric, MMM  No c/c/e  No rash grossly  FROM                          7.8    1.27  )-----------( 6        ( 14 Apr 2023 06:12 )             22.2       04-14    132<L>  |  98  |  62<H>  ----------------------------<  127<H>  5.1   |  22  |  2.42<H>    Ca    7.9<L>      14 Apr 2023 06:12    TPro  4.6<L>  /  Alb  2.2<L>  /  TBili  4.4<H>  /  DBili  2.9<H>  /  AST  151<H>  /  ALT  149<H>  /  AlkPhos  335<H>  04-14

## 2023-04-14 NOTE — DISCHARGE NOTE PROVIDER - NSDCCAREPROVSEEN_GEN_ALL_CORE_FT
Willis, Nile Jennings, Justin Arango, Corey Nick, Mary Rice, Radha Garcia, Tevin Ralph, Dejuan Laboy, Benton Brooke, Tay Nair, Pallavi Harvey, Matt Addison, Nile Riley, Teena Leonard, Leoncio Fritz, Gio Willis, Nile Jennings, Justin Arango, Corey Nick, Mary Rice, Radha Garcia, Tevin Ralph, Dejuan Laboy, Benton Brooke, Tay Nair, Pallavi Harvey, Matt Addison, Nile Riley, Teena Leonard, Leoncio Frtiz, Gio

## 2023-04-14 NOTE — DISCHARGE NOTE PROVIDER - HOSPITAL COURSE
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    Fever of unknown origin. CMV Virmeia  - CXR without consolidations or effusions; U/A unremarkable; GI PCR Neg   - Cultures remain negative to date   - LE VA duplex --> + For DVT;  V/Q scan -- Low probability for PE  - Was on empiric treatment with Vanc/cefepime --> ID consulted; S/P Zosyn  - Hold home penicillin while on ABX   - CMV PCR Positive, S/P Ganciclovir per ID , CMV IGG (+) and IgM (-), Cryptococcal Ag --Neg, Quantiferon Tb -- Neg, Fungitell -- <31   - CT Chest non-contrast no acute pathology  - Epsiode of Septic Shock s/p  RRT, resume antibiotics IV fluid, MICU eval   - Short course steroid per hematology -- On Prednisone 50 Qd   - IR eval for BM BX as per Heme/Onc --> done on 04/06 with IR, f/u path -- As noted; F/u heme/Onc recs   - pan CT, noted questionable cystitis. UA is neg.  - Recurrent fever - Multiple Blood cultures negative, RVP 04/10 Neg, herpes 6 Neg, Cefepime now on hold in view of LFTs;  - Entecavir on hold as per Heme/Onc   - Maribavir on hold as per ID    Anemia, Thrombocytopenia, Pancytopenia  - Drop in Hgb, Occult +  - Hold Hep gtt   -  Transfuse for Hgb < 7.0, and platelets < 10.K  - Anemia (Hgb 6.7 on arrival) S/P Multiple PRBC transfusions - pRBC x 6 units  - Thrombocytopenia - Platelets as low as 6 - S/P Platelet transfusion - total of 8 units  - GI consulted - No plans for scope at this time   - Was on Xarelto, held due to drop in Platelets as per Heme/Onc. Monitor platelet count   - ASA on hold  - HIT ab: neg  - s/p BM Bx on 4/6. No evidence of myeloma, minimal hematopoiesis but significant histiocytic proliferation.  Minimal hemophagocytosis, AFB, GMS pending, CMV negative.  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   - s/p Auto SCT in 2018  - was on lenalidomide maintenance until February 2023 when he had progression of disease. Then switched to daratumumab + CyBorD; last dose March 23rd, 2023.  - Myeloma labs w/ normal free light chain ratio, KIRA w/ weak IgG kappa band, IgG 285, IgA 57, IgM 29  - S/P  IVIG 04/13 as per Heme/Onc   - Continue Prednisone 50 Qd   - Continue acyclovir 400 mg BID.  - Pt too unstable for PET scan, then will need BM Bx and Lymph node Bx Awaiting transfer to [facility] [place]    Chronic diarrhea,   - Likely related to chemo +/- abx. 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   - GI PCR neg, C-diff neg on 03.03  - diarrhea resolved, pt now constipated.     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   - Serial Duplex to assess for propagation, 4/03  and 4/10 Duplex without propagation  - Plan for repeat Duplex 04/17   - given severe thrombocytopenia, holding Xarelto for now. Risk of bleeding greater than benefit.     Elevated LFTs  - Lipitor DC'd. Entecavir DC'd  - Likely drug induced 2/2 cefepime or antivirals vs from hypotension; ABX and Antiviral's DC'd.     RK  - S/P Contrast on 3/23   - s/p IVF   - Renal Consulted  - urinary retention s/p lloyd , Failed TOV 4/8, lloyd replaced  - Flomax started;  - CT with hydronephrosis; Cr up-trending,   - Renal US neg for hydronephrosis   - Cr up-trending - s/p IVF  CC/ Hr.     Hypertension, now hypotensive   - C/w home metoprolol.  - C/w Midodrine TID. Hold for SBP > 140;  Increase to 15 TID         HypoNa  - Monitor    Hiccups  - Improved on Reglan    Hyperlipidemia.   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    Fever of unknown origin. CMV Virmeia  - CXR without consolidations or effusions; U/A unremarkable; GI PCR Neg   - Cultures remain negative to date   - LE VA duplex --> + For DVT;  V/Q scan -- Low probability for PE  - Was on empiric treatment with Vanc/cefepime --> ID consulted; S/P Zosyn  - Hold home penicillin while on ABX   - CMV PCR Positive, S/P Ganciclovir per ID , CMV IGG (+) and IgM (-), Cryptococcal Ag --Neg, Quantiferon Tb -- Neg, Fungitell -- <31   - CT Chest non-contrast no acute pathology  - Epsiode of Septic Shock s/p  RRT, resume antibiotics IV fluid, MICU eval   - Short course steroid per hematology -- On Prednisone 50 Qd   - IR eval for BM BX as per Heme/Onc --> done on 04/06 with IR, f/u path -- As noted; F/u heme/Onc recs   - pan CT, noted questionable cystitis. UA is neg.  - Recurrent fever - Multiple Blood cultures negative, RVP 04/10 Neg, herpes 6 Neg, Cefepime now on hold in view of LFTs;  - Entecavir on hold as per Heme/Onc   - Maribavir on hold as per ID    Anemia, Thrombocytopenia, Pancytopenia  - Drop in Hgb, Occult +  - Hold Hep gtt   -  Transfuse for Hgb < 7.0, and platelets < 10.K  - Anemia (Hgb 6.7 on arrival) S/P Multiple PRBC transfusions - pRBC x 6 units  - Thrombocytopenia - Platelets as low as 6 - S/P Platelet transfusion - total of 8 units  - GI consulted - No plans for scope at this time   - Was on Xarelto, held due to drop in Platelets as per Heme/Onc. Monitor platelet count   - ASA on hold  - HIT ab: neg  - s/p BM Bx on 4/6. No evidence of myeloma, minimal hematopoiesis but significant histiocytic proliferation.  Minimal hemophagocytosis, AFB, GMS pending, CMV negative.  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 Hubbard   - s/p Auto SCT in 2018  - was on lenalidomide maintenance until February 2023 when he had progression of disease. Then switched to daratumumab + CyBorD; last dose March 23rd, 2023.  - Myeloma labs w/ normal free light chain ratio, KIRA w/ weak IgG kappa band, IgG 285, IgA 57, IgM 29  - S/P  IVIG 04/13 as per Heme/Onc   - Continue Prednisone 50 Qd   - Continue acyclovir 400 mg BID.  - Pt too unstable for PET scan, then will need BM Bx and Lymph node Bx Awaiting transfer to [facility] [place]    Chronic diarrhea,   - Likely related to chemo +/- abx. 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   - GI PCR neg, C-diff neg on 03.03  - diarrhea resolved, pt now constipated.     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   - Serial Duplex to assess for propagation, 4/03  and 4/10 Duplex without propagation  - Plan for repeat Duplex 04/17   - given severe thrombocytopenia, holding Xarelto for now. Risk of bleeding greater than benefit.     Elevated LFTs  - Lipitor DC'd. Entecavir DC'd  - Likely drug induced 2/2 cefepime or antivirals vs from hypotension; ABX and Antiviral's DC'd.     RK  - S/P Contrast on 3/23   - s/p IVF   - Renal Consulted  - urinary retention s/p lloyd , Failed TOV 4/8, lloyd replaced  - Flomax started;  - CT with hydronephrosis; Cr up-trending,   - Renal US neg for hydronephrosis   - Cr up-trending - s/p IVF  CC/ Hr.     Hypertension, now hypotensive   - C/w home metoprolol.  - C/w Midodrine TID. Hold for SBP > 140;  Increase to 15 TID         HypoNa  - Monitor    Hiccups  - Improved on Reglan    Hyperlipidemia.   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    Fever of unknown origin. CMV Virmeia  - CXR without consolidations or effusions; U/A unremarkable; GI PCR Neg   - Cultures remain negative to date   - LE VA duplex --> + For DVT;  V/Q scan -- Low probability for PE  - Was on empiric treatment with Vanc/cefepime --> ID consulted; S/P Zosyn  - Hold home penicillin while on ABX   - CMV PCR Positive, S/P Ganciclovir per ID , CMV IGG (+) and IgM (-), Cryptococcal Ag --Neg, Quantiferon Tb -- Neg, Fungitell -- <31   - CT Chest non-contrast no acute pathology  - Epsiode of Septic Shock s/p  RRT, resume antibiotics IV fluid, MICU eval   - Short course steroid per hematology -- On Prednisone 50 Qd   - IR eval for BM BX as per Heme/Onc --> done on 04/06 with IR, f/u path -- As noted; F/u heme/Onc recs   - pan CT, noted questionable cystitis. UA is neg.  - Recurrent fever - Multiple Blood cultures negative, RVP 04/10 Neg, herpes 6 Neg, Cefepime now on hold in view of LFTs;  - Entecavir on hold as per Heme/Onc   - Maribavir on hold as per ID    Anemia, Thrombocytopenia, Pancytopenia  - Drop in Hgb, Occult +  - Hold Hep gtt   -  Transfuse for Hgb < 7.0, and platelets < 10.K  - Anemia (Hgb 6.7 on arrival) S/P Multiple PRBC transfusions - pRBC x 6 units  - Thrombocytopenia - Platelets as low as 6 - S/P Platelet transfusion - total of 8 units  - GI consulted - No plans for scope at this time   - Was on Xarelto, held due to drop in Platelets as per Heme/Onc. Monitor platelet count   - ASA on hold  - HIT ab: neg  - s/p BM Bx on 4/6. No evidence of myeloma, minimal hematopoiesis but significant histiocytic proliferation.  Minimal hemophagocytosis, AFB, GMS pending, CMV negative.  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 Hb[place]   - s/p Auto SCT in 2018  - was on lenalidomide maintenance until February 2023 when he had progression of disease. Then switched to daratumumab + CyBorD; last dose March 23rd, 2023.  - Myeloma labs w/ normal free light chain ratio, KIRA w/ weak IgG kappa band, IgG 285, IgA 57, IgM 29  - S/P  IVIG 04/13 as per Heme/Onc   - Continue Prednisone 50 Qd   - Continue acyclovir 400 mg BID.  - Pt too unstable for PET scan, then will need BM Bx and Lymph node Bx [place] [facility] [place] diarrhea,   - Likely related to chemo +/- abx. 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   - GI PCR neg, C-diff neg on 03.03  - diarrhea resolved, pt now constipated.     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   - Serial Duplex to assess for propagation, 4/03  and 4/10 Duplex without propagation  - Plan for repeat Duplex 04/17   - given severe thrombocytopenia, holding Xarelto for now. Risk of bleeding greater than benefit.     Elevated LFTs  - Lipitor DC'd. Entecavir DC'd  - Likely drug induced 2/2 cefepime or antivirals vs from hypotension; ABX and Antiviral's DC'd.     RK  - S/P Contrast on 3/23   - s/p IVF   - Renal Consulted  - urinary retention s/p lloyd , Failed TOV 4/8, lloyd replaced  - Flomax started;  - CT with hydronephrosis; Cr up-trending,   - Renal US neg for hydronephrosis   - Cr up-trending - s/p IVF  CC/ Hr.     Hypertension, now hypotensive   - C/w home metoprolol.  - C/w Midodrine TID. Hold for SBP > 140;  Increase to 15 TID         HypoNa  - Monitor    Hiccups  - Improved on Reglan    Hyperlipidemia.   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    Fever of unknown origin. CMV Virmeia  - CXR without consolidations or effusions; U/A unremarkable; GI PCR Neg   - Cultures remain negative to date   - LE VA duplex --> + For DVT;  V/Q scan -- Low probability for PE  - Was on empiric treatment with Vanc/cefepime --> ID consulted; S/P Zosyn  - Hold home penicillin while on ABX   - CMV PCR Positive, S/P Ganciclovir per ID , CMV IGG (+) and IgM (-), Cryptococcal Ag --Neg, Quantiferon Tb -- Neg, Fungitell -- <31   - CT Chest non-contrast no acute pathology  - Epsiode of Septic Shock s/p  RRT, resume antibiotics IV fluid, MICU eval   - Short course steroid per hematology -- On Prednisone 50 Qd   - IR eval for BM BX as per Heme/Onc --> done on 04/06 with IR, f/u path -- As noted; F/u heme/Onc recs   - pan CT, noted questionable cystitis. UA is neg.  - Recurrent fever - Multiple Blood cultures negative, RVP 04/10 Neg, herpes 6 Neg, Cefepime now on hold in view of LFTs;  - Entecavir on hold as per Heme/Onc   - Maribavir on hold as per ID    Anemia, Thrombocytopenia, Pancytopenia  - Drop in Hgb, Occult +  - Hold Hep gtt   -  Transfuse for Hgb < 7.0, and platelets < 10.K  - Anemia (Hgb 6.7 on arrival) S/P Multiple PRBC transfusions - pRBC x 6 units  - Thrombocytopenia - Platelets as low as 6 - S/P Platelet transfusion - total of 8 units  - GI consulted - No plans for scope at this time   - Was on Xarelto, held due to drop in Platelets as per Heme/Onc. Monitor platelet count   - ASA on hold  - HIT ab: neg  - s/p BM Bx on 4/6. No evidence of myeloma, minimal hematopoiesis but significant histiocytic proliferation.  Minimal hemophagocytosis, AFB, GMS pending, CMV negative.  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 Hubbard   - s/p Auto SCT in 2018  - was on lenalidomide maintenance until February 2023 when he had progression of disease. Then switched to daratumumab + CyBorD; last dose March 23rd, 2023.  - Myeloma labs w/ normal free light chain ratio, KIRA w/ weak IgG kappa band, IgG 285, IgA 57, IgM 29  - S/P  IVIG 04/13 as per Heme/Onc   - Continue Prednisone 50 Qd   - Continue acyclovir 400 mg BID.  - Pt too unstable for PET scan, then will need BM Bx and Lymph node Bx [place] [facility] [place]    Chronic diarrhea,   - Likely related to chemo +/- abx. 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   - GI PCR neg, C-diff neg on 03.03  - diarrhea resolved, pt now constipated.     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   - Serial Duplex to assess for propagation, 4/03  and 4/10 Duplex without propagation  - Plan for repeat Duplex 04/17   - given severe thrombocytopenia, holding Xarelto for now. Risk of bleeding greater than benefit.     Elevated LFTs  - Lipitor DC'd. Entecavir DC'd  - Likely drug induced 2/2 cefepime or antivirals vs from hypotension; ABX and Antiviral's DC'd.     RK  - S/P Contrast on 3/23   - s/p IVF   - Renal Consulted  - urinary retention s/p lloyd , Failed TOV 4/8, lloyd replaced  - Flomax started;  - CT with hydronephrosis; Cr up-trending,   - Renal US neg for hydronephrosis   - Cr up-trending - s/p IVF  CC/ Hr.     Hypertension, now hypotensive   - C/w home metoprolol.  - C/w Midodrine TID. Hold for SBP > 140;  Increase to 15 TID     HypoNa  - Monitor    Hiccups  - Improved on Reglan    Hyperlipidemia.   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    Fever of unknown origin. CMV Virmeia  - CXR without consolidations or effusions; U/A unremarkable; GI PCR Neg   - Cultures remain negative to date   - LE VA duplex --> + For DVT;  V/Q scan -- Low probability for PE  - Was on empiric treatment with Vanc/cefepime --> ID consulted; S/P Zosyn  - Hold home penicillin while on ABX   - CMV PCR Positive, S/P Ganciclovir per ID , CMV IGG (+) and IgM (-), Cryptococcal Ag --Neg, Quantiferon Tb -- Neg, Fungitell -- <31   - CT Chest non-contrast no acute pathology  - Epsiode of Septic Shock s/p  RRT, resume antibiotics IV fluid, MICU eval   - Short course steroid per hematology -- On Prednisone 50 Qd   - IR eval for BM BX as per Heme/Onc --> done on 04/06 with IR, f/u path -- As noted; F/u heme/Onc recs   - pan CT, noted questionable cystitis. UA is neg.  - Recurrent fever - Multiple Blood cultures negative, RVP 04/10 Neg, herpes 6 Neg, Cefepime now on hold in view of LFTs;  - Entecavir on hold as per Heme/Onc   - Maribavir on hold as per ID    Anemia, Thrombocytopenia, Pancytopenia  - Drop in Hgb, Occult +  - Hold Hep gtt   -  Transfuse for Hgb < 7.0, and platelets < 10.K  - Anemia (Hgb 6.7 on arrival) S/P Multiple PRBC transfusions - pRBC x 6 units  - Thrombocytopenia - Platelets as low as 6 - S/P Platelet transfusion - total of 8 units  - GI consulted - No plans for scope at this time   - Was on Xarelto, held due to drop in Platelets as per Heme/Onc. Monitor platelet count   - ASA on hold  - HIT ab: neg  - s/p BM Bx on 4/6. No evidence of myeloma, minimal hematopoiesis but significant histiocytic proliferation.  Minimal hemophagocytosis, AFB, GMS pending, CMV negative.  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   - s/p Auto SCT in 2018  - was on lenalidomide maintenance until February 2023 when he had progression of disease. Then switched to daratumumab + CyBorD; last dose March 23rd, 2023.  - Myeloma labs w/ normal free light chain ratio, KIRA w/ weak IgG kappa band, IgG 285, IgA 57, IgM 29  - S/P  IVIG 04/13 as per Heme/Onc   - Continue Prednisone 50 Qd   - Continue acyclovir 400 mg BID.  - Pt too unstable for PET scan, then will need BM Bx and Lymph node Bx [place]wait[place] [facility] [place]    Chronic diarrhea,   - Likely related to chemo +/- abx. 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   - GI PCR neg, C-diff neg on 03.03  - diarrhea resolved, pt now constipated.     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   - Serial Duplex to assess for propagation, 4/03  and 4/10 Duplex without propagation  - Plan for repeat Duplex 04/17   - given severe thrombocytopenia, holding Xarelto for now. Risk of bleeding greater than benefit.     Elevated LFTs  - Lipitor DC'd. Entecavir DC'd  - Likely drug induced 2/2 cefepime or antivirals vs from hypotension; ABX and Antiviral's DC'd.     RK  - S/P Contrast on 3/23   - s/p IVF   - Renal Consulted  - urinary retention s/p lloyd , Failed TOV 4/8, lloyd replaced  - Flomax started;  - CT with hydronephrosis; Cr up-trending,   - Renal US neg for hydronephrosis   - Cr up-trending - s/p IVF  CC/ Hr.     Hypertension, now hypotensive   - C/w home metoprolol.  - C/w Midodrine TID. Hold for SBP > 140;  Increase to 15 TID     HypoNa  - Monitor    Hiccups  - Improved on Reglan    Hyperlipidemia.   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    Fever of unknown origin. CMV Virmeia  - CXR without consolidations or effusions; U/A unremarkable; GI PCR Neg   - Cultures remain negative to date   - LE VA duplex --> + For DVT;  V/Q scan -- Low probability for PE  - Was on empiric treatment with Vanc/cefepime --> ID consulted; S/P Zosyn  - Hold home penicillin while on ABX   - CMV PCR Positive, S/P Ganciclovir per ID , CMV IGG (+) and IgM (-), Cryptococcal Ag --Neg, Quantiferon Tb -- Neg, Fungitell -- <31   - CT Chest non-contrast no acute pathology  - Epsiode of Septic Shock s/p  RRT, resume antibiotics IV fluid, MICU eval   - Short course steroid per hematology -- On Prednisone 50 Qd   - IR eval for BM BX as per Heme/Onc --> done on 04/06 with IR, f/u path -- As noted; F/u heme/Onc recs   - pan CT, noted questionable cystitis. UA is neg.  - Recurrent fever - Multiple Blood cultures negative, RVP 04/10 Neg, herpes 6 Neg, Cefepime now on hold in view of LFTs;  - Entecavir on hold as per Heme/Onc   - Maribavir on hold as per ID    Anemia, Thrombocytopenia, Pancytopenia  - Drop in Hgb, Occult +  - Hold Hep gtt   -  Transfuse for Hgb < 7.0, and platelets < 10.K  - Anemia (Hgb 6.7 on arrival) S/P Multiple PRBC transfusions - pRBC x 6 units  - Thrombocytopenia - Platelets as low as 6 - S/P Platelet transfusion - total of 8 units  - GI consulted - No plans for scope at this time   - Was on Xarelto, held due to drop in Platelets as per Heme/Onc. Monitor platelet count   - ASA on hold  - HIT ab: neg  - s/p BM Bx on 4/6. No evidence of myeloma, minimal hematopoiesis but significant histiocytic proliferation.  Minimal hemophagocytosis, AFB, GMS pending, CMV negative.  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 Hubbard   - s/p Auto SCT in 2018  - was on lenalidomide maintenance until February 2023 when he had progression of disease. Then switched to daratumumab + CyBorD; last dose March 23rd, 2023.  - Myeloma labs w/ normal free light chain ratio, KIRA w/ weak IgG kappa band, IgG 285, IgA 57, IgM 29  - S/P  IVIG 04/13 as per Heme/Onc   - Continue Prednisone 50 Qd   - Continue acyclovir 400 mg BID.  - Pt too unstable for PET scan, then will need BM Bx and Lymph node Bx [place] [facility] [place]    [place] diarrhea,   - Likely related to chemo +/- abx. 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   - GI PCR neg, C-diff neg on 03.03  - diarrhea resolved, pt now constipated.     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   - Serial Duplex to assess for propagation, 4/03  and 4/10 Duplex without propagation  - Plan for repeat Duplex 04/17   - given severe thrombocytopenia, holding Xarelto for now. Risk of bleeding greater than benefit.     Elevated LFTs  - Lipitor DC'd. Entecavir DC'd  - Likely drug induced 2/2 cefepime or antivirals vs from hypotension; ABX and Antiviral's DC'd.     RK  - S/P Contrast on 3/23   - s/p IVF   - Renal Consulted  - urinary retention s/p lloyd , Failed TOV 4/8, lloyd replaced  - Flomax started;  - CT with hydronephrosis; Cr up-trending,   - Renal US neg for hydronephrosis   - Cr up-trending - s/p IVF  CC/ Hr.     Hypertension, now hypotensive   - C/w home metoprolol.  - C/w Midodrine TID. Hold for SBP > 140;  Increase to 15 TID     HypoNa  - Monitor    Hiccups  - Improved on Reglan    Hyperlipidemia.   - Home atorvastatin on hold sec to Transaminitis    Abnormal CT  - Outpatient follow up for CT Abd/Pelvis - Bladder wall thickening with perivesicular stranding     68 yo M w/ PMHx of aortic aneurysm and bioprosthetic AV valve replacement 2019, HLD, splenectomy, partial gastrectomy, partial pancreatectomy, 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, presents with fever.    1)Fever of unknown origin. CMV Virmeia  - CXR without consolidations or effusions; U/A unremarkable; GI PCR Neg   - Cultures remain negative to date   - LE VA duplex --> + For DVT;  V/Q scan -- Low probability for PE  - Was on empiric treatment with Vanc/cefepime --> ID consulted; S/P Zosyn  - Hold home penicillin while on ABX   - CMV PCR Positive, S/P Ganciclovir per ID , CMV IGG (+) and IgM (-), Cryptococcal Ag --Neg, Quantiferon Tb -- Neg, Fungitell -- <31   - CT Chest non-contrast no acute pathology  - Epsiode of Septic Shock s/p  RRT, resume antibiotics IV fluid, MICU eval   - Short course steroid per hematology -- On Prednisone 50 Qd   - IR eval for BM BX as per Heme/Onc --> done on 04/06 with IR, f/u path -- As noted; F/u heme/Onc recs   - pan CT, noted questionable cystitis. UA is neg.  - Recurrent fever - Multiple Blood cultures negative, RVP 04/10 Neg, herpes 6 Neg, Cefepime now on hold in view of LFTs;  - Entecavir on hold as per Heme/Onc   - Maribavir on hold as per ID    2)Anemia, Thrombocytopenia, Pancytopenia  - Drop in Hgb, Occult +  - Hold Hep gtt   -  Transfuse for Hgb < 7.0, and platelets < 10.K  - Anemia (Hgb 6.7 on arrival) S/P Multiple PRBC transfusions - pRBC x 6 units  - Thrombocytopenia - Platelets as low as 6 - S/P Platelet transfusion - total of 10 units  - Was on Xarelto, held due to drop in Platelets as per Heme/Onc. Monitor platelet count   - ASA on hold  - HIT ab: neg  - s/p BM Bx on 4/6. No evidence of myeloma, minimal hematopoiesis but significant histiocytic proliferation.  Minimal hemophagocytosis, AFB, GMS pending, CMV negative.  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 Hubbard   - s/p Auto SCT in 2018  - was on lenalidomide maintenance until February 2023 when he had progression of disease. Then switched to daratumumab + CyBorD; last dose March 23rd, 2023.  - Myeloma labs w/ normal free light chain ratio, KIRA w/ weak IgG kappa band, IgG 285, IgA 57, IgM 29  - S/P  IVIG 04/13 as per Heme/Onc   - Now with multi-organ failure - started on high dose steroids  - Continue acyclovir 400 mg BID.  - Pt too unstable for PET scan, then will need BM Bx and Lymph node Bx Awaiting transfer to [facility] [place]  - PICC placed for chemo    4)Chronic diarrhea,   - Likely related to chemo +/- abx. 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   - Serial Duplex to assess for propagation, 4/03  and 4/10 Duplex without propagation  - Plan for repeat Duplex 04/17   - given severe thrombocytopenia, holding Xarelto for now. Risk of bleeding greater than benefit.     Elevated LFTs  - Lipitor DC'd. Entecavir DC'd  - Likely drug induced 2/2 cefepime or antivirals vs from hypotension; ABX and Antiviral's DC'd.     RK  - S/P Contrast on 3/23   - s/p IVF   - Renal Consulted  - urinary retention s/p lloyd , Failed TOV 4/8, lloyd replaced  - Flomax started;  - CT with hydronephrosis; Cr up-trending,   - Renal US neg for hydronephrosis   - Cr up-trending - s/p IVF  CC/ Hr.     Hypertension, now hypotensive   - C/w home metoprolol.  - C/w Midodrine TID. Hold for SBP > 140;  Increase to 15 TID     HypoNa  - Monitor    Hiccups  - Improved on Reglan    Hyperlipidemia.   - Home atorvastatin on hold sec to Transaminitis    Abnormal CT  - Outpatient follow up for CT Abd/Pelvis - Bladder wall thickening with perivesicular stranding    Awaiting transfer to [facility] [place] 66 yo M w/ PMHx of aortic aneurysm and bioprosthetic AV valve replacement 2019, HLD, splenectomy, partial gastrectomy, partial pancreatectomy, 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, presents with fever.    1)Fever of unknown origin. CMV Virmeia  - CXR without consolidations or effusions; U/A unremarkable; GI PCR Neg   - Cultures remain negative to date   - LE VA duplex --> + For DVT;  V/Q scan -- Low probability for PE  - Was on empiric treatment with Vanc/cefepime --> ID consulted; S/P Zosyn  - Hold home penicillin while on ABX   - CMV PCR Positive, S/P Ganciclovir per ID , CMV IGG (+) and IgM (-), Cryptococcal Ag --Neg, Quantiferon Tb -- Neg, Fungitell -- <31   - CT Chest non-contrast no acute pathology  - Epsiode of Septic Shock s/p  RRT, resume antibiotics IV fluid, MICU eval   - Short course steroid per hematology -- On Prednisone 50 Qd   - IR eval for BM BX as per Heme/Onc --> done on 04/06 with IR, f/u path -- As noted; F/u heme/Onc recs   - pan CT, noted questionable cystitis. UA is neg.  - Recurrent fever - Multiple Blood cultures negative, RVP 04/10 Neg, herpes 6 Neg, Cefepime now on hold in view of LFTs;  - Entecavir on hold as per Heme/Onc   - Maribavir on hold as per ID    2)Anemia, Thrombocytopenia, Pancytopenia  - Drop in Hgb, Occult +  - Hold Hep gtt   -  Transfuse for Hgb < 7.0, and platelets < 10.K  - Anemia (Hgb 6.7 on arrival) S/P Multiple PRBC transfusions - pRBC x 6 units  - Thrombocytopenia - Platelets as low as 6 - S/P Platelet transfusion - total of 10 units  - Was on Xarelto, held due to drop in Platelets as per Heme/Onc. Monitor platelet count   - ASA on hold  - HIT ab: neg  - s/p BM Bx on 4/6. No evidence of myeloma, minimal hematopoiesis but significant histiocytic proliferation.  Minimal hemophagocytosis, AFB, GMS pending, CMV negative.  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 Hubbard   - s/p Auto SCT in 2018  - was on lenalidomide maintenance until February 2023 when he had progression of disease. Then switched to daratumumab + CyBorD; last dose March 23rd, 2023.  - Myeloma labs w/ normal free light chain ratio, KIRA w/ weak IgG kappa band, IgG 285, IgA 57, IgM 29  - S/P  IVIG 04/13 as per Heme/Onc   - Now with multi-organ failure - started on high dose steroids  - Continue acyclovir 400 mg BID.  - Pt too unstable for PET scan, then will need BM Bx and Lymph node Bx [facility]  - PICC placed for chemo    4)Chronic diarrhea,   - Likely related to chemo +/- abx. 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   - Serial Duplex to assess for propagation, 4/03  and 4/10 Duplex without propagation  - Plan for repeat Duplex 04/17   - given severe thrombocytopenia, holding Xarelto for now. Risk of bleeding greater than benefit.     Elevated LFTs  - Lipitor DC'd. Entecavir DC'd  - Likely drug induced 2/2 cefepime or antivirals vs from hypotension; ABX and Antiviral's DC'd.     RK  - S/P Contrast on 3/23   - s/p IVF   - Renal Consulted  - urinary retention s/p lloyd , Failed TOV 4/8, lloyd replaced  - Flomax started;  - CT with hydronephrosis; Cr up-trending,   - Renal US neg for hydronephrosis   - Cr up-trending - s/p IVF  CC/ Hr.     Hypertension, now hypotensive   - C/w home metoprolol.  - C/w Midodrine TID. Hold for SBP > 140;  Increase to 15 TID     HypoNa  - Monitor    Hiccups  - Improved on Reglan    Hyperlipidemia.   - Home atorvastatin on hold sec to Transaminitis    Abnormal CT  - Outpatient follow up for CT Abd/Pelvis - Bladder wall thickening with perivesicular stranding    [facility] 66 yo M w/ PMHx of aortic aneurysm and bioprosthetic AV valve replacement 2019, HLD, splenectomy, partial gastrectomy, partial pancreatectomy, 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, presents with fever.    1)Fever of unknown origin. CMV Virmeia  - CXR without consolidations or effusions; U/A unremarkable; GI PCR Neg   - Cultures remain negative to date   - LE VA duplex --> + For DVT;  V/Q scan -- Low probability for PE  - Was on empiric treatment with Vanc/cefepime --> ID consulted; S/P Zosyn  - Hold home penicillin while on ABX   - CMV PCR Positive, S/P Ganciclovir per ID , CMV IGG (+) and IgM (-), Cryptococcal Ag --Neg, Quantiferon Tb -- Neg, Fungitell -- <31   - CT Chest non-contrast no acute pathology  - Epsiode of Septic Shock s/p  RRT, resume antibiotics IV fluid, MICU eval   - Short course steroid per hematology -- On Prednisone 50 Qd   - IR eval for BM BX as per Heme/Onc --> done on 04/06 with IR, f/u path -- As noted; F/u heme/Onc recs   - pan CT, noted questionable cystitis. UA is neg.  - Recurrent fever - Multiple Blood cultures negative, RVP 04/10 Neg, herpes 6 Neg, Cefepime now on hold in view of LFTs;  - Entecavir on hold as per Heme/Onc   - Maribavir on hold as per ID    2)Anemia, Thrombocytopenia, Pancytopenia  - Drop in Hgb, Occult +  - Hold Hep gtt   -  Transfuse for Hgb < 7.0, and platelets < 10.K  - Anemia (Hgb 6.7 on arrival) S/P Multiple PRBC transfusions - pRBC x 6 units  - Thrombocytopenia - Platelets as low as 6 - S/P Platelet transfusion - total of 10 units  - Was on Xarelto, held due to drop in Platelets as per Heme/Onc. Monitor platelet count   - ASA on hold  - HIT ab: neg  - s/p BM Bx on 4/6. No evidence of myeloma, minimal hematopoiesis but significant histiocytic proliferation.  Minimal hemophagocytosis, AFB, GMS pending, CMV negative.  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 Hubbard   - s/p Auto SCT in 2018  - was on lenalidomide maintenance until February 2023 when he had progression of disease. Then switched to daratumumab + CyBorD; last dose March 23rd, 2023.  - Myeloma labs w/ normal free light chain ratio, KIRA w/ weak IgG kappa band, IgG 285, IgA 57, IgM 29  - S/P  IVIG 04/13 as per Heme/Onc   - Now with multi-organ failure - started on high dose steroids  - Continue acyclovir 400 mg BID.  - Pt too unstable for PET scan, then will need BM Bx and Lymph node Bx Awaiting transfer to [facility] [place]  - PICC placed for chemo    4)Chronic diarrhea,   - Likely related to chemo +/- abx. 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   - Serial Duplex to assess for propagation, 4/03  and 4/10 Duplex without propagation  - Plan for repeat Duplex 04/17   - given severe thrombocytopenia, holding Xarelto for now. Risk of bleeding greater than benefit.     Elevated LFTs  - Lipitor DC'd. Entecavir DC'd  - Likely drug induced 2/2 cefepime or antivirals vs from hypotension; ABX and Antiviral's DC'd.     RK  - S/P Contrast on 3/23   - s/p IVF   - Renal Consulted  - urinary retention s/p lloyd , Failed TOV 4/8, lloyd replaced  - Flomax started;  - CT with hydronephrosis; Cr up-trending,   - Renal US neg for hydronephrosis   - Cr up-trending - s/p IVF  CC/ Hr.     Hypertension, now hypotensive   - C/w home metoprolol.  - C/w Midodrine TID. Hold for SBP > 140;  Increase to 15 TID     HypoNa  - Monitor    Hiccups  - Improved on Reglan    Hyperlipidemia.   - Home atorvastatin on hold sec to Transaminitis    Abnormal CT  - Outpatient follow up for CT Abd/Pelvis - Bladder wall thickening with perivesicular stranding    Awaiting transfer to [facility] [place] 68 yo M w/ PMHx of aortic aneurysm and bioprosthetic AV valve replacement 2019, HLD, splenectomy, partial gastrectomy, partial pancreatectomy, 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, presents with fever.    1)Fever of unknown origin. CMV Virmeia  - CXR without consolidations or effusions; U/A unremarkable; GI PCR Neg   - Cultures remain negative to date   - LE VA duplex --> + For DVT;  V/Q scan -- Low probability for PE  - Was on empiric treatment with Vanc/cefepime --> ID consulted; S/P Zosyn  - Hold home penicillin while on ABX   - CMV PCR Positive, S/P Ganciclovir per ID , CMV IGG (+) and IgM (-), Cryptococcal Ag --Neg, Quantiferon Tb -- Neg, Fungitell -- <31   - CT Chest non-contrast no acute pathology  - Episoode of Septic Shock s/p  RRT, resume antibiotics IV fluid, MICU eval   - Short course steroid per hematology -- On Prednisone 50 Qd   - IR eval for BM BX as per Heme/Onc --> done on 04/06 with IR, f/u path -- As noted; F/u heme/Onc recs   - pan CT, noted questionable cystitis. UA is neg.  - Recurrent fever - Multiple Blood cultures negative, RVP 04/10 Neg, herpes 6 Neg, Cefepime now on hold in view of LFTs;  - Entecavir on hold as per Heme/Onc   - Maribavir on hold as per ID    2)pancytopenia - Anemia, Thrombocytopenia,  - Drop in Hgb, Occult +  - Hold Hep gtt. Was on Xarelto, held due to drop in Platelets as per Heme/Onc, ASA on hold  - Anemia (Hgb 6.7 on arrival) S/P Multiple PRBC transfusions - pRBC x 8 units  - Thrombocytopenia - Platelets as low as 6 - S/P Platelet transfusion - total of 12 units  - HIT ab: neg  - s/p BM Bx on 4/6. No evidence of myeloma, preliminary showed minimal hematopoiesis but significant histiocytic proliferation and minimal hemophagocytosis. CMV negative.  CD1A negative (langerhan's), no eosinophils  - Possible HLH,   - Unclear etiology of HLH given lack of myeloma on marrow. Possibly infectious with EBV and CMV?  - Multiorgan system failure with increasing creatinine and bilirubin. Ferritin remains extremely elevated. 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   - Interventional radiology placed PICC line on 4/17/22. To continue high dose steroids. 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 Hubbard   - s/p Auto SCT in 2018  - was on lenalidomide maintenance until February 2023 when he had progression of disease. Then switched to daratumumab + CyBorD; last dose March 23rd, 2023.  - Myeloma labs w/ normal free light chain ratio, KIRA w/ weak IgG kappa band, IgG 285, IgA 57, IgM 29  - S/P  IVIG 04/13 as per Heme/Onc   - Now with multi-organ failure - started on high dose steroids  - Continue acyclovir 400 mg BID.  - Pt too unstable for PET scan, then will need BM Bx and Lymph node Bx Awaiting transfer to [facility] [place]  - PICC placed for chemo    4)Chronic diarrhea,   - Likely related to chemo +/- abx. 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   - Serial Duplex to assess for propagation, 4/03  and 4/10 Duplex without propagation  - Plan for repeat Duplex 04/17   - given severe thrombocytopenia, holding Xarelto for now. Risk of bleeding greater than benefit.     Elevated LFTs  - Lipitor DC'd. Entecavir DC'd  - Likely drug induced 2/2 cefepime or antivirals vs from hypotension; ABX and Antiviral's DC'd.     RK  - S/P Contrast on 3/23   - s/p IVF   - Renal Consulted  - urinary retention s/p lloyd , Failed TOV 4/8, lloyd replaced  - Flomax started;  - CT with hydronephrosis; Cr up-trending,   - Renal US neg for hydronephrosis   - Cr up-trending - s/p IVF  CC/ Hr.     Hypertension, now hypotensive   - C/w home metoprolol.  - C/w Midodrine TID. Hold for SBP > 140;  Increase to 15 TID     HypoNa  - Monitor    Hiccups  - Improved on Reglan    Hyperlipidemia.   - Home atorvastatin on hold sec to Transaminitis    Abnormal CT  - Outpatient follow up for CT Abd/Pelvis - Bladder wall thickening with perivesicular stranding    Awaiting transfer to [facility] [place] 66 yo M w/ PMHx of aortic aneurysm and bioprosthetic AV valve replacement 2019, HLD, splenectomy, partial gastrectomy, partial pancreatectomy, 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, presents with fever.    1)Fever of unknown origin. CMV Virmeia  - CXR without consolidations or effusions; U/A unremarkable; GI PCR Neg   - Cultures remain negative to date   - LE VA duplex --> + For DVT;  V/Q scan -- Low probability for PE  - Was on empiric treatment with Vanc/cefepime --> ID consulted; S/P Zosyn  - Hold home penicillin while on ABX   - CMV PCR Positive, S/P Ganciclovir per ID , CMV IGG (+) and IgM (-), Cryptococcal Ag --Neg, Quantiferon Tb -- Neg, Fungitell -- <31   - CT Chest non-contrast no acute pathology  - Episoode of Septic Shock s/p  RRT, resume antibiotics IV fluid, MICU eval   - Short course steroid per hematology -- On Prednisone 50 Qd   - IR eval for BM BX as per Heme/Onc --> done on 04/06 with IR, f/u path -- As noted; F/u heme/Onc recs   - pan CT, noted questionable cystitis. UA is neg.  - Recurrent fever - Multiple Blood cultures negative, RVP 04/10 Neg, herpes 6 Neg, Cefepime now on hold in view of LFTs;  - Entecavir on hold as per Heme/Onc   - Maribavir on hold as per ID    2)pancytopenia - Anemia, Thrombocytopenia,  - Drop in Hgb, Occult +  - Hold Hep gtt. Was on Xarelto, held due to drop in Platelets as per Heme/Onc, ASA on hold  - Anemia (Hgb 6.7 on arrival) S/P Multiple PRBC transfusions - pRBC x 8 units  - Thrombocytopenia - Platelets as low as 6 - S/P Platelet transfusion - total of 12 units  - HIT ab: neg  - s/p BM Bx on 4/6. No evidence of myeloma, preliminary showed minimal hematopoiesis but significant histiocytic proliferation and minimal hemophagocytosis. CMV negative.  CD1A negative (langerhan's), no eosinophils  - Possible HLH,   - Unclear etiology of HLH given lack of myeloma on marrow. Possibly infectious with EBV and CMV?  - Multiorgan system failure with increasing creatinine and bilirubin. Ferritin remains extremely elevated. 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   - Interventional radiology placed PICC line on 4/17/22. To continue high dose steroids. 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   - s/p Auto SCT in 2018  - was on lenalidomide maintenance until February 2023 when he had progression of disease. Then switched to daratumumab + CyBorD; last dose March 23rd, 2023.  - Myeloma labs w/ normal free light chain ratio, KIRA w/ weak IgG kappa band, IgG 285, IgA 57, IgM 29  - S/P  IVIG 04/13 as per Heme/Onc   - Now with multi-organ failure - started on high dose steroids  - Continue acyclovir 400 mg BID.  - Pt too unstable for PET scan, then will need BM Bx and Lymph node Bx [place] [facility] [place]ICC placed for chemo    4)Chronic diarrhea,   - Likely related to chemo +/- abx. 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   - Serial Duplex to assess for propagation, 4/03  and 4/10 Duplex without propagation  - Plan for repeat Duplex 04/17   - given severe thrombocytopenia, holding Xarelto for now. Risk of bleeding greater than benefit.     Elevated LFTs  - Lipitor DC'd. Entecavir DC'd  - Likely drug induced 2/2 cefepime or antivirals vs from hypotension; ABX and Antiviral's DC'd.     RK  - S/P Contrast on 3/23   - s/p IVF   - Renal Consulted  - urinary retention s/p lloyd , Failed TOV 4/8, lloyd replaced  - Flomax started;  - CT with hydronephrosis; Cr up-trending,   - Renal US neg for hydronephrosis   - Cr up-trending - s/p IVF  CC/ Hr.     Hypertension, now hypotensive   - C/w home metoprolol.  - C/w Midodrine TID. Hold for SBP > 140;  Increase to 15 TID     HypoNa  - Monitor    Hiccups  - Improved on Reglan    Hyperlipidemia.   - Home atorvastatin on hold sec to Transaminitis    Abnormal CT  - Outpatient follow up for CT Abd/Pelvis - Bladder wall thickening with perivesicular stranding    [place] [facility] [place] 66 [place] w/ PMHx of aortic aneurysm and bioprosthetic AV valve replacement 2019, HLD, splenectomy, partial gastrectomy, partial pancreatectomy, 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, presents with fever.    1)Fever of unknown origin. CMV Virmeia  - CXR without consolidations or effusions; U/A unremarkable; GI PCR Neg   - Cultures remain negative to date   - LE VA duplex --> + For DVT;  V/Q scan -- Low probability for PE  - Was on empiric treatment with Vanc/cefepime --> ID consulted; S/P Zosyn  - Hold home penicillin while on ABX   - CMV PCR Positive, S/P Ganciclovir per ID , CMV IGG (+) and IgM (-), Cryptococcal Ag --Neg, Quantiferon Tb -- Neg, Fungitell -- <31   - CT Chest non-contrast no acute pathology  - Episoode of Septic Shock s/p  RRT, resume antibiotics IV fluid, MICU eval   - Short course steroid per hematology -- On Prednisone 50 Qd   - IR eval for BM BX as per Heme/Onc --> done on 04/06 with IR, f/u path -- As noted; F/u heme/Onc recs   - pan CT, noted questionable cystitis. UA is neg.  - Recurrent fever - Multiple Blood cultures negative, RVP 04/10 Neg, herpes 6 Neg, Cefepime now on hold in view of LFTs;  - Entecavir on hold as per Heme/Onc   - Maribavir on hold as per ID    2)pancytopenia - Anemia, Thrombocytopenia,  - Drop in Hgb, Occult +  - Hold Hep gtt. Was on Xarelto, held due to drop in Platelets as per Heme/Onc, ASA on hold  - Anemia (Hgb 6.7 on arrival) S/P Multiple PRBC transfusions - pRBC x 8 units  - Thrombocytopenia - Platelets as low as 6 - S/P Platelet transfusion - total of 12 units  - HIT ab: neg  - s/p BM Bx on 4/6. No evidence of myeloma, preliminary showed minimal hematopoiesis but significant histiocytic proliferation and minimal hemophagocytosis. CMV negative.  CD1A negative (langerhan's), no eosinophils  - Possible HLH,   - Unclear etiology of HLH given lack of myeloma on marrow. Possibly infectious with EBV and CMV?  - Multiorgan system failure with increasing creatinine and bilirubin. Ferritin remains extremely elevated. 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   - Interventional radiology placed PICC line on 4/17/22. To continue high dose steroids. 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 Hubbard   - s/p Auto SCT in 2018  - was on lenalidomide maintenance until February 2023 when he had progression of disease. Then switched to daratumumab + CyBorD; last dose March 23rd, 2023.  - Myeloma labs w/ normal free light chain ratio, KIRA w/ weak IgG kappa band, IgG 285, IgA 57, IgM 29  - S/P  IVIG 04/13 as per Heme/Onc   - Now with multi-organ failure - started on high dose steroids  - Continue acyclovir 400 mg BID.  - Pt too unstable for PET scan, then will need BM Bx and Lymph node Bx [place] [facility] [place] [facility] [place]ICC placed for chemo    4)Chronic diarrhea,   - Likely related to chemo +/- abx. 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   - Serial Duplex to assess for propagation, 4/03  and 4/10 Duplex without propagation  - Plan for repeat Duplex 04/17   - given severe thrombocytopenia, holding Xarelto for now. Risk of bleeding greater than benefit.     Elevated LFTs  - Lipitor DC'd. Entecavir DC'd  - Likely drug induced 2/2 cefepime or antivirals vs from hypotension; ABX and Antiviral's DC'd.     RK  - S/P Contrast on 3/23   - s/p IVF   - Renal Consulted  - urinary retention s/p lloyd , Failed TOV 4/8, lloyd replaced  - Flomax started;  - CT with hydronephrosis; Cr up-trending,   - Renal US neg for hydronephrosis   - Cr up-trending - s/p IVF  CC/ Hr.     Hypertension, now hypotensive   - C/w home metoprolol.  - C/w Midodrine TID. Hold for SBP > 140;  Increase to 15 TID     HypoNa  - Monitor    Hiccups  - Improved on Reglan    Hyperlipidemia.   - Home atorvastatin on hold sec to Transaminitis    Abnormal CT  - Outpatient follow up for CT Abd/Pelvis - Bladder wall thickening with perivesicular stranding    [place] [facility] [place] [facility] [place] 68 [place] w/ PMHx of aortic aneurysm and bioprosthetic AV valve replacement 2019, HLD, splenectomy, partial gastrectomy, partial pancreatectomy, 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, presents with fever.    1)Fever of unknown origin. CMV Virmeia  - CXR without consolidations or effusions; U/A unremarkable; GI PCR Neg   - Cultures remain negative to date   - LE VA duplex --> + For DVT;  V/Q scan -- Low probability for PE  - Was on empiric treatment with Vanc/cefepime --> ID consulted; S/P Zosyn  - Hold home penicillin while on ABX   - CMV PCR Positive, S/P Ganciclovir per ID , CMV IGG (+) and IgM (-), Cryptococcal Ag --Neg, Quantiferon Tb -- Neg, Fungitell -- <31   - CT Chest non-contrast no acute pathology  - Episoode of Septic Shock s/p  RRT, resume antibiotics IV fluid, MICU eval   - Short course steroid per hematology -- On Prednisone 50 Qd   - IR eval for BM BX as per Heme/Onc --> done on 04/06 with IR, f/u path -- As noted; F/u heme/Onc recs   - pan CT, noted questionable cystitis. UA is neg.  - Recurrent fever - Multiple Blood cultures negative, RVP 04/10 Neg, herpes 6 Neg, Cefepime now on hold in view of LFTs;  - Entecavir on hold as per Heme/Onc   - Maribavir on hold as per ID    2)pancytopenia - Anemia, Thrombocytopenia,  - Drop in Hgb, Occult +  - Hold Hep gtt. Was on Xarelto, held due to drop in Platelets as per Heme/Onc, ASA on hold  - Anemia (Hgb 6.7 on arrival) S/P Multiple PRBC transfusions - pRBC x 8 units  - Thrombocytopenia - Platelets as low as 6 - S/P Platelet transfusion - total of 12 units  - HIT ab: neg  - s/p BM Bx on 4/6. No evidence of myeloma, preliminary showed minimal hematopoiesis but significant histiocytic proliferation and minimal hemophagocytosis. CMV negative.  CD1A negative (langerhan's), no eosinophils  - Possible HLH,   - Unclear etiology of HLH given lack of myeloma on marrow. Possibly infectious with EBV and CMV?  - Multiorgan system failure with increasing creatinine and bilirubin. Ferritin remains extremely elevated. 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   - Interventional radiology placed PICC line on 4/17/22. To continue high dose steroids. 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 Hubbard   - s/p Auto SCT in 2018  - was on lenalidomide maintenance until February 2023 when he had progression of disease. Then switched to daratumumab + CyBorD; last dose March 23rd, 2023.  - Myeloma labs w/ normal free light chain ratio, KIRA w/ weak IgG kappa band, IgG 285, IgA 57, IgM 29  - S/P  IVIG 04/13 as per Heme/Onc   - Now with multi-organ failure - started on high dose steroids  - Continue acyclovir 400 mg BID.  - Pt too unstable for PET scan, then will need BM Bx and Lymph node Bx [place] [facility] [place]  - PICC placed for chemo    4)Chronic diarrhea,   - Likely related to chemo +/- abx. 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   - Serial Duplex to assess for propagation, 4/03  and 4/10 Duplex without propagation  - Plan for repeat Duplex 04/17   - given severe thrombocytopenia, holding Xarelto for now. Risk of bleeding greater than benefit.     Elevated LFTs  - Lipitor DC'd. Entecavir DC'd  - Likely drug induced 2/2 cefepime or antivirals vs from hypotension; ABX and Antiviral's DC'd.     RK  - S/P Contrast on 3/23   - s/p IVF   - Renal Consulted  - urinary retention s/p lloyd , Failed TOV 4/8, lloyd replaced  - Flomax started;  - CT with hydronephrosis; Cr up-trending,   - Renal US neg for hydronephrosis   - Cr up-trending - s/p IVF  CC/ Hr.     Hypertension, now hypotensive   - C/w home metoprolol.  - C/w Midodrine TID. Hold for SBP > 140;  Increase to 15 TID     HypoNa  - Monitor    Hiccups  - Improved on Reglan    Hyperlipidemia.   - Home atorvastatin on hold sec to Transaminitis    Abnormal CT  - Outpatient follow up for CT Abd/Pelvis - Bladder wall thickening with perivesicular stranding    [place]wait[place] [facility] [place] 68 [place] w/ PMHx of aortic aneurysm and bioprosthetic AV valve replacement 2019, HLD, splenectomy, partial gastrectomy, partial pancreatectomy, 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, presents with fever.    1)Fever of unknown origin. CMV Virmeia  - CXR without consolidations or effusions; U/A unremarkable; GI PCR Neg   - Cultures remain negative to date   - LE VA duplex --> + For DVT;  V/Q scan -- Low probability for PE  - Was on empiric treatment with Vanc/cefepime --> ID consulted; S/P Zosyn  - Hold home penicillin while on ABX   - CMV PCR Positive, S/P Ganciclovir per ID , CMV IGG (+) and IgM (-), Cryptococcal Ag --Neg, Quantiferon Tb -- Neg, Fungitell -- <31   - CT Chest non-contrast no acute pathology  - Episoode of Septic Shock s/p  RRT, resume antibiotics IV fluid, MICU eval   - Short course steroid per hematology -- On Prednisone 50 Qd   - IR eval for BM BX as per Heme/Onc --> done on 04/06 with IR, f/u path -- As noted; F/u heme/Onc recs   - pan CT, noted questionable cystitis. UA is neg.  - Recurrent fever - Multiple Blood cultures negative, RVP 04/10 Neg, herpes 6 Neg, Cefepime now on hold in view of LFTs;  - Entecavir on hold as per Heme/Onc   - Maribavir on hold as per ID    2)pancytopenia - Anemia, Thrombocytopenia,  - Drop in Hgb, Occult +  - Hold Hep gtt. Was on Xarelto, held due to drop in Platelets as per Heme/Onc, ASA on hold  - Anemia (Hgb 6.7 on arrival) S/P Multiple PRBC transfusions - pRBC x 8 units  - Thrombocytopenia - Platelets as low as 6 - S/P Platelet transfusion - total of 12 units  - HIT ab: neg  - s/p BM Bx on 4/6. No evidence of myeloma, preliminary showed minimal hematopoiesis but significant histiocytic proliferation and minimal hemophagocytosis. CMV negative.  CD1A negative (langerhan's), no eosinophils  - Possible HLH,   - Unclear etiology of HLH given lack of myeloma on marrow. Possibly infectious with EBV and CMV?  - Multiorgan system failure with increasing creatinine and bilirubin. Ferritin remains extremely elevated. 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   - Interventional radiology placed PICC line on 4/17/22. To continue high dose steroids. 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 Hubbard   - s/p Auto SCT in 2018  - was on lenalidomide maintenance until February 2023 when he had progression of disease. Then switched to daratumumab + CyBorD; last dose March 23rd, 2023.  - Myeloma labs w/ normal free light chain ratio, KIRA w/ weak IgG kappa band, IgG 285, IgA 57, IgM 29  - S/P  IVIG 04/13 as per Heme/Onc   - Now with multi-organ failure - started on high dose steroids  - Continue acyclovir 400 mg BID.  - Pt too unstable for PET scan, then will need BM Bx and Lymph node Bx Awaiting transfer to [facility] [place]  - PICC placed for chemo    4)Chronic diarrhea,   - Likely related to chemo +/- abx. 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   - Serial Duplex to assess for propagation, 4/03  and 4/10 Duplex without propagation  - Plan for repeat Duplex 04/17   - given severe thrombocytopenia, holding Xarelto for now. Risk of bleeding greater than benefit.     Elevated LFTs  - Lipitor DC'd. Entecavir DC'd  - Likely drug induced 2/2 cefepime or antivirals vs from hypotension; ABX and Antiviral's DC'd.     RK  - S/P Contrast on 3/23   - s/p IVF   - Renal Consulted  - urinary retention s/p lloyd , Failed TOV 4/8, lloyd replaced  - Flomax started;  - CT with hydronephrosis; Cr up-trending,   - Renal US neg for hydronephrosis   - Cr up-trending - s/p IVF  CC/ Hr.     Hypertension, now hypotensive   - C/w home metoprolol.  - C/w Midodrine TID. Hold for SBP > 140;  Increase to 15 TID     HypoNa  - Monitor    Hiccups  - Improved on Reglan    Hyperlipidemia.   - Home atorvastatin on hold sec to Transaminitis    Abnormal CT  - Outpatient follow up for CT Abd/Pelvis - Bladder wall thickening with perivesicular stranding    Awaiting transfer to [facility] [place] 68 yo M w/ PMHx of aortic aneurysm and bioprosthetic AV valve replacement 2019, HLD, splenectomy, partial gastrectomy, partial pancreatectomy, 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, presents with fever.    1)Fever of unknown origin. CMV Virmeia  - CXR without consolidations or effusions; U/A unremarkable; GI PCR Neg   - Cultures remain negative to date   - LE VA duplex --> + For DVT;  V/Q scan -- Low probability for PE  - Was on empiric treatment with Vanc/cefepime --> ID consulted; S/P Zosyn  - Hold home penicillin while on ABX   - CMV PCR Positive, S/P Ganciclovir per ID , CMV IGG (+) and IgM (-), Cryptococcal Ag --Neg, Quantiferon Tb -- Neg, Fungitell -- <31   - CT Chest non-contrast no acute pathology  - Episoode of Septic Shock s/p  RRT, resume antibiotics IV fluid, MICU eval   - Short course steroid per hematology -- On Prednisone 50 Qd   - IR eval for BM BX as per Heme/Onc --> done on 04/06 with IR, f/u path -- As noted; F/u heme/Onc recs   - pan CT, noted questionable cystitis. UA is neg.  - Recurrent fever - Multiple Blood cultures negative, RVP 04/10 Neg, herpes 6 Neg, Cefepime now on hold in view of LFTs;  - Entecavir on hold as per Heme/Onc   - Maribavir on hold as per ID    2)pancytopenia - Anemia, Thrombocytopenia,  - Drop in Hgb, Occult +  - Hold Hep gtt. Was on Xarelto, held due to drop in Platelets as per Heme/Onc, ASA on hold  - Anemia (Hgb 6.7 on arrival) S/P Multiple PRBC transfusions - pRBC x 8 units  - Thrombocytopenia - Platelets as low as 6 - S/P Platelet transfusion - total of 12 units  - HIT ab: neg  - s/p BM Bx on 4/6. No evidence of myeloma, preliminary showed minimal hematopoiesis but significant histiocytic proliferation and minimal hemophagocytosis. CMV negative.  CD1A negative (langerhan's), no eosinophils  - Possible HLH,   - Unclear etiology of HLH given lack of myeloma on marrow. Possibly infectious with EBV and CMV?  - Multiorgan system failure with increasing creatinine and bilirubin. Ferritin remains extremely elevated. 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   - Interventional radiology placed PICC line on 4/17/22. To continue high dose steroids. 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 Hubbard   - s/p Auto SCT in 2018  - was on lenalidomide maintenance until February 2023 when he had progression of disease. Then switched to daratumumab + CyBorD; last dose March 23rd, 2023.  - Myeloma labs w/ normal free light chain ratio, KIRA w/ weak IgG kappa band, IgG 285, IgA 57, IgM 29  - S/P  IVIG 04/13 as per Heme/Onc   - Now with multi-organ failure - started on high dose steroids  - Continue acyclovir 400 mg BID.  - Pt too unstable for PET scan, then will need BM Bx and Lymph node Bx Awaiting transfer to [facility] [place]  - PICC placed for chemo    4)Chronic diarrhea,   - Likely related to chemo +/- abx. 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   - Serial Duplex to assess for propagation, 4/03  and 4/10 Duplex without propagation  - Plan for repeat Duplex 04/17   - given severe thrombocytopenia, holding Xarelto for now. Risk of bleeding greater than benefit.     Elevated LFTs  - Lipitor DC'd. Entecavir DC'd  - Likely drug induced 2/2 cefepime or antivirals vs from hypotension; ABX and Antiviral's DC'd.     RK  - S/P Contrast on 3/23   - s/p IVF   - Renal Consulted  - urinary retention s/p lloyd , Failed TOV 4/8, lloyd replaced  - Flomax started;  - CT with hydronephrosis; Cr up-trending,   - Renal US neg for hydronephrosis   - Cr up-trending - s/p IVF  CC/ Hr.     Hypertension, now hypotensive   - C/w home metoprolol.  - C/w Midodrine TID. Hold for SBP > 140;  Increase to 15 TID     HypoNa  - Monitor    Hiccups  - Improved on Reglan    Hyperlipidemia.   - Home atorvastatin on hold sec to Transaminitis    Abnormal CT  - Outpatient follow up for CT Abd/Pelvis - Bladder wall thickening with perivesicular stranding    Awaiting transfer to [facility] [place]

## 2023-04-14 NOTE — PROGRESS NOTE ADULT - SUBJECTIVE AND OBJECTIVE BOX
Neurology Progress Note    S: Patient seen and examined. No new events overnight. patient denied CP, SOB, HA or pain. still with tremor; better     Medication:  acetaminophen     Tablet .. 650 milliGRAM(s) Oral every 6 hours PRN  acetaminophen     Tablet .. 650 milliGRAM(s) Oral once  aluminum hydroxide/magnesium hydroxide/simethicone Suspension 30 milliLiter(s) Oral every 4 hours PRN  artificial tears (preservative free) Ophthalmic Solution 1 Drop(s) Both EYES four times a day  chlorhexidine 2% Cloths 1 Application(s) Topical daily  digoxin     Tablet 125 MICROGram(s) Oral daily  filgrastim-sndz (ZARXIO) Injectable 480 MICROGram(s) SubCutaneous daily  loperamide 2 milliGRAM(s) Oral three times a day PRN  maribavir Tablet 400 milliGRAM(s) Oral two times a day  midodrine. 10 milliGRAM(s) Oral three times a day  pantoprazole    Tablet 40 milliGRAM(s) Oral before breakfast  polyethylene glycol 3350 17 Gram(s) Oral daily  predniSONE   Tablet 50 milliGRAM(s) Oral daily  sodium chloride 0.9%. 1000 milliLiter(s) IV Continuous <Continuous>  tamsulosin 0.4 milliGRAM(s) Oral at bedtime      Vitals:  Vital Signs Last 24 Hrs  T(C): 37.2 (14 Apr 2023 06:49), Max: 39.4 (14 Apr 2023 04:53)  T(F): 99 (14 Apr 2023 06:49), Max: 102.9 (14 Apr 2023 04:53)  HR: 96 (14 Apr 2023 04:53) (64 - 96)  BP: 128/65 (14 Apr 2023 04:53) (98/54 - 128/65)  BP(mean): --  RR: 18 (14 Apr 2023 04:53) (18 - 18)  SpO2: 93% (14 Apr 2023 04:53) (93% - 93%)    Parameters below as of 14 Apr 2023 04:53  Patient On (Oxygen Delivery Method): room air        General Exam:   General Appearance: Appropriately dressed and in no acute distress       Head: Normocephalic, atraumatic and no dysmorphic features  Ear, Nose, and Throat: Moist mucous membranes  CVS: S1S2+  Resp: No SOB, no wheeze or rhonchi  GI: soft NT/ND  Extremities: No edema or cyanosis  Skin: No bruises or rashes     Neurological Exam:  Mental Status: Awake, alert and oriented x 2.  Able to follow simple and complex verbal commands. Able to name and repeat. fluent speech. No obvious aphasia or dysarthria noted.   Cranial Nerves: PERRL, EOMI, VFFC, sensation V1-V3 intact,  no obvious facial asymmetry, equal elevation of palate, scm/trap 5/5, tongue is midline on protrusion. no obvious papilledema on fundoscopic exam. hearing is grossly intact.   Motor: VICENTE 4/5 throuhgout but uppers>lowers : flapping tremor   Sensation: Intact to light touch and pinprick throughout. no right/left confusion. no extinction to tactile on DSS.    Reflexes: 1+ throughout at biceps, brachioradialis, triceps, patellars and ankles bilaterally and equal. No clonus. R toe and L toe were both downgoing.  Coordination: No dysmetria on FNF  Gait: deferred .       I personally reviewed the below data/images/labs:      CBC Full  -  ( 14 Apr 2023 06:12 )  WBC Count : 1.27 K/uL  RBC Count : 2.56 M/uL  Hemoglobin : 7.8 g/dL  Hematocrit : 22.2 %  Platelet Count - Automated : 6 K/uL  Mean Cell Volume : 86.7 fl  Mean Cell Hemoglobin : 30.5 pg  Mean Cell Hemoglobin Concentration : 35.1 gm/dL  Auto Neutrophil # : 1.14 K/uL  Auto Lymphocyte # : 0.03 K/uL  Auto Monocyte # : 0.01 K/uL  Auto Eosinophil # : 0.00 K/uL  Auto Basophil # : 0.00 K/uL  Auto Neutrophil % : 59.3 %  Auto Lymphocyte % : 2.6 %  Auto Monocyte % : 0.9 %  Auto Eosinophil % : 0.0 %  Auto Basophil % : 0.0 %    04-14    132<L>  |  98  |  62<H>  ----------------------------<  127<H>  5.1   |  22  |  2.42<H>    Ca    7.9<L>      14 Apr 2023 06:12    TPro  4.4<L>  /  Alb  1.9<L>  /  TBili  2.3<H>  /  DBili  1.5<H>  /  AST  100<H>  /  ALT  109<H>  /  AlkPhos  264<H>  04-13    LIVER FUNCTIONS - ( 13 Apr 2023 07:13 )  Alb: 1.9 g/dL / Pro: 4.4 g/dL / ALK PHOS: 264 U/L / ALT: 109 U/L / AST: 100 U/L / GGT: x               < from: CT Head No Cont (04.01.23 @ 09:19) >    ACC: [id number] EXAM:  CT BRAIN   ORDERED BY: EVONNE CARDONA     PROCEDURE DATE:  04/01/2023          INTERPRETATION:  CLINICAL INDICATIONS:  dizzy and low platelets    COMPARISON: None.    TECHNIQUE: Noncontrast CT of the head. Multiplanar reformations are   submitted.    FINDINGS:  There is periventricular and subcortical white matter hypodensity without   mass effect, nonspecific, likely representing mild chronic microvascular   ischemic changes. There is no compelling evidence for an acute   transcortical infarction. There is no evidence of mass, mass effect,   midline shift or extra-axial fluid collection. The lateral ventricles and   cortical sulci are age-appropriate in size and configuration. Chronic   left orbit medial wall fracture deformity. The orbits, mastoid air cells   and visualized paranasal sinuses are unremarkable. The calvarium is   intact. Consider follow-up CT or MRI as clinically warranted.    IMPRESSION:  Mild chronic microvascular changes without evidence of an   acute transcortical infarction or hemorrhage.    --- End of Report ---            IMAN MUÑOZ MD; Attending Radiologist  This document has been electronically signed. Apr 1 2023  9:25AM    < end of copied text >          Neurology Progress Note    S: Patient seen and examined. No new events overnight. patient denied CP, SOB, HA or pain. still with tremor; better     Medication:  acetaminophen     Tablet .. 650 milliGRAM(s) Oral every 6 hours PRN  acetaminophen     Tablet .. 650 milliGRAM(s) Oral once  aluminum hydroxide/magnesium hydroxide/simethicone Suspension 30 milliLiter(s) Oral every 4 hours PRN  artificial tears (preservative free) Ophthalmic Solution 1 Drop(s) Both EYES four times a day  chlorhexidine 2% Cloths 1 Application(s) Topical daily  digoxin     Tablet 125 MICROGram(s) Oral daily  filgrastim-sndz (ZARXIO) Injectable 480 MICROGram(s) SubCutaneous daily  loperamide 2 milliGRAM(s) Oral three times a day PRN  maribavir Tablet 400 milliGRAM(s) Oral two times a day  midodrine. 10 milliGRAM(s) Oral three times a day  pantoprazole    Tablet 40 milliGRAM(s) Oral before breakfast  polyethylene glycol 3350 17 Gram(s) Oral daily  predniSONE   Tablet 50 milliGRAM(s) Oral daily  sodium chloride 0.9%. 1000 milliLiter(s) IV Continuous <Continuous>  tamsulosin 0.4 milliGRAM(s) Oral at bedtime      Vitals:  Vital Signs Last 24 Hrs  T(C): 37.2 (14 Apr 2023 06:49), Max: 39.4 (14 Apr 2023 04:53)  T(F): 99 (14 Apr 2023 06:49), Max: 102.9 (14 Apr 2023 04:53)  HR: 96 (14 Apr 2023 04:53) (64 - 96)  BP: 128/65 (14 Apr 2023 04:53) (98/54 - 128/65)  BP(mean): --  RR: 18 (14 Apr 2023 04:53) (18 - 18)  SpO2: 93% (14 Apr 2023 04:53) (93% - 93%)    Parameters below as of 14 Apr 2023 04:53  Patient On (Oxygen Delivery Method): room air        General Exam:   General Appearance: Appropriately dressed and in no acute distress       Head: Normocephalic, atraumatic and no dysmorphic features  Ear, Nose, and Throat: Moist mucous membranes  CVS: S1S2+  Resp: No SOB, no wheeze or rhonchi  GI: soft NT/ND  Extremities: No edema or cyanosis  Skin: No bruises or rashes     Neurological Exam:  Mental Status: Awake, alert and oriented x 2.  Able to follow simple and complex verbal commands. Able to name and repeat. fluent speech. No obvious aphasia or dysarthria noted.   Cranial Nerves: PERRL, EOMI, VFFC, sensation V1-V3 intact,  no obvious facial asymmetry, equal elevation of palate, scm/trap 5/5, tongue is midline on protrusion. no obvious papilledema on fundoscopic exam. hearing is grossly intact.   Motor: VICENTE 4/5 throuhgout but uppers>lowers : flapping tremor   Sensation: Intact to light touch and pinprick throughout. no right/left confusion. no extinction to tactile on DSS.    Reflexes: 1+ throughout at biceps, brachioradialis, triceps, patellars and ankles bilaterally and equal. No clonus. R toe and L toe were both downgoing.  Coordination: No dysmetria on FNF  Gait: deferred .       I personally reviewed the below data/images/labs:      CBC Full  -  ( 14 Apr 2023 06:12 )  WBC Count : 1.27 K/uL  RBC Count : 2.56 M/uL  Hemoglobin : 7.8 g/dL  Hematocrit : 22.2 %  Platelet Count - Automated : 6 K/uL  Mean Cell Volume : 86.7 fl  Mean Cell Hemoglobin : 30.5 pg  Mean Cell Hemoglobin Concentration : 35.1 gm/dL  Auto Neutrophil # : 1.14 K/uL  Auto Lymphocyte # : 0.03 K/uL  Auto Monocyte # : 0.01 K/uL  Auto Eosinophil # : 0.00 K/uL  Auto Basophil # : 0.00 K/uL  Auto Neutrophil % : 59.3 %  Auto Lymphocyte % : 2.6 %  Auto Monocyte % : 0.9 %  Auto Eosinophil % : 0.0 %  Auto Basophil % : 0.0 %    04-14    132<L>  |  98  |  62<H>  ----------------------------<  127<H>  5.1   |  22  |  2.42<H>    Ca    7.9<L>      14 Apr 2023 06:12    TPro  4.4<L>  /  Alb  1.9<L>  /  TBili  2.3<H>  /  DBili  1.5<H>  /  AST  100<H>  /  ALT  109<H>  /  AlkPhos  264<H>  04-13    LIVER FUNCTIONS - ( 13 Apr 2023 07:13 )  Alb: 1.9 g/dL / Pro: 4.4 g/dL / ALK PHOS: 264 U/L / ALT: 109 U/L / AST: 100 U/L / GGT: x               < from: CT Head No Cont (04.01.23 @ 09:19) >    ACC: [id number] EXAM:  CT BRAIN   ORDERED BY: EVONNE CARDONA     PROCEDURE DATE:  04/01/2023          INTERPRETATION:  CLINICAL INDICATIONS:  dizzy and low platelets    COMPARISON: None.    TECHNIQUE: Noncontrast CT of the head. Multiplanar reformations are   submitted.    FINDINGS:  There is periventricular and subcortical white matter hypodensity without   mass effect, nonspecific, likely representing mild chronic microvascular   ischemic changes. There is no compelling evidence for an acute   transcortical infarction. There is no evidence of mass, mass effect,   midline shift or extra-axial fluid collection. The lateral ventricles and   cortical sulci are age-appropriate in size and configuration. Chronic   left orbit medial wall fracture deformity. The orbits, mastoid air cells   and visualized paranasal sinuses are unremarkable. The calvarium is   intact. Consider follow-up CT or MRI as clinically warranted.    IMPRESSION:  Mild chronic microvascular changes without evidence of an   acute transcortical infarction or hemorrhage.    --- End of Report ---            IMAN MUÑOZ MD; Attending Radiologist  This document has been electronically signed. Apr 1 2023  9:25AM    < end of copied text >          Neurology Progress Note    S: Patient seen and examined. No new events overnight. patient denied CP, SOB, HA or pain. still with tremor; better     Medication:  acetaminophen     Tablet .. 650 milliGRAM(s) Oral every 6 hours PRN  acetaminophen     Tablet .. 650 milliGRAM(s) Oral once  aluminum hydroxide/magnesium hydroxide/simethicone Suspension 30 milliLiter(s) Oral every 4 hours PRN  artificial tears (preservative free) Ophthalmic Solution 1 Drop(s) Both EYES four times a day  chlorhexidine 2% Cloths 1 Application(s) Topical daily  digoxin     Tablet 125 MICROGram(s) Oral daily  filgrastim-sndz (ZARXIO) Injectable 480 MICROGram(s) SubCutaneous daily  loperamide 2 milliGRAM(s) Oral three times a day PRN  maribavir Tablet 400 milliGRAM(s) Oral two times a day  midodrine. 10 milliGRAM(s) Oral three times a day  pantoprazole    Tablet 40 milliGRAM(s) Oral before breakfast  polyethylene glycol 3350 17 Gram(s) Oral daily  predniSONE   Tablet 50 milliGRAM(s) Oral daily  sodium chloride 0.9%. 1000 milliLiter(s) IV Continuous <Continuous>  tamsulosin 0.4 milliGRAM(s) Oral at bedtime      Vitals:  Vital Signs Last 24 Hrs  T(C): 37.2 (14 Apr 2023 06:49), Max: 39.4 (14 Apr 2023 04:53)  T(F): 99 (14 Apr 2023 06:49), Max: 102.9 (14 Apr 2023 04:53)  HR: 96 (14 Apr 2023 04:53) (64 - 96)  BP: 128/65 (14 Apr 2023 04:53) (98/54 - 128/65)  BP(mean): --  RR: 18 (14 Apr 2023 04:53) (18 - 18)  SpO2: 93% (14 Apr 2023 04:53) (93% - 93%)    Parameters below as of 14 Apr 2023 04:53  Patient On (Oxygen Delivery Method): room air        General Exam:   General Appearance: Appropriately dressed and in no acute distress       Head: Normocephalic, atraumatic and no dysmorphic features  Ear, Nose, and Throat: Moist mucous membranes  CVS: S1S2+  Resp: No SOB, no wheeze or rhonchi  GI: soft NT/ND  Extremities: No edema or cyanosis  Skin: No bruises or rashes     Neurological Exam:  Mental Status: Awake, alert and oriented x 2.  Able to follow simple and complex verbal commands. Able to name and repeat. fluent speech. No obvious aphasia or dysarthria noted.   Cranial Nerves: PERRL, EOMI, VFFC, sensation V1-V3 intact,  no obvious facial asymmetry, equal elevation of palate, scm/trap 5/5, tongue is midline on protrusion. no obvious papilledema on fundoscopic exam. hearing is grossly intact.   Motor: VICENTE 4/5 throuhgout but uppers>lowers : flapping tremor   Sensation: Intact to light touch and pinprick throughout. no right/left confusion. no extinction to tactile on DSS.    Reflexes: 1+ throughout at biceps, brachioradialis, triceps, patellars and ankles bilaterally and equal. No clonus. R toe and L toe were both downgoing.  Coordination: No dysmetria on FNF  Gait: deferred .       I personally reviewed the below data/images/labs:      CBC Full  -  ( 14 Apr 2023 06:12 )  WBC Count : 1.27 K/uL  RBC Count : 2.56 M/uL  Hemoglobin : 7.8 g/dL  Hematocrit : 22.2 %  Platelet Count - Automated : 6 K/uL  Mean Cell Volume : 86.7 fl  Mean Cell Hemoglobin : 30.5 pg  Mean Cell Hemoglobin Concentration : 35.1 gm/dL  Auto Neutrophil # : 1.14 K/uL  Auto Lymphocyte # : 0.03 K/uL  Auto Monocyte # : 0.01 K/uL  Auto Eosinophil # : 0.00 K/uL  Auto Basophil # : 0.00 K/uL  Auto Neutrophil % : 59.3 %  Auto Lymphocyte % : 2.6 %  Auto Monocyte % : 0.9 %  Auto Eosinophil % : 0.0 %  Auto Basophil % : 0.0 %    04-14    132<L>  |  98  |  62<H>  ----------------------------<  127<H>  5.1   |  22  |  2.42<H>    Ca    7.9<L>      14 Apr 2023 06:12    TPro  4.4<L>  /  Alb  1.9<L>  /  TBili  2.3<H>  /  DBili  1.5<H>  /  AST  100<H>  /  ALT  109<H>  /  AlkPhos  264<H>  04-13    LIVER FUNCTIONS - ( 13 Apr 2023 07:13 )  Alb: 1.9 g/dL / Pro: 4.4 g/dL / ALK PHOS: 264 U/L / ALT: 109 U/L / AST: 100 U/L / GGT: x               < from: CT Head No Cont (04.01.23 @ 09:19) >    ACC: [id number] EXAM:  CT BRAIN   ORDERED BY: EVONNE CARDONA     PROCEDURE DATE:  04/01/2023          INTERPRETATION:  CLINICAL INDICATIONS:  dizzy and low platelets    COMPARISON: None.    TECHNIQUE: Noncontrast CT of the head. Multiplanar reformations are   submitted.    FINDINGS:  There is periventricular and subcortical white matter hypodensity without   mass effect, nonspecific, likely representing mild chronic microvascular   ischemic changes. There is no compelling evidence for an acute   transcortical infarction. There is no evidence of mass, mass effect,   midline shift or extra-axial fluid collection. The lateral ventricles and   cortical sulci are age-appropriate in size and configuration. Chronic   left orbit medial wall fracture deformity. The orbits, mastoid air cells   and visualized paranasal sinuses are unremarkable. The calvarium is   intact. Consider follow-up CT or MRI as clinically warranted.    IMPRESSION:  Mild chronic microvascular changes without evidence of an   acute transcortical infarction or hemorrhage.    --- End of Report ---            IMAN MUÑOZ MD; Attending Radiologist  This document has been electronically signed. Apr 1 2023  9:25AM    < end of copied text >

## 2023-04-14 NOTE — DISCHARGE NOTE PROVIDER - DETAILS OF MALNUTRITION DIAGNOSIS/DIAGNOSES
This patient has been assessed with a concern for Malnutrition and was treated during this hospitalization for the following Nutrition diagnosis/diagnoses:     -  04/17/2023: Moderate protein-calorie malnutrition

## 2023-04-14 NOTE — DISCHARGE NOTE PROVIDER - CONDITIONS AT DISCHARGE
Discharge to [facility] for further care Discharge to [facility] for further care Discharge to [facility] for further care

## 2023-04-14 NOTE — PROGRESS NOTE ADULT - ASSESSMENT
1. 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 Hubbard   -- s/p Auto SCT in 2018  -- was on lenalidomide maintenance until February 2023 when he had progression of disease. Then switched to daratumumab + CyBorD; last dose March 23rd, 2023.  -- Continue acyclovir 400 mg BID.  -- Arranging transfer to [facility].    2. Pancytopenia  -- unclear etiology. Primary BM d/o vs Valgancyclovir (now dc'd)  -- s/p BM Bx on 4/6. Preliminary report discussed w/ pathology, no evidence of myeloma, minimal hematopoiesis but significant histiocytic proliferation.  Minimal hemophagocytosis, AFB, GMS pending, CMV negative.  CD1A negative (langerhan's), no eosinophils  -- transfuse to maintain HGB > 7; PLT > 10.  -- Continue Zarxio 480 for ANC <1500  -- IgG low, s/p IVIG 4-12  -- Pending soluble IL2, and CD25 (lab sent/collected by requisition only)  -- Recommend bone and lymph node biopsy following PET/CT  -- Myeloma labs w/ normal free light chain ratio, KIRA w/ weak IgG kappa band, IgG 285, IgA 57, IgM 29  -- platelets at 6K today, receiving platelet transfusion.    3. Fever Unknown Origin  -- ID following  -- no obvious infectious source  -- Entecavir dc'd, now on maribavir (recommend d/c given elevation in creatinine)  -- ID w/u so far neg   --on Zarxio to help w/ count recovery  -- CT c/a/p noted only for bladder wall thickening but UA not c/w cystitis    4. R Soleal Vein DVT  -- Hold AC in light of low platelets and bleeding risk  -- BTK DVT  -- need to Rpt LE Doppler 4/17 to eval for propagation    Awaiting insurance auth and bed availability at [facility].    Quirino Holloway PA-C  Hematology/Oncology  [facility]  [phone] (office) 1. 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 Hubbard   -- s/p Auto SCT in 2018  -- was on lenalidomide maintenance until February 2023 when he had progression of disease. Then switched to daratumumab + CyBorD; last dose March 23rd, 2023.  -- Continue acyclovir 400 mg BID.  -- Arranging transfer to [facility].    2. Pancytopenia  -- unclear etiology. Primary BM d/o vs Valgancyclovir (now dc'd)  -- s/p BM Bx on 4/6. Preliminary report discussed w/ pathology, no evidence of myeloma, minimal hematopoiesis but significant histiocytic proliferation.  Minimal hemophagocytosis, AFB, GMS pending, CMV negative.  CD1A negative (langerhan's), no eosinophils  -- transfuse to maintain HGB > 7; PLT > 10.  -- Continue Zarxio 480 for ANC <1500  -- IgG low, s/p IVIG 4-12  -- Pending soluble IL2, and CD25 (lab sent/collected by requisition only)  -- Recommend bone and lymph node biopsy following PET/CT  -- Myeloma labs w/ normal free light chain ratio, KIRA w/ weak IgG kappa band, IgG 285, IgA 57, IgM 29  -- platelets at 6K today, receiving platelet transfusion.    3. Fever Unknown Origin  -- ID following  -- no obvious infectious source  -- Entecavir dc'd, now on maribavir (recommend d/c given elevation in creatinine)  -- ID w/u so far neg   --on Zarxio to help w/ count recovery  -- CT c/a/p noted only for bladder wall thickening but UA not c/w cystitis    4. R Soleal Vein DVT  -- Hold AC in light of low platelets and bleeding risk  -- BTK DVT  -- need to Rpt LE Doppler 4/17 to eval for propagation    Awaiting insurance auth and bed availability at [facility].    Quirino Holloway PA-C  Hematology/Oncology  [facility]  [phone] (office) 1. 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   -- s/p Auto SCT in 2018  -- was on lenalidomide maintenance until February 2023 when he had progression of disease. Then switched to daratumumab + CyBorD; last dose March 23rd, 2023.  -- Continue acyclovir 400 mg BID.  -- Ar[place] [facility]    2. Pancytopenia  -- unclear etiology. Primary BM d/o vs Valgancyclovir (now dc'd)  -- s/p BM Bx on 4/6. Preliminary report discussed w/ pathology, no evidence of myeloma, minimal hematopoiesis but significant histiocytic proliferation.  Minimal hemophagocytosis, AFB, GMS pending, CMV negative.  CD1A negative (langerhan's), no eosinophils  -- transfuse to maintain HGB > 7; PLT > 10.  -- Continue Zarxio 480 for ANC <1500  -- IgG low, s/p IVIG 4-12  -- Pending soluble IL2, and CD25 (lab sent/collected by requisition only)  -- Recommend bone and lymph node biopsy following PET/CT  -- Myeloma labs w/ normal free light chain ratio, KIRA w/ weak IgG kappa band, IgG 285, IgA 57, IgM 29  -- platelets at 6K today, receiving platelet transfusion.    3. Fever Unknown Origin  -- ID following  -- no obvious infectious source  -- Entecavir dc'd, now on maribavir (recommend d/c given elevation in creatinine)  -- ID w/u so far neg   --on Zarxio to help w/ count recovery  -- CT c/a/p noted only for bladder wall thickening but UA not c/w cystitis    4. R Soleal Vein DVT  -- Hold AC in light of low platelets and bleeding risk  -- BTK DVT  -- need to Rpt LE Doppler 4/17 to eval for propagation    Awaiting insurance auth and bed availability [place] [facility]    Quirino Holloway PA-C  Hematology/Oncology  [facility]  [phone] (office)

## 2023-04-14 NOTE — CHART NOTE - NSCHARTNOTEFT_GEN_A_CORE
Fever 103.1F - FUO  Discussed with Dr. Addison - no need to reculture   multiple BCx - all [facility]    [id number] Fever 103.1F - FUO  Discussed with Dr. Addison - no need to reculture   multiple BCx - all [facility]    [id number] Fever 103.1F - FUO  Discussed with Dr. Addison - no need to reculture   multiple BCx - all [facility]    [id number]

## 2023-04-14 NOTE — PROGRESS NOTE ADULT - NS ATTEND OPT1A GEN_ALL_CORE
Medical decision making

## 2023-04-14 NOTE — PROGRESS NOTE ADULT - ASSESSMENT
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    Fever of unknown origin.   - CXR without consolidations or effusions; U/A unremarkable; GI PCR Neg   - Cultures remain negative to date   - LE VA duplex --> + For DVT;  V/Q scan -- Low probability   - Was on empiric treatment with Vanc/cefepime --> ID consulted; S/P Zosyn  - Hold home penicillin while on ABX   - ID to follow up, infectious work up as per ID  --> CMV PCR -- + S/P Ganciclovir per ID , CMV IGG (+) and IgM (-), Cryptococcal Ag --Neg, Quantiferon Tb -- Neg, Fungitell -- <31   - CT Chest non-contrast, noted, no acute pathology  - Shock, RRT, resume antibiotics IV fluid, MICU eval , ID follow up  --> Prednisone 50 PO Qd   - S/P Zosyn; Defer ABX as per ID   - Short course steroid per hematology -- On Prednisone 50 Qd   - IR eval for BM BX as per Heme/Onc --> done on 04/06 with IR, f/u path -- As noted; F/u heme/Onc recs   - ID ordered pan CT, noted questionable cystitis. UA is neg.  - Febrile 04/10 and 04/11 AM. 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 Transfuse for Hgb < 7.0, and platelets < 10.K  - GI eval appreciated; F/u recs --> No plans for scope at this time   - Was on Xarelto, held due to drop in Platelets as per Heme/Onc. Monitor platelet count   - S/P 1 unit PRBCs and 1 unit Platelets 04/04, 04/05   - HIT ab: neg  - ASA on hold  - Plt of 7K, plan for 1 unit of Plt 04/11  - S/P 1 unit PRBC 04/13  - 1 unit Platelets 04/14   - Heme/Onc obtaining further work up     Afib  - Unable to be on AC 2/2 low platelets  - On Digoxin   - Monitor on tele   - Cardio follow up     Multiple myeloma.   - pancytopenia largely at baseline although Hg 6.7 on arrival; s/p 1U PRBC with good response   - Was on acyclovir, / Ganciclovir as per ID   - C/w home entecavir   - S/P dexamethasone, D/C'd by hematology, follow up outpatient after discharge  --> Now on Prednisone 50 Qd   - Pt reports his Revlimid was held  - Heme/Onc consulted; F/u recs   - Resume home aspirin. --> Now on hold   - s/p IR BM Bx  04/06; Atypical histiocytic infiltrate with few EBV positive cells; F/u Heme/Onc recs   - S/P  IVIG 04/13 as per Heme/Onc   - IR eval for Bone Lesion Biopsy per Heme/Onc --> Arrangements for PET scan attempting to be made; Patient too unstable to be off of floor. [place] [facility] [place]rec[place]e/Onc      Chronic diarrhea, now with Constipation   - Standing Imodium switched to PRN  - Monitor for BM - reports having BM     DVT   - Duplex + For R soleal vein DVT  --> Will consider CT A chest once creatinine permits as patient received contrast; Monitor for LOPEZ   - s/p Hep gtt; Monitor PTT; Monitor H/H closely --> Now on hold in view of drop in Hgb and severe thrombocytopenia  - VQ scan to R/O PE -- Low probability   - Vascular eval appreciated; AC as tolerated, on Xarelto 10, monitor H/H, plts too low to start.   - Serial Duplex to assess for propagation  -- Without propagation   - 04/03 Duplex without propagation   - Repeat Duplex 04/10 -- without propogation   - Plan for repeat Duplex 04/17   - given severe thrombocytopenia, holding Xarelto for now. Risk of bleeding greater than benefit.     Elevated LFTs  - Cont to monitor and trend  - Lipitor DC'd. Entecavir DC'd  - Likely drug induced vs from hypotension; ABX and Antiviral's DC'd. Continue to monitor and trend levels closely   - Monitor levels     RK  - S/P Contrast on 03/23   - Monitor Cr closely; Avoid nephrotoxic agents   - Cr down-trending. s/p IVF   - Renal eval appreciated   - urinary retention s/p lloyd.   - Removed lloyd 4/7/23, TOV in progress. post void residual 550ml on bladder scan  - s/p straight cath. may need to replace lloyd if unable to urinarte.   - CT with hydronephrosis; Cr up-trending, Flomax started; F/u renal recs    - Renal US neg for hydronephrosis   - Cr up-trending; C/w IVF  CC/ Hr. Check bladder scan to R/O retention     Hypertension, now hypotensive   - C/w home metoprolol.  - C/w Midodrine TID. Hold for SBP > 140;  Increase to 15 TID     HypoNa  - Cont to monitor and trend  - Appreciate renal eval.   - Serial labs     Hiccups  - Improved on Reglan, Monitor     Hyperlipidemia.   - C/w home atorvastatin --> on hold as per GI .    Abnormal CT  - Outpatient follow up for CT findings    Prophylactic measure.   dvt ppx: DVT PPX   diet: regular  ambulate: with assistance    fall precautions  aspiration precautions.    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    Fever of unknown origin.   - CXR without consolidations or effusions; U/A unremarkable; GI PCR Neg   - Cultures remain negative to date   - LE VA duplex --> + For DVT;  V/Q scan -- Low probability   - Was on empiric treatment with Vanc/cefepime --> ID consulted; S/P Zosyn  - Hold home penicillin while on ABX   - ID to follow up, infectious work up as per ID  --> CMV PCR -- + S/P Ganciclovir per ID , CMV IGG (+) and IgM (-), Cryptococcal Ag --Neg, Quantiferon Tb -- Neg, Fungitell -- <31   - CT Chest non-contrast, noted, no acute pathology  - Shock, RRT, resume antibiotics IV fluid, MICU eval , ID follow up  --> Prednisone 50 PO Qd   - S/P Zosyn; Defer ABX as per ID   - Short course steroid per hematology -- On Prednisone 50 Qd   - IR eval for BM BX as per Heme/Onc --> done on 04/06 with IR, f/u path -- As noted; F/u heme/Onc recs   - ID ordered pan CT, noted questionable cystitis. UA is neg.  - Febrile 04/10 and 04/11 AM. 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 Transfuse for Hgb < 7.0, and platelets < 10.K  - GI eval appreciated; F/u recs --> No plans for scope at this time   - Was on Xarelto, held due to drop in Platelets as per Heme/Onc. Monitor platelet count   - S/P 1 unit PRBCs and 1 unit Platelets 04/04, 04/05   - HIT ab: neg  - ASA on hold  - Plt of 7K, plan for 1 unit of Plt 04/11  - S/P 1 unit PRBC 04/13  - 1 unit Platelets 04/14   - Heme/Onc obtaining further work up     Afib  - Unable to be on AC 2/2 low platelets  - On Digoxin   - Monitor on tele   - Cardio follow up     Multiple myeloma.   - pancytopenia largely at baseline although Hg 6.7 on arrival; s/p 1U PRBC with good response   - Was on acyclovir, / Ganciclovir as per ID   - C/w home entecavir   - S/P dexamethasone, D/C'd by hematology, follow up outpatient after discharge  --> Now on Prednisone 50 Qd   - Pt reports his Revlimid was held  - Heme/Onc consulted; F/u recs   - Resume home aspirin. --> Now on hold   - s/p IR BM Bx  04/06; Atypical histiocytic infiltrate with few EBV positive cells; F/u Heme/Onc recs   - S/P  IVIG 04/13 as per Heme/Onc   - IR eval for Bone Lesion Biopsy per Heme/Onc --> Arrangements for PET scan attempting to be made; Patient too unstable to be off of floor. Attempting for possible [place] [facility] [place]; Appreciate Heme/Onc      Chronic diarrhea, now with Constipation   - Standing Imodium switched to PRN  - Monitor for BM - reports having BM     DVT   - Duplex + For R soleal vein DVT  --> Will consider CT A chest once creatinine permits as patient received contrast; Monitor for LOPEZ   - s/p Hep gtt; Monitor PTT; Monitor H/H closely --> Now on hold in view of drop in Hgb and severe thrombocytopenia  - VQ scan to R/O PE -- Low probability   - Vascular eval appreciated; AC as tolerated, on Xarelto 10, monitor H/H, plts too low to start.   - Serial Duplex to assess for propagation  -- Without propagation   - 04/03 Duplex without propagation   - Repeat Duplex 04/10 -- without propogation   - Plan for repeat Duplex 04/17   - given severe thrombocytopenia, holding Xarelto for now. Risk of bleeding greater than benefit.     Elevated LFTs  - Cont to monitor and trend  - Lipitor DC'd. Entecavir DC'd  - Likely drug induced vs from hypotension; ABX and Antiviral's DC'd. Continue to monitor and trend levels closely   - Monitor levels     RK  - S/P Contrast on 03/23   - Monitor Cr closely; Avoid nephrotoxic agents   - Cr down-trending. s/p IVF   - Renal eval appreciated   - urinary retention s/p lloyd.   - Removed lloyd 4/7/23, TOV in progress. post void residual 550ml on bladder scan  - s/p straight cath. may need to replace lloyd if unable to urinarte.   - CT with hydronephrosis; Cr up-trending, Flomax started; F/u renal recs    - Renal US neg for hydronephrosis   - Cr up-trending; C/w IVF  CC/ Hr. Check bladder scan to R/O retention     Hypertension, now hypotensive   - C/w home metoprolol.  - C/w Midodrine TID. Hold for SBP > 140;  Increase to 15 TID     HypoNa  - Cont to monitor and trend  - Appreciate renal eval.   - Serial labs     Hiccups  - Improved on Reglan, Monitor     Hyperlipidemia.   - C/w home atorvastatin --> on hold as per GI .    Abnormal CT  - Outpatient follow up for CT findings    Prophylactic measure.   dvt ppx: DVT PPX   diet: regular  ambulate: with assistance    fall precautions  aspiration precautions.    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    Fever of unknown origin.   - CXR without consolidations or effusions; U/A unremarkable; GI PCR Neg   - Cultures remain negative to date   - LE VA duplex --> + For DVT;  V/Q scan -- Low probability   - Was on empiric treatment with Vanc/cefepime --> ID consulted; S/P Zosyn  - Hold home penicillin while on ABX   - ID to follow up, infectious work up as per ID  --> CMV PCR -- + S/P Ganciclovir per ID , CMV IGG (+) and IgM (-), Cryptococcal Ag --Neg, Quantiferon Tb -- Neg, Fungitell -- <31   - CT Chest non-contrast, noted, no acute pathology  - Shock, RRT, resume antibiotics IV fluid, MICU eval , ID follow up  --> Prednisone 50 PO Qd   - S/P Zosyn; Defer ABX as per ID   - Short course steroid per hematology -- On Prednisone 50 Qd   - IR eval for BM BX as per Heme/Onc --> done on 04/06 with IR, f/u path -- As noted; F/u heme/Onc recs   - ID ordered pan CT, noted questionable cystitis. UA is neg.  - Febrile 04/10 and 04/11 AM. 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 Transfuse for Hgb < 7.0, and platelets < 10.K  - GI eval appreciated; F/u recs --> No plans for scope at this time   - Was on Xarelto, held due to drop in Platelets as per Heme/Onc. Monitor platelet count   - S/P 1 unit PRBCs and 1 unit Platelets 04/04, 04/05   - HIT ab: neg  - ASA on hold  - Plt of 7K, plan for 1 unit of Plt 04/11  - S/P 1 unit PRBC 04/13  - 1 unit Platelets 04/14   - Heme/Onc obtaining further work up     Afib  - Unable to be on AC 2/2 low platelets  - On Digoxin   - Monitor on tele   - Cardio follow up     Multiple myeloma.   - pancytopenia largely at baseline although Hg 6.7 on arrival; s/p 1U PRBC with good response   - Was on acyclovir, / Ganciclovir as per ID   - C/w home entecavir   - S/P dexamethasone, D/C'd by hematology, follow up outpatient after discharge  --> Now on Prednisone 50 Qd   - Pt reports his Revlimid was held  - Heme/Onc consulted; F/u recs   - Resume home aspirin. --> Now on hold   - s/p IR BM Bx  04/06; Atypical histiocytic infiltrate with few EBV positive cells; F/u Heme/Onc recs   - S/P  IVIG 04/13 as per Heme/Onc   - IR eval for Bone Lesion Biopsy per Heme/Onc --> Arrangements for PET scan attempting to be made; Patient too unstable to be off of floor. Attempting for possible transfer to [facility] [place]; Appreciate Heme/Onc      Chronic diarrhea, now with Constipation   - Standing Imodium switched to PRN  - Monitor for BM - reports having BM     DVT   - Duplex + For R soleal vein DVT  --> Will consider CT A chest once creatinine permits as patient received contrast; Monitor for LOPEZ   - s/p Hep gtt; Monitor PTT; Monitor H/H closely --> Now on hold in view of drop in Hgb and severe thrombocytopenia  - VQ scan to R/O PE -- Low probability   - Vascular eval appreciated; AC as tolerated, on Xarelto 10, monitor H/H, plts too low to start.   - Serial Duplex to assess for propagation  -- Without propagation   - 04/03 Duplex without propagation   - Repeat Duplex 04/10 -- without propogation   - Plan for repeat Duplex 04/17   - given severe thrombocytopenia, holding Xarelto for now. Risk of bleeding greater than benefit.     Elevated LFTs  - Cont to monitor and trend  - Lipitor DC'd. Entecavir DC'd  - Likely drug induced vs from hypotension; ABX and Antiviral's DC'd. Continue to monitor and trend levels closely   - Monitor levels     RK  - S/P Contrast on 03/23   - Monitor Cr closely; Avoid nephrotoxic agents   - Cr down-trending. s/p IVF   - Renal eval appreciated   - urinary retention s/p lloyd.   - Removed lloyd 4/7/23, TOV in progress. post void residual 550ml on bladder scan  - s/p straight cath. may need to replace lloyd if unable to urinarte.   - CT with hydronephrosis; Cr up-trending, Flomax started; F/u renal recs    - Renal US neg for hydronephrosis   - Cr up-trending; C/w IVF  CC/ Hr. Check bladder scan to R/O retention     Hypertension, now hypotensive   - C/w home metoprolol.  - C/w Midodrine TID. Hold for SBP > 140;  Increase to 15 TID     HypoNa  - Cont to monitor and trend  - Appreciate renal eval.   - Serial labs     Hiccups  - Improved on Reglan, Monitor     Hyperlipidemia.   - C/w home atorvastatin --> on hold as per GI .    Abnormal CT  - Outpatient follow up for CT findings    Prophylactic measure.   dvt ppx: DVT PPX   diet: regular  ambulate: with assistance    fall precautions  aspiration precautions.

## 2023-04-14 NOTE — PROGRESS NOTE ADULT - NS ATTEND AMEND GEN_ALL_CORE FT
66 y/o male with multiple myeloma admitted with fever.  Hospital course complicated by worsening pancytopenia. Fever continues to persist despite CMV treatment and steroids.  Infectious work up negative except for CMV which is better.     Bone marrow biopsy prelim discussed with pathology- no evidence of myeloma, minimal hematopoiesis but significant histiocytic proliferation, few CD30 positive cells, few bright MUM-1 positive cells with irregular or spindle-shaped nuclei, and few NATALIA positive cells.  Minimal/rare hemophagocytosis, AFB, GMS negative, CMV negative.  CD1A negative (langerhan's), no eosinophils.      ?etiology- underlying infectious cause or histiocytic disorder. But oddly also with CD30 and MUM1, could this be a lymphoproliferative process?    Concerned that his ferritin has increased significantly, but without the HLH on the marrow and no other criteria (only fever and cytopenia, normal fibrinogen/triglycerides, splenectomy).  Soluble IL2/CD25 sent.  Repeat fibrinogen/triglycerides tomorrow.    No hemolysis, direct fidel negative.  Cytopenia likely related to ganciclovir.  Increased creatinine suspect due to ATN/maribavir- d/c maribavir.    Would recommend PET/CT scan and then IR for bone and lymph node biopsy.  Unable to get PET/CT as patient not stable enough for transfer for imaging.  Reached out for potential transfer to [facility] [place]- in process. 66 y/o male with multiple myeloma admitted with fever.  Hospital course complicated by worsening pancytopenia. Fever continues to persist despite CMV treatment and steroids.  Infectious work up negative except for CMV which is better.     Bone marrow biopsy prelim discussed with pathology- no evidence of myeloma, minimal hematopoiesis but significant histiocytic proliferation, few CD30 positive cells, few bright MUM-1 positive cells with irregular or spindle-shaped nuclei, and few NATALIA positive cells.  Minimal/rare hemophagocytosis, AFB, GMS negative, CMV negative.  CD1A negative (langerhan's), no eosinophils.      ?etiology- underlying infectious cause or histiocytic disorder. But oddly also with CD30 and MUM1, could this be a lymphoproliferative process?    Concerned that his ferritin has increased significantly, but without the HLH on the marrow and no other criteria (only fever and cytopenia, normal fibrinogen/triglycerides, splenectomy).  Soluble IL2/CD25 sent.  Repeat fibrinogen/triglycerides tomorrow.    No hemolysis, direct fidel negative.  Cytopenia likely related to ganciclovir.  Increased creatinine suspect due to ATN/maribavir- d/c maribavir.    Would recommend PET/CT scan and then IR for bone and lymph node biopsy.  Unable to get PET/CT as patient not stable enough for transfer for imaging.  [place] [facility] [place] 68 y/o male with multiple myeloma admitted with fever.  Hospital course complicated by worsening pancytopenia. Fever continues to persist despite CMV treatment and steroids.  Infectious work up negative except for CMV which is better.     Bone marrow biopsy prelim discussed with pathology- no evidence of myeloma, minimal hematopoiesis but significant histiocytic proliferation, few CD30 positive cells, few bright MUM-1 positive cells with irregular or spindle-shaped nuclei, and few NATALIA positive cells.  Minimal/rare hemophagocytosis, AFB, GMS negative, CMV negative.  CD1A negative (langerhan's), no eosinophils.      ?etiology- underlying infectious cause or histiocytic disorder. But oddly also with CD30 and MUM1, could this be a lymphoproliferative process?    Concerned that his ferritin has increased significantly, but without the HLH on the marrow and no other criteria (only fever and cytopenia, normal fibrinogen/triglycerides, splenectomy).  Soluble IL2/CD25 sent.  Repeat fibrinogen/triglycerides tomorrow.    No hemolysis, direct fidel negative.  Cytopenia likely related to ganciclovir.  Increased creatinine suspect due to ATN/maribavir- d/c maribavir.    Would recommend PET/CT scan and then IR for bone and lymph node biopsy.  Unable to get PET/CT as patient not stable enough for transfer for imaging.  Reached out for potential transfer to [facility] [place]- in process. 66 y/o male with multiple myeloma admitted with fever.  Hospital course complicated by worsening pancytopenia. Fever continues to persist despite CMV treatment and steroids.  Infectious work up negative except for CMV which is better.     Bone marrow biopsy prelim discussed with pathology- no evidence of myeloma, minimal hematopoiesis but significant histiocytic proliferation, few CD30 positive cells, few bright MUM-1 positive cells with irregular or spindle-shaped nuclei, and few NATALIA positive cells.  Minimal/rare hemophagocytosis, AFB, GMS negative, CMV negative.  CD1A negative (langerhan's), no eosinophils.      ?etiology- underlying infectious cause or histiocytic disorder. But oddly also with CD30 and MUM1, could this be a lymphoproliferative process?    Concerned that his ferritin has increased significantly, but without the HLH on the marrow and no other criteria (only fever and cytopenia, normal fibrinogen/triglycerides, splenectomy).  Soluble IL2/CD25 sent.  Repeat fibrinogen/triglycerides tomorrow.  Increase steroids to dexamethasone 20mg IV daily.    No hemolysis, direct fidel negative.  Cytopenia likely related to ganciclovir.  Increased creatinine suspect due to ATN/maribavir- d/c maribavir.    Would recommend PET/CT scan and then IR for bone and lymph node biopsy.  Unable to get PET/CT as patient not stable enough for transfer for imaging.  Reached out for potential [place] [facility] [place]. 68 y/o male with multiple myeloma admitted with fever.  Hospital course complicated by worsening pancytopenia. Fever continues to persist despite CMV treatment and steroids.  Infectious work up negative except for CMV which is better.     Bone marrow biopsy prelim discussed with pathology- no evidence of myeloma, minimal hematopoiesis but significant histiocytic proliferation, few CD30 positive cells, few bright MUM-1 positive cells with irregular or spindle-shaped nuclei, and few NATALIA positive cells.  Minimal/rare hemophagocytosis, AFB, GMS negative, CMV negative.  CD1A negative (langerhan's), no eosinophils.      ?etiology- underlying infectious cause or histiocytic disorder. But oddly also with CD30 and MUM1, could this be a lymphoproliferative process?    Concerned that his ferritin has increased significantly, but without the HLH on the marrow and no other criteria (only fever and cytopenia, normal fibrinogen/triglycerides, splenectomy).  Soluble IL2/CD25 sent.  Repeat fibrinogen/triglycerides tomorrow.  Increase steroids to dexamethasone 20mg IV daily.    No hemolysis, direct fidel negative.  Cytopenia likely related to ganciclovir.  Increased creatinine suspect due to ATN/maribavir- d/c maribavir.    Would recommend PET/CT scan and then IR for bone and lymph node biopsy.  Unable to get PET/CT as patient not stable enough for transfer for imaging.  Reached out for potential transfer to [facility] [place]- in process. 68 y/o male with multiple myeloma admitted with fever.  Hospital course complicated by worsening pancytopenia. Fever continues to persist despite CMV treatment and steroids.  Infectious work up negative except for CMV which is better.     Bone marrow biopsy prelim discussed with pathology- no evidence of myeloma, minimal hematopoiesis but significant histiocytic proliferation, few CD30 positive cells, few bright MUM-1 positive cells with irregular or spindle-shaped nuclei, and few NATALIA positive cells.  Minimal/rare hemophagocytosis, AFB, GMS negative, CMV negative.  CD1A negative (langerhan's), no eosinophils.      ?etiology- underlying infectious cause or histiocytic disorder. But oddly also with CD30 and MUM1, could this be a lymphoproliferative process?    Concerned that his ferritin has increased significantly, but without the HLH on the marrow and no other criteria (only fever and cytopenia, normal fibrinogen/triglycerides, splenectomy).  Soluble IL2/CD25 sent.  Repeat fibrinogen/triglycerides tomorrow.  Increase steroids to dexamethasone 20mg IV daily.    No hemolysis, direct fidel negative.  Cytopenia likely related to ganciclovir.  Increased creatinine suspect due to ATN/maribavir- d/c maribavir.    Would recommend PET/CT scan and then IR for bone and lymph node biopsy.  Unable to get PET/CT as patient not stable enough for transfer for imaging.  Reached out for potential transfer to [facility] [place]- in process.

## 2023-04-14 NOTE — PROGRESS NOTE ADULT - ASSESSMENT
67y Male with history of MM on chemotherapy presents with fevers. Nephrology consulted for elevated Scr.    1) RK: Recurrent RK due to LOPEZ, hypotension, anemia and infection. Patient now with third spacing with volume overload for which will change IVF to IV albumin. Will give concurrent IV lasix 80 mg IV X 1 dose. UA active likely due to lloyd with granular casts suggestive of ATN. FeNa indeterminate. Repeat renal US with resolution of bilateral hydronephrosis s/p lloyd placement. TMA work up negative. Avoid nephrotoxins. Monitor electrolytes.    2) Hypotension: BP low normal. Midodrine increased to 15 mg PO TID. Monitor BP.    3) Metabolic alkalosis: Resolved with chloride solution. Monitor pH.    4) Hyponatremia: Due to volume overload. IV lasix as above. Monitor serum Na.    5) MM: As per Heme/Onc.      [facility] NEPHROLOGY  Leoncio Leonard M.D.  Tay Brooke D.O.  Precious Chen M.D.  Nidia Stallings, MSN, ANP-C    Telephone: [phone]  Facsimile: [phone]    [address] [place] [address]   67y Male with history of MM on chemotherapy presents with fevers. Nephrology consulted for elevated Scr.    1) RK: Recurrent RK due to LOPEZ, hypotension, anemia and infection. Patient now with third spacing with volume overload for which will change IVF to IV albumin. Will give concurrent IV lasix 80 mg IV X 1 dose. UA active likely due to lloyd with granular casts suggestive of ATN. FeNa indeterminate. Repeat renal US with resolution of bilateral hydronephrosis s/p lloyd placement. TMA work up negative. Avoid nephrotoxins. Monitor electrolytes.    2) Hypotension: BP low normal. Midodrine increased to 15 mg PO TID. Monitor BP.    3) Metabolic alkalosis: Resolved with chloride solution. Monitor pH.    4) Hyponatremia: Due to volume overload. IV lasix as above. Monitor serum Na.    5) MM: As per Heme/Onc.      [facility] NEPHROLOGY  Leoncio Leonard M.D.  Tay Brooke D.O.  Precious Chen M.D.  Nidia Stallings, MSN, ANP-C    Telephone: [phone]  Facsimile: [phone]    [address] [place] [address]   67y Male with history of MM on chemotherapy presents with fevers. Nephrology consulted for elevated Scr.    1) RK: Recurrent RK due to LOPEZ, hypotension, anemia and infection. Patient now with third spacing with volume overload for which will change IVF to IV albumin. Will give concurrent IV lasix 80 mg IV X 1 dose. UA active likely due to lloyd with granular casts suggestive of ATN. FeNa indeterminate. Repeat renal US with resolution of bilateral hydronephrosis s/p lloyd placement. TMA work up negative. Avoid nephrotoxins. Monitor electrolytes.    2) Hypotension: BP low normal. Midodrine increased to 15 mg PO TID. Monitor BP.    3) Metabolic alkalosis: Resolved with chloride solution. Monitor pH.    4) Hyponatremia: Due to volume overload. IV lasix as above. Monitor serum Na.    5) MM: As per Heme/Onc.      [facility] NEPHROLOGY  Leoncio Leonard M.D.  Tay Brooke D.O.  Precious Chen M.D.  Nidia Stallings, MSN, ANP-C    Telephone: [phone]  Facsimile: [phone]    [address] [place] [address]

## 2023-04-14 NOTE — PROGRESS NOTE ADULT - ASSESSMENT
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 now with HA and c/o pain behind eyes   +DVT    + thrombocytopenia    4/1 CTH : mild chronic MVD. no acute findings   4/10 CTH and C oribts: no acute findings.   + flapping termor   EEG neg     Impression:   - possible myoclonic jerks/asterexis likely metabolic   - consider MRI brain and orbits if able with and w/o if no oimprovement   - optho recs appreicated.   - on entecavir, mariavir  - midodrine 10mg TID for hypotension  - immunosupression with steroids. on prednisone   - DOAC for AF and DVT  held in setting of thromobcytopenia  - transfusions PRN per team, platelets and PRBC  - telemetry  - PT/OT   - check FS, glucose control <180  - GI/DVT ppx  - Thank you for allowing me to participate in the care of this patient. Call with questions.   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 now with HA and c/o pain behind eyes   +DVT    + thrombocytopenia    4/1 CTH : mild chronic MVD. no acute findings   4/10 CTH and C oribts: no acute findings.   + flapping termor   EEG neg     Impression:   - possible myoclonic jerks/asterexis likely metabolic   - consider MRI brain and orbits if able with and w/o if no oimprovement   - optho recs appreicated.   - on entecavir, mariavir  - midodrine 10mg TID for hypotension  - immunosupression with steroids. on prednisone   - DOAC for AF and DVT  held in setting of thromobcytopenia  - transfusions PRN per team, platelets and PRBC  - telemetry  - PT/OT   - check FS, glucose control <180  - GI/DVT ppx  - Thank you for allowing me to participate in the care of this patient. Call with questions.   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 now with HA and c/o pain behind eyes   +DVT    + thrombocytopenia    4/1 CTH : mild chronic MVD. no acute findings   4/10 CTH and C oribts: no acute findings.   + flapping termor   EEG neg     Impression:   - possible myoclonic jerks/asterexis likely metabolic   - consider MRI brain and orbits if able with and w/o if no oimprovement   - optho recs appreicated.   - on entecavir, mariavir  - midodrine 10mg TID for hypotension  - immunosupression with steroids. on prednisone   - DOAC for AF and DVT  held in setting of thromobcytopenia  - transfusions PRN per team, platelets and PRBC  - telemetry  - PT/OT   - check FS, glucose control <180  - GI/DVT ppx  - Thank you for allowing me to participate in the care of this patient. Call with questions.   - spoke with wife bedside 4/14   Matt Harvey MD  Vascular Neurology  Office: [phone]

## 2023-04-14 NOTE — PROGRESS NOTE ADULT - SUBJECTIVE AND OBJECTIVE BOX
[facility] NEPHROLOGY- PROGRESS NOTE    67 year old Male with history of MM on chemotherapy presents with fevers. Nephrology consulted for elevated Scr.      REVIEW OF SYSTEMS:  Gen: + fevers  Cards: no chest pain  Resp: no dyspnea  GI: no nausea or vomiting or diarrhea  Vascular: + LE edema    No Known Allergies      Hospital Medications: Medications reviewed        VITALS:  T(F): 97.3 (23 @ 11:16), Max: 102.9 (23 @ 04:53)  HR: 74 (23 @ 11:16)  BP: 96/49 (23 @ 11:16)  RR: 18 (23 @ 11:16)  SpO2: 94% (23 @ 11:16)  Wt(kg): --     @ 07:01  -   @ 07:00  --------------------------------------------------------  IN: 0 mL / OUT: 1300 mL / NET: -1300 mL     @ 07:01  -   @ 16:49  --------------------------------------------------------  IN: 240 mL / OUT: 950 mL / NET: -710 mL          PHYSICAL EXAM:    Gen: NAD, calm  Cards: Irregularly irregular, +S1/S2, no M/G/R  Resp: CTA B/L  GI: soft, NT/ND, NABS  : + lloyd with yellow urine with debris  Vascular: 2+ LE edema B/L        LABS:      132<L>  |  98  |  62<H>  ----------------------------<  127<H>  5.1   |  22  |  2.42<H>    Ca    7.9<L>      2023 06:12    TPro  4.6<L>  /  Alb  2.2<L>  /  TBili  4.4<H>  /  DBili  2.9<H>  /  AST  151<H>  /  ALT  149<H>  /  AlkPhos  335<H>      Creatinine Trend: 2.42 <--, 1.79 <--, 1.44 <--, 1.52 <--, 1.54 <--, 1.15 <--, 1.18 <--                        7.8    1.27  )-----------( 6        ( 2023 06:12 )             22.2     Urine Studies:  Urinalysis Basic - ( 2023 08:42 )    Color: Yellow / Appearance: Clear / S.031 / pH:   Gluc:  / Ketone: Negative  / Bili: Negative / Urobili: 6 mg/dL   Blood:  / Protein: 30 mg/dL / Nitrite: Negative   Leuk Esterase: Negative / RBC: 3 /hpf / WBC 3 /HPF   Sq Epi:  / Non Sq Epi:  / Bacteria: Negative      Osmolality, Random Urine: 568 mos/kg (04-10 @ 14:07)  Sodium, Random Urine: 58 mmol/L (04-10 @ 14:07)  Chloride, Random Urine: 37 mmol/L ( @ 08:42)     [facility] NEPHROLOGY- PROGRESS NOTE    67 year old Male with history of MM on chemotherapy presents with fevers. Nephrology consulted for elevated Scr.      REVIEW OF SYSTEMS:  Gen: + fevers  Cards: no chest pain  Resp: no dyspnea  GI: no nausea or vomiting or diarrhea  Vascular: + LE edema    No Known Allergies      Hospital Medications: Medications reviewed        VITALS:  T(F): 97.3 (23 @ 11:16), Max: 102.9 (23 @ 04:53)  HR: 74 (23 @ 11:16)  BP: 96/49 (23 @ 11:16)  RR: 18 (23 @ 11:16)  SpO2: 94% (23 @ 11:16)  Wt(kg): --     @ 07:01  -   @ 07:00  --------------------------------------------------------  IN: 0 mL / OUT: 1300 mL / NET: -1300 mL     @ 07:01  -   @ 16:49  --------------------------------------------------------  IN: 240 mL / OUT: 950 mL / NET: -710 mL          PHYSICAL EXAM:    Gen: NAD, calm  Cards: Irregularly irregular, +S1/S2, no M/G/R  Resp: CTA B/L  GI: soft, NT/ND, NABS  : + lloyd with yellow urine with debris  Vascular: 2+ LE edema B/L        LABS:      132<L>  |  98  |  62<H>  ----------------------------<  127<H>  5.1   |  22  |  2.42<H>    Ca    7.9<L>      2023 06:12    TPro  4.6<L>  /  Alb  2.2<L>  /  TBili  4.4<H>  /  DBili  2.9<H>  /  AST  151<H>  /  ALT  149<H>  /  AlkPhos  335<H>      Creatinine Trend: 2.42 <--, 1.79 <--, 1.44 <--, 1.52 <--, 1.54 <--, 1.15 <--, 1.18 <--                        7.8    1.27  )-----------( 6        ( 2023 06:12 )             22.2     Urine Studies:  Urinalysis Basic - ( 2023 08:42 )    Color: Yellow / Appearance: Clear / S.031 / pH:   Gluc:  / Ketone: Negative  / Bili: Negative / Urobili: 6 mg/dL   Blood:  / Protein: 30 mg/dL / Nitrite: Negative   Leuk Esterase: Negative / RBC: 3 /hpf / WBC 3 /HPF   Sq Epi:  / Non Sq Epi:  / Bacteria: Negative      Osmolality, Random Urine: 568 mos/kg (04-10 @ 14:07)  Sodium, Random Urine: 58 mmol/L (04-10 @ 14:07)  Chloride, Random Urine: 37 mmol/L ( @ 08:42)     [facility] NEPHROLOGY- PROGRESS NOTE    67 year old Male with history of MM on chemotherapy presents with fevers. Nephrology consulted for elevated Scr.      REVIEW OF SYSTEMS:  Gen: + fevers  Cards: no chest pain  Resp: no dyspnea  GI: no nausea or vomiting or diarrhea  Vascular: + LE edema    No Known Allergies      Hospital Medications: Medications reviewed        VITALS:  T(F): 97.3 (23 @ 11:16), Max: 102.9 (23 @ 04:53)  HR: 74 (23 @ 11:16)  BP: 96/49 (23 @ 11:16)  RR: 18 (23 @ 11:16)  SpO2: 94% (23 @ 11:16)  Wt(kg): --     @ 07:01  -   @ 07:00  --------------------------------------------------------  IN: 0 mL / OUT: 1300 mL / NET: -1300 mL     @ 07:01  -   @ 16:49  --------------------------------------------------------  IN: 240 mL / OUT: 950 mL / NET: -710 mL          PHYSICAL EXAM:    Gen: NAD, calm  Cards: Irregularly irregular, +S1/S2, no M/G/R  Resp: CTA B/L  GI: soft, NT/ND, NABS  : + lloyd with yellow urine with debris  Vascular: 2+ LE edema B/L        LABS:      132<L>  |  98  |  62<H>  ----------------------------<  127<H>  5.1   |  22  |  2.42<H>    Ca    7.9<L>      2023 06:12    TPro  4.6<L>  /  Alb  2.2<L>  /  TBili  4.4<H>  /  DBili  2.9<H>  /  AST  151<H>  /  ALT  149<H>  /  AlkPhos  335<H>      Creatinine Trend: 2.42 <--, 1.79 <--, 1.44 <--, 1.52 <--, 1.54 <--, 1.15 <--, 1.18 <--                        7.8    1.27  )-----------( 6        ( 2023 06:12 )             22.2     Urine Studies:  Urinalysis Basic - ( 2023 08:42 )    Color: Yellow / Appearance: Clear / S.031 / pH:   Gluc:  / Ketone: Negative  / Bili: Negative / Urobili: 6 mg/dL   Blood:  / Protein: 30 mg/dL / Nitrite: Negative   Leuk Esterase: Negative / RBC: 3 /hpf / WBC 3 /HPF   Sq Epi:  / Non Sq Epi:  / Bacteria: Negative      Osmolality, Random Urine: 568 mos/kg (04-10 @ 14:07)  Sodium, Random Urine: 58 mmol/L (04-10 @ 14:07)  Chloride, Random Urine: 37 mmol/L ( @ 08:42)

## 2023-04-14 NOTE — DISCHARGE NOTE PROVIDER - CARE PROVIDER_API CALL
Jean Claude Hubbard  Internal Medicine  [address] [place] [address]  Phone: ()-  Fax: ()-  Follow Up Time:    Jean Claude Hubbard  Internal Medicine  [address] [place] [address]  Phone: ()-  Fax: ()-  Follow Up Time:    Jean Claude Hubbard  Internal Medicine  [address] [place] [address]  Phone: ()-  Fax: ()-  Follow Up Time:

## 2023-04-14 NOTE — CHART NOTE - NSCHARTNOTEFT_GEN_A_CORE
pt with persistent fever, rising Cr.  Discussed with heme, wants to hold off on maribavir given rising Cr and low likelihood that his current fevers are being contributed by CMV infection.   Stopped maribavir.  ferritin higher, LFT's rising   plan for transfer to [place].   Monitor off antimicrobials for now.     Dejuan Ralph  Please contact through [place] Teams   If no response or past 5 pm/weekend call [phone]. pt with persistent fever, rising Cr.  Discussed with heme, wants to hold off on maribavir given rising Cr and low likelihood that his current fevers are being contributed by CMV infection.   Stopped maribavir.  ferritin higher, LFT's rising   plan for transfer to [place].   Monitor off antimicrobials for now.     Dejuan Ralph  Please contact through [place] Teams   If no response or past 5 pm/weekend call [phone]. pt with persistent fever, rising Cr.  Discussed with heme, wants to hold off on maribavir given rising Cr and low likelihood that his current fevers are being contributed by CMV infection.   Stopped maribavir.  ferritin higher, LFT's rising   plan for transfer to [place].   Monitor off antimicrobials for now.     Dejuan Ralph  Please contact through MS Teams   If no response or past 5 pm/weekend call [phone].

## 2023-04-14 NOTE — DISCHARGE NOTE PROVIDER - PROVIDER TOKENS
PROVIDER:[TOKEN:[[id number]]] PROVIDER:[TOKEN:[[id number]]] PROVIDER:[TOKEN:[[id number]]]

## 2023-04-14 NOTE — DISCHARGE NOTE PROVIDER - NSDCFUADDAPPT_GEN_ALL_CORE_FT
You are being transferred to [facility] for further treatment. You are being transferred to [facility] for further treatment.

## 2023-04-14 NOTE — DISCHARGE NOTE PROVIDER - CARE PROVIDERS DIRECT ADDRESSES
[email or web address] [email or web address] [email or web address] Hospital[facility]

## 2023-04-14 NOTE — DISCHARGE NOTE PROVIDER - [id number]_CORE_FT
PRINCIPAL DISCHARGE DIAGNOSIS  Diagnosis: Fever of unknown origin (FUO)  Assessment and Plan of Treatment: Fever of unknown origin. CMV Virmeia  - CXR without consolidations or effusions; U/A unremarkable; GI PCR Neg   - Cultures remain negative to date   - LE VA duplex --> + For DVT;  V/Q scan -- Low probability for PE  - Was on empiric treatment with Vanc/cefepime --> ID consulted; S/P Zosyn  - Hold home penicillin while on ABX   - CMV PCR Positive, S/P Ganciclovir per ID , CMV IGG (+) and IgM (-), Cryptococcal Ag --Neg, Quantiferon Tb -- Neg, Fungitell -- <31   - CT Chest non-contrast no acute pathology  - Epsiode of Septic Shock s/p  RRT, resume antibiotics IV fluid, MICU eval   - Short course steroid per hematology -- On Prednisone 50 Qd   - IR eval for BM BX as per Heme/Onc --> done on 04/06 with IR, f/u path -- As noted; F/u heme/Onc recs   - pan CT, noted questionable cystitis. UA is neg.  - Recurrent fever - Multiple Blood cultures negative, RVP 04/10 Neg, herpes 6 Neg, Cefepime now on hold in view of LFTs;  - Entecavir on hold as per Heme/Onc   - Maribavir on hold as per ID      SECONDARY DISCHARGE DIAGNOSES  Diagnosis: Pancytopenia  Assessment and Plan of Treatment: Anemia, Thrombocytopenia, Pancytopenia  - Drop in Hgb, Occult +  - Hold Hep gtt   -  Transfuse for Hgb < 7.0, and platelets < 10.K  - Anemia (Hgb 6.7 on arrival) S/P Multiple PRBC transfusions - pRBC x 6 units  - Thrombocytopenia - Platelets as low as 6 - S/P Platelet transfusion - total of 8 units  - GI consulted - No plans for scope at this time   - Was on Xarelto, held due to drop in Platelets as per Heme/Onc. Monitor platelet count   - ASA on hold  - HIT ab: neg  - s/p BM Bx on 4/6. No evidence of myeloma, minimal hematopoiesis but significant histiocytic proliferation.  Minimal hemophagocytosis, AFB, GMS pending, CMV negative.  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 Hubbard   - s/p Auto SCT in 2018  - was on lenalidomide maintenance until February 2023 when he had progression of disease. Then switched to daratumumab + CyBorD; last dose March 23rd, 2023.  - Myeloma labs w/ normal free light chain ratio, KIRA w/ weak IgG kappa band, IgG 285, IgA 57, IgM 29  - S/P  IVIG 04/13 as per Heme/Onc   - Continue Prednisone 50 Qd   - Continue acyclovir 400 mg BID.  - Pt too unstable for PET scan, then will need BM Bx and Lymph node Bx Awaiting transfer to [facility] [place]  Digoxin    Diagnosis: Diarrhea  Assessment and Plan of Treatment: Chronic diarrhea,   - Likely related to chemo +/- abx. 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   - GI PCR neg, C-diff neg on 03.03  - diarrhea resolved, pt now constipated.   -Immodium stopped      Diagnosis: Atrial fibrillation  Assessment and Plan of Treatment: - Unable to be on AC 2/2 low platelets  - Continue Digoxin    Diagnosis: Hypertension  Assessment and Plan of Treatment: Hypertension, now hypotensive   - C/w home metoprolol.  - C/w Midodrine TID. Hold for SBP > 140;  Increase to 15 TID       Diagnosis: DVT, lower extremity  Assessment and Plan of Treatment: DVT - R Soleal Vein DVT  - Duplex + For R soleal vein DVT    - s/p Hep gtt - Now on hold in view of low H/H and severe thrombocytopenia  - VQ scan to R/O PE -- Low probability   - Consider CT A chest once creatinine permits as patient received contrast; Monitor for LOPEZ   - Vascular eval appreciated; AC as tolerated, on Xarelto 10, monitor H/H, plts too low to start.   - Serial Duplex to assess for propagation, 4/03  and 4/10 Duplex without propagation  - Plan for repeat Duplex 04/17   - given severe thrombocytopenia, holding Xarelto for now. Risk of bleeding greater than benefit.    Diagnosis: RK (acute kidney injury)  Assessment and Plan of Treatment: - S/P Contrast on 3/23   - s/p IVF   - Renal Consulted  - urinary retention s/p lloyd , Failed TOV 4/8, lloyd replaced  - Flomax started;  - CT with hydronephrosis; Cr up-trending,   - Renal US neg for hydronephrosis   - s/p IVF  CC/ Hr.   - Cr up-trending - Creatinine 2.42 on 4/14    Diagnosis: Transaminitis  Assessment and Plan of Treatment:   - Lipitor DC'd. Entecavir DC'd  - Likely drug induced 2/2 cefepime or antivirals vs from hypotension; ABX and Antiviral's DC'd.    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;   - Optho evaluated - no Ocular Etiology  - Neuro evaluated - possible myoclonic jerks/asterexis likely metabolic   - EEG Neg for seizures    Diagnosis: Hyponatremia  Assessment and Plan of Treatment: Sodium 131 -132  Monitor    Diagnosis: Hyperlipidemia  Assessment and Plan of Treatment: - Home atorvastatin on hold sec to Transaminitis      Diagnosis: Hiccup  Assessment and Plan of Treatment: - Improved on Reglan    Diagnosis: Abnormal CT scan, chest  Assessment and Plan of Treatment: - Outpatient follow up for CT Abd/Pelvis - Bladder wall thickening with perivesicular     PRINCIPAL DISCHARGE DIAGNOSIS  Diagnosis: Fever of unknown origin (FUO)  Assessment and Plan of Treatment: Fever of unknown origin. CMV Virmeia  - CXR without consolidations or effusions; U/A unremarkable; GI PCR Neg   - Cultures remain negative to date   - LE VA duplex --> + For DVT;  V/Q scan -- Low probability for PE  - Was on empiric treatment with Vanc/cefepime --> ID consulted; S/P Zosyn  - Hold home penicillin while on ABX   - CMV PCR Positive, S/P Ganciclovir per ID , CMV IGG (+) and IgM (-), Cryptococcal Ag --Neg, Quantiferon Tb -- Neg, Fungitell -- <31   - CT Chest non-contrast no acute pathology  - Epsiode of Septic Shock s/p  RRT, resume antibiotics IV fluid, MICU eval   - Short course steroid per hematology -- On Prednisone 50 Qd   - IR eval for BM BX as per Heme/Onc --> done on 04/06 with IR, f/u path -- As noted; F/u heme/Onc recs   - pan CT, noted questionable cystitis. UA is neg.  - Recurrent fever - Multiple Blood cultures negative, RVP 04/10 Neg, herpes 6 Neg, Cefepime now on hold in view of LFTs;  - Entecavir on hold as per Heme/Onc   - Maribavir on hold as per ID      SECONDARY DISCHARGE DIAGNOSES  Diagnosis: Pancytopenia  Assessment and Plan of Treatment: Anemia, Thrombocytopenia, Pancytopenia  - Drop in Hgb, Occult +  - Hold Hep gtt   -  Transfuse for Hgb < 7.0, and platelets < 10.K  - Anemia (Hgb 6.7 on arrival) S/P Multiple PRBC transfusions - pRBC x 6 units  - Thrombocytopenia - Platelets as low as 6 - S/P Platelet transfusion - total of 8 units  - GI consulted - No plans for scope at this time   - Was on Xarelto, held due to drop in Platelets as per Heme/Onc. Monitor platelet count   - ASA on hold  - HIT ab: neg  - s/p BM Bx on 4/6. No evidence of myeloma, minimal hematopoiesis but significant histiocytic proliferation.  Minimal hemophagocytosis, AFB, GMS pending, CMV negative.  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 Hubbard   - s/p Auto SCT in 2018  - was on lenalidomide maintenance until February 2023 when he had progression of disease. Then switched to daratumumab + CyBorD; last dose March 23rd, 2023.  - Myeloma labs w/ normal free light chain ratio, KIRA w/ weak IgG kappa band, IgG 285, IgA 57, IgM 29  - S/P  IVIG 04/13 as per Heme/Onc   - Continue Prednisone 50 Qd   - Continue acyclovir 400 mg BID.  - Pt too unstable for PET scan, then will need BM Bx and Lymph node Bx Awaiting transfer to [facility] [place]  Digoxin    Diagnosis: Diarrhea  Assessment and Plan of Treatment: Chronic diarrhea,   - Likely related to chemo +/- abx. 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   - GI PCR neg, C-diff neg on 03.03  - diarrhea resolved, pt now constipated.   -Immodium stopped      Diagnosis: Atrial fibrillation  Assessment and Plan of Treatment: - Unable to be on AC 2/2 low platelets  - Continue Digoxin    Diagnosis: Hypertension  Assessment and Plan of Treatment: Hypertension, now hypotensive   - C/w home metoprolol.  - C/w Midodrine TID. Hold for SBP > 140;  Increase to 15 TID       Diagnosis: DVT, lower extremity  Assessment and Plan of Treatment: DVT - R Soleal Vein DVT  - Duplex + For R soleal vein DVT    - s/p Hep gtt - Now on hold in view of low H/H and severe thrombocytopenia  - VQ scan to R/O PE -- Low probability   - Consider CT A chest once creatinine permits as patient received contrast; Monitor for LOPEZ   - Vascular eval appreciated; AC as tolerated, on Xarelto 10, monitor H/H, plts too low to start.   - Serial Duplex to assess for propagation, 4/03  and 4/10 Duplex without propagation  - Plan for repeat Duplex 04/17   - given severe thrombocytopenia, holding Xarelto for now. Risk of bleeding greater than benefit.    Diagnosis: RK (acute kidney injury)  Assessment and Plan of Treatment: - S/P Contrast on 3/23   - s/p IVF   - Renal Consulted  - urinary retention s/p lloyd , Failed TOV 4/8, lloyd replaced  - Flomax started;  - CT with hydronephrosis; Cr up-trending,   - Renal US neg for hydronephrosis   - s/p IVF  CC/ Hr.   - Cr up-trending - Creatinine 2.42 on 4/14    Diagnosis: Transaminitis  Assessment and Plan of Treatment:   - Lipitor DC'd. Entecavir DC'd  - Likely drug induced 2/2 cefepime or antivirals vs from hypotension; ABX and Antiviral's DC'd.    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;   - Optho evaluated - no Ocular Etiology  - Neuro evaluated - possible myoclonic jerks/asterexis likely metabolic   - EEG Neg for seizures    Diagnosis: Hyponatremia  Assessment and Plan of Treatment: Sodium 131 -132  Monitor    Diagnosis: Hyperlipidemia  Assessment and Plan of Treatment: - Home atorvastatin on hold sec to Transaminitis      Diagnosis: Hiccup  Assessment and Plan of Treatment: - Improved on Reglan    Diagnosis: Abnormal CT scan, chest  Assessment and Plan of Treatment: - Outpatient follow up for CT Abd/Pelvis - Bladder wall thickening with perivesicular     PRINCIPAL DISCHARGE DIAGNOSIS  Diagnosis: Fever of unknown origin (FUO)  Assessment and Plan of Treatment: Fever of unknown origin. CMV Virmeia  - CXR without consolidations or effusions; U/A unremarkable; GI PCR Neg   - Cultures remain negative to date   - LE VA duplex --> + For DVT;  V/Q scan -- Low probability for PE  - Was on empiric treatment with Vanc/cefepime --> ID consulted; S/P Zosyn  - Hold home penicillin while on ABX   - CMV PCR Positive, S/P Ganciclovir per ID , CMV IGG (+) and IgM (-), Cryptococcal Ag --Neg, Quantiferon Tb -- Neg, Fungitell -- <31   - CT Chest non-contrast no acute pathology  - Epsiode of Septic Shock s/p  RRT, resume antibiotics IV fluid, MICU eval   - Short course steroid per hematology -- On Prednisone 50 Qd   - IR eval for BM BX as per Heme/Onc --> done on 04/06 with IR, f/u path -- As noted; F/u heme/Onc recs   - pan CT, noted questionable cystitis. UA is neg.  - Recurrent fever - Multiple Blood cultures negative, RVP 04/10 Neg, herpes 6 Neg, Cefepime now on hold in view of LFTs;  - Entecavir on hold as per Heme/Onc   - Maribavir on hold as per ID      SECONDARY DISCHARGE DIAGNOSES  Diagnosis: Pancytopenia  Assessment and Plan of Treatment: Anemia, Thrombocytopenia, Pancytopenia  - Drop in Hgb, Occult +  - Hold Hep gtt   -  Transfuse for Hgb < 7.0, and platelets < 10.K  - Anemia (Hgb 6.7 on arrival) S/P Multiple PRBC transfusions - pRBC x 6 units  - Thrombocytopenia - Platelets as low as 6 - S/P Platelet transfusion - total of 8 units  - GI consulted - No plans for scope at this time   - Was on Xarelto, held due to drop in Platelets as per Heme/Onc. Monitor platelet count   - ASA on hold  - HIT ab: neg  - s/p BM Bx on 4/6. No evidence of myeloma, minimal hematopoiesis but significant histiocytic proliferation.  Minimal hemophagocytosis, AFB, GMS pending, CMV negative.  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 Hubbard   - s/p Auto SCT in 2018  - was on lenalidomide maintenance until February 2023 when he had progression of disease. Then switched to daratumumab + CyBorD; last dose March 23rd, 2023.  - Myeloma labs w/ normal free light chain ratio, KIRA w/ weak IgG kappa band, IgG 285, IgA 57, IgM 29  - S/P  IVIG 04/13 as per Heme/Onc   - Continue Prednisone 50 Qd   - Continue acyclovir 400 mg BID.  - Pt too unstable for PET scan, then will need BM Bx and Lymph node Bx Awaiting transfer to [facility] [place]  Digoxin    Diagnosis: Diarrhea  Assessment and Plan of Treatment: Chronic diarrhea,   - Likely related to chemo +/- abx. 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   - GI PCR neg, C-diff neg on 03.03  - diarrhea resolved, pt now constipated.   -Immodium stopped      Diagnosis: Atrial fibrillation  Assessment and Plan of Treatment: - Unable to be on AC 2/2 low platelets  - Continue Digoxin    Diagnosis: Hypertension  Assessment and Plan of Treatment: Hypertension, now hypotensive   - C/w home metoprolol.  - C/w Midodrine TID. Hold for SBP > 140;  Increase to 15 TID       Diagnosis: DVT, lower extremity  Assessment and Plan of Treatment: DVT - R Soleal Vein DVT  - Duplex + For R soleal vein DVT    - s/p Hep gtt - Now on hold in view of low H/H and severe thrombocytopenia  - VQ scan to R/O PE -- Low probability   - Consider CT A chest once creatinine permits as patient received contrast; Monitor for LOPEZ   - Vascular eval appreciated; AC as tolerated, on Xarelto 10, monitor H/H, plts too low to start.   - Serial Duplex to assess for propagation, 4/03  and 4/10 Duplex without propagation  - Plan for repeat Duplex 04/17   - given severe thrombocytopenia, holding Xarelto for now. Risk of bleeding greater than benefit.    Diagnosis: RK (acute kidney injury)  Assessment and Plan of Treatment: - S/P Contrast on 3/23   - s/p IVF   - Renal Consulted  - urinary retention s/p lloyd , Failed TOV 4/8, lloyd replaced  - Flomax started;  - CT with hydronephrosis; Cr up-trending,   - Renal US neg for hydronephrosis   - s/p IVF  CC/ Hr.   - Cr up-trending - Creatinine 2.42 on 4/14    Diagnosis: Transaminitis  Assessment and Plan of Treatment:   - Lipitor DC'd. Entecavir DC'd  - Likely drug induced 2/2 cefepime or antivirals vs from hypotension; ABX and Antiviral's DC'd.    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;   - Optho evaluated - no Ocular Etiology  - Neuro evaluated - possible myoclonic jerks/asterexis likely metabolic   - EEG Neg for seizures    Diagnosis: Hyponatremia  Assessment and Plan of Treatment: Sodium 131 -132  Monitor    Diagnosis: Hyperlipidemia  Assessment and Plan of Treatment: - Home atorvastatin on hold sec to Transaminitis      Diagnosis: Hiccup  Assessment and Plan of Treatment: - Improved on Reglan    Diagnosis: Abnormal CT scan, chest  Assessment and Plan of Treatment: - Outpatient follow up for CT Abd/Pelvis - Bladder wall thickening with perivesicular     PRINCIPAL DISCHARGE DIAGNOSIS  Diagnosis: Fever of unknown origin (FUO)  Assessment and Plan of Treatment: Fever of unknown origin. CMV Virmeia  - CXR without consolidations or effusions; U/A unremarkable; GI PCR Neg   - Cultures remain negative to date   - LE VA duplex --> + For DVT;  V/Q scan -- Low probability for PE  - Was on empiric treatment with Vanc/cefepime --> ID consulted; S/P Zosyn  - Hold home penicillin while on ABX   - CMV PCR Positive, S/P Ganciclovir per ID , CMV IGG (+) and IgM (-), Cryptococcal Ag --Neg, Quantiferon Tb -- Neg, Fungitell -- <31   - CT Chest non-contrast no acute pathology  - Epsiode of Septic Shock s/p  RRT, resume antibiotics IV fluid, MICU eval   - Short course steroid per hematology -- On Prednisone 50 Qd   - IR eval for BM BX as per Heme/Onc --> done on 04/06 with IR, f/u path -- As noted; F/u heme/Onc recs   - pan CT, noted questionable cystitis. UA is neg.  - Recurrent fever - Multiple Blood cultures negative, RVP 04/10 Neg, herpes 6 Neg, Cefepime now on hold in view of LFTs;  - Entecavir on hold as per Heme/Onc   - Maribavir on hold as per ID      SECONDARY DISCHARGE DIAGNOSES  Diagnosis: Pancytopenia  Assessment and Plan of Treatment: Anemia, Thrombocytopenia, Pancytopenia  - Drop in Hgb, Occult +  - Hold Hep gtt   -  Transfuse for Hgb < 7.0, and platelets < 10.K  - Anemia (Hgb 6.7 on arrival) S/P Multiple PRBC transfusions - pRBC x 6 units  - Thrombocytopenia - Platelets as low as 6 - S/P Platelet transfusion - total of 8 units  - GI consulted - No plans for scope at this time   - Was on Xarelto, held due to drop in Platelets as per Heme/Onc. Monitor platelet count   - ASA on hold  - HIT ab: neg  - s/p BM Bx on 4/6. No evidence of myeloma, minimal hematopoiesis but significant histiocytic proliferation.  Minimal hemophagocytosis, AFB, GMS pending, CMV negative.  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 Hubbard   - s/p Auto SCT in 2018  - was on lenalidomide maintenance until February 2023 when he had progression of disease. Then switched to daratumumab + CyBorD; last dose March 23rd, 2023.  - Myeloma labs w/ normal free light chain ratio, KIRA w/ weak IgG kappa band, IgG 285, IgA 57, IgM 29  - S/P  IVIG 04/13 as per Heme/Onc   - Continue Prednisone 50 Qd   - Continue acyclovir 400 mg BID.  - Pt too unstable for PET scan, then will need BM Bx and Lymph node Bx Awaiting transfer to [facility] [place]  Digoxin    Diagnosis: Diarrhea  Assessment and Plan of Treatment: Chronic diarrhea,   - Likely related to chemo +/- abx. 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   - GI PCR neg, C-diff neg on 03.03      Diagnosis: Atrial fibrillation  Assessment and Plan of Treatment: - Unable to be on AC 2/2 low platelets  - Continue Digoxin    Diagnosis: Hypertension  Assessment and Plan of Treatment: Hypertension, now hypotensive   - C/w home metoprolol.  - C/w Midodrine TID. Hold for SBP > 140;  Increase to 15 TID       Diagnosis: DVT, lower extremity  Assessment and Plan of Treatment: DVT - R Soleal Vein DVT  - Duplex + For R soleal vein DVT    - s/p Hep gtt - Now on hold in view of low H/H and severe thrombocytopenia  - VQ scan to R/O PE -- Low probability   - Consider CT A chest once creatinine permits as patient received contrast; Monitor for LOPEZ   - Vascular eval appreciated; AC as tolerated, on Xarelto 10, monitor H/H, plts too low to start.   - Serial Duplex to assess for propagation, 4/03  and 4/10 Duplex without propagation  - Plan for repeat Duplex 04/17   - given severe thrombocytopenia, holding Xarelto for now. Risk of bleeding greater than benefit.    Diagnosis: RK (acute kidney injury)  Assessment and Plan of Treatment: - S/P Contrast on 3/23   - s/p IVF   - Renal Consulted  - urinary retention s/p lloyd , Failed TOV 4/8, lloyd replaced  - Flomax started;  - CT with hydronephrosis; Cr up-trending,   - Renal US neg for hydronephrosis   - s/p IVF  CC/ Hr.   - Cr up-trending - Creatinine 2.42 on 4/14    Diagnosis: Transaminitis  Assessment and Plan of Treatment:   - Lipitor DC'd. Entecavir DC'd  - Likely drug induced 2/2 cefepime or antivirals vs from hypotension; ABX and Antiviral's DC'd.    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;   - Optho evaluated - no Ocular Etiology  - Neuro evaluated - possible myoclonic jerks/asterexis likely metabolic   - EEG Neg for seizures    Diagnosis: Hyponatremia  Assessment and Plan of Treatment: Sodium 131 -132  Monitor    Diagnosis: Hyperlipidemia  Assessment and Plan of Treatment: - Home atorvastatin on hold sec to Transaminitis      Diagnosis: Hiccup  Assessment and Plan of Treatment: - Improved on Reglan    Diagnosis: Abnormal CT scan, chest  Assessment and Plan of Treatment: - Outpatient follow up for CT Abd/Pelvis - Bladder wall thickening with perivesicular     PRINCIPAL DISCHARGE DIAGNOSIS  Diagnosis: Fever of unknown origin (FUO)  Assessment and Plan of Treatment: Fever of unknown origin. CMV Virmeia  - CXR without consolidations or effusions; U/A unremarkable; GI PCR Neg   - Cultures remain negative to date   - LE VA duplex --> + For DVT;  V/Q scan -- Low probability for PE  - Was on empiric treatment with Vanc/cefepime --> ID consulted; S/P Zosyn  - Hold home penicillin while on ABX   - CMV PCR Positive, S/P Ganciclovir per ID , CMV IGG (+) and IgM (-), Cryptococcal Ag --Neg, Quantiferon Tb -- Neg, Fungitell -- <31   - CT Chest non-contrast no acute pathology  - Epsiode of Septic Shock s/p  RRT, resume antibiotics IV fluid, MICU eval   - Short course steroid per hematology -- On Prednisone 50 Qd   - IR eval for BM BX as per Heme/Onc --> done on 04/06 with IR, f/u path -- As noted; F/u heme/Onc recs   - pan CT, noted questionable cystitis. UA is neg.  - Recurrent fever - Multiple Blood cultures negative, RVP 04/10 Neg, herpes 6 Neg, Cefepime now on hold in view of LFTs;  - Entecavir on hold as per Heme/Onc   - Maribavir on hold as per ID      SECONDARY DISCHARGE DIAGNOSES  Diagnosis: Pancytopenia  Assessment and Plan of Treatment: Anemia, Thrombocytopenia, Pancytopenia  - Drop in Hgb, Occult +  - Hold Hep gtt   -  Transfuse for Hgb < 7.0, and platelets < 10.K  - Anemia (Hgb 6.7 on arrival) S/P Multiple PRBC transfusions - pRBC x 6 units  - Thrombocytopenia - Platelets as low as 6 - S/P Platelet transfusion - total of 8 units  - GI consulted - No plans for scope at this time   - Was on Xarelto, held due to drop in Platelets as per Heme/Onc. Monitor platelet count   - ASA on hold  - HIT ab: neg  - s/p BM Bx on 4/6. No evidence of myeloma, minimal hematopoiesis but significant histiocytic proliferation.  Minimal hemophagocytosis, AFB, GMS pending, CMV negative.  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 Hubbard   - s/p Auto SCT in 2018  - was on lenalidomide maintenance until February 2023 when he had progression of disease. Then switched to daratumumab + CyBorD; last dose March 23rd, 2023.  - Myeloma labs w/ normal free light chain ratio, KIRA w/ weak IgG kappa band, IgG 285, IgA 57, IgM 29  - S/P  IVIG 04/13 as per Heme/Onc   - Continue Prednisone 50 Qd   - Continue acyclovir 400 mg BID.  - Pt too unstable for PET scan, then will need BM Bx and Lymph node Bx Awaiting transfer to [facility] [place]  Digoxin    Diagnosis: Diarrhea  Assessment and Plan of Treatment: Chronic diarrhea,   - Likely related to chemo +/- abx. 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   - GI PCR neg, C-diff neg on 03.03      Diagnosis: Atrial fibrillation  Assessment and Plan of Treatment: - Unable to be on AC 2/2 low platelets  - Continue Digoxin    Diagnosis: Hypertension  Assessment and Plan of Treatment: Hypertension, now hypotensive   - C/w home metoprolol.  - C/w Midodrine TID. Hold for SBP > 140;  Increase to 15 TID       Diagnosis: DVT, lower extremity  Assessment and Plan of Treatment: DVT - R Soleal Vein DVT  - Duplex + For R soleal vein DVT    - s/p Hep gtt - Now on hold in view of low H/H and severe thrombocytopenia  - VQ scan to R/O PE -- Low probability   - Consider CT A chest once creatinine permits as patient received contrast; Monitor for LOPEZ   - Vascular eval appreciated; AC as tolerated, on Xarelto 10, monitor H/H, plts too low to start.   - Serial Duplex to assess for propagation, 4/03  and 4/10 Duplex without propagation  - Plan for repeat Duplex 04/17   - given severe thrombocytopenia, holding Xarelto for now. Risk of bleeding greater than benefit.    Diagnosis: RK (acute kidney injury)  Assessment and Plan of Treatment: - S/P Contrast on 3/23   - s/p IVF   - Renal Consulted  - urinary retention s/p lloyd , Failed TOV 4/8, lloyd replaced  - Flomax started;  - CT with hydronephrosis; Cr up-trending,   - Renal US neg for hydronephrosis   - s/p IVF  CC/ Hr.   - Cr up-trending - Creatinine 2.42 on 4/14    Diagnosis: Transaminitis  Assessment and Plan of Treatment:   - Lipitor DC'd. Entecavir DC'd  - Likely drug induced 2/2 cefepime or antivirals vs from hypotension; ABX and Antiviral's DC'd.    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;   - Optho evaluated - no Ocular Etiology  - Neuro evaluated - possible myoclonic jerks/asterexis likely metabolic   - EEG Neg for seizures    Diagnosis: Hyponatremia  Assessment and Plan of Treatment: Sodium 131 -132  Monitor    Diagnosis: Hyperlipidemia  Assessment and Plan of Treatment: - Home atorvastatin on hold sec to Transaminitis      Diagnosis: Hiccup  Assessment and Plan of Treatment: - Improved on Reglan    Diagnosis: Abnormal CT scan, chest  Assessment and Plan of Treatment: - Outpatient follow up for CT Abd/Pelvis - Bladder wall thickening with perivesicular     PRINCIPAL DISCHARGE DIAGNOSIS  Diagnosis: Fever of unknown origin (FUO)  Assessment and Plan of Treatment: Fever of unknown origin. CMV Virmeia  - CXR without consolidations or effusions; U/A unremarkable; GI PCR Neg   - Cultures remain negative to date   - LE VA duplex --> + For DVT;  V/Q scan -- Low probability for PE  - Was on empiric treatment with Vanc/cefepime --> ID consulted; S/P Zosyn  - Hold home penicillin while on ABX   - CMV PCR Positive, S/P Ganciclovir per ID , CMV IGG (+) and IgM (-), Cryptococcal Ag --Neg, Quantiferon Tb -- Neg, Fungitell -- <31   - CT Chest non-contrast no acute pathology  - Epsiode of Septic Shock s/p  RRT, resume antibiotics IV fluid, MICU eval   - Short course steroid per hematology -- On Prednisone 50 Qd   - IR eval for BM BX as per Heme/Onc --> done on 04/06 with IR, f/u path -- As noted; F/u heme/Onc recs   - pan CT, noted questionable cystitis. UA is neg.  - Recurrent fever - Multiple Blood cultures negative, RVP 04/10 Neg, herpes 6 Neg, Cefepime now on hold in view of LFTs;  - Entecavir on hold as per Heme/Onc   - Maribavir on hold as per ID      SECONDARY DISCHARGE DIAGNOSES  Diagnosis: Pancytopenia  Assessment and Plan of Treatment: Anemia, Thrombocytopenia, Pancytopenia  - Drop in Hgb, Occult +  - Hold Hep gtt   -  Transfuse for Hgb < 7.0, and platelets < 10.K  - Anemia (Hgb 6.7 on arrival) S/P Multiple PRBC transfusions - pRBC x 6 units  - Thrombocytopenia - Platelets as low as 6 - S/P Platelet transfusion - total of 8 units  - GI consulted - No plans for scope at this time   - Was on Xarelto, held due to drop in Platelets as per Heme/Onc. Monitor platelet count   - ASA on hold  - HIT ab: neg  - s/p BM Bx on 4/6. No evidence of myeloma, minimal hematopoiesis but significant histiocytic proliferation.  Minimal hemophagocytosis, AFB, GMS pending, CMV negative.  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 Hubbard   - s/p Auto SCT in 2018  - was on lenalidomide maintenance until February 2023 when he had progression of disease. Then switched to daratumumab + CyBorD; last dose March 23rd, 2023.  - Myeloma labs w/ normal free light chain ratio, KIRA w/ weak IgG kappa band, IgG 285, IgA 57, IgM 29  - S/P  IVIG 04/13 as per Heme/Onc   - Continue Prednisone 50 Qd   - Continue acyclovir 400 mg BID.  - Pt too unstable for PET scan, then will need BM Bx and Lymph node Bx [place] [facility] [place]    Diagnosis: Diarrhea  Assessment and Plan of Treatment: Chronic diarrhea,   - Likely related to chemo +/- abx. 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   - GI PCR neg, C-diff neg on 03.03      Diagnosis: Atrial fibrillation  Assessment and Plan of Treatment: - Unable to be on AC 2/2 low platelets  - Continue Digoxin    Diagnosis: Hypertension  Assessment and Plan of Treatment: Hypertension, now hypotensive   - C/w home metoprolol.  - C/w Midodrine TID. Hold for SBP > 140;  Increase to 15 TID       Diagnosis: DVT, lower extremity  Assessment and Plan of Treatment: DVT - R Soleal Vein DVT  - Duplex + For R soleal vein DVT    - s/p Hep gtt - Now on hold in view of low H/H and severe thrombocytopenia  - VQ scan to R/O PE -- Low probability   - Consider CT A chest once creatinine permits as patient received contrast; Monitor for LOPEZ   - Vascular eval appreciated; AC as tolerated, on Xarelto 10, monitor H/H, plts too low to start.   - Serial Duplex to assess for propagation, 4/03  and 4/10 Duplex without propagation  - Plan for repeat Duplex 04/17   - given severe thrombocytopenia, holding Xarelto for now. Risk of bleeding greater than benefit.    Diagnosis: RK (acute kidney injury)  Assessment and Plan of Treatment: - S/P Contrast on 3/23   - s/p IVF   - Renal Consulted  - urinary retention s/p lloyd , Failed TOV 4/8, lloyd replaced  - Flomax started;  - CT with hydronephrosis; Cr up-trending,   - Renal US neg for hydronephrosis   - s/p IVF  CC/ Hr.   - Cr up-trending - Creatinine 2.42 on 4/14    Diagnosis: Transaminitis  Assessment and Plan of Treatment:   - Lipitor DC'd. Entecavir DC'd  - Likely drug induced 2/2 cefepime or antivirals vs from hypotension; ABX and Antiviral's DC'd.    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;   - Optho evaluated - no Ocular Etiology  - Neuro evaluated - possible myoclonic jerks/asterexis likely metabolic   - EEG Neg for seizures    Diagnosis: Hyponatremia  Assessment and Plan of Treatment: Sodium 131 -132  Monitor    Diagnosis: Hyperlipidemia  Assessment and Plan of Treatment: - Home atorvastatin on hold sec to Transaminitis      Diagnosis: Hiccup  Assessment and Plan of Treatment: - Improved on Reglan    Diagnosis: Abnormal CT scan, chest  Assessment and Plan of Treatment: - Outpatient follow up for CT Abd/Pelvis - Bladder wall thickening with perivesicular     PRINCIPAL DISCHARGE DIAGNOSIS  Diagnosis: Fever of unknown origin (FUO)  Assessment and Plan of Treatment: Fever of unknown origin. CMV Virmeia  - CXR without consolidations or effusions; U/A unremarkable; GI PCR Neg   - Cultures remain negative to date   - LE VA duplex --> + For DVT;  V/Q scan -- Low probability for PE  - Was on empiric treatment with Vanc/cefepime --> ID consulted; S/P Zosyn  - Hold home penicillin while on ABX   - CMV PCR Positive, S/P Ganciclovir per ID , CMV IGG (+) and IgM (-), Cryptococcal Ag --Neg, Quantiferon Tb -- Neg, Fungitell -- <31   - CT Chest non-contrast no acute pathology  - Epsiode of Septic Shock s/p  RRT, resume antibiotics IV fluid, MICU eval   - Short course steroid per hematology -- On Prednisone 50 Qd   - IR eval for BM BX as per Heme/Onc --> done on 04/06 with IR, f/u path -- As noted; F/u heme/Onc recs   - pan CT, noted questionable cystitis. UA is neg.  - Recurrent fever - Multiple Blood cultures negative, RVP 04/10 Neg, herpes 6 Neg, Cefepime now on hold in view of LFTs;  - Entecavir on hold as per Heme/Onc   - Maribavir on hold as per ID      SECONDARY DISCHARGE DIAGNOSES  Diagnosis: Pancytopenia  Assessment and Plan of Treatment: Anemia, Thrombocytopenia, Pancytopenia  - Drop in Hgb, Occult +  - Hold Hep gtt   -  Transfuse for Hgb < 7.0, and platelets < 10.K  - Anemia (Hgb 6.7 on arrival) S/P Multiple PRBC transfusions - pRBC x 6 units  - Thrombocytopenia - Platelets as low as 6 - S/P Platelet transfusion - total of 10 units  - GI consulted - No plans for scope at this time   - Was on Xarelto, held due to drop in Platelets as per Heme/Onc. Monitor platelet count   - ASA on hold  - HIT ab: neg  - s/p BM Bx on 4/6. No evidence of myeloma, minimal hematopoiesis but significant histiocytic proliferation.  Minimal hemophagocytosis, AFB, GMS pending, CMV negative.  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 Hubbard   - s/p Auto SCT in 2018  - was on lenalidomide maintenance until February 2023 when he had progression of disease. Then switched to daratumumab + CyBorD; last dose March 23rd, 2023.  - Myeloma labs w/ normal free light chain ratio, KIRA w/ weak IgG kappa band, IgG 285, IgA 57, IgM 29  - S/P  IVIG 04/13 as per Heme/Onc   - Continue Prednisone 50 Qd   - Continue acyclovir 400 mg BID.  - Pt too unstable for PET scan, then will need BM Bx and Lymph node Bx Awaiting transfer to [facility] [place]  Started on high dose solumedrol  PICC placed on 4/17 for possible chemotherapy    Diagnosis: Multisystem disorder  Assessment and Plan of Treatment: Multi organ failure  Uptrending LFTs - Abd US pending (  )  Started on high dose steroids  CT A/P pending (  )    Diagnosis: Diarrhea  Assessment and Plan of Treatment: Chronic diarrhea,   - Likely related to chemo +/- abx. 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   - GI PCR neg, C-diff neg on 03.03  -Immodium prn      Diagnosis: Atrial fibrillation  Assessment and Plan of Treatment: - Unable to be on AC 2/2 low platelets  - Continue Digoxin  - Monitor on telemetry    Diagnosis: Hypertension  Assessment and Plan of Treatment: Hypertension, now hypotensive   - C/w home metoprolol.  - C/w Midodrine TID. Hold for SBP > 140;  Increase to 15 TID       Diagnosis: DVT, lower extremity  Assessment and Plan of Treatment: DVT - R Soleal Vein DVT  - Duplex + For R soleal vein DVT    - s/p Hep gtt - Now on hold in view of low H/H and severe thrombocytopenia  - VQ scan to R/O PE -- Low probability   - Consider CT A chest once creatinine permits as patient received contrast; Monitor for LOPEZ   - Vascular eval appreciated; AC as tolerated, on Xarelto 10, monitor H/H, plts too low to start.   - Serial Duplex to assess for propagation, 4/03  and 4/10 Duplex without propagation  - Plan for repeat Duplex 04/17 (  )  not performed yet***  - given severe thrombocytopenia, holding Xarelto for now. Risk of bleeding greater than benefit.    Diagnosis: RK (acute kidney injury)  Assessment and Plan of Treatment: - S/P Contrast on 3/23   - s/p IVF   - Renal Consulted  - urinary retention s/p lloyd , Failed TOV 4/8, lloyd replaced  - Flomax started;  - CT with hydronephrosis; Cr up-trending,   - Renal US neg for hydronephrosis   - s/p IVF  CC/ Hr.   - Cr up-trending - Creatinine 2.42 on 4/14  - Rpt Renal US pending (  )    Diagnosis: Transaminitis  Assessment and Plan of Treatment:   - Lipitor DC'd. Entecavir DC'd  - Likely drug induced 2/2 cefepime or antivirals vs from hypotension; ABX and Antiviral's DC'd.    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;   - Optho evaluated - no Ocular Etiology  - Neuro evaluated - possible myoclonic jerks/asterexis likely metabolic   - EEG Neg for seizures    Diagnosis: Hyponatremia  Assessment and Plan of Treatment: Trending daily  Monitor    Diagnosis: Hyperlipidemia  Assessment and Plan of Treatment: - Home atorvastatin on hold sec to Transaminitis      Diagnosis: Hiccup  Assessment and Plan of Treatment: - Improved on Reglan    Diagnosis: Abnormal CT scan, chest  Assessment and Plan of Treatment: - Outpatient follow up for CT Abd/Pelvis - Bladder wall thickening with perivesicular     PRINCIPAL DISCHARGE DIAGNOSIS  Diagnosis: Fever of unknown origin (FUO)  Assessment and Plan of Treatment: Fever of unknown origin. CMV Virmeia  - CXR without consolidations or effusions; U/A unremarkable; GI PCR Neg   - Cultures remain negative to date   - LE VA duplex --> + For DVT;  V/Q scan -- Low probability for PE  - Was on empiric treatment with Vanc/cefepime --> ID consulted; S/P Zosyn  - Hold home penicillin while on ABX   - CMV PCR Positive, S/P Ganciclovir per ID , CMV IGG (+) and IgM (-), Cryptococcal Ag --Neg, Quantiferon Tb -- Neg, Fungitell -- <31   - CT Chest non-contrast no acute pathology  - Epsiode of Septic Shock s/p  RRT, resume antibiotics IV fluid, MICU eval   - Short course steroid per hematology -- On Prednisone 50 Qd   - IR eval for BM BX as per Heme/Onc --> done on 04/06 with IR, f/u path -- As noted; F/u heme/Onc recs   - pan CT, noted questionable cystitis. UA is neg.  - Recurrent fever - Multiple Blood cultures negative, RVP 04/10 Neg, herpes 6 Neg, Cefepime now on hold in view of LFTs;  - Entecavir on hold as per Heme/Onc   - Maribavir on hold as per ID      SECONDARY DISCHARGE DIAGNOSES  Diagnosis: Pancytopenia  Assessment and Plan of Treatment: Anemia, Thrombocytopenia, Pancytopenia  - Drop in Hgb, Occult +  - Hold Hep gtt   -  Transfuse for Hgb < 7.0, and platelets < 10.K  - Anemia (Hgb 6.7 on arrival) S/P Multiple PRBC transfusions - pRBC x 6 units  - Thrombocytopenia - Platelets as low as 6 - S/P Platelet transfusion - total of 10 units  - GI consulted - No plans for scope at this time   - Was on Xarelto, held due to drop in Platelets as per Heme/Onc. Monitor platelet count   - ASA on hold  - HIT ab: neg  - s/p BM Bx on 4/6. No evidence of myeloma, minimal hematopoiesis but significant histiocytic proliferation.  Minimal hemophagocytosis, AFB, GMS pending, CMV negative.  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 Hubbard   - s/p Auto SCT in 2018  - was on lenalidomide maintenance until February 2023 when he had progression of disease. Then switched to daratumumab + CyBorD; last dose March 23rd, 2023.  - Myeloma labs w/ normal free light chain ratio, KIRA w/ weak IgG kappa band, IgG 285, IgA 57, IgM 29  - S/P  IVIG 04/13 as per Heme/Onc   - Continue Prednisone 50 Qd   - Continue acyclovir 400 mg BID.  - Pt too unstable for PET scan, then will need BM Bx and Lymph node Bx [place] [facility] [place]  Started on high dose solumedrol  PICC placed on 4/17 for possible chemotherapy    Diagnosis: Multisystem disorder  Assessment and Plan of Treatment: Multi organ failure  Uptrending LFTs - Abd US pending (  )  Started on high dose steroids  CT A/P pending (  )    Diagnosis: Diarrhea  Assessment and Plan of Treatment: Chronic diarrhea,   - Likely related to chemo +/- abx. 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   - GI PCR neg, C-diff neg on 03.03  -Immodium prn      Diagnosis: Atrial fibrillation  Assessment and Plan of Treatment: - Unable to be on AC 2/2 low platelets  - Continue Digoxin  - Monitor on telemetry    Diagnosis: Hypertension  Assessment and Plan of Treatment: Hypertension, now hypotensive   - C/w home metoprolol.  - C/w Midodrine TID. Hold for SBP > 140;  Increase to 15 TID       Diagnosis: DVT, lower extremity  Assessment and Plan of Treatment: DVT - R Soleal Vein DVT  - Duplex + For R soleal vein DVT    - s/p Hep gtt - Now on hold in view of low H/H and severe thrombocytopenia  - VQ scan to R/O PE -- Low probability   - Consider CT A chest once creatinine permits as patient received contrast; Monitor for LOPEZ   - Vascular eval appreciated; AC as tolerated, on Xarelto 10, monitor H/H, plts too low to start.   - Serial Duplex to assess for propagation, 4/03  and 4/10 Duplex without propagation  - Plan for repeat Duplex 04/17 (  )  not performed yet***  - given severe thrombocytopenia, holding Xarelto for now. Risk of bleeding greater than benefit.    Diagnosis: RK (acute kidney injury)  Assessment and Plan of Treatment: - S/P Contrast on 3/23   - s/p IVF   - Renal Consulted  - urinary retention s/p lloyd , Failed TOV 4/8, lloyd replaced  - Flomax started;  - CT with hydronephrosis; Cr up-trending,   - Renal US neg for hydronephrosis   - s/p IVF  CC/ Hr.   - Cr up-trending - Creatinine 2.42 on 4/14  - Rpt Renal US pending (  )    Diagnosis: Transaminitis  Assessment and Plan of Treatment:   - Lipitor DC'd. Entecavir DC'd  - Likely drug induced 2/2 cefepime or antivirals vs from hypotension; ABX and Antiviral's DC'd.    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;   - Optho evaluated - no Ocular Etiology  - Neuro evaluated - possible myoclonic jerks/asterexis likely metabolic   - EEG Neg for seizures    Diagnosis: Hyponatremia  Assessment and Plan of Treatment: Trending daily  Monitor    Diagnosis: Hyperlipidemia  Assessment and Plan of Treatment: - Home atorvastatin on hold sec to Transaminitis      Diagnosis: Hiccup  Assessment and Plan of Treatment: - Improved on Reglan    Diagnosis: Abnormal CT scan, chest  Assessment and Plan of Treatment: - Outpatient follow up for CT Abd/Pelvis - Bladder wall thickening with perivesicular     PRINCIPAL DISCHARGE DIAGNOSIS  Diagnosis: Fever of unknown origin (FUO)  Assessment and Plan of Treatment: Fever of unknown origin. CMV Virmeia  - CXR without consolidations or effusions; U/A unremarkable; GI PCR Neg   - Cultures remain negative to date   - LE VA duplex --> + For DVT;  V/Q scan -- Low probability for PE  - Was on empiric treatment with Vanc/cefepime --> ID consulted; S/P Zosyn  - Hold home penicillin while on ABX   - CMV PCR Positive, S/P Ganciclovir per ID , CMV IGG (+) and IgM (-), Cryptococcal Ag --Neg, Quantiferon Tb -- Neg, Fungitell -- <31   - CT Chest non-contrast no acute pathology  - Epsiode of Septic Shock s/p  RRT, resume antibiotics IV fluid, MICU eval   - Short course steroid per hematology -- On Prednisone 50 Qd   - IR eval for BM BX as per Heme/Onc --> done on 04/06 with IR, f/u path -- As noted; F/u heme/Onc recs   - pan CT, noted questionable cystitis. UA is neg.  - Recurrent fever - Multiple Blood cultures negative, RVP 04/10 Neg, herpes 6 Neg, Cefepime now on hold in view of LFTs;  - Entecavir on hold as per Heme/Onc   - Maribavir on hold as per ID      SECONDARY DISCHARGE DIAGNOSES  Diagnosis: Pancytopenia  Assessment and Plan of Treatment: Anemia, Thrombocytopenia, Pancytopenia  - Drop in Hgb, Occult +  - Hold Hep gtt   -  Transfuse for Hgb < 7.0, and platelets < 10.K  - Anemia (Hgb 6.7 on arrival) S/P Multiple PRBC transfusions - pRBC x 6 units  - Thrombocytopenia - Platelets as low as 6 - S/P Platelet transfusion - total of 10 units  - GI consulted - No plans for scope at this time   - Was on Xarelto, held due to drop in Platelets as per Heme/Onc. Monitor platelet count   - ASA on hold  - HIT ab: neg  - s/p BM Bx on 4/6. No evidence of myeloma, minimal hematopoiesis but significant histiocytic proliferation.  Minimal hemophagocytosis, AFB, GMS pending, CMV negative.  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   - s/p Auto SCT in 2018  - was on lenalidomide maintenance until February 2023 when he had progression of disease. Then switched to daratumumab + CyBorD; last dose March 23rd, 2023.  - Myeloma labs w/ normal free light chain ratio, KIRA w/ weak IgG kappa band, IgG 285, IgA 57, IgM 29  - S/P  IVIG 04/13 as per Heme/Onc   - Continue Prednisone 50 Qd   - Continue acyclovir 400 mg BID.  - Pt too unstable for PET scan, then will need BM Bx and Lymph node Bx Awaiting transfer to [facility] [place]  Started on high dose solumedrol  PICC placed on 4/17 for possible chemotherapy    Diagnosis: Multisystem disorder  Assessment and Plan of Treatment: Multi organ failure  Uptrending LFTs - Abd US pending (  )  Started on high dose steroids  CT A/P pending (  )    Diagnosis: Diarrhea  Assessment and Plan of Treatment: Chronic diarrhea,   - Likely related to chemo +/- abx. 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   - GI PCR neg, C-diff neg on 03.03  -Immodium prn      Diagnosis: Atrial fibrillation  Assessment and Plan of Treatment: - Unable to be on AC 2/2 low platelets  - Continue Digoxin  - Monitor on telemetry    Diagnosis: Hypertension  Assessment and Plan of Treatment: Hypertension, now hypotensive   - C/w home metoprolol.  - C/w Midodrine TID. Hold for SBP > 140;  Increase to 15 TID       Diagnosis: DVT, lower extremity  Assessment and Plan of Treatment: DVT - R Soleal Vein DVT  - Duplex + For R soleal vein DVT    - s/p Hep gtt - Now on hold in view of low H/H and severe thrombocytopenia  - VQ scan to R/O PE -- Low probability   - Consider CT A chest once creatinine permits as patient received contrast; Monitor for LOPEZ   - Vascular eval appreciated; AC as tolerated, on Xarelto 10, monitor H/H, plts too low to start.   - Serial Duplex to assess for propagation, 4/03  and 4/10 Duplex without propagation  - Plan for repeat Duplex 04/17 (  )  not performed yet***  - given severe thrombocytopenia, holding Xarelto for now. Risk of bleeding greater than benefit.    Diagnosis: RK (acute kidney injury)  Assessment and Plan of Treatment: - S/P Contrast on 3/23   - s/p IVF   - Renal Consulted  - urinary retention s/p lloyd , Failed TOV 4/8, lloyd replaced  - Flomax started;  - CT with hydronephrosis; Cr up-trending,   - Renal US neg for hydronephrosis   - s/p IVF  CC/ Hr.   - Cr up-trending - Creatinine 2.42 on 4/14  - Rpt Renal US pending (  )    Diagnosis: Transaminitis  Assessment and Plan of Treatment:   - Lipitor DC'd. Entecavir DC'd  - Likely drug induced 2/2 cefepime or antivirals vs from hypotension; ABX and Antiviral's DC'd.    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;   - Optho evaluated - no Ocular Etiology  - Neuro evaluated - possible myoclonic jerks/asterexis likely metabolic   - EEG Neg for seizures    Diagnosis: Hyponatremia  Assessment and Plan of Treatment: Trending daily  Monitor    Diagnosis: Hyperlipidemia  Assessment and Plan of Treatment: - Home atorvastatin on hold sec to Transaminitis      Diagnosis: Hiccup  Assessment and Plan of Treatment: - Improved on Reglan    Diagnosis: Abnormal CT scan, chest  Assessment and Plan of Treatment: - Outpatient follow up for CT Abd/Pelvis - Bladder wall thickening with perivesicular     PRINCIPAL DISCHARGE DIAGNOSIS  Diagnosis: Fever of unknown origin (FUO)  Assessment and Plan of Treatment: Fever of unknown origin. CMV Virmeia  - CXR without consolidations or effusions; U/A unremarkable; GI PCR Neg   - Cultures remain negative to date   - LE VA duplex --> + For DVT;  V/Q scan -- Low probability for PE  - Was on empiric treatment with Vanc/cefepime --> ID consulted; S/P Zosyn  - Hold home penicillin while on ABX   - CMV PCR Positive, S/P Ganciclovir per ID , CMV IGG (+) and IgM (-), Cryptococcal Ag --Neg, Quantiferon Tb -- Neg, Fungitell -- <31   - CT Chest non-contrast no acute pathology  - Epsiode of Septic Shock s/p  RRT, resume antibiotics IV fluid, MICU eval   - Short course steroid per hematology -- On Prednisone 50 Qd   - IR eval for BM BX as per Heme/Onc --> done on 04/06 with IR, f/u path -- As noted; F/u heme/Onc recs   - pan CT, noted questionable cystitis. UA is neg.  - Recurrent fever - Multiple Blood cultures negative, RVP 04/10 Neg, herpes 6 Neg, Cefepime now on hold in view of LFTs;  - Entecavir on hold as per Heme/Onc   - Maribavir on hold as per ID      SECONDARY DISCHARGE DIAGNOSES  Diagnosis: Pancytopenia  Assessment and Plan of Treatment: Pancytopenia - Anemia, Thrombocytopenia, severe leukopenia,   - Drop in Hgb, Occult +  - Hold Hep gtt. Was on Xarelto, held due to drop in Platelets as per Heme/Onc, ASA on hold  - Anemia (Hgb 6.7 on arrival) S/P Multiple PRBC transfusions - pRBC x 8 units  - Thrombocytopenia - Platelets as low as 6 - S/P Platelet transfusion - total of 12 units  - HIT ab: neg  - s/p BM Bx on 4/6. No evidence of myeloma, preliminary showed minimal hematopoiesis but significant histiocytic proliferation and minimal hemophagocytosis. CMV negative.  CD1A negative (langerhan's), no eosinophils  - Possible HLH,   - Unclear etiology of HLH given lack of myeloma on marrow. Possibly infectious with EBV and CMV?  - Multiorgan system failure with increasing creatinine and bilirubin. Ferritin remains extremely elevated. 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   - Interventional radiology placed PICC line on 4/17/22. To continue high dose steroids. 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 Hubbard   - s/p Auto SCT in 2018  - was on lenalidomide maintenance until February 2023 when he had progression of disease. Then switched to daratumumab + CyBorD; last dose March 23rd, 2023.  - Myeloma labs w/ normal free light chain ratio, KIRA w/ weak IgG kappa band, IgG 285, IgA 57, IgM 29  - S/P  IVIG 04/13 as per Heme/Onc   - Continue Prednisone 50 Qd   - Continue acyclovir 400 mg BID.  - Pt too unstable for PET scan, then will need BM Bx and Lymph node Bx [place] [facility] [place] [facility] [place]  Started on high dose solumedrol  PICC placed on 4/17 for possible chemotherapy    Diagnosis: Diarrhea  Assessment and Plan of Treatment: Chronic diarrhea,   - Likely related to chemo +/- abx. 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   - GI PCR neg, C-diff neg on 03.03  -Immodium prn      Diagnosis: Atrial fibrillation  Assessment and Plan of Treatment: - Unable to be on AC 2/2 low platelets  - Continue Digoxin  - Monitor on telemetry    Diagnosis: Hypertension  Assessment and Plan of Treatment: Hypertension, now hypotensive   - C/w home metoprolol.  - C/w Midodrine TID. Hold for SBP > 140;  Increase to 15 TID       Diagnosis: DVT, lower extremity  Assessment and Plan of Treatment: DVT - R Soleal Vein DVT  - Duplex + For R soleal vein DVT    - s/p Hep gtt - Now on hold in view of low H/H and severe thrombocytopenia  - VQ scan to R/O PE -- Low probability   - Consider CT A chest once creatinine permits as patient received contrast; Monitor for LOPEZ   - Vascular eval appreciated; AC as tolerated, on Xarelto 10, monitor H/H, plts too low to start.   - Serial Duplex to assess for propagation, 4/03  and 4/10 Duplex without propagation  - Plan for repeat Duplex 04/17 (  )  not performed yet***  - given severe thrombocytopenia, holding Xarelto for now. Risk of bleeding greater than benefit.    Diagnosis: RK (acute kidney injury)  Assessment and Plan of Treatment: - S/P Contrast on 3/23   - s/p IVF   - Renal Consulted  - urinary retention s/p lloyd , Failed TOV 4/8, lloyd replaced  - Flomax started;  - CT with hydronephrosis; Cr up-trending,   - Renal US neg for hydronephrosis   - s/p IVF  CC/ Hr.   - Cr up-trending - Creatinine 2.42 on 4/14  - Rpt Renal US pending (  )    Diagnosis: Transaminitis  Assessment and Plan of Treatment:   - Lipitor DC'd. Entecavir DC'd  - Likely drug induced 2/2 cefepime or antivirals vs from hypotension; ABX and Antiviral's DC'd.    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;   - Optho evaluated - no Ocular Etiology  - Neuro evaluated - possible myoclonic jerks/asterexis likely metabolic   - EEG Neg for seizures    Diagnosis: Hyponatremia  Assessment and Plan of Treatment: Trending daily  Monitor    Diagnosis: Hyperlipidemia  Assessment and Plan of Treatment: - Home atorvastatin on hold sec to Transaminitis      Diagnosis: Hiccup  Assessment and Plan of Treatment: - Improved on Reglan    Diagnosis: Abnormal CT scan, chest  Assessment and Plan of Treatment: - Outpatient follow up for CT Abd/Pelvis - Bladder wall thickening with perivesicular    Diagnosis: Multisystem disorder  Assessment and Plan of Treatment: Multi organ failure  Uptrending LFTs - Abd US pending (  )  Started on high dose steroids  CT A/P pending (  )     PRINCIPAL DISCHARGE DIAGNOSIS  Diagnosis: Fever of unknown origin (FUO)  Assessment and Plan of Treatment: Fever of unknown origin. CMV Virmeia  - CXR without consolidations or effusions; U/A unremarkable; GI PCR Neg   - Cultures remain negative to date   - LE VA duplex --> + For DVT;  V/Q scan -- Low probability for PE  - Was on empiric treatment with Vanc/cefepime --> ID consulted; S/P Zosyn  - Hold home penicillin while on ABX   - CMV PCR Positive, S/P Ganciclovir per ID , CMV IGG (+) and IgM (-), Cryptococcal Ag --Neg, Quantiferon Tb -- Neg, Fungitell -- <31   - CT Chest non-contrast no acute pathology  - Epsiode of Septic Shock s/p  RRT, resume antibiotics IV fluid, MICU eval   - Short course steroid per hematology -- On Prednisone 50 Qd   - IR eval for BM BX as per Heme/Onc --> done on 04/06 with IR, f/u path -- As noted; F/u heme/Onc recs   - pan CT, noted questionable cystitis. UA is neg.  - Recurrent fever - Multiple Blood cultures negative, RVP 04/10 Neg, herpes 6 Neg, Cefepime now on hold in view of LFTs;  - Entecavir on hold as per Heme/Onc   - Maribavir on hold as per ID      SECONDARY DISCHARGE DIAGNOSES  Diagnosis: Pancytopenia  Assessment and Plan of Treatment: Pancytopenia - Anemia, Thrombocytopenia, severe leukopenia,   - Drop in Hgb, Occult +  - Hold Hep gtt. Was on Xarelto, held due to drop in Platelets as per Heme/Onc, ASA on hold  - Anemia (Hgb 6.7 on arrival) S/P Multiple PRBC transfusions - pRBC x 8 units  - Thrombocytopenia - Platelets as low as 6 - S/P Platelet transfusion - total of 12 units  - HIT ab: neg  - s/p BM Bx on 4/6. No evidence of myeloma, preliminary showed minimal hematopoiesis but significant histiocytic proliferation and minimal hemophagocytosis. CMV negative.  CD1A negative (langerhan's), no eosinophils  - Possible HLH,   - Unclear etiology of HLH given lack of myeloma on marrow. Possibly infectious with EBV and CMV?  - Multiorgan system failure with increasing creatinine and bilirubin. Ferritin remains extremely elevated. 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   - Interventional radiology placed PICC line on 4/17/22. To continue high dose steroids. 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 Hubbard   - s/p Auto SCT in 2018  - was on lenalidomide maintenance until February 2023 when he had progression of disease. Then switched to daratumumab + CyBorD; last dose March 23rd, 2023.  - Myeloma labs w/ normal free light chain ratio, KIRA w/ weak IgG kappa band, IgG 285, IgA 57, IgM 29  - S/P  IVIG 04/13 as per Heme/Onc   - Continue Prednisone 50 Qd   - Continue acyclovir 400 mg BID.  - Pt too unstable for PET scan, then will need BM Bx and Lymph node Bx [place] [facility] [place]  Started on high dose solumedrol  PICC placed on 4/17 for possible chemotherapy    Diagnosis: Diarrhea  Assessment and Plan of Treatment: Chronic diarrhea,   - Likely related to chemo +/- abx. 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   - GI PCR neg, C-diff neg on 03.03  -Immodium prn      Diagnosis: Atrial fibrillation  Assessment and Plan of Treatment: - Unable to be on AC 2/2 low platelets  - Continue Digoxin  - Monitor on telemetry    Diagnosis: Hypertension  Assessment and Plan of Treatment: Hypertension, now hypotensive   - C/w home metoprolol.  - C/w Midodrine TID. Hold for SBP > 140;  Increase to 15 TID       Diagnosis: DVT, lower extremity  Assessment and Plan of Treatment: DVT - R Soleal Vein DVT  - Duplex + For R soleal vein DVT    - s/p Hep gtt - Now on hold in view of low H/H and severe thrombocytopenia  - VQ scan to R/O PE -- Low probability   - Consider CT A chest once creatinine permits as patient received contrast; Monitor for LOPEZ   - Vascular eval appreciated; AC as tolerated, on Xarelto 10, monitor H/H, plts too low to start.   - Serial Duplex to assess for propagation, 4/03  and 4/10 Duplex without propagation  - Plan for repeat Duplex 04/17 (  )  not performed yet***  - given severe thrombocytopenia, holding Xarelto for now. Risk of bleeding greater than benefit.    Diagnosis: RK (acute kidney injury)  Assessment and Plan of Treatment: - S/P Contrast on 3/23   - s/p IVF   - Renal Consulted  - urinary retention s/p lloyd , Failed TOV 4/8, lloyd replaced  - Flomax started;  - CT with hydronephrosis; Cr up-trending,   - Renal US neg for hydronephrosis   - s/p IVF  CC/ Hr.   - Cr up-trending - Creatinine 2.42 on 4/14  - Rpt Renal US pending (  )    Diagnosis: Transaminitis  Assessment and Plan of Treatment:   - Lipitor DC'd. Entecavir DC'd  - Likely drug induced 2/2 cefepime or antivirals vs from hypotension; ABX and Antiviral's DC'd.    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;   - Optho evaluated - no Ocular Etiology  - Neuro evaluated - possible myoclonic jerks/asterexis likely metabolic   - EEG Neg for seizures    Diagnosis: Hyponatremia  Assessment and Plan of Treatment: Trending daily  Monitor    Diagnosis: Hyperlipidemia  Assessment and Plan of Treatment: - Home atorvastatin on hold sec to Transaminitis      Diagnosis: Hiccup  Assessment and Plan of Treatment: - Improved on Reglan    Diagnosis: Abnormal CT scan, chest  Assessment and Plan of Treatment: - Outpatient follow up for CT Abd/Pelvis - Bladder wall thickening with perivesicular    Diagnosis: Multisystem disorder  Assessment and Plan of Treatment: Multi organ failure  Uptrending LFTs - Abd US pending (  )  Started on high dose steroids  CT A/P pending (  )     PRINCIPAL DISCHARGE DIAGNOSIS  Diagnosis: Fever of unknown origin (FUO)  Assessment and Plan of Treatment: Fever of unknown origin. CMV Virmeia  - CXR without consolidations or effusions; U/A unremarkable; GI PCR Neg   - Cultures remain negative to date   - LE VA duplex --> + For DVT;  V/Q scan -- Low probability for PE  - Was on empiric treatment with Vanc/cefepime --> ID consulted; S/P Zosyn  - Hold home penicillin while on ABX   - CMV PCR Positive, S/P Ganciclovir per ID , CMV IGG (+) and IgM (-), Cryptococcal Ag --Neg, Quantiferon Tb -- Neg, Fungitell -- <31   - CT Chest non-contrast no acute pathology  - Epsiode of Septic Shock s/p  RRT, resume antibiotics IV fluid, MICU eval   - Short course steroid per hematology -- On Prednisone 50 Qd   - IR eval for BM BX as per Heme/Onc --> done on 04/06 with IR, f/u path -- As noted; F/u heme/Onc recs   - pan CT, noted questionable cystitis. UA is neg.  - Recurrent fever - Multiple Blood cultures negative, RVP 04/10 Neg, herpes 6 Neg, Cefepime now on hold in view of LFTs;  - Entecavir on hold as per Heme/Onc   - Maribavir on hold as per ID      SECONDARY DISCHARGE DIAGNOSES  Diagnosis: Pancytopenia  Assessment and Plan of Treatment: Pancytopenia - Anemia, Thrombocytopenia, severe leukopenia,   - Drop in Hgb, Occult +  - Hold Hep gtt. Was on Xarelto, held due to drop in Platelets as per Heme/Onc, ASA on hold  - Anemia (Hgb 6.7 on arrival) S/P Multiple PRBC transfusions - pRBC x 8 units  - Thrombocytopenia - Platelets as low as 6 - S/P Platelet transfusion - total of 12 units  - HIT ab: neg  - s/p BM Bx on 4/6. No evidence of myeloma, preliminary showed minimal hematopoiesis but significant histiocytic proliferation and minimal hemophagocytosis. CMV negative.  CD1A negative (langerhan's), no eosinophils  - Possible HLH,   - Unclear etiology of HLH given lack of myeloma on marrow. Possibly infectious with EBV and CMV?  - Multiorgan system failure with increasing creatinine and bilirubin. Ferritin remains extremely elevated. 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   - Interventional radiology placed PICC line on 4/17/22. To continue high dose steroids. 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 Hubbard   - s/p Auto SCT in 2018  - was on lenalidomide maintenance until February 2023 when he had progression of disease. Then switched to daratumumab + CyBorD; last dose March 23rd, 2023.  - Myeloma labs w/ normal free light chain ratio, KIRA w/ weak IgG kappa band, IgG 285, IgA 57, IgM 29  - S/P  IVIG 04/13 as per Heme/Onc   - Continue Prednisone 50 Qd   - Continue acyclovir 400 mg BID.  - Pt too unstable for PET scan, then will need BM Bx and Lymph node Bx [place]wait[place] [facility] [place]  Started on high dose solumedrol  PICC placed on 4/17 for possible chemotherapy    Diagnosis: Diarrhea  Assessment and Plan of Treatment: Chronic diarrhea,   - Likely related to chemo +/- abx. 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   - GI PCR neg, C-diff neg on 03.03  -Immodium prn      Diagnosis: Atrial fibrillation  Assessment and Plan of Treatment: - Unable to be on AC 2/2 low platelets  - Continue Digoxin  - Monitor on telemetry    Diagnosis: Hypertension  Assessment and Plan of Treatment: Hypertension, now hypotensive   - C/w home metoprolol.  - C/w Midodrine TID. Hold for SBP > 140;  Increase to 15 TID       Diagnosis: DVT, lower extremity  Assessment and Plan of Treatment: DVT - R Soleal Vein DVT  - Duplex + For R soleal vein DVT    - s/p Hep gtt - Now on hold in view of low H/H and severe thrombocytopenia  - VQ scan to R/O PE -- Low probability   - Consider CT A chest once creatinine permits as patient received contrast; Monitor for LOPEZ   - Vascular eval appreciated; AC as tolerated, on Xarelto 10, monitor H/H, plts too low to start.   - Serial Duplex to assess for propagation, 4/03  and 4/10 Duplex without propagation  - Plan for repeat Duplex 04/17 (  )  not performed yet***  - given severe thrombocytopenia, holding Xarelto for now. Risk of bleeding greater than benefit.    Diagnosis: RK (acute kidney injury)  Assessment and Plan of Treatment: - S/P Contrast on 3/23   - s/p IVF   - Renal Consulted  - urinary retention s/p lloyd , Failed TOV 4/8, lloyd replaced  - Flomax started;  - CT with hydronephrosis; Cr up-trending,   - Renal US neg for hydronephrosis   - s/p IVF  CC/ Hr.   - Cr up-trending - Creatinine 2.42 on 4/14  - Rpt Renal US pending (  )    Diagnosis: Transaminitis  Assessment and Plan of Treatment:   - Lipitor DC'd. Entecavir DC'd  - Likely drug induced 2/2 cefepime or antivirals vs from hypotension; ABX and Antiviral's DC'd.    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;   - Optho evaluated - no Ocular Etiology  - Neuro evaluated - possible myoclonic jerks/asterexis likely metabolic   - EEG Neg for seizures    Diagnosis: Hyponatremia  Assessment and Plan of Treatment: Trending daily  Monitor    Diagnosis: Hyperlipidemia  Assessment and Plan of Treatment: - Home atorvastatin on hold sec to Transaminitis      Diagnosis: Hiccup  Assessment and Plan of Treatment: - Improved on Reglan    Diagnosis: Abnormal CT scan, chest  Assessment and Plan of Treatment: - Outpatient follow up for CT Abd/Pelvis - Bladder wall thickening with perivesicular    Diagnosis: Multisystem disorder  Assessment and Plan of Treatment: Multi organ failure  Uptrending LFTs - Abd US pending (  )  Started on high dose steroids  CT A/P pending (  )     PRINCIPAL DISCHARGE DIAGNOSIS  Diagnosis: Fever of unknown origin (FUO)  Assessment and Plan of Treatment: Fever of unknown origin. CMV Virmeia  - CXR without consolidations or effusions; U/A unremarkable; GI PCR Neg   - Cultures remain negative to date   - LE VA duplex --> + For DVT;  V/Q scan -- Low probability for PE  - Was on empiric treatment with Vanc/cefepime --> ID consulted; S/P Zosyn  - Hold home penicillin while on ABX   - CMV PCR Positive, S/P Ganciclovir per ID , CMV IGG (+) and IgM (-), Cryptococcal Ag --Neg, Quantiferon Tb -- Neg, Fungitell -- <31   - CT Chest non-contrast no acute pathology  - Epsiode of Septic Shock s/p  RRT, resume antibiotics IV fluid, MICU eval   - Short course steroid per hematology -- On Prednisone 50 Qd   - IR eval for BM BX as per Heme/Onc --> done on 04/06 with IR, f/u path -- As noted; F/u heme/Onc recs   - pan CT, noted questionable cystitis. UA is neg.  - Recurrent fever - Multiple Blood cultures negative, RVP 04/10 Neg, herpes 6 Neg, Cefepime now on hold in view of LFTs;  - Entecavir on hold as per Heme/Onc   - Maribavir on hold as per ID      SECONDARY DISCHARGE DIAGNOSES  Diagnosis: Pancytopenia  Assessment and Plan of Treatment: Pancytopenia - Anemia, Thrombocytopenia, severe leukopenia,   - Drop in Hgb, Occult +  - Hold Hep gtt. Was on Xarelto, held due to drop in Platelets as per Heme/Onc, ASA on hold  - Anemia (Hgb 6.7 on arrival) S/P Multiple PRBC transfusions - pRBC x 8 units  - Thrombocytopenia - Platelets as low as 6 - S/P Platelet transfusion - total of 12 units  - HIT ab: neg  - s/p BM Bx on 4/6. No evidence of myeloma, preliminary showed minimal hematopoiesis but significant histiocytic proliferation and minimal hemophagocytosis. CMV negative.  CD1A negative (langerhan's), no eosinophils  - Possible HLH,   - Unclear etiology of HLH given lack of myeloma on marrow. Possibly infectious with EBV and CMV?  - Multiorgan system failure with increasing creatinine and bilirubin. Ferritin remains extremely elevated. 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   - Interventional radiology placed PICC line on 4/17/22. To continue high dose steroids.   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   - s/p Auto SCT in 2018  - was on lenalidomide maintenance until February 2023 when he had progression of disease. Then switched to daratumumab + CyBorD; last dose March 23rd, 2023.  - Myeloma labs w/ normal free light chain ratio, KIRA w/ weak IgG kappa band, IgG 285, IgA 57, IgM 29  - S/P  IVIG 04/13 as per Heme/Onc   - Continue Prednisone 50 Qd   - Continue acyclovir 400 mg BID.  - Pt too unstable for PET scan, then will need BM Bx and Lymph node Bx Awaiting transfer to [facility] [place]  Started on high dose solumedrol  PICC placed on 4/17 for possible chemotherapy    Diagnosis: Diarrhea  Assessment and Plan of Treatment: Chronic diarrhea,   - Likely related to chemo +/- abx. 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   - GI PCR neg, C-diff neg on 03.03  -Immodium prn      Diagnosis: Atrial fibrillation  Assessment and Plan of Treatment: - Unable to be on AC 2/2 low platelets  - Continue Digoxin  - Monitor on telemetry    Diagnosis: Hypertension  Assessment and Plan of Treatment: Hypertension, now hypotensive   - C/w home metoprolol.  - C/w Midodrine TID. Hold for SBP > 140;  Increase to 15 TID       Diagnosis: DVT, lower extremity  Assessment and Plan of Treatment: DVT - R Soleal Vein DVT  - Duplex + For R soleal vein DVT    - s/p Hep gtt - Now on hold in view of low H/H and severe thrombocytopenia  - VQ scan to R/O PE -- Low probability   - Consider CT A chest once creatinine permits as patient received contrast; Monitor for LOPEZ   - Vascular eval appreciated; AC as tolerated, on Xarelto 10, monitor H/H, plts too low to start.   - Serial Duplex to assess for propagation, 4/03  and 4/10 Duplex without propagation  - Plan for repeat Duplex 04/17 (  )  not performed yet***  - given severe thrombocytopenia, holding Xarelto for now. Risk of bleeding greater than benefit.    Diagnosis: RK (acute kidney injury)  Assessment and Plan of Treatment: - S/P Contrast on 3/23   - s/p IVF   - Renal Consulted  - urinary retention s/p lloyd , Failed TOV 4/8, lloyd replaced  - Flomax started;  - CT with hydronephrosis; Cr up-trending,   - Renal US neg for hydronephrosis   - s/p IVF  CC/ Hr.   - Cr up-trending - Creatinine 2.42 on 4/14  - Rpt Renal US pending (  )    Diagnosis: Transaminitis  Assessment and Plan of Treatment:   - Lipitor DC'd. Entecavir DC'd  - Likely drug induced 2/2 cefepime or antivirals vs from hypotension; ABX and Antiviral's DC'd.    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;   - Optho evaluated - no Ocular Etiology  - Neuro evaluated - possible myoclonic jerks/asterexis likely metabolic   - EEG Neg for seizures    Diagnosis: Hyponatremia  Assessment and Plan of Treatment: Trending daily  Monitor    Diagnosis: Hyperlipidemia  Assessment and Plan of Treatment: - Home atorvastatin on hold sec to Transaminitis      Diagnosis: Hiccup  Assessment and Plan of Treatment: - Improved on Reglan    Diagnosis: Abnormal CT scan, chest  Assessment and Plan of Treatment: - Outpatient follow up for CT Abd/Pelvis - Bladder wall thickening with perivesicular    Diagnosis: Multisystem disorder  Assessment and Plan of Treatment: Multi organ failure  Uptrending LFTs - Abd US pending (  )  Started on high dose steroids  CT A/P pending (  )     PRINCIPAL DISCHARGE DIAGNOSIS  Diagnosis: Fever of unknown origin (FUO)  Assessment and Plan of Treatment: Fever of unknown origin. CMV Virmeia  - CXR without consolidations or effusions; U/A unremarkable; GI PCR Neg   - Cultures remain negative to date   - LE VA duplex --> + For DVT;  V/Q scan -- Low probability for PE  - Was on empiric treatment with Vanc/cefepime --> ID consulted; S/P Zosyn  - Hold home penicillin while on ABX   - CMV PCR Positive, S/P Ganciclovir per ID , CMV IGG (+) and IgM (-), Cryptococcal Ag --Neg, Quantiferon Tb -- Neg, Fungitell -- <31   - CT Chest non-contrast no acute pathology  - Epsiode of Septic Shock s/p  RRT, resume antibiotics IV fluid, MICU eval   - Short course steroid per hematology -- On Prednisone 50 Qd   - IR eval for BM BX as per Heme/Onc --> done on 04/06 with IR, f/u path -- As noted; F/u heme/Onc recs   - pan CT, noted questionable cystitis. UA is neg.  - Recurrent fever - Multiple Blood cultures negative, RVP 04/10 Neg, herpes 6 Neg, Cefepime now on hold in view of LFTs;  - Entecavir on hold as per Heme/Onc   - Maribavir on hold as per ID      SECONDARY DISCHARGE DIAGNOSES  Diagnosis: Pancytopenia  Assessment and Plan of Treatment: Pancytopenia - Anemia, Thrombocytopenia, severe leukopenia,   - Drop in Hgb, Occult +  - Hold Hep gtt. Was on Xarelto, held due to drop in Platelets as per Heme/Onc, ASA on hold  - Anemia (Hgb 6.7 on arrival) S/P Multiple PRBC transfusions - pRBC x 8 units  - Thrombocytopenia - Platelets as low as 6 - S/P Platelet transfusion - total of 12 units  - HIT ab: neg  - s/p BM Bx on 4/6. No evidence of myeloma, preliminary showed minimal hematopoiesis but significant histiocytic proliferation and minimal hemophagocytosis. CMV negative.  CD1A negative (langerhan's), no eosinophils  - Possible HLH,   - Unclear etiology of HLH given lack of myeloma on marrow. Possibly infectious with EBV and CMV?  - Multiorgan system failure with increasing creatinine and bilirubin. Ferritin remains extremely elevated. 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   - Interventional radiology placed PICC line on 4/17/22. To continue high dose steroids.   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   - s/p Auto SCT in 2018  - was on lenalidomide maintenance until February 2023 when he had progression of disease. Then switched to daratumumab + CyBorD; last dose March 23rd, 2023.  - Myeloma labs w/ normal free light chain ratio, KIRA w/ weak IgG kappa band, IgG 285, IgA 57, IgM 29  - S/P  IVIG 04/13 as per Heme/Onc   - Continue Prednisone 50 Qd   - Continue acyclovir 400 mg BID.  - Pt too unstable for PET scan, then will need BM Bx and Lymph node Bx Awaiting [place] to [facility] [place]  Started on high dose solumedrol  PICC placed on 4/17 for possible chemotherapy    Diagnosis: Diarrhea  Assessment and Plan of Treatment: Chronic diarrhea,   - Likely related to chemo +/- abx. 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   - GI PCR neg, C-diff neg on 03.03  -Immodium prn      Diagnosis: Atrial fibrillation  Assessment and Plan of Treatment: - Unable to be on AC 2/2 low platelets  - Continue Digoxin  - Monitor on telemetry    Diagnosis: Hypertension  Assessment and Plan of Treatment: Hypertension, now hypotensive   - C/w home metoprolol.  - C/w Midodrine TID. Hold for SBP > 140;  Increase to 15 TID       Diagnosis: DVT, lower extremity  Assessment and Plan of Treatment: DVT - R Soleal Vein DVT  - Duplex + For R soleal vein DVT    - s/p Hep gtt - Now on hold in view of low H/H and severe thrombocytopenia  - VQ scan to R/O PE -- Low probability   - Consider CT A chest once creatinine permits as patient received contrast; Monitor for LOPEZ   - Vascular eval appreciated; AC as tolerated, on Xarelto 10, monitor H/H, plts too low to start.   - Serial Duplex to assess for propagation, 4/03  and 4/10 Duplex without propagation  - Plan for repeat Duplex 04/17 (  )  not performed yet***  - given severe thrombocytopenia, holding Xarelto for now. Risk of bleeding greater than benefit.    Diagnosis: RK (acute kidney injury)  Assessment and Plan of Treatment: - S/P Contrast on 3/23   - s/p IVF   - Renal Consulted  - urinary retention s/p lloyd , Failed TOV 4/8, lloyd replaced  - Flomax started;  - CT with hydronephrosis; Cr up-trending,   - Renal US neg for hydronephrosis   - s/p IVF  CC/ Hr.   - Cr up-trending - Creatinine 2.42 on 4/14  - Rpt Renal US pending (  )    Diagnosis: Transaminitis  Assessment and Plan of Treatment:   - Lipitor DC'd. Entecavir DC'd  - Likely drug induced 2/2 cefepime or antivirals vs from hypotension; ABX and Antiviral's DC'd.    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;   - Optho evaluated - no Ocular Etiology  - Neuro evaluated - possible myoclonic jerks/asterexis likely metabolic   - EEG Neg for seizures    Diagnosis: Hyponatremia  Assessment and Plan of Treatment: Trending daily  Monitor    Diagnosis: Hyperlipidemia  Assessment and Plan of Treatment: - Home atorvastatin on hold sec to Transaminitis      Diagnosis: Hiccup  Assessment and Plan of Treatment: - Improved on Reglan    Diagnosis: Abnormal CT scan, chest  Assessment and Plan of Treatment: - Outpatient follow up for CT Abd/Pelvis - Bladder wall thickening with perivesicular    Diagnosis: Multisystem disorder  Assessment and Plan of Treatment: Multi organ failure  Uptrending LFTs - Abd US pending (  )  Started on high dose steroids  CT A/P pending (  )     PRINCIPAL DISCHARGE DIAGNOSIS  Diagnosis: Fever of unknown origin (FUO)  Assessment and Plan of Treatment: Fever of unknown origin. CMV Virmeia  - CXR without consolidations or effusions; U/A unremarkable; GI PCR Neg   - Cultures remain negative to date   - LE VA duplex --> + For DVT;  V/Q scan -- Low probability for PE  - Was on empiric treatment with Vanc/cefepime --> ID consulted; S/P Zosyn  - Hold home penicillin while on ABX   - CMV PCR Positive, S/P Ganciclovir per ID , CMV IGG (+) and IgM (-), Cryptococcal Ag --Neg, Quantiferon Tb -- Neg, Fungitell -- <31   - CT Chest non-contrast no acute pathology  - Epsiode of Septic Shock s/p  RRT, resume antibiotics IV fluid, MICU eval   - Short course steroid per hematology -- On Prednisone 50 Qd   - IR eval for BM BX as per Heme/Onc --> done on 04/06 with IR, f/u path -- As noted; F/u heme/Onc recs   - pan CT, noted questionable cystitis. UA is neg.  - Recurrent fever - Multiple Blood cultures negative, RVP 04/10 Neg, herpes 6 Neg, Cefepime now on hold in view of LFTs;  - Entecavir on hold as per Heme/Onc   - Maribavir on hold as per ID      SECONDARY DISCHARGE DIAGNOSES  Diagnosis: Pancytopenia  Assessment and Plan of Treatment: Pancytopenia - Anemia, Thrombocytopenia, severe leukopenia,   - Drop in Hgb, Occult +  - Hold Hep gtt. Was on Xarelto, held due to drop in Platelets as per Heme/Onc, ASA on hold  - Anemia (Hgb 6.7 on arrival) S/P Multiple PRBC transfusions - pRBC x 8 units  - Thrombocytopenia - Platelets as low as 6 - S/P Platelet transfusion - total of 12 units  - HIT ab: neg  - s/p BM Bx on 4/6. No evidence of myeloma, preliminary showed minimal hematopoiesis but significant histiocytic proliferation and minimal hemophagocytosis. CMV negative.  CD1A negative (langerhan's), no eosinophils  - Possible HLH,   - Unclear etiology of HLH given lack of myeloma on marrow. Possibly infectious with EBV and CMV?  - Multiorgan system failure with increasing creatinine and bilirubin. Ferritin remains extremely elevated. 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   - Interventional radiology placed PICC line on 4/17/22. To continue high dose steroids.   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   - s/p Auto SCT in 2018  - was on lenalidomide maintenance until February 2023 when he had progression of disease. Then switched to daratumumab + CyBorD; last dose March 23rd, 2023.  - Myeloma labs w/ normal free light chain ratio, KIRA w/ weak IgG kappa band, IgG 285, IgA 57, IgM 29  - S/P  IVIG 04/13 as per Heme/Onc   - Continue Prednisone 50 Qd   - Continue acyclovir 400 mg BID.  - Pt too unstable for PET scan, then will need BM Bx and Lymph node Bx Awaiting transfer to [facility] [place]  Started on high dose solumedrol  PICC placed on 4/17 for possible chemotherapy    Diagnosis: Diarrhea  Assessment and Plan of Treatment: Chronic diarrhea,   - Likely related to chemo +/- abx. 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   - GI PCR neg, C-diff neg on 03.03  -Immodium prn      Diagnosis: Atrial fibrillation  Assessment and Plan of Treatment: - Unable to be on AC 2/2 low platelets  - Continue Digoxin  - Monitor on telemetry    Diagnosis: Hypertension  Assessment and Plan of Treatment: Hypertension, now hypotensive   - C/w home metoprolol.  - C/w Midodrine TID. Hold for SBP > 140;  Increase to 15 TID       Diagnosis: DVT, lower extremity  Assessment and Plan of Treatment: DVT - R Soleal Vein DVT  - Duplex + For R soleal vein DVT    - s/p Hep gtt - Now on hold in view of low H/H and severe thrombocytopenia  - VQ scan to R/O PE -- Low probability   - Consider CT A chest once creatinine permits as patient received contrast; Monitor for LOPEZ   - Vascular eval appreciated; AC as tolerated, on Xarelto 10, monitor H/H, plts too low to start.   - Serial Duplex to assess for propagation, 4/03  and 4/10 Duplex without propagation  - Plan for repeat Duplex 04/17 (  )  not performed yet***  - given severe thrombocytopenia, holding Xarelto for now. Risk of bleeding greater than benefit.    Diagnosis: RK (acute kidney injury)  Assessment and Plan of Treatment: - S/P Contrast on 3/23   - s/p IVF   - Renal Consulted  - urinary retention s/p lloyd , Failed TOV 4/8, lloyd replaced  - Flomax started;  - CT with hydronephrosis; Cr up-trending,   - Renal US neg for hydronephrosis   - s/p IVF  CC/ Hr.   - Cr up-trending - Creatinine 2.42 on 4/14  - Rpt Renal US pending (  )    Diagnosis: Transaminitis  Assessment and Plan of Treatment:   - Lipitor DC'd. Entecavir DC'd  - Likely drug induced 2/2 cefepime or antivirals vs from hypotension; ABX and Antiviral's DC'd.    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;   - Optho evaluated - no Ocular Etiology  - Neuro evaluated - possible myoclonic jerks/asterexis likely metabolic   - EEG Neg for seizures    Diagnosis: Hyponatremia  Assessment and Plan of Treatment: Trending daily  Monitor    Diagnosis: Hyperlipidemia  Assessment and Plan of Treatment: - Home atorvastatin on hold sec to Transaminitis      Diagnosis: Hiccup  Assessment and Plan of Treatment: - Improved on Reglan    Diagnosis: Abnormal CT scan, chest  Assessment and Plan of Treatment: - Outpatient follow up for CT Abd/Pelvis - Bladder wall thickening with perivesicular    Diagnosis: Multisystem disorder  Assessment and Plan of Treatment: Multi organ failure  Uptrending LFTs - Abd US pending (  )  Started on high dose steroids  CT A/P pending (  )     PRINCIPAL DISCHARGE DIAGNOSIS  Diagnosis: Fever of unknown origin (FUO)  Assessment and Plan of Treatment: Fever of unknown origin. CMV Virmeia  - CXR without consolidations or effusions; U/A unremarkable; GI PCR Neg   - Cultures remain negative to date   - LE VA duplex --> + For DVT;  V/Q scan -- Low probability for PE  - Was on empiric treatment with Vanc/cefepime --> ID consulted; S/P Zosyn  - Hold home penicillin while on ABX   - CMV PCR Positive, S/P Ganciclovir per ID , CMV IGG (+) and IgM (-), Cryptococcal Ag --Neg, Quantiferon Tb -- Neg, Fungitell -- <31   - CT Chest non-contrast no acute pathology  - Epsiode of Septic Shock s/p  RRT, resume antibiotics IV fluid, MICU eval   - Short course steroid per hematology -- On Prednisone 50 Qd   - IR eval for BM BX as per Heme/Onc --> done on 04/06 with IR, f/u path -- As noted; F/u heme/Onc recs   - pan CT, noted questionable cystitis. UA is neg.  - Recurrent fever - Multiple Blood cultures negative, RVP 04/10 Neg, herpes 6 Neg, Cefepime now on hold in view of LFTs;  - Entecavir on hold as per Heme/Onc   - Maribavir on hold as per ID      SECONDARY DISCHARGE DIAGNOSES  Diagnosis: Pancytopenia  Assessment and Plan of Treatment: Pancytopenia - Anemia, Thrombocytopenia, severe leukopenia,   - Drop in Hgb, Occult +  - Hold Hep gtt. Was on Xarelto, held due to drop in Platelets as per Heme/Onc, ASA on hold  - Anemia (Hgb 6.7 on arrival) S/P Multiple PRBC transfusions - pRBC x 8 units  - Thrombocytopenia - Platelets as low as 6 - S/P Platelet transfusion - total of 12 units  - HIT ab: neg  - s/p BM Bx on 4/6. No evidence of myeloma, preliminary showed minimal hematopoiesis but significant histiocytic proliferation and minimal hemophagocytosis. CMV negative.  CD1A negative (langerhan's), no eosinophils  - Possible HLH,   - Unclear etiology of HLH given lack of myeloma on marrow. Possibly infectious with EBV and CMV?  - Multiorgan system failure with increasing creatinine and bilirubin. Ferritin remains extremely elevated. 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   - Interventional radiology placed PICC line on 4/17/22. To continue high dose steroids.   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 Hubbard   - s/p Auto SCT in 2018  - was on lenalidomide maintenance until February 2023 when he had progression of disease. Then switched to daratumumab + CyBorD; last dose March 23rd, 2023.  - Myeloma labs w/ normal free light chain ratio, KIRA w/ weak IgG kappa band, IgG 285, IgA 57, IgM 29  - S/P  IVIG 04/13 as per Heme/Onc   - Continue Prednisone 50 Qd   - Continue acyclovir 400 mg BID.  - Pt too unstable for PET scan, then will need BM Bx and Lymph node Bx [place] [facility] [place]  Started on high dose solumedrol  PICC placed on 4/17 for possible chemotherapy    Diagnosis: Diarrhea  Assessment and Plan of Treatment: Chronic diarrhea,   - Likely related to chemo +/- abx. 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   - GI PCR neg, C-diff neg on 03.03  -Immodium prn      Diagnosis: Atrial fibrillation  Assessment and Plan of Treatment: - Unable to be on AC 2/2 low platelets  - Continue Digoxin  - Monitor on telemetry    Diagnosis: Hypertension  Assessment and Plan of Treatment: Hypertension, now hypotensive   - C/w home metoprolol.  - C/w Midodrine TID. Hold for SBP > 140;  Increase to 15 TID       Diagnosis: DVT, lower extremity  Assessment and Plan of Treatment: DVT - R Soleal Vein DVT  - Duplex + For R soleal vein DVT    - s/p Hep gtt - Now on hold in view of low H/H and severe thrombocytopenia  - VQ scan to R/O PE -- Low probability   - Consider CT A chest once creatinine permits as patient received contrast; Monitor for LOPEZ   - Vascular eval appreciated; AC as tolerated, on Xarelto 10, monitor H/H, plts too low to start.   - Serial Duplex to assess for propagation, 4/03  and 4/10 Duplex without propagation  - Plan for repeat Duplex 04/17 (  )  not performed yet***  - given severe thrombocytopenia, holding Xarelto for now. Risk of bleeding greater than benefit.    Diagnosis: RK (acute kidney injury)  Assessment and Plan of Treatment: - S/P Contrast on 3/23   - s/p IVF   - Renal Consulted  - urinary retention s/p lloyd , Failed TOV 4/8, lloyd replaced  - Flomax started;  - CT with hydronephrosis; Cr up-trending,   - Renal US neg for hydronephrosis   - s/p IVF  CC/ Hr.   - Cr up-trending - Creatinine 2.42 on 4/14  - Rpt Renal US pending (  )    Diagnosis: Transaminitis  Assessment and Plan of Treatment:   - Lipitor DC'd. Entecavir DC'd  - Likely drug induced 2/2 cefepime or antivirals vs from hypotension; ABX and Antiviral's DC'd.    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;   - Optho evaluated - no Ocular Etiology  - Neuro evaluated - possible myoclonic jerks/asterexis likely metabolic   - EEG Neg for seizures    Diagnosis: Hyponatremia  Assessment and Plan of Treatment: Trending daily  Monitor    Diagnosis: Hyperlipidemia  Assessment and Plan of Treatment: - Home atorvastatin on hold sec to Transaminitis      Diagnosis: Hiccup  Assessment and Plan of Treatment: - Improved on Reglan    Diagnosis: Abnormal CT scan, chest  Assessment and Plan of Treatment: - Outpatient follow up for CT Abd/Pelvis - Bladder wall thickening with perivesicular    Diagnosis: Multisystem disorder  Assessment and Plan of Treatment: Multi organ failure  Uptrending LFTs - Abd US pending (  )  Started on high dose steroids  CT A/P pending (  )    Diagnosis: Hematoma  Assessment and Plan of Treatment: CT Abdomen/Pelvis on 4/18/23 showed a Left Psoas intramuscular Hematoma.  H/H Stable   Monitor     PRINCIPAL DISCHARGE DIAGNOSIS  Diagnosis: Fever of unknown origin (FUO)  Assessment and Plan of Treatment: Fever of unknown origin. CMV Virmeia  - CXR without consolidations or effusions; U/A unremarkable; GI PCR Neg   - Cultures remain negative to date   - LE VA duplex --> + For DVT;  V/Q scan -- Low probability for PE  - Was on empiric treatment with Vanc/cefepime --> ID consulted; S/P Zosyn  - Hold home penicillin while on ABX   - CMV PCR Positive, S/P Ganciclovir per ID , CMV IGG (+) and IgM (-), Cryptococcal Ag --Neg, Quantiferon Tb -- Neg, Fungitell -- <31   - CT Chest non-contrast no acute pathology  - Epsiode of Septic Shock s/p  RRT, resume antibiotics IV fluid, MICU eval   - Short course steroid per hematology -- On Prednisone 50 Qd   - IR eval for BM BX as per Heme/Onc --> done on 04/06 with IR, f/u path -- As noted; F/u heme/Onc recs   - pan CT, noted questionable cystitis. UA is neg.  - Recurrent fever - Multiple Blood cultures negative, RVP 04/10 Neg, herpes 6 Neg, Cefepime now on hold in view of LFTs;  - Entecavir on hold as per Heme/Onc   - Maribavir on hold as per ID      SECONDARY DISCHARGE DIAGNOSES  Diagnosis: Pancytopenia  Assessment and Plan of Treatment: Pancytopenia - Anemia, Thrombocytopenia, severe leukopenia,   - Drop in Hgb, Occult +  - Hold Hep gtt. Was on Xarelto, held due to drop in Platelets as per Heme/Onc, ASA on hold  - Anemia (Hgb 6.7 on arrival) S/P Multiple PRBC transfusions - pRBC x 8 units  - Thrombocytopenia - Platelets as low as 6 - S/P Platelet transfusion - total of 12 units  - HIT ab: neg  - s/p BM Bx on 4/6. No evidence of myeloma, preliminary showed minimal hematopoiesis but significant histiocytic proliferation and minimal hemophagocytosis. CMV negative.  CD1A negative (langerhan's), no eosinophils  - Possible HLH,   - Unclear etiology of HLH given lack of myeloma on marrow. Possibly infectious with EBV and CMV?  - Multiorgan system failure with increasing creatinine and bilirubin. Ferritin remains extremely elevated. 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   - Interventional radiology placed PICC line on 4/17/22. To continue high dose steroids.   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   - s/p Auto SCT in 2018  - was on lenalidomide maintenance until February 2023 when he had progression of disease. Then switched to daratumumab + CyBorD; last dose March 23rd, 2023.  - Myeloma labs w/ normal free light chain ratio, KIRA w/ weak IgG kappa band, IgG 285, IgA 57, IgM 29  - S/P  IVIG 04/13 as per Heme/Onc   - Continue Prednisone 50 Qd   - Continue acyclovir 400 mg BID.  - Pt too unstable for PET scan, then will need BM Bx and Lymph node Bx Awaiting transfer to [facility]  Started on high dose solumedrol  PICC placed on 4/17 for possible chemotherapy    Diagnosis: Diarrhea  Assessment and Plan of Treatment: Chronic diarrhea,   - Likely related to chemo +/- abx. 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   - GI PCR neg, C-diff neg on 03.03  -Immodium prn      Diagnosis: Atrial fibrillation  Assessment and Plan of Treatment: - Unable to be on AC 2/2 low platelets  - Continue Digoxin  - Monitor on telemetry    Diagnosis: Hypertension  Assessment and Plan of Treatment: Hypertension, now hypotensive   - C/w home metoprolol.  - C/w Midodrine TID. Hold for SBP > 140;  Increase to 15 TID       Diagnosis: DVT, lower extremity  Assessment and Plan of Treatment: DVT - R Soleal Vein DVT  - Duplex + For R soleal vein DVT    - s/p Hep gtt - Now on hold in view of low H/H and severe thrombocytopenia  - VQ scan to R/O PE -- Low probability   - Consider CT A chest once creatinine permits as patient received contrast; Monitor for LOPEZ   - Vascular eval appreciated; AC as tolerated, on Xarelto 10, monitor H/H, plts too low to start.   - Serial Duplex to assess for propagation, 4/03  and 4/10 Duplex without propagation  - Plan for repeat Duplex 04/17 (  )  not performed yet***  - given severe thrombocytopenia, holding Xarelto for now. Risk of bleeding greater than benefit.    Diagnosis: RK (acute kidney injury)  Assessment and Plan of Treatment: - S/P Contrast on 3/23   - s/p IVF   - Renal Consulted  - urinary retention s/p lloyd , Failed TOV 4/8, lloyd replaced  - Flomax started;  - CT with hydronephrosis; Cr up-trending,   - Renal US neg for hydronephrosis   - s/p IVF  CC/ Hr.   - Cr up-trending - Creatinine 2.42 on 4/14  - Rpt Renal US pending (  )    Diagnosis: Transaminitis  Assessment and Plan of Treatment:   - Lipitor DC'd. Entecavir DC'd  - Likely drug induced 2/2 cefepime or antivirals vs from hypotension; ABX and Antiviral's DC'd.    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;   - Optho evaluated - no Ocular Etiology  - Neuro evaluated - possible myoclonic jerks/asterexis likely metabolic   - EEG Neg for seizures    Diagnosis: Hyponatremia  Assessment and Plan of Treatment: Trending daily  Monitor    Diagnosis: Hyperlipidemia  Assessment and Plan of Treatment: - Home atorvastatin on hold sec to Transaminitis      Diagnosis: Hiccup  Assessment and Plan of Treatment: - Improved on Reglan    Diagnosis: Abnormal CT scan, chest  Assessment and Plan of Treatment: - Outpatient follow up for CT Abd/Pelvis - Bladder wall thickening with perivesicular    Diagnosis: Multisystem disorder  Assessment and Plan of Treatment: Multi organ failure  Uptrending LFTs - Abd US pending (  )  Started on high dose steroids  CT A/P pending (  )    Diagnosis: Hematoma  Assessment and Plan of Treatment: CT Abdomen/Pelvis on 4/18/23 showed a Left Psoas intramuscular Hematoma.  H/H Stable   Monitor     PRINCIPAL DISCHARGE DIAGNOSIS  Diagnosis: Fever of unknown origin (FUO)  Assessment and Plan of Treatment: Fever of unknown origin. CMV Virmeia  - CXR without consolidations or effusions; U/A unremarkable; GI PCR Neg   - Cultures remain negative to date   - LE VA duplex --> + For DVT;  V/Q scan -- Low probability for PE  - Was on empiric treatment with Vanc/cefepime --> ID consulted; S/P Zosyn  - Hold home penicillin while on ABX   - CMV PCR Positive, S/P Ganciclovir per ID , CMV IGG (+) and IgM (-), Cryptococcal Ag --Neg, Quantiferon Tb -- Neg, Fungitell -- <31   - CT Chest non-contrast no acute pathology  - Epsiode of Septic Shock s/p  RRT, resume antibiotics IV fluid, MICU eval   - Short course steroid per hematology -- On Prednisone 50 Qd   - IR eval for BM BX as per Heme/Onc --> done on 04/06 with IR, f/u path -- As noted; F/u heme/Onc recs   - pan CT, noted questionable cystitis. UA is neg.  - Recurrent fever - Multiple Blood cultures negative, RVP 04/10 Neg, herpes 6 Neg, Cefepime now on hold in view of LFTs;  - Entecavir on hold as per Heme/Onc   - Maribavir on hold as per ID      SECONDARY DISCHARGE DIAGNOSES  Diagnosis: Pancytopenia  Assessment and Plan of Treatment: Pancytopenia - Anemia, Thrombocytopenia, severe leukopenia,   - Drop in Hgb, Occult +  - Hold Hep gtt. Was on Xarelto, held due to drop in Platelets as per Heme/Onc, ASA on hold  - Anemia (Hgb 6.7 on arrival) S/P Multiple PRBC transfusions - pRBC x 8 units  - Thrombocytopenia - Platelets as low as 6 - S/P Platelet transfusion - total of 12 units  - HIT ab: neg  - s/p BM Bx on 4/6. No evidence of myeloma, preliminary showed minimal hematopoiesis but significant histiocytic proliferation and minimal hemophagocytosis. CMV negative.  CD1A negative (langerhan's), no eosinophils  - Possible HLH,   - Unclear etiology of HLH given lack of myeloma on marrow. Possibly infectious with EBV and CMV?  - Multiorgan system failure with increasing creatinine and bilirubin. Ferritin remains extremely elevated. 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   - Interventional radiology placed PICC line on 4/17/22. To continue high dose steroids.   - Plan per HemOnc To start etoposide at 75 mg [dose reduced by 75% given organ dysfunction] (NOT GIVEN AT 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 Hubbard   - s/p Auto SCT in 2018  - was on lenalidomide maintenance until February 2023 when he had progression of disease. Then switched to daratumumab + CyBorD; last dose March 23rd, 2023.  - Myeloma labs w/ normal free light chain ratio, KIRA w/ weak IgG kappa band, IgG 285, IgA 57, IgM 29  - S/P  IVIG 04/13 as per Heme/Onc   - Continue Prednisone 50 Qd   - Continue acyclovir 400 mg BID.  - Pt too unstable for PET scan, then will need BM Bx and Lymph node Bx Awaiting transfer to [facility] [place]  Started on high dose solumedrol  PICC placed on 4/17 for possible chemotherapy    Diagnosis: Diarrhea  Assessment and Plan of Treatment: Chronic diarrhea,   - Likely related to chemo +/- abx. 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   - GI PCR neg, C-diff neg on 03.03  -Immodium prn      Diagnosis: Atrial fibrillation  Assessment and Plan of Treatment: - Unable to be on AC 2/2 low platelets  - Continue Digoxin  - Monitor on telemetry    Diagnosis: Hypertension  Assessment and Plan of Treatment: Hypertension, now hypotensive   - C/w home metoprolol.  - C/w Midodrine TID. Hold for SBP > 140;  Increase to 15 TID       Diagnosis: DVT, lower extremity  Assessment and Plan of Treatment: DVT - R Soleal Vein DVT  - Duplex + For R soleal vein DVT    - s/p Hep gtt - Now on hold in view of low H/H and severe thrombocytopenia  - VQ scan to R/O PE -- Low probability   - Consider CT A chest once creatinine permits as patient received contrast; Monitor for LOPEZ   - Vascular eval appreciated; AC as tolerated, on Xarelto 10, monitor H/H, plts too low to start.   - Serial Duplex to assess for propagation, 4/03  and 4/10 Duplex without propagation  - Plan for repeat Duplex 04/17 (  )  not performed yet***  - given severe thrombocytopenia, holding Xarelto for now. Risk of bleeding greater than benefit.    Diagnosis: RK (acute kidney injury)  Assessment and Plan of Treatment: - S/P Contrast on 3/23   - s/p IVF   - Renal Consulted  - urinary retention s/p lloyd , Failed TOV 4/8, lloyd replaced  - Flomax started;  - CT with hydronephrosis; Cr up-trending,   - Renal US neg for hydronephrosis   - s/p IVF  CC/ Hr.   - Cr up-trending - Creatinine 2.42 on 4/14  - Rpt Renal US pending (  )    Diagnosis: Transaminitis  Assessment and Plan of Treatment:   - Lipitor DC'd. Entecavir DC'd  - Likely drug induced 2/2 cefepime or antivirals vs from hypotension; ABX and Antiviral's DC'd.    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;   - Optho evaluated - no Ocular Etiology  - Neuro evaluated - possible myoclonic jerks/asterexis likely metabolic   - EEG Neg for seizures    Diagnosis: Hyponatremia  Assessment and Plan of Treatment: Trending daily  Monitor    Diagnosis: Hyperlipidemia  Assessment and Plan of Treatment: - Home atorvastatin on hold sec to Transaminitis      Diagnosis: Hiccup  Assessment and Plan of Treatment: - Improved on Reglan    Diagnosis: Abnormal CT scan, chest  Assessment and Plan of Treatment: - Outpatient follow up for CT Abd/Pelvis - Bladder wall thickening with perivesicular    Diagnosis: Multisystem disorder  Assessment and Plan of Treatment: Multi organ failure  Uptrending LFTs - Abd US pending (  )  Started on high dose steroids  CT A/P pending (  )    Diagnosis: Hematoma  Assessment and Plan of Treatment: CT Abdomen/Pelvis on 4/18/23 showed a Left Psoas intramuscular Hematoma.  H/H Stable   Monitor

## 2023-04-14 NOTE — PROGRESS NOTE ADULT - SUBJECTIVE AND OBJECTIVE BOX
Name of Patient : PETER DE LA PAZ  MRN: [id number]  Date of visit: 04-14-23 @ 13:18      Subjective: Patient seen and examined. No new events except as noted.   Patient seen earlier this AM. Febrile overnight.   Weak appearing male. 1 unit of Platelet transfusion ordered for today  S/P 1 unit of PRBCs yesterday       REVIEW OF SYSTEMS:    CONSTITUTIONAL: Febrile overnight   EYES/ENT: No visual changes;  No vertigo or throat pain   NECK: No pain or stiffness  RESPIRATORY: No cough, wheezing, hemoptysis; No shortness of breath  CARDIOVASCULAR: No chest pain or palpitations  GASTROINTESTINAL: No abdominal or epigastric pain. No nausea, vomiting, or hematemesis; No diarrhea or constipation. No melena or hematochezia.  GENITOURINARY: No dysuria, frequency or hematuria  NEUROLOGICAL: + Twitch improving; Weakness   SKIN: No itching, burning, rashes, or lesions   All other review of systems is negative unless indicated above.    MEDICATIONS:  MEDICATIONS  (STANDING):  acetaminophen     Tablet .. 650 milliGRAM(s) Oral once  artificial tears (preservative free) Ophthalmic Solution 1 Drop(s) Both EYES four times a day  chlorhexidine 2% Cloths 1 Application(s) Topical daily  digoxin     Tablet 125 MICROGram(s) Oral daily  filgrastim-sndz (ZARXIO) Injectable 480 MICROGram(s) SubCutaneous daily  metoclopramide Injectable 10 milliGRAM(s) IV Push once  midodrine. 10 milliGRAM(s) Oral three times a day  pantoprazole    Tablet 40 milliGRAM(s) Oral before breakfast  polyethylene glycol 3350 17 Gram(s) Oral daily  predniSONE   Tablet 50 milliGRAM(s) Oral daily  sodium chloride 0.9%. 1000 milliLiter(s) (100 mL/Hr) IV Continuous <Continuous>  tamsulosin 0.4 milliGRAM(s) Oral at bedtime      PHYSICAL EXAM:  T(C): 36.3 (04-14-23 @ 11:16), Max: 39.4 (04-14-23 @ 04:53)  HR: 74 (04-14-23 @ 11:16) (64 - 96)  BP: 96/49 (04-14-23 @ 11:16) (96/49 - 128/65)  RR: 18 (04-14-23 @ 11:16) (18 - 18)  SpO2: 94% (04-14-23 @ 11:16) (93% - 94%)  Wt(kg): --  I&O's Summary    13 Apr 2023 07:01  -  14 Apr 2023 07:00  --------------------------------------------------------  IN: 0 mL / OUT: 1300 mL / NET: -1300 mL          Appearance: Awake, weak appearing male, facial twitch   HEENT: Eyes are open; Icteric    Lymphatic: No lymphadenopathy   Cardiovascular: Normal S1 S2   Respiratory: normal effort , clear  Gastrointestinal:  Soft, Non-tender  Skin: No rashes,  warm to touch  Psychiatry:  Mood & affect appropriate  Musculoskeletal: No edema            04-13-23 @ 07:01  -  04-14-23 @ 07:00  --------------------------------------------------------  IN: 0 mL / OUT: 1300 mL / NET: -1300 mL                                7.8    1.27  )-----------( 6        ( 14 Apr 2023 06:12 )             22.2               04-14    132<L>  |  98  |  62<H>  ----------------------------<  127<H>  5.1   |  22  |  2.42<H>    Ca    7.9<L>      14 Apr 2023 06:12    TPro  4.6<L>  /  Alb  2.2<L>  /  TBili  4.4<H>  /  DBili  2.9<H>  /  AST  151<H>  /  ALT  149<H>  /  AlkPhos  335<H>  04-14                         Culture - Blood (04.12.23 @ 13:42)   Specimen Source: .Blood Blood  Culture Results: No growth to date.    Culture - Blood (04.12.23 @ 13:42)   Specimen Source: .Blood Blood  Culture Results: No growth to date.    Culture - Blood (04.10.23 @ 07:21)   Specimen Source: .Blood Blood-Peripheral  Culture Results: No growth to date.    Culture - Blood (04.10.23 @ 07:21)   Specimen Source: .Blood Blood-Peripheral  Culture Results: No growth to date.   Name of Patient : PETER DE LA PAZ  MRN: [id number]  Date of visit: 04-14-23 @ 13:18      Subjective: Patient seen and examined. No new events except as noted.   Patient seen earlier this AM. Febrile overnight.   Weak appearing male. 1 unit of Platelet transfusion ordered for today  S/P 1 unit of PRBCs yesterday       REVIEW OF SYSTEMS:    CONSTITUTIONAL: Febrile overnight   EYES/ENT: No visual changes;  No vertigo or throat pain   NECK: No pain or stiffness  RESPIRATORY: No cough, wheezing, hemoptysis; No shortness of breath  CARDIOVASCULAR: No chest pain or palpitations  GASTROINTESTINAL: No abdominal or epigastric pain. No nausea, vomiting, or hematemesis; No diarrhea or constipation. No melena or hematochezia.  GENITOURINARY: No dysuria, frequency or hematuria  NEUROLOGICAL: + Twitch improving; Weakness   SKIN: No itching, burning, rashes, or lesions   All other review of systems is negative unless indicated above.    MEDICATIONS:  MEDICATIONS  (STANDING):  acetaminophen     Tablet .. 650 milliGRAM(s) Oral once  artificial tears (preservative free) Ophthalmic Solution 1 Drop(s) Both EYES four times a day  chlorhexidine 2% Cloths 1 Application(s) Topical daily  digoxin     Tablet 125 MICROGram(s) Oral daily  filgrastim-sndz (ZARXIO) Injectable 480 MICROGram(s) SubCutaneous daily  metoclopramide Injectable 10 milliGRAM(s) IV Push once  midodrine. 10 milliGRAM(s) Oral three times a day  pantoprazole    Tablet 40 milliGRAM(s) Oral before breakfast  polyethylene glycol 3350 17 Gram(s) Oral daily  predniSONE   Tablet 50 milliGRAM(s) Oral daily  sodium chloride 0.9%. 1000 milliLiter(s) (100 mL/Hr) IV Continuous <Continuous>  tamsulosin 0.4 milliGRAM(s) Oral at bedtime      PHYSICAL EXAM:  T(C): 36.3 (04-14-23 @ 11:16), Max: 39.4 (04-14-23 @ 04:53)  HR: 74 (04-14-23 @ 11:16) (64 - 96)  BP: 96/49 (04-14-23 @ 11:16) (96/49 - 128/65)  RR: 18 (04-14-23 @ 11:16) (18 - 18)  SpO2: 94% (04-14-23 @ 11:16) (93% - 94%)  Wt(kg): --  I&O's Summary    13 Apr 2023 07:01  -  14 Apr 2023 07:00  --------------------------------------------------------  IN: 0 mL / OUT: 1300 mL / NET: -1300 mL          Appearance: Awake, weak appearing male, facial twitch   HEENT: Eyes are open; Icteric    Lymphatic: No lymphadenopathy   Cardiovascular: Normal S1 S2   Respiratory: normal effort , clear  Gastrointestinal:  Soft, Non-tender  Skin: No rashes,  warm to touch  Psychiatry:  Mood & affect appropriate  Musculoskeletal: No edema            04-13-23 @ 07:01  -  04-14-23 @ 07:00  --------------------------------------------------------  IN: 0 mL / OUT: 1300 mL / NET: -1300 mL                                7.8    1.27  )-----------( 6        ( 14 Apr 2023 06:12 )             22.2               04-14    132<L>  |  98  |  62<H>  ----------------------------<  127<H>  5.1   |  22  |  2.42<H>    Ca    7.9<L>      14 Apr 2023 06:12    TPro  4.6<L>  /  Alb  2.2<L>  /  TBili  4.4<H>  /  DBili  2.9<H>  /  AST  151<H>  /  ALT  149<H>  /  AlkPhos  335<H>  04-14                         Culture - Blood (04.12.23 @ 13:42)   Specimen Source: .Blood Blood  Culture Results: No growth to date.    Culture - Blood (04.12.23 @ 13:42)   Specimen Source: .Blood Blood  Culture Results: No growth to date.    Culture - Blood (04.10.23 @ 07:21)   Specimen Source: .Blood Blood-Peripheral  Culture Results: No growth to date.    Culture - Blood (04.10.23 @ 07:21)   Specimen Source: .Blood Blood-Peripheral  Culture Results: No growth to date.   Name of Patient : PETER DE LA PAZ  MRN: [id number]  Date of visit: 04-14-23 @ 13:18      Subjective: Patient seen and examined. No new events except as noted.   Patient seen earlier this AM. Febrile overnight.   Weak appearing male. 1 unit of Platelet transfusion ordered for today  S/P 1 unit of PRBCs yesterday       REVIEW OF SYSTEMS:    CONSTITUTIONAL: Febrile overnight   EYES/ENT: No visual changes;  No vertigo or throat pain   NECK: No pain or stiffness  RESPIRATORY: No cough, wheezing, hemoptysis; No shortness of breath  CARDIOVASCULAR: No chest pain or palpitations  GASTROINTESTINAL: No abdominal or epigastric pain. No nausea, vomiting, or hematemesis; No diarrhea or constipation. No melena or hematochezia.  GENITOURINARY: No dysuria, frequency or hematuria  NEUROLOGICAL: + Twitch improving; Weakness   SKIN: No itching, burning, rashes, or lesions   All other review of systems is negative unless indicated above.    MEDICATIONS:  MEDICATIONS  (STANDING):  acetaminophen     Tablet .. 650 milliGRAM(s) Oral once  artificial tears (preservative free) Ophthalmic Solution 1 Drop(s) Both EYES four times a day  chlorhexidine 2% Cloths 1 Application(s) Topical daily  digoxin     Tablet 125 MICROGram(s) Oral daily  filgrastim-sndz (ZARXIO) Injectable 480 MICROGram(s) SubCutaneous daily  metoclopramide Injectable 10 milliGRAM(s) IV Push once  midodrine. 10 milliGRAM(s) Oral three times a day  pantoprazole    Tablet 40 milliGRAM(s) Oral before breakfast  polyethylene glycol 3350 17 Gram(s) Oral daily  predniSONE   Tablet 50 milliGRAM(s) Oral daily  sodium chloride 0.9%. 1000 milliLiter(s) (100 mL/Hr) IV Continuous <Continuous>  tamsulosin 0.4 milliGRAM(s) Oral at bedtime      PHYSICAL EXAM:  T(C): 36.3 (04-14-23 @ 11:16), Max: 39.4 (04-14-23 @ 04:53)  HR: 74 (04-14-23 @ 11:16) (64 - 96)  BP: 96/49 (04-14-23 @ 11:16) (96/49 - 128/65)  RR: 18 (04-14-23 @ 11:16) (18 - 18)  SpO2: 94% (04-14-23 @ 11:16) (93% - 94%)  Wt(kg): --  I&O's Summary    13 Apr 2023 07:01  -  14 Apr 2023 07:00  --------------------------------------------------------  IN: 0 mL / OUT: 1300 mL / NET: -1300 mL          Appearance: Awake, weak appearing male, facial twitch   HEENT: Eyes are open; Icteric    Lymphatic: No lymphadenopathy   Cardiovascular: Normal S1 S2   Respiratory: normal effort , clear  Gastrointestinal:  Soft, Non-tender  Skin: No rashes,  warm to touch  Psychiatry:  Mood & affect appropriate  Musculoskeletal: No edema            04-13-23 @ 07:01  -  04-14-23 @ 07:00  --------------------------------------------------------  IN: 0 mL / OUT: 1300 mL / NET: -1300 mL                                7.8    1.27  )-----------( 6        ( 14 Apr 2023 06:12 )             22.2               04-14    132<L>  |  98  |  62<H>  ----------------------------<  127<H>  5.1   |  22  |  2.42<H>    Ca    7.9<L>      14 Apr 2023 06:12    TPro  4.6<L>  /  Alb  2.2<L>  /  TBili  4.4<H>  /  DBili  2.9<H>  /  AST  151<H>  /  ALT  149<H>  /  AlkPhos  335<H>  04-14                         Culture - Blood (04.12.23 @ 13:42)   Specimen Source: .Blood Blood  Culture Results: No growth to date.    Culture - Blood (04.12.23 @ 13:42)   Specimen Source: .Blood Blood  Culture Results: No growth to date.    Culture - Blood (04.10.23 @ 07:21)   Specimen Source: .Blood Blood-Peripheral  Culture Results: No growth to date.    Culture - Blood (04.10.23 @ 07:21)   Specimen Source: .Blood Blood-Peripheral  Culture Results: No growth to date.

## 2023-04-14 NOTE — PROGRESS NOTE ADULT - SUBJECTIVE AND OBJECTIVE BOX
Chief Complaint:  Patient is a 67y old  Male who presents with a chief complaint of fever (14 Apr 2023 08:40)      Date of service 04-14-23 @ 13:09      Interval Events:   Patient seen and examined.   Having BMs.     Hospital Medications:  acetaminophen     Tablet .. 650 milliGRAM(s) Oral once  acetaminophen     Tablet .. 650 milliGRAM(s) Oral every 6 hours PRN  aluminum hydroxide/magnesium hydroxide/simethicone Suspension 30 milliLiter(s) Oral every 4 hours PRN  artificial tears (preservative free) Ophthalmic Solution 1 Drop(s) Both EYES four times a day  chlorhexidine 2% Cloths 1 Application(s) Topical daily  digoxin     Tablet 125 MICROGram(s) Oral daily  filgrastim-sndz (ZARXIO) Injectable 480 MICROGram(s) SubCutaneous daily  loperamide 2 milliGRAM(s) Oral three times a day PRN  metoclopramide Injectable 10 milliGRAM(s) IV Push once  midodrine. 10 milliGRAM(s) Oral three times a day  pantoprazole    Tablet 40 milliGRAM(s) Oral before breakfast  polyethylene glycol 3350 17 Gram(s) Oral daily  predniSONE   Tablet 50 milliGRAM(s) Oral daily  sodium chloride 0.9%. 1000 milliLiter(s) IV Continuous <Continuous>  tamsulosin 0.4 milliGRAM(s) Oral at bedtime        Review of Systems:  General:  No wt loss, fevers, chills, night sweats, fatigue,   Eyes:  Good vision, no reported pain  ENT:  No sore throat, pain, runny nose, dysphagia  CV:  No pain, palpitations, hypo/hypertension  Resp:  No dyspnea, cough, tachypnea, wheezing  GI:  See HPI  :  No pain, bleeding, incontinence, nocturia  Muscle:  No pain, weakness  Neuro:  No weakness, tingling, memory problems  Psych:  No fatigue, insomnia, mood problems, depression  Endocrine:  No polyuria, polydipsia, cold/heat intolerance  Heme:  No petechiae, ecchymosis, easy bruisability  Integumentary:  No rash, edema    PHYSICAL EXAM:   Vital Signs:  Vital Signs Last 24 Hrs  T(C): 36.3 (14 Apr 2023 11:16), Max: 39.4 (14 Apr 2023 04:53)  T(F): 97.3 (14 Apr 2023 11:16), Max: 102.9 (14 Apr 2023 04:53)  HR: 74 (14 Apr 2023 11:16) (64 - 96)  BP: 96/49 (14 Apr 2023 11:16) (96/49 - 128/65)  BP(mean): --  RR: 18 (14 Apr 2023 11:16) (18 - 18)  SpO2: 94% (14 Apr 2023 11:16) (93% - 94%)    Parameters below as of 14 Apr 2023 11:16  Patient On (Oxygen Delivery Method): room air      Daily     Daily       PHYSICAL EXAM:     GENERAL:  Appears stated age, well-groomed, well-nourished, no distress  HEENT:  NC/AT,  conjunctivae anicteric, clear and pink,   NECK: supple, trachea midline  CHEST:  Full & symmetric excursion, no increased effort, breath sounds clear  HEART:  Regular rhythm, no JVD  ABDOMEN:  Soft, non-tender, non-distended, normoactive bowel sounds,  no masses , no hepatosplenomegaly  EXTREMITIES:  no cyanosis,clubbing or edema  SKIN:  No rash, erythema, or, ecchymoses, no jaundice  NEURO:  Alert, non-focal, no asterixis  PSYCH: Appropriate affect, oriented to place and time  RECTAL: Deferred      LABS Personally reviewed by me:                        7.8    1.27  )-----------( 6        ( 14 Apr 2023 06:12 )             22.2     Mean Cell Volume: 86.7 fl (04-14-23 @ 06:12)    04-14    132<L>  |  98  |  62<H>  ----------------------------<  127<H>  5.1   |  22  |  2.42<H>    Ca    7.9<L>      14 Apr 2023 06:12    TPro  4.6<L>  /  Alb  2.2<L>  /  TBili  4.4<H>  /  DBili  2.9<H>  /  AST  151<H>  /  ALT  149<H>  /  AlkPhos  335<H>  04-14    LIVER FUNCTIONS - ( 14 Apr 2023 06:12 )  Alb: 2.2 g/dL / Pro: 4.6 g/dL / ALK PHOS: 335 U/L / ALT: 149 U/L / AST: 151 U/L / GGT: x                                       7.8    1.27  )-----------( 6        ( 14 Apr 2023 06:12 )             22.2                         6.7    1.17  )-----------( 20       ( 13 Apr 2023 07:18 )             19.1                         7.7    0.87  )-----------( 14       ( 12 Apr 2023 06:21 )             22.4                         6.7    1.19  )-----------( 10       ( 11 Apr 2023 14:37 )             20.2       Imaging personally reviewed by me:

## 2023-04-14 NOTE — DISCHARGE NOTE PROVIDER - NSDCFUADDINST_GEN_ALL_CORE_FT
- Plan for repeat Duplex 04/17 to assess propagation of DVT 1500mL fluid restriction  No concentrated potassium, no concentrated phosphorus    - Plan for repeat Duplex 04/17 to assess propagation of DVT  - Needs Abdominal US 2/2 elevated LFTs  - Needs Renal US 2/2 elevated creatinine  - Needs CT abdomen/pelvis to r/o biliary dilitation

## 2023-04-15 LAB
ALBUMIN SERPL ELPH-MCNC: 2.3 G/DL — LOW (ref 3.3–5)
ALP SERPL-CCNC: 309 U/L — HIGH (ref 40–120)
ALT FLD-CCNC: 101 U/L — HIGH (ref 10–45)
ANION GAP SERPL CALC-SCNC: 16 MMOL/L — SIGNIFICANT CHANGE UP (ref 5–17)
APTT BLD: 38.6 SEC — HIGH (ref 27.5–35.5)
AST SERPL-CCNC: 66 U/L — HIGH (ref 10–40)
BILIRUB SERPL-MCNC: 7.3 MG/DL — HIGH (ref 0.2–1.2)
BUN SERPL-MCNC: 89 MG/DL — HIGH (ref 7–23)
CALCIUM SERPL-MCNC: 7.5 MG/DL — LOW (ref 8.4–10.5)
CHLORIDE SERPL-SCNC: 94 MMOL/L — LOW (ref 96–108)
CO2 SERPL-SCNC: 19 MMOL/L — LOW (ref 22–31)
CREAT SERPL-MCNC: 3.45 MG/DL — HIGH (ref 0.5–1.3)
CULTURE RESULTS: SIGNIFICANT CHANGE UP
EGFR: 19 ML/MIN/1.73M2 — LOW
FERRITIN SERPL-MCNC: HIGH NG/ML (ref 30–400)
GLUCOSE SERPL-MCNC: 226 MG/DL — HIGH (ref 70–99)
HAPTOGLOB SERPL-MCNC: 53 MG/DL — SIGNIFICANT CHANGE UP (ref 34–200)
HCT VFR BLD CALC: 20 % — CRITICAL LOW (ref 39–50)
HGB BLD-MCNC: 7.2 G/DL — LOW (ref 13–17)
LDH SERPL L TO P-CCNC: 402 U/L — HIGH (ref 50–242)
MCHC RBC-ENTMCNC: 30.1 PG — SIGNIFICANT CHANGE UP (ref 27–34)
MCHC RBC-ENTMCNC: 36 GM/DL — SIGNIFICANT CHANGE UP (ref 32–36)
MCV RBC AUTO: 83.7 FL — SIGNIFICANT CHANGE UP (ref 80–100)
NRBC # BLD: 0 /100 WBCS — SIGNIFICANT CHANGE UP (ref 0–0)
PLATELET # BLD AUTO: 7 K/UL — CRITICAL LOW (ref 150–400)
POTASSIUM SERPL-MCNC: 4.4 MMOL/L — SIGNIFICANT CHANGE UP (ref 3.5–5.3)
POTASSIUM SERPL-SCNC: 4.4 MMOL/L — SIGNIFICANT CHANGE UP (ref 3.5–5.3)
PROT SERPL-MCNC: 4.1 G/DL — LOW (ref 6–8.3)
RBC # BLD: 2.39 M/UL — LOW (ref 4.2–5.8)
RBC # FLD: 18.6 % — HIGH (ref 10.3–14.5)
SODIUM SERPL-SCNC: 129 MMOL/L — LOW (ref 135–145)
SPECIMEN SOURCE: SIGNIFICANT CHANGE UP
TRIGL SERPL-MCNC: 257 MG/DL — HIGH
WBC # BLD: 1.18 K/UL — LOW (ref 3.8–10.5)
WBC # FLD AUTO: 1.18 K/UL — LOW (ref 3.8–10.5)

## 2023-04-15 RX ADMIN — MIDODRINE HYDROCHLORIDE 15 MILLIGRAM(S): 2.5 TABLET ORAL at 17:42

## 2023-04-15 RX ADMIN — CHLORHEXIDINE GLUCONATE 1 APPLICATION(S): 213 SOLUTION TOPICAL at 11:33

## 2023-04-15 RX ADMIN — Medication 650 MILLIGRAM(S): at 03:15

## 2023-04-15 RX ADMIN — Medication 480 MICROGRAM(S): at 12:35

## 2023-04-15 RX ADMIN — Medication 1 DROP(S): at 23:09

## 2023-04-15 RX ADMIN — MIDODRINE HYDROCHLORIDE 15 MILLIGRAM(S): 2.5 TABLET ORAL at 05:21

## 2023-04-15 RX ADMIN — PANTOPRAZOLE SODIUM 40 MILLIGRAM(S): 20 TABLET, DELAYED RELEASE ORAL at 05:20

## 2023-04-15 RX ADMIN — Medication 650 MILLIGRAM(S): at 20:51

## 2023-04-15 RX ADMIN — Medication 50 MILLILITER(S): at 05:12

## 2023-04-15 RX ADMIN — Medication 50 MILLILITER(S): at 12:35

## 2023-04-15 RX ADMIN — Medication 125 MICROGRAM(S): at 05:20

## 2023-04-15 RX ADMIN — Medication 1 DROP(S): at 11:35

## 2023-04-15 RX ADMIN — Medication 650 MILLIGRAM(S): at 19:56

## 2023-04-15 RX ADMIN — Medication 50 MILLIGRAM(S): at 23:12

## 2023-04-15 RX ADMIN — Medication 50 MILLILITER(S): at 17:43

## 2023-04-15 RX ADMIN — Medication 110 MILLIGRAM(S): at 11:34

## 2023-04-15 RX ADMIN — Medication 1 DROP(S): at 05:19

## 2023-04-15 RX ADMIN — MIDODRINE HYDROCHLORIDE 15 MILLIGRAM(S): 2.5 TABLET ORAL at 11:35

## 2023-04-15 RX ADMIN — Medication 650 MILLIGRAM(S): at 02:03

## 2023-04-15 RX ADMIN — POLYETHYLENE GLYCOL 3350 17 GRAM(S): 17 POWDER, FOR SOLUTION ORAL at 11:35

## 2023-04-15 RX ADMIN — Medication 1 DROP(S): at 17:43

## 2023-04-15 NOTE — PROGRESS NOTE ADULT - ASSESSMENT
PETER DE LA PAZ is a 67y Male who presents with a chief complaint of fever    Multiple Myeloma  ·	Patient follows with Dr. Jean Claude Hubbard, [facility].  ·	Last on daratumumab + CyBorD, last dose on March 23rd.  ·	Continue acyclovir.  ·	No systemic therapy while inpatient or during rehabilitation.    Pancytopenia  ·	Patient with continued worsening pancytopenia; now severe leukopenia, anemia, thrombocytopenia.  ·	Bone marrow biopsy on April 6th preliminary showed minimal hematopoiesis but significant histiocytic proliferation and minimal hemophagocytosis.   ·	Multiorgan system failure with increasing creatinine and bilirubin. Ferritin remains extremely elevated. Etiology remains unclear; no evidence of myeloma on bone marrow.  ·	Etiology remains unclear. 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   ·	Continue filgrastim for neutropenia.  ·	Monitor CBC and transfuse to maintain HGB > 7 and PLT > 10.  ·	Maribavir has been stopped by infectious disease.     Fever  ·	Unclear etiology. Unlikely to be infectious in origin per infectious disease.  ·	Monitoring off antibiotics at this time.     Pending transfer arrangement to [facility].    Will continue to follow.    Darwin Ham MD  Hematology/Oncology  O: [phone] PETER DE LA PAZ is a 67y Male who presents with a chief complaint of fever    Multiple Myeloma  ·	Patient follows with Dr. Jean Claude Hubbard, [facility].  ·	Last on daratumumab + CyBorD, last dose on March 23rd.  ·	Continue acyclovir.  ·	No systemic therapy while inpatient or during rehabilitation.    Pancytopenia  ·	Patient with continued worsening pancytopenia; now severe leukopenia, anemia, thrombocytopenia.  ·	Bone marrow biopsy on April 6th preliminary showed minimal hematopoiesis but significant histiocytic proliferation and minimal hemophagocytosis.   ·	Multiorgan system failure with increasing creatinine and bilirubin. Ferritin remains extremely elevated. Etiology remains unclear; no evidence of myeloma on bone marrow.  ·	Etiology remains unclear. 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   ·	Continue filgrastim for neutropenia.  ·	Monitor CBC and transfuse to maintain HGB > 7 and PLT > 10.  ·	Maribavir has been stopped by infectious disease.     Fever  ·	Unclear etiology. Unlikely to be infectious in origin per infectious disease.  ·	Monitoring off antibiotics at this time.     Pending transfer arrangement to [facility].    Will continue to follow.    Darwin Ham MD  Hematology/Oncology  O: [phone] PETER DE LA PAZ is a 67y Male who presents with a chief complaint of fever    Multiple Myeloma  ·	Patient follows with Dr. Jean Claude Hubbard, [facility].  ·	Last on daratumumab + CyBorD, last dose on March 23rd.  ·	Continue acyclovir.  ·	No systemic therapy while inpatient or during rehabilitation.    Pancytopenia  ·	Patient with continued worsening pancytopenia; now severe leukopenia, anemia, thrombocytopenia.  ·	Bone marrow biopsy on April 6th preliminary showed minimal hematopoiesis but significant histiocytic proliferation and minimal hemophagocytosis.   ·	Multiorgan system failure with increasing creatinine and bilirubin. Ferritin remains extremely elevated. Etiology remains unclear; no evidence of myeloma on bone marrow.  ·	Etiology remains unclear. 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   ·	Continue filgrastim for neutropenia.  ·	Monitor CBC and transfuse to maintain HGB > 7 and PLT > 10.  ·	Maribavir has been stopped by infectious disease.     Fever  ·	Unclear etiology. Unlikely to be infectious in origin per infectious disease.  ·	Monitoring off antibiotics at this time.     Pending transfer arrangement to [facility].    Will continue to follow.    Darwin Ham MD  Hematology/Oncology  O: [phone]

## 2023-04-15 NOTE — CHART NOTE - NSCHARTNOTEFT_GEN_A_CORE
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 Pt with pancytopenia on Zarxio  ( plt 7, hgb 7.2) one unit PRBC and single donor Plt ordered as d/w Dr. Addison. Solumedrol 500mg iv q8hrs started as advised by Dr. Addison. Seen and examined pt at the bedside; NAD noted. 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 Pt with pancytopenia on Zarxio  ( plt 7, hgb 7.2) one unit PRBC and single donor Plt ordered as d/w Dr. Addison. Solumedrol 500mg iv q8hrs started as advised by Dr. Addison. Seen and examined pt at the bedside; NAD noted.

## 2023-04-15 NOTE — PROVIDER CONTACT NOTE (CRITICAL VALUE NOTIFICATION) - BACKGROUND
pt admitted for fever due to unspecified condition. previously received PRBCs x6; platelets infused x1 yesterday (plt 6).

## 2023-04-15 NOTE — PROGRESS NOTE ADULT - SUBJECTIVE AND OBJECTIVE BOX
CHIEF COMPLAINT  Fever    HISTORY OF PRESENT ILLNESS  PETER DE LA PAZ is a 67y Male who presents with a chief complaint of fever    No acute events. No complaints.    REVIEW OF SYSTEMS  A complete review of systems was performed; negative except per HPI    PHYSICAL EXAM  T(C): 36.6 (04-15-23 @ 11:32), Max: 39.5 (04-14-23 @ 17:00)  HR: 76 (04-15-23 @ 11:32) (68 - 91)  BP: 133/59 (04-15-23 @ 11:32) (111/64 - 133/59)  RR: 20 (04-15-23 @ 11:32) (18 - 20)  SpO2: 94% (04-15-23 @ 11:32) (87% - 96%)  Constitutional: alert, awake, in no acute distress  Eyes: PERRL, EOMI  HEENT: normocephalic, atraumatic  Neck: supple, non-tender  Cardiovascular: normal perfusion, no peripheral edema  Respiratory: normal respiratory efforts; no increased use of accessory muscles  Gastrointestinal: soft, non-tender  Musculoskeletal: normal range of motion, no deformities noted  Neurological: alert, CN II to XI grossly intact  Skin: warm, dry    LABORATORY DATA                        7.2    1.18  )-----------( 7        ( 15 Apr 2023 09:58 )             20.0     04-15    129<L>  |  94<L>  |  89<H>  ----------------------------<  226<H>  4.4   |  19<L>  |  3.45<H>    Ca    7.5<L>      15 Apr 2023 09:58    TPro  4.1<L>  /  Alb  2.3<L>  /  TBili  7.3<H>  /  DBili  x   /  AST  66<H>  /  ALT  101<H>  /  AlkPhos  309<H>  04-15   CHIEF COMPLAINT  Fever    HISTORY OF PRESENT ILLNESS  PETER DE LA PAZ is a 67y Male who presents with a chief complaint of fever    No acute events. He complains of heartburn    REVIEW OF SYSTEMS  A complete review of systems was performed; negative except per HPI    PHYSICAL EXAM  T(C): 36.6 (04-15-23 @ 11:32), Max: 39.5 (04-14-23 @ 17:00)  HR: 76 (04-15-23 @ 11:32) (68 - 91)  BP: 133/59 (04-15-23 @ 11:32) (111/64 - 133/59)  RR: 20 (04-15-23 @ 11:32) (18 - 20)  SpO2: 94% (04-15-23 @ 11:32) (87% - 96%)  Constitutional: alert, awake, in no acute distress  Eyes: PERRL, EOMI  HEENT: normocephalic, atraumatic  Neck: supple, non-tender  Cardiovascular: normal perfusion, no peripheral edema  Respiratory: normal respiratory efforts; no increased use of accessory muscles  Gastrointestinal: soft, non-tender  Musculoskeletal: normal range of motion, no deformities noted  Neurological: alert, CN II to XI grossly intact  Skin: warm, dry, jaundiced.    LABORATORY DATA                        7.2    1.18  )-----------( 7        ( 15 Apr 2023 09:58 )             20.0     04-15    129<L>  |  94<L>  |  89<H>  ----------------------------<  226<H>  4.4   |  19<L>  |  3.45<H>    Ca    7.5<L>      15 Apr 2023 09:58    TPro  4.1<L>  /  Alb  2.3<L>  /  TBili  7.3<H>  /  DBili  x   /  AST  66<H>  /  ALT  101<H>  /  AlkPhos  309<H>  04-15

## 2023-04-15 NOTE — PROGRESS NOTE ADULT - ASSESSMENT
67y Male with history of MM on chemotherapy presents with fevers. Nephrology consulted for elevated Scr.    1) RK: Recurrent RK due to LOPEZ, hypotension, anemia and infection likely ATN. Scr increasing for which maribavir discontinued. Continue with IV albumin. Patient not oliguric s/p lasix 80 mg IV X 1 ware on 4/14. Can give an additional dose if patient with respiratory distress. UA active likely due to lloyd with granular casts suggestive of ATN. FeNa indeterminate. Repeat renal US with resolution of bilateral hydronephrosis s/p lloyd placement. TMA work up negative. Avoid nephrotoxins. Monitor electrolytes.    2) Hypotension: BP low normal. Continue with midodrine 15 mg PO TID. Monitor BP.    3) Hyponatremia: Due to volume overload. Start 1.5L FR. Monitor serum Na.    4) MM: As per Heme/Onc.      [facility] NEPHROLOGY  Leoncio Leonard M.D.  Tay Brooke D.O.  Precious Chen M.D.  Nidia Stallings, NURIS, ANP-C    Telephone: [phone]  Facsimile: [phone]    [address] [place] [address]   67y Male with history of MM on chemotherapy presents with fevers. Nephrology consulted for elevated Scr.    1) RK: Recurrent RK due to LOPEZ, hypotension, anemia and infection likely ATN. Scr increasing for which maribavir discontinued. Continue with IV albumin. Patient not oliguric s/p lasix 80 mg IV X 1 ware on 4/14. Can give an additional dose if patient with respiratory distress. UA active likely due to lloyd with granular casts suggestive of ATN. FeNa indeterminate. Repeat renal US with resolution of bilateral hydronephrosis s/p lloyd placement. TMA work up negative. Avoid nephrotoxins. Monitor electrolytes.    2) Hypotension: BP low normal. Continue with midodrine 15 mg PO TID. Monitor BP.    3) Hyponatremia: Due to volume overload. Start 1.5L FR. Monitor serum Na.    4) MM: As per Heme/Onc.      [facility] NEPHROLOGY  Leoncio Leonard M.D.  Tay Brooke D.O.  Precious Chen M.D.  Nidia Stallings, NURIS, ANP-C    Telephone: [phone]  Facsimile: [phone]    [address] [place] [address]   67y Male with history of MM on chemotherapy presents with fevers. Nephrology consulted for elevated Scr.    1) RK: Recurrent RK due to LOPEZ, hypotension, anemia and infection likely ATN. Scr increasing for which maribavir discontinued. Continue with IV albumin. Patient not oliguric s/p lasix 80 mg IV X 1 ware on 4/14. Can give an additional dose if patient with respiratory distress. UA active likely due to lloyd with granular casts suggestive of ATN. FeNa indeterminate. Repeat renal US with resolution of bilateral hydronephrosis s/p lloyd placement. TMA work up negative. Avoid nephrotoxins. Monitor electrolytes.    2) Hypotension: BP low normal. Continue with midodrine 15 mg PO TID. Monitor BP.    3) Hyponatremia: Due to volume overload. Start 1.5L FR. Monitor serum Na.    4) MM: As per Heme/Onc.      [facility] NEPHROLOGY  Leoncio Leonard M.D.  Tay Brooke D.O.  Precious Chen M.D.  Nidia Stallings, NURIS, ANP-C    Telephone: [phone]  Facsimile: [phone]    [address] [place] [address]

## 2023-04-15 NOTE — PROGRESS NOTE ADULT - SUBJECTIVE AND OBJECTIVE BOX
Name of Patient : PETER DE LA PAZ  MRN: [id number]  Date of visit: 04-15-23       Subjective: Patient seen and examined. No new events except as noted.   clinically worsening       REVIEW OF SYSTEMS:    + weakness, + Hiccups     MEDICATIONS:  MEDICATIONS  (STANDING):  acetaminophen     Tablet .. 650 milliGRAM(s) Oral once  albumin human 25% IVPB 50 milliLiter(s) IV Intermittent every 6 hours  artificial tears (preservative free) Ophthalmic Solution 1 Drop(s) Both EYES four times a day  chlorhexidine 2% Cloths 1 Application(s) Topical daily  digoxin     Tablet 125 MICROGram(s) Oral daily  filgrastim-sndz (ZARXIO) Injectable 480 MICROGram(s) SubCutaneous daily  methylPREDNISolone sodium succinate IVPB 500 milliGRAM(s) IV Intermittent daily  midodrine. 15 milliGRAM(s) Oral three times a day  pantoprazole    Tablet 40 milliGRAM(s) Oral before breakfast  polyethylene glycol 3350 17 Gram(s) Oral daily      PHYSICAL EXAM:  T(C): 36.6 (04-15-23 @ 11:32), Max: 38.6 (04-15-23 @ 01:58)  HR: 76 (04-15-23 @ 11:32) (68 - 80)  BP: 133/59 (04-15-23 @ 11:32) (111/64 - 133/59)  RR: 20 (04-15-23 @ 11:32) (18 - 20)  SpO2: 94% (04-15-23 @ 11:32) (91% - 96%)  Wt(kg): --  I&O's Summary    14 Apr 2023 07:01  -  15 Apr 2023 07:00  --------------------------------------------------------  IN: 1010 mL / OUT: 1275 mL / NET: -265 mL    15 Apr 2023 07:01  -  15 Apr 2023 19:11  --------------------------------------------------------  IN: 325 mL / OUT: 300 mL / NET: 25 mL          Appearance: awake, fraill  HEENT:  PERRLA   Lymphatic: No lymphadenopathy   Cardiovascular: Normal S1 S2, no JVD  Respiratory: normal effort , clear  Gastrointestinal:  Soft, Non-tender  Skin: No rashes,  warm to touch  Psychiatry:  Mood & affect appropriate  Musculuskeletal: LE pitting edema     recent labs, Imaging and EKGs personally reviewed     04-14-23 @ 07:01  -  04-15-23 @ 07:00  --------------------------------------------------------  IN: 1010 mL / OUT: 1275 mL / NET: -265 mL    04-15-23 @ 07:01  -  04-15-23 @ 19:11  --------------------------------------------------------  IN: 325 mL / OUT: 300 mL / NET: 25 mL                          7.2    1.18  )-----------( 7        ( 15 Apr 2023 09:58 )             20.0               04-15    129<L>  |  94<L>  |  89<H>  ----------------------------<  226<H>  4.4   |  19<L>  |  3.45<H>    Ca    7.5<L>      15 Apr 2023 09:58    TPro  4.1<L>  /  Alb  2.3<L>  /  TBili  7.3<H>  /  DBili  x   /  AST  66<H>  /  ALT  101<H>  /  AlkPhos  309<H>  04-15    PTT - ( 15 Apr 2023 09:58 )  PTT:38.6 sec                              Name of Patient : PETER DE LA PAZ  MRN: [id number]  Date of visit: 04-15-23       Subjective: Patient seen and examined. No new events except as noted.   clinically worsening       REVIEW OF SYSTEMS:    + weakness, + Hiccups     MEDICATIONS:  MEDICATIONS  (STANDING):  acetaminophen     Tablet .. 650 milliGRAM(s) Oral once  albumin human 25% IVPB 50 milliLiter(s) IV Intermittent every 6 hours  artificial tears (preservative free) Ophthalmic Solution 1 Drop(s) Both EYES four times a day  chlorhexidine 2% Cloths 1 Application(s) Topical daily  digoxin     Tablet 125 MICROGram(s) Oral daily  filgrastim-sndz (ZARXIO) Injectable 480 MICROGram(s) SubCutaneous daily  methylPREDNISolone sodium succinate IVPB 500 milliGRAM(s) IV Intermittent daily  midodrine. 15 milliGRAM(s) Oral three times a day  pantoprazole    Tablet 40 milliGRAM(s) Oral before breakfast  polyethylene glycol 3350 17 Gram(s) Oral daily      PHYSICAL EXAM:  T(C): 36.6 (04-15-23 @ 11:32), Max: 38.6 (04-15-23 @ 01:58)  HR: 76 (04-15-23 @ 11:32) (68 - 80)  BP: 133/59 (04-15-23 @ 11:32) (111/64 - 133/59)  RR: 20 (04-15-23 @ 11:32) (18 - 20)  SpO2: 94% (04-15-23 @ 11:32) (91% - 96%)  Wt(kg): --  I&O's Summary    14 Apr 2023 07:01  -  15 Apr 2023 07:00  --------------------------------------------------------  IN: 1010 mL / OUT: 1275 mL / NET: -265 mL    15 Apr 2023 07:01  -  15 Apr 2023 19:11  --------------------------------------------------------  IN: 325 mL / OUT: 300 mL / NET: 25 mL          Appearance: awake, fraill  HEENT:  PERRLA   Lymphatic: No lymphadenopathy   Cardiovascular: Normal S1 S2, no JVD  Respiratory: normal effort , clear  Gastrointestinal:  Soft, Non-tender  Skin: No rashes,  warm to touch  Psychiatry:  Mood & affect appropriate  Musculuskeletal: LE pitting edema     recent labs, Imaging and EKGs personally reviewed     04-14-23 @ 07:01  -  04-15-23 @ 07:00  --------------------------------------------------------  IN: 1010 mL / OUT: 1275 mL / NET: -265 mL    04-15-23 @ 07:01  -  04-15-23 @ 19:11  --------------------------------------------------------  IN: 325 mL / OUT: 300 mL / NET: 25 mL                          7.2    1.18  )-----------( 7        ( 15 Apr 2023 09:58 )             20.0               04-15    129<L>  |  94<L>  |  89<H>  ----------------------------<  226<H>  4.4   |  19<L>  |  3.45<H>    Ca    7.5<L>      15 Apr 2023 09:58    TPro  4.1<L>  /  Alb  2.3<L>  /  TBili  7.3<H>  /  DBili  x   /  AST  66<H>  /  ALT  101<H>  /  AlkPhos  309<H>  04-15    PTT - ( 15 Apr 2023 09:58 )  PTT:38.6 sec                              Name of Patient : PETER DE LA PAZ  MRN: [id number]  Date of visit: 04-15-23       Subjective: Patient seen and examined. No new events except as noted.   clinically worsening       REVIEW OF SYSTEMS:    + weakness, + Hiccups     MEDICATIONS:  MEDICATIONS  (STANDING):  acetaminophen     Tablet .. 650 milliGRAM(s) Oral once  albumin human 25% IVPB 50 milliLiter(s) IV Intermittent every 6 hours  artificial tears (preservative free) Ophthalmic Solution 1 Drop(s) Both EYES four times a day  chlorhexidine 2% Cloths 1 Application(s) Topical daily  digoxin     Tablet 125 MICROGram(s) Oral daily  filgrastim-sndz (ZARXIO) Injectable 480 MICROGram(s) SubCutaneous daily  methylPREDNISolone sodium succinate IVPB 500 milliGRAM(s) IV Intermittent daily  midodrine. 15 milliGRAM(s) Oral three times a day  pantoprazole    Tablet 40 milliGRAM(s) Oral before breakfast  polyethylene glycol 3350 17 Gram(s) Oral daily      PHYSICAL EXAM:  T(C): 36.6 (04-15-23 @ 11:32), Max: 38.6 (04-15-23 @ 01:58)  HR: 76 (04-15-23 @ 11:32) (68 - 80)  BP: 133/59 (04-15-23 @ 11:32) (111/64 - 133/59)  RR: 20 (04-15-23 @ 11:32) (18 - 20)  SpO2: 94% (04-15-23 @ 11:32) (91% - 96%)  Wt(kg): --  I&O's Summary    14 Apr 2023 07:01  -  15 Apr 2023 07:00  --------------------------------------------------------  IN: 1010 mL / OUT: 1275 mL / NET: -265 mL    15 Apr 2023 07:01  -  15 Apr 2023 19:11  --------------------------------------------------------  IN: 325 mL / OUT: 300 mL / NET: 25 mL          Appearance: awake, fraill  HEENT:  PERRLA   Lymphatic: No lymphadenopathy   Cardiovascular: Normal S1 S2, no JVD  Respiratory: normal effort , clear  Gastrointestinal:  Soft, Non-tender  Skin: No rashes,  warm to touch  Psychiatry:  Mood & affect appropriate  Musculuskeletal: LE pitting edema     recent labs, Imaging and EKGs personally reviewed     04-14-23 @ 07:01  -  04-15-23 @ 07:00  --------------------------------------------------------  IN: 1010 mL / OUT: 1275 mL / NET: -265 mL    04-15-23 @ 07:01  -  04-15-23 @ 19:11  --------------------------------------------------------  IN: 325 mL / OUT: 300 mL / NET: 25 mL                          7.2    1.18  )-----------( 7        ( 15 Apr 2023 09:58 )             20.0               04-15    129<L>  |  94<L>  |  89<H>  ----------------------------<  226<H>  4.4   |  19<L>  |  3.45<H>    Ca    7.5<L>      15 Apr 2023 09:58    TPro  4.1<L>  /  Alb  2.3<L>  /  TBili  7.3<H>  /  DBili  x   /  AST  66<H>  /  ALT  101<H>  /  AlkPhos  309<H>  04-15    PTT - ( 15 Apr 2023 09:58 )  PTT:38.6 sec

## 2023-04-15 NOTE — PROGRESS NOTE ADULT - ASSESSMENT
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    Fever of unknown origin.   - CXR without consolidations or effusions; U/A unremarkable; GI PCR Neg   - Cultures remain negative to date   - LE VA duplex --> + For DVT;  V/Q scan -- Low probability   - Was on empiric treatment with Vanc/cefepime --> ID consulted; S/P Zosyn  - Hold home penicillin while on ABX   - ID to follow up, infectious work up as per ID  --> CMV PCR -- + S/P Ganciclovir per ID , CMV IGG (+) and IgM (-), Cryptococcal Ag --Neg, Quantiferon Tb -- Neg, Fungitell -- <31   - CT Chest non-contrast, noted, no acute pathology  - Shock, RRT, resume antibiotics IV fluid, MICU eval , ID follow up  --> Prednisone 50 PO Qd   - S/P Zosyn; Defer ABX as per ID   - Short course steroid per hematology -- On Prednisone 50 Qd   - IR eval for BM BX as per Heme/Onc --> done on 04/06 with IR, f/u path -- As noted; F/u heme/Onc recs   - ID ordered pan CT, noted questionable cystitis. UA is neg.  - Febrile 04/10 and 04/11 AM. 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 Transfuse for Hgb < 7.0, and platelets < 10.K  - GI eval appreciated; F/u recs --> No plans for scope at this time   - Was on Xarelto, held due to drop in Platelets as per Heme/Onc. Monitor platelet count   - S/P 1 unit PRBCs and 1 unit Platelets 04/04, 04/05   - HIT ab: neg  - ASA on hold  - Plt of 7K, plan for 1 unit of Plt 04/11  - S/P 1 unit PRBC 04/13  - 1 unit Platelets 04/14   - Heme/Onc obtaining further work up   - 1 unit PRBC and plt 04/15    Afib  - Unable to be on AC 2/2 low platelets  - On Digoxin   - Monitor on tele   - Cardio follow up     Multiple myeloma.   - pancytopenia largely at baseline although Hg 6.7 on arrival; s/p 1U PRBC with good response   - Was on acyclovir, / Ganciclovir as per ID   - C/w home entecavir   - S/P dexamethasone, D/C'd by hematology, follow up outpatient after discharge  --> S/P Prednisone   - Pt reports his Revlimid was held  - Heme/Onc consulted; F/u recs   - Resume home aspirin. --> Now on hold   - s/p IR BM Bx  04/06; Atypical histiocytic infiltrate with few EBV positive cells; F/u Heme/Onc recs   - S/P  IVIG 04/13 as per Heme/Onc   - IR eval for Bone Lesion Biopsy per Heme/Onc --> Arrangements for PET scan attempting to be made; Patient too unstable to be off of floor. Attempting for possible transfer to [facility]; Appreciate Heme/Onc      - started on high dose solumedrol   - discussed with oncology  - low threshold for ICU eval     Chronic diarrhea, now with Constipation   - Standing Imodium switched to PRN  - Monitor for BM - reports having BM     DVT   - Duplex + For R soleal vein DVT  --> Will consider CT A chest once creatinine permits as patient received contrast; Monitor for LOPEZ   - s/p Hep gtt; Monitor PTT; Monitor H/H closely --> Now on hold in view of drop in Hgb and severe thrombocytopenia  - VQ scan to R/O PE -- Low probability   - Vascular eval appreciated; AC as tolerated, on Xarelto 10, monitor H/H, plts too low to start.   - Serial Duplex to assess for propagation  -- Without propagation   - 04/03 Duplex without propagation   - Repeat Duplex 04/10 -- without propogation   - Plan for repeat Duplex 04/17   - given severe thrombocytopenia, holding Xarelto for now. Risk of bleeding greater than benefit.     Elevated LFTs  - Cont to monitor and trend  - Lipitor DC'd. Entecavir DC'd  - Likely drug induced vs from hypotension; ABX and Antiviral's DC'd. Continue to monitor and trend levels closely   - Monitor levels     RK  - S/P Contrast on 03/23   - Monitor Cr closely; Avoid nephrotoxic agents   - Cr down-trending. s/p IVF   - Renal eval appreciated   - urinary retention s/p lloyd.   - Removed lloyd 4/7/23, TOV in progress. post void residual 550ml on bladder scan  - s/p straight cath. may need to replace lloyd if unable to urinarte.   - CT with hydronephrosis; Cr up-trending, Flomax started; F/u renal recs    - Renal US neg for hydronephrosis   - Cr up-trending; C/w IVF  CC/ Hr. Check bladder scan to R/O retention     Hypertension, now hypotensive   - C/w home metoprolol.  - C/w Midodrine TID. Hold for SBP > 140;  Increase to 15 TID     HypoNa  - Cont to monitor and trend  - Appreciate renal eval.   - Serial labs     Hiccups  - Improved on Reglan, Monitor     Hyperlipidemia.   - C/w home atorvastatin --> on hold as per GI .    Abnormal CT  - Outpatient follow up for CT findings    Prophylactic measure.   dvt ppx: DVT PPX   diet: regular  ambulate: with assistance    fall precautions  aspiration precautions.    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    Fever of unknown origin.   - CXR without consolidations or effusions; U/A unremarkable; GI PCR Neg   - Cultures remain negative to date   - LE VA duplex --> + For DVT;  V/Q scan -- Low probability   - Was on empiric treatment with Vanc/cefepime --> ID consulted; S/P Zosyn  - Hold home penicillin while on ABX   - ID to follow up, infectious work up as per ID  --> CMV PCR -- + S/P Ganciclovir per ID , CMV IGG (+) and IgM (-), Cryptococcal Ag --Neg, Quantiferon Tb -- Neg, Fungitell -- <31   - CT Chest non-contrast, noted, no acute pathology  - Shock, RRT, resume antibiotics IV fluid, MICU eval , ID follow up  --> Prednisone 50 PO Qd   - S/P Zosyn; Defer ABX as per ID   - Short course steroid per hematology -- On Prednisone 50 Qd   - IR eval for BM BX as per Heme/Onc --> done on 04/06 with IR, f/u path -- As noted; F/u heme/Onc recs   - ID ordered pan CT, noted questionable cystitis. UA is neg.  - Febrile 04/10 and 04/11 AM. 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 Transfuse for Hgb < 7.0, and platelets < 10.K  - GI eval appreciated; F/u recs --> No plans for scope at this time   - Was on Xarelto, held due to drop in Platelets as per Heme/Onc. Monitor platelet count   - S/P 1 unit PRBCs and 1 unit Platelets 04/04, 04/05   - HIT ab: neg  - ASA on hold  - Plt of 7K, plan for 1 unit of Plt 04/11  - S/P 1 unit PRBC 04/13  - 1 unit Platelets 04/14   - Heme/Onc obtaining further work up   - 1 unit PRBC and plt 04/15    Afib  - Unable to be on AC 2/2 low platelets  - On Digoxin   - Monitor on tele   - Cardio follow up     Multiple myeloma.   - pancytopenia largely at baseline although Hg 6.7 on arrival; s/p 1U PRBC with good response   - Was on acyclovir, / Ganciclovir as per ID   - C/w home entecavir   - S/P dexamethasone, D/C'd by hematology, follow up outpatient after discharge  --> S/P Prednisone   - Pt reports his Revlimid was held  - Heme/Onc consulted; F/u recs   - Resume home aspirin. --> Now on hold   - s/p IR BM Bx  04/06; Atypical histiocytic infiltrate with few EBV positive cells; F/u Heme/Onc recs   - S/P  IVIG 04/13 as per Heme/Onc   - IR eval for Bone Lesion Biopsy per Heme/Onc --> Arrangements for PET scan attempting to be made; Patient too unstable to be off of floor. Attempting for possible transfer to [facility]; Appreciate Heme/Onc      - started on high dose solumedrol   - discussed with oncology  - low threshold for ICU eval     Chronic diarrhea, now with Constipation   - Standing Imodium switched to PRN  - Monitor for BM - reports having BM     DVT   - Duplex + For R soleal vein DVT  --> Will consider CT A chest once creatinine permits as patient received contrast; Monitor for LOPEZ   - s/p Hep gtt; Monitor PTT; Monitor H/H closely --> Now on hold in view of drop in Hgb and severe thrombocytopenia  - VQ scan to R/O PE -- Low probability   - Vascular eval appreciated; AC as tolerated, on Xarelto 10, monitor H/H, plts too low to start.   - Serial Duplex to assess for propagation  -- Without propagation   - 04/03 Duplex without propagation   - Repeat Duplex 04/10 -- without propogation   - Plan for repeat Duplex 04/17   - given severe thrombocytopenia, holding Xarelto for now. Risk of bleeding greater than benefit.     Elevated LFTs  - Cont to monitor and trend  - Lipitor DC'd. Entecavir DC'd  - Likely drug induced vs from hypotension; ABX and Antiviral's DC'd. Continue to monitor and trend levels closely   - Monitor levels     RK  - S/P Contrast on 03/23   - Monitor Cr closely; Avoid nephrotoxic agents   - Cr down-trending. s/p IVF   - Renal eval appreciated   - urinary retention s/p lloyd.   - Removed lloyd 4/7/23, TOV in progress. post void residual 550ml on bladder scan  - s/p straight cath. may need to replace lloyd if unable to urinarte.   - CT with hydronephrosis; Cr up-trending, Flomax started; F/u renal recs    - Renal US neg for hydronephrosis   - Cr up-trending; C/w IVF  CC/ Hr. Check bladder scan to R/O retention     Hypertension, now hypotensive   - C/w home metoprolol.  - C/w Midodrine TID. Hold for SBP > 140;  Increase to 15 TID     HypoNa  - Cont to monitor and trend  - Appreciate renal eval.   - Serial labs     Hiccups  - Improved on Reglan, Monitor     Hyperlipidemia.   - C/w home atorvastatin --> on hold as per GI .    Abnormal CT  - Outpatient follow up for CT findings    Prophylactic measure.   dvt ppx: DVT PPX   diet: regular  ambulate: with assistance    fall precautions  aspiration precautions.    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    Fever of unknown origin.   - CXR without consolidations or effusions; U/A unremarkable; GI PCR Neg   - Cultures remain negative to date   - LE VA duplex --> + For DVT;  V/Q scan -- Low probability   - Was on empiric treatment with Vanc/cefepime --> ID consulted; S/P Zosyn  - Hold home penicillin while on ABX   - ID to follow up, infectious work up as per ID  --> CMV PCR -- + S/P Ganciclovir per ID , CMV IGG (+) and IgM (-), Cryptococcal Ag --Neg, Quantiferon Tb -- Neg, Fungitell -- <31   - CT Chest non-contrast, noted, no acute pathology  - Shock, RRT, resume antibiotics IV fluid, MICU eval , ID follow up  --> Prednisone 50 PO Qd   - S/P Zosyn; Defer ABX as per ID   - Short course steroid per hematology -- On Prednisone 50 Qd   - IR eval for BM BX as per Heme/Onc --> done on 04/06 with IR, f/u path -- As noted; F/u heme/Onc recs   - ID ordered pan CT, noted questionable cystitis. UA is neg.  - Febrile 04/10 and 04/11 AM. 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 Transfuse for Hgb < 7.0, and platelets < 10.K  - GI eval appreciated; F/u recs --> No plans for scope at this time   - Was on Xarelto, held due to drop in Platelets as per Heme/Onc. Monitor platelet count   - S/P 1 unit PRBCs and 1 unit Platelets 04/04, 04/05   - HIT ab: neg  - ASA on hold  - Plt of 7K, plan for 1 unit of Plt 04/11  - S/P 1 unit PRBC 04/13  - 1 unit Platelets 04/14   - Heme/Onc obtaining further work up   - 1 unit PRBC and plt 04/15    Afib  - Unable to be on AC 2/2 low platelets  - On Digoxin   - Monitor on tele   - Cardio follow up     Multiple myeloma.   - pancytopenia largely at baseline although Hg 6.7 on arrival; s/p 1U PRBC with good response   - Was on acyclovir, / Ganciclovir as per ID   - C/w home entecavir   - S/P dexamethasone, D/C'd by hematology, follow up outpatient after discharge  --> S/P Prednisone   - Pt reports his Revlimid was held  - Heme/Onc consulted; F/u recs   - Resume home aspirin. --> Now on hold   - s/p IR BM Bx  04/06; Atypical histiocytic infiltrate with few EBV positive cells; F/u Heme/Onc recs   - S/P  IVIG 04/13 as per Heme/Onc   - IR eval for Bone Lesion Biopsy per Heme/Onc --> Arrangements for PET scan attempting to be made; Patient too unstable to be off of floor. Attempting for possible transfer to [facility]; Appreciate Heme/Onc      - started on high dose solumedrol   - discussed with oncology  - low threshold for ICU eval     Chronic diarrhea, now with Constipation   - Standing Imodium switched to PRN  - Monitor for BM - reports having BM     DVT   - Duplex + For R soleal vein DVT  --> Will consider CT A chest once creatinine permits as patient received contrast; Monitor for LOPEZ   - s/p Hep gtt; Monitor PTT; Monitor H/H closely --> Now on hold in view of drop in Hgb and severe thrombocytopenia  - VQ scan to R/O PE -- Low probability   - Vascular eval appreciated; AC as tolerated, on Xarelto 10, monitor H/H, plts too low to start.   - Serial Duplex to assess for propagation  -- Without propagation   - 04/03 Duplex without propagation   - Repeat Duplex 04/10 -- without propogation   - Plan for repeat Duplex 04/17   - given severe thrombocytopenia, holding Xarelto for now. Risk of bleeding greater than benefit.     Elevated LFTs  - Cont to monitor and trend  - Lipitor DC'd. Entecavir DC'd  - Likely drug induced vs from hypotension; ABX and Antiviral's DC'd. Continue to monitor and trend levels closely   - Monitor levels     RK  - S/P Contrast on 03/23   - Monitor Cr closely; Avoid nephrotoxic agents   - Cr down-trending. s/p IVF   - Renal eval appreciated   - urinary retention s/p lloyd.   - Removed lloyd 4/7/23, TOV in progress. post void residual 550ml on bladder scan  - s/p straight cath. may need to replace lloyd if unable to urinarte.   - CT with hydronephrosis; Cr up-trending, Flomax started; F/u renal recs    - Renal US neg for hydronephrosis   - Cr up-trending; C/w IVF  CC/ Hr. Check bladder scan to R/O retention     Hypertension, now hypotensive   - C/w home metoprolol.  - C/w Midodrine TID. Hold for SBP > 140;  Increase to 15 TID     HypoNa  - Cont to monitor and trend  - Appreciate renal eval.   - Serial labs     Hiccups  - Improved on Reglan, Monitor     Hyperlipidemia.   - C/w home atorvastatin --> on hold as per GI .    Abnormal CT  - Outpatient follow up for CT findings    Prophylactic measure.   dvt ppx: DVT PPX   diet: regular  ambulate: with assistance    fall precautions  aspiration precautions.

## 2023-04-15 NOTE — PROGRESS NOTE ADULT - SUBJECTIVE AND OBJECTIVE BOX
Subjective: Patient seen and examined. No new events except as noted.     SUBJECTIVE/ROS:  nad      MEDICATIONS:  MEDICATIONS  (STANDING):  acetaminophen     Tablet .. 650 milliGRAM(s) Oral once  albumin human 25% IVPB 50 milliLiter(s) IV Intermittent every 6 hours  artificial tears (preservative free) Ophthalmic Solution 1 Drop(s) Both EYES four times a day  chlorhexidine 2% Cloths 1 Application(s) Topical daily  dexAMETHasone  IVPB 20 milliGRAM(s) IV Intermittent daily  digoxin     Tablet 125 MICROGram(s) Oral daily  filgrastim-sndz (ZARXIO) Injectable 480 MICROGram(s) SubCutaneous daily  midodrine. 15 milliGRAM(s) Oral three times a day  pantoprazole    Tablet 40 milliGRAM(s) Oral before breakfast  polyethylene glycol 3350 17 Gram(s) Oral daily      PHYSICAL EXAM:  T(C): 36.6 (04-15-23 @ 11:32), Max: 39.5 (04-14-23 @ 17:00)  HR: 76 (04-15-23 @ 11:32) (68 - 91)  BP: 133/59 (04-15-23 @ 11:32) (111/64 - 133/59)  RR: 20 (04-15-23 @ 11:32) (18 - 20)  SpO2: 94% (04-15-23 @ 11:32) (87% - 96%)  Wt(kg): --  I&O's Summary    14 Apr 2023 07:01  -  15 Apr 2023 07:00  --------------------------------------------------------  IN: 1010 mL / OUT: 1275 mL / NET: -265 mL    15 Apr 2023 07:01  -  15 Apr 2023 13:20  --------------------------------------------------------  IN: 100 mL / OUT: 0 mL / NET: 100 mL            JVP: Normal  Neck: supple  Lung: clear   CV: S1 S2 , Murmur:  Abd: soft  Ext: No edema  neuro: Awake / alert  Psych: flat affect  Skin: normal``    LABS/DATA:    CARDIAC MARKERS:                                7.2    1.18  )-----------( 7        ( 15 Apr 2023 09:58 )             20.0     04-15    129<L>  |  94<L>  |  89<H>  ----------------------------<  226<H>  4.4   |  19<L>  |  3.45<H>    Ca    7.5<L>      15 Apr 2023 09:58    TPro  4.1<L>  /  Alb  2.3<L>  /  TBili  7.3<H>  /  DBili  x   /  AST  66<H>  /  ALT  101<H>  /  AlkPhos  309<H>  04-15    proBNP:   Lipid Profile:   HgA1c:   TSH:     TELE:  EKG:

## 2023-04-15 NOTE — PROGRESS NOTE ADULT - SUBJECTIVE AND OBJECTIVE BOX
[facility] NEPHROLOGY- PROGRESS NOTE    67 year old Male with history of MM on chemotherapy presents with fevers. Nephrology consulted for elevated Scr.      REVIEW OF SYSTEMS:  Gen: + fevers  Cards: no chest pain  Resp: no dyspnea  GI: no nausea or vomiting or diarrhea  Vascular: + LE edema    No Known Allergies      Hospital Medications: Medications reviewed        VITALS:  T(F): 97.8 (04-15-23 @ 04:30), Max: 103.1 (04-14-23 @ 17:00)  HR: 75 (04-15-23 @ 04:30)  BP: 111/64 (04-15-23 @ 04:30)  RR: 19 (04-15-23 @ 04:30)  SpO2: 95% (04-15-23 @ 04:30)  Wt(kg): --    04-14 @ 07:01  -  04-15 @ 07:00  --------------------------------------------------------  IN: 1010 mL / OUT: 1275 mL / NET: -265 mL        PHYSICAL EXAM:    Gen: NAD, calm  Cards: Irregularly irregular, +S1/S2, no M/G/R  Resp: CTA B/L  GI: soft, NT/ND, NABS  : + lloyd with yellow urine with debris  Vascular: 2+ LE edema B/L        LABS:  04-15    129<L>  |  94<L>  |  89<H>  ----------------------------<  226<H>  4.4   |  19<L>  |  3.45<H>    Ca    7.5<L>      15 Apr 2023 09:58    TPro  4.1<L>  /  Alb  2.3<L>  /  TBili  7.3<H>  /  DBili      /  AST  66<H>  /  ALT  101<H>  /  AlkPhos  309<H>  04-15    Creatinine Trend: 3.45 <--, 2.42 <--, 1.79 <--, 1.44 <--, 1.52 <--, 1.54 <--, 1.15 <--, 1.18 <--                        7.2    1.18  )-----------( 7        ( 15 Apr 2023 09:58 )             20.0     Urine Studies:    Osmolality, Random Urine: 568 mos/kg (04-10 @ 14:07)  Sodium, Random Urine: 58 mmol/L (04-10 @ 14:07)         [facility] NEPHROLOGY- PROGRESS NOTE    67 year old Male with history of MM on chemotherapy presents with fevers. Nephrology consulted for elevated Scr.      REVIEW OF SYSTEMS:  Gen: + fevers  Cards: no chest pain  Resp: no dyspnea  GI: no nausea or vomiting or diarrhea  Vascular: + LE edema    No Known Allergies      Hospital Medications: Medications reviewed        VITALS:  T(F): 97.8 (04-15-23 @ 04:30), Max: 103.1 (04-14-23 @ 17:00)  HR: 75 (04-15-23 @ 04:30)  BP: 111/64 (04-15-23 @ 04:30)  RR: 19 (04-15-23 @ 04:30)  SpO2: 95% (04-15-23 @ 04:30)  Wt(kg): --    04-14 @ 07:01  -  04-15 @ 07:00  --------------------------------------------------------  IN: 1010 mL / OUT: 1275 mL / NET: -265 mL        PHYSICAL EXAM:    Gen: NAD, calm  Cards: Irregularly irregular, +S1/S2, no M/G/R  Resp: CTA B/L  GI: soft, NT/ND, NABS  : + lloyd with yellow urine with debris  Vascular: 2+ LE edema B/L        LABS:  04-15    129<L>  |  94<L>  |  89<H>  ----------------------------<  226<H>  4.4   |  19<L>  |  3.45<H>    Ca    7.5<L>      15 Apr 2023 09:58    TPro  4.1<L>  /  Alb  2.3<L>  /  TBili  7.3<H>  /  DBili      /  AST  66<H>  /  ALT  101<H>  /  AlkPhos  309<H>  04-15    Creatinine Trend: 3.45 <--, 2.42 <--, 1.79 <--, 1.44 <--, 1.52 <--, 1.54 <--, 1.15 <--, 1.18 <--                        7.2    1.18  )-----------( 7        ( 15 Apr 2023 09:58 )             20.0     Urine Studies:    Osmolality, Random Urine: 568 mos/kg (04-10 @ 14:07)  Sodium, Random Urine: 58 mmol/L (04-10 @ 14:07)         [facility] NEPHROLOGY- PROGRESS NOTE    67 year old Male with history of MM on chemotherapy presents with fevers. Nephrology consulted for elevated Scr.      REVIEW OF SYSTEMS:  Gen: + fevers  Cards: no chest pain  Resp: no dyspnea  GI: no nausea or vomiting or diarrhea  Vascular: + LE edema    No Known Allergies      Hospital Medications: Medications reviewed        VITALS:  T(F): 97.8 (04-15-23 @ 04:30), Max: 103.1 (04-14-23 @ 17:00)  HR: 75 (04-15-23 @ 04:30)  BP: 111/64 (04-15-23 @ 04:30)  RR: 19 (04-15-23 @ 04:30)  SpO2: 95% (04-15-23 @ 04:30)  Wt(kg): --    04-14 @ 07:01  -  04-15 @ 07:00  --------------------------------------------------------  IN: 1010 mL / OUT: 1275 mL / NET: -265 mL        PHYSICAL EXAM:    Gen: NAD, calm  Cards: Irregularly irregular, +S1/S2, no M/G/R  Resp: CTA B/L  GI: soft, NT/ND, NABS  : + lloyd with yellow urine with debris  Vascular: 2+ LE edema B/L        LABS:  04-15    129<L>  |  94<L>  |  89<H>  ----------------------------<  226<H>  4.4   |  19<L>  |  3.45<H>    Ca    7.5<L>      15 Apr 2023 09:58    TPro  4.1<L>  /  Alb  2.3<L>  /  TBili  7.3<H>  /  DBili      /  AST  66<H>  /  ALT  101<H>  /  AlkPhos  309<H>  04-15    Creatinine Trend: 3.45 <--, 2.42 <--, 1.79 <--, 1.44 <--, 1.52 <--, 1.54 <--, 1.15 <--, 1.18 <--                        7.2    1.18  )-----------( 7        ( 15 Apr 2023 09:58 )             20.0     Urine Studies:    Osmolality, Random Urine: 568 mos/kg (04-10 @ 14:07)  Sodium, Random Urine: 58 mmol/L (04-10 @ 14:07)

## 2023-04-16 LAB
ALBUMIN SERPL ELPH-MCNC: 2.6 G/DL — LOW (ref 3.3–5)
ALP SERPL-CCNC: 300 U/L — HIGH (ref 40–120)
ALT FLD-CCNC: 86 U/L — HIGH (ref 10–45)
ANION GAP SERPL CALC-SCNC: 19 MMOL/L — HIGH (ref 5–17)
AST SERPL-CCNC: 64 U/L — HIGH (ref 10–40)
BILIRUB SERPL-MCNC: 10.7 MG/DL — HIGH (ref 0.2–1.2)
BUN SERPL-MCNC: 99 MG/DL — HIGH (ref 7–23)
CALCIUM SERPL-MCNC: 7.3 MG/DL — LOW (ref 8.4–10.5)
CHLORIDE SERPL-SCNC: 92 MMOL/L — LOW (ref 96–108)
CO2 SERPL-SCNC: 15 MMOL/L — LOW (ref 22–31)
CREAT SERPL-MCNC: 3.85 MG/DL — HIGH (ref 0.5–1.3)
EGFR: 16 ML/MIN/1.73M2 — LOW
GLUCOSE SERPL-MCNC: 308 MG/DL — HIGH (ref 70–99)
HCT VFR BLD CALC: 20.7 % — CRITICAL LOW (ref 39–50)
HGB BLD-MCNC: 7.3 G/DL — LOW (ref 13–17)
IL2 SERPL-MCNC: HIGH PG/ML (ref 175.3–858.2)
MCHC RBC-ENTMCNC: 29.3 PG — SIGNIFICANT CHANGE UP (ref 27–34)
MCHC RBC-ENTMCNC: 35.3 GM/DL — SIGNIFICANT CHANGE UP (ref 32–36)
MCV RBC AUTO: 83.1 FL — SIGNIFICANT CHANGE UP (ref 80–100)
NRBC # BLD: 0 /100 WBCS — SIGNIFICANT CHANGE UP (ref 0–0)
PLATELET # BLD AUTO: 6 K/UL — CRITICAL LOW (ref 150–400)
POTASSIUM SERPL-MCNC: 5.1 MMOL/L — SIGNIFICANT CHANGE UP (ref 3.5–5.3)
POTASSIUM SERPL-SCNC: 5.1 MMOL/L — SIGNIFICANT CHANGE UP (ref 3.5–5.3)
PROT SERPL-MCNC: 4.3 G/DL — LOW (ref 6–8.3)
RBC # BLD: 2.49 M/UL — LOW (ref 4.2–5.8)
RBC # FLD: 18.1 % — HIGH (ref 10.3–14.5)
SODIUM SERPL-SCNC: 126 MMOL/L — LOW (ref 135–145)
WBC # BLD: 1.03 K/UL — LOW (ref 3.8–10.5)
WBC # FLD AUTO: 1.03 K/UL — LOW (ref 3.8–10.5)

## 2023-04-16 RX ORDER — CHLORPROMAZINE HCL 10 MG
10 TABLET ORAL ONCE
Refills: 0 | Status: COMPLETED | OUTPATIENT
Start: 2023-04-16 | End: 2023-04-16

## 2023-04-16 RX ADMIN — Medication 50 MILLILITER(S): at 00:24

## 2023-04-16 RX ADMIN — Medication 30 MILLILITER(S): at 11:32

## 2023-04-16 RX ADMIN — POLYETHYLENE GLYCOL 3350 17 GRAM(S): 17 POWDER, FOR SOLUTION ORAL at 11:34

## 2023-04-16 RX ADMIN — Medication 1 DROP(S): at 06:24

## 2023-04-16 RX ADMIN — CHLORHEXIDINE GLUCONATE 1 APPLICATION(S): 213 SOLUTION TOPICAL at 11:34

## 2023-04-16 RX ADMIN — Medication 125 MICROGRAM(S): at 06:24

## 2023-04-16 RX ADMIN — Medication 50 MILLILITER(S): at 06:23

## 2023-04-16 RX ADMIN — Medication 1 DROP(S): at 17:25

## 2023-04-16 RX ADMIN — MIDODRINE HYDROCHLORIDE 15 MILLIGRAM(S): 2.5 TABLET ORAL at 17:25

## 2023-04-16 RX ADMIN — Medication 10 MILLIGRAM(S): at 11:32

## 2023-04-16 RX ADMIN — Medication 50 MILLILITER(S): at 11:33

## 2023-04-16 RX ADMIN — Medication 480 MICROGRAM(S): at 11:33

## 2023-04-16 RX ADMIN — Medication 50 MILLIGRAM(S): at 07:32

## 2023-04-16 RX ADMIN — MIDODRINE HYDROCHLORIDE 15 MILLIGRAM(S): 2.5 TABLET ORAL at 06:25

## 2023-04-16 RX ADMIN — PANTOPRAZOLE SODIUM 40 MILLIGRAM(S): 20 TABLET, DELAYED RELEASE ORAL at 06:24

## 2023-04-16 RX ADMIN — Medication 1 DROP(S): at 11:32

## 2023-04-16 RX ADMIN — MIDODRINE HYDROCHLORIDE 15 MILLIGRAM(S): 2.5 TABLET ORAL at 11:32

## 2023-04-16 NOTE — PROGRESS NOTE ADULT - SUBJECTIVE AND OBJECTIVE BOX
Name of Patient : PETER DE LA PAZ  MRN: [id number]  Date of visit: 04-16-23       Subjective: Patient seen and examined. No new events except as noted.   worsening weakness  jaundiced     REVIEW OF SYSTEMS:  limited     MEDICATIONS:  MEDICATIONS  (STANDING):  acetaminophen     Tablet .. 650 milliGRAM(s) Oral once  artificial tears (preservative free) Ophthalmic Solution 1 Drop(s) Both EYES four times a day  chlorhexidine 2% Cloths 1 Application(s) Topical daily  digoxin     Tablet 125 MICROGram(s) Oral daily  filgrastim-sndz (ZARXIO) Injectable 480 MICROGram(s) SubCutaneous daily  methylPREDNISolone sodium succinate IVPB 500 milliGRAM(s) IV Intermittent daily  midodrine. 15 milliGRAM(s) Oral three times a day  pantoprazole    Tablet 40 milliGRAM(s) Oral before breakfast  polyethylene glycol 3350 17 Gram(s) Oral daily      PHYSICAL EXAM:  T(C): 36.4 (04-16-23 @ 20:58), Max: 36.4 (04-16-23 @ 20:58)  HR: 65 (04-16-23 @ 20:58) (60 - 76)  BP: 130/76 (04-16-23 @ 20:58) (121/74 - 136/65)  RR: 20 (04-16-23 @ 20:58) (18 - 20)  SpO2: 94% (04-16-23 @ 20:58) (93% - 95%)  Wt(kg): --  I&O's Summary    15 Apr 2023 07:01  -  16 Apr 2023 07:00  --------------------------------------------------------  IN: 445 mL / OUT: 750 mL / NET: -305 mL    16 Apr 2023 07:01  -  16 Apr 2023 22:51  --------------------------------------------------------  IN: 0 mL / OUT: 550 mL / NET: -550 mL          Appearance: awake, frail   HEENT:  PERRLA   Lymphatic: No lymphadenopathy   Cardiovascular: Normal S1 S2, no JVD  Respiratory: normal effort , clear  Gastrointestinal:  Soft, Non-tender  Skin: jaundice   Psychiatry:  Mood & affect appropriate  Musculuskeletal: No edema    recent labs, Imaging and EKGs personally reviewed     04-15-23 @ 07:01  -  04-16-23 @ 07:00  --------------------------------------------------------  IN: 445 mL / OUT: 750 mL / NET: -305 mL    04-16-23 @ 07:01  -  04-16-23 @ 22:51  --------------------------------------------------------  IN: 0 mL / OUT: 550 mL / NET: -550 mL                          7.3    1.03  )-----------( 6        ( 16 Apr 2023 11:17 )             20.7               04-16    126<L>  |  92<L>  |  99<H>  ----------------------------<  308<H>  5.1   |  15<L>  |  3.85<H>    Ca    7.3<L>      16 Apr 2023 11:17    TPro  4.3<L>  /  Alb  2.6<L>  /  TBili  10.7<H>  /  DBili  x   /  AST  64<H>  /  ALT  86<H>  /  AlkPhos  300<H>  04-16    PTT - ( 15 Apr 2023 09:58 )  PTT:38.6 sec                              Name of Patient : PETER DE LA PAZ  MRN: [id number]  Date of visit: 04-16-23       Subjective: Patient seen and examined. No new events except as noted.   worsening weakness  jaundiced     REVIEW OF SYSTEMS:  limited     MEDICATIONS:  MEDICATIONS  (STANDING):  acetaminophen     Tablet .. 650 milliGRAM(s) Oral once  artificial tears (preservative free) Ophthalmic Solution 1 Drop(s) Both EYES four times a day  chlorhexidine 2% Cloths 1 Application(s) Topical daily  digoxin     Tablet 125 MICROGram(s) Oral daily  filgrastim-sndz (ZARXIO) Injectable 480 MICROGram(s) SubCutaneous daily  methylPREDNISolone sodium succinate IVPB 500 milliGRAM(s) IV Intermittent daily  midodrine. 15 milliGRAM(s) Oral three times a day  pantoprazole    Tablet 40 milliGRAM(s) Oral before breakfast  polyethylene glycol 3350 17 Gram(s) Oral daily      PHYSICAL EXAM:  T(C): 36.4 (04-16-23 @ 20:58), Max: 36.4 (04-16-23 @ 20:58)  HR: 65 (04-16-23 @ 20:58) (60 - 76)  BP: 130/76 (04-16-23 @ 20:58) (121/74 - 136/65)  RR: 20 (04-16-23 @ 20:58) (18 - 20)  SpO2: 94% (04-16-23 @ 20:58) (93% - 95%)  Wt(kg): --  I&O's Summary    15 Apr 2023 07:01  -  16 Apr 2023 07:00  --------------------------------------------------------  IN: 445 mL / OUT: 750 mL / NET: -305 mL    16 Apr 2023 07:01  -  16 Apr 2023 22:51  --------------------------------------------------------  IN: 0 mL / OUT: 550 mL / NET: -550 mL          Appearance: awake, frail   HEENT:  PERRLA   Lymphatic: No lymphadenopathy   Cardiovascular: Normal S1 S2, no JVD  Respiratory: normal effort , clear  Gastrointestinal:  Soft, Non-tender  Skin: jaundice   Psychiatry:  Mood & affect appropriate  Musculuskeletal: No edema    recent labs, Imaging and EKGs personally reviewed     04-15-23 @ 07:01  -  04-16-23 @ 07:00  --------------------------------------------------------  IN: 445 mL / OUT: 750 mL / NET: -305 mL    04-16-23 @ 07:01  -  04-16-23 @ 22:51  --------------------------------------------------------  IN: 0 mL / OUT: 550 mL / NET: -550 mL                          7.3    1.03  )-----------( 6        ( 16 Apr 2023 11:17 )             20.7               04-16    126<L>  |  92<L>  |  99<H>  ----------------------------<  308<H>  5.1   |  15<L>  |  3.85<H>    Ca    7.3<L>      16 Apr 2023 11:17    TPro  4.3<L>  /  Alb  2.6<L>  /  TBili  10.7<H>  /  DBili  x   /  AST  64<H>  /  ALT  86<H>  /  AlkPhos  300<H>  04-16    PTT - ( 15 Apr 2023 09:58 )  PTT:38.6 sec                              Name of Patient : PETER DE LA PAZ  MRN: [id number]  Date of visit: 04-16-23       Subjective: Patient seen and examined. No new events except as noted.   worsening weakness  jaundiced     REVIEW OF SYSTEMS:  limited     MEDICATIONS:  MEDICATIONS  (STANDING):  acetaminophen     Tablet .. 650 milliGRAM(s) Oral once  artificial tears (preservative free) Ophthalmic Solution 1 Drop(s) Both EYES four times a day  chlorhexidine 2% Cloths 1 Application(s) Topical daily  digoxin     Tablet 125 MICROGram(s) Oral daily  filgrastim-sndz (ZARXIO) Injectable 480 MICROGram(s) SubCutaneous daily  methylPREDNISolone sodium succinate IVPB 500 milliGRAM(s) IV Intermittent daily  midodrine. 15 milliGRAM(s) Oral three times a day  pantoprazole    Tablet 40 milliGRAM(s) Oral before breakfast  polyethylene glycol 3350 17 Gram(s) Oral daily      PHYSICAL EXAM:  T(C): 36.4 (04-16-23 @ 20:58), Max: 36.4 (04-16-23 @ 20:58)  HR: 65 (04-16-23 @ 20:58) (60 - 76)  BP: 130/76 (04-16-23 @ 20:58) (121/74 - 136/65)  RR: 20 (04-16-23 @ 20:58) (18 - 20)  SpO2: 94% (04-16-23 @ 20:58) (93% - 95%)  Wt(kg): --  I&O's Summary    15 Apr 2023 07:01  -  16 Apr 2023 07:00  --------------------------------------------------------  IN: 445 mL / OUT: 750 mL / NET: -305 mL    16 Apr 2023 07:01  -  16 Apr 2023 22:51  --------------------------------------------------------  IN: 0 mL / OUT: 550 mL / NET: -550 mL          Appearance: awake, frail   HEENT:  PERRLA   Lymphatic: No lymphadenopathy   Cardiovascular: Normal S1 S2, no JVD  Respiratory: normal effort , clear  Gastrointestinal:  Soft, Non-tender  Skin: jaundice   Psychiatry:  Mood & affect appropriate  Musculuskeletal: No edema    recent labs, Imaging and EKGs personally reviewed     04-15-23 @ 07:01  -  04-16-23 @ 07:00  --------------------------------------------------------  IN: 445 mL / OUT: 750 mL / NET: -305 mL    04-16-23 @ 07:01  -  04-16-23 @ 22:51  --------------------------------------------------------  IN: 0 mL / OUT: 550 mL / NET: -550 mL                          7.3    1.03  )-----------( 6        ( 16 Apr 2023 11:17 )             20.7               04-16    126<L>  |  92<L>  |  99<H>  ----------------------------<  308<H>  5.1   |  15<L>  |  3.85<H>    Ca    7.3<L>      16 Apr 2023 11:17    TPro  4.3<L>  /  Alb  2.6<L>  /  TBili  10.7<H>  /  DBili  x   /  AST  64<H>  /  ALT  86<H>  /  AlkPhos  300<H>  04-16    PTT - ( 15 Apr 2023 09:58 )  PTT:38.6 sec

## 2023-04-16 NOTE — CHART NOTE - NSCHARTNOTEFT_GEN_A_CORE
68 y/o male with MM and pancytopenia, plt 6, one single donor plt ordered. 66 y/o male with MM and pancytopenia, plt 6, one single donor plt ordered. 68 y/o male with MM and pancytopenic, plt 6, one single donor plt ordered ( goal: HGB > 7 and PLT > 10). No active bleeding; Thorazine 10mg x1 given for hiccups- d/w Dr. Addison.    pt is hemodynamically stable ( afebrile 97.3, BP 13/71, HR 67, 02 sta 94% on 3LNC). pt remains awake and alert.

## 2023-04-16 NOTE — PROGRESS NOTE ADULT - SUBJECTIVE AND OBJECTIVE BOX
Patient seen and examined at bedside. sister at bedside. feeling fatigue     MEDICATIONS  (STANDING):  acetaminophen     Tablet .. 650 milliGRAM(s) Oral once  artificial tears (preservative free) Ophthalmic Solution 1 Drop(s) Both EYES four times a day  chlorhexidine 2% Cloths 1 Application(s) Topical daily  digoxin     Tablet 125 MICROGram(s) Oral daily  filgrastim-sndz (ZARXIO) Injectable 480 MICROGram(s) SubCutaneous daily  methylPREDNISolone sodium succinate IVPB 500 milliGRAM(s) IV Intermittent daily  midodrine. 15 milliGRAM(s) Oral three times a day  pantoprazole    Tablet 40 milliGRAM(s) Oral before breakfast  polyethylene glycol 3350 17 Gram(s) Oral daily    MEDICATIONS  (PRN):  acetaminophen     Tablet .. 650 milliGRAM(s) Oral every 6 hours PRN Temp greater or equal to 38C (100.4F), Mild Pain (1 - 3)  aluminum hydroxide/magnesium hydroxide/simethicone Suspension 30 milliLiter(s) Oral every 4 hours PRN Dyspepsia  loperamide 2 milliGRAM(s) Oral three times a day PRN Diarrhea        Vital Signs Last 24 Hrs  T(C): 36.3 (16 Apr 2023 11:19), Max: 39.1 (15 Apr 2023 19:15)  T(F): 97.3 (16 Apr 2023 11:19), Max: 102.3 (15 Apr 2023 19:15)  HR: 67 (16 Apr 2023 11:19) (67 - 94)  BP: 131/71 (16 Apr 2023 11:19) (131/71 - 142/63)  BP(mean): --  RR: 20 (16 Apr 2023 11:19) (18 - 20)  SpO2: 93% (16 Apr 2023 11:19) (92% - 94%)    Parameters below as of 16 Apr 2023 11:19  Patient On (Oxygen Delivery Method): nasal cannula  O2 Flow (L/min): 3        PHYSICAL EXAM:     GENERAL:  Appears ill  HEENT:  + jaundiced   CHEST:  CTA b/l  HEART:  S1 s2+   ABDOMEN:  Soft, non-tender, non-distended  NEURO:  Alert, oriented, no asterixis                            7.3    1.03  )-----------( 6        ( 16 Apr 2023 11:17 )             20.7       04-16    126<L>  |  92<L>  |  99<H>  ----------------------------<  308<H>  5.1   |  15<L>  |  3.85<H>    Ca    7.3<L>      16 Apr 2023 11:17    TPro  4.3<L>  /  Alb  2.6<L>  /  TBili  10.7<H>  /  DBili  x   /  AST  64<H>  /  ALT  86<H>  /  AlkPhos  300<H>  04-16

## 2023-04-16 NOTE — PROVIDER CONTACT NOTE (OTHER) - BACKGROUND
Pt VSS pre transfusion and 15 min after start of transfusion. Pt 102.3F 30 min post transfusion, other VSS.

## 2023-04-16 NOTE — PROGRESS NOTE ADULT - SUBJECTIVE AND OBJECTIVE BOX
Neurology Progress Note    S: Patient seen and examined. No new events overnight. patient denied CP, SOB, HA or pain. still with tremor; better     Medication:    MEDICATIONS  (STANDING):  acetaminophen     Tablet .. 650 milliGRAM(s) Oral once  artificial tears (preservative free) Ophthalmic Solution 1 Drop(s) Both EYES four times a day  chlorhexidine 2% Cloths 1 Application(s) Topical daily  digoxin     Tablet 125 MICROGram(s) Oral daily  filgrastim-sndz (ZARXIO) Injectable 480 MICROGram(s) SubCutaneous daily  methylPREDNISolone sodium succinate IVPB 500 milliGRAM(s) IV Intermittent daily  midodrine. 15 milliGRAM(s) Oral three times a day  pantoprazole    Tablet 40 milliGRAM(s) Oral before breakfast  polyethylene glycol 3350 17 Gram(s) Oral daily    MEDICATIONS  (PRN):  acetaminophen     Tablet .. 650 milliGRAM(s) Oral every 6 hours PRN Temp greater or equal to 38C (100.4F), Mild Pain (1 - 3)  aluminum hydroxide/magnesium hydroxide/simethicone Suspension 30 milliLiter(s) Oral every 4 hours PRN Dyspepsia  loperamide 2 milliGRAM(s) Oral three times a day PRN Diarrhea        Vitals:  Vital Signs Last 24 Hrs  T(C): 36.3 (04-16-23 @ 11:19), Max: 39.1 (04-15-23 @ 19:15)  T(F): 97.3 (04-16-23 @ 11:19), Max: 102.3 (04-15-23 @ 19:15)  HR: 67 (04-16-23 @ 11:19) (67 - 94)  BP: 131/71 (04-16-23 @ 11:19) (131/71 - 142/63)  BP(mean): --  RR: 20 (04-16-23 @ 11:19) (18 - 20)  SpO2: 93% (04-16-23 @ 11:19) (92% - 94%)        General Exam:   General Appearance: Appropriately dressed and in no acute distress       Head: Normocephalic, atraumatic and no dysmorphic features  Ear, Nose, and Throat: Moist mucous membranes  CVS: S1S2+  Resp: No SOB, no wheeze or rhonchi  GI: soft NT/ND  Extremities: No edema or cyanosis  Skin: No bruises or rashes     Neurological Exam:  Mental Status: Awake, alert and oriented x 2.  Able to follow simple and complex verbal commands. Able to name and repeat. fluent speech. No obvious aphasia or dysarthria noted.   Cranial Nerves: PERRL, EOMI, VFFC, sensation V1-V3 intact,  no obvious facial asymmetry, equal elevation of palate, scm/trap 5/5, tongue is midline on protrusion. no obvious papilledema on fundoscopic exam. hearing is grossly intact.   Motor: VICENTE 4/5 throuhgout but uppers>lowers : flapping tremor   Sensation: Intact to light touch and pinprick throughout. no right/left confusion. no extinction to tactile on DSS.    Reflexes: 1+ throughout at biceps, brachioradialis, triceps, patellars and ankles bilaterally and equal. No clonus. R toe and L toe were both downgoing.  Coordination: No dysmetria on FNF  Gait: deferred .       I personally reviewed the below data/images/labs:    CBC Full  -  ( 16 Apr 2023 11:17 )  WBC Count : 1.03 K/uL  RBC Count : 2.49 M/uL  Hemoglobin : 7.3 g/dL  Hematocrit : 20.7 %  Platelet Count - Automated : 6 K/uL  Mean Cell Volume : 83.1 fl  Mean Cell Hemoglobin : 29.3 pg  Mean Cell Hemoglobin Concentration : 35.3 gm/dL  Auto Neutrophil # : x  Auto Lymphocyte # : x  Auto Monocyte # : x  Auto Eosinophil # : x  Auto Basophil # : x  Auto Neutrophil % : x  Auto Lymphocyte % : x  Auto Monocyte % : x  Auto Eosinophil % : x  Auto Basophil % : x    04-16    126<L>  |  92<L>  |  99<H>  ----------------------------<  308<H>  5.1   |  15<L>  |  3.85<H>    Ca    7.3<L>      16 Apr 2023 11:17    TPro  4.3<L>  /  Alb  2.6<L>  /  TBili  10.7<H>  /  DBili  x   /  AST  64<H>  /  ALT  86<H>  /  AlkPhos  300<H>  04-16        < from: CT Head No Cont (04.01.23 @ 09:19) >    ACC: [id number] EXAM:  CT BRAIN   ORDERED BY: EVONNE BYERSER     PROCEDURE DATE:  04/01/2023          INTERPRETATION:  CLINICAL INDICATIONS:  dizzy and low platelets    COMPARISON: None.    TECHNIQUE: Noncontrast CT of the head. Multiplanar reformations are   submitted.    FINDINGS:  There is periventricular and subcortical white matter hypodensity without   mass effect, nonspecific, likely representing mild chronic microvascular   ischemic changes. There is no compelling evidence for an acute   transcortical infarction. There is no evidence of mass, mass effect,   midline shift or extra-axial fluid collection. The lateral ventricles and   cortical sulci are age-appropriate in size and configuration. Chronic   left orbit medial wall fracture deformity. The orbits, mastoid air cells   and visualized paranasal sinuses are unremarkable. The calvarium is   intact. Consider follow-up CT or MRI as clinically warranted.    IMPRESSION:  Mild chronic microvascular changes without evidence of an   acute transcortical infarction or hemorrhage.    --- End of Report ---            IMAN MUÑOZ MD; Attending Radiologist  This document has been electronically signed. Apr 1 2023  9:25AM    < end of copied text >          Neurology Progress Note    S: Patient seen and examined. No new events overnight. patient denied CP, SOB, HA or pain. still with tremor; better     Medication:    MEDICATIONS  (STANDING):  acetaminophen     Tablet .. 650 milliGRAM(s) Oral once  artificial tears (preservative free) Ophthalmic Solution 1 Drop(s) Both EYES four times a day  chlorhexidine 2% Cloths 1 Application(s) Topical daily  digoxin     Tablet 125 MICROGram(s) Oral daily  filgrastim-sndz (ZARXIO) Injectable 480 MICROGram(s) SubCutaneous daily  methylPREDNISolone sodium succinate IVPB 500 milliGRAM(s) IV Intermittent daily  midodrine. 15 milliGRAM(s) Oral three times a day  pantoprazole    Tablet 40 milliGRAM(s) Oral before breakfast  polyethylene glycol 3350 17 Gram(s) Oral daily    MEDICATIONS  (PRN):  acetaminophen     Tablet .. 650 milliGRAM(s) Oral every 6 hours PRN Temp greater or equal to 38C (100.4F), Mild Pain (1 - 3)  aluminum hydroxide/magnesium hydroxide/simethicone Suspension 30 milliLiter(s) Oral every 4 hours PRN Dyspepsia  loperamide 2 milliGRAM(s) Oral three times a day PRN Diarrhea        Vitals:  Vital Signs Last 24 Hrs  T(C): 36.3 (04-16-23 @ 11:19), Max: 39.1 (04-15-23 @ 19:15)  T(F): 97.3 (04-16-23 @ 11:19), Max: 102.3 (04-15-23 @ 19:15)  HR: 67 (04-16-23 @ 11:19) (67 - 94)  BP: 131/71 (04-16-23 @ 11:19) (131/71 - 142/63)  BP(mean): --  RR: 20 (04-16-23 @ 11:19) (18 - 20)  SpO2: 93% (04-16-23 @ 11:19) (92% - 94%)        General Exam:   General Appearance: Appropriately dressed and in no acute distress       Head: Normocephalic, atraumatic and no dysmorphic features  Ear, Nose, and Throat: Moist mucous membranes  CVS: S1S2+  Resp: No SOB, no wheeze or rhonchi  GI: soft NT/ND  Extremities: No edema or cyanosis  Skin: No bruises or rashes     Neurological Exam:  Mental Status: Awake, alert and oriented x 2.  Able to follow simple and complex verbal commands. Able to name and repeat. fluent speech. No obvious aphasia or dysarthria noted.   Cranial Nerves: PERRL, EOMI, VFFC, sensation V1-V3 intact,  no obvious facial asymmetry, equal elevation of palate, scm/trap 5/5, tongue is midline on protrusion. no obvious papilledema on fundoscopic exam. hearing is grossly intact.   Motor: VICENTE 4/5 throuhgout but uppers>lowers : flapping tremor   Sensation: Intact to light touch and pinprick throughout. no right/left confusion. no extinction to tactile on DSS.    Reflexes: 1+ throughout at biceps, brachioradialis, triceps, patellars and ankles bilaterally and equal. No clonus. R toe and L toe were both downgoing.  Coordination: No dysmetria on FNF  Gait: deferred .       I personally reviewed the below data/images/labs:    CBC Full  -  ( 16 Apr 2023 11:17 )  WBC Count : 1.03 K/uL  RBC Count : 2.49 M/uL  Hemoglobin : 7.3 g/dL  Hematocrit : 20.7 %  Platelet Count - Automated : 6 K/uL  Mean Cell Volume : 83.1 fl  Mean Cell Hemoglobin : 29.3 pg  Mean Cell Hemoglobin Concentration : 35.3 gm/dL  Auto Neutrophil # : x  Auto Lymphocyte # : x  Auto Monocyte # : x  Auto Eosinophil # : x  Auto Basophil # : x  Auto Neutrophil % : x  Auto Lymphocyte % : x  Auto Monocyte % : x  Auto Eosinophil % : x  Auto Basophil % : x    04-16    126<L>  |  92<L>  |  99<H>  ----------------------------<  308<H>  5.1   |  15<L>  |  3.85<H>    Ca    7.3<L>      16 Apr 2023 11:17    TPro  4.3<L>  /  Alb  2.6<L>  /  TBili  10.7<H>  /  DBili  x   /  AST  64<H>  /  ALT  86<H>  /  AlkPhos  300<H>  04-16        < from: CT Head No Cont (04.01.23 @ 09:19) >    ACC: [id number] EXAM:  CT BRAIN   ORDERED BY: EVONNE BYERSER     PROCEDURE DATE:  04/01/2023          INTERPRETATION:  CLINICAL INDICATIONS:  dizzy and low platelets    COMPARISON: None.    TECHNIQUE: Noncontrast CT of the head. Multiplanar reformations are   submitted.    FINDINGS:  There is periventricular and subcortical white matter hypodensity without   mass effect, nonspecific, likely representing mild chronic microvascular   ischemic changes. There is no compelling evidence for an acute   transcortical infarction. There is no evidence of mass, mass effect,   midline shift or extra-axial fluid collection. The lateral ventricles and   cortical sulci are age-appropriate in size and configuration. Chronic   left orbit medial wall fracture deformity. The orbits, mastoid air cells   and visualized paranasal sinuses are unremarkable. The calvarium is   intact. Consider follow-up CT or MRI as clinically warranted.    IMPRESSION:  Mild chronic microvascular changes without evidence of an   acute transcortical infarction or hemorrhage.    --- End of Report ---            IMAN MUÑOZ MD; Attending Radiologist  This document has been electronically signed. Apr 1 2023  9:25AM    < end of copied text >          Neurology Progress Note    S: Patient seen and examined. No new events overnight. patient denied CP, SOB, HA or pain. still with tremor; better     Medication:    MEDICATIONS  (STANDING):  acetaminophen     Tablet .. 650 milliGRAM(s) Oral once  artificial tears (preservative free) Ophthalmic Solution 1 Drop(s) Both EYES four times a day  chlorhexidine 2% Cloths 1 Application(s) Topical daily  digoxin     Tablet 125 MICROGram(s) Oral daily  filgrastim-sndz (ZARXIO) Injectable 480 MICROGram(s) SubCutaneous daily  methylPREDNISolone sodium succinate IVPB 500 milliGRAM(s) IV Intermittent daily  midodrine. 15 milliGRAM(s) Oral three times a day  pantoprazole    Tablet 40 milliGRAM(s) Oral before breakfast  polyethylene glycol 3350 17 Gram(s) Oral daily    MEDICATIONS  (PRN):  acetaminophen     Tablet .. 650 milliGRAM(s) Oral every 6 hours PRN Temp greater or equal to 38C (100.4F), Mild Pain (1 - 3)  aluminum hydroxide/magnesium hydroxide/simethicone Suspension 30 milliLiter(s) Oral every 4 hours PRN Dyspepsia  loperamide 2 milliGRAM(s) Oral three times a day PRN Diarrhea        Vitals:  Vital Signs Last 24 Hrs  T(C): 36.3 (04-16-23 @ 11:19), Max: 39.1 (04-15-23 @ 19:15)  T(F): 97.3 (04-16-23 @ 11:19), Max: 102.3 (04-15-23 @ 19:15)  HR: 67 (04-16-23 @ 11:19) (67 - 94)  BP: 131/71 (04-16-23 @ 11:19) (131/71 - 142/63)  BP(mean): --  RR: 20 (04-16-23 @ 11:19) (18 - 20)  SpO2: 93% (04-16-23 @ 11:19) (92% - 94%)        General Exam:   General Appearance: Appropriately dressed and in no acute distress       Head: Normocephalic, atraumatic and no dysmorphic features  Ear, Nose, and Throat: Moist mucous membranes  CVS: S1S2+  Resp: No SOB, no wheeze or rhonchi  GI: soft NT/ND  Extremities: No edema or cyanosis  Skin: No bruises or rashes     Neurological Exam:  Mental Status: Awake, alert and oriented x 2.  Able to follow simple and complex verbal commands. Able to name and repeat. fluent speech. No obvious aphasia or dysarthria noted.   Cranial Nerves: PERRL, EOMI, VFFC, sensation V1-V3 intact,  no obvious facial asymmetry, equal elevation of palate, scm/trap 5/5, tongue is midline on protrusion. no obvious papilledema on fundoscopic exam. hearing is grossly intact.   Motor: VICENTE 4/5 throuhgout but uppers>lowers : flapping tremor   Sensation: Intact to light touch and pinprick throughout. no right/left confusion. no extinction to tactile on DSS.    Reflexes: 1+ throughout at biceps, brachioradialis, triceps, patellars and ankles bilaterally and equal. No clonus. R toe and L toe were both downgoing.  Coordination: No dysmetria on FNF  Gait: deferred .       I personally reviewed the below data/images/labs:    CBC Full  -  ( 16 Apr 2023 11:17 )  WBC Count : 1.03 K/uL  RBC Count : 2.49 M/uL  Hemoglobin : 7.3 g/dL  Hematocrit : 20.7 %  Platelet Count - Automated : 6 K/uL  Mean Cell Volume : 83.1 fl  Mean Cell Hemoglobin : 29.3 pg  Mean Cell Hemoglobin Concentration : 35.3 gm/dL  Auto Neutrophil # : x  Auto Lymphocyte # : x  Auto Monocyte # : x  Auto Eosinophil # : x  Auto Basophil # : x  Auto Neutrophil % : x  Auto Lymphocyte % : x  Auto Monocyte % : x  Auto Eosinophil % : x  Auto Basophil % : x    04-16    126<L>  |  92<L>  |  99<H>  ----------------------------<  308<H>  5.1   |  15<L>  |  3.85<H>    Ca    7.3<L>      16 Apr 2023 11:17    TPro  4.3<L>  /  Alb  2.6<L>  /  TBili  10.7<H>  /  DBili  x   /  AST  64<H>  /  ALT  86<H>  /  AlkPhos  300<H>  04-16        < from: CT Head No Cont (04.01.23 @ 09:19) >    ACC: [id number] EXAM:  CT BRAIN   ORDERED BY: EVONNE BYERSER     PROCEDURE DATE:  04/01/2023          INTERPRETATION:  CLINICAL INDICATIONS:  dizzy and low platelets    COMPARISON: None.    TECHNIQUE: Noncontrast CT of the head. Multiplanar reformations are   submitted.    FINDINGS:  There is periventricular and subcortical white matter hypodensity without   mass effect, nonspecific, likely representing mild chronic microvascular   ischemic changes. There is no compelling evidence for an acute   transcortical infarction. There is no evidence of mass, mass effect,   midline shift or extra-axial fluid collection. The lateral ventricles and   cortical sulci are age-appropriate in size and configuration. Chronic   left orbit medial wall fracture deformity. The orbits, mastoid air cells   and visualized paranasal sinuses are unremarkable. The calvarium is   intact. Consider follow-up CT or MRI as clinically warranted.    IMPRESSION:  Mild chronic microvascular changes without evidence of an   acute transcortical infarction or hemorrhage.    --- End of Report ---            IMAN MUÑOZ MD; Attending Radiologist  This document has been electronically signed. Apr 1 2023  9:25AM    < end of copied text >

## 2023-04-16 NOTE — PROVIDER CONTACT NOTE (OTHER) - REASON
Patient still retaining urine
Patient still retaining urine
patient retaining. bladder scan showed 861ml
pt ,s temp 102.8f
4 beats of V tac
patient has temperature of 101.5
fever
Pt converted from SR to aflutter HR up to 140-150
pt have temp 101.6
temp is 100.2
temp 102.8f oral
fever
pt temp of 102.9
Oral temp 103 F
Pt temp 102.3
Pt arrived on unit with stat blood, plt, IV antibx not completed.
Pt febrile 102.4 deg F
pt w/ 101.1 temp orally

## 2023-04-16 NOTE — PROGRESS NOTE ADULT - ASSESSMENT
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 now with HA and c/o pain behind eyes   +DVT    + thrombocytopenia    4/1 CTH : mild chronic MVD. no acute findings   4/10 CTH and C oribts: no acute findings.   + flapping termor   EEG neg   febrile     Impression:   - s/p platelets   - [place] [facility] [place]   - possible myoclonic jerks/asterexis likely metabolic   - consider MRI brain and orbits if able with and w/o if no  improvement   - optho recs appreicated.   - on entecavir, mariavir  - midodrine 10mg TID for hypotension  - immunosupression with steroids. on prednisone   - DOAC for AF and DVT  held in setting of thrombocytopenia  - transfusions PRN per team, platelets and PRBC  - telemetry  - PT/OT   - check FS, glucose control <180  - GI/DVT ppx  - Thank you for allowing me to participate in the care of this patient. Call with questions.   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 now with HA and c/o pain behind eyes   +DVT    + thrombocytopenia    4/1 CTH : mild chronic MVD. no acute findings   4/10 CTH and C oribts: no acute findings.   + flapping termor   EEG neg   febrile     Impression:   - s/p platelets   - plan to transfer to [facility] [place]   - possible myoclonic jerks/asterexis likely metabolic   - consider MRI brain and orbits if able with and w/o if no  improvement   - optho recs appreicated.   - on entecavir, mariavir  - midodrine 10mg TID for hypotension  - immunosupression with steroids. on prednisone   - DOAC for AF and DVT  held in setting of thrombocytopenia  - transfusions PRN per team, platelets and PRBC  - telemetry  - PT/OT   - check FS, glucose control <180  - GI/DVT ppx  - Thank you for allowing me to participate in the care of this patient. Call with questions.   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 now with HA and c/o pain behind eyes   +DVT    + thrombocytopenia    4/1 CTH : mild chronic MVD. no acute findings   4/10 CTH and C oribts: no acute findings.   + flapping termor   EEG neg   febrile     Impression:   - s/p platelets   [place] [facility] [place]   - possible myoclonic jerks/asterexis likely metabolic   - consider MRI brain and orbits if able with and w/o if no  improvement   - optho recs appreicated.   - on entecavir, mariavir  - midodrine 10mg TID for hypotension  - immunosupression with steroids. on prednisone   - DOAC for AF and DVT  held in setting of thrombocytopenia  - transfusions PRN per team, platelets and PRBC  - telemetry  - PT/OT   - check FS, glucose control <180  - GI/DVT ppx  - Thank you for allowing me to participate in the care of this patient. Call with questions.   - spoke with wife bedside 4/14   Matt Harvey MD  Vascular Neurology  Office: [phone]

## 2023-04-16 NOTE — PROGRESS NOTE ADULT - ASSESSMENT
PETER DE LA PAZ is a 67y Male who presents with a chief complaint of fever    Multiple Myeloma  ·	Patient follows with Dr. Jean Claude Hubbard, [facility].  ·	Last on daratumumab + CyBorD, last dose on March 23rd.  ·	Continue acyclovir.  ·	No systemic therapy while inpatient or during rehabilitation.    Pancytopenia  ·	Patient with continued worsening pancytopenia; now severe leukopenia, anemia, thrombocytopenia.  ·	Bone marrow biopsy on April 6th preliminary showed minimal hematopoiesis but significant histiocytic proliferation and minimal hemophagocytosis.   ·	Multiorgan system failure with increasing creatinine and bilirubin. Ferritin remains extremely elevated. Etiology remains unclear; no evidence of myeloma on bone marrow.  ·	Etiology remains unclear. 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   ·	Continue filgrastim for neutropenia.  ·	Monitor CBC and transfuse to maintain HGB > 7 and PLT > 10.  ·	Maribavir has been stopped by infectious disease.     Fever  ·	Unclear etiology. Unlikely to be infectious in origin per infectious disease.  ·	Monitoring off antibiotics at this time.     [place] [facility] [place] at this time   will be in touch with [facility] [place]     Nile Pedro MD  HematologyOncology   O: [phone]  PETER DE LA PAZ is a 67y Male who presents with a chief complaint of fever    Multiple Myeloma  ·	Patient follows with Dr. Jean Claude Hubbard, [facility].  ·	Last on daratumumab + CyBorD, last dose on March 23rd.  ·	Continue acyclovir.  ·	No systemic therapy while inpatient or during rehabilitation.    Pancytopenia  ·	Patient with continued worsening pancytopenia; now severe leukopenia, anemia, thrombocytopenia.  ·	Bone marrow biopsy on April 6th preliminary showed minimal hematopoiesis but significant histiocytic proliferation and minimal hemophagocytosis.   ·	Multiorgan system failure with increasing creatinine and bilirubin. Ferritin remains extremely elevated. Etiology remains unclear; no evidence of myeloma on bone marrow.  ·	Etiology remains unclear. 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   ·	Continue filgrastim for neutropenia.  ·	Monitor CBC and transfuse to maintain HGB > 7 and PLT > 10.  ·	Maribavir has been stopped by infectious disease.     Fever  ·	Unclear etiology. Unlikely to be infectious in origin per infectious disease.  ·	Monitoring off antibiotics at this time.     [place] [facility] [place]   [place] [facility] [place]     Nile Pedro MD  HematologyOncology   O: [phone]  PETER DE LA PAZ is a 67y Male who presents with a chief complaint of fever    Multiple Myeloma  ·	Patient follows with Dr. Jean Claude Hubbard, [facility].  ·	Last on daratumumab + CyBorD, last dose on March 23rd.  ·	Continue acyclovir.  ·	No systemic therapy while inpatient or during rehabilitation.    Pancytopenia  ·	Patient with continued worsening pancytopenia; now severe leukopenia, anemia, thrombocytopenia.  ·	Bone marrow biopsy on April 6th preliminary showed minimal hematopoiesis but significant histiocytic proliferation and minimal hemophagocytosis.   ·	Multiorgan system failure with increasing creatinine and bilirubin. Ferritin remains extremely elevated. Etiology remains unclear; no evidence of myeloma on bone marrow.  ·	Etiology remains unclear. 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   ·	Continue filgrastim for neutropenia.  ·	Monitor CBC and transfuse to maintain HGB > 7 and PLT > 10.  ·	Maribavir has been stopped by infectious disease.     Fever  ·	Unclear etiology. Unlikely to be infectious in origin per infectious disease.  ·	Monitoring off antibiotics at this time.     Transfer to [facility] [place] is pending at this time   will be in touch with [facility] [place] team     Nile Pedro MD  HematologyOncology   O: [phone]

## 2023-04-16 NOTE — PROGRESS NOTE ADULT - ASSESSMENT
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    Fever of unknown origin.   - CXR without consolidations or effusions; U/A unremarkable; GI PCR Neg   - Cultures remain negative to date   - LE VA duplex --> + For DVT;  V/Q scan -- Low probability   - Was on empiric treatment with Vanc/cefepime --> ID consulted; S/P Zosyn  - Hold home penicillin while on ABX   - ID to follow up, infectious work up as per ID  --> CMV PCR -- + S/P Ganciclovir per ID , CMV IGG (+) and IgM (-), Cryptococcal Ag --Neg, Quantiferon Tb -- Neg, Fungitell -- <31   - CT Chest non-contrast, noted, no acute pathology  - Shock, RRT, resume antibiotics IV fluid, MICU eval , ID follow up  --> Prednisone 50 PO Qd   - S/P Zosyn; Defer ABX as per ID   - Short course steroid per hematology -- On Prednisone 50 Qd   - IR eval for BM BX as per Heme/Onc --> done on 04/06 with IR, f/u path -- As noted; F/u heme/Onc recs   - ID ordered pan CT, noted questionable cystitis. UA is neg.  - Febrile 04/10 and 04/11 AM. Repeat Cx NGTD; Cefepime now on hold in view of LFTs; F/u ID recs  - RVP 04/10 Neg, herpes 6 Neg   - Recurrent fever; F/u 04/12 BCx2 NGTD; F/u final   - Entecavir on hold as per Heme/Onc   - Ammonia level WNL, Lactate down-trending   - Maribavir on hold as per ID  - F/u ID recs    Multi organ failure  GI to follow Lfts, trending up   started on high dose steroids  [place] [facility] [place]   may obtain Pan CT in view of worsening renal function and LFTs,     Pain behind eyes, Generalized weakness, lethargy, twitch   - No gross deficits on exam   - Pending CT Head and Orbits -- CT H neg for hemorrhage, 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 Transfuse for Hgb < 7.0, and platelets < 10.K  - GI eval appreciated; F/u recs --> No plans for scope at this time   - Was on Xarelto, held due to drop in Platelets as per Heme/Onc. Monitor platelet count   - S/P 1 unit PRBCs and 1 unit Platelets 04/04, 04/05   - HIT ab: neg  - ASA on hold  - Plt of 7K, plan for 1 unit of Plt 04/11  - S/P 1 unit PRBC 04/13  - 1 unit Platelets 04/14   - Heme/Onc obtaining further work up   - 1 unit PRBC and plt 04/15    Afib  - Unable to be on AC 2/2 low platelets  - On Digoxin   - Monitor on tele   - Cardio follow up     Multiple myeloma.   - pancytopenia largely at baseline although Hg 6.7 on arrival; s/p 1U PRBC with good response   - Was on acyclovir, / Ganciclovir as per ID   - C/w home entecavir   - S/P dexamethasone, D/C'd by hematology, follow up outpatient after discharge  --> S/P Prednisone   - Pt reports his Revlimid was held  - Heme/Onc consulted; F/u recs   - Resume home aspirin. --> Now on hold   - s/p IR BM Bx  04/06; Atypical histiocytic infiltrate with few EBV positive cells; F/u Heme/Onc recs   - S/P  IVIG 04/13 as per Heme/Onc   - IR eval for Bone Lesion Biopsy per Heme/Onc --> Arrangements for PET scan attempting to be made; Patient too unstable to be off of floor. Attempting for possible transfer to [facility] [place]; Appreciate Heme/Onc      - started on high dose solumedrol   - discussed with oncology  - low threshold for ICU eval     Chronic diarrhea, now with Constipation   - Standing Imodium switched to PRN  - Monitor for BM - reports having BM     DVT   - Duplex + For R soleal vein DVT  --> Will consider CT A chest once creatinine permits as patient received contrast; Monitor for LOPEZ   - s/p Hep gtt; Monitor PTT; Monitor H/H closely --> Now on hold in view of drop in Hgb and severe thrombocytopenia  - VQ scan to R/O PE -- Low probability   - Vascular eval appreciated; AC as tolerated, on Xarelto 10, monitor H/H, plts too low to start.   - Serial Duplex to assess for propagation  -- Without propagation   - 04/03 Duplex without propagation   - Repeat Duplex 04/10 -- without propogation   - Plan for repeat Duplex 04/17   - given severe thrombocytopenia, holding Xarelto for now. Risk of bleeding greater than benefit.     Elevated LFTs  - Cont to monitor and trend  - Lipitor DC'd. Entecavir DC'd  - Likely drug induced vs from hypotension; ABX and Antiviral's DC'd. Continue to monitor and trend levels closely   - Monitor levels     RK  - S/P Contrast on 03/23   - Monitor Cr closely; Avoid nephrotoxic agents   - Cr down-trending. s/p IVF   - Renal eval appreciated   - urinary retention s/p lloyd.   - Removed lloyd 4/7/23, TOV in progress. post void residual 550ml on bladder scan  - s/p straight cath. may need to replace lloyd if unable to urinarte.   - CT with hydronephrosis; Cr up-trending, Flomax started; F/u renal recs    - Renal US neg for hydronephrosis   - Cr up-trending; C/w IVF  CC/ Hr. Check bladder scan to R/O retention     Hypertension, now hypotensive   - C/w home metoprolol.  - C/w Midodrine TID. Hold for SBP > 140;  Increase to 15 TID     HypoNa  - Cont to monitor and trend  - Appreciate renal eval.   - Serial labs     Hiccups  - Improved on Reglan, Monitor     Hyperlipidemia.   - C/w home atorvastatin --> on hold as per GI .    Abnormal CT  - Outpatient follow up for CT findings    Prophylactic measure.   dvt ppx: DVT PPX   diet: regular  ambulate: with assistance    fall precautions  aspiration precautions.    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    Fever of unknown origin.   - CXR without consolidations or effusions; U/A unremarkable; GI PCR Neg   - Cultures remain negative to date   - LE VA duplex --> + For DVT;  V/Q scan -- Low probability   - Was on empiric treatment with Vanc/cefepime --> ID consulted; S/P Zosyn  - Hold home penicillin while on ABX   - ID to follow up, infectious work up as per ID  --> CMV PCR -- + S/P Ganciclovir per ID , CMV IGG (+) and IgM (-), Cryptococcal Ag --Neg, Quantiferon Tb -- Neg, Fungitell -- <31   - CT Chest non-contrast, noted, no acute pathology  - Shock, RRT, resume antibiotics IV fluid, MICU eval , ID follow up  --> Prednisone 50 PO Qd   - S/P Zosyn; Defer ABX as per ID   - Short course steroid per hematology -- On Prednisone 50 Qd   - IR eval for BM BX as per Heme/Onc --> done on 04/06 with IR, f/u path -- As noted; F/u heme/Onc recs   - ID ordered pan CT, noted questionable cystitis. UA is neg.  - Febrile 04/10 and 04/11 AM. Repeat Cx NGTD; Cefepime now on hold in view of LFTs; F/u ID recs  - RVP 04/10 Neg, herpes 6 Neg   - Recurrent fever; F/u 04/12 BCx2 NGTD; F/u final   - Entecavir on hold as per Heme/Onc   - Ammonia level WNL, Lactate down-trending   - Maribavir on hold as per ID  - F/u ID recs    Multi organ failure  GI to follow Lfts, trending up   started on high dose steroids  awwaiting transfer to [facility], PET   may obtain Pan CT in view of worsening renal function and LFTs,     Pain behind eyes, Generalized weakness, lethargy, twitch   - No gross deficits on exam   - Pending CT Head and Orbits -- CT H neg for hemorrhage, 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 Transfuse for Hgb < 7.0, and platelets < 10.K  - GI eval appreciated; F/u recs --> No plans for scope at this time   - Was on Xarelto, held due to drop in Platelets as per Heme/Onc. Monitor platelet count   - S/P 1 unit PRBCs and 1 unit Platelets 04/04, 04/05   - HIT ab: neg  - ASA on hold  - Plt of 7K, plan for 1 unit of Plt 04/11  - S/P 1 unit PRBC 04/13  - 1 unit Platelets 04/14   - Heme/Onc obtaining further work up   - 1 unit PRBC and plt 04/15    Afib  - Unable to be on AC 2/2 low platelets  - On Digoxin   - Monitor on tele   - Cardio follow up     Multiple myeloma.   - pancytopenia largely at baseline although Hg 6.7 on arrival; s/p 1U PRBC with good response   - Was on acyclovir, / Ganciclovir as per ID   - C/w home entecavir   - S/P dexamethasone, D/C'd by hematology, follow up outpatient after discharge  --> S/P Prednisone   - Pt reports his Revlimid was held  - Heme/Onc consulted; F/u recs   - Resume home aspirin. --> Now on hold   - s/p IR BM Bx  04/06; Atypical histiocytic infiltrate with few EBV positive cells; F/u Heme/Onc recs   - S/P  IVIG 04/13 as per Heme/Onc   - IR eval for Bone Lesion Biopsy per Heme/Onc --> Arrangements for PET scan attempting to be made; Patient too unstable to be off of floor. Attempting for possible transfer to [facility]; Appreciate Heme/Onc      - started on high dose solumedrol   - discussed with oncology  - low threshold for ICU eval     Chronic diarrhea, now with Constipation   - Standing Imodium switched to PRN  - Monitor for BM - reports having BM     DVT   - Duplex + For R soleal vein DVT  --> Will consider CT A chest once creatinine permits as patient received contrast; Monitor for LOPEZ   - s/p Hep gtt; Monitor PTT; Monitor H/H closely --> Now on hold in view of drop in Hgb and severe thrombocytopenia  - VQ scan to R/O PE -- Low probability   - Vascular eval appreciated; AC as tolerated, on Xarelto 10, monitor H/H, plts too low to start.   - Serial Duplex to assess for propagation  -- Without propagation   - 04/03 Duplex without propagation   - Repeat Duplex 04/10 -- without propogation   - Plan for repeat Duplex 04/17   - given severe thrombocytopenia, holding Xarelto for now. Risk of bleeding greater than benefit.     Elevated LFTs  - Cont to monitor and trend  - Lipitor DC'd. Entecavir DC'd  - Likely drug induced vs from hypotension; ABX and Antiviral's DC'd. Continue to monitor and trend levels closely   - Monitor levels     RK  - S/P Contrast on 03/23   - Monitor Cr closely; Avoid nephrotoxic agents   - Cr down-trending. s/p IVF   - Renal eval appreciated   - urinary retention s/p lloyd.   - Removed lloyd 4/7/23, TOV in progress. post void residual 550ml on bladder scan  - s/p straight cath. may need to replace lloyd if unable to urinarte.   - CT with hydronephrosis; Cr up-trending, Flomax started; F/u renal recs    - Renal US neg for hydronephrosis   - Cr up-trending; C/w IVF  CC/ Hr. Check bladder scan to R/O retention     Hypertension, now hypotensive   - C/w home metoprolol.  - C/w Midodrine TID. Hold for SBP > 140;  Increase to 15 TID     HypoNa  - Cont to monitor and trend  - Appreciate renal eval.   - Serial labs     Hiccups  - Improved on Reglan, Monitor     Hyperlipidemia.   - C/w home atorvastatin --> on hold as per GI .    Abnormal CT  - Outpatient follow up for CT findings    Prophylactic measure.   dvt ppx: DVT PPX   diet: regular  ambulate: with assistance    fall precautions  aspiration precautions.    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    Fever of unknown origin.   - CXR without consolidations or effusions; U/A unremarkable; GI PCR Neg   - Cultures remain negative to date   - LE VA duplex --> + For DVT;  V/Q scan -- Low probability   - Was on empiric treatment with Vanc/cefepime --> ID consulted; S/P Zosyn  - Hold home penicillin while on ABX   - ID to follow up, infectious work up as per ID  --> CMV PCR -- + S/P Ganciclovir per ID , CMV IGG (+) and IgM (-), Cryptococcal Ag --Neg, Quantiferon Tb -- Neg, Fungitell -- <31   - CT Chest non-contrast, noted, no acute pathology  - Shock, RRT, resume antibiotics IV fluid, MICU eval , ID follow up  --> Prednisone 50 PO Qd   - S/P Zosyn; Defer ABX as per ID   - Short course steroid per hematology -- On Prednisone 50 Qd   - IR eval for BM BX as per Heme/Onc --> done on 04/06 with IR, f/u path -- As noted; F/u heme/Onc recs   - ID ordered pan CT, noted questionable cystitis. UA is neg.  - Febrile 04/10 and 04/11 AM. Repeat Cx NGTD; Cefepime now on hold in view of LFTs; F/u ID recs  - RVP 04/10 Neg, herpes 6 Neg   - Recurrent fever; F/u 04/12 BCx2 NGTD; F/u final   - Entecavir on hold as per Heme/Onc   - Ammonia level WNL, Lactate down-trending   - Maribavir on hold as per ID  - F/u ID recs    Multi organ failure  GI to follow Lfts, trending up   started on high dose steroids  [place] [facility] [place]   may obtain Pan CT in view of worsening renal function and LFTs,     Pain behind eyes, Generalized weakness, lethargy, twitch   - No gross deficits on exam   - Pending CT Head and Orbits -- CT H neg for hemorrhage, 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 Transfuse for Hgb < 7.0, and platelets < 10.K  - GI eval appreciated; F/u recs --> No plans for scope at this time   - Was on Xarelto, held due to drop in Platelets as per Heme/Onc. Monitor platelet count   - S/P 1 unit PRBCs and 1 unit Platelets 04/04, 04/05   - HIT ab: neg  - ASA on hold  - Plt of 7K, plan for 1 unit of Plt 04/11  - S/P 1 unit PRBC 04/13  - 1 unit Platelets 04/14   - Heme/Onc obtaining further work up   - 1 unit PRBC and plt 04/15    Afib  - Unable to be on AC 2/2 low platelets  - On Digoxin   - Monitor on tele   - Cardio follow up     Multiple myeloma.   - pancytopenia largely at baseline although Hg 6.7 on arrival; s/p 1U PRBC with good response   - Was on acyclovir, / Ganciclovir as per ID   - C/w home entecavir   - S/P dexamethasone, D/C'd by hematology, follow up outpatient after discharge  --> S/P Prednisone   - Pt reports his Revlimid was held  - Heme/Onc consulted; F/u recs   - Resume home aspirin. --> Now on hold   - s/p IR BM Bx  04/06; Atypical histiocytic infiltrate with few EBV positive cells; F/u Heme/Onc recs   - S/P  IVIG 04/13 as per Heme/Onc   - IR eval for Bone Lesion Biopsy per Heme/Onc --> Arrangements for PET scan attempting to be made; Patient too unstable to be off of floor. [place] possible [place] [facility] [place]reciate Heme/Onc      - started on high dose solumedrol   - discussed with oncology  - low threshold for ICU eval     Chronic diarrhea, now with Constipation   - Standing Imodium switched to PRN  - Monitor for BM - reports having BM     DVT   - Duplex + For R soleal vein DVT  --> Will consider CT A chest once creatinine permits as patient received contrast; Monitor for LOPEZ   - s/p Hep gtt; Monitor PTT; Monitor H/H closely --> Now on hold in view of drop in Hgb and severe thrombocytopenia  - VQ scan to R/O PE -- Low probability   - Vascular eval appreciated; AC as tolerated, on Xarelto 10, monitor H/H, plts too low to start.   - Serial Duplex to assess for propagation  -- Without propagation   - 04/03 Duplex without propagation   - Repeat Duplex 04/10 -- without propogation   - Plan for repeat Duplex 04/17   - given severe thrombocytopenia, holding Xarelto for now. Risk of bleeding greater than benefit.     Elevated LFTs  - Cont to monitor and trend  - Lipitor DC'd. Entecavir DC'd  - Likely drug induced vs from hypotension; ABX and Antiviral's DC'd. Continue to monitor and trend levels closely   - Monitor levels     RK  - S/P Contrast on 03/23   - Monitor Cr closely; Avoid nephrotoxic agents   - Cr down-trending. s/p IVF   - Renal eval appreciated   - urinary retention s/p lloyd.   - Removed lloyd 4/7/23, TOV in progress. post void residual 550ml on bladder scan  - s/p straight cath. may need to replace lloyd if unable to urinarte.   - CT with hydronephrosis; Cr up-trending, Flomax started; F/u renal recs    - Renal US neg for hydronephrosis   - Cr up-trending; C/w IVF  CC/ Hr. Check bladder scan to R/O retention     Hypertension, now hypotensive   - C/w home metoprolol.  - C/w Midodrine TID. Hold for SBP > 140;  Increase to 15 TID     HypoNa  - Cont to monitor and trend  - Appreciate renal eval.   - Serial labs     Hiccups  - Improved on Reglan, Monitor     Hyperlipidemia.   - C/w home atorvastatin --> on hold as per GI .    Abnormal CT  - Outpatient follow up for CT findings    Prophylactic measure.   dvt ppx: DVT PPX   diet: regular  ambulate: with assistance    fall precautions  aspiration precautions.

## 2023-04-16 NOTE — PROGRESS NOTE ADULT - SUBJECTIVE AND OBJECTIVE BOX
Chief Complaint:  Patient is a 67y old  Male who presents with a chief complaint of fever (15 Apr 2023 12:56)      Date of service 04-16-23 @ 08:26      Interval Events:   febrile to 102.3    Hospital Medications:  acetaminophen     Tablet .. 650 milliGRAM(s) Oral every 6 hours PRN  acetaminophen     Tablet .. 650 milliGRAM(s) Oral once  albumin human 25% IVPB 50 milliLiter(s) IV Intermittent every 6 hours  aluminum hydroxide/magnesium hydroxide/simethicone Suspension 30 milliLiter(s) Oral every 4 hours PRN  artificial tears (preservative free) Ophthalmic Solution 1 Drop(s) Both EYES four times a day  chlorhexidine 2% Cloths 1 Application(s) Topical daily  digoxin     Tablet 125 MICROGram(s) Oral daily  filgrastim-sndz (ZARXIO) Injectable 480 MICROGram(s) SubCutaneous daily  loperamide 2 milliGRAM(s) Oral three times a day PRN  methylPREDNISolone sodium succinate IVPB 500 milliGRAM(s) IV Intermittent daily  midodrine. 15 milliGRAM(s) Oral three times a day  pantoprazole    Tablet 40 milliGRAM(s) Oral before breakfast  polyethylene glycol 3350 17 Gram(s) Oral daily        Review of Systems:  General:  No wt loss, fevers, chills, night sweats, fatigue,   Eyes:  Good vision, no reported pain  ENT:  No sore throat, pain, runny nose, dysphagia  CV:  No pain, palpitations, hypo/hypertension  Resp:  No dyspnea, cough, tachypnea, wheezing  GI:  See HPI  :  No pain, bleeding, incontinence, nocturia  Muscle:  No pain, weakness  Neuro:  No weakness, tingling, memory problems  Psych:  No fatigue, insomnia, mood problems, depression  Endocrine:  No polyuria, polydipsia, cold/heat intolerance  Heme:  No petechiae, ecchymosis, easy bruisability  Integumentary:  No rash, edema    PHYSICAL EXAM:   Vital Signs:  Vital Signs Last 24 Hrs  T(C): 36.3 (16 Apr 2023 04:54), Max: 39.1 (15 Apr 2023 19:15)  T(F): 97.4 (16 Apr 2023 04:54), Max: 102.3 (15 Apr 2023 19:15)  HR: 76 (16 Apr 2023 04:54) (76 - 94)  BP: 136/65 (16 Apr 2023 04:54) (133/59 - 142/63)  BP(mean): --  RR: 18 (16 Apr 2023 04:54) (18 - 20)  SpO2: 94% (16 Apr 2023 04:54) (92% - 94%)    Parameters below as of 16 Apr 2023 04:54  Patient On (Oxygen Delivery Method): nasal cannula  O2 Flow (L/min): 3    Daily     Daily       PHYSICAL EXAM:     GENERAL:  Appears stated age, well-groomed, well-nourished, no distress  HEENT:  NC/AT,  conjunctivae anicteric, clear and pink,   NECK: supple, trachea midline  CHEST:  Full & symmetric excursion, no increased effort, breath sounds clear  HEART:  Regular rhythm, no JVD  ABDOMEN:  Soft, non-tender, non-distended, normoactive bowel sounds,  no masses , no hepatosplenomegaly  EXTREMITIES:  no cyanosis,clubbing or edema  SKIN:  No rash, erythema, or, ecchymoses, no jaundice  NEURO:  Alert, non-focal, no asterixis  PSYCH: Appropriate affect, oriented to place and time  RECTAL: Deferred      LABS Personally reviewed by me:                        7.2    1.18  )-----------( 7        ( 15 Apr 2023 09:58 )             20.0     Mean Cell Volume: 83.7 fl (04-15-23 @ 09:58)    04-15    129<L>  |  94<L>  |  89<H>  ----------------------------<  226<H>  4.4   |  19<L>  |  3.45<H>    Ca    7.5<L>      15 Apr 2023 09:58    TPro  4.1<L>  /  Alb  2.3<L>  /  TBili  7.3<H>  /  DBili  x   /  AST  66<H>  /  ALT  101<H>  /  AlkPhos  309<H>  04-15    LIVER FUNCTIONS - ( 15 Apr 2023 09:58 )  Alb: 2.3 g/dL / Pro: 4.1 g/dL / ALK PHOS: 309 U/L / ALT: 101 U/L / AST: 66 U/L / GGT: x           PTT - ( 15 Apr 2023 09:58 )  PTT:38.6 sec                            7.2    1.18  )-----------( 7        ( 15 Apr 2023 09:58 )             20.0                         6.1    1.06  )-----------( 15       ( 14 Apr 2023 20:02 )             17.2                         7.8    1.27  )-----------( 6 ( 14 Apr 2023 06:12 )             22.2       Imaging personally reviewed by me:

## 2023-04-16 NOTE — PROVIDER CONTACT NOTE (OTHER) - RECOMMENDATIONS
cooling measures and IV Tylenol
IV Tylenol
Notify provider. Cooling blanket applied. IV Tylenol?
cooling measures
provider notified
provider notify, straight cath for now?
Provider notified.
patient is due for blood transfusion, send blood back to blood bank?
Notify provider
Notify Provider.
Notify Provider
Will continue to bladder scan q8h as ordered.

## 2023-04-16 NOTE — PROGRESS NOTE ADULT - SUBJECTIVE AND OBJECTIVE BOX
Subjective: Patient seen and examined. No new events except as noted.     SUBJECTIVE/ROS:  nad      MEDICATIONS:  MEDICATIONS  (STANDING):  acetaminophen     Tablet .. 650 milliGRAM(s) Oral once  artificial tears (preservative free) Ophthalmic Solution 1 Drop(s) Both EYES four times a day  chlorhexidine 2% Cloths 1 Application(s) Topical daily  digoxin     Tablet 125 MICROGram(s) Oral daily  filgrastim-sndz (ZARXIO) Injectable 480 MICROGram(s) SubCutaneous daily  methylPREDNISolone sodium succinate IVPB 500 milliGRAM(s) IV Intermittent daily  midodrine. 15 milliGRAM(s) Oral three times a day  pantoprazole    Tablet 40 milliGRAM(s) Oral before breakfast  polyethylene glycol 3350 17 Gram(s) Oral daily      PHYSICAL EXAM:  T(C): 36.3 (04-16-23 @ 11:19), Max: 39.1 (04-15-23 @ 19:15)  HR: 67 (04-16-23 @ 11:19) (67 - 94)  BP: 131/71 (04-16-23 @ 11:19) (131/71 - 142/63)  RR: 20 (04-16-23 @ 11:19) (18 - 20)  SpO2: 93% (04-16-23 @ 11:19) (92% - 94%)  Wt(kg): --  I&O's Summary    15 Apr 2023 07:01  -  16 Apr 2023 07:00  --------------------------------------------------------  IN: 445 mL / OUT: 750 mL / NET: -305 mL            JVP: Normal  Neck: supple  Lung: clear   CV: S1 S2 , Murmur:  Abd: soft  Ext: No edema  neuro: Awake / alert  Psych: flat affect  Skin: normal``    LABS/DATA:    CARDIAC MARKERS:                                7.3    1.03  )-----------( 6        ( 16 Apr 2023 11:17 )             20.7     04-16    126<L>  |  92<L>  |  99<H>  ----------------------------<  308<H>  5.1   |  15<L>  |  3.85<H>    Ca    7.3<L>      16 Apr 2023 11:17    TPro  4.3<L>  /  Alb  2.6<L>  /  TBili  10.7<H>  /  DBili  x   /  AST  64<H>  /  ALT  86<H>  /  AlkPhos  300<H>  04-16    proBNP:   Lipid Profile:   HgA1c:   TSH:     TELE:  EKG:

## 2023-04-16 NOTE — PROGRESS NOTE ADULT - ASSESSMENT
68 y/o M PMHx MM (s/p autoSCT, s/p relapse now on chemo last dose 3/3), bioprosthetic AVR (2019), splenectomy, and partial gastrectomy/pancreatectomy, presented with fever/chills. Found to be febrile on admission with CMV viremia. Now with multiorgan dysfunction, rising creatinine and bilirubin.     1. Fever, post transplant   - now off abx   - off Maribavir   - f/u with ID     2. MM s/p SCT w/ relapse, severe pancytopenia   - per heme / onc   - awaiting transfer to [facility] [place]    3. abnormal LFTs, now cholestatic with rising TBL. Likely multifactorial from sepsis and drug-induced cholestasis.  - abdominal US ordered   - continue to trend     4. Chronic diarrhea, likely related to chemo +/- abx > recently more constipated   - GI PCR neg, C-diff neg on 03.03  - imodium PRN     5. DVT   - on anticoagulation    6. Hiccups--resolved   - reglan discontinued       Jahaira Jennings M.D.   Gastroenterology and Hepatology  [address] [place], [place]  Office: [phone]  Cell: [phone]    68 y/o M PMHx MM (s/p autoSCT, s/p relapse now on chemo last dose 3/3), bioprosthetic AVR (2019), splenectomy, and partial gastrectomy/pancreatectomy, presented with fever/chills. Found to be febrile on admission with CMV viremia. Now with multiorgan dysfunction, rising creatinine and bilirubin.     1. Fever, post transplant   - now off abx   - off Maribavir   - f/u with ID     2. MM s/p SCT w/ relapse, severe pancytopenia   - per heme / onc   - awaiting transfer to [facility] [place]    3. abnormal LFTs, now cholestatic with rising TBL. Likely multifactorial from sepsis and drug-induced cholestasis.  - abdominal US ordered   - continue to trend     4. Chronic diarrhea, likely related to chemo +/- abx > recently more constipated   - GI PCR neg, C-diff neg on 03.03  - imodium PRN     5. DVT   - on anticoagulation    6. Hiccups--resolved   - reglan discontinued       Jahaira Jennings M.D.   Gastroenterology and Hepatology  [address] [place], [place]  Office: [phone]  Cell: [phone]    66 y/o M PMHx MM (s/p autoSCT, s/p relapse now on chemo last dose 3/3), bioprosthetic AVR (2019), splenectomy, and partial gastrectomy/pancreatectomy, presented with fever/chills. Found to be febrile on admission with CMV viremia. Now with multiorgan dysfunction, rising creatinine and bilirubin.     1. Fever, post transplant   - now off abx   - off Maribavir   - f/u with ID     2. MM s/p SCT w/ relapse, severe pancytopenia   - per heme / onc   - awaiting transfer to [facility] [place]    3. abnormal LFTs, now cholestatic with rising TBL. Likely multifactorial from sepsis and drug-induced cholestasis.  - abdominal US ordered   - continue to trend     4. Chronic diarrhea, likely related to chemo +/- abx > recently more constipated   - GI PCR neg, C-diff neg on 03.03  - imodium PRN     5. DVT   - on anticoagulation    6. Hiccups--resolved   - reglan discontinued       Jahaira Jennings M.D.   Gastroenterology and Hepatology  [address] [place], [place]  Office: [phone]  Cell: [phone]

## 2023-04-17 LAB
ALBUMIN SERPL ELPH-MCNC: 2.6 G/DL — LOW (ref 3.3–5)
ALP SERPL-CCNC: 368 U/L — HIGH (ref 40–120)
ALT FLD-CCNC: 70 U/L — HIGH (ref 10–45)
AMMONIA BLD-MCNC: 29 UMOL/L — SIGNIFICANT CHANGE UP (ref 11–55)
ANION GAP SERPL CALC-SCNC: 21 MMOL/L — HIGH (ref 5–17)
AST SERPL-CCNC: 45 U/L — HIGH (ref 10–40)
BILIRUB SERPL-MCNC: 11.7 MG/DL — HIGH (ref 0.2–1.2)
BUN SERPL-MCNC: 113 MG/DL — HIGH (ref 7–23)
CALCIUM SERPL-MCNC: 7.4 MG/DL — LOW (ref 8.4–10.5)
CEFEPIME LEVEL RESULT: SIGNIFICANT CHANGE UP
CHLORIDE SERPL-SCNC: 89 MMOL/L — LOW (ref 96–108)
CO2 SERPL-SCNC: 16 MMOL/L — LOW (ref 22–31)
CREAT SERPL-MCNC: 4.18 MG/DL — HIGH (ref 0.5–1.3)
CULTURE RESULTS: SIGNIFICANT CHANGE UP
EBV DNA SERPL NAA+PROBE-ACNC: 1010 IU/ML — HIGH
EBVPCR LOG: 3 LOG10IU/ML — HIGH
EGFR: 15 ML/MIN/1.73M2 — LOW
GLUCOSE SERPL-MCNC: 428 MG/DL — HIGH (ref 70–99)
HCT VFR BLD CALC: 21.4 % — LOW (ref 39–50)
HGB BLD-MCNC: 7.7 G/DL — LOW (ref 13–17)
MCHC RBC-ENTMCNC: 30 PG — SIGNIFICANT CHANGE UP (ref 27–34)
MCHC RBC-ENTMCNC: 36 GM/DL — SIGNIFICANT CHANGE UP (ref 32–36)
MCV RBC AUTO: 83.3 FL — SIGNIFICANT CHANGE UP (ref 80–100)
NRBC # BLD: 2 /100 WBCS — HIGH (ref 0–0)
PLATELET # BLD AUTO: 6 K/UL — CRITICAL LOW (ref 150–400)
POTASSIUM SERPL-MCNC: 5.2 MMOL/L — SIGNIFICANT CHANGE UP (ref 3.5–5.3)
POTASSIUM SERPL-SCNC: 5.2 MMOL/L — SIGNIFICANT CHANGE UP (ref 3.5–5.3)
PROT SERPL-MCNC: 4.4 G/DL — LOW (ref 6–8.3)
RBC # BLD: 2.57 M/UL — LOW (ref 4.2–5.8)
RBC # FLD: 18.8 % — HIGH (ref 10.3–14.5)
SARS-COV-2 RNA SPEC QL NAA+PROBE: SIGNIFICANT CHANGE UP
SODIUM SERPL-SCNC: 126 MMOL/L — LOW (ref 135–145)
SPECIMEN SOURCE: SIGNIFICANT CHANGE UP
WBC # BLD: 0.79 K/UL — CRITICAL LOW (ref 3.8–10.5)
WBC # FLD AUTO: 0.79 K/UL — CRITICAL LOW (ref 3.8–10.5)

## 2023-04-17 PROCEDURE — 36573 INSJ PICC RS&I 5 YR+: CPT

## 2023-04-17 RX ORDER — LOPERAMIDE HCL 2 MG
1 TABLET ORAL
Qty: 0 | Refills: 0 | DISCHARGE
Start: 2023-04-17

## 2023-04-17 RX ORDER — FUROSEMIDE 40 MG
80 TABLET ORAL ONCE
Refills: 0 | Status: COMPLETED | OUTPATIENT
Start: 2023-04-17 | End: 2023-04-17

## 2023-04-17 RX ORDER — ALBUMIN HUMAN 25 %
50 VIAL (ML) INTRAVENOUS ONCE
Refills: 0 | Status: COMPLETED | OUTPATIENT
Start: 2023-04-17 | End: 2023-04-17

## 2023-04-17 RX ORDER — PANTOPRAZOLE SODIUM 20 MG/1
1 TABLET, DELAYED RELEASE ORAL
Qty: 0 | Refills: 0 | DISCHARGE
Start: 2023-04-17

## 2023-04-17 RX ORDER — MIDODRINE HYDROCHLORIDE 2.5 MG/1
1 TABLET ORAL
Qty: 0 | Refills: 0 | DISCHARGE
Start: 2023-04-17

## 2023-04-17 RX ORDER — ACETAMINOPHEN 500 MG
2 TABLET ORAL
Qty: 0 | Refills: 0 | DISCHARGE
Start: 2023-04-17

## 2023-04-17 RX ORDER — MIDODRINE HYDROCHLORIDE 2.5 MG/1
10 TABLET ORAL THREE TIMES A DAY
Refills: 0 | Status: DISCONTINUED | OUTPATIENT
Start: 2023-04-17 | End: 2023-04-18

## 2023-04-17 RX ORDER — CHLORHEXIDINE GLUCONATE 213 G/1000ML
1 SOLUTION TOPICAL
Qty: 0 | Refills: 0 | DISCHARGE
Start: 2023-04-17

## 2023-04-17 RX ORDER — CHLORHEXIDINE GLUCONATE 213 G/1000ML
1 SOLUTION TOPICAL
Refills: 0 | Status: DISCONTINUED | OUTPATIENT
Start: 2023-04-17 | End: 2023-04-18

## 2023-04-17 RX ORDER — SODIUM CHLORIDE 9 MG/ML
10 INJECTION INTRAMUSCULAR; INTRAVENOUS; SUBCUTANEOUS
Qty: 0 | Refills: 0 | DISCHARGE
Start: 2023-04-17

## 2023-04-17 RX ORDER — POLYETHYLENE GLYCOL 3350 17 G/17G
17 POWDER, FOR SOLUTION ORAL
Qty: 0 | Refills: 0 | DISCHARGE
Start: 2023-04-17

## 2023-04-17 RX ORDER — SODIUM CHLORIDE 9 MG/ML
10 INJECTION INTRAMUSCULAR; INTRAVENOUS; SUBCUTANEOUS
Refills: 0 | Status: DISCONTINUED | OUTPATIENT
Start: 2023-04-17 | End: 2023-04-18

## 2023-04-17 RX ORDER — DIGOXIN 250 MCG
1 TABLET ORAL
Qty: 0 | Refills: 0 | DISCHARGE
Start: 2023-04-17

## 2023-04-17 RX ADMIN — Medication 1 DROP(S): at 18:15

## 2023-04-17 RX ADMIN — Medication 125 MICROGRAM(S): at 06:40

## 2023-04-17 RX ADMIN — POLYETHYLENE GLYCOL 3350 17 GRAM(S): 17 POWDER, FOR SOLUTION ORAL at 11:14

## 2023-04-17 RX ADMIN — Medication 80 MILLIGRAM(S): at 18:16

## 2023-04-17 RX ADMIN — Medication 50 MILLIGRAM(S): at 06:40

## 2023-04-17 RX ADMIN — Medication 1 DROP(S): at 11:11

## 2023-04-17 RX ADMIN — MIDODRINE HYDROCHLORIDE 15 MILLIGRAM(S): 2.5 TABLET ORAL at 06:43

## 2023-04-17 RX ADMIN — MIDODRINE HYDROCHLORIDE 15 MILLIGRAM(S): 2.5 TABLET ORAL at 11:11

## 2023-04-17 RX ADMIN — Medication 50 MILLILITER(S): at 18:15

## 2023-04-17 RX ADMIN — PANTOPRAZOLE SODIUM 40 MILLIGRAM(S): 20 TABLET, DELAYED RELEASE ORAL at 06:40

## 2023-04-17 RX ADMIN — Medication 480 MICROGRAM(S): at 12:05

## 2023-04-17 RX ADMIN — CHLORHEXIDINE GLUCONATE 1 APPLICATION(S): 213 SOLUTION TOPICAL at 11:11

## 2023-04-17 RX ADMIN — Medication 1 DROP(S): at 06:43

## 2023-04-17 RX ADMIN — Medication 1 DROP(S): at 00:35

## 2023-04-17 RX ADMIN — MIDODRINE HYDROCHLORIDE 10 MILLIGRAM(S): 2.5 TABLET ORAL at 18:15

## 2023-04-17 NOTE — DIETITIAN INITIAL EVALUATION ADULT - PROBLEM SELECTOR PLAN 2
-reviewed prior hematology notes from 3/9/23, last received chemo 3/3  -pancytopenia largely at baseline although Hg 6.7 on arrival  -s/p 1U PRBC with good response   -c/w home acyclovir  -c/w home entecavir   -c/w home dexamethasone   -pt reports his Revlimid was held  -hematology consult in AM  -hold home aspirin

## 2023-04-17 NOTE — DIETITIAN INITIAL EVALUATION ADULT - CONTINUE CURRENT NUTRITION CARE PLAN
- fluid restrictions deferred to team  - monitor labs, add no concentrated K+ PRN (noted trending elevated)  - monitor BM and adjust diet as needed/ indicated; if diarrhea resumes consider low fiber diet  - Monitor tolerance, PO intake/yes

## 2023-04-17 NOTE — DIETITIAN INITIAL EVALUATION ADULT - NAME AND PHONE
Tiera Carrera RD CDN #[id number] or Teams (preferred)  Tiera Carrera RD CDN #[id number] or Teams (preferred)  Tiera Carrera RD CDN #[id number] or Teams (preferred)

## 2023-04-17 NOTE — DIETITIAN INITIAL EVALUATION ADULT - OTHER CALCULATIONS
Dosing wt 170 pounds used for calorie and protein needs calculations. Defer fluids for team, noted ordered for fluid restriction of 1.5L/day

## 2023-04-17 NOTE — PROGRESS NOTE ADULT - NS ATTEND OPT1 GEN_ALL_CORE

## 2023-04-17 NOTE — CONSULT NOTE ADULT - PROVIDER SPECIALTY LIST ADULT
Intervent Radiology
Neurology
Intervent Radiology
Ophthalmology
MICU
Vascular Surgery
Cardiology
Infectious Disease
Nephrology
Heme/Onc
Gastroenterology

## 2023-04-17 NOTE — PROGRESS NOTE ADULT - SUBJECTIVE AND OBJECTIVE BOX
Name of Patient : PETER DE LA PAZ  MRN: [id number]  Date of visit: 04-17-23 @ 12:16      Subjective: Patient seen and examined. No new events except as noted.   Patient seen earlier this AM. Sister at the bedside.   S/P 1 unit of platelets yesterday without improvement in platelet count. Planned for additional unit of platelets today.  Cr up-trending, checking renal US to assess for hydronephrosis   Pending ABD US due to elevated LFTs/ bilirubin      REVIEW OF SYSTEMS:  Limited ROS     MEDICATIONS:  MEDICATIONS  (STANDING):  acetaminophen     Tablet .. 650 milliGRAM(s) Oral once  artificial tears (preservative free) Ophthalmic Solution 1 Drop(s) Both EYES four times a day  chlorhexidine 2% Cloths 1 Application(s) Topical daily  digoxin     Tablet 125 MICROGram(s) Oral daily  methylPREDNISolone sodium succinate IVPB 500 milliGRAM(s) IV Intermittent daily  midodrine. 15 milliGRAM(s) Oral three times a day  pantoprazole    Tablet 40 milliGRAM(s) Oral before breakfast  polyethylene glycol 3350 17 Gram(s) Oral daily      PHYSICAL EXAM:  T(C): 37.1 (04-17-23 @ 11:13), Max: 37.1 (04-17-23 @ 11:13)  HR: 76 (04-17-23 @ 11:13) (60 - 76)  BP: 130/72 (04-17-23 @ 11:13) (121/74 - 133/71)  RR: 18 (04-17-23 @ 11:13) (18 - 20)  SpO2: 90% (04-17-23 @ 11:13) (90% - 95%)  Wt(kg): --  I&O's Summary    16 Apr 2023 07:01  -  17 Apr 2023 07:00  --------------------------------------------------------  IN: 0 mL / OUT: 550 mL / NET: -550 mL          Appearance: Awake, weak appearing male, frail, lying down in bed, Jaundiced, Icteric   HEENT:  Scleral icterus   Lymphatic: No lymphadenopathy   Cardiovascular: Normal    Respiratory: normal effort , clear  Gastrointestinal:  Soft, Non-tender to palpitation   Skin: Jaundiced   Psychiatry: Calm, weak   Musculoskeletal: +Pitting edema           04-16-23 @ 07:01  -  04-17-23 @ 07:00  --------------------------------------------------------  IN: 0 mL / OUT: 550 mL / NET: -550 mL                                  7.7    0.79  )-----------( 6        ( 17 Apr 2023 06:20 )             21.4               04-16    126<L>  |  92<L>  |  99<H>  ----------------------------<  308<H>  5.1   |  15<L>  |  3.85<H>    Ca    7.3<L>      16 Apr 2023 11:17    TPro  4.3<L>  /  Alb  2.6<L>  /  TBili  10.7<H>  /  DBili  x   /  AST  64<H>  /  ALT  86<H>  /  AlkPhos  300<H>  04-16                         Culture - Blood (04.12.23 @ 13:42)   Specimen Source: .Blood Blood  Culture Results: No growth to date.    Culture - Blood (04.12.23 @ 13:42)   Specimen Source: .Blood Blood  Culture Results: No growth to date.     Name of Patient : PETER DE LA PAZ  MRN: [id number]  Date of visit: 04-17-23 @ 12:16      Subjective: Patient seen and examined. No new events except as noted.   Patient seen earlier this AM. Sister at the bedside.   S/P 1 unit of platelets yesterday without improvement in platelet count. Planned for additional unit of platelets today.  Cr up-trending, checking renal US to assess for hydronephrosis   Pending ABD US due to elevated LFTs/ bilirubin      REVIEW OF SYSTEMS:  Limited ROS     MEDICATIONS:  MEDICATIONS  (STANDING):  acetaminophen     Tablet .. 650 milliGRAM(s) Oral once  artificial tears (preservative free) Ophthalmic Solution 1 Drop(s) Both EYES four times a day  chlorhexidine 2% Cloths 1 Application(s) Topical daily  digoxin     Tablet 125 MICROGram(s) Oral daily  methylPREDNISolone sodium succinate IVPB 500 milliGRAM(s) IV Intermittent daily  midodrine. 15 milliGRAM(s) Oral three times a day  pantoprazole    Tablet 40 milliGRAM(s) Oral before breakfast  polyethylene glycol 3350 17 Gram(s) Oral daily      PHYSICAL EXAM:  T(C): 37.1 (04-17-23 @ 11:13), Max: 37.1 (04-17-23 @ 11:13)  HR: 76 (04-17-23 @ 11:13) (60 - 76)  BP: 130/72 (04-17-23 @ 11:13) (121/74 - 133/71)  RR: 18 (04-17-23 @ 11:13) (18 - 20)  SpO2: 90% (04-17-23 @ 11:13) (90% - 95%)  Wt(kg): --  I&O's Summary    16 Apr 2023 07:01  -  17 Apr 2023 07:00  --------------------------------------------------------  IN: 0 mL / OUT: 550 mL / NET: -550 mL          Appearance: Awake, weak appearing male, frail, lying down in bed, Jaundiced, Icteric   HEENT:  Scleral icterus   Lymphatic: No lymphadenopathy   Cardiovascular: Normal    Respiratory: normal effort , clear  Gastrointestinal:  Soft, Non-tender to palpitation   Skin: Jaundiced   Psychiatry: Calm, weak   Musculoskeletal: +Pitting edema           04-16-23 @ 07:01  -  04-17-23 @ 07:00  --------------------------------------------------------  IN: 0 mL / OUT: 550 mL / NET: -550 mL                                  7.7    0.79  )-----------( 6        ( 17 Apr 2023 06:20 )             21.4               04-16    126<L>  |  92<L>  |  99<H>  ----------------------------<  308<H>  5.1   |  15<L>  |  3.85<H>    Ca    7.3<L>      16 Apr 2023 11:17    TPro  4.3<L>  /  Alb  2.6<L>  /  TBili  10.7<H>  /  DBili  x   /  AST  64<H>  /  ALT  86<H>  /  AlkPhos  300<H>  04-16                         Culture - Blood (04.12.23 @ 13:42)   Specimen Source: .Blood Blood  Culture Results: No growth to date.    Culture - Blood (04.12.23 @ 13:42)   Specimen Source: .Blood Blood  Culture Results: No growth to date.     Name of Patient : PETER DE LA PAZ  MRN: [id number]  Date of visit: 04-17-23 @ 12:16      Subjective: Patient seen and examined. No new events except as noted.   Patient seen earlier this AM. Sister at the bedside.   S/P 1 unit of platelets yesterday without improvement in platelet count. Planned for additional unit of platelets today.  Cr up-trending, checking renal US to assess for hydronephrosis   Pending ABD US due to elevated LFTs/ bilirubin      REVIEW OF SYSTEMS:  Limited ROS     MEDICATIONS:  MEDICATIONS  (STANDING):  acetaminophen     Tablet .. 650 milliGRAM(s) Oral once  artificial tears (preservative free) Ophthalmic Solution 1 Drop(s) Both EYES four times a day  chlorhexidine 2% Cloths 1 Application(s) Topical daily  digoxin     Tablet 125 MICROGram(s) Oral daily  methylPREDNISolone sodium succinate IVPB 500 milliGRAM(s) IV Intermittent daily  midodrine. 15 milliGRAM(s) Oral three times a day  pantoprazole    Tablet 40 milliGRAM(s) Oral before breakfast  polyethylene glycol 3350 17 Gram(s) Oral daily      PHYSICAL EXAM:  T(C): 37.1 (04-17-23 @ 11:13), Max: 37.1 (04-17-23 @ 11:13)  HR: 76 (04-17-23 @ 11:13) (60 - 76)  BP: 130/72 (04-17-23 @ 11:13) (121/74 - 133/71)  RR: 18 (04-17-23 @ 11:13) (18 - 20)  SpO2: 90% (04-17-23 @ 11:13) (90% - 95%)  Wt(kg): --  I&O's Summary    16 Apr 2023 07:01  -  17 Apr 2023 07:00  --------------------------------------------------------  IN: 0 mL / OUT: 550 mL / NET: -550 mL          Appearance: Awake, weak appearing male, frail, lying down in bed, Jaundiced, Icteric   HEENT:  Scleral icterus   Lymphatic: No lymphadenopathy   Cardiovascular: Normal    Respiratory: normal effort , clear  Gastrointestinal:  Soft, Non-tender to palpitation   Skin: Jaundiced   Psychiatry: Calm, weak   Musculoskeletal: +Pitting edema           04-16-23 @ 07:01  -  04-17-23 @ 07:00  --------------------------------------------------------  IN: 0 mL / OUT: 550 mL / NET: -550 mL                                  7.7    0.79  )-----------( 6        ( 17 Apr 2023 06:20 )             21.4               04-16    126<L>  |  92<L>  |  99<H>  ----------------------------<  308<H>  5.1   |  15<L>  |  3.85<H>    Ca    7.3<L>      16 Apr 2023 11:17    TPro  4.3<L>  /  Alb  2.6<L>  /  TBili  10.7<H>  /  DBili  x   /  AST  64<H>  /  ALT  86<H>  /  AlkPhos  300<H>  04-16                         Culture - Blood (04.12.23 @ 13:42)   Specimen Source: .Blood Blood  Culture Results: No growth to date.    Culture - Blood (04.12.23 @ 13:42)   Specimen Source: .Blood Blood  Culture Results: No growth to date.

## 2023-04-17 NOTE — CONSULT NOTE ADULT - CONSULT REASON
Cardiac eval
R soleal dvt
bmbx
pain behind eyes
HA
fever
Elevated Scr
diarrhea, positive FOBT
hypotension
MM
picc

## 2023-04-17 NOTE — PROGRESS NOTE ADULT - ASSESSMENT
PETER DE LA PAZ is a 67y Male who presents with a chief complaint of fever    Multiple Myeloma  ·	Patient follows with Dr. Jean Claude Hubbard, [facility].  ·	Last on daratumumab + CyBorD, last dose on March 23rd.  ·	Continue acyclovir.  ·	No systemic therapy while inpatient or during rehabilitation.    Pancytopenia  Possible HLH  ·	Patient with continued worsening pancytopenia; now severe leukopenia, anemia, thrombocytopenia.  ·	Bone marrow biopsy on April 6th preliminary showed minimal hematopoiesis but significant histiocytic proliferation and minimal hemophagocytosis.   ·	Multiorgan system failure with increasing creatinine and bilirubin. Ferritin remains extremely elevated. 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   ·	At this time, given continued fever, worsening cytopenia, worsening kidney and liver functions, elevated ferritin and IL-25 soluble receptor, will treat as if this is HLH  ·	Unclear etiology of HLH given lack of myeloma on marrow. Possibly infectious with EBV and CMV?  ·	Interventional radiology to place PICC line today. To continue high dose steroids. To start etoposide today/tomorrow at 75 mg [dose reduced by 75% given organ dysfunction]  ·	Continue filgrastim for neutropenia.  ·	Monitor CBC and transfuse to maintain HGB > 7 and PLT > 10.  ·	Maribavir has been stopped by infectious disease.     Fever  ·	Unclear etiology. Unlikely to be infectious in origin per infectious disease.  ·	Monitoring off antibiotics at this time.     Pending ammonium level; may need to start lactulose for hepatic encephalopathy.    Will continue to follow.  Prognosis is poor.    Darwin Ham MD  Hematology/Oncology  O: [phone]   PETER DE LA PAZ is a 67y Male who presents with a chief complaint of fever    Multiple Myeloma  ·	Patient follows with Dr. Jean Claude Hubbard, [facility].  ·	Last on daratumumab + CyBorD, last dose on March 23rd.  ·	Continue acyclovir.  ·	No systemic therapy while inpatient or during rehabilitation.    Pancytopenia  Possible HLH  ·	Patient with continued worsening pancytopenia; now severe leukopenia, anemia, thrombocytopenia.  ·	Bone marrow biopsy on April 6th preliminary showed minimal hematopoiesis but significant histiocytic proliferation and minimal hemophagocytosis.   ·	Multiorgan system failure with increasing creatinine and bilirubin. Ferritin remains extremely elevated. 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   ·	At this time, given continued fever, worsening cytopenia, worsening kidney and liver functions, elevated ferritin and IL-25 soluble receptor, will treat as if this is HLH  ·	Unclear etiology of HLH given lack of myeloma on marrow. Possibly infectious with EBV and CMV?  ·	Interventional radiology to place PICC line today. To continue high dose steroids. To start etoposide today/tomorrow at 75 mg [dose reduced by 75% given organ dysfunction]  ·	Continue filgrastim for neutropenia.  ·	Monitor CBC and transfuse to maintain HGB > 7 and PLT > 10.  ·	Maribavir has been stopped by infectious disease.     Fever  ·	Unclear etiology. Unlikely to be infectious in origin per infectious disease.  ·	Monitoring off antibiotics at this time.     Pending ammonium level; may need to start lactulose for hepatic encephalopathy.    Will continue to follow.  Prognosis is poor.    Darwin Ham MD  Hematology/Oncology  O: [phone]   PETER DE LA PAZ is a 67y Male who presents with a chief complaint of fever    Multiple Myeloma  ·	Patient follows with Dr. Jean Claude Hubbard, [facility].  ·	Last on daratumumab + CyBorD, last dose on March 23rd.  ·	Continue acyclovir.  ·	No systemic therapy while inpatient or during rehabilitation.    Pancytopenia  Possible HLH  ·	Patient with continued worsening pancytopenia; now severe leukopenia, anemia, thrombocytopenia.  ·	Bone marrow biopsy on April 6th preliminary showed minimal hematopoiesis but significant histiocytic proliferation and minimal hemophagocytosis.   ·	Multiorgan system failure with increasing creatinine and bilirubin. Ferritin remains extremely elevated. 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   ·	At this time, given continued fever, worsening cytopenia, worsening kidney and liver functions, elevated ferritin and IL-25 soluble receptor, will treat as if this is HLH  ·	Unclear etiology of HLH given lack of myeloma on marrow. Possibly infectious with EBV and CMV?  ·	Interventional radiology to place PICC line today. To continue high dose steroids. To start etoposide today/tomorrow at 75 mg [dose reduced by 75% given organ dysfunction]  ·	Continue filgrastim for neutropenia.  ·	Monitor CBC and transfuse to maintain HGB > 7 and PLT > 10.  ·	Maribavir has been stopped by infectious disease.     Fever  ·	Unclear etiology. Unlikely to be infectious in origin per infectious disease.  ·	Monitoring off antibiotics at this time.     Pending ammonium level; may need to start lactulose for hepatic encephalopathy.    Will continue to follow.  Prognosis is poor.    Darwin Ham MD  Hematology/Oncology  O: [phone]

## 2023-04-17 NOTE — PROGRESS NOTE ADULT - ASSESSMENT
67y Male with history of MM on chemotherapy presents with fevers. Nephrology consulted for elevated Scr.    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 Heme planning on starting Etoposide today? Patient educated that he may need RRT however would be high risk for bleeding for shiley placement given thrombocytopenia. Repeat UA with urine urea and urine creatinine. Repeat renal US with resolution of bilateral hydronephrosis s/p lloyd placement. TMA work up negative. Avoid nephrotoxins. Monitor electrolytes.    2) Hypotension: BP improving. Decrease midodrine to 10 mg PO TID. Monitor BP.    3) LE edema: Will give lasix 80 mg IV X 1 dose with concurrent IV albumin. Monitor UO.    4) Hyponatremia: Due to volume overload. Continue with 1.5L FR. IV lasix as above. Monitor serum Na.      [facility] NEPHROLOGY  Leoncio Leonard M.D.  Tay Brooke D.O.  Precious Chen M.D.  Nidia Stallings, MSN, ANP-C    Telephone: [phone]  Facsimile: [phone]    [address] [place] [address]   67y Male with history of MM on chemotherapy presents with fevers. Nephrology consulted for elevated Scr.    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 Heme planning on starting Etoposide today? Patient educated that he may need RRT however would be high risk for bleeding for shiley placement given thrombocytopenia. Repeat UA with urine urea and urine creatinine. Repeat renal US with resolution of bilateral hydronephrosis s/p lloyd placement. TMA work up negative. Avoid nephrotoxins. Monitor electrolytes.    2) Hypotension: BP improving. Decrease midodrine to 10 mg PO TID. Monitor BP.    3) LE edema: Will give lasix 80 mg IV X 1 dose with concurrent IV albumin. Monitor UO.    4) Hyponatremia: Due to volume overload. Continue with 1.5L FR. IV lasix as above. Monitor serum Na.      [facility] NEPHROLOGY  Leoncio Leonard M.D.  Tay Brooke D.O.  Precious Chen M.D.  Nidia Stallings, MSN, ANP-C    Telephone: [phone]  Facsimile: [phone]    [address] [place] [address]   67y Male with history of MM on chemotherapy presents with fevers. Nephrology consulted for elevated Scr.    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 Heme planning on starting Etoposide today? Patient educated that he may need RRT however would be high risk for bleeding for shiley placement given thrombocytopenia. Repeat UA with urine urea and urine creatinine. Repeat renal US with resolution of bilateral hydronephrosis s/p lloyd placement. TMA work up negative. Avoid nephrotoxins. Monitor electrolytes.    2) Hypotension: BP improving. Decrease midodrine to 10 mg PO TID. Monitor BP.    3) LE edema: Will give lasix 80 mg IV X 1 dose with concurrent IV albumin. Monitor UO.    4) Hyponatremia: Due to volume overload. Continue with 1.5L FR. IV lasix as above. Monitor serum Na.      [facility] NEPHROLOGY  Leoncio Leonard M.D.  Tay Brooke D.O.  Precious Chen M.D.  Nidia Stallings, MSN, ANP-C    Telephone: [phone]  Facsimile: [phone]    [address] [place] [address]

## 2023-04-17 NOTE — PROGRESS NOTE ADULT - ASSESSMENT
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    Fever of unknown origin.   - CXR without consolidations or effusions; U/A unremarkable; GI PCR Neg   - Cultures remain negative to date   - LE VA duplex --> + For DVT;  V/Q scan -- Low probability   - Was on empiric treatment with Vanc/cefepime --> ID consulted; S/P Zosyn  - Hold home penicillin while on ABX   - ID to follow up, infectious work up as per ID  --> CMV PCR -- + S/P Ganciclovir per ID , CMV IGG (+) and IgM (-), Cryptococcal Ag --Neg, Quantiferon Tb -- Neg, Fungitell -- <31   - CT Chest non-contrast, noted, no acute pathology  - Shock, RRT, resume antibiotics IV fluid, MICU eval , ID follow up  --> Prednisone 50 PO Qd   - S/P Zosyn; Defer ABX as per ID   - IR eval for BM BX as per Heme/Onc --> done on 04/06 with IR, f/u path -- As noted; F/u heme/Onc recs   - ID ordered pan CT, noted questionable cystitis. UA is neg.  - Febrile 04/10 and 04/11 AM. Repeat Cx Neg final; Cefepime now on hold in view of LFTs; F/u ID recs  - RVP 04/10 Neg, herpes 6 Neg   - Recurrent fever; F/u 04/12 BCx2 NGTD; F/u final   - Entecavir on hold as per Heme/Onc   - Ammonia level WNL, Lactate down-trending   - Maribavir on hold as per ID  - F/u ID recs    Multi organ failure  - GI to follow L[facility], trending up --> Pending ABD US   - Started on high dose steroids, continue   - Awaiting transfer to [facility] [place], [place] for PET Scan, further work up    - May obtain Pan CT in view of worsening renal function and LFTs --> F/u GI recs     Pain behind eyes, Generalized weakness, lethargy, twitch   - No gross deficits on exam   - Pending CT Head and Orbits -- CT H neg for hemorrhage, 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 Transfuse for Hgb < 7.0, and platelets < 10.K  - GI eval appreciated; F/u recs --> No plans for scope at this time   - Was on Xarelto, held due to drop in Platelets as per Heme/Onc. Monitor platelet count   - S/P 1 unit PRBCs and 1 unit Platelets 04/04, 04/05   - HIT ab: neg  - ASA on hold  - S/ P multiple platelet transfusion/ PRBCs transfusion    - Heme/Onc obtaining further work up   - Planned for 1 unit of platelets 04/17     Afib  - Unable to be on AC 2/2 low platelets  - On Digoxin   - Monitor on tele   - Cardio follow up     Multiple myeloma.   - pancytopenia largely at baseline although Hg 6.7 on arrival; s/p 1U PRBC with good response   - Was on acyclovir, / Ganciclovir as per ID   - C/w home entecavir   - S/P dexamethasone, D/C'd by hematology, follow up outpatient after discharge  --> S/P Prednisone   - Pt reports his Revlimid was held  - Heme/Onc consulted; F/u recs   - Resume home aspirin. --> Now on hold   - s/p IR BM Bx  04/06; Atypical histiocytic infiltrate with few EBV positive cells; F/u Heme/Onc recs   - S/P  IVIG 04/13 as per Heme/Onc   - IR eval for Bone Lesion Biopsy per Heme/Onc --> Arrangements for PET scan attempting to be made; Patient too unstable to be off of floor. Attempting for possible transfer to [facility] [place], [place]; Appreciate Heme/Onc      - started on high dose solumedrol, discussed with oncology, continue   - low threshold for ICU eval   - PICC line placed for possible chemotherapy as per Heme/onc, [place]ed [place] PICC [place] at [phone]       Chronic diarrhea, now with Constipation   - Standing Imodium switched to PRN  - Monitor for BM - reports having BM     DVT   - Duplex + For R soleal vein DVT  --> Will consider CT A chest once creatinine permits as patient received contrast; Monitor for LOPEZ   - s/p Hep gtt; Monitor PTT; Monitor H/H closely --> Now on hold in view of drop in Hgb and severe thrombocytopenia  - VQ scan to R/O PE -- Low probability   - Vascular eval appreciated; AC as tolerated, on Xarelto 10, monitor H/H, plts too low to start.   - Serial Duplex to assess for propagation  -- Without propagation   - 04/03 Duplex without propagation   - Repeat Duplex 04/10 -- without propogation   - Plan for repeat Duplex 04/17   - given severe thrombocytopenia, holding Xarelto for now. Risk of bleeding greater than benefit.     Elevated LFTs  - Cont to monitor and trend  - Lipitor DC'd. Entecavir DC'd  - Likely drug induced vs from hypotension; ABX and Antiviral's DC'd. Continue to monitor and trend levels closely   - Monitor levels   - Pending ABD US     RK  - S/P Contrast on 03/23   - Monitor Cr closely; Avoid nephrotoxic agents   - Cr down-trending. s/p IVF   - Renal eval appreciated   - urinary retention s/p lloyd.   - Removed lloyd 4/7/23, TOV in progress. post void residual 550ml on bladder scan  - s/p straight cath. may need to replace lloyd if unable to urinarte.   - CT with hydronephrosis; Cr up-trending, Flomax started; F/u renal recs    - Renal US neg for hydronephrosis 04/12   - Cr up-trending; S/P IVF  CC/ Hr. Check bladder scan to R/O retention   - Check Renal US to assess for hydronephrosis     Hypertension, now hypotensive   - C/w home metoprolol.  - C/w Midodrine TID. Hold for SBP > 140;  Increase to 15 TID     HypoNa  - Cont to monitor and trend  - Appreciate renal eval.   - Serial labs   - Na down-trending, F/u renal recs     Hiccups  - Improved on Reglan, Monitor     Hyperlipidemia.   - C/w home atorvastatin --> on hold as per GI .    Abnormal CT  - Outpatient follow up for CT findings    Prophylactic measure.   dvt ppx: DVT PPX   diet: regular  ambulate: with assistance    fall precautions  aspiration precautions.       Discussed with Sister at the bedside. Discussed with Heme/Onc and ACP.  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    Fever of unknown origin.   - CXR without consolidations or effusions; U/A unremarkable; GI PCR Neg   - Cultures remain negative to date   - LE VA duplex --> + For DVT;  V/Q scan -- Low probability   - Was on empiric treatment with Vanc/cefepime --> ID consulted; S/P Zosyn  - Hold home penicillin while on ABX   - ID to follow up, infectious work up as per ID  --> CMV PCR -- + S/P Ganciclovir per ID , CMV IGG (+) and IgM (-), Cryptococcal Ag --Neg, Quantiferon Tb -- Neg, Fungitell -- <31   - CT Chest non-contrast, noted, no acute pathology  - Shock, RRT, resume antibiotics IV fluid, MICU eval , ID follow up  --> Prednisone 50 PO Qd   - S/P Zosyn; Defer ABX as per ID   - IR eval for BM BX as per Heme/Onc --> done on 04/06 with IR, f/u path -- As noted; F/u heme/Onc recs   - ID ordered pan CT, noted questionable cystitis. UA is neg.  - Febrile 04/10 and 04/11 AM. Repeat Cx Neg final; Cefepime now on hold in view of LFTs; F/u ID recs  - RVP 04/10 Neg, herpes 6 Neg   - Recurrent fever; F/u 04/12 BCx2 NGTD; F/u final   - Entecavir on hold as per Heme/Onc   - Ammonia level WNL, Lactate down-trending   - Maribavir on hold as per ID  - F/u ID recs    Multi organ failure  - GI to follow [facility], trending up --> Pending ABD US   - Started on high dose steroids, continue   - Await[place] [facility] [place], further work up    - May obtain Pan CT in view of worsening renal function and LFTs --> F/u GI recs     Pain behind eyes, Generalized weakness, lethargy, twitch   - No gross deficits on exam   - Pending CT Head and Orbits -- CT H neg for hemorrhage, 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 Transfuse for Hgb < 7.0, and platelets < 10.K  - GI eval appreciated; F/u recs --> No plans for scope at this time   - Was on Xarelto, held due to drop in Platelets as per Heme/Onc. Monitor platelet count   - S/P 1 unit PRBCs and 1 unit Platelets 04/04, 04/05   - HIT ab: neg  - ASA on hold  - S/ P multiple platelet transfusion/ PRBCs transfusion    - Heme/Onc obtaining further work up   - Planned for 1 unit of platelets 04/17     Afib  - Unable to be on AC 2/2 low platelets  - On Digoxin   - Monitor on tele   - Cardio follow up     Multiple myeloma.   - pancytopenia largely at baseline although Hg 6.7 on arrival; s/p 1U PRBC with good response   - Was on acyclovir, / Ganciclovir as per ID   - C/w home entecavir   - S/P dexamethasone, D/C'd by hematology, follow up outpatient after discharge  --> S/P Prednisone   - Pt reports his Revlimid was held  - Heme/Onc consulted; F/u recs   - Resume home aspirin. --> Now on hold   - s/p IR BM Bx  04/06; Atypical histiocytic infiltrate with few EBV positive cells; F/u Heme/Onc recs   - S/P  IVIG 04/13 as per Heme/Onc   - IR eval for Bone Lesion Biopsy per Heme/Onc --> Arrangements for PET scan attempting to be made; Patient too unstable to be off of floor. Attempting for possible [place] [facility] [place]; Appreciate Heme/Onc      - started on high dose solumedrol, discussed with oncology, continue   - low threshold for ICU eval   - PICC line placed for possible chemotherapy as per Heme/onc, [place]ed [place]ICC [place] at [phone]       Chronic diarrhea, now with Constipation   - Standing Imodium switched to PRN  - Monitor for BM - reports having BM     DVT   - Duplex + For R soleal vein DVT  --> Will consider CT A chest once creatinine permits as patient received contrast; Monitor for LOPEZ   - s/p Hep gtt; Monitor PTT; Monitor H/H closely --> Now on hold in view of drop in Hgb and severe thrombocytopenia  - VQ scan to R/O PE -- Low probability   - Vascular eval appreciated; AC as tolerated, on Xarelto 10, monitor H/H, plts too low to start.   - Serial Duplex to assess for propagation  -- Without propagation   - 04/03 Duplex without propagation   - Repeat Duplex 04/10 -- without propogation   - Plan for repeat Duplex 04/17   - given severe thrombocytopenia, holding Xarelto for now. Risk of bleeding greater than benefit.     Elevated LFTs  - Cont to monitor and trend  - Lipitor DC'd. Entecavir DC'd  - Likely drug induced vs from hypotension; ABX and Antiviral's DC'd. Continue to monitor and trend levels closely   - Monitor levels   - Pending ABD US     RK  - S/P Contrast on 03/23   - Monitor Cr closely; Avoid nephrotoxic agents   - Cr down-trending. s/p IVF   - Renal eval appreciated   - urinary retention s/p lloyd.   - Removed lloyd 4/7/23, TOV in progress. post void residual 550ml on bladder scan  - s/p straight cath. may need to replace lloyd if unable to urinarte.   - CT with hydronephrosis; Cr up-trending, Flomax started; F/u renal recs    - Renal US neg for hydronephrosis 04/12   - Cr up-trending; S/P IVF  CC/ Hr. Check bladder scan to R/O retention   - Check Renal US to assess for hydronephrosis     Hypertension, now hypotensive   - C/w home metoprolol.  - C/w Midodrine TID. Hold for SBP > 140;  Increase to 15 TID     HypoNa  - Cont to monitor and trend  - Appreciate renal eval.   - Serial labs   - Na down-trending, F/u renal recs     Hiccups  - Improved on Reglan, Monitor     Hyperlipidemia.   - C/w home atorvastatin --> on hold as per GI .    Abnormal CT  - Outpatient follow up for CT findings    Prophylactic measure.   dvt ppx: DVT PPX   diet: regular  ambulate: with assistance    fall precautions  aspiration precautions.       Discussed with Sister at the bedside. Discussed with Heme/Onc and ACP.  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    Fever of unknown origin.   - CXR without consolidations or effusions; U/A unremarkable; GI PCR Neg   - Cultures remain negative to date   - LE VA duplex --> + For DVT;  V/Q scan -- Low probability   - Was on empiric treatment with Vanc/cefepime --> ID consulted; S/P Zosyn  - Hold home penicillin while on ABX   - ID to follow up, infectious work up as per ID  --> CMV PCR -- + S/P Ganciclovir per ID , CMV IGG (+) and IgM (-), Cryptococcal Ag --Neg, Quantiferon Tb -- Neg, Fungitell -- <31   - CT Chest non-contrast, noted, no acute pathology  - Shock, RRT, resume antibiotics IV fluid, MICU eval , ID follow up  --> Prednisone 50 PO Qd   - S/P Zosyn; Defer ABX as per ID   - IR eval for BM BX as per Heme/Onc --> done on 04/06 with IR, f/u path -- As noted; F/u heme/Onc recs   - ID ordered pan CT, noted questionable cystitis. UA is neg.  - Febrile 04/10 and 04/11 AM. Repeat Cx Neg final; Cefepime now on hold in view of LFTs; F/u ID recs  - RVP 04/10 Neg, herpes 6 Neg   - Recurrent fever; F/u 04/12 BCx2 NGTD; F/u final   - Entecavir on hold as per Heme/Onc   - Ammonia level WNL, Lactate down-trending   - Maribavir on hold as per ID  - F/u ID recs    Multi organ failure  - GI to follow Lfts, trending up --> Pending ABD US   - Started on high dose steroids, continue   - Awaiting transfer to [facility] [place] for PET Scan, further work up    - May obtain Pan CT in view of worsening renal function and LFTs --> F/u GI recs     Pain behind eyes, Generalized weakness, lethargy, twitch   - No gross deficits on exam   - Pending CT Head and Orbits -- CT H neg for hemorrhage, 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 Transfuse for Hgb < 7.0, and platelets < 10.K  - GI eval appreciated; F/u recs --> No plans for scope at this time   - Was on Xarelto, held due to drop in Platelets as per Heme/Onc. Monitor platelet count   - S/P 1 unit PRBCs and 1 unit Platelets 04/04, 04/05   - HIT ab: neg  - ASA on hold  - S/ P multiple platelet transfusion/ PRBCs transfusion    - Heme/Onc obtaining further work up   - Planned for 1 unit of platelets 04/17     Afib  - Unable to be on AC 2/2 low platelets  - On Digoxin   - Monitor on tele   - Cardio follow up     Multiple myeloma.   - pancytopenia largely at baseline although Hg 6.7 on arrival; s/p 1U PRBC with good response   - Was on acyclovir, / Ganciclovir as per ID   - C/w home entecavir   - S/P dexamethasone, D/C'd by hematology, follow up outpatient after discharge  --> S/P Prednisone   - Pt reports his Revlimid was held  - Heme/Onc consulted; F/u recs   - Resume home aspirin. --> Now on hold   - s/p IR BM Bx  04/06; Atypical histiocytic infiltrate with few EBV positive cells; F/u Heme/Onc recs   - S/P  IVIG 04/13 as per Heme/Onc   - IR eval for Bone Lesion Biopsy per Heme/Onc --> Arrangements for PET scan attempting to be made; Patient too unstable to be off of floor. Attempting for possible transfer to [facility] [place]; Appreciate Heme/Onc      - started on high dose solumedrol, discussed with oncology, continue   - low threshold for ICU eval   - PICC line placed for possible chemotherapy as per Heme/onc, D[place]ed [place] at [phone]       Chronic diarrhea, now with Constipation   - Standing Imodium switched to PRN  - Monitor for BM - reports having BM     DVT   - Duplex + For R soleal vein DVT  --> Will consider CT A chest once creatinine permits as patient received contrast; Monitor for LOPEZ   - s/p Hep gtt; Monitor PTT; Monitor H/H closely --> Now on hold in view of drop in Hgb and severe thrombocytopenia  - VQ scan to R/O PE -- Low probability   - Vascular eval appreciated; AC as tolerated, on Xarelto 10, monitor H/H, plts too low to start.   - Serial Duplex to assess for propagation  -- Without propagation   - 04/03 Duplex without propagation   - Repeat Duplex 04/10 -- without propogation   - Plan for repeat Duplex 04/17   - given severe thrombocytopenia, holding Xarelto for now. Risk of bleeding greater than benefit.     Elevated LFTs  - Cont to monitor and trend  - Lipitor DC'd. Entecavir DC'd  - Likely drug induced vs from hypotension; ABX and Antiviral's DC'd. Continue to monitor and trend levels closely   - Monitor levels   - Pending ABD US     RK  - S/P Contrast on 03/23   - Monitor Cr closely; Avoid nephrotoxic agents   - Cr down-trending. s/p IVF   - Renal eval appreciated   - urinary retention s/p lloyd.   - Removed lloyd 4/7/23, TOV in progress. post void residual 550ml on bladder scan  - s/p straight cath. may need to replace lloyd if unable to urinarte.   - CT with hydronephrosis; Cr up-trending, Flomax started; F/u renal recs    - Renal US neg for hydronephrosis 04/12   - Cr up-trending; S/P IVF  CC/ Hr. Check bladder scan to R/O retention   - Check Renal US to assess for hydronephrosis     Hypertension, now hypotensive   - C/w home metoprolol.  - C/w Midodrine TID. Hold for SBP > 140;  Increase to 15 TID     HypoNa  - Cont to monitor and trend  - Appreciate renal eval.   - Serial labs   - Na down-trending, F/u renal recs     Hiccups  - Improved on Reglan, Monitor     Hyperlipidemia.   - C/w home atorvastatin --> on hold as per GI .    Abnormal CT  - Outpatient follow up for CT findings    Prophylactic measure.   dvt ppx: DVT PPX   diet: regular  ambulate: with assistance    fall precautions  aspiration precautions.       Discussed with Sister at the bedside. Discussed with Heme/Onc and ACP.  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    Fever of unknown origin.   - CXR without consolidations or effusions; U/A unremarkable; GI PCR Neg   - Cultures remain negative to date   - LE VA duplex --> + For DVT;  V/Q scan -- Low probability   - Was on empiric treatment with Vanc/cefepime --> ID consulted; S/P Zosyn  - Hold home penicillin while on ABX   - ID to follow up, infectious work up as per ID  --> CMV PCR -- + S/P Ganciclovir per ID , CMV IGG (+) and IgM (-), Cryptococcal Ag --Neg, Quantiferon Tb -- Neg, Fungitell -- <31   - CT Chest non-contrast, noted, no acute pathology  - Shock, RRT, resume antibiotics IV fluid, MICU eval , ID follow up  --> Prednisone 50 PO Qd   - S/P Zosyn; Defer ABX as per ID   - IR eval for BM BX as per Heme/Onc --> done on 04/06 with IR, f/u path -- As noted; F/u heme/Onc recs   - ID ordered pan CT, noted questionable cystitis. UA is neg.  - Febrile 04/10 and 04/11 AM. Repeat Cx Neg final; Cefepime now on hold in view of LFTs; F/u ID recs  - RVP 04/10 Neg, herpes 6 Neg   - Recurrent fever; F/u 04/12 BCx2 NGTD; F/u final   - Entecavir on hold as per Heme/Onc   - Ammonia level WNL, Lactate down-trending   - Maribavir on hold as per ID  - F/u ID recs    Multi organ failure  - GI to follow [facility], trending up --> Pending ABD US   - Started on high dose steroids, continue   - Awaiting transfer to [facility] [place] for PET Scan, further work up    - May obtain Pan CT in view of worsening renal function and LFTs --> F/u GI recs     Pain behind eyes, Generalized weakness, lethargy, twitch   - No gross deficits on exam   - Pending CT Head and Orbits -- CT H neg for hemorrhage, 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 Transfuse for Hgb < 7.0, and platelets < 10.K  - GI eval appreciated; F/u recs --> No plans for scope at this time   - Was on Xarelto, held due to drop in Platelets as per Heme/Onc. Monitor platelet count   - S/P 1 unit PRBCs and 1 unit Platelets 04/04, 04/05   - HIT ab: neg  - ASA on hold  - S/ P multiple platelet transfusion/ PRBCs transfusion    - Heme/Onc obtaining further work up   - Planned for 2 unit of platelets 04/17 in view of PICC Line and Thrombocytopenia     Afib  - Unable to be on AC 2/2 low platelets  - On Digoxin   - Monitor on tele   - Cardio follow up     Multiple myeloma.   - pancytopenia largely at baseline although Hg 6.7 on arrival; s/p 1U PRBC with good response   - Was on acyclovir, / Ganciclovir as per ID   - C/w home entecavir   - S/P dexamethasone, D/C'd by hematology, follow up outpatient after discharge  --> S/P Prednisone   - Pt reports his Revlimid was held  - Heme/Onc consulted; F/u recs   - Resume home aspirin. --> Now on hold   - s/p IR BM Bx  04/06; Atypical histiocytic infiltrate with few EBV positive cells; F/u Heme/Onc recs   - S/P  IVIG 04/13 as per Heme/Onc   - IR eval for Bone Lesion Biopsy per Heme/Onc --> Arrangements for PET scan attempting to be made; Patient too unstable to be off of floor. Attempting for possible transfer to [facility] [place]; Appreciate Heme/Onc      - started on high dose solumedrol, discussed with oncology, continue   - low threshold for ICU eval   - PICC line placed for possible chemotherapy as per Heme/onc, Discussed with PICC line team, planned for IR PICC Placement with 2 units Platelets prior to PICC placement        Chronic diarrhea, now with Constipation   - Standing Imodium switched to PRN  - Monitor for BM - reports having BM     DVT   - Duplex + For R soleal vein DVT  --> Will consider CT A chest once creatinine permits as patient received contrast; Monitor for LOPEZ   - s/p Hep gtt; Monitor PTT; Monitor H/H closely --> Now on hold in view of drop in Hgb and severe thrombocytopenia  - VQ scan to R/O PE -- Low probability   - Vascular eval appreciated; AC as tolerated, on Xarelto 10, monitor H/H, plts too low to start.   - Serial Duplex to assess for propagation  -- Without propagation   - 04/03 Duplex without propagation   - Repeat Duplex 04/10 -- without propogation   - Plan for repeat Duplex 04/17   - given severe thrombocytopenia, holding Xarelto for now. Risk of bleeding greater than benefit.     Elevated LFTs  - Cont to monitor and trend  - Lipitor DC'd. Entecavir DC'd  - Likely drug induced vs from hypotension; ABX and Antiviral's DC'd. Continue to monitor and trend levels closely   - Monitor levels   - Pending ABD US     RK  - S/P Contrast on 03/23   - Monitor Cr closely; Avoid nephrotoxic agents   - Cr down-trending. s/p IVF   - Renal eval appreciated   - urinary retention s/p lloyd.   - Removed lloyd 4/7/23, TOV in progress. post void residual 550ml on bladder scan  - s/p straight cath. may need to replace lloyd if unable to urinarte.   - CT with hydronephrosis; Cr up-trending, Flomax started; F/u renal recs    - Renal US neg for hydronephrosis 04/12   - Cr up-trending; S/P IVF  CC/ Hr. Check bladder scan to R/O retention   - Check Renal US to assess for hydronephrosis     Hypertension, now hypotensive   - C/w home metoprolol.  - C/w Midodrine TID. Hold for SBP > 140;  Increase to 15 TID     HypoNa  - Cont to monitor and trend  - Appreciate renal eval.   - Serial labs   - Na down-trending, F/u renal recs     Hiccups  - Improved on Reglan, Monitor     Hyperlipidemia.   - C/w home atorvastatin --> on hold as per GI .    Abnormal CT  - Outpatient follow up for CT findings    Prophylactic measure.   dvt ppx: DVT PPX   diet: regular  ambulate: with assistance    fall precautions  aspiration precautions.       Discussed with Sister at the bedside. Discussed with Jason/Onc and ACP.  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    Fever of unknown origin.   - CXR without consolidations or effusions; U/A unremarkable; GI PCR Neg   - Cultures remain negative to date   - LE [facility] duplex --> + For DVT;  V/Q scan -- Low probability   - Was on empiric treatment with Vanc/cefepime --> ID consulted; S/P Zosyn  - Hold home penicillin while on ABX   - ID to follow up, infectious work up as per ID  --> CMV PCR -- + S/P Ganciclovir per ID , CMV IGG (+) and IgM (-), Cryptococcal Ag --Neg, Quantiferon Tb -- Neg, Fungitell -- <31   - CT Chest non-contrast, noted, no acute pathology  - Shock, RRT, resume antibiotics IV fluid, MICU eval , ID follow up  --> Prednisone 50 PO Qd   - S/P Zosyn; Defer ABX as per ID   - IR eval for BM BX as per Heme/Onc --> done on 04/06 with IR, f/u path -- As noted; F/u heme/Onc recs   - ID ordered pan CT, noted questionable cystitis. UA is neg.  - Febrile 04/10 and 04/11 AM. Repeat Cx Neg final; Cefepime now on hold in view of LFTs; F/u ID recs  - RVP 04/10 Neg, herpes 6 Neg   - Recurrent fever; F/u 04/12 BCx2 NGTD; F/u final   - Entecavir on hold as per Heme/Onc   - Ammonia level WNL, Lactate down-trending   - Maribavir on hold as per ID  - F/u ID recs    Multi organ failure  - GI to follow Lfts, trending up --> Pending ABD US   - Started on high dose steroids, continue   - Awaiting transfer to [facility] [place], further work up    - May obtain Pan CT in view of worsening renal function and LFTs --> F/u GI recs     Pain behind eyes, Generalized weakness, lethargy, twitch   - No gross deficits on exam   - Pending CT Head and Orbits -- CT H neg for hemorrhage, 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 Transfuse for Hgb < 7.0, and platelets < 10.K  - GI eval appreciated; F/u recs --> No plans for scope at this time   - Was on Xarelto, held due to drop in Platelets as per Heme/Onc. Monitor platelet count   - S/P 1 unit PRBCs and 1 unit Platelets 04/04, 04/05   - HIT ab: neg  - ASA on hold  - S/ P multiple platelet transfusion/ PRBCs transfusion    - Heme/Onc obtaining further work up   - Planned for 2 unit of platelets 04/17 in view of PICC Line and Thrombocytopenia     Afib  - Unable to be on AC 2/2 low platelets  - On Digoxin   - Monitor on tele   - Cardio follow up     Multiple myeloma.   - pancytopenia largely at baseline although Hg 6.7 on arrival; s/p 1U PRBC with good response   - Was on acyclovir, / Ganciclovir as per ID   - C/w home entecavir   - S/P dexamethasone, D/C'd by hematology, follow up outpatient after discharge  --> S/P Prednisone   - Pt reports his Revlimid was held  - Heme/Onc consulted; F/u recs   - Resume home aspirin. --> Now on hold   - s/p IR BM Bx  04/06; Atypical histiocytic infiltrate with few EBV positive cells; F/u Heme/Onc recs   - S/P  IVIG 04/13 as per Heme/Onc   - IR eval for Bone Lesion Biopsy per Heme/Onc --> Arrangements for PET scan attempting to be made; Patient too unstable to be off of floor. Attempting for possible transfer to [facility] [place]; Appreciate Heme/Onc      - started on high dose solumedrol, discussed with oncology, continue   - low threshold for ICU eval   - PICC line placed for possible chemotherapy as per Heme/onc, Discussed with PICC line team, planned for IR PICC Placement with 2 units Platelets prior to PICC placement        Chronic diarrhea, now with Constipation   - Standing Imodium switched to PRN  - Monitor for BM - reports having BM     DVT   - Duplex + For R soleal vein DVT  --> Will consider CT A chest once creatinine permits as patient received contrast; Monitor for LOPEZ   - s/p Hep gtt; Monitor PTT; Monitor H/H closely --> Now on hold in view of drop in Hgb and severe thrombocytopenia  - VQ scan to R/O PE -- Low probability   - Vascular eval appreciated; AC as tolerated, on Xarelto 10, monitor H/H, plts too low to start.   - Serial Duplex to assess for propagation  -- Without propagation   - 04/03 Duplex without propagation   - Repeat Duplex 04/10 -- without propogation   - Plan for repeat Duplex 04/17   - given severe thrombocytopenia, holding Xarelto for now. Risk of bleeding greater than benefit.     Elevated LFTs  - Cont to monitor and trend  - Lipitor DC'd. Entecavir DC'd  - Likely drug induced vs from hypotension; ABX and Antiviral's DC'd. Continue to monitor and trend levels closely   - Monitor levels   - Pending ABD US     RK  - S/P Contrast on 03/23   - Monitor Cr closely; Avoid nephrotoxic agents   - Cr down-trending. s/p IVF   - Renal eval appreciated   - urinary retention s/p lloyd.   - Removed lloyd 4/7/23, TOV in progress. post void residual 550ml on bladder scan  - s/p straight cath. may need to replace lloyd if unable to urinarte.   - CT with hydronephrosis; Cr up-trending, Flomax started; F/u renal recs    - Renal US neg for hydronephrosis 04/12   - Cr up-trending; S/P IVF  CC/ Hr. Check bladder scan to R/O retention   - Check Renal US to assess for hydronephrosis     Hypertension, now hypotensive   - C/w home metoprolol.  - C/w Midodrine TID. Hold for SBP > 140;  Increase to 15 TID     HypoNa  - Cont to monitor and trend  - Appreciate renal eval.   - Serial labs   - Na down-trending, F/u renal recs     Hiccups  - Improved on Reglan, Monitor     Hyperlipidemia.   - C/w home atorvastatin --> on hold as per GI .    Abnormal CT  - Outpatient follow up for CT findings    Prophylactic measure.   dvt ppx: DVT PPX   diet: regular  ambulate: with assistance    fall precautions  aspiration precautions.       Discussed with Sister at the bedside. Discussed with Jason/Onc and ACP.  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    Fever of unknown origin.   - CXR without consolidations or effusions; U/A unremarkable; GI PCR Neg   - Cultures remain negative to date   - LE VA duplex --> + For DVT;  V/Q scan -- Low probability   - Was on empiric treatment with Vanc/cefepime --> ID consulted; S/P Zosyn  - Hold home penicillin while on ABX   - ID to follow up, infectious work up as per ID  --> CMV PCR -- + S/P Ganciclovir per ID , CMV IGG (+) and IgM (-), Cryptococcal Ag --Neg, Quantiferon Tb -- Neg, Fungitell -- <31   - CT Chest non-contrast, noted, no acute pathology  - Shock, RRT, resume antibiotics IV fluid, MICU eval , ID follow up  --> Prednisone 50 PO Qd   - S/P Zosyn; Defer ABX as per ID   - IR eval for BM BX as per Heme/Onc --> done on 04/06 with IR, f/u path -- As noted; F/u heme/Onc recs   - ID ordered pan CT, noted questionable cystitis. UA is neg.  - Febrile 04/10 and 04/11 AM. Repeat Cx Neg final; Cefepime now on hold in view of LFTs; F/u ID recs  - RVP 04/10 Neg, herpes 6 Neg   - Recurrent fever; F/u 04/12 BCx2 NGTD; F/u final   - Entecavir on hold as per Heme/Onc   - Ammonia level WNL, Lactate down-trending   - Maribavir on hold as per ID  - F/u ID recs    Multi organ failure  - GI to follow Lfts, trending up --> Pending ABD US   - Started on high dose steroids, continue   - Awaiting transfer to [facility] [place] for PET Scan, further work up    - May obtain Pan CT in view of worsening renal function and LFTs --> F/u GI recs     Pain behind eyes, Generalized weakness, lethargy, twitch   - No gross deficits on exam   - Pending CT Head and Orbits -- CT H neg for hemorrhage, 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 Transfuse for Hgb < 7.0, and platelets < 10.K  - GI eval appreciated; F/u recs --> No plans for scope at this time   - Was on Xarelto, held due to drop in Platelets as per Heme/Onc. Monitor platelet count   - S/P 1 unit PRBCs and 1 unit Platelets 04/04, 04/05   - HIT ab: neg  - ASA on hold  - S/ P multiple platelet transfusion/ PRBCs transfusion    - Heme/Onc obtaining further work up   - Planned for 2 unit of platelets 04/17 in view of PICC Line and Thrombocytopenia     Afib  - Unable to be on AC 2/2 low platelets  - On Digoxin   - Monitor on tele   - Cardio follow up     Multiple myeloma.   - pancytopenia largely at baseline although Hg 6.7 on arrival; s/p 1U PRBC with good response   - Was on acyclovir, / Ganciclovir as per ID   - C/w home entecavir   - S/P dexamethasone, D/C'd by hematology, follow up outpatient after discharge  --> S/P Prednisone   - Pt reports his Revlimid was held  - Heme/Onc consulted; F/u recs   - Resume home aspirin. --> Now on hold   - s/p IR BM Bx  04/06; Atypical histiocytic infiltrate with few EBV positive cells; F/u Heme/Onc recs   - S/P  IVIG 04/13 as per Heme/Onc   - IR eval for Bone Lesion Biopsy per Heme/Onc --> Arrangements for PET scan attempting to be made; Patient too unstable to be off of floor. Attempting for possible transfer to [facility] [place]; Appreciate Heme/Onc      - started on high dose solumedrol, discussed with oncology, continue   - low threshold for ICU eval   - PICC line placed for possible chemotherapy as per Heme/onc, Discussed with PICC line team, planned for IR PICC Placement with 2 units Platelets prior to PICC placement        Chronic diarrhea, now with Constipation   - Standing Imodium switched to PRN  - Monitor for BM - reports having BM     DVT   - Duplex + For R soleal vein DVT  --> Will consider CT A chest once creatinine permits as patient received contrast; Monitor for LOPEZ   - s/p Hep gtt; Monitor PTT; Monitor H/H closely --> Now on hold in view of drop in Hgb and severe thrombocytopenia  - VQ scan to R/O PE -- Low probability   - Vascular eval appreciated; AC as tolerated, on Xarelto 10, monitor H/H, plts too low to start.   - Serial Duplex to assess for propagation  -- Without propagation   - 04/03 Duplex without propagation   - Repeat Duplex 04/10 -- without propogation   - Plan for repeat Duplex 04/17   - given severe thrombocytopenia, holding Xarelto for now. Risk of bleeding greater than benefit.     Elevated LFTs  - Cont to monitor and trend  - Lipitor DC'd. Entecavir DC'd  - Likely drug induced vs from hypotension; ABX and Antiviral's DC'd. Continue to monitor and trend levels closely   - Monitor levels   - Pending ABD US     RK  - S/P Contrast on 03/23   - Monitor Cr closely; Avoid nephrotoxic agents   - Cr down-trending. s/p IVF   - Renal eval appreciated   - urinary retention s/p lloyd.   - Removed lloyd 4/7/23, TOV in progress. post void residual 550ml on bladder scan  - s/p straight cath. may need to replace lloyd if unable to urinarte.   - CT with hydronephrosis; Cr up-trending, Flomax started; F/u renal recs    - Renal US neg for hydronephrosis 04/12   - Cr up-trending; S/P IVF  CC/ Hr. Check bladder scan to R/O retention   - Check Renal US to assess for hydronephrosis     Hypertension, now hypotensive   - C/w home metoprolol.  - C/w Midodrine TID. Hold for SBP > 140;  Increase to 15 TID     HypoNa  - Cont to monitor and trend  - Appreciate renal eval.   - Serial labs   - Na down-trending, F/u renal recs     Hiccups  - Improved on Reglan, Monitor     Hyperlipidemia.   - C/w home atorvastatin --> on hold as per GI .    Abnormal CT  - Outpatient follow up for CT findings    Prophylactic measure.   dvt ppx: DVT PPX   diet: regular  ambulate: with assistance    fall precautions  aspiration precautions.       Discussed with Sister at the bedside. Discussed with Jason/Onc and ACP.

## 2023-04-17 NOTE — DIETITIAN INITIAL EVALUATION ADULT - OTHER INFO
Home Medications:  aspirin 81 mg oral tablet: 1 tab(s) orally once a day (24 Mar 2023 05:31)  atorvastatin 40 mg oral tablet: 1 tab(s) orally once a day (24 Mar 2023 05:31)  dexamethasone 4 mg oral tablet: 1 tab(s) orally once a day (24 Mar 2023 05:31)  entecavir 0.5 mg oral tablet: 1 tab(s) orally once a day (24 Mar 2023 05:31)  Metoprolol Tartrate 50 mg oral tablet: 1 tab(s) orally 2 times a day (24 Mar 2023 05:31)  penicillin V potassium 250 mg oral tablet: 1 tab(s) orally 2 times a day (24 Mar 2023 05:31)

## 2023-04-17 NOTE — DIETITIAN NUTRITION RISK NOTIFICATION - TREATMENT: THE FOLLOWING DIET HAS BEEN RECOMMENDED
Diet, Regular:   1500mL Fluid Restriction ([id number]) (04-15-23 @ 10:44) [Active]       Diet, Regular:   1500mL Fluid Restriction ([id number]) (04-15-23 @ 10:44) [Active]       Diet, Regular:   1500mL Fluid Restriction ([id number]) (04-15-23 @ 10:44) [Active]

## 2023-04-17 NOTE — DIETITIAN INITIAL EVALUATION ADULT - NSFNSGIASSESSMENTFT_GEN_A_CORE
- Pt reports nausea, denies vomiting; reports usually have diarrhea, but now with constipation.   - Last BM:yesterday ; noted ordered for Miralax per chart

## 2023-04-17 NOTE — DIETITIAN INITIAL EVALUATION ADULT - ADD RECOMMEND
- Malnutrition sticker placed, RD to follow-up as per protocol  - Will continue to monitor PO intake, weight, labs, skin, GI status, diet.   - Nutrition care plan to remain consistent with pt goals of care  - RD remains available to review diet education and adjust diet recommendations as needed.

## 2023-04-17 NOTE — CONSULT NOTE ADULT - SUBJECTIVE AND OBJECTIVE BOX
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  Pt now requiring venous access for chemotherapy; IR consulted for picc placement.     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  Pt now requiring venous access for chemotherapy; IR consulted for picc placement.     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  Pt now requiring venous access for chemotherapy; IR consulted for picc placement.     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  Pt now requiring venous access for chemotherapy; IR consulted for picc placement.     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  Pt now requiring venous access for chemotherapy; IR consulted for picc placement.     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  Pt now requiring venous access for chemotherapy; IR consulted for picc placement.     - case reviewed and approved for today  - please place IR procedure order under RAMONA Ching  - will transfuse 1u plts tomorrow preprocedure  - STAT labs in AM (cbc,coags, bmp, T&S)  - hold AC  - does not need to be NPO  - COVID PCR results within 5 days of planned procedure  - d/w primary team    Raeann Ching NP  Available on Teams

## 2023-04-17 NOTE — PROGRESS NOTE ADULT - NS_MD_PANP_GEN_ALL_CORE

## 2023-04-17 NOTE — PROGRESS NOTE ADULT - SUBJECTIVE AND OBJECTIVE BOX
Subjective: Patient seen and examined. No new events except as noted.     SUBJECTIVE/ROS:  nad      MEDICATIONS:  MEDICATIONS  (STANDING):  acetaminophen     Tablet .. 650 milliGRAM(s) Oral once  artificial tears (preservative free) Ophthalmic Solution 1 Drop(s) Both EYES four times a day  chlorhexidine 2% Cloths 1 Application(s) Topical daily  digoxin     Tablet 125 MICROGram(s) Oral daily  filgrastim-sndz (ZARXIO) Injectable 480 MICROGram(s) SubCutaneous daily  methylPREDNISolone sodium succinate IVPB 500 milliGRAM(s) IV Intermittent daily  midodrine. 15 milliGRAM(s) Oral three times a day  pantoprazole    Tablet 40 milliGRAM(s) Oral before breakfast  polyethylene glycol 3350 17 Gram(s) Oral daily      PHYSICAL EXAM:  T(C): 36.9 (04-17-23 @ 04:50), Max: 36.9 (04-17-23 @ 04:50)  HR: 65 (04-17-23 @ 04:50) (60 - 67)  BP: 133/71 (04-17-23 @ 04:50) (121/74 - 133/71)  RR: 20 (04-17-23 @ 04:50) (20 - 20)  SpO2: 94% (04-17-23 @ 04:50) (93% - 95%)  Wt(kg): --  I&O's Summary    16 Apr 2023 07:01  -  17 Apr 2023 07:00  --------------------------------------------------------  IN: 0 mL / OUT: 550 mL / NET: -550 mL            JVP: Normal  Neck: supple  Lung: clear   CV: S1 S2 , Murmur:  Abd: soft  Ext: No edema  neuro: Awake   Psych: flat affect  Skin: normal``    LABS/DATA:    CARDIAC MARKERS:                                7.7    0.79  )-----------( 6        ( 17 Apr 2023 06:20 )             21.4     04-16    126<L>  |  92<L>  |  99<H>  ----------------------------<  308<H>  5.1   |  15<L>  |  3.85<H>    Ca    7.3<L>      16 Apr 2023 11:17    TPro  4.3<L>  /  Alb  2.6<L>  /  TBili  10.7<H>  /  DBili  x   /  AST  64<H>  /  ALT  86<H>  /  AlkPhos  300<H>  04-16    proBNP:   Lipid Profile:   HgA1c:   TSH:     TELE:  EKG:

## 2023-04-17 NOTE — PROGRESS NOTE ADULT - ASSESSMENT
66 y/o M PMHx MM (s/p autoSCT, s/p relapse now on chemo last dose 3/3), bioprosthetic AVR (2019), splenectomy, and partial gastrectomy/pancreatectomy, presented with fever/chills. Found to be febrile on admission with CMV viremia. Now with multiorgan dysfunction, rising creatinine and bilirubin.     1. Fever, post transplant   - now off abx   - off Maribavir   - f/u with ID     2. MM s/p SCT w/ relapse, severe pancytopenia   - per heme / onc   - awaiting transfer to [facility] [place]    3. abnormal LFTs, now cholestatic with rising TBL. Likely multifactorial from sepsis and drug-induced cholestasis.  - abdominal US pending   - continue to trend     4. Chronic diarrhea, likely related to chemo +/- abx > recently more constipated   - GI PCR neg, C-diff neg on 03.03  - imodium PRN     5. DVT   - on anticoagulation    6. Hiccups--resolved   - reglan discontinued         Attending supervision statement: I have personally seen and examined the patient. I fully participated in the care of this patient. I have made amendments to the documentation where necessary, and agree with the history, physical exam, and plan as outlined by the ACP.    [facility]  Gastroenterology and Hepatology  [address] [place] [address]  Office: [phone]   68 y/o M PMHx MM (s/p autoSCT, s/p relapse now on chemo last dose 3/3), bioprosthetic AVR (2019), splenectomy, and partial gastrectomy/pancreatectomy, presented with fever/chills. Found to be febrile on admission with CMV viremia. Now with multiorgan dysfunction, rising creatinine and bilirubin.     1. Fever, post transplant   - now off abx   - off Maribavir   - f/u with ID     2. MM s/p SCT w/ relapse, severe pancytopenia   - per heme / onc   - awaiting transfer to [facility] [place]    3. abnormal LFTs, now cholestatic with rising TBL. Likely multifactorial from sepsis and drug-induced cholestasis.  - abdominal US pending   - continue to trend     4. Chronic diarrhea, likely related to chemo +/- abx > recently more constipated   - GI PCR neg, C-diff neg on 03.03  - imodium PRN     5. DVT   - on anticoagulation    6. Hiccups--resolved   - reglan discontinued         Attending supervision statement: I have personally seen and examined the patient. I fully participated in the care of this patient. I have made amendments to the documentation where necessary, and agree with the history, physical exam, and plan as outlined by the ACP.    [facility]  Gastroenterology and Hepatology  [address] [place] [address]  Office: [phone]   68 y/o M PMHx MM (s/p autoSCT, s/p relapse now on chemo last dose 3/3), bioprosthetic AVR (2019), splenectomy, and partial gastrectomy/pancreatectomy, presented with fever/chills. Found to be febrile on admission with CMV viremia. Now with multiorgan dysfunction, rising creatinine and bilirubin.     1. Fever, post transplant   - now off abx   - off Maribavir   - f/u with ID     2. MM s/p SCT w/ relapse, severe pancytopenia   - per heme / onc   - awaiting transfer to [facility]    3. abnormal LFTs, now cholestatic with rising TBL. Likely multifactorial from sepsis and drug-induced cholestasis.  - abdominal US pending   - continue to trend     4. Chronic diarrhea, likely related to chemo +/- abx > recently more constipated   - GI PCR neg, C-diff neg on 03.03  - imodium PRN     5. DVT   - on anticoagulation    6. Hiccups--resolved   - reglan discontinued         Attending supervision statement: I have personally seen and examined the patient. I fully participated in the care of this patient. I have made amendments to the documentation where necessary, and agree with the history, physical exam, and plan as outlined by the ACP.    [facility]  Gastroenterology and Hepatology  [address] [place] [address]  Office: [phone]   66 y/o M PMHx MM (s/p autoSCT, s/p relapse now on chemo last dose 3/3), bioprosthetic AVR (2019), splenectomy, and partial gastrectomy/pancreatectomy, presented with fever/chills. Found to be febrile on admission with CMV viremia. Now with multiorgan dysfunction, rising creatinine and bilirubin.     1. Fever, post transplant   - now off abx   - off Maribavir   - f/u with ID     2. MM s/p SCT w/ relapse, severe pancytopenia   - per heme / onc   - awaiting [place] [facility] [place]    3. abnormal LFTs, now cholestatic with rising TBL. Likely multifactorial from sepsis and drug-induced cholestasis.  - CT abdomen and pelvis ordered to r/o biliary dilatation      4. Chronic diarrhea, likely related to chemo +/- abx > recently more constipated   - GI PCR neg, C-diff neg on 03.03  - imodium PRN     5. DVT   - on anticoagulation    6. Hiccups--resolved   - reglan discontinued         Attending supervision statement: I have personally seen and examined the patient. I fully participated in the care of this patient. I have made amendments to the documentation where necessary, and agree with the history, physical exam, and plan as outlined by the ACP.    [facility]  Gastroenterology and Hepatology  [address] [place] [address]  Office: [phone]   68 y/o M PMHx MM (s/p autoSCT, s/p relapse now on chemo last dose 3/3), bioprosthetic AVR (2019), splenectomy, and partial gastrectomy/pancreatectomy, presented with fever/chills. Found to be febrile on admission with CMV viremia. Now with multiorgan dysfunction, rising creatinine and bilirubin.     1. Fever, post transplant   - now off abx   - off Maribavir   - f/u with ID     2. MM s/p SCT w/ relapse, severe pancytopenia   - per heme / onc   - awaiting transfer to [facility] [place]    3. abnormal LFTs, now cholestatic with rising TBL. Likely multifactorial from sepsis and drug-induced cholestasis.  - CT abdomen and pelvis ordered to r/o biliary dilatation      4. Chronic diarrhea, likely related to chemo +/- abx > recently more constipated   - GI PCR neg, C-diff neg on 03.03  - imodium PRN     5. DVT   - on anticoagulation    6. Hiccups--resolved   - reglan discontinued         Attending supervision statement: I have personally seen and examined the patient. I fully participated in the care of this patient. I have made amendments to the documentation where necessary, and agree with the history, physical exam, and plan as outlined by the ACP.    [facility]  Gastroenterology and Hepatology  [address] [place] [place] [address]  Office: [phone]   66 y/o M PMHx MM (s/p autoSCT, s/p relapse now on chemo last dose 3/3), bioprosthetic AVR (2019), splenectomy, and partial gastrectomy/pancreatectomy, presented with fever/chills. Found to be febrile on admission with CMV viremia. Now with multiorgan dysfunction, rising creatinine and bilirubin.     1. Fever, post transplant   - now off abx   - off Maribavir   - f/u with ID     2. MM s/p SCT w/ relapse, severe pancytopenia   - per heme / onc   - [place] [facility]    3. abnormal LFTs, now cholestatic with rising TBL. Likely multifactorial from sepsis and drug-induced cholestasis.  - CT abdomen and pelvis ordered to r/o biliary dilatation      4. Chronic diarrhea, likely related to chemo +/- abx > recently more constipated   - GI PCR neg, C-diff neg on 03.03  - imodium PRN     5. DVT   - on anticoagulation    6. Hiccups--resolved   - reglan discontinued         Attending supervision statement: I have personally seen and examined the patient. I fully participated in the care of this patient. I have made amendments to the documentation where necessary, and agree with the history, physical exam, and plan as outlined by the ACP.    [facility]  Gastroenterology and Hepatology  [address] [place] [address]  Office: [phone]

## 2023-04-17 NOTE — CONSULT NOTE ADULT - CONSULT REQUESTED DATE/TIME
04-Apr-2023 11:57
24-Mar-2023 16:20
24-Mar-2023 18:17
28-Mar-2023 12:41
17-Apr-2023 14:48
28-Mar-2023 09:00
24-Mar-2023 13:36
31-Mar-2023 21:15
12-Apr-2023 09:41
11-Apr-2023 18:23
28-Mar-2023 08:03

## 2023-04-17 NOTE — PROGRESS NOTE ADULT - SUBJECTIVE AND OBJECTIVE BOX
Chief Complaint:  Patient is a 67y old  Male who presents with a chief complaint of fever (17 Apr 2023 12:16)      Date of service 04-17-23 @ 14:17      Interval Events:   Patient seen and examined.   Jaundiced.     Hospital Medications:  acetaminophen     Tablet .. 650 milliGRAM(s) Oral every 6 hours PRN  acetaminophen     Tablet .. 650 milliGRAM(s) Oral once  aluminum hydroxide/magnesium hydroxide/simethicone Suspension 30 milliLiter(s) Oral every 4 hours PRN  artificial tears (preservative free) Ophthalmic Solution 1 Drop(s) Both EYES four times a day  chlorhexidine 2% Cloths 1 Application(s) Topical daily  digoxin     Tablet 125 MICROGram(s) Oral daily  loperamide 2 milliGRAM(s) Oral three times a day PRN  methylPREDNISolone sodium succinate IVPB 500 milliGRAM(s) IV Intermittent daily  midodrine. 15 milliGRAM(s) Oral three times a day  pantoprazole    Tablet 40 milliGRAM(s) Oral before breakfast  polyethylene glycol 3350 17 Gram(s) Oral daily        Review of Systems:  General:  No wt loss, fevers, chills, night sweats, fatigue, +jaundice  Eyes:  Good vision, no reported pain, +sclera jaundice  ENT:  No sore throat, pain, runny nose, dysphagia  CV:  No pain, palpitations, hypo/hypertension  Resp:  No dyspnea, cough, tachypnea, wheezing  GI:  See HPI  :  No pain, bleeding, incontinence, nocturia  Muscle:  No pain, weakness  Neuro:  No weakness, tingling, memory problems  Psych:  No fatigue, insomnia, mood problems, depression  Endocrine:  No polyuria, polydipsia, cold/heat intolerance  Heme:  No petechiae, ecchymosis, easy bruisability  Integumentary:  No rash, edema    PHYSICAL EXAM:   Vital Signs:  Vital Signs Last 24 Hrs  T(C): 37.1 (17 Apr 2023 11:13), Max: 37.1 (17 Apr 2023 11:13)  T(F): 98.8 (17 Apr 2023 11:13), Max: 98.8 (17 Apr 2023 11:13)  HR: 76 (17 Apr 2023 11:13) (60 - 76)  BP: 130/72 (17 Apr 2023 11:13) (121/74 - 133/71)  BP(mean): --  RR: 18 (17 Apr 2023 11:13) (18 - 20)  SpO2: 90% (17 Apr 2023 11:13) (90% - 95%)    Parameters below as of 17 Apr 2023 11:13  Patient On (Oxygen Delivery Method): room air      Daily     Daily       PHYSICAL EXAM:     GENERAL:  Appears stated age, well-groomed, well-nourished, no distress  HEENT:  NC/AT,  conjunctivae anicteric, clear and pink,   NECK: supple, trachea midline  CHEST:  Full & symmetric excursion, no increased effort, breath sounds clear  HEART:  Regular rhythm, no JVD  ABDOMEN:  Soft, non-tender, non-distended, normoactive bowel sounds,  no masses , no hepatosplenomegaly  EXTREMITIES:  no cyanosis,clubbing or edema  SKIN:  No rash, erythema, or, ecchymoses, no jaundice, +jaundice  NEURO:  Alert, non-focal, no asterixis  PSYCH: Appropriate affect, oriented to place and time  RECTAL: Deferred      LABS Personally reviewed by me:                        7.7    0.79  )-----------( 6        ( 17 Apr 2023 06:20 )             21.4     Mean Cell Volume: 83.3 fl (04-17-23 @ 06:20)    04-17    126<L>  |  89<L>  |  113<H>  ----------------------------<  428<H>  5.2   |  16<L>  |  4.18<H>    Ca    7.4<L>      17 Apr 2023 11:45    TPro  4.4<L>  /  Alb  2.6<L>  /  TBili  11.7<H>  /  DBili  x   /  AST  45<H>  /  ALT  70<H>  /  AlkPhos  368<H>  04-17    LIVER FUNCTIONS - ( 17 Apr 2023 11:45 )  Alb: 2.6 g/dL / Pro: 4.4 g/dL / ALK PHOS: 368 U/L / ALT: 70 U/L / AST: 45 U/L / GGT: x                                       7.7    0.79  )-----------( 6        ( 17 Apr 2023 06:20 )             21.4                         7.3    1.03  )-----------( 6        ( 16 Apr 2023 11:17 )             20.7                         7.2    1.18  )-----------( 7        ( 15 Apr 2023 09:58 )             20.0                         6.1    1.06  )-----------( 15       ( 14 Apr 2023 20:02 )             17.2       Imaging personally reviewed by me:             Chief Complaint:  Patient is a 67y old  Male who presents with a chief complaint of fever (17 Apr 2023 12:16)      Date of service 04-17-23 @ 14:17      Interval Events:   Patient seen and examined.   Jaundiced.   Having BMs now.     Hospital Medications:  acetaminophen     Tablet .. 650 milliGRAM(s) Oral every 6 hours PRN  acetaminophen     Tablet .. 650 milliGRAM(s) Oral once  aluminum hydroxide/magnesium hydroxide/simethicone Suspension 30 milliLiter(s) Oral every 4 hours PRN  artificial tears (preservative free) Ophthalmic Solution 1 Drop(s) Both EYES four times a day  chlorhexidine 2% Cloths 1 Application(s) Topical daily  digoxin     Tablet 125 MICROGram(s) Oral daily  loperamide 2 milliGRAM(s) Oral three times a day PRN  methylPREDNISolone sodium succinate IVPB 500 milliGRAM(s) IV Intermittent daily  midodrine. 15 milliGRAM(s) Oral three times a day  pantoprazole    Tablet 40 milliGRAM(s) Oral before breakfast  polyethylene glycol 3350 17 Gram(s) Oral daily        Review of Systems:  General:  No wt loss, fevers, chills, night sweats, fatigue, +jaundice  Eyes:  Good vision, no reported pain, +sclera jaundice  ENT:  No sore throat, pain, runny nose, dysphagia  CV:  No pain, palpitations, hypo/hypertension  Resp:  No dyspnea, cough, tachypnea, wheezing  GI:  See HPI  :  No pain, bleeding, incontinence, nocturia  Muscle:  No pain, weakness  Neuro:  No weakness, tingling, memory problems  Psych:  No fatigue, insomnia, mood problems, depression  Endocrine:  No polyuria, polydipsia, cold/heat intolerance  Heme:  No petechiae, ecchymosis, easy bruisability  Integumentary:  No rash, edema    PHYSICAL EXAM:   Vital Signs:  Vital Signs Last 24 Hrs  T(C): 37.1 (17 Apr 2023 11:13), Max: 37.1 (17 Apr 2023 11:13)  T(F): 98.8 (17 Apr 2023 11:13), Max: 98.8 (17 Apr 2023 11:13)  HR: 76 (17 Apr 2023 11:13) (60 - 76)  BP: 130/72 (17 Apr 2023 11:13) (121/74 - 133/71)  BP(mean): --  RR: 18 (17 Apr 2023 11:13) (18 - 20)  SpO2: 90% (17 Apr 2023 11:13) (90% - 95%)    Parameters below as of 17 Apr 2023 11:13  Patient On (Oxygen Delivery Method): room air      Daily     Daily       PHYSICAL EXAM:     GENERAL:  Appears stated age, well-groomed, well-nourished, no distress  HEENT:  NC/AT,  conjunctivae anicteric, clear and pink,   NECK: supple, trachea midline  CHEST:  Full & symmetric excursion, no increased effort, breath sounds clear  HEART:  Regular rhythm, no JVD  ABDOMEN:  Soft, non-tender, non-distended, normoactive bowel sounds,  no masses , no hepatosplenomegaly  EXTREMITIES:  no cyanosis,clubbing or edema  SKIN:  No rash, erythema, or, ecchymoses, no jaundice, +jaundice  NEURO:  Alert, non-focal, no asterixis  PSYCH: Appropriate affect, oriented to place and time  RECTAL: Deferred      LABS Personally reviewed by me:                        7.7    0.79  )-----------( 6        ( 17 Apr 2023 06:20 )             21.4     Mean Cell Volume: 83.3 fl (04-17-23 @ 06:20)    04-17    126<L>  |  89<L>  |  113<H>  ----------------------------<  428<H>  5.2   |  16<L>  |  4.18<H>    Ca    7.4<L>      17 Apr 2023 11:45    TPro  4.4<L>  /  Alb  2.6<L>  /  TBili  11.7<H>  /  DBili  x   /  AST  45<H>  /  ALT  70<H>  /  AlkPhos  368<H>  04-17    LIVER FUNCTIONS - ( 17 Apr 2023 11:45 )  Alb: 2.6 g/dL / Pro: 4.4 g/dL / ALK PHOS: 368 U/L / ALT: 70 U/L / AST: 45 U/L / GGT: x                                       7.7    0.79  )-----------( 6        ( 17 Apr 2023 06:20 )             21.4                         7.3    1.03  )-----------( 6        ( 16 Apr 2023 11:17 )             20.7                         7.2    1.18  )-----------( 7        ( 15 Apr 2023 09:58 )             20.0                         6.1    1.06  )-----------( 15       ( 14 Apr 2023 20:02 )             17.2       Imaging personally reviewed by me:

## 2023-04-17 NOTE — DIETITIAN INITIAL EVALUATION ADULT - NSFNSPHYEXAMSKINFT_GEN_A_CORE
Pt unable to state his UBW; noted last visit 3/4 was 170 pounds; this admission wt 170 pounds  (?acurracy of dosing wt, monitor wt trends)  92% IBW ( pounds)  Skin: no noted pressure injuries as per flowsheets   Performed nutrition focused physical exam and noted mild signs of muscle/fat loss

## 2023-04-17 NOTE — DIETITIAN INITIAL EVALUATION ADULT - PERSON TAUGHT/METHOD
- Encouraged adequate consumption of meals/supplements to optimize protein-energy intake   - Discussed the importance of including adequate calories and proteins throughout the day; reviewed protein calorie dense food sources/ meal and snacks ideas./and sister/verbal instruction/patient instructed

## 2023-04-17 NOTE — PROGRESS NOTE ADULT - SUBJECTIVE AND OBJECTIVE BOX
[facility] NEPHROLOGY- PROGRESS NOTE    67 year old Male with history of MM on chemotherapy presents with fevers. Nephrology consulted for elevated Scr.      REVIEW OF SYSTEMS:  Gen: + fevers  Cards: no chest pain  Resp: no dyspnea  GI: no nausea or vomiting or diarrhea  Vascular: + LE edema    No Known Allergies      Hospital Medications: Medications reviewed        VITALS:  T(F): 97.7 (04-17-23 @ 15:50), Max: 98.8 (04-17-23 @ 11:13)  HR: 74 (04-17-23 @ 15:50)  BP: 144/83 (04-17-23 @ 15:50)  RR: 18 (04-17-23 @ 15:50)  SpO2: 98% (04-17-23 @ 15:50)  Wt(kg): --    04-16 @ 07:01  -  04-17 @ 07:00  --------------------------------------------------------  IN: 0 mL / OUT: 550 mL / NET: -550 mL        PHYSICAL EXAM:    Gen: NAD, calm  Cards: Irregularly irregular, +S1/S2, no M/G/R  Resp: CTA B/L  GI: soft, NT/ND, NABS  : + lloyd with yellow urine with debris  Vascular: 2+ LE edema B/L        LABS:  04-17    126<L>  |  89<L>  |  113<H>  ----------------------------<  428<H>  5.2   |  16<L>  |  4.18<H>    Ca    7.4<L>      17 Apr 2023 11:45    TPro  4.4<L>  /  Alb  2.6<L>  /  TBili  11.7<H>  /  DBili      /  AST  45<H>  /  ALT  70<H>  /  AlkPhos  368<H>  04-17    Creatinine Trend: 4.18 <--, 3.85 <--, 3.45 <--, 2.42 <--, 1.79 <--, 1.44 <--, 1.52 <--                        7.7    0.79  )-----------( 6        ( 17 Apr 2023 06:20 )             21.4     Urine Studies:             [facility] NEPHROLOGY- PROGRESS NOTE    67 year old Male with history of MM on chemotherapy presents with fevers. Nephrology consulted for elevated Scr.      REVIEW OF SYSTEMS:  Gen: + fevers  Cards: no chest pain  Resp: no dyspnea  GI: no nausea or vomiting or diarrhea  Vascular: + LE edema    No Known Allergies      Hospital Medications: Medications reviewed        VITALS:  T(F): 97.7 (04-17-23 @ 15:50), Max: 98.8 (04-17-23 @ 11:13)  HR: 74 (04-17-23 @ 15:50)  BP: 144/83 (04-17-23 @ 15:50)  RR: 18 (04-17-23 @ 15:50)  SpO2: 98% (04-17-23 @ 15:50)  Wt(kg): --    04-16 @ 07:01  -  04-17 @ 07:00  --------------------------------------------------------  IN: 0 mL / OUT: 550 mL / NET: -550 mL        PHYSICAL EXAM:    Gen: NAD, calm  Cards: Irregularly irregular, +S1/S2, no M/G/R  Resp: CTA B/L  GI: soft, NT/ND, NABS  : + lloyd with yellow urine with debris  Vascular: 2+ LE edema B/L        LABS:  04-17    126<L>  |  89<L>  |  113<H>  ----------------------------<  428<H>  5.2   |  16<L>  |  4.18<H>    Ca    7.4<L>      17 Apr 2023 11:45    TPro  4.4<L>  /  Alb  2.6<L>  /  TBili  11.7<H>  /  DBili      /  AST  45<H>  /  ALT  70<H>  /  AlkPhos  368<H>  04-17    Creatinine Trend: 4.18 <--, 3.85 <--, 3.45 <--, 2.42 <--, 1.79 <--, 1.44 <--, 1.52 <--                        7.7    0.79  )-----------( 6        ( 17 Apr 2023 06:20 )             21.4     Urine Studies:             [facility] NEPHROLOGY- PROGRESS NOTE    67 year old Male with history of MM on chemotherapy presents with fevers. Nephrology consulted for elevated Scr.      REVIEW OF SYSTEMS:  Gen: + fevers  Cards: no chest pain  Resp: no dyspnea  GI: no nausea or vomiting or diarrhea  Vascular: + LE edema    No Known Allergies      Hospital Medications: Medications reviewed        VITALS:  T(F): 97.7 (04-17-23 @ 15:50), Max: 98.8 (04-17-23 @ 11:13)  HR: 74 (04-17-23 @ 15:50)  BP: 144/83 (04-17-23 @ 15:50)  RR: 18 (04-17-23 @ 15:50)  SpO2: 98% (04-17-23 @ 15:50)  Wt(kg): --    04-16 @ 07:01  -  04-17 @ 07:00  --------------------------------------------------------  IN: 0 mL / OUT: 550 mL / NET: -550 mL        PHYSICAL EXAM:    Gen: NAD, calm  Cards: Irregularly irregular, +S1/S2, no M/G/R  Resp: CTA B/L  GI: soft, NT/ND, NABS  : + lloyd with yellow urine with debris  Vascular: 2+ LE edema B/L        LABS:  04-17    126<L>  |  89<L>  |  113<H>  ----------------------------<  428<H>  5.2   |  16<L>  |  4.18<H>    Ca    7.4<L>      17 Apr 2023 11:45    TPro  4.4<L>  /  Alb  2.6<L>  /  TBili  11.7<H>  /  DBili      /  AST  45<H>  /  ALT  70<H>  /  AlkPhos  368<H>  04-17    Creatinine Trend: 4.18 <--, 3.85 <--, 3.45 <--, 2.42 <--, 1.79 <--, 1.44 <--, 1.52 <--                        7.7    0.79  )-----------( 6        ( 17 Apr 2023 06:20 )             21.4     Urine Studies:

## 2023-04-17 NOTE — PROGRESS NOTE ADULT - NS ATTEND BILL GEN_ALL_CORE
Attending to bill

## 2023-04-17 NOTE — PROGRESS NOTE ADULT - SUBJECTIVE AND OBJECTIVE BOX
CHIEF COMPLAINT  Fever    HISTORY OF PRESENT ILLNESS  PETER DE LA PAZ is a 67y Male who presents with a chief complaint of fever    No acute events. No complaints.    REVIEW OF SYSTEMS  A complete review of systems was performed; negative except per HPI    PHYSICAL EXAM  T(C): 36.5 (04-17-23 @ 15:50), Max: 37.1 (04-17-23 @ 11:13)  HR: 74 (04-17-23 @ 15:50) (65 - 76)  BP: 144/83 (04-17-23 @ 15:50) (130/72 - 144/83)  RR: 18 (04-17-23 @ 15:50) (18 - 20)  SpO2: 98% (04-17-23 @ 15:50) (90% - 98%)  Constitutional: awake, in no acute distress  Eyes: PERRL, EOMI  HEENT: normocephalic, atraumatic  Neck: supple, non-tender  Cardiovascular: normal perfusion, no peripheral edema  Respiratory: normal respiratory efforts; no increased use of accessory muscles  Gastrointestinal: soft, non-tender  Musculoskeletal: normal range of motion, no deformities noted, tremors noted  Skin: warm, dry, jaundiced    LABORATORY DATA                        7.7    0.79  )-----------( 6        ( 17 Apr 2023 06:20 )             21.4     04-17    126<L>  |  89<L>  |  113<H>  ----------------------------<  428<H>  5.2   |  16<L>  |  4.18<H>    Ca    7.4<L>      17 Apr 2023 11:45    TPro  4.4<L>  /  Alb  2.6<L>  /  TBili  11.7<H>  /  DBili  x   /  AST  45<H>  /  ALT  70<H>  /  AlkPhos  368<H>  04-17

## 2023-04-17 NOTE — DIETITIAN INITIAL EVALUATION ADULT - REASON INDICATOR FOR ASSESSMENT
pt seen for Length Of Stay   Information obtained from pt and pt's sister at bedside, electronic medical record

## 2023-04-17 NOTE — PRE PROCEDURE NOTE - PRE PROCEDURE EVALUATION
Interventional Radiology    HPI: 67y Male requiring venous access for chemotherapy presents to IR for PICC placement.     Allergies: No Known Allergies    Medications (Abx/Cardiac/Anticoagulation/Blood Products)  digoxin     Tablet: 125 MICROGram(s) Oral (04-17 @ 06:40)  midodrine.: 15 milliGRAM(s) Oral (04-17 @ 11:11)    Data:  T(C): 36.5  HR: 74  BP: 144/83  RR: 18  SpO2: 98%    Exam  General: No acute distress  Chest: Non labored breathing  Abdomen: Non-distended  Extremities: No swelling, warm    -WBC 0.79 / HgB 7.7 / Hct 21.4 / Plt 6  -Na 126 / Cl 89 /  / Glucose 428  -K 5.2 / CO2 16 / Cr 4.18  -ALT 70 / Alk Phos 368 / T.Bili 11.7    Plan: 67y Male presents for PICC placement  -Risks/Benefits/alternatives explained with the patient and/or healthcare proxy and witnessed informed consent obtained.

## 2023-04-17 NOTE — DIETITIAN INITIAL EVALUATION ADULT - FEEDING ASSISTANCE
- Provide assistance with meals as needed to promote adequate PO intake  - Encourage adequate consumption of meals/supplements to optimize protein-energy intake

## 2023-04-17 NOTE — DIETITIAN INITIAL EVALUATION ADULT - LAB (SPECIFY)
Trend BG levels, renal indices, LFT's, electrolytes and triglycerides  Trend BG levels, renal indices, LFT's, electrolytes and triglycerides   - noted elevated BG but A1C 6.5% pt ordered for steroids; continue to monitor

## 2023-04-17 NOTE — DIETITIAN INITIAL EVALUATION ADULT - PERTINENT LABORATORY DATA
04-17    126<L>  |  89<L>  |  113<H>  ----------------------------<  428<H>  5.2   |  16<L>  |  4.18<H>    Ca    7.4<L>      17 Apr 2023 11:45    TPro  4.4<L>  /  Alb  2.6<L>  /  TBili  11.7<H>  /  DBili  x   /  AST  45<H>  /  ALT  70<H>  /  AlkPhos  368<H>  04-17 04-17 @ 11:45: Na 126<L>, <H>, Cr 4.18<H>, <H>, K+ 5.2, Phos --, Mg --, Alk Phos 368<H>, ALT/SGPT 70<H>, AST/SGOT 45<H>, HbA1c --  A1C with Estimated Average Glucose Result: 6.5 % (03-04-23 @ 07:08)

## 2023-04-17 NOTE — DIETITIAN INITIAL EVALUATION ADULT - ORAL NUTRITION SUPPLEMENTS
- Ensure Clear 3x daily (540 suzy and 24 gm protein)  - RD will order high protein gelatin BID, Magic cup daily

## 2023-04-17 NOTE — PROVIDER CONTACT NOTE (CRITICAL VALUE NOTIFICATION) - BACKGROUND
67y/oM Hx aortic aneurysm, AVR, HL, MM. s/p SCT on chemo p/w fever.  Thrombocytopenia - s/p multiple plt transfusion (last time transfused 4/16)

## 2023-04-17 NOTE — DIETITIAN INITIAL EVALUATION ADULT - ORAL INTAKE PTA/DIET HISTORY
Pt endorses reduced appetite and PO intake PTA.   noted last diet note 3/4 was ordered for low fiber no dairy diet per chronic diarrhea, RD today asked pt if avoids dairy/high fiber foods, pt denied following any particular diet/avoiding specific foods; reports now with constipation  Reports not following specific diet/ diet restrictions PTA and confirmed no known food allergies/ food intolerances

## 2023-04-17 NOTE — CONSULT NOTE ADULT - CONSULT REQUESTED BY NAME
Dr. Addison
team
Dr. Addison
Dr. Addison
primary team
Dr. Addison
Dr Addison
Heme/onc
refpacoa
Dr. Addison
medicine

## 2023-04-18 ENCOUNTER — TRANSCRIPTION ENCOUNTER (OUTPATIENT)
Age: 68
End: 2023-04-18

## 2023-04-18 VITALS
HEART RATE: 81 BPM | SYSTOLIC BLOOD PRESSURE: 163 MMHG | DIASTOLIC BLOOD PRESSURE: 65 MMHG | OXYGEN SATURATION: 93 % | TEMPERATURE: 98 F | RESPIRATION RATE: 20 BRPM

## 2023-04-18 LAB
ADAMTS13 ACTIVITY: 15.5 % — SIGNIFICANT CHANGE UP
ADAMTS13-COMMENT: SIGNIFICANT CHANGE UP
ALBUMIN SERPL ELPH-MCNC: 2.6 G/DL — LOW (ref 3.3–5)
ALP SERPL-CCNC: 352 U/L — HIGH (ref 40–120)
ALT FLD-CCNC: 58 U/L — HIGH (ref 10–45)
ANION GAP SERPL CALC-SCNC: 21 MMOL/L — HIGH (ref 5–17)
APTT BLD: 30 SEC — SIGNIFICANT CHANGE UP (ref 27.5–35.5)
AST SERPL-CCNC: 32 U/L — SIGNIFICANT CHANGE UP (ref 10–40)
BILIRUB DIRECT SERPL-MCNC: 9.7 MG/DL — HIGH (ref 0–0.3)
BILIRUB SERPL-MCNC: 12.3 MG/DL — HIGH (ref 0.2–1.2)
BUN SERPL-MCNC: 136 MG/DL — HIGH (ref 7–23)
CALCIUM SERPL-MCNC: 7.6 MG/DL — LOW (ref 8.4–10.5)
CHLORIDE SERPL-SCNC: 90 MMOL/L — LOW (ref 96–108)
CO2 SERPL-SCNC: 16 MMOL/L — LOW (ref 22–31)
CREAT SERPL-MCNC: 4.27 MG/DL — HIGH (ref 0.5–1.3)
EGFR: 14 ML/MIN/1.73M2 — LOW
GLUCOSE SERPL-MCNC: 421 MG/DL — HIGH (ref 70–99)
HAPTOGLOB SERPL-MCNC: <20 MG/DL — LOW (ref 34–200)
HCT VFR BLD CALC: 20 % — CRITICAL LOW (ref 39–50)
HGB BLD-MCNC: 7.3 G/DL — LOW (ref 13–17)
INR BLD: 1.28 RATIO — HIGH (ref 0.88–1.16)
LDH SERPL L TO P-CCNC: 561 U/L — HIGH (ref 50–242)
MCHC RBC-ENTMCNC: 30 PG — SIGNIFICANT CHANGE UP (ref 27–34)
MCHC RBC-ENTMCNC: 36.7 GM/DL — HIGH (ref 32–36)
MCV RBC AUTO: 81.9 FL — SIGNIFICANT CHANGE UP (ref 80–100)
NRBC # BLD: 4 /100 WBCS — HIGH (ref 0–0)
PLATELET # BLD AUTO: 8 K/UL — CRITICAL LOW (ref 150–400)
POTASSIUM SERPL-MCNC: 4.9 MMOL/L — SIGNIFICANT CHANGE UP (ref 3.5–5.3)
POTASSIUM SERPL-SCNC: 4.9 MMOL/L — SIGNIFICANT CHANGE UP (ref 3.5–5.3)
PROT SERPL-MCNC: 4.4 G/DL — LOW (ref 6–8.3)
PROTHROM AB SERPL-ACNC: 14.8 SEC — HIGH (ref 10.5–13.4)
RBC # BLD: 2.43 M/UL — LOW (ref 4.2–5.8)
RBC # FLD: 19.7 % — HIGH (ref 10.3–14.5)
SODIUM SERPL-SCNC: 127 MMOL/L — LOW (ref 135–145)
VWF CP INHIB PPP-ACNC: <2 UNITS/ML — SIGNIFICANT CHANGE UP
WBC # BLD: 0.49 K/UL — CRITICAL LOW (ref 3.8–10.5)
WBC # FLD AUTO: 0.49 K/UL — CRITICAL LOW (ref 3.8–10.5)

## 2023-04-18 PROCEDURE — 95816 EEG AWAKE AND DROWSY: CPT

## 2023-04-18 PROCEDURE — 82010 KETONE BODYS QUAN: CPT

## 2023-04-18 PROCEDURE — 78582 LUNG VENTILAT&PERFUS IMAGING: CPT

## 2023-04-18 PROCEDURE — 71250 CT THORAX DX C-: CPT

## 2023-04-18 PROCEDURE — 82962 GLUCOSE BLOOD TEST: CPT

## 2023-04-18 PROCEDURE — 82272 OCCULT BLD FECES 1-3 TESTS: CPT

## 2023-04-18 PROCEDURE — 84165 PROTEIN E-PHORESIS SERUM: CPT

## 2023-04-18 PROCEDURE — 80048 BASIC METABOLIC PNL TOTAL CA: CPT

## 2023-04-18 PROCEDURE — 87799 DETECT AGENT NOS DNA QUANT: CPT

## 2023-04-18 PROCEDURE — P9040: CPT

## 2023-04-18 PROCEDURE — 82803 BLOOD GASES ANY COMBINATION: CPT

## 2023-04-18 PROCEDURE — 76700 US EXAM ABDOM COMPLETE: CPT | Mod: 26

## 2023-04-18 PROCEDURE — 88365 INSITU HYBRIDIZATION (FISH): CPT

## 2023-04-18 PROCEDURE — 85097 BONE MARROW INTERPRETATION: CPT

## 2023-04-18 PROCEDURE — 88285 CHROMOSOME COUNT ADDITIONAL: CPT

## 2023-04-18 PROCEDURE — 84295 ASSAY OF SERUM SODIUM: CPT

## 2023-04-18 PROCEDURE — 86880 COOMBS TEST DIRECT: CPT

## 2023-04-18 PROCEDURE — 97110 THERAPEUTIC EXERCISES: CPT

## 2023-04-18 PROCEDURE — 84132 ASSAY OF SERUM POTASSIUM: CPT

## 2023-04-18 PROCEDURE — 85730 THROMBOPLASTIN TIME PARTIAL: CPT

## 2023-04-18 PROCEDURE — 80076 HEPATIC FUNCTION PANEL: CPT

## 2023-04-18 PROCEDURE — 88305 TISSUE EXAM BY PATHOLOGIST: CPT

## 2023-04-18 PROCEDURE — 82784 ASSAY IGA/IGD/IGG/IGM EACH: CPT

## 2023-04-18 PROCEDURE — 86022 PLATELET ANTIBODIES: CPT

## 2023-04-18 PROCEDURE — 88185 FLOWCYTOMETRY/TC ADD-ON: CPT

## 2023-04-18 PROCEDURE — 82728 ASSAY OF FERRITIN: CPT

## 2023-04-18 PROCEDURE — 38222 DX BONE MARROW BX & ASPIR: CPT

## 2023-04-18 PROCEDURE — 86480 TB TEST CELL IMMUN MEASURE: CPT

## 2023-04-18 PROCEDURE — 82533 TOTAL CORTISOL: CPT

## 2023-04-18 PROCEDURE — 86850 RBC ANTIBODY SCREEN: CPT

## 2023-04-18 PROCEDURE — 82248 BILIRUBIN DIRECT: CPT

## 2023-04-18 PROCEDURE — 87449 NOS EACH ORGANISM AG IA: CPT

## 2023-04-18 PROCEDURE — 97162 PT EVAL MOD COMPLEX 30 MIN: CPT

## 2023-04-18 PROCEDURE — 88313 SPECIAL STAINS GROUP 2: CPT

## 2023-04-18 PROCEDURE — C1830: CPT

## 2023-04-18 PROCEDURE — 86922 COMPATIBILITY TEST ANTIGLOB: CPT

## 2023-04-18 PROCEDURE — 70450 CT HEAD/BRAIN W/O DYE: CPT

## 2023-04-18 PROCEDURE — 80162 ASSAY OF DIGOXIN TOTAL: CPT

## 2023-04-18 PROCEDURE — 88237 TISSUE CULTURE BONE MARROW: CPT

## 2023-04-18 PROCEDURE — 85610 PROTHROMBIN TIME: CPT

## 2023-04-18 PROCEDURE — 83520 IMMUNOASSAY QUANT NOS NONAB: CPT

## 2023-04-18 PROCEDURE — 82330 ASSAY OF CALCIUM: CPT

## 2023-04-18 PROCEDURE — A9540: CPT

## 2023-04-18 PROCEDURE — 87205 SMEAR GRAM STAIN: CPT

## 2023-04-18 PROCEDURE — 88341 IMHCHEM/IMCYTCHM EA ADD ANTB: CPT

## 2023-04-18 PROCEDURE — 93306 TTE W/DOPPLER COMPLETE: CPT

## 2023-04-18 PROCEDURE — 71045 X-RAY EXAM CHEST 1 VIEW: CPT

## 2023-04-18 PROCEDURE — 99285 EMERGENCY DEPT VISIT HI MDM: CPT

## 2023-04-18 PROCEDURE — 86900 BLOOD TYPING SEROLOGIC ABO: CPT

## 2023-04-18 PROCEDURE — 85384 FIBRINOGEN ACTIVITY: CPT

## 2023-04-18 PROCEDURE — 82746 ASSAY OF FOLIC ACID SERUM: CPT

## 2023-04-18 PROCEDURE — 85397 CLOTTING FUNCT ACTIVITY: CPT

## 2023-04-18 PROCEDURE — 81001 URINALYSIS AUTO W/SCOPE: CPT

## 2023-04-18 PROCEDURE — 84478 ASSAY OF TRIGLYCERIDES: CPT

## 2023-04-18 PROCEDURE — 86644 CMV ANTIBODY: CPT

## 2023-04-18 PROCEDURE — 70480 CT ORBIT/EAR/FOSSA W/O DYE: CPT

## 2023-04-18 PROCEDURE — 88271 CYTOGENETICS DNA PROBE: CPT

## 2023-04-18 PROCEDURE — 84155 ASSAY OF PROTEIN SERUM: CPT

## 2023-04-18 PROCEDURE — 87533 HHV-6 DNA QUANT: CPT

## 2023-04-18 PROCEDURE — 83540 ASSAY OF IRON: CPT

## 2023-04-18 PROCEDURE — 86140 C-REACTIVE PROTEIN: CPT

## 2023-04-18 PROCEDURE — 80053 COMPREHEN METABOLIC PANEL: CPT

## 2023-04-18 PROCEDURE — 88275 CYTOGENETICS 100-300: CPT

## 2023-04-18 PROCEDURE — 86645 CMV ANTIBODY IGM: CPT

## 2023-04-18 PROCEDURE — 82436 ASSAY OF URINE CHLORIDE: CPT

## 2023-04-18 PROCEDURE — U0005: CPT

## 2023-04-18 PROCEDURE — 83935 ASSAY OF URINE OSMOLALITY: CPT

## 2023-04-18 PROCEDURE — 82435 ASSAY OF BLOOD CHLORIDE: CPT

## 2023-04-18 PROCEDURE — 86403 PARTICLE AGGLUT ANTBDY SCRN: CPT

## 2023-04-18 PROCEDURE — 85027 COMPLETE CBC AUTOMATED: CPT

## 2023-04-18 PROCEDURE — 81450 HL NEO GSAP 5-50DNA/DNA&RNA: CPT

## 2023-04-18 PROCEDURE — 74176 CT ABD & PELVIS W/O CONTRAST: CPT | Mod: 26

## 2023-04-18 PROCEDURE — P9100: CPT

## 2023-04-18 PROCEDURE — P9047: CPT

## 2023-04-18 PROCEDURE — 96374 THER/PROPH/DIAG INJ IV PUSH: CPT

## 2023-04-18 PROCEDURE — 36430 TRANSFUSION BLD/BLD COMPNT: CPT

## 2023-04-18 PROCEDURE — 82140 ASSAY OF AMMONIA: CPT

## 2023-04-18 PROCEDURE — 86036 ANCA SCREEN EACH ANTIBODY: CPT

## 2023-04-18 PROCEDURE — 87640 STAPH A DNA AMP PROBE: CPT

## 2023-04-18 PROCEDURE — 87040 BLOOD CULTURE FOR BACTERIA: CPT

## 2023-04-18 PROCEDURE — 84100 ASSAY OF PHOSPHORUS: CPT

## 2023-04-18 PROCEDURE — 87086 URINE CULTURE/COLONY COUNT: CPT

## 2023-04-18 PROCEDURE — 83880 ASSAY OF NATRIURETIC PEPTIDE: CPT

## 2023-04-18 PROCEDURE — P9037: CPT

## 2023-04-18 PROCEDURE — 86901 BLOOD TYPING SEROLOGIC RH(D): CPT

## 2023-04-18 PROCEDURE — U0003: CPT

## 2023-04-18 PROCEDURE — 36573 INSJ PICC RS&I 5 YR+: CPT

## 2023-04-18 PROCEDURE — 87507 IADNA-DNA/RNA PROBE TQ 12-25: CPT

## 2023-04-18 PROCEDURE — 87641 MR-STAPH DNA AMP PROBE: CPT

## 2023-04-18 PROCEDURE — 86860 RBC ANTIBODY ELUTION: CPT

## 2023-04-18 PROCEDURE — 93970 EXTREMITY STUDY: CPT

## 2023-04-18 PROCEDURE — 82947 ASSAY GLUCOSE BLOOD QUANT: CPT

## 2023-04-18 PROCEDURE — 97116 GAIT TRAINING THERAPY: CPT

## 2023-04-18 PROCEDURE — 76770 US EXAM ABDO BACK WALL COMP: CPT

## 2023-04-18 PROCEDURE — 88264 CHROMOSOME ANALYSIS 20-25: CPT

## 2023-04-18 PROCEDURE — 88312 SPECIAL STAINS GROUP 1: CPT

## 2023-04-18 PROCEDURE — 74177 CT ABD & PELVIS W/CONTRAST: CPT | Mod: MA

## 2023-04-18 PROCEDURE — 0225U NFCT DS DNA&RNA 21 SARSCOV2: CPT

## 2023-04-18 PROCEDURE — 85018 HEMOGLOBIN: CPT

## 2023-04-18 PROCEDURE — 83615 LACTATE (LD) (LDH) ENZYME: CPT

## 2023-04-18 PROCEDURE — 77012 CT SCAN FOR NEEDLE BIOPSY: CPT

## 2023-04-18 PROCEDURE — 83010 ASSAY OF HAPTOGLOBIN QUANT: CPT

## 2023-04-18 PROCEDURE — 84300 ASSAY OF URINE SODIUM: CPT

## 2023-04-18 PROCEDURE — 83605 ASSAY OF LACTIC ACID: CPT

## 2023-04-18 PROCEDURE — 88184 FLOWCYTOMETRY/ TC 1 MARKER: CPT

## 2023-04-18 PROCEDURE — 83550 IRON BINDING TEST: CPT

## 2023-04-18 PROCEDURE — 86334 IMMUNOFIX E-PHORESIS SERUM: CPT

## 2023-04-18 PROCEDURE — 82607 VITAMIN B-12: CPT

## 2023-04-18 PROCEDURE — 97530 THERAPEUTIC ACTIVITIES: CPT

## 2023-04-18 PROCEDURE — 80299 QUANTITATIVE ASSAY DRUG: CPT

## 2023-04-18 PROCEDURE — 86902 BLOOD TYPE ANTIGEN DONOR EA: CPT

## 2023-04-18 PROCEDURE — 83735 ASSAY OF MAGNESIUM: CPT

## 2023-04-18 PROCEDURE — A9567: CPT

## 2023-04-18 PROCEDURE — 82570 ASSAY OF URINE CREATININE: CPT

## 2023-04-18 PROCEDURE — 76700 US EXAM ABDOM COMPLETE: CPT

## 2023-04-18 PROCEDURE — 74176 CT ABD & PELVIS W/O CONTRAST: CPT

## 2023-04-18 PROCEDURE — C1751: CPT

## 2023-04-18 PROCEDURE — 36415 COLL VENOUS BLD VENIPUNCTURE: CPT

## 2023-04-18 PROCEDURE — 88280 CHROMOSOME KARYOTYPE STUDY: CPT

## 2023-04-18 PROCEDURE — 85014 HEMATOCRIT: CPT

## 2023-04-18 PROCEDURE — 71260 CT THORAX DX C+: CPT

## 2023-04-18 PROCEDURE — 88342 IMHCHEM/IMCYTCHM 1ST ANTB: CPT

## 2023-04-18 PROCEDURE — 85025 COMPLETE CBC W/AUTO DIFF WBC: CPT

## 2023-04-18 RX ADMIN — Medication 1 DROP(S): at 05:37

## 2023-04-18 RX ADMIN — Medication 1 DROP(S): at 11:28

## 2023-04-18 RX ADMIN — Medication 50 MILLIGRAM(S): at 05:36

## 2023-04-18 RX ADMIN — POLYETHYLENE GLYCOL 3350 17 GRAM(S): 17 POWDER, FOR SOLUTION ORAL at 11:25

## 2023-04-18 RX ADMIN — CHLORHEXIDINE GLUCONATE 1 APPLICATION(S): 213 SOLUTION TOPICAL at 06:09

## 2023-04-18 RX ADMIN — Medication 125 MICROGRAM(S): at 05:36

## 2023-04-18 NOTE — DISCHARGE NOTE NURSING/CASE MANAGEMENT/SOCIAL WORK - NSDCFUADDAPPT_GEN_ALL_CORE_FT
You are being transferred to [facility] for further treatment. You are being transferred to [facility] for further treatment. You are being transferred to [facility] for further treatment.

## 2023-04-18 NOTE — PROGRESS NOTE ADULT - ASSESSMENT
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    Fever of unknown origin.   - CXR without consolidations or effusions; U/A unremarkable; GI PCR Neg   - Cultures remain negative to date   - LE VA duplex --> + For DVT;  V/Q scan -- Low probability   - Was on empiric treatment with Vanc/cefepime --> ID consulted; S/P Zosyn  - Hold home penicillin while on ABX   - ID to follow up, infectious work up as per ID  --> CMV PCR -- + S/P Ganciclovir per ID , CMV IGG (+) and IgM (-), Cryptococcal Ag --Neg, Quantiferon Tb -- Neg, Fungitell -- <31   - CT Chest non-contrast, noted, no acute pathology  - Shock, RRT, resume antibiotics IV fluid, MICU eval , ID follow up  --> Prednisone 50 PO Qd   - S/P Zosyn; Defer ABX as per ID   - IR eval for BM BX as per Heme/Onc --> done on 04/06 with IR, f/u path -- As noted; F/u heme/Onc recs   - ID ordered pan CT, noted questionable cystitis. UA is neg.  - Febrile 04/10 and 04/11 AM. Repeat Cx Neg final; Cefepime now on hold in view of LFTs; F/u ID recs  - RVP 04/10 Neg, herpes 6 Neg   - Recurrent fever; F/u 04/12 BCx2 Neg final   - Entecavir on hold as per Heme/Onc   - Ammonia level WNL, Lactate down-trending   - Maribavir on hold as per ID  - F/u ID recs    Multi organ failure  - GI to follow Lfts, trending up --> Pending ABD US   - Started on high dose steroids, continue   - Awaiting transfer to [facility] for PET Scan, further work up    - S/P CT A/P, results pending     Pain behind eyes, Generalized weakness, lethargy, twitch   - No gross deficits on exam   - Pending CT Head and Orbits -- CT H neg for hemorrhage, 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 Transfuse for Hgb < 7.0, and platelets < 10.K  - GI eval appreciated; F/u recs --> No plans for scope at this time   - Was on Xarelto, held due to drop in Platelets as per Heme/Onc. Monitor platelet count   - S/P 1 unit PRBCs and 1 unit Platelets 04/04, 04/05   - HIT ab: neg  - ASA on hold  - S/ P multiple platelet transfusion/ PRBCs transfusion    - Heme/Onc obtaining further work up   - S/P 2 unit of platelets 04/17 in view of PICC Line and Thrombocytopenia   - Planned for 1 unit of Platelets this AM 04/18    Afib  - Unable to be on AC 2/2 low platelets  - On Digoxin   - Monitor on tele   - Cardio follow up     Multiple myeloma.   - pancytopenia largely at baseline although Hg 6.7 on arrival; s/p 1U PRBC with good response   - Was on acyclovir, / Ganciclovir as per ID   - C/w home entecavir   - S/P dexamethasone, D/C'd by hematology, follow up outpatient after discharge  --> S/P Prednisone   - Pt reports his Revlimid was held  - Heme/Onc consulted; F/u recs   - Resume home aspirin. --> Now on hold   - s/p IR BM Bx  04/06; Atypical histiocytic infiltrate with few EBV positive cells; F/u Heme/Onc recs   - S/P  IVIG 04/13 as per Heme/Onc   - IR eval for Bone Lesion Biopsy per Heme/Onc --> Planned for transfer to [facility]; Appreciate Heme/Onc      - started on high dose solumedrol, discussed with oncology, continue   - low threshold for ICU eval   - PICC line placed 04/17, Holding off on inpatient chemo as per oncology       Chronic diarrhea, now with Constipation   - Standing Imodium switched to PRN  - Monitor for BM - reports having BM     DVT   - Duplex + For R soleal vein DVT  --> Will consider CT A chest once creatinine permits as patient received contrast; Monitor for LOPEZ   - s/p Hep gtt; Monitor PTT; Monitor H/H closely --> Now on hold in view of drop in Hgb and severe thrombocytopenia  - VQ scan to R/O PE -- Low probability   - Vascular eval appreciated; AC as tolerated, on Xarelto 10, monitor H/H, plts too low to start.   - Serial Duplex to assess for propagation  -- Without propagation   - 04/03 Duplex without propagation   - Repeat Duplex 04/10 -- without propogation   - Plan for repeat Duplex 04/17   - given severe thrombocytopenia, holding Xarelto for now. Risk of bleeding greater than benefit.     Elevated LFTs  - Cont to monitor and trend  - Lipitor DC'd. Entecavir DC'd  - Likely drug induced vs from hypotension; ABX and Antiviral's DC'd. Continue to monitor and trend levels closely   - Monitor levels   - Pending ABD US     RK  - S/P Contrast on 03/23   - Monitor Cr closely; Avoid nephrotoxic agents   - Cr down-trending. s/p IVF   - Renal eval appreciated   - urinary retention s/p lloyd.   - Removed lloyd 4/7/23, TOV in progress. post void residual 550ml on bladder scan  - s/p straight cath. may need to replace lloyd if unable to urinarte.   - CT with hydronephrosis; Cr up-trending, Flomax started; F/u renal recs    - Renal US neg for hydronephrosis 04/12   - Cr up-trending; S/P IVF  CC/ Hr. Check bladder scan to R/O retention   - Check Renal US to assess for hydronephrosis   - Lasix and Albumin as per renal     Hypertension, now hypotensive   - C/w home metoprolol.  - C/w Midodrine TID. Hold for SBP > 140, decreased to 5 TID     HypoNa  - Cont to monitor and trend  - Appreciate renal eval.   - Serial labs   - Na down-trending, F/u renal recs     Hiccups  - Improved on Reglan, Monitor     Hyperlipidemia.   - C/w home atorvastatin --> on hold as per GI .    Abnormal CT  - Outpatient follow up for CT findings    Prophylactic measure.   dvt ppx: DVT PPX   diet: regular  ambulate: with assistance    fall precautions  aspiration precautions.       Discussed with Sister at the bedside. Discussed with Heme/Onc and ACP.  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    Fever of unknown origin.   - CXR without consolidations or effusions; U/A unremarkable; GI PCR Neg   - Cultures remain negative to date   - LE VA duplex --> + For DVT;  V/Q scan -- Low probability   - Was on empiric treatment with Vanc/cefepime --> ID consulted; S/P Zosyn  - Hold home penicillin while on ABX   - ID to follow up, infectious work up as per ID  --> CMV PCR -- + S/P Ganciclovir per ID , CMV IGG (+) and IgM (-), Cryptococcal Ag --Neg, Quantiferon Tb -- Neg, Fungitell -- <31   - CT Chest non-contrast, noted, no acute pathology  - Shock, RRT, resume antibiotics IV fluid, MICU eval , ID follow up  --> Prednisone 50 PO Qd   - S/P Zosyn; Defer ABX as per ID   - IR eval for BM BX as per Heme/Onc --> done on 04/06 with IR, f/u path -- As noted; F/u heme/Onc recs   - ID ordered pan CT, noted questionable cystitis. UA is neg.  - Febrile 04/10 and 04/11 AM. Repeat Cx Neg final; Cefepime now on hold in view of LFTs; F/u ID recs  - RVP 04/10 Neg, herpes 6 Neg   - Recurrent fever; F/u 04/12 BCx2 Neg final   - Entecavir on hold as per Heme/Onc   - Ammonia level WNL, Lactate down-trending   - Maribavir on hold as per ID  - F/u ID recs    Multi organ failure  - GI to follow Lfts, trending up --> Pending ABD US   - Started on high dose steroids, continue   - Awaiting transfer to [facility] for PET Scan, further work up    - S/P CT A/P, results pending     Pain behind eyes, Generalized weakness, lethargy, twitch   - No gross deficits on exam   - Pending CT Head and Orbits -- CT H neg for hemorrhage, 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 Transfuse for Hgb < 7.0, and platelets < 10.K  - GI eval appreciated; F/u recs --> No plans for scope at this time   - Was on Xarelto, held due to drop in Platelets as per Heme/Onc. Monitor platelet count   - S/P 1 unit PRBCs and 1 unit Platelets 04/04, 04/05   - HIT ab: neg  - ASA on hold  - S/ P multiple platelet transfusion/ PRBCs transfusion    - Heme/Onc obtaining further work up   - S/P 2 unit of platelets 04/17 in view of PICC Line and Thrombocytopenia   - Planned for 1 unit of Platelets this AM 04/18    Afib  - Unable to be on AC 2/2 low platelets  - On Digoxin   - Monitor on tele   - Cardio follow up     Multiple myeloma.   - pancytopenia largely at baseline although Hg 6.7 on arrival; s/p 1U PRBC with good response   - Was on acyclovir, / Ganciclovir as per ID   - C/w home entecavir   - S/P dexamethasone, D/C'd by hematology, follow up outpatient after discharge  --> S/P Prednisone   - Pt reports his Revlimid was held  - Heme/Onc consulted; F/u recs   - Resume home aspirin. --> Now on hold   - s/p IR BM Bx  04/06; Atypical histiocytic infiltrate with few EBV positive cells; F/u Heme/Onc recs   - S/P  IVIG 04/13 as per Heme/Onc   - IR eval for Bone Lesion Biopsy per Heme/Onc --> Planned for transfer to [facility]; Appreciate Heme/Onc      - started on high dose solumedrol, discussed with oncology, continue   - low threshold for ICU eval   - PICC line placed 04/17, Holding off on inpatient chemo as per oncology       Chronic diarrhea, now with Constipation   - Standing Imodium switched to PRN  - Monitor for BM - reports having BM     DVT   - Duplex + For R soleal vein DVT  --> Will consider CT A chest once creatinine permits as patient received contrast; Monitor for LOPEZ   - s/p Hep gtt; Monitor PTT; Monitor H/H closely --> Now on hold in view of drop in Hgb and severe thrombocytopenia  - VQ scan to R/O PE -- Low probability   - Vascular eval appreciated; AC as tolerated, on Xarelto 10, monitor H/H, plts too low to start.   - Serial Duplex to assess for propagation  -- Without propagation   - 04/03 Duplex without propagation   - Repeat Duplex 04/10 -- without propogation   - Plan for repeat Duplex 04/17   - given severe thrombocytopenia, holding Xarelto for now. Risk of bleeding greater than benefit.     Elevated LFTs  - Cont to monitor and trend  - Lipitor DC'd. Entecavir DC'd  - Likely drug induced vs from hypotension; ABX and Antiviral's DC'd. Continue to monitor and trend levels closely   - Monitor levels   - Pending ABD US     RK  - S/P Contrast on 03/23   - Monitor Cr closely; Avoid nephrotoxic agents   - Cr down-trending. s/p IVF   - Renal eval appreciated   - urinary retention s/p lloyd.   - Removed lloyd 4/7/23, TOV in progress. post void residual 550ml on bladder scan  - s/p straight cath. may need to replace lloyd if unable to urinarte.   - CT with hydronephrosis; Cr up-trending, Flomax started; F/u renal recs    - Renal US neg for hydronephrosis 04/12   - Cr up-trending; S/P IVF  CC/ Hr. Check bladder scan to R/O retention   - Check Renal US to assess for hydronephrosis   - Lasix and Albumin as per renal     Hypertension, now hypotensive   - C/w home metoprolol.  - C/w Midodrine TID. Hold for SBP > 140, decreased to 5 TID     HypoNa  - Cont to monitor and trend  - Appreciate renal eval.   - Serial labs   - Na down-trending, F/u renal recs     Hiccups  - Improved on Reglan, Monitor     Hyperlipidemia.   - C/w home atorvastatin --> on hold as per GI .    Abnormal CT  - Outpatient follow up for CT findings    Prophylactic measure.   dvt ppx: DVT PPX   diet: regular  ambulate: with assistance    fall precautions  aspiration precautions.       Discussed with Sister at the bedside. Discussed with Heme/Onc and ACP.  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    Fever of unknown origin.   - CXR without consolidations or effusions; U/A unremarkable; GI PCR Neg   - Cultures remain negative to date   - LE VA duplex --> + For DVT;  V/Q scan -- Low probability   - Was on empiric treatment with Vanc/cefepime --> ID consulted; S/P Zosyn  - Hold home penicillin while on ABX   - ID to follow up, infectious work up as per ID  --> CMV PCR -- + S/P Ganciclovir per ID , CMV IGG (+) and IgM (-), Cryptococcal Ag --Neg, Quantiferon Tb -- Neg, Fungitell -- <31   - CT Chest non-contrast, noted, no acute pathology  - Shock, RRT, resume antibiotics IV fluid, MICU eval , ID follow up  --> Prednisone 50 PO Qd   - S/P Zosyn; Defer ABX as per ID   - IR eval for BM BX as per Heme/Onc --> done on 04/06 with IR, f/u path -- As noted; F/u heme/Onc recs   - ID ordered pan CT, noted questionable cystitis. UA is neg.  - Febrile 04/10 and 04/11 AM. Repeat Cx Neg final; Cefepime now on hold in view of LFTs; F/u ID recs  - RVP 04/10 Neg, herpes 6 Neg   - Recurrent fever; F/u 04/12 BCx2 Neg final   - Entecavir on hold as per Heme/Onc   - Ammonia level WNL, Lactate down-trending   - Maribavir on hold as per ID  - F/u ID recs    Multi organ failure  - GI to follow Lfts, trending up --> Pending ABD US   - Started on high dose steroids, continue   - Awaiting transfer to [facility] [place] for PET Scan, further work up    - S/P CT A/P, results pending     Pain behind eyes, Generalized weakness, lethargy, twitch   - No gross deficits on exam   - Pending CT Head and Orbits -- CT H neg for hemorrhage, 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 Transfuse for Hgb < 7.0, and platelets < 10.K  - GI eval appreciated; F/u recs --> No plans for scope at this time   - Was on Xarelto, held due to drop in Platelets as per Heme/Onc. Monitor platelet count   - S/P 1 unit PRBCs and 1 unit Platelets 04/04, 04/05   - HIT ab: neg  - ASA on hold  - S/ P multiple platelet transfusion/ PRBCs transfusion    - Heme/Onc obtaining further work up   - S/P 2 unit of platelets 04/17 in view of PICC Line and Thrombocytopenia   - Planned for 1 unit of Platelets this AM 04/18    Afib  - Unable to be on AC 2/2 low platelets  - On Digoxin   - Monitor on tele   - Cardio follow up     Multiple myeloma.   - pancytopenia largely at baseline although Hg 6.7 on arrival; s/p 1U PRBC with good response   - Was on acyclovir, / Ganciclovir as per ID   - C/w home entecavir   - S/P dexamethasone, D/C'd by hematology, follow up outpatient after discharge  --> S/P Prednisone   - Pt reports his Revlimid was held  - Heme/Onc consulted; F/u recs   - Resume home aspirin. --> Now on hold   - s/p IR BM Bx  04/06; Atypical histiocytic infiltrate with few EBV positive cells; F/u Heme/Onc recs   - S/P  IVIG 04/13 as per Heme/Onc   - IR eval for Bone Lesion Biopsy per Heme/Onc --> Planned for transfer to [facility] [place]; Appreciate Heme/Onc      - started on high dose solumedrol, discussed with oncology, continue   - low threshold for ICU eval   - PICC line placed 04/17, Holding off on inpatient chemo as per oncology       Chronic diarrhea, now with Constipation   - Standing Imodium switched to PRN  - Monitor for BM - reports having BM     DVT   - Duplex + For R soleal vein DVT  --> Will consider CT A chest once creatinine permits as patient received contrast; Monitor for LOPEZ   - s/p Hep gtt; Monitor PTT; Monitor H/H closely --> Now on hold in view of drop in Hgb and severe thrombocytopenia  - VQ scan to R/O PE -- Low probability   - Vascular eval appreciated; AC as tolerated, on Xarelto 10, monitor H/H, plts too low to start.   - Serial Duplex to assess for propagation  -- Without propagation   - 04/03 Duplex without propagation   - Repeat Duplex 04/10 -- without propogation   - Plan for repeat Duplex 04/17   - given severe thrombocytopenia, holding Xarelto for now. Risk of bleeding greater than benefit.     Elevated LFTs  - Cont to monitor and trend  - Lipitor DC'd. Entecavir DC'd  - Likely drug induced vs from hypotension; ABX and Antiviral's DC'd. Continue to monitor and trend levels closely   - Monitor levels   - Pending ABD US     RK  - S/P Contrast on 03/23   - Monitor Cr closely; Avoid nephrotoxic agents   - Cr down-trending. s/p IVF   - Renal eval appreciated   - urinary retention s/p lloyd.   - Removed lloyd 4/7/23, TOV in progress. post void residual 550ml on bladder scan  - s/p straight cath. may need to replace lloyd if unable to urinarte.   - CT with hydronephrosis; Cr up-trending, Flomax started; F/u renal recs    - Renal US neg for hydronephrosis 04/12   - Cr up-trending; S/P IVF  CC/ Hr. Check bladder scan to R/O retention   - Check Renal US to assess for hydronephrosis   - Lasix and Albumin as per renal     Hypertension, now hypotensive   - C/w home metoprolol.  - C/w Midodrine TID. Hold for SBP > 140, decreased to 5 TID     HypoNa  - Cont to monitor and trend  - Appreciate renal eval.   - Serial labs   - Na down-trending, F/u renal recs     Hiccups  - Improved on Reglan, Monitor     Hyperlipidemia.   - C/w home atorvastatin --> on hold as per GI .    Abnormal CT  - Outpatient follow up for CT findings    Prophylactic measure.   dvt ppx: DVT PPX   diet: regular  ambulate: with assistance    fall precautions  aspiration precautions.       Discussed with Sister at the bedside. Discussed with Heme/Onc and ACP.

## 2023-04-18 NOTE — PROGRESS NOTE ADULT - SUBJECTIVE AND OBJECTIVE BOX
Chief Complaint:  Patient is a 67y old  Male who presents with a chief complaint of fever (18 Apr 2023 12:48)      Date of service 04-18-23 @ 13:05      Interval Events:  Patient seen and examined.   s/p CT abdomen/ pelvis  Planned for transfer to [facility] [place].      Hospital Medications:  acetaminophen     Tablet .. 650 milliGRAM(s) Oral once  acetaminophen     Tablet .. 650 milliGRAM(s) Oral every 6 hours PRN  aluminum hydroxide/magnesium hydroxide/simethicone Suspension 30 milliLiter(s) Oral every 4 hours PRN  artificial tears (preservative free) Ophthalmic Solution 1 Drop(s) Both EYES four times a day  chlorhexidine 2% Cloths 1 Application(s) Topical daily  chlorhexidine 4% Liquid 1 Application(s) Topical <User Schedule>  digoxin     Tablet 125 MICROGram(s) Oral daily  loperamide 2 milliGRAM(s) Oral three times a day PRN  methylPREDNISolone sodium succinate IVPB 500 milliGRAM(s) IV Intermittent daily  midodrine. 10 milliGRAM(s) Oral three times a day  pantoprazole    Tablet 40 milliGRAM(s) Oral before breakfast  polyethylene glycol 3350 17 Gram(s) Oral daily  sodium chloride 0.9% lock flush 10 milliLiter(s) IV Push every 1 hour PRN        Review of Systems:  General:  No wt loss, fevers, chills, night sweats, fatigue,   Eyes:  Good vision, no reported pain  ENT:  No sore throat, pain, runny nose, dysphagia  CV:  No pain, palpitations, hypo/hypertension  Resp:  No dyspnea, cough, tachypnea, wheezing  GI:  See HPI  :  No pain, bleeding, incontinence, nocturia  Muscle:  No pain, weakness  Neuro:  No weakness, tingling, memory problems  Psych:  No fatigue, insomnia, mood problems, depression  Endocrine:  No polyuria, polydipsia, cold/heat intolerance  Heme:  No petechiae, ecchymosis, easy bruisability  Integumentary:  No rash, edema    PHYSICAL EXAM:   Vital Signs:  Vital Signs Last 24 Hrs  T(C): 36.9 (18 Apr 2023 08:02), Max: 37.7 (18 Apr 2023 04:22)  T(F): 98.4 (18 Apr 2023 08:02), Max: 99.9 (18 Apr 2023 04:22)  HR: 81 (18 Apr 2023 08:02) (74 - 81)  BP: 163/65 (18 Apr 2023 08:02) (144/83 - 163/65)  BP(mean): --  RR: 20 (18 Apr 2023 08:02) (18 - 20)  SpO2: 93% (18 Apr 2023 08:02) (92% - 98%)    Parameters below as of 18 Apr 2023 08:02  Patient On (Oxygen Delivery Method): nasal cannula  O2 Flow (L/min): 3    Daily     Daily       PHYSICAL EXAM:     GENERAL:  Appears stated age, well-groomed, well-nourished, no distress  HEENT:  NC/AT,  conjunctivae anicteric, clear and pink,   NECK: supple, trachea midline  CHEST:  Full & symmetric excursion, no increased effort, breath sounds clear  HEART:  Regular rhythm, no JVD  ABDOMEN:  Soft, non-tender, non-distended, normoactive bowel sounds,  no masses , no hepatosplenomegaly  EXTREMITIES:  no cyanosis,clubbing or edema  SKIN:  No rash, erythema, or, ecchymoses, no jaundice  NEURO:  Alert, non-focal, no asterixis  PSYCH: Appropriate affect, oriented to place and time  RECTAL: Deferred      LABS Personally reviewed by me:                        7.3    0.49  )-----------( 8        ( 18 Apr 2023 06:19 )             20.0     Mean Cell Volume: 81.9 fl (04-18-23 @ 06:19)    04-18    127<L>  |  90<L>  |  136<H>  ----------------------------<  421<H>  4.9   |  16<L>  |  4.27<H>    Ca    7.6<L>      18 Apr 2023 06:19    TPro  4.4<L>  /  Alb  2.6<L>  /  TBili  12.3<H>  /  DBili  9.7<H>  /  AST  32  /  ALT  58<H>  /  AlkPhos  352<H>  04-18    LIVER FUNCTIONS - ( 18 Apr 2023 06:19 )  Alb: 2.6 g/dL / Pro: 4.4 g/dL / ALK PHOS: 352 U/L / ALT: 58 U/L / AST: 32 U/L / GGT: x           PT/INR - ( 18 Apr 2023 10:12 )   PT: 14.8 sec;   INR: 1.28 ratio         PTT - ( 18 Apr 2023 10:12 )  PTT:30.0 sec    Amylase Serum--      Lipase serum--       [id number]                          7.3    0.49  )-----------( 8        ( 18 Apr 2023 06:19 )             20.0                         7.7    0.79  )-----------( 6        ( 17 Apr 2023 06:20 )             21.4                         7.3    1.03  )-----------( 6        ( 16 Apr 2023 11:17 )             20.7       Imaging personally reviewed by me:             Chief Complaint:  Patient is a 67y old  Male who presents with a chief complaint of fever (18 Apr 2023 12:48)      Date of service 04-18-23 @ 13:05      Interval Events:  Patient seen and examined.   s/p CT abdomen/ pelvis  Planned for transfer to [facility] [place].      Hospital Medications:  acetaminophen     Tablet .. 650 milliGRAM(s) Oral once  acetaminophen     Tablet .. 650 milliGRAM(s) Oral every 6 hours PRN  aluminum hydroxide/magnesium hydroxide/simethicone Suspension 30 milliLiter(s) Oral every 4 hours PRN  artificial tears (preservative free) Ophthalmic Solution 1 Drop(s) Both EYES four times a day  chlorhexidine 2% Cloths 1 Application(s) Topical daily  chlorhexidine 4% Liquid 1 Application(s) Topical <User Schedule>  digoxin     Tablet 125 MICROGram(s) Oral daily  loperamide 2 milliGRAM(s) Oral three times a day PRN  methylPREDNISolone sodium succinate IVPB 500 milliGRAM(s) IV Intermittent daily  midodrine. 10 milliGRAM(s) Oral three times a day  pantoprazole    Tablet 40 milliGRAM(s) Oral before breakfast  polyethylene glycol 3350 17 Gram(s) Oral daily  sodium chloride 0.9% lock flush 10 milliLiter(s) IV Push every 1 hour PRN        Review of Systems:  General:  No wt loss, fevers, chills, night sweats, fatigue,   Eyes:  Good vision, no reported pain  ENT:  No sore throat, pain, runny nose, dysphagia  CV:  No pain, palpitations, hypo/hypertension  Resp:  No dyspnea, cough, tachypnea, wheezing  GI:  See HPI  :  No pain, bleeding, incontinence, nocturia  Muscle:  No pain, weakness  Neuro:  No weakness, tingling, memory problems  Psych:  No fatigue, insomnia, mood problems, depression  Endocrine:  No polyuria, polydipsia, cold/heat intolerance  Heme:  No petechiae, ecchymosis, easy bruisability  Integumentary:  No rash, edema    PHYSICAL EXAM:   Vital Signs:  Vital Signs Last 24 Hrs  T(C): 36.9 (18 Apr 2023 08:02), Max: 37.7 (18 Apr 2023 04:22)  T(F): 98.4 (18 Apr 2023 08:02), Max: 99.9 (18 Apr 2023 04:22)  HR: 81 (18 Apr 2023 08:02) (74 - 81)  BP: 163/65 (18 Apr 2023 08:02) (144/83 - 163/65)  BP(mean): --  RR: 20 (18 Apr 2023 08:02) (18 - 20)  SpO2: 93% (18 Apr 2023 08:02) (92% - 98%)    Parameters below as of 18 Apr 2023 08:02  Patient On (Oxygen Delivery Method): nasal cannula  O2 Flow (L/min): 3    Daily     Daily       PHYSICAL EXAM:     GENERAL:  Appears stated age, well-groomed, well-nourished, no distress  HEENT:  NC/AT,  conjunctivae anicteric, clear and pink,   NECK: supple, trachea midline  CHEST:  Full & symmetric excursion, no increased effort, breath sounds clear  HEART:  Regular rhythm, no JVD  ABDOMEN:  Soft, non-tender, non-distended, normoactive bowel sounds,  no masses , no hepatosplenomegaly  EXTREMITIES:  no cyanosis,clubbing or edema  SKIN:  No rash, erythema, or, ecchymoses, no jaundice  NEURO:  Alert, non-focal, no asterixis  PSYCH: Appropriate affect, oriented to place and time  RECTAL: Deferred      LABS Personally reviewed by me:                        7.3    0.49  )-----------( 8        ( 18 Apr 2023 06:19 )             20.0     Mean Cell Volume: 81.9 fl (04-18-23 @ 06:19)    04-18    127<L>  |  90<L>  |  136<H>  ----------------------------<  421<H>  4.9   |  16<L>  |  4.27<H>    Ca    7.6<L>      18 Apr 2023 06:19    TPro  4.4<L>  /  Alb  2.6<L>  /  TBili  12.3<H>  /  DBili  9.7<H>  /  AST  32  /  ALT  58<H>  /  AlkPhos  352<H>  04-18    LIVER FUNCTIONS - ( 18 Apr 2023 06:19 )  Alb: 2.6 g/dL / Pro: 4.4 g/dL / ALK PHOS: 352 U/L / ALT: 58 U/L / AST: 32 U/L / GGT: x           PT/INR - ( 18 Apr 2023 10:12 )   PT: 14.8 sec;   INR: 1.28 ratio         PTT - ( 18 Apr 2023 10:12 )  PTT:30.0 sec    Amylase Serum--      Lipase serum--       [id number]                          7.3    0.49  )-----------( 8        ( 18 Apr 2023 06:19 )             20.0                         7.7    0.79  )-----------( 6        ( 17 Apr 2023 06:20 )             21.4                         7.3    1.03  )-----------( 6        ( 16 Apr 2023 11:17 )             20.7       Imaging personally reviewed by me:             Chief Complaint:  Patient is a 67y old  Male who presents with a chief complaint of fever (18 Apr 2023 12:48)      Date of service 04-18-23 @ 13:05      Interval Events:  Patient seen and examined.   s/p CT abdomen/ pelvis  Planned for transfer to [facility] [place].      Hospital Medications:  acetaminophen     Tablet .. 650 milliGRAM(s) Oral once  acetaminophen     Tablet .. 650 milliGRAM(s) Oral every 6 hours PRN  aluminum hydroxide/magnesium hydroxide/simethicone Suspension 30 milliLiter(s) Oral every 4 hours PRN  artificial tears (preservative free) Ophthalmic Solution 1 Drop(s) Both EYES four times a day  chlorhexidine 2% Cloths 1 Application(s) Topical daily  chlorhexidine 4% Liquid 1 Application(s) Topical <User Schedule>  digoxin     Tablet 125 MICROGram(s) Oral daily  loperamide 2 milliGRAM(s) Oral three times a day PRN  methylPREDNISolone sodium succinate IVPB 500 milliGRAM(s) IV Intermittent daily  midodrine. 10 milliGRAM(s) Oral three times a day  pantoprazole    Tablet 40 milliGRAM(s) Oral before breakfast  polyethylene glycol 3350 17 Gram(s) Oral daily  sodium chloride 0.9% lock flush 10 milliLiter(s) IV Push every 1 hour PRN        Review of Systems:  General:  No wt loss, fevers, chills, night sweats, fatigue,   Eyes:  Good vision, no reported pain  ENT:  No sore throat, pain, runny nose, dysphagia  CV:  No pain, palpitations, hypo/hypertension  Resp:  No dyspnea, cough, tachypnea, wheezing  GI:  See HPI  :  No pain, bleeding, incontinence, nocturia  Muscle:  No pain, weakness  Neuro:  No weakness, tingling, memory problems  Psych:  No fatigue, insomnia, mood problems, depression  Endocrine:  No polyuria, polydipsia, cold/heat intolerance  Heme:  No petechiae, ecchymosis, easy bruisability  Integumentary:  No rash, edema    PHYSICAL EXAM:   Vital Signs:  Vital Signs Last 24 Hrs  T(C): 36.9 (18 Apr 2023 08:02), Max: 37.7 (18 Apr 2023 04:22)  T(F): 98.4 (18 Apr 2023 08:02), Max: 99.9 (18 Apr 2023 04:22)  HR: 81 (18 Apr 2023 08:02) (74 - 81)  BP: 163/65 (18 Apr 2023 08:02) (144/83 - 163/65)  BP(mean): --  RR: 20 (18 Apr 2023 08:02) (18 - 20)  SpO2: 93% (18 Apr 2023 08:02) (92% - 98%)    Parameters below as of 18 Apr 2023 08:02  Patient On (Oxygen Delivery Method): nasal cannula  O2 Flow (L/min): 3    Daily     Daily       PHYSICAL EXAM:     GENERAL:  Appears stated age, well-groomed, well-nourished, no distress  HEENT:  NC/AT,  conjunctivae anicteric, clear and pink,   NECK: supple, trachea midline  CHEST:  Full & symmetric excursion, no increased effort, breath sounds clear  HEART:  Regular rhythm, no JVD  ABDOMEN:  Soft, non-tender, non-distended, normoactive bowel sounds,  no masses , no hepatosplenomegaly  EXTREMITIES:  no cyanosis,clubbing or edema  SKIN:  No rash, erythema, or, ecchymoses, no jaundice  NEURO:  Alert, non-focal, no asterixis  PSYCH: Appropriate affect, oriented to place and time  RECTAL: Deferred      LABS Personally reviewed by me:                        7.3    0.49  )-----------( 8        ( 18 Apr 2023 06:19 )             20.0     Mean Cell Volume: 81.9 fl (04-18-23 @ 06:19)    04-18    127<L>  |  90<L>  |  136<H>  ----------------------------<  421<H>  4.9   |  16<L>  |  4.27<H>    Ca    7.6<L>      18 Apr 2023 06:19    TPro  4.4<L>  /  Alb  2.6<L>  /  TBili  12.3<H>  /  DBili  9.7<H>  /  AST  32  /  ALT  58<H>  /  AlkPhos  352<H>  04-18    LIVER FUNCTIONS - ( 18 Apr 2023 06:19 )  Alb: 2.6 g/dL / Pro: 4.4 g/dL / ALK PHOS: 352 U/L / ALT: 58 U/L / AST: 32 U/L / GGT: x           PT/INR - ( 18 Apr 2023 10:12 )   PT: 14.8 sec;   INR: 1.28 ratio         PTT - ( 18 Apr 2023 10:12 )  PTT:30.0 sec    Amylase Serum--      Lipase serum--       [id number]                          7.3    0.49  )-----------( 8        ( 18 Apr 2023 06:19 )             20.0                         7.7    0.79  )-----------( 6        ( 17 Apr 2023 06:20 )             21.4                         7.3    1.03  )-----------( 6        ( 16 Apr 2023 11:17 )             20.7       Imaging personally reviewed by me:

## 2023-04-18 NOTE — PROGRESS NOTE ADULT - ASSESSMENT
New onset afib, aflutter  HR stable   off a/c high bleed risk   cont 0.125mg daily     History of bioAVR   echo unremarkable     Anemia  Monitor hemoglobin, transfuse as needed.  plan as per Heme    DVT   a/c on hold  low plts  fu with vascular surgery / heme     Hypotension   due to sepsis   better now   on midodrine   can lower dose as BP is improving

## 2023-04-18 NOTE — PROGRESS NOTE ADULT - ASSESSMENT
PETER DE LA PAZ is a 67y Male who presents with a chief complaint of fever    Multiple Myeloma  ·	Patient follows with Dr. Jean Claude Hubbard, [facility].  ·	Last on daratumumab + CyBorD, last dose on March 23rd.  ·	Continue acyclovir.  ·	No systemic therapy while inpatient or during rehabilitation.    Pancytopenia  Possible HLH  ·	Patient with continued worsening pancytopenia; now severe leukopenia, anemia, thrombocytopenia.  ·	Bone marrow biopsy on April 6th preliminary showed minimal hematopoiesis but significant histiocytic proliferation and minimal hemophagocytosis.   ·	Multiorgan system failure with increasing creatinine and bilirubin. Ferritin remains extremely elevated. 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   ·	At this time, given continued fever, worsening cytopenia, worsening kidney and liver functions, elevated ferritin and IL-25 soluble receptor, will treat as if this is HLH  ·	Unclear etiology of HLH given lack of myeloma on marrow. Possibly infectious with EBV and CMV?  ·	S/p IR PICC line. To continue high dose steroids. Holding off on etoposide given transfer to [facility] [place] delayed. Planned for 75 mg [dose reduced by 75%]  ·	Continue filgrastim for neutropenia.  ·	Monitor CBC and transfuse to maintain HGB > 7 and PLT > 10.  ·	Maribavir has been stopped by infectious disease.     Fever  ·	Unclear etiology. Unlikely to be infectious in origin per infectious disease.  ·	Monitoring off antibiotics at this time.     Pending ammonium level; may need to start lactulose for hepatic encephalopathy.    Pending transfer to [facility] [place].     Quirino Holloway PA-C  Hematology/Oncology  [facility]  [phone] (office) PETER DE LA PAZ is a 67y Male who presents with a chief complaint of fever    Multiple Myeloma  ·	Patient follows with Dr. Jean Claude Hubbard, [facility].  ·	Last on daratumumab + CyBorD, last dose on March 23rd.  ·	Continue acyclovir.  ·	No systemic therapy while inpatient or during rehabilitation.    Pancytopenia  Possible HLH  ·	Patient with continued worsening pancytopenia; now severe leukopenia, anemia, thrombocytopenia.  ·	Bone marrow biopsy on April 6th preliminary showed minimal hematopoiesis but significant histiocytic proliferation and minimal hemophagocytosis.   ·	Multiorgan system failure with increasing creatinine and bilirubin. Ferritin remains extremely elevated. 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   ·	At this time, given continued fever, worsening cytopenia, worsening kidney and liver functions, elevated ferritin and IL-25 soluble receptor, will treat as if this is HLH  ·	Unclear etiology of HLH given lack of myeloma on marrow. Possibly infectious with EBV and CMV?  ·	S/p IR PICC line. To continue high dose steroids. Holding off on etoposide given [place] [facility] [place] Planned for 75 mg [dose reduced by 75%]  ·	Continue filgrastim for neutropenia.  ·	Monitor CBC and transfuse to maintain HGB > 7 and PLT > 10.  ·	Maribavir has been stopped by infectious disease.     Fever  ·	Unclear etiology. Unlikely to be infectious in origin per infectious disease.  ·	Monitoring off antibiotics at this time.     Pending ammonium level; may need to start lactulose for hepatic encephalopathy.    [place] [facility] [place]     Quirino Holloway PA-C  Hematology/Oncology  [facility]  [phone] (office) PETER DE LA PAZ is a 67y Male who presents with a chief complaint of fever    Multiple Myeloma  ·	Patient follows with Dr. Jean Claude Hubbard, [facility].  ·	Last on daratumumab + CyBorD, last dose on March 23rd.  ·	Continue acyclovir.  ·	No systemic therapy while inpatient or during rehabilitation.    Pancytopenia  Possible HLH  ·	Patient with continued worsening pancytopenia; now severe leukopenia, anemia, thrombocytopenia.  ·	Bone marrow biopsy on April 6th preliminary showed minimal hematopoiesis but significant histiocytic proliferation and minimal hemophagocytosis.   ·	Multiorgan system failure with increasing creatinine and bilirubin. Ferritin remains extremely elevated. 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   ·	At this time, given continued fever, worsening cytopenia, worsening kidney and liver functions, elevated ferritin and IL-25 soluble receptor, will treat as if this is HLH  ·	Unclear etiology of HLH given lack of myeloma on marrow. Possibly infectious with EBV and CMV?  ·	S/p IR PICC line. To continue high dose steroids. Holding off on etoposide given transfer to [facility] [place] delayed. Planned for 75 mg [dose reduced by 75%]  ·	Continue filgrastim for neutropenia.  ·	Monitor CBC and transfuse to maintain HGB > 7 and PLT > 10.  ·	Maribavir has been stopped by infectious disease.     Fever  ·	Unclear etiology. Unlikely to be infectious in origin per infectious disease.  ·	Monitoring off antibiotics at this time.     Pending ammonium level; may need to start lactulose for hepatic encephalopathy.    Pending transfer to [facility] [place].     Quirino Holloway PA-C  Hematology/Oncology  [facility]  [phone] (office)

## 2023-04-18 NOTE — PROGRESS NOTE ADULT - SUBJECTIVE AND OBJECTIVE BOX
Subjective: Patient seen and examined. No new events except as noted.     SUBJECTIVE/ROS:  nad      MEDICATIONS:  MEDICATIONS  (STANDING):  acetaminophen     Tablet .. 650 milliGRAM(s) Oral once  artificial tears (preservative free) Ophthalmic Solution 1 Drop(s) Both EYES four times a day  chlorhexidine 2% Cloths 1 Application(s) Topical daily  chlorhexidine 4% Liquid 1 Application(s) Topical <User Schedule>  digoxin     Tablet 125 MICROGram(s) Oral daily  methylPREDNISolone sodium succinate IVPB 500 milliGRAM(s) IV Intermittent daily  midodrine. 10 milliGRAM(s) Oral three times a day  pantoprazole    Tablet 40 milliGRAM(s) Oral before breakfast  polyethylene glycol 3350 17 Gram(s) Oral daily      PHYSICAL EXAM:  T(C): 37.7 (04-18-23 @ 04:22), Max: 37.7 (04-18-23 @ 04:22)  HR: 78 (04-18-23 @ 04:22) (74 - 80)  BP: 153/74 (04-18-23 @ 04:22) (130/72 - 153/74)  RR: 18 (04-18-23 @ 04:22) (18 - 18)  SpO2: 92% (04-18-23 @ 04:22) (90% - 98%)  Wt(kg): --  I&O's Summary    17 Apr 2023 07:01  -  18 Apr 2023 07:00  --------------------------------------------------------  IN: 0 mL / OUT: 1250 mL / NET: -1250 mL            JVP: Normal  Neck: supple  Lung: clear   CV: S1 S2 , Murmur:  Abd: soft  Ext: No edema  neuro: Awake / alert  Psych: flat affect  Skin: normal``    LABS/DATA:    CARDIAC MARKERS:                                7.3    0.49  )-----------( 8        ( 18 Apr 2023 06:19 )             20.0     04-18    127<L>  |  90<L>  |  136<H>  ----------------------------<  421<H>  4.9   |  16<L>  |  4.27<H>    Ca    7.6<L>      18 Apr 2023 06:19    TPro  4.4<L>  /  Alb  2.6<L>  /  TBili  12.3<H>  /  DBili  9.7<H>  /  AST  32  /  ALT  58<H>  /  AlkPhos  352<H>  04-18    proBNP:   Lipid Profile:   HgA1c:   TSH:     TELE:  EKG:

## 2023-04-18 NOTE — CHART NOTE - NSCHARTNOTESELECT_GEN_ALL_CORE
Elevated Temp/Event Note
Event Note
Fever 103.1F/Event Note
Fever/Event Note
IR/Event Note
Infectious disease/Event Note
fever -recurrent/Event Note
Interventional Radiology/Event Note
Event Note
Intramuscular Hematoma/Event Note
RK/Event Note
fever/Event Note

## 2023-04-18 NOTE — PROVIDER CONTACT NOTE (CRITICAL VALUE NOTIFICATION) - RECOMMENDATIONS
notify provider
Notify ACP
Notify Provider.
notify ACP,
administer platelets ?
Notify ACP, platelets?
Notify ACP
Notify provider, required multiple transfusion throughout admission for MM
recheck CBC?
ACP notified. Labs to be redrawn
recheck H&H
Notify provider
Notify provider
Notify provider.

## 2023-04-18 NOTE — PROVIDER CONTACT NOTE (CRITICAL VALUE NOTIFICATION) - TEST AND RESULT REPORTED:
platelet-7
Aptt 122.4
Hemoglobin 6.7 Hematocrit 19.1  Platelets 20
Platelet 15k
Platelets 20
Hemoglobin= 6.7  Hematocrit= 19.2
plt 14
Platelet 14
Platelet 7
Hematocrit 20.9 and Platelet 12
WBC 0.79, Plt 6
WBC 0.87, Platelet 14
hct-20.0
Critical Lab- Platelets 10
WBC 0.82, Hemoglobin 6.8 , Hematocrit 20.4 , Platelet 12
plt 6
platelets 17
Hgb 6.7 Hct 20.2 Plt 10 Lactate 3.6
WBC, Hct, Plt
hgb 6.1, hct 17.2, plt 15

## 2023-04-18 NOTE — PROGRESS NOTE ADULT - SUBJECTIVE AND OBJECTIVE BOX
Patient is a 67y old  Male who presents with a chief complaint of fever (18 Apr 2023 09:42)    Patient seen and examined at bedside. Planned for transfer to [facility] [place] today.    MEDICATIONS  (STANDING):  acetaminophen     Tablet .. 650 milliGRAM(s) Oral once  artificial tears (preservative free) Ophthalmic Solution 1 Drop(s) Both EYES four times a day  chlorhexidine 2% Cloths 1 Application(s) Topical daily  chlorhexidine 4% Liquid 1 Application(s) Topical <User Schedule>  digoxin     Tablet 125 MICROGram(s) Oral daily  methylPREDNISolone sodium succinate IVPB 500 milliGRAM(s) IV Intermittent daily  midodrine. 10 milliGRAM(s) Oral three times a day  pantoprazole    Tablet 40 milliGRAM(s) Oral before breakfast  polyethylene glycol 3350 17 Gram(s) Oral daily    MEDICATIONS  (PRN):  acetaminophen     Tablet .. 650 milliGRAM(s) Oral every 6 hours PRN Temp greater or equal to 38C (100.4F), Mild Pain (1 - 3)  aluminum hydroxide/magnesium hydroxide/simethicone Suspension 30 milliLiter(s) Oral every 4 hours PRN Dyspepsia  loperamide 2 milliGRAM(s) Oral three times a day PRN Diarrhea  sodium chloride 0.9% lock flush 10 milliLiter(s) IV Push every 1 hour PRN Pre/post blood products, medications, blood draw, and to maintain line patency      Vital Signs Last 24 Hrs  T(C): 37.7 (18 Apr 2023 04:22), Max: 37.7 (18 Apr 2023 04:22)  T(F): 99.9 (18 Apr 2023 04:22), Max: 99.9 (18 Apr 2023 04:22)  HR: 78 (18 Apr 2023 04:22) (74 - 80)  BP: 153/74 (18 Apr 2023 04:22) (144/83 - 153/74)  BP(mean): --  RR: 18 (18 Apr 2023 04:22) (18 - 18)  SpO2: 92% (18 Apr 2023 04:22) (92% - 98%)    Parameters below as of 18 Apr 2023 04:22  Patient On (Oxygen Delivery Method): nasal cannula  O2 Flow (L/min): 3      PE  NAD  Awake, alert  Scleral icterus, MMM  No c/c/e  Jaundiced  FROM                          7.3    0.49  )-----------( 8        ( 18 Apr 2023 06:19 )             20.0       04-18    127<L>  |  90<L>  |  136<H>  ----------------------------<  421<H>  4.9   |  16<L>  |  4.27<H>    Ca    7.6<L>      18 Apr 2023 06:19    TPro  4.4<L>  /  Alb  2.6<L>  /  TBili  12.3<H>  /  DBili  9.7<H>  /  AST  32  /  ALT  58<H>  /  AlkPhos  352<H>  04-18       Patient is a 67y old  Male who presents with a chief complaint of fever (18 Apr 2023 09:42)    Patient seen and examined at bedside. Planned for transfer to [facility] today.    MEDICATIONS  (STANDING):  acetaminophen     Tablet .. 650 milliGRAM(s) Oral once  artificial tears (preservative free) Ophthalmic Solution 1 Drop(s) Both EYES four times a day  chlorhexidine 2% Cloths 1 Application(s) Topical daily  chlorhexidine 4% Liquid 1 Application(s) Topical <User Schedule>  digoxin     Tablet 125 MICROGram(s) Oral daily  methylPREDNISolone sodium succinate IVPB 500 milliGRAM(s) IV Intermittent daily  midodrine. 10 milliGRAM(s) Oral three times a day  pantoprazole    Tablet 40 milliGRAM(s) Oral before breakfast  polyethylene glycol 3350 17 Gram(s) Oral daily    MEDICATIONS  (PRN):  acetaminophen     Tablet .. 650 milliGRAM(s) Oral every 6 hours PRN Temp greater or equal to 38C (100.4F), Mild Pain (1 - 3)  aluminum hydroxide/magnesium hydroxide/simethicone Suspension 30 milliLiter(s) Oral every 4 hours PRN Dyspepsia  loperamide 2 milliGRAM(s) Oral three times a day PRN Diarrhea  sodium chloride 0.9% lock flush 10 milliLiter(s) IV Push every 1 hour PRN Pre/post blood products, medications, blood draw, and to maintain line patency      Vital Signs Last 24 Hrs  T(C): 37.7 (18 Apr 2023 04:22), Max: 37.7 (18 Apr 2023 04:22)  T(F): 99.9 (18 Apr 2023 04:22), Max: 99.9 (18 Apr 2023 04:22)  HR: 78 (18 Apr 2023 04:22) (74 - 80)  BP: 153/74 (18 Apr 2023 04:22) (144/83 - 153/74)  BP(mean): --  RR: 18 (18 Apr 2023 04:22) (18 - 18)  SpO2: 92% (18 Apr 2023 04:22) (92% - 98%)    Parameters below as of 18 Apr 2023 04:22  Patient On (Oxygen Delivery Method): nasal cannula  O2 Flow (L/min): 3      PE  NAD  Awake, alert  Scleral icterus, MMM  No c/c/e  Jaundiced  FROM                          7.3    0.49  )-----------( 8        ( 18 Apr 2023 06:19 )             20.0       04-18    127<L>  |  90<L>  |  136<H>  ----------------------------<  421<H>  4.9   |  16<L>  |  4.27<H>    Ca    7.6<L>      18 Apr 2023 06:19    TPro  4.4<L>  /  Alb  2.6<L>  /  TBili  12.3<H>  /  DBili  9.7<H>  /  AST  32  /  ALT  58<H>  /  AlkPhos  352<H>  04-18       Patient is a 67y old  Male who presents with a chief complaint of fever (18 Apr 2023 09:42)    Patient seen and examined at bedside. Planned for transfer to [facility] today.    MEDICATIONS  (STANDING):  acetaminophen     Tablet .. 650 milliGRAM(s) Oral once  artificial tears (preservative free) Ophthalmic Solution 1 Drop(s) Both EYES four times a day  chlorhexidine 2% Cloths 1 Application(s) Topical daily  chlorhexidine 4% Liquid 1 Application(s) Topical <User Schedule>  digoxin     Tablet 125 MICROGram(s) Oral daily  methylPREDNISolone sodium succinate IVPB 500 milliGRAM(s) IV Intermittent daily  midodrine. 10 milliGRAM(s) Oral three times a day  pantoprazole    Tablet 40 milliGRAM(s) Oral before breakfast  polyethylene glycol 3350 17 Gram(s) Oral daily    MEDICATIONS  (PRN):  acetaminophen     Tablet .. 650 milliGRAM(s) Oral every 6 hours PRN Temp greater or equal to 38C (100.4F), Mild Pain (1 - 3)  aluminum hydroxide/magnesium hydroxide/simethicone Suspension 30 milliLiter(s) Oral every 4 hours PRN Dyspepsia  loperamide 2 milliGRAM(s) Oral three times a day PRN Diarrhea  sodium chloride 0.9% lock flush 10 milliLiter(s) IV Push every 1 hour PRN Pre/post blood products, medications, blood draw, and to maintain line patency      Vital Signs Last 24 Hrs  T(C): 37.7 (18 Apr 2023 04:22), Max: 37.7 (18 Apr 2023 04:22)  T(F): 99.9 (18 Apr 2023 04:22), Max: 99.9 (18 Apr 2023 04:22)  HR: 78 (18 Apr 2023 04:22) (74 - 80)  BP: 153/74 (18 Apr 2023 04:22) (144/83 - 153/74)  BP(mean): --  RR: 18 (18 Apr 2023 04:22) (18 - 18)  SpO2: 92% (18 Apr 2023 04:22) (92% - 98%)    Parameters below as of 18 Apr 2023 04:22  Patient On (Oxygen Delivery Method): nasal cannula  O2 Flow (L/min): 3      PE  NAD  Awake, alert  Scleral icterus, MMM  No c/c/e  Jaundiced  FROM                          7.3    0.49  )-----------( 8        ( 18 Apr 2023 06:19 )             20.0       04-18    127<L>  |  90<L>  |  136<H>  ----------------------------<  421<H>  4.9   |  16<L>  |  4.27<H>    Ca    7.6<L>      18 Apr 2023 06:19    TPro  4.4<L>  /  Alb  2.6<L>  /  TBili  12.3<H>  /  DBili  9.7<H>  /  AST  32  /  ALT  58<H>  /  AlkPhos  352<H>  04-18

## 2023-04-18 NOTE — DISCHARGE NOTE NURSING/CASE MANAGEMENT/SOCIAL WORK - NSDCPEFALRISK_GEN_ALL_CORE
For information on Fall & Injury Prevention, visit: [email or web address]-[email or web address]-mobility OR  [email or web address]://[email or web address]/[email or web address]-tips-[email or web address]-avoid-injury OR  https://www.cdc.gov/steadi/patient.html For information on Fall & Injury Prevention, visit: https://www.[facility]/news/fall-prevention-protects-[email or web address]-maintains-health-[email or web address]-mobility OR  https://www.[facility]/news/fall-prevention-tips-to-avoid-injury OR  https://www.cdc.gov/steadi/patient.html For information on Fall & Injury Prevention, visit: https://www.[facility]/news/fall-prevention-protects-and-maintains-health-and-mobility OR  https://www.[facility]/news/fall-prevention-tips-to-avoid-injury OR  https://www.cdc.gov/steadi/patient.html

## 2023-04-18 NOTE — PROVIDER CONTACT NOTE (CRITICAL VALUE NOTIFICATION) - BACKGROUND
67yr M here for MM, pancytopenia, UTI, RK, pending transfer to [facility] [place] 67yr M here for MM, pancytopenia, UTI, RK, pending transfer to [facility] [place] [facility] 67yr M here for MM, pancytopenia, UTI, RK, pending transfer to [facility] [place]

## 2023-04-18 NOTE — PROGRESS NOTE ADULT - PROVIDER SPECIALTY LIST ADULT
Cardiology
Gastroenterology
Heme/Onc
Infectious Disease
Internal Medicine
Intervent Radiology
Nephrology
Cardiology
Heme/Onc
Internal Medicine
Nephrology
Internal Medicine
Internal Medicine
Neurology
Cardiology
Gastroenterology
Heme/Onc
Internal Medicine
Nephrology
Nephrology
Cardiology
Gastroenterology
Gastroenterology
Heme/Onc
Infectious Disease
Internal Medicine
Cardiology
Gastroenterology
Heme/Onc
Infectious Disease
Internal Medicine
Cardiology
Cardiology
Gastroenterology
Gastroenterology
Heme/Onc
Infectious Disease
Internal Medicine
Internal Medicine
Nephrology
Neurology
Cardiology
Gastroenterology
Heme/Onc
Internal Medicine
Internal Medicine
Heme/Onc
Cardiology
Cardiology
Gastroenterology
Gastroenterology
Heme/Onc
Infectious Disease
Infectious Disease
Internal Medicine
Internal Medicine
Nephrology
Neurology
Internal Medicine

## 2023-04-18 NOTE — PROVIDER CONTACT NOTE (CRITICAL VALUE NOTIFICATION) - PERSON GIVING RESULT:
Yana Sin
Agapito Valladares, Alireza
Edgar Enriquez, Lab
Edgar Lay
Agapito Valladares
Yana Sin
Jose Angel Barney
Yana Garvin / Holli
Galo Small, Lab
Lab Vikash Ortiz
marlene walker
Agapito Valladares
Yana Sin
Ness rosario
Vikash Ortiz
Edgar Lay
marlene walker
Yana Sin, Lab
Edgar Lay

## 2023-04-18 NOTE — PROVIDER CONTACT NOTE (CRITICAL VALUE NOTIFICATION) - SITUATION
Platelet 14
Pt admitted with fever of unknown origin
Platelet 7
WBC 0.87, Platelet 14
hct-20.0
Critical Lab- Platelets 10
hgb 6.1, hct 17.2, plt 15
Hematocrit 20.9 and Platelet 12
Hemoglobin 6.7 Hematocrit 19.1  Platelets 20
Patients WBC 0.82, Hemoglobin 6.8 , Hematocrit 20.4 , Platelet 12
Hgb 6.7 Hct 20.2 Plt 10 Lactate 3.6
platelet-7
Critical lab WBC 0.49, Hct 20, Plt 8
pt has a platelet count of 14
Platelets 20
platelets 17
WBC 0.79, Plt 6
platelet 6

## 2023-04-18 NOTE — CHART NOTE - NSCHARTNOTEFT_GEN_A_CORE
Call from radiology with CT Abdomen/Pelvis on 4/18/23 showing a Left Psoas intramuscular Hematoma.   H/H Stable  Monitor.  Discussed with Dr. Addison who will inform [facility]  Cleared for [place] [facility] [place] Addison    [id number] Call from radiology with CT Abdomen/Pelvis on 4/18/23 showing a Left Psoas intramuscular Hematoma.   H/H Stable  Monitor.  Discussed with Dr. Addison who will inform [facility]  Cleared for discharge to [facility] [place]. Addison    [id number] Call from radiology with CT Abdomen/Pelvis on 4/18/23 showing a Left Psoas intramuscular Hematoma.   H/H Stable  Monitor.  Discussed with Dr. Addison who will inform [facility]  Cleared for discharge to [facility] per Dr. Addison    [id number]

## 2023-04-18 NOTE — PROGRESS NOTE ADULT - SUBJECTIVE AND OBJECTIVE BOX
[facility] NEPHROLOGY- PROGRESS NOTE    67 year old Male with history of MM on chemotherapy presents with fevers. Nephrology consulted for elevated Scr.      REVIEW OF SYSTEMS:  Gen: no fevers  Cards: no chest pain  Resp: no dyspnea  GI: no nausea or vomiting or diarrhea  Vascular: + LE edema    No Known Allergies      Hospital Medications: Medications reviewed        VITALS:  T(F): 99.9 (04-18-23 @ 04:22), Max: 99.9 (04-18-23 @ 04:22)  HR: 78 (04-18-23 @ 04:22)  BP: 153/74 (04-18-23 @ 04:22)  RR: 18 (04-18-23 @ 04:22)  SpO2: 92% (04-18-23 @ 04:22)  Wt(kg): --    04-17 @ 07:01  -  04-18 @ 07:00  --------------------------------------------------------  IN: 0 mL / OUT: 1250 mL / NET: -1250 mL    04-18 @ 07:01  -  04-18 @ 12:29  --------------------------------------------------------  IN: 0 mL / OUT: 800 mL / NET: -800 mL        PHYSICAL EXAM:    Gen: NAD, calm  Cards: Irregularly irregular, +S1/S2, no M/G/R  Resp: CTA B/L  GI: soft, NT/ND, NABS  : + lloyd with yellow urine with debris  Vascular: 2+ LE edema B/L        LABS:  04-18    127<L>  |  90<L>  |  136<H>  ----------------------------<  421<H>  4.9   |  16<L>  |  4.27<H>    Ca    7.6<L>      18 Apr 2023 06:19    TPro  4.4<L>  /  Alb  2.6<L>  /  TBili  12.3<H>  /  DBili  9.7<H>  /  AST  32  /  ALT  58<H>  /  AlkPhos  352<H>  04-18    Creatinine Trend: 4.27 <--, 4.18 <--, 3.85 <--, 3.45 <--, 2.42 <--, 1.79 <--, 1.44 <--                        7.3    0.49  )-----------( 8        ( 18 Apr 2023 06:19 )             20.0     Urine Studies:               [facility] NEPHROLOGY- PROGRESS NOTE    67 year old Male with history of MM on chemotherapy presents with fevers. Nephrology consulted for elevated Scr.      REVIEW OF SYSTEMS:  Gen: no fevers  Cards: no chest pain  Resp: no dyspnea  GI: no nausea or vomiting or diarrhea  Vascular: + LE edema    No Known Allergies      Hospital Medications: Medications reviewed        VITALS:  T(F): 99.9 (04-18-23 @ 04:22), Max: 99.9 (04-18-23 @ 04:22)  HR: 78 (04-18-23 @ 04:22)  BP: 153/74 (04-18-23 @ 04:22)  RR: 18 (04-18-23 @ 04:22)  SpO2: 92% (04-18-23 @ 04:22)  Wt(kg): --    04-17 @ 07:01  -  04-18 @ 07:00  --------------------------------------------------------  IN: 0 mL / OUT: 1250 mL / NET: -1250 mL    04-18 @ 07:01  -  04-18 @ 12:29  --------------------------------------------------------  IN: 0 mL / OUT: 800 mL / NET: -800 mL        PHYSICAL EXAM:    Gen: NAD, calm  Cards: Irregularly irregular, +S1/S2, no M/G/R  Resp: CTA B/L  GI: soft, NT/ND, NABS  : + lloyd with yellow urine with debris  Vascular: 2+ LE edema B/L        LABS:  04-18    127<L>  |  90<L>  |  136<H>  ----------------------------<  421<H>  4.9   |  16<L>  |  4.27<H>    Ca    7.6<L>      18 Apr 2023 06:19    TPro  4.4<L>  /  Alb  2.6<L>  /  TBili  12.3<H>  /  DBili  9.7<H>  /  AST  32  /  ALT  58<H>  /  AlkPhos  352<H>  04-18    Creatinine Trend: 4.27 <--, 4.18 <--, 3.85 <--, 3.45 <--, 2.42 <--, 1.79 <--, 1.44 <--                        7.3    0.49  )-----------( 8        ( 18 Apr 2023 06:19 )             20.0     Urine Studies:               [facility] NEPHROLOGY- PROGRESS NOTE    67 year old Male with history of MM on chemotherapy presents with fevers. Nephrology consulted for elevated Scr.      REVIEW OF SYSTEMS:  Gen: no fevers  Cards: no chest pain  Resp: no dyspnea  GI: no nausea or vomiting or diarrhea  Vascular: + LE edema    No Known Allergies      Hospital Medications: Medications reviewed        VITALS:  T(F): 99.9 (04-18-23 @ 04:22), Max: 99.9 (04-18-23 @ 04:22)  HR: 78 (04-18-23 @ 04:22)  BP: 153/74 (04-18-23 @ 04:22)  RR: 18 (04-18-23 @ 04:22)  SpO2: 92% (04-18-23 @ 04:22)  Wt(kg): --    04-17 @ 07:01  -  04-18 @ 07:00  --------------------------------------------------------  IN: 0 mL / OUT: 1250 mL / NET: -1250 mL    04-18 @ 07:01  -  04-18 @ 12:29  --------------------------------------------------------  IN: 0 mL / OUT: 800 mL / NET: -800 mL        PHYSICAL EXAM:    Gen: NAD, calm  Cards: Irregularly irregular, +S1/S2, no M/G/R  Resp: CTA B/L  GI: soft, NT/ND, NABS  : + lloyd with yellow urine with debris  Vascular: 2+ LE edema B/L        LABS:  04-18    127<L>  |  90<L>  |  136<H>  ----------------------------<  421<H>  4.9   |  16<L>  |  4.27<H>    Ca    7.6<L>      18 Apr 2023 06:19    TPro  4.4<L>  /  Alb  2.6<L>  /  TBili  12.3<H>  /  DBili  9.7<H>  /  AST  32  /  ALT  58<H>  /  AlkPhos  352<H>  04-18    Creatinine Trend: 4.27 <--, 4.18 <--, 3.85 <--, 3.45 <--, 2.42 <--, 1.79 <--, 1.44 <--                        7.3    0.49  )-----------( 8        ( 18 Apr 2023 06:19 )             20.0     Urine Studies:

## 2023-04-18 NOTE — PROGRESS NOTE ADULT - ASSESSMENT
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 now with HA and c/o pain behind eyes   +DVT    + thrombocytopenia    4/1 CTH : mild chronic MVD. no acute findings   4/10 CTH and C oribts: no acute findings.   + flapping termor   EEG neg   febrile   platelets 8 today     Impression:   - s/p platelets   - plan to transfer to [facility] [place]   - possible myoclonic jerks/asterexis likely metabolic   - consider MRI brain and orbits if able with and w/o if no  improvement   - optho recs appreicated.   - on entecavir, mariavir  - midodrine 10mg TID for hypotension  - immunosupression with steroids. on prednisone   - DOAC for AF and DVT  held in setting of thrombocytopenia  - transfusions PRN per team, platelets and PRBC  - telemetry  - PT/OT   - check FS, glucose control <180  - GI/DVT ppx  - Thank you for allowing me to participate in the care of this patient. Call with questions.   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 now with HA and c/o pain behind eyes   +DVT    + thrombocytopenia    4/1 CTH : mild chronic MVD. no acute findings   4/10 CTH and C oribts: no acute findings.   + flapping termor   EEG neg   febrile   platelets 8 today     Impression:   - s/p platelets   - plan to [place] [facility] [place]   - possible myoclonic jerks/asterexis likely metabolic   - consider MRI brain and orbits if able with and w/o if no  improvement   - optho recs appreicated.   - on entecavir, mariavir  - midodrine 10mg TID for hypotension  - immunosupression with steroids. on prednisone   - DOAC for AF and DVT  held in setting of thrombocytopenia  - transfusions PRN per team, platelets and PRBC  - telemetry  - PT/OT   - check FS, glucose control <180  - GI/DVT ppx  - Thank you for allowing me to participate in the care of this patient. Call with questions.   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 now with HA and c/o pain behind eyes   +DVT    + thrombocytopenia    4/1 CTH : mild chronic MVD. no acute findings   4/10 CTH and C oribts: no acute findings.   + flapping termor   EEG neg   febrile   platelets 8 today     Impression:   - s/p platelets   - [place] [facility]   - possible myoclonic jerks/asterexis likely metabolic   - consider MRI brain and orbits if able with and w/o if no  improvement   - optho recs appreicated.   - on entecavir, mariavir  - midodrine 10mg TID for hypotension  - immunosupression with steroids. on prednisone   - DOAC for AF and DVT  held in setting of thrombocytopenia  - transfusions PRN per team, platelets and PRBC  - telemetry  - PT/OT   - check FS, glucose control <180  - GI/DVT ppx  - Thank you for allowing me to participate in the care of this patient. Call with questions.   - spoke with wife bedside 4/14 \  [place] [facility] [place]  Matt Harvey MD  Vascular Neurology  Office: [phone]

## 2023-04-18 NOTE — PROGRESS NOTE ADULT - SUBJECTIVE AND OBJECTIVE BOX
Name of Patient : PETER DE LA PAZ  MRN: [id number]  Date of visit: 04-18-23 @ 12:48      Subjective: Patient seen and examined. No new events except as noted.   Patient seen earlier this AM. Sister at the bedside.   Weak appearing male. Lethargic.   S/P 2 units of platelets yesterday. Planned for 1 unit of platelets this AM.   S/P PICC line with IR yesterday.   Pending CT Abdomen/Pelvis today.  Planned for transfer to [facility] [place] today    REVIEW OF SYSTEMS:  Limited ROS     MEDICATIONS:  MEDICATIONS  (STANDING):  acetaminophen     Tablet .. 650 milliGRAM(s) Oral once  artificial tears (preservative free) Ophthalmic Solution 1 Drop(s) Both EYES four times a day  chlorhexidine 2% Cloths 1 Application(s) Topical daily  chlorhexidine 4% Liquid 1 Application(s) Topical <User Schedule>  digoxin     Tablet 125 MICROGram(s) Oral daily  methylPREDNISolone sodium succinate IVPB 500 milliGRAM(s) IV Intermittent daily  midodrine. 10 milliGRAM(s) Oral three times a day  pantoprazole    Tablet 40 milliGRAM(s) Oral before breakfast  polyethylene glycol 3350 17 Gram(s) Oral daily      PHYSICAL EXAM:  T(C): 36.9 (04-18-23 @ 08:02), Max: 37.7 (04-18-23 @ 04:22)  HR: 81 (04-18-23 @ 08:02) (74 - 81)  BP: 163/65 (04-18-23 @ 08:02) (144/83 - 163/65)  RR: 20 (04-18-23 @ 08:02) (18 - 20)  SpO2: 93% (04-18-23 @ 08:02) (92% - 98%)  Wt(kg): --  I&O's Summary    17 Apr 2023 07:01  -  18 Apr 2023 07:00  --------------------------------------------------------  IN: 0 mL / OUT: 1250 mL / NET: -1250 mL    18 Apr 2023 07:01  -  18 Apr 2023 12:48  --------------------------------------------------------  IN: 0 mL / OUT: 800 mL / NET: -800 mL          Appearance: Awake, weak appearing male, frail, lying down in bed, Jaundiced, Icteric   HEENT:  Scleral icterus   Lymphatic: No lymphadenopathy   Cardiovascular: Normal    Respiratory: normal effort , clear  Gastrointestinal:  Soft, Non-tender to palpitation   Skin: Jaundiced   Psychiatry: Calm, weak   Musculoskeletal: +Pitting edema       04-17-23 @ 07:01  -  04-18-23 @ 07:00  --------------------------------------------------------  IN: 0 mL / OUT: 1250 mL / NET: -1250 mL    04-18-23 @ 07:01  -  04-18-23 @ 12:48  --------------------------------------------------------  IN: 0 mL / OUT: 800 mL / NET: -800 mL                                  7.3    0.49  )-----------( 8        ( 18 Apr 2023 06:19 )             20.0               04-18    127<L>  |  90<L>  |  136<H>  ----------------------------<  421<H>  4.9   |  16<L>  |  4.27<H>    Ca    7.6<L>      18 Apr 2023 06:19    TPro  4.4<L>  /  Alb  2.6<L>  /  TBili  12.3<H>  /  DBili  9.7<H>  /  AST  32  /  ALT  58<H>  /  AlkPhos  352<H>  04-18    PT/INR - ( 18 Apr 2023 10:12 )   PT: 14.8 sec;   INR: 1.28 ratio         PTT - ( 18 Apr 2023 10:12 )  PTT:30.0 sec                      Name of Patient : PETER DE LA PAZ  MRN: [id number]  Date of visit: 04-18-23 @ 12:48      Subjective: Patient seen and examined. No new events except as noted.   Patient seen earlier this AM. Sister at the bedside.   Weak appearing male. Lethargic.   S/P 2 units of platelets yesterday. Planned for 1 unit of platelets this AM.   S/P PICC line with IR yesterday.   Pending CT Abdomen/Pelvis today.  Planned [place] [facility] [place]    REVIEW OF SYSTEMS:  Limited ROS     MEDICATIONS:  MEDICATIONS  (STANDING):  acetaminophen     Tablet .. 650 milliGRAM(s) Oral once  artificial tears (preservative free) Ophthalmic Solution 1 Drop(s) Both EYES four times a day  chlorhexidine 2% Cloths 1 Application(s) Topical daily  chlorhexidine 4% Liquid 1 Application(s) Topical <User Schedule>  digoxin     Tablet 125 MICROGram(s) Oral daily  methylPREDNISolone sodium succinate IVPB 500 milliGRAM(s) IV Intermittent daily  midodrine. 10 milliGRAM(s) Oral three times a day  pantoprazole    Tablet 40 milliGRAM(s) Oral before breakfast  polyethylene glycol 3350 17 Gram(s) Oral daily      PHYSICAL EXAM:  T(C): 36.9 (04-18-23 @ 08:02), Max: 37.7 (04-18-23 @ 04:22)  HR: 81 (04-18-23 @ 08:02) (74 - 81)  BP: 163/65 (04-18-23 @ 08:02) (144/83 - 163/65)  RR: 20 (04-18-23 @ 08:02) (18 - 20)  SpO2: 93% (04-18-23 @ 08:02) (92% - 98%)  Wt(kg): --  I&O's Summary    17 Apr 2023 07:01  -  18 Apr 2023 07:00  --------------------------------------------------------  IN: 0 mL / OUT: 1250 mL / NET: -1250 mL    18 Apr 2023 07:01  -  18 Apr 2023 12:48  --------------------------------------------------------  IN: 0 mL / OUT: 800 mL / NET: -800 mL          Appearance: Awake, weak appearing male, frail, lying down in bed, Jaundiced, Icteric   HEENT:  Scleral icterus   Lymphatic: No lymphadenopathy   Cardiovascular: Normal    Respiratory: normal effort , clear  Gastrointestinal:  Soft, Non-tender to palpitation   Skin: Jaundiced   Psychiatry: Calm, weak   Musculoskeletal: +Pitting edema       04-17-23 @ 07:01  -  04-18-23 @ 07:00  --------------------------------------------------------  IN: 0 mL / OUT: 1250 mL / NET: -1250 mL    04-18-23 @ 07:01  -  04-18-23 @ 12:48  --------------------------------------------------------  IN: 0 mL / OUT: 800 mL / NET: -800 mL                                  7.3    0.49  )-----------( 8        ( 18 Apr 2023 06:19 )             20.0               04-18    127<L>  |  90<L>  |  136<H>  ----------------------------<  421<H>  4.9   |  16<L>  |  4.27<H>    Ca    7.6<L>      18 Apr 2023 06:19    TPro  4.4<L>  /  Alb  2.6<L>  /  TBili  12.3<H>  /  DBili  9.7<H>  /  AST  32  /  ALT  58<H>  /  AlkPhos  352<H>  04-18    PT/INR - ( 18 Apr 2023 10:12 )   PT: 14.8 sec;   INR: 1.28 ratio         PTT - ( 18 Apr 2023 10:12 )  PTT:30.0 sec                      Name of Patient : PETER DE LA PAZ  MRN: [id number]  Date of visit: 04-18-23 @ 12:48      Subjective: Patient seen and examined. No new events except as noted.   Patient seen earlier this AM. Sister at the bedside.   Weak appearing male. Lethargic.   S/P 2 units of platelets yesterday. Planned for 1 unit of platelets this AM.   S/P PICC line with IR yesterday.   Pending CT Abdomen/Pelvis today.  Planned for transfer to [facility] [place] today    REVIEW OF SYSTEMS:  Limited ROS     MEDICATIONS:  MEDICATIONS  (STANDING):  acetaminophen     Tablet .. 650 milliGRAM(s) Oral once  artificial tears (preservative free) Ophthalmic Solution 1 Drop(s) Both EYES four times a day  chlorhexidine 2% Cloths 1 Application(s) Topical daily  chlorhexidine 4% Liquid 1 Application(s) Topical <User Schedule>  digoxin     Tablet 125 MICROGram(s) Oral daily  methylPREDNISolone sodium succinate IVPB 500 milliGRAM(s) IV Intermittent daily  midodrine. 10 milliGRAM(s) Oral three times a day  pantoprazole    Tablet 40 milliGRAM(s) Oral before breakfast  polyethylene glycol 3350 17 Gram(s) Oral daily      PHYSICAL EXAM:  T(C): 36.9 (04-18-23 @ 08:02), Max: 37.7 (04-18-23 @ 04:22)  HR: 81 (04-18-23 @ 08:02) (74 - 81)  BP: 163/65 (04-18-23 @ 08:02) (144/83 - 163/65)  RR: 20 (04-18-23 @ 08:02) (18 - 20)  SpO2: 93% (04-18-23 @ 08:02) (92% - 98%)  Wt(kg): --  I&O's Summary    17 Apr 2023 07:01  -  18 Apr 2023 07:00  --------------------------------------------------------  IN: 0 mL / OUT: 1250 mL / NET: -1250 mL    18 Apr 2023 07:01  -  18 Apr 2023 12:48  --------------------------------------------------------  IN: 0 mL / OUT: 800 mL / NET: -800 mL          Appearance: Awake, weak appearing male, frail, lying down in bed, Jaundiced, Icteric   HEENT:  Scleral icterus   Lymphatic: No lymphadenopathy   Cardiovascular: Normal    Respiratory: normal effort , clear  Gastrointestinal:  Soft, Non-tender to palpitation   Skin: Jaundiced   Psychiatry: Calm, weak   Musculoskeletal: +Pitting edema       04-17-23 @ 07:01  -  04-18-23 @ 07:00  --------------------------------------------------------  IN: 0 mL / OUT: 1250 mL / NET: -1250 mL    04-18-23 @ 07:01  -  04-18-23 @ 12:48  --------------------------------------------------------  IN: 0 mL / OUT: 800 mL / NET: -800 mL                                  7.3    0.49  )-----------( 8        ( 18 Apr 2023 06:19 )             20.0               04-18    127<L>  |  90<L>  |  136<H>  ----------------------------<  421<H>  4.9   |  16<L>  |  4.27<H>    Ca    7.6<L>      18 Apr 2023 06:19    TPro  4.4<L>  /  Alb  2.6<L>  /  TBili  12.3<H>  /  DBili  9.7<H>  /  AST  32  /  ALT  58<H>  /  AlkPhos  352<H>  04-18    PT/INR - ( 18 Apr 2023 10:12 )   PT: 14.8 sec;   INR: 1.28 ratio         PTT - ( 18 Apr 2023 10:12 )  PTT:30.0 sec

## 2023-04-18 NOTE — DISCHARGE NOTE NURSING/CASE MANAGEMENT/SOCIAL WORK - PATIENT PORTAL LINK FT
You can access the FollowMyHealth Patient Portal offered by [facility] by registering at the following website: [email or web address]. By joining [employer]’s FollowMyHealth portal, you will also be able to view your health information using other applications (apps) compatible with our system. You can access the FollowMyHealth Patient Portal offered by [facility] by registering at the following website: [email or web address]. By joining [employer]’s FollowMyHealth portal, you will also be able to view your health information using other applications (apps) compatible with our system. You can access the FollowMyHealth Patient Portal offered by [facility] by registering at the following website: [email or web address]. By joining [employer]’s FollowMyHealth portal, you will also be able to view your health information using other applications (apps) compatible with our system.

## 2023-04-18 NOTE — PROGRESS NOTE ADULT - ASSESSMENT
67y Male with history of MM on chemotherapy presents with fevers. Nephrology consulted for elevated Scr.    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 Scr increasing however patient not oliguric. Patient educated that he may need RRT in the next 24-48 hours but would be high risk for bleeding for shiley placement given thrombocytopenia. Repeat UA with urine urea and urine creatinine. Repeat renal US with resolution of bilateral hydronephrosis s/p lloyd placement. TMA work up negative. Avoid nephrotoxins. Monitor electrolytes.    2) Hypotension: BP improving. Decrease midodrine to 5 mg PO TID. Monitor BP.    3) LE edema: Improving with albumin assisted diuresis. Will give lasix 80 mg IV X 1 dose with concurrent IV albumin today. Monitor UO.    4) Hyponatremia: Due to volume overload. Continue with 1.5L FR. IV lasix as above. Monitor serum Na.      [facility] NEPHROLOGY  Leoncio Leonard M.D.  Tay Brooke D.O.  Precious Chen M.D.  Nidia Stallings, NURIS, ANP-C    Telephone: [phone]  Facsimile: [phone]    [address] [place] [address]   67y Male with history of MM on chemotherapy presents with fevers. Nephrology consulted for elevated Scr.    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 Scr increasing however patient not oliguric. Patient educated that he may need RRT in the next 24-48 hours but would be high risk for bleeding for shiley placement given thrombocytopenia. Repeat UA with urine urea and urine creatinine. Repeat renal US with resolution of bilateral hydronephrosis s/p lloyd placement. TMA work up negative. Avoid nephrotoxins. Monitor electrolytes.    2) Hypotension: BP improving. Decrease midodrine to 5 mg PO TID. Monitor BP.    3) LE edema: Improving with albumin assisted diuresis. Will give lasix 80 mg IV X 1 dose with concurrent IV albumin today. Monitor UO.    4) Hyponatremia: Due to volume overload. Continue with 1.5L FR. IV lasix as above. Monitor serum Na.      [facility] NEPHROLOGY  Leoncio Leonard M.D.  Tay Brooke D.O.  Precious Chen M.D.  Nidia Stallings, NURIS, ANP-C    Telephone: [phone]  Facsimile: [phone]    [address] [place] [address]   67y Male with history of MM on chemotherapy presents with fevers. Nephrology consulted for elevated Scr.    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 Scr increasing however patient not oliguric. Patient educated that he may need RRT in the next 24-48 hours but would be high risk for bleeding for shiley placement given thrombocytopenia. Repeat UA with urine urea and urine creatinine. Repeat renal US with resolution of bilateral hydronephrosis s/p lloyd placement. TMA work up negative. Avoid nephrotoxins. Monitor electrolytes.    2) Hypotension: BP improving. Decrease midodrine to 5 mg PO TID. Monitor BP.    3) LE edema: Improving with albumin assisted diuresis. Will give lasix 80 mg IV X 1 dose with concurrent IV albumin today. Monitor UO.    4) Hyponatremia: Due to volume overload. Continue with 1.5L FR. IV lasix as above. Monitor serum Na.      [facility] NEPHROLOGY  Leoncio Leonard M.D.  Tay Brooke D.O.  Precious Chen M.D.  Nidia Stallings, NURIS, ANP-C    Telephone: [phone]  Facsimile: [phone]    [address] [place] [address]

## 2023-04-18 NOTE — PROGRESS NOTE ADULT - SUBJECTIVE AND OBJECTIVE BOX
Neurology Progress Note    S: Patient seen and examined. plan to tx to [facility]sk     Medication:    MEDICATIONS  (STANDING):  acetaminophen     Tablet .. 650 milliGRAM(s) Oral once  artificial tears (preservative free) Ophthalmic Solution 1 Drop(s) Both EYES four times a day  chlorhexidine 2% Cloths 1 Application(s) Topical daily  chlorhexidine 4% Liquid 1 Application(s) Topical <User Schedule>  digoxin     Tablet 125 MICROGram(s) Oral daily  methylPREDNISolone sodium succinate IVPB 500 milliGRAM(s) IV Intermittent daily  midodrine. 10 milliGRAM(s) Oral three times a day  pantoprazole    Tablet 40 milliGRAM(s) Oral before breakfast  polyethylene glycol 3350 17 Gram(s) Oral daily    MEDICATIONS  (PRN):  acetaminophen     Tablet .. 650 milliGRAM(s) Oral every 6 hours PRN Temp greater or equal to 38C (100.4F), Mild Pain (1 - 3)  aluminum hydroxide/magnesium hydroxide/simethicone Suspension 30 milliLiter(s) Oral every 4 hours PRN Dyspepsia  loperamide 2 milliGRAM(s) Oral three times a day PRN Diarrhea  sodium chloride 0.9% lock flush 10 milliLiter(s) IV Push every 1 hour PRN Pre/post blood products, medications, blood draw, and to maintain line patency          Vitals:  \Vital Signs Last 24 Hrs  T(C): 36.9 (04-18-23 @ 08:02), Max: 37.7 (04-18-23 @ 04:22)  T(F): 98.4 (04-18-23 @ 08:02), Max: 99.9 (04-18-23 @ 04:22)  HR: 81 (04-18-23 @ 08:02) (78 - 81)  BP: 163/65 (04-18-23 @ 08:02) (152/81 - 163/65)  BP(mean): --  RR: 20 (04-18-23 @ 08:02) (18 - 20)  SpO2: 93% (04-18-23 @ 08:02) (92% - 93%)        General Exam:   General Appearance: Appropriately dressed and in no acute distress       Head: Normocephalic, atraumatic and no dysmorphic features  Ear, Nose, and Throat: Moist mucous membranes  CVS: S1S2+  Resp: No SOB, no wheeze or rhonchi  GI: soft NT/ND  Extremities: No edema or cyanosis  Skin: No bruises or rashes     Neurological Exam:  Mental Status: Awake, alert and oriented x 2.  Able to follow simple and complex verbal commands. Able to name and repeat. fluent speech. No obvious aphasia or dysarthria noted.   Cranial Nerves: PERRL, EOMI, VFFC, sensation V1-V3 intact,  no obvious facial asymmetry, equal elevation of palate, scm/trap 5/5, tongue is midline on protrusion. no obvious papilledema on fundoscopic exam. hearing is grossly intact.   Motor: VICENTE 4/5 throuhgout but uppers>lowers : flapping tremor   Sensation: Intact to light touch and pinprick throughout. no right/left confusion. no extinction to tactile on DSS.    Reflexes: 1+ throughout at biceps, brachioradialis, triceps, patellars and ankles bilaterally and equal. No clonus. R toe and L toe were both downgoing.  Coordination: No dysmetria on FNF  Gait: deferred .       I personally reviewed the below data/images/labs:    CBC Full  -  ( 18 Apr 2023 06:19 )  WBC Count : 0.49 K/uL  RBC Count : 2.43 M/uL  Hemoglobin : 7.3 g/dL  Hematocrit : 20.0 %  Platelet Count - Automated : 8 K/uL  Mean Cell Volume : 81.9 fl  Mean Cell Hemoglobin : 30.0 pg  Mean Cell Hemoglobin Concentration : 36.7 gm/dL  Auto Neutrophil # : x  Auto Lymphocyte # : x  Auto Monocyte # : x  Auto Eosinophil # : x  Auto Basophil # : x  Auto Neutrophil % : x  Auto Lymphocyte % : x  Auto Monocyte % : x  Auto Eosinophil % : x  Auto Basophil % : x        < from: CT Head No Cont (04.01.23 @ 09:19) >    ACC: [id number] EXAM:  CT BRAIN   ORDERED BY: EVONNE CARDONA     PROCEDURE DATE:  04/01/2023          INTERPRETATION:  CLINICAL INDICATIONS:  dizzy and low platelets    COMPARISON: None.    TECHNIQUE: Noncontrast CT of the head. Multiplanar reformations are   submitted.    FINDINGS:  There is periventricular and subcortical white matter hypodensity without   mass effect, nonspecific, likely representing mild chronic microvascular   ischemic changes. There is no compelling evidence for an acute   transcortical infarction. There is no evidence of mass, mass effect,   midline shift or extra-axial fluid collection. The lateral ventricles and   cortical sulci are age-appropriate in size and configuration. Chronic   left orbit medial wall fracture deformity. The orbits, mastoid air cells   and visualized paranasal sinuses are unremarkable. The calvarium is   intact. Consider follow-up CT or MRI as clinically warranted.    IMPRESSION:  Mild chronic microvascular changes without evidence of an   acute transcortical infarction or hemorrhage.    --- End of Report ---            IMAN MUÑOZ MD; Attending Radiologist  This document has been electronically signed. Apr 1 2023  9:25AM    < end of copied text >          Neurology Progress Note    S: Patient seen and examined. plan to tx to [facility]     Medication:    MEDICATIONS  (STANDING):  acetaminophen     Tablet .. 650 milliGRAM(s) Oral once  artificial tears (preservative free) Ophthalmic Solution 1 Drop(s) Both EYES four times a day  chlorhexidine 2% Cloths 1 Application(s) Topical daily  chlorhexidine 4% Liquid 1 Application(s) Topical <User Schedule>  digoxin     Tablet 125 MICROGram(s) Oral daily  methylPREDNISolone sodium succinate IVPB 500 milliGRAM(s) IV Intermittent daily  midodrine. 10 milliGRAM(s) Oral three times a day  pantoprazole    Tablet 40 milliGRAM(s) Oral before breakfast  polyethylene glycol 3350 17 Gram(s) Oral daily    MEDICATIONS  (PRN):  acetaminophen     Tablet .. 650 milliGRAM(s) Oral every 6 hours PRN Temp greater or equal to 38C (100.4F), Mild Pain (1 - 3)  aluminum hydroxide/magnesium hydroxide/simethicone Suspension 30 milliLiter(s) Oral every 4 hours PRN Dyspepsia  loperamide 2 milliGRAM(s) Oral three times a day PRN Diarrhea  sodium chloride 0.9% lock flush 10 milliLiter(s) IV Push every 1 hour PRN Pre/post blood products, medications, blood draw, and to maintain line patency          Vitals:  \Vital Signs Last 24 Hrs  T(C): 36.9 (04-18-23 @ 08:02), Max: 37.7 (04-18-23 @ 04:22)  T(F): 98.4 (04-18-23 @ 08:02), Max: 99.9 (04-18-23 @ 04:22)  HR: 81 (04-18-23 @ 08:02) (78 - 81)  BP: 163/65 (04-18-23 @ 08:02) (152/81 - 163/65)  BP(mean): --  RR: 20 (04-18-23 @ 08:02) (18 - 20)  SpO2: 93% (04-18-23 @ 08:02) (92% - 93%)        General Exam:   General Appearance: Appropriately dressed and in no acute distress       Head: Normocephalic, atraumatic and no dysmorphic features  Ear, Nose, and Throat: Moist mucous membranes  CVS: S1S2+  Resp: No SOB, no wheeze or rhonchi  GI: soft NT/ND  Extremities: No edema or cyanosis  Skin: No bruises or rashes     Neurological Exam:  Mental Status: Awake, alert and oriented x 2.  Able to follow simple and complex verbal commands. Able to name and repeat. fluent speech. No obvious aphasia or dysarthria noted.   Cranial Nerves: PERRL, EOMI, VFFC, sensation V1-V3 intact,  no obvious facial asymmetry, equal elevation of palate, scm/trap 5/5, tongue is midline on protrusion. no obvious papilledema on fundoscopic exam. hearing is grossly intact.   Motor: VICENTE 4/5 throuhgout but uppers>lowers : flapping tremor   Sensation: Intact to light touch and pinprick throughout. no right/left confusion. no extinction to tactile on DSS.    Reflexes: 1+ throughout at biceps, brachioradialis, triceps, patellars and ankles bilaterally and equal. No clonus. R toe and L toe were both downgoing.  Coordination: No dysmetria on FNF  Gait: deferred .       I personally reviewed the below data/images/labs:    CBC Full  -  ( 18 Apr 2023 06:19 )  WBC Count : 0.49 K/uL  RBC Count : 2.43 M/uL  Hemoglobin : 7.3 g/dL  Hematocrit : 20.0 %  Platelet Count - Automated : 8 K/uL  Mean Cell Volume : 81.9 fl  Mean Cell Hemoglobin : 30.0 pg  Mean Cell Hemoglobin Concentration : 36.7 gm/dL  Auto Neutrophil # : x  Auto Lymphocyte # : x  Auto Monocyte # : x  Auto Eosinophil # : x  Auto Basophil # : x  Auto Neutrophil % : x  Auto Lymphocyte % : x  Auto Monocyte % : x  Auto Eosinophil % : x  Auto Basophil % : x        < from: CT Head No Cont (04.01.23 @ 09:19) >    ACC: [id number] EXAM:  CT BRAIN   ORDERED BY: EVONNE CARDONA     PROCEDURE DATE:  04/01/2023          INTERPRETATION:  CLINICAL INDICATIONS:  dizzy and low platelets    COMPARISON: None.    TECHNIQUE: Noncontrast CT of the head. Multiplanar reformations are   submitted.    FINDINGS:  There is periventricular and subcortical white matter hypodensity without   mass effect, nonspecific, likely representing mild chronic microvascular   ischemic changes. There is no compelling evidence for an acute   transcortical infarction. There is no evidence of mass, mass effect,   midline shift or extra-axial fluid collection. The lateral ventricles and   cortical sulci are age-appropriate in size and configuration. Chronic   left orbit medial wall fracture deformity. The orbits, mastoid air cells   and visualized paranasal sinuses are unremarkable. The calvarium is   intact. Consider follow-up CT or MRI as clinically warranted.    IMPRESSION:  Mild chronic microvascular changes without evidence of an   acute transcortical infarction or hemorrhage.    --- End of Report ---            IMAN MUÑOZ MD; Attending Radiologist  This document has been electronically signed. Apr 1 2023  9:25AM    < end of copied text >          Neurology Progress Note    S: Patient seen and examined. plan to tx to [facility]     Medication:    MEDICATIONS  (STANDING):  acetaminophen     Tablet .. 650 milliGRAM(s) Oral once  artificial tears (preservative free) Ophthalmic Solution 1 Drop(s) Both EYES four times a day  chlorhexidine 2% Cloths 1 Application(s) Topical daily  chlorhexidine 4% Liquid 1 Application(s) Topical <User Schedule>  digoxin     Tablet 125 MICROGram(s) Oral daily  methylPREDNISolone sodium succinate IVPB 500 milliGRAM(s) IV Intermittent daily  midodrine. 10 milliGRAM(s) Oral three times a day  pantoprazole    Tablet 40 milliGRAM(s) Oral before breakfast  polyethylene glycol 3350 17 Gram(s) Oral daily    MEDICATIONS  (PRN):  acetaminophen     Tablet .. 650 milliGRAM(s) Oral every 6 hours PRN Temp greater or equal to 38C (100.4F), Mild Pain (1 - 3)  aluminum hydroxide/magnesium hydroxide/simethicone Suspension 30 milliLiter(s) Oral every 4 hours PRN Dyspepsia  loperamide 2 milliGRAM(s) Oral three times a day PRN Diarrhea  sodium chloride 0.9% lock flush 10 milliLiter(s) IV Push every 1 hour PRN Pre/post blood products, medications, blood draw, and to maintain line patency          Vitals:  \Vital Signs Last 24 Hrs  T(C): 36.9 (04-18-23 @ 08:02), Max: 37.7 (04-18-23 @ 04:22)  T(F): 98.4 (04-18-23 @ 08:02), Max: 99.9 (04-18-23 @ 04:22)  HR: 81 (04-18-23 @ 08:02) (78 - 81)  BP: 163/65 (04-18-23 @ 08:02) (152/81 - 163/65)  BP(mean): --  RR: 20 (04-18-23 @ 08:02) (18 - 20)  SpO2: 93% (04-18-23 @ 08:02) (92% - 93%)        General Exam:   General Appearance: Appropriately dressed and in no acute distress       Head: Normocephalic, atraumatic and no dysmorphic features  Ear, Nose, and Throat: Moist mucous membranes  CVS: S1S2+  Resp: No SOB, no wheeze or rhonchi  GI: soft NT/ND  Extremities: No edema or cyanosis  Skin: No bruises or rashes     Neurological Exam:  Mental Status: Awake, alert and oriented x 2.  Able to follow simple and complex verbal commands. Able to name and repeat. fluent speech. No obvious aphasia or dysarthria noted.   Cranial Nerves: PERRL, EOMI, VFFC, sensation V1-V3 intact,  no obvious facial asymmetry, equal elevation of palate, scm/trap 5/5, tongue is midline on protrusion. no obvious papilledema on fundoscopic exam. hearing is grossly intact.   Motor: VICENTE 4/5 throuhgout but uppers>lowers : flapping tremor   Sensation: Intact to light touch and pinprick throughout. no right/left confusion. no extinction to tactile on DSS.    Reflexes: 1+ throughout at biceps, brachioradialis, triceps, patellars and ankles bilaterally and equal. No clonus. R toe and L toe were both downgoing.  Coordination: No dysmetria on FNF  Gait: deferred .       I personally reviewed the below data/images/labs:    CBC Full  -  ( 18 Apr 2023 06:19 )  WBC Count : 0.49 K/uL  RBC Count : 2.43 M/uL  Hemoglobin : 7.3 g/dL  Hematocrit : 20.0 %  Platelet Count - Automated : 8 K/uL  Mean Cell Volume : 81.9 fl  Mean Cell Hemoglobin : 30.0 pg  Mean Cell Hemoglobin Concentration : 36.7 gm/dL  Auto Neutrophil # : x  Auto Lymphocyte # : x  Auto Monocyte # : x  Auto Eosinophil # : x  Auto Basophil # : x  Auto Neutrophil % : x  Auto Lymphocyte % : x  Auto Monocyte % : x  Auto Eosinophil % : x  Auto Basophil % : x        < from: CT Head No Cont (04.01.23 @ 09:19) >    ACC: [id number] EXAM:  CT BRAIN   ORDERED BY: EVONNE CARDONA     PROCEDURE DATE:  04/01/2023          INTERPRETATION:  CLINICAL INDICATIONS:  dizzy and low platelets    COMPARISON: None.    TECHNIQUE: Noncontrast CT of the head. Multiplanar reformations are   submitted.    FINDINGS:  There is periventricular and subcortical white matter hypodensity without   mass effect, nonspecific, likely representing mild chronic microvascular   ischemic changes. There is no compelling evidence for an acute   transcortical infarction. There is no evidence of mass, mass effect,   midline shift or extra-axial fluid collection. The lateral ventricles and   cortical sulci are age-appropriate in size and configuration. Chronic   left orbit medial wall fracture deformity. The orbits, mastoid air cells   and visualized paranasal sinuses are unremarkable. The calvarium is   intact. Consider follow-up CT or MRI as clinically warranted.    IMPRESSION:  Mild chronic microvascular changes without evidence of an   acute transcortical infarction or hemorrhage.    --- End of Report ---            IMAN MUÑOZ MD; Attending Radiologist  This document has been electronically signed. Apr 1 2023  9:25AM    < end of copied text >

## 2023-04-18 NOTE — PROGRESS NOTE ADULT - REASON FOR ADMISSION
fever

## 2023-04-18 NOTE — PROGRESS NOTE ADULT - ASSESSMENT
68 y/o M PMHx MM (s/p autoSCT, s/p relapse now on chemo last dose 3/3), bioprosthetic AVR (2019), splenectomy, and partial gastrectomy/pancreatectomy, presented with fever/chills. Found to be febrile on admission with CMV viremia. Now with multiorgan dysfunction, rising creatinine and bilirubin.     1. New left Psoas intramuscular hematoma seen on CT    2. Fever, post transplant   - now off abx   - off Maribavir   - f/u with ID     3. MM s/p SCT w/ relapse, severe pancytopenia   - per heme / onc   - planned for transfer to [facility] [place]    4. abnormal LFTs, now cholestatic with rising TBL. Likely multifactorial from sepsis and drug-induced cholestasis.  - CT abdomen and pelvis with no obvious biliary ductal dilatation     5. Chronic diarrhea, likely related to chemo +/- abx > recently more constipated   - GI PCR neg, C-diff neg on 03.03  - imodium PRN     6. DVT   - on anticoagulation    7. Hiccups--resolved   - reglan discontinued         Attending supervision statement: I have personally seen and examined the patient. I fully participated in the care of this patient. I have made amendments to the documentation where necessary, and agree with the history, physical exam, and plan as outlined by the ACP.    [facility]  Gastroenterology and Hepatology  [address] [place] [address]  Office: [phone]   68 y/o M PMHx MM (s/p autoSCT, s/p relapse now on chemo last dose 3/3), bioprosthetic AVR (2019), splenectomy, and partial gastrectomy/pancreatectomy, presented with fever/chills. Found to be febrile on admission with CMV viremia. Now with multiorgan dysfunction, rising creatinine and bilirubin.     1. New left Psoas intramuscular hematoma seen on CT    2. Fever, post transplant   - now off abx   - off Maribavir   - f/u with ID     3. MM s/p SCT w/ relapse, severe pancytopenia   - per heme / onc   - planned for transfer to [facility] [place]    4. abnormal LFTs, now cholestatic with rising TBL. Likely multifactorial from sepsis and drug-induced cholestasis.  - CT abdomen and pelvis with no obvious biliary ductal dilatation     5. Chronic diarrhea, likely related to chemo +/- abx > recently more constipated   - GI PCR neg, C-diff neg on 03.03  - imodium PRN     6. DVT   - on anticoagulation    7. Hiccups--resolved   - reglan discontinued         Attending supervision statement: I have personally seen and examined the patient. I fully participated in the care of this patient. I have made amendments to the documentation where necessary, and agree with the history, physical exam, and plan as outlined by the ACP.    [facility]  Gastroenterology and Hepatology  [address] [place] [id number]  Office: [phone]   66 y/o M PMHx MM (s/p autoSCT, s/p relapse now on chemo last dose 3/3), bioprosthetic AVR (2019), splenectomy, and partial gastrectomy/pancreatectomy, presented with fever/chills. Found to be febrile on admission with CMV viremia. Now with multiorgan dysfunction, rising creatinine and bilirubin.     1. New left Psoas intramuscular hematoma seen on CT    2. Fever, post transplant   - now off abx   - off Maribavir   - f/u with ID     3. MM s/p SCT w/ relapse, severe pancytopenia   - per heme / onc   - planned for transfer to [facility] [place]    4. abnormal LFTs, now cholestatic with rising TBL. Likely multifactorial from sepsis and drug-induced cholestasis.  - CT abdomen and pelvis with no obvious biliary ductal dilatation     5. Chronic diarrhea, likely related to chemo +/- abx > recently more constipated   - GI PCR neg, C-diff neg on 03.03  - imodium PRN     6. DVT   - on anticoagulation    7. Hiccups--resolved   - reglan discontinued         Attending supervision statement: I have personally seen and examined the patient. I fully participated in the care of this patient. I have made amendments to the documentation where necessary, and agree with the history, physical exam, and plan as outlined by the ACP.    [facility]  Gastroenterology and Hepatology  [address] [place] [id number]  Office: [phone]

## 2023-04-27 NOTE — ED PROCEDURE NOTE - NS ED ATTENDING STATEMENT MOD
Render Post-Care Instructions In Note?: no
Show Applicator Variable?: Yes
Consent: The patient's consent was obtained including but not limited to risks of crusting, scabbing, blistering, scarring, darker or lighter pigmentary change, recurrence, incomplete removal and infection.
Detail Level: Detailed
Duration Of Freeze Thaw-Cycle (Seconds): 0
Post-Care Instructions: I reviewed with the patient in detail post-care instructions. Patient is to wear sunprotection, and avoid picking at any of the treated lesions. Pt may apply Vaseline to crusted or scabbing areas.
Attending with

## 2023-08-03 NOTE — PHYSICAL EXAM
[Ill-Appearing] : ill-appearing [Normal] : normal venous pressure, no carotid bruit [Normal S1, S2] : normal S1, S2 [No Murmur] : no murmur [No Gallop] : no gallop [Murmur] : murmur [Clear Lung Fields] : clear lung fields [Good Air Entry] : good air entry [No Respiratory Distress] : no respiratory distress  [Soft] : abdomen soft [Normal Gait] : normal gait [Gait - Sufficient for Exercise Testing] : gait - sufficient for exercise testing [No Edema] : no edema [No Cyanosis] : no cyanosis [No Clubbing] : no clubbing [No Varicosities] : no varicosities [No Rash] : no rash [Moves all extremities] : moves all extremities [No Focal Deficits] : no focal deficits [Normal Speech] : normal speech [Alert and Oriented] : alert and oriented [de-identified] : 2/6 systolic and diastolic murmur [de-identified] : surgical and radiation scars

## 2023-08-03 NOTE — HISTORY OF PRESENT ILLNESS
[FreeTextEntry1] : Interval History:    History: He is referred for chest pain and dyspnea on exertion. Last saw Cardiologist 2019, Dr. Dutton at [facility].  He reports retrosternal chest pain which comes and goes, especially after exerting himself (gardening or household repairs). He also notes dyspnea on exertion, when walking, which has been present for a few years but worsened in the past few months. No syncope. No palpitations. No recent stress test.   Myeloma was discovered after he presented with chest pain. 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 The most recent PET scan done a few months ago showed no evidence of new disease or skeletal involvement. He remains on lenalidomide maintenance with good control.   Other Medical History: splenectomy, partial gastrectomy, partial pancreatectomy Aortic valve [facility] (bovine, 27mm) [facility] Titanium kevin in right femur, plasmacytoma  2022: Increased metoprolol to 50 mg BID for abnormal exercise stress test. He reports he felt better after this with no further chest pain. No exertional symptoms. No change in exercise capacity. Missed last follow up appointment due to pandemic, but denies any new complaints today and reports feeling well. Remains on maintenance lenalidomide.  2023: Feels terrible, pain all over. Lost about 20 lbs since August. Also with night sweats, diaphoresis. Occasional chest discomfort, but no exertional angina. Had PET/CT and laboratory testing at [facility] [place] yesterday. To see Dr. Hubbard this week.   Cardiovascular Testing: --------------------------------------- EC2023: 2023: NSR 94 bpm, possible LAE 2022: sinus bradycardia 55 bpm 2021: NSR 60, normal ECG --------------------------------------- Echo: 9/15/2021: EF 66%, bioprosthetic AVR, Peak gradient 3 mm Hg, mean 2 mm Hg (normal), no AI --------------------------------------- Stress: 2022: MPI, mild-moderate basal inferolateral scar, mild inferoapical ischemia. EF 65% 9/15/2021: Exercise Joseluis 4 mets (fatigue), ST depressions at stress persisted into recovery ---------------------------------------

## 2023-08-03 NOTE — REASON FOR VISIT
[Spouse] : spouse [FreeTextEntry3] : Dr. Stevie MUÑOZK [FreeTextEntry1] : PETER DE LA PAZ is a 67 year old man with multiple myeloma s/p autologous SCT in 2018, HTN, HLD, and aortic aneurysm and bioprosthetic AV replacement 7/3/2019 at [facility] seen for follow up.  Prior Cancer Treatments: ------------------------------------------------------------------------ Chemo/targeted therapy: CyBorD x2 KRD + daratumumab x4 autologous SCT with melphalan prep 10/2018 lenalidomide maintenance  ------------------------------------------------------------------------ Surgery: 1/31/2018: Titanium kevin in right femur, plasmacytoma splenectomy, partial gastrectomy, partial pancreatectomy ----------------------------------------------------------------------- Radiation: 1/25/17-3/6/17 to right chest wall plasmacytoma 2018: right femur plasmacytoma (post op radiation)

## 2023-08-03 NOTE — REVIEW OF SYSTEMS
[Fever] : fever [Feeling Fatigued] : feeling fatigued [Weight Loss (___ Lbs)] : [unfilled] ~Ulb weight loss [Chest Discomfort] : chest discomfort [Joint Pain] : joint pain [Muscle Cramps] : muscle cramps [Negative] : Heme/Lymph

## 2023-08-03 NOTE — ASSESSMENT
[FreeTextEntry1] :   1. Multiple myeloma,    2. Stable ischemic heart disease: mild ischemia with preserved EF on MPI stress test in August. Chest pain resolved with metoprolol. Current symptoms more likely related to systemic process.  - he should continue maximal medical therapy with aspirin, atorvastatin, and metoprolol  3. Bioprosthetic AVR and aortic aneurysm repair. ECG today without worrisome findings and auscultation of the chest revealed soft murmurs which may be physiologic.  - he should continue aspirin, statin, and BB for BP control. -

## 2023-08-07 ENCOUNTER — APPOINTMENT (OUTPATIENT)
Dept: CARDIOLOGY | Facility: CLINIC | Age: 68
End: 2023-08-07

## 2023-09-19 NOTE — PROGRESS NOTE ADULT - ASSESSMENT
[facility] - Tele[facility] (Hospital)  Discharge Final Note    Primary Care Provider: Lucian Barry MD    Expected Discharge Date: 9/19/2023    Final Discharge Note (most recent)       Final Note - 09/19/23 1558          Final Note    Assessment Type Final Discharge Note     Anticipated Discharge Disposition Home-Health Care Svc        Post-Acute Status    Post-Acute Authorization Home Health     Home Health Status Set-up Complete/Auth obtained     Coverage [employer]     Discharge Delays None known at this time                     Important Message from Medicare  Important Message from Medicare regarding Discharge Appeal Rights: Given to patient/caregiver, Explained to patient/caregiver, Signed/date by patient/caregiver     Date IMM was signed: 09/19/23  Time IMM was signed: 0947    Contact Info       [facility]   Specialty: Home Health Services    [address] [place] [id number]   Phone: [phone]       Next Steps: Call in 3 day(s)    Instructions: home health  [facility] has been for you upon discharge. Please give them a call if no one has reached out within 3 days of discharge.          DC Disposition: [facility]  Family Notified: Patient by nurse  Transportation: personal transportation    Patient needed Home Health services set up upon discharge. Patient has [facility] set up.     Patient has resources to schedule hospital [facility].    History of bioAVR   echo unremarkable     Anemia  Monitor hemoglobin, transfuse as needed.  plan as per Heme    DVT   a/c on hold  low plts  fu with vascular surgery / heme     Hypotension   due to sepsis   better now   on midodrine

## 2023-12-11 RX ORDER — ATORVASTATIN CALCIUM 80 MG/1
1 TABLET, FILM COATED ORAL
Qty: 0 | Refills: 0 | DISCHARGE

## 2023-12-11 RX ORDER — METOPROLOL TARTRATE 50 MG
1 TABLET ORAL
Qty: 0 | Refills: 0 | DISCHARGE

## 2023-12-11 RX ORDER — PENICILLIN V POTASIUM 500 MG/1
1 TABLET OROPHARYNGEAL
Qty: 0 | Refills: 0 | DISCHARGE

## 2023-12-11 RX ORDER — LENALIDOMIDE 5 MG/1
1 CAPSULE ORAL
Qty: 0 | Refills: 0 | DISCHARGE

## 2023-12-11 RX ORDER — ASPIRIN/CALCIUM CARB/MAGNESIUM 324 MG
1 TABLET ORAL
Qty: 0 | Refills: 0 | DISCHARGE

## 2023-12-11 RX ORDER — ENTECAVIR 0.5 MG/1
1 TABLET ORAL
Qty: 0 | Refills: 0 | DISCHARGE

## 2023-12-11 RX ORDER — DEXAMETHASONE 0.5 MG/5ML
1 ELIXIR ORAL
Qty: 0 | Refills: 0 | DISCHARGE

## 2023-12-11 RX ORDER — ONDANSETRON 8 MG/1
1 TABLET, FILM COATED ORAL
Qty: 0 | Refills: 0 | DISCHARGE

## 2023-12-11 RX ORDER — MONTELUKAST 4 MG/1
1 TABLET, CHEWABLE ORAL
Qty: 0 | Refills: 0 | DISCHARGE

## 2024-06-07 NOTE — PROVIDER CONTACT NOTE (OTHER) - DATE AND TIME:
\"Have you been to the ER, urgent care clinic since your last visit?  Hospitalized since your last visit?\"    NO    “Have you seen or consulted any other health care providers outside of [facility] 24-Mar-2023 20:20

## 2024-09-05 NOTE — DIETITIAN INITIAL EVALUATION ADULT - NUTRITION CONSULT
[facility]                            Date:  9/5/2024  Patient: Les Judd  Age:  70 y.o., 1954      Reason for evaluation:         SUBJECTIVE:    Returns today follow-up assessment pacemaker sinus node dysfunction hypertension complete atrioventricular block overall doing well.  Device interrogated he is 1.5 years longevity no atrial fibrillation.  Overall doing well denies anginal chest pain dyspnea palpitations or other complaints.  Blood pressure 142/78 heart 69    Review of Systems   Constitutional: Negative.  Negative for chills, fever and unexpected weight change.   HENT: Negative.     Eyes: Negative.    Respiratory: Negative.  Negative for shortness of breath.    Cardiovascular: Negative.  Negative for chest pain.   Gastrointestinal: Negative.  Negative for diarrhea, nausea and vomiting.   Endocrine: Negative.    Genitourinary: Negative.    Musculoskeletal: Negative.    Skin: Negative.    Neurological: Negative.    All other systems reviewed and are negative.        OBJECTIVE:    BP (!) 142/78   Pulse 69   Ht 1.753 m (5' 9\")   Wt 89.8 kg (198 lb)   BMI 29.24 kg/m²     Labs:   CBC: No results for input(s): \"WBC\", \"HGB\", \"HCT\", \"PLT\" in the last 72 hours.  BMP:No results for input(s): \"NA\", \"K\", \"CO2\", \"BUN\", \"CREATININE\", \"LABGLOM\", \"GLUCOSE\" in the last 72 hours.  BNP: No results for input(s): \"BNP\" in the last 72 hours.  PT/INR: No results for input(s): \"PROTIME\", \"INR\" in the last 72 hours.  APTT:No results for input(s): \"APTT\" in the last 72 hours.  CARDIAC ENZYMES:No results for input(s): \"CKTOTAL\", \"CKMB\", \"CKMBINDEX\", \"TROPONINI\" in the last 72 hours.  FASTING LIPID PANEL:  Lab Results   Component Value Date/Time    HDL 57 12/26/2016 05:03 AM    TRIG 48 12/26/2016 05:03 AM     LIVER PROFILE:No results for input(s): \"AST\", \"ALT\", \"LABALBU\" in the last 72 hours.        Past Medical History:   Diagnosis Date    Hypertension     Other signs and symptoms involving  no

## 2025-05-11 NOTE — PROVIDER CONTACT NOTE (OTHER) - ASSESSMENT
Admission Reconciliation is Completed  Discharge Reconciliation is Not Complete Pt stated he felt cold and was shivering. No indicators of post transfusion reaction. No hives, itchiness, redness. Admission Reconciliation is Completed  Discharge Reconciliation is Completed